# Patient Record
Sex: FEMALE | Race: WHITE | Employment: OTHER | URBAN - METROPOLITAN AREA
[De-identification: names, ages, dates, MRNs, and addresses within clinical notes are randomized per-mention and may not be internally consistent; named-entity substitution may affect disease eponyms.]

---

## 2020-12-18 ENCOUNTER — OFFICE VISIT (OUTPATIENT)
Dept: CARDIOLOGY CLINIC | Age: 75
End: 2020-12-18
Payer: MEDICARE

## 2020-12-18 VITALS
HEART RATE: 65 BPM | RESPIRATION RATE: 18 BRPM | HEIGHT: 63 IN | SYSTOLIC BLOOD PRESSURE: 138 MMHG | DIASTOLIC BLOOD PRESSURE: 80 MMHG | OXYGEN SATURATION: 100 % | WEIGHT: 92 LBS | BODY MASS INDEX: 16.3 KG/M2

## 2020-12-18 DIAGNOSIS — I49.9 IRREGULAR HEART BEAT: Primary | ICD-10-CM

## 2020-12-18 PROCEDURE — G8536 NO DOC ELDER MAL SCRN: HCPCS | Performed by: SPECIALIST

## 2020-12-18 PROCEDURE — G8510 SCR DEP NEG, NO PLAN REQD: HCPCS | Performed by: SPECIALIST

## 2020-12-18 PROCEDURE — 93005 ELECTROCARDIOGRAM TRACING: CPT | Performed by: SPECIALIST

## 2020-12-18 PROCEDURE — G8400 PT W/DXA NO RESULTS DOC: HCPCS | Performed by: SPECIALIST

## 2020-12-18 PROCEDURE — G8427 DOCREV CUR MEDS BY ELIG CLIN: HCPCS | Performed by: SPECIALIST

## 2020-12-18 PROCEDURE — 3288F FALL RISK ASSESSMENT DOCD: CPT | Performed by: SPECIALIST

## 2020-12-18 PROCEDURE — 99203 OFFICE O/P NEW LOW 30 MIN: CPT | Performed by: SPECIALIST

## 2020-12-18 PROCEDURE — 1100F PTFALLS ASSESS-DOCD GE2>/YR: CPT | Performed by: SPECIALIST

## 2020-12-18 PROCEDURE — G8419 CALC BMI OUT NRM PARAM NOF/U: HCPCS | Performed by: SPECIALIST

## 2020-12-18 PROCEDURE — 1090F PRES/ABSN URINE INCON ASSESS: CPT | Performed by: SPECIALIST

## 2020-12-18 PROCEDURE — 3017F COLORECTAL CA SCREEN DOC REV: CPT | Performed by: SPECIALIST

## 2020-12-18 PROCEDURE — 93010 ELECTROCARDIOGRAM REPORT: CPT | Performed by: SPECIALIST

## 2020-12-18 PROCEDURE — G0463 HOSPITAL OUTPT CLINIC VISIT: HCPCS | Performed by: SPECIALIST

## 2020-12-18 RX ORDER — BUTALBITAL, ACETAMINOPHEN AND CAFFEINE 50; 325; 40 MG/1; MG/1; MG/1
1-2 TABLET ORAL
COMMUNITY
Start: 2020-12-15 | End: 2022-09-16

## 2020-12-18 RX ORDER — METOPROLOL SUCCINATE 25 MG/1
25 TABLET, EXTENDED RELEASE ORAL EVERY MORNING
Status: ON HOLD | COMMUNITY
End: 2022-09-13

## 2020-12-18 RX ORDER — METOCLOPRAMIDE 10 MG/1
10 TABLET ORAL
Status: ON HOLD | COMMUNITY
Start: 2020-12-15 | End: 2021-12-16

## 2020-12-18 RX ORDER — DICLOFENAC SODIUM 75 MG/1
75 TABLET, DELAYED RELEASE ORAL 2 TIMES DAILY
Status: ON HOLD | COMMUNITY
Start: 2020-12-15 | End: 2021-12-16

## 2020-12-18 RX ORDER — DIGOXIN 250 MCG
0.25 TABLET ORAL DAILY
COMMUNITY
End: 2021-11-15

## 2020-12-18 RX ORDER — ASPIRIN 81 MG/1
325 TABLET ORAL DAILY
COMMUNITY
End: 2022-02-04

## 2020-12-18 RX ORDER — PANTOPRAZOLE SODIUM 40 MG/1
40 TABLET, DELAYED RELEASE ORAL DAILY
COMMUNITY
Start: 2020-12-15

## 2020-12-18 RX ORDER — LACTULOSE 10 G/15ML
10 SOLUTION ORAL; RECTAL DAILY
COMMUNITY
Start: 2020-12-15 | End: 2022-09-16

## 2020-12-18 NOTE — PROGRESS NOTES
[address] [facility]                                                            OFFICE NOTE        An Flores M.D.,MARCOS            Nereyda [place]   1945  [id number]    Date/Time:  12/18/20203:08 PM        ICD-10-CM ICD-9-CM    1. Irregular heart beat  I49.9 427.9 AMB POC EKG ROUTINE W/ 12 LEADS, INTER & REP         SUBJECTIVE:    Doing well no cp or sob or palpitations reported     Assessment/Plan  1. Paroxysmal atrial fibrillation: She seems to be completely asymptomatic at this time. Her electrocardio reveals a normal sinus rhythm with nonspecific ST-T wave changes. ST segment depression may be related to the use of digoxin. We have discussed digoxin and Toprol use. This will be continued for now. She tells me that she was previously on Eliquis but this had to be stopped secondary to bleeding. She now on aspirin a day only. For now no additional cardiac interventions. I will request records from [place]. Echo on hold. Her blood pressure seems to be controlled. She is closely followed by primary care physician. Otherwise see her back in 3 months. HPI   Very pleasant 69years old lady with a past medical history remarkable for paroxysmal atrial fibrillation which she tells me she has been having since the 1980s. She has recently relocated from [place] to [place] where her family is and she is currently living with her son. She has been followed by cardiology in [place]. She tells me that she has had an episode of congestive heart failure many years ago possibly related to atrial fibrillation rapid ventricular response which was then resolved by echo 2 or 3 years ago. Records not yet available at this time. She also has a history of gastroesophageal reflux disease. She quit smoking 2011. She does not drink.   She is not very active on account of unsteady gait. CARDIAC STUDIES                                       EKG Results     Procedure 720 Value Units Date/Time    AMB POC EKG ROUTINE W/ 12 LEADS, INTER & REP [[id number]] Resulted: 12/18/20 1457    Order Status: Completed Updated: 12/18/20 1500              IMAGING      MRI Results (most recent):  No results found for this or any previous visit. CT Results (most recent):  No results found for this or any previous visit. XR Results (most recent):  No results found for this or any previous visit. No past medical history on file. No past surgical history on file. Social History     Tobacco Use    Smoking status: Former Smoker     Quit date: 12/18/2020    Smokeless tobacco: Never Used   Substance Use Topics    Alcohol use: Never     Frequency: Never     Binge frequency: Never    Drug use: Never     No family history on file. Allergies   Allergen Reactions    Cefuroxime Hives    Hydrocodone Nausea and Vomiting    Oxycodone Nausea and Vomiting    Penicillins Hives         Visit Vitals  /80 (BP 1 Location: Left arm, BP Patient Position: Sitting)   Pulse 65   Resp 18   Ht 5' 3\" (1.6 m)   Wt 92 lb (41.7 kg)   SpO2 100%   BMI 16.30 kg/m²         Last 3 Recorded Weights in this Encounter    12/18/20 1444   Weight: 92 lb (41.7 kg)            Review of Systems:   Pertinent items are noted in the History of Present Illness. Neck: no JVD  Heart: regular rate and rhythm  Lungs: clear to auscultation bilaterally  Abdomen: soft, non-tender. Bowel sounds normal. No masses,  no organomegaly  Extremities: no edema      Current Outpatient Medications on File Prior to Visit   Medication Sig Dispense Refill    aspirin delayed-release 81 mg tablet Take 81 mg by mouth daily.  butalbital-acetaminophen-caffeine (FIORICET, ESGIC) -40 mg per tablet Take 1-2 Tabs by mouth every six (6) hours as needed.       diclofenac EC (VOLTAREN) 75 mg EC tablet Take 75 mg by mouth two (2) times a day.  digoxin (LANOXIN) 0.25 mg tablet Take 0.25 mg by mouth daily.  lactulose (CHRONULAC) 10 gram/15 mL solution Take 20 g by mouth two (2) times daily as needed.  metoclopramide HCl (REGLAN) 10 mg tablet Take 10 mg by mouth every eight (8) hours as needed.  metoprolol succinate (TOPROL-XL) 25 mg XL tablet Take 25 mg by mouth daily.  pantoprazole (PROTONIX) 40 mg tablet Take 40 mg by mouth daily. No current facility-administered medications on file prior to visit. Manda Schulz had no medications administered during this visit. Current Outpatient Medications   Medication Sig    aspirin delayed-release 81 mg tablet Take 81 mg by mouth daily.  butalbital-acetaminophen-caffeine (FIORICET, ESGIC) -40 mg per tablet Take 1-2 Tabs by mouth every six (6) hours as needed.  diclofenac EC (VOLTAREN) 75 mg EC tablet Take 75 mg by mouth two (2) times a day.  digoxin (LANOXIN) 0.25 mg tablet Take 0.25 mg by mouth daily.  lactulose (CHRONULAC) 10 gram/15 mL solution Take 20 g by mouth two (2) times daily as needed.  metoclopramide HCl (REGLAN) 10 mg tablet Take 10 mg by mouth every eight (8) hours as needed.  metoprolol succinate (TOPROL-XL) 25 mg XL tablet Take 25 mg by mouth daily.  pantoprazole (PROTONIX) 40 mg tablet Take 40 mg by mouth daily. No current facility-administered medications for this visit.           No results found for: CHOL, CHOLPOCT, CHOLX, CHLST, CHOLV, HDL, HDLPOC, HDLP, LDL, LDLCPOC, LDLC, DLDLP, VLDLC, VLDL, TGLX, TRIGL, TRIGP, TGLPOCT, CHHD, CHHDX    No results found for: NA, K, CL, CO2, AGAP, GLU, BUN, CREA, BUCR, GFRAA, GFRNA, CA, GFRAA    No results found for: ALT, GGT, GGTP, AP, APIT, APX, CBIL, TBIL, TBILI    No results found for: WBC, WBCLT, HGBPOC, HGB, HGBP, HCTPOC, HCT, PHCT, RBCH, PLT, MCV, HGBEXT, HCTEXT, PLTEXT    No results found for: TSH, TSH2, TSH3, TSHP, TSHEXT      No results found for: CHOL, CHOLPOCT, CHOLX, CHLST, CHOLV, HDL, HDLP, LDL, LDLC, DLDLP, TGLX, TRIGL, TRIGP, TGLPOCT, NTGLT, CHHD, CHHDX             Please note that this dictation was completed with [email or web address], the computer voice recognition software. Quite often unanticipated grammatical, syntax, homophones, and other interpretative errors are inadvertently transcribed by the computer software. Please disregard these errors. Please excuse any errors that have escaped final proofreading.

## 2020-12-18 NOTE — PROGRESS NOTES
.  Visit Vitals  /80 (BP 1 Location: Left arm, BP Patient Position: Sitting)   Pulse 65   Resp 18   Ht 5' 3\" (1.6 m)   Wt 92 lb (41.7 kg)   SpO2 100%   BMI 16.30 kg/m²

## 2021-03-12 ENCOUNTER — OFFICE VISIT (OUTPATIENT)
Dept: CARDIOLOGY CLINIC | Age: 76
End: 2021-03-12
Payer: MEDICARE

## 2021-03-12 VITALS
BODY MASS INDEX: 16.48 KG/M2 | DIASTOLIC BLOOD PRESSURE: 70 MMHG | RESPIRATION RATE: 18 BRPM | HEART RATE: 66 BPM | SYSTOLIC BLOOD PRESSURE: 110 MMHG | OXYGEN SATURATION: 97 % | HEIGHT: 63 IN | WEIGHT: 93 LBS

## 2021-03-12 DIAGNOSIS — I49.9 IRREGULAR HEART BEAT: ICD-10-CM

## 2021-03-12 DIAGNOSIS — I10 ESSENTIAL HYPERTENSION: Primary | ICD-10-CM

## 2021-03-12 PROCEDURE — G8752 SYS BP LESS 140: HCPCS | Performed by: SPECIALIST

## 2021-03-12 PROCEDURE — G8510 SCR DEP NEG, NO PLAN REQD: HCPCS | Performed by: SPECIALIST

## 2021-03-12 PROCEDURE — G8400 PT W/DXA NO RESULTS DOC: HCPCS | Performed by: SPECIALIST

## 2021-03-12 PROCEDURE — 1100F PTFALLS ASSESS-DOCD GE2>/YR: CPT | Performed by: SPECIALIST

## 2021-03-12 PROCEDURE — G8754 DIAS BP LESS 90: HCPCS | Performed by: SPECIALIST

## 2021-03-12 PROCEDURE — G8427 DOCREV CUR MEDS BY ELIG CLIN: HCPCS | Performed by: SPECIALIST

## 2021-03-12 PROCEDURE — 3288F FALL RISK ASSESSMENT DOCD: CPT | Performed by: SPECIALIST

## 2021-03-12 PROCEDURE — 99214 OFFICE O/P EST MOD 30 MIN: CPT | Performed by: SPECIALIST

## 2021-03-12 PROCEDURE — G8419 CALC BMI OUT NRM PARAM NOF/U: HCPCS | Performed by: SPECIALIST

## 2021-03-12 PROCEDURE — 1090F PRES/ABSN URINE INCON ASSESS: CPT | Performed by: SPECIALIST

## 2021-03-12 PROCEDURE — G8536 NO DOC ELDER MAL SCRN: HCPCS | Performed by: SPECIALIST

## 2021-03-12 PROCEDURE — G0463 HOSPITAL OUTPT CLINIC VISIT: HCPCS | Performed by: SPECIALIST

## 2021-03-12 PROCEDURE — 3017F COLORECTAL CA SCREEN DOC REV: CPT | Performed by: SPECIALIST

## 2021-03-12 NOTE — PROGRESS NOTES
.  Visit Vitals  /70 (BP 1 Location: Left upper arm, BP Patient Position: Sitting, BP Cuff Size: Adult)   Pulse 66   Resp 18   Ht 5' 3\" (1.6 m)   Wt 93 lb (42.2 kg)   SpO2 97%   BMI 16.47 kg/m²

## 2021-03-12 NOTE — PROGRESS NOTES
[address] [facility]                                                            OFFICE NOTE        Mini Montejo M.D.,MARCOS            [place]   1945  [id number]    Date/Time:  3/12/630411:47 PM      SUBJECTIVE:  Doing well no cp or sob or palpitations   Somewhat sedentary on account of hips and knees pain       Assessment/Plan    1. Paroxysmal atrial fibrillation: She remains completely asymptomatic. Her clinical examination today reveals a perfectly regular rhythm and rate. No recurrent atrial fibrillation thus far. Continue with Toprol and digoxin for now. The patient is aware of potential toxicity of digoxin nonetheless. She tells me that she was previously on Eliquis which was stopped on account of bleeding. She is on aspirin only at this time. She is aware of potential CVA occurring with paroxysmal atrial fibrillation. Proceed with echocardiogram at the next office visit to evaluate ejection fraction. Her blood pressure seems to be controlled. Her cholesterol is closely followed by primary care physician. Otherwise she will continue same activity although I encouraged her to try to stay a little more active and I will see her back in 6 months after echocardiogram.      HPI   Very pleasant 77years old lady with a past medical history remarkable for paroxysmal atrial fibrillation which she tells me she has been having since the 46s.     She has recently relocated from [place] to [place] where her family is and she is currently living with her son.     She has been followed by cardiology in [place].   She tells me that she has had an episode of congestive heart failure many years ago possibly related to atrial fibrillation rapid ventricular response which was then resolved by echo 2 or 3 years ago.     She also has a history of gastroesophageal reflux disease.     She quit smoking . She does not drink. She is not very active on account of unsteady gait. CARDIAC STUDIES                                       EKG Results     None              IMAGING      MRI Results (most recent):  No results found for this or any previous visit. CT Results (most recent):  No results found for this or any previous visit. XR Results (most recent):  No results found for this or any previous visit. No past medical history on file. No past surgical history on file. Social History     Tobacco Use    Smoking status: Former Smoker     Quit date: 2020     Years since quittin.2    Smokeless tobacco: Never Used   Substance Use Topics    Alcohol use: Never     Frequency: Never     Binge frequency: Never    Drug use: Never     No family history on file. Allergies   Allergen Reactions    Cefuroxime Hives    Hydrocodone Nausea and Vomiting    Oxycodone Nausea and Vomiting    Penicillins Hives         There were no vitals taken for this visit. There were no vitals filed for this visit. Review of Systems:   Pertinent items are noted in the History of Present Illness. Visit Vitals  /70 (BP 1 Location: Left upper arm, BP Patient Position: Sitting, BP Cuff Size: Adult)   Pulse 66   Resp 18   Ht 5' 3\" (1.6 m)   Wt 93 lb (42.2 kg)   SpO2 97%   BMI 16.47 kg/m²     General Appearance:  Well developed, well nourished,alert and oriented x 3, and individual in no acute distress. Ears/Nose/Mouth/Throat:   Hearing grossly normal.         Neck: Supple. Chest:   Lungs clear to auscultation bilaterally. Cardiovascular:  Regular rate and rhythm, S1, S2 normal, no murmur. Abdomen:   Soft, non-tender, bowel sounds are active. Extremities: No edema bilaterally. Skin: Warm and dry. Current Outpatient Medications on File Prior to Visit   Medication Sig Dispense Refill    aspirin delayed-release 81 mg tablet Take 81 mg by mouth daily.  butalbital-acetaminophen-caffeine (FIORICET, ESGIC) -40 mg per tablet Take 1-2 Tabs by mouth every six (6) hours as needed.  diclofenac EC (VOLTAREN) 75 mg EC tablet Take 75 mg by mouth two (2) times a day.  digoxin (LANOXIN) 0.25 mg tablet Take 0.25 mg by mouth daily.  lactulose (CHRONULAC) 10 gram/15 mL solution Take 20 g by mouth two (2) times daily as needed.  metoclopramide HCl (REGLAN) 10 mg tablet Take 10 mg by mouth every eight (8) hours as needed.  metoprolol succinate (TOPROL-XL) 25 mg XL tablet Take 25 mg by mouth daily.  pantoprazole (PROTONIX) 40 mg tablet Take 40 mg by mouth daily. No current facility-administered medications on file prior to visit. Marshall Lerner had no medications administered during this visit. Current Outpatient Medications   Medication Sig    aspirin delayed-release 81 mg tablet Take 81 mg by mouth daily.  butalbital-acetaminophen-caffeine (FIORICET, ESGIC) -40 mg per tablet Take 1-2 Tabs by mouth every six (6) hours as needed.  diclofenac EC (VOLTAREN) 75 mg EC tablet Take 75 mg by mouth two (2) times a day.  digoxin (LANOXIN) 0.25 mg tablet Take 0.25 mg by mouth daily.  lactulose (CHRONULAC) 10 gram/15 mL solution Take 20 g by mouth two (2) times daily as needed.  metoclopramide HCl (REGLAN) 10 mg tablet Take 10 mg by mouth every eight (8) hours as needed.  metoprolol succinate (TOPROL-XL) 25 mg XL tablet Take 25 mg by mouth daily.  pantoprazole (PROTONIX) 40 mg tablet Take 40 mg by mouth daily. No current facility-administered medications for this visit.           No results found for: CHOL, CHOLPOCT, CHOLX, CHLST, CHOLV, HDL, HDLPOC, HDLP, LDL, LDLCPOC, LDLC, DLDLP, VLDLC, VLDL, TGLX, TRIGL, TRIGP, TGLPOCT, CHHD, CHHDX    No results found for: NA, K, CL, CO2, AGAP, GLU, BUN, CREA, BUCR, GFRAA, GFRNA, CA, GFRAA    No results found for: ALT, GGT, GGTP, AP, APIT, APX, CBIL, TBIL, TBILI    No results found for: WBC, WBCLT, HGBPOC, HGB, HGBP, HCTPOC, HCT, PHCT, RBCH, PLT, MCV, HGBEXT, HCTEXT, PLTEXT    No results found for: TSH, TSH2, TSH3, TSHP, TSHEXT      No results found for: CHOL, CHOLPOCT, CHOLX, CHLST, CHOLV, HDL, HDLP, LDL, LDLC, DLDLP, TGLX, TRIGL, TRIGP, TGLPOCT, NTGLT, CHHD, CHHDX             Please note that this dictation was completed with [employer], the [employer] voice recognition software. Quite often unanticipated grammatical, syntax, homophones, and other interpretative errors are inadvertently transcribed by the computer software. Please disregard these errors. Please excuse any errors that have escaped final proofreading.

## 2021-08-30 ENCOUNTER — HOSPITAL ENCOUNTER (EMERGENCY)
Age: 76
Discharge: HOME | End: 2021-08-31
Attending: EMERGENCY MEDICINE | Admitting: EMERGENCY MEDICINE
Payer: MEDICARE

## 2021-08-30 DIAGNOSIS — E87.6 HYPOKALEMIA: Primary | ICD-10-CM

## 2021-08-30 DIAGNOSIS — K21.9 GASTROESOPHAGEAL REFLUX DISEASE, UNSPECIFIED WHETHER ESOPHAGITIS PRESENT: ICD-10-CM

## 2021-08-30 LAB
ALBUMIN SERPL-MCNC: 3.7 G/DL (ref 3.5–5)
ALBUMIN/GLOB SERPL: 0.9 {RATIO} (ref 1.1–2.2)
ALP SERPL-CCNC: 142 U/L (ref 45–117)
ALT SERPL-CCNC: 14 U/L (ref 12–78)
ANION GAP SERPL CALC-SCNC: 9 MMOL/L (ref 5–15)
AST SERPL-CCNC: 14 U/L (ref 15–37)
BASOPHILS # BLD: 0.1 K/UL (ref 0–0.1)
BASOPHILS NFR BLD: 1 % (ref 0–1)
BILIRUB SERPL-MCNC: 0.2 MG/DL (ref 0.2–1)
BUN SERPL-MCNC: 6 MG/DL (ref 6–20)
BUN/CREAT SERPL: 7 (ref 12–20)
CALCIUM SERPL-MCNC: 8.7 MG/DL (ref 8.5–10.1)
CHLORIDE SERPL-SCNC: 103 MMOL/L (ref 97–108)
CO2 SERPL-SCNC: 25 MMOL/L (ref 21–32)
COMMENT, HOLDF: NORMAL
CREAT SERPL-MCNC: 0.85 MG/DL (ref 0.55–1.02)
DIFFERENTIAL METHOD BLD: ABNORMAL
EOSINOPHIL # BLD: 0.1 K/UL (ref 0–0.4)
EOSINOPHIL NFR BLD: 2 % (ref 0–7)
ERYTHROCYTE [DISTWIDTH] IN BLOOD BY AUTOMATED COUNT: 18.2 % (ref 11.5–14.5)
GLOBULIN SER CALC-MCNC: 4.1 G/DL (ref 2–4)
GLUCOSE SERPL-MCNC: 90 MG/DL (ref 65–100)
HCT VFR BLD AUTO: 28.5 % (ref 35–47)
HGB BLD-MCNC: 8.6 G/DL (ref 11.5–16)
IMM GRANULOCYTES # BLD AUTO: 0 K/UL (ref 0–0.04)
IMM GRANULOCYTES NFR BLD AUTO: 0 % (ref 0–0.5)
LYMPHOCYTES # BLD: 2.4 K/UL (ref 0.8–3.5)
LYMPHOCYTES NFR BLD: 34 % (ref 12–49)
MCH RBC QN AUTO: 21.7 PG (ref 26–34)
MCHC RBC AUTO-ENTMCNC: 30.2 G/DL (ref 30–36.5)
MCV RBC AUTO: 72 FL (ref 80–99)
MONOCYTES # BLD: 0.4 K/UL (ref 0–1)
MONOCYTES NFR BLD: 6 % (ref 5–13)
NEUTS SEG # BLD: 4 K/UL (ref 1.8–8)
NEUTS SEG NFR BLD: 57 % (ref 32–75)
NRBC # BLD: 0 K/UL (ref 0–0.01)
NRBC BLD-RTO: 0 PER 100 WBC
PLATELET # BLD AUTO: 460 K/UL (ref 150–400)
PMV BLD AUTO: 9.2 FL (ref 8.9–12.9)
POTASSIUM SERPL-SCNC: 2.8 MMOL/L (ref 3.5–5.1)
PROT SERPL-MCNC: 7.8 G/DL (ref 6.4–8.2)
RBC # BLD AUTO: 3.96 M/UL (ref 3.8–5.2)
SAMPLES BEING HELD,HOLD: NORMAL
SODIUM SERPL-SCNC: 137 MMOL/L (ref 136–145)
TROPONIN I SERPL-MCNC: <0.05 NG/ML
WBC # BLD AUTO: 7.1 K/UL (ref 3.6–11)

## 2021-08-30 PROCEDURE — 74011250637 HC RX REV CODE- 250/637: Performed by: EMERGENCY MEDICINE

## 2021-08-30 PROCEDURE — 99285 EMERGENCY DEPT VISIT HI MDM: CPT

## 2021-08-30 PROCEDURE — 96375 TX/PRO/DX INJ NEW DRUG ADDON: CPT

## 2021-08-30 PROCEDURE — C9113 INJ PANTOPRAZOLE SODIUM, VIA: HCPCS | Performed by: EMERGENCY MEDICINE

## 2021-08-30 PROCEDURE — 74011000250 HC RX REV CODE- 250: Performed by: EMERGENCY MEDICINE

## 2021-08-30 PROCEDURE — 80053 COMPREHEN METABOLIC PANEL: CPT

## 2021-08-30 PROCEDURE — 84484 ASSAY OF TROPONIN QUANT: CPT

## 2021-08-30 PROCEDURE — 96374 THER/PROPH/DIAG INJ IV PUSH: CPT

## 2021-08-30 PROCEDURE — 85025 COMPLETE CBC W/AUTO DIFF WBC: CPT

## 2021-08-30 PROCEDURE — 96361 HYDRATE IV INFUSION ADD-ON: CPT

## 2021-08-30 PROCEDURE — 74011250636 HC RX REV CODE- 250/636: Performed by: EMERGENCY MEDICINE

## 2021-08-30 RX ORDER — POTASSIUM CHLORIDE 7.45 MG/ML
10 INJECTION INTRAVENOUS ONCE
Status: COMPLETED | OUTPATIENT
Start: 2021-08-30 | End: 2021-08-31

## 2021-08-30 RX ORDER — FAMOTIDINE 20 MG/1
20 TABLET, FILM COATED ORAL 2 TIMES DAILY
Qty: 20 TABLET | Refills: 0 | Status: SHIPPED | OUTPATIENT
Start: 2021-08-30 | End: 2021-09-09

## 2021-08-30 RX ORDER — ONDANSETRON 2 MG/ML
4 INJECTION INTRAMUSCULAR; INTRAVENOUS
Status: COMPLETED | OUTPATIENT
Start: 2021-08-30 | End: 2021-08-30

## 2021-08-30 RX ORDER — POTASSIUM CHLORIDE 1500 MG/1
40 TABLET, FILM COATED, EXTENDED RELEASE ORAL DAILY
Qty: 6 TABLET | Refills: 0 | Status: SHIPPED | OUTPATIENT
Start: 2021-08-30 | End: 2021-09-02

## 2021-08-30 RX ORDER — POTASSIUM CHLORIDE 750 MG/1
40 TABLET, FILM COATED, EXTENDED RELEASE ORAL
Status: COMPLETED | OUTPATIENT
Start: 2021-08-30 | End: 2021-08-30

## 2021-08-30 RX ADMIN — POTASSIUM CHLORIDE 10 MEQ: 7.46 INJECTION, SOLUTION INTRAVENOUS at 22:05

## 2021-08-30 RX ADMIN — ALUMINUM HYDROXIDE, MAGNESIUM HYDROXIDE, AND SIMETHICONE 40 ML: 200; 200; 20 SUSPENSION ORAL at 22:15

## 2021-08-30 RX ADMIN — SODIUM CHLORIDE 1000 ML: 9 INJECTION, SOLUTION INTRAVENOUS at 22:01

## 2021-08-30 RX ADMIN — ONDANSETRON 4 MG: 2 INJECTION INTRAMUSCULAR; INTRAVENOUS at 22:02

## 2021-08-30 RX ADMIN — PANTOPRAZOLE SODIUM 40 MG: 40 INJECTION, POWDER, FOR SOLUTION INTRAVENOUS at 22:02

## 2021-08-30 RX ADMIN — POTASSIUM CHLORIDE 40 MEQ: 750 TABLET, FILM COATED, EXTENDED RELEASE ORAL at 22:11

## 2021-08-31 VITALS
DIASTOLIC BLOOD PRESSURE: 71 MMHG | OXYGEN SATURATION: 98 % | SYSTOLIC BLOOD PRESSURE: 142 MMHG | TEMPERATURE: 97.8 F | HEART RATE: 86 BPM | RESPIRATION RATE: 16 BRPM

## 2021-08-31 NOTE — DISCHARGE INSTRUCTIONS
Your potassium was low today and we gave you IVF. Please take pepcid for your heart burn and follow up with GI. Also take potassium pills as prescribed. Thank you.

## 2021-08-31 NOTE — ED PROVIDER NOTES
Is a 76-year-old female with history of atrial fibrillation, eating disorder, hyponatremia resulting in seizures last year, chronic nausea who presents with acute exacerbation of her nausea, decreased p.o. intake, concern for dehydration. Patient reports that her electrolytes were abnormal last year resulting in admission to outside hospital.  She wants to make sure that her electrolytes are fine and that she gets some IV fluids because she is dehydrated. Patient reports that she moved from [place] last year right before the admission to the outside hospital for seizures and hyponatremia. Reports that she has not seen a GI specialist in [place] yet but would like a referral.  Denies any abdominal pain, urinary or vaginal symptoms, change in bowel habits. Past Medical History:   Diagnosis Date    Atrial fibrillation [facility])        Past Surgical History:   Procedure Laterality Date    HX  SECTION      HX CHOLECYSTECTOMY      HX HIP REPLACEMENT Left     HX HIP REPLACEMENT Right     HX HYSTERECTOMY      HX TONSILLECTOMY           No family history on file.     Social History     Socioeconomic History    Marital status: SINGLE     Spouse name: Not on file    Number of children: Not on file    Years of education: Not on file    Highest education level: Not on file   Occupational History    Not on file   Tobacco Use    Smoking status: Former Smoker     Quit date: 2020     Years since quittin.6    Smokeless tobacco: Never Used   Substance and Sexual Activity    Alcohol use: Never    Drug use: Never    Sexual activity: Not on file   Other Topics Concern    Not on file   Social History Narrative    Not on file     Social Determinants of Health     Financial Resource Strain:     Difficulty of Paying Living Expenses:    Food Insecurity:     Worried About Running Out of Food in the Last Year:     [address] in the Last Year:    Transportation Needs:     Lack of Transportation (Medical):  Lack of Transportation (Non-Medical):    Physical Activity:     Days of Exercise per Week:     Minutes of Exercise per Session:    Stress:     Feeling of Stress :    Social Connections:     Frequency of Communication with Friends and Family:     Frequency of Social Gatherings with Friends and Family:     Attends [employer] Services:     Active Member of Clubs or Organizations:     Attends Club or Organization Meetings:     Marital Status:    Intimate Partner Violence:     Fear of Current or Ex-Partner:     Emotionally Abused:     Physically Abused:     Sexually Abused: ALLERGIES: Cefuroxime, Hydrocodone, Oxycodone, and Penicillins    Review of Systems   Constitutional: Positive for fatigue. Negative for chills and fever. HENT: Negative for drooling and nosebleeds. Eyes: Negative for pain and itching. Respiratory: Negative for choking and stridor. Cardiovascular: Negative for leg swelling. Gastrointestinal: Positive for nausea. Negative for abdominal distention and rectal pain. Endocrine: Negative for heat intolerance and polyphagia. Genitourinary: Negative for enuresis and genital sores. Musculoskeletal: Negative for arthralgias and joint swelling. Skin: Negative for color change. Allergic/Immunologic: Negative for immunocompromised state. Neurological: Positive for light-headedness. Negative for tremors and speech difficulty. Hematological: Negative for adenopathy. Psychiatric/Behavioral: Negative for dysphoric mood and sleep disturbance. Vitals:    08/30/21 1818 08/30/21 2103   BP: 122/66 114/67   Pulse: 72 86   Resp: 16 16   Temp: 98.6 °F (37 °C) 97.8 °F (36.6 °C)   SpO2: 97% 98%            Physical Exam  Vitals and nursing note reviewed. Constitutional:       General: She is not in acute distress. Appearance: She is well-developed. She is not toxic-appearing or diaphoretic. HENT:      Head: Normocephalic.       Nose: Nose normal.      Mouth/Throat:      Mouth: Mucous membranes are dry. Eyes:      Conjunctiva/sclera: Conjunctivae normal.   Cardiovascular:      Rate and Rhythm: Normal rate and regular rhythm. Heart sounds: Normal heart sounds. Pulmonary:      Effort: Pulmonary effort is normal. No respiratory distress. Breath sounds: Normal breath sounds. Abdominal:      General: There is no distension. Palpations: Abdomen is soft. Tenderness: There is no abdominal tenderness. Musculoskeletal:         General: No deformity. Normal range of motion. Cervical back: Normal range of motion and neck supple. Skin:     General: Skin is warm and dry. Neurological:      Mental Status: She is alert and oriented to person, place, and time. Coordination: Coordination normal.   Psychiatric:         Behavior: Behavior normal.          MDM  Number of Diagnoses or Management Options  Gastroesophageal reflux disease, unspecified whether esophagitis present  Hypokalemia  Diagnosis management comments: No concern for hyponatremia today. Potassium repleted and IV fluids administered. Will provide prescription for potassium and Pepcid to add on to current GERD regimen. Referral for GI provided. Patient is stable for discharge at this time. Procedures    Patient's results have been reviewed with them. Patient and/or family have verbally conveyed their understanding and agreement of the patient's signs, symptoms, diagnosis, treatment and prognosis and additionally agree to follow up as recommended or return to the Emergency Room should their condition change prior to follow-up. Discharge instructions have also been provided to the patient with some educational information regarding their diagnosis as well a list of reasons why they would want to return to the ER prior to their follow-up appointment should their condition change.

## 2021-09-04 ENCOUNTER — HOSPITAL ENCOUNTER (EMERGENCY)
Age: 76
Discharge: HOME | End: 2021-09-04
Attending: EMERGENCY MEDICINE
Payer: MEDICARE

## 2021-09-04 ENCOUNTER — APPOINTMENT (OUTPATIENT)
Dept: GENERAL RADIOLOGY | Age: 76
End: 2021-09-04
Attending: EMERGENCY MEDICINE
Payer: MEDICARE

## 2021-09-04 VITALS
SYSTOLIC BLOOD PRESSURE: 120 MMHG | TEMPERATURE: 98.3 F | DIASTOLIC BLOOD PRESSURE: 64 MMHG | BODY MASS INDEX: 15.95 KG/M2 | HEIGHT: 63 IN | HEART RATE: 85 BPM | WEIGHT: 90 LBS | OXYGEN SATURATION: 100 % | RESPIRATION RATE: 16 BRPM

## 2021-09-04 DIAGNOSIS — R06.09 DYSPNEA ON EXERTION: ICD-10-CM

## 2021-09-04 DIAGNOSIS — D50.9 IRON DEFICIENCY ANEMIA, UNSPECIFIED IRON DEFICIENCY ANEMIA TYPE: Primary | ICD-10-CM

## 2021-09-04 DIAGNOSIS — T50.905A DRUG-INDUCED HYPERKALEMIA: ICD-10-CM

## 2021-09-04 DIAGNOSIS — E87.5 DRUG-INDUCED HYPERKALEMIA: ICD-10-CM

## 2021-09-04 LAB
ALBUMIN SERPL-MCNC: 3.5 G/DL (ref 3.5–5)
ALBUMIN/GLOB SERPL: 0.9 {RATIO} (ref 1.1–2.2)
ALP SERPL-CCNC: 121 U/L (ref 45–117)
ALT SERPL-CCNC: 19 U/L (ref 12–78)
ANION GAP SERPL CALC-SCNC: 9 MMOL/L (ref 5–15)
AST SERPL-CCNC: 14 U/L (ref 15–37)
BASOPHILS # BLD: 0.1 K/UL (ref 0–0.1)
BASOPHILS NFR BLD: 1 % (ref 0–1)
BILIRUB SERPL-MCNC: 0.2 MG/DL (ref 0.2–1)
BNP SERPL-MCNC: 515 PG/ML
BUN SERPL-MCNC: 11 MG/DL (ref 6–20)
BUN/CREAT SERPL: 11 (ref 12–20)
CALCIUM SERPL-MCNC: 9 MG/DL (ref 8.5–10.1)
CHLORIDE SERPL-SCNC: 105 MMOL/L (ref 97–108)
CO2 SERPL-SCNC: 20 MMOL/L (ref 21–32)
COMMENT, HOLDF: NORMAL
CREAT SERPL-MCNC: 0.98 MG/DL (ref 0.55–1.02)
DIFFERENTIAL METHOD BLD: ABNORMAL
EOSINOPHIL # BLD: 0.2 K/UL (ref 0–0.4)
EOSINOPHIL NFR BLD: 3 % (ref 0–7)
ERYTHROCYTE [DISTWIDTH] IN BLOOD BY AUTOMATED COUNT: 18.2 % (ref 11.5–14.5)
GLOBULIN SER CALC-MCNC: 3.9 G/DL (ref 2–4)
GLUCOSE SERPL-MCNC: 107 MG/DL (ref 65–100)
HCT VFR BLD AUTO: 26.5 % (ref 35–47)
HGB BLD-MCNC: 7.9 G/DL (ref 11.5–16)
IMM GRANULOCYTES # BLD AUTO: 0 K/UL (ref 0–0.04)
IMM GRANULOCYTES NFR BLD AUTO: 1 % (ref 0–0.5)
LYMPHOCYTES # BLD: 2.1 K/UL (ref 0.8–3.5)
LYMPHOCYTES NFR BLD: 25 % (ref 12–49)
MAGNESIUM SERPL-MCNC: 1.9 MG/DL (ref 1.6–2.4)
MCH RBC QN AUTO: 21.4 PG (ref 26–34)
MCHC RBC AUTO-ENTMCNC: 29.8 G/DL (ref 30–36.5)
MCV RBC AUTO: 71.8 FL (ref 80–99)
MONOCYTES # BLD: 0.9 K/UL (ref 0–1)
MONOCYTES NFR BLD: 11 % (ref 5–13)
NEUTS SEG # BLD: 5.2 K/UL (ref 1.8–8)
NEUTS SEG NFR BLD: 59 % (ref 32–75)
NRBC # BLD: 0 K/UL (ref 0–0.01)
NRBC BLD-RTO: 0 PER 100 WBC
PLATELET # BLD AUTO: 425 K/UL (ref 150–400)
PMV BLD AUTO: 8.9 FL (ref 8.9–12.9)
POTASSIUM SERPL-SCNC: 5.7 MMOL/L (ref 3.5–5.1)
PROT SERPL-MCNC: 7.4 G/DL (ref 6.4–8.2)
RBC # BLD AUTO: 3.69 M/UL (ref 3.8–5.2)
SAMPLES BEING HELD,HOLD: NORMAL
SODIUM SERPL-SCNC: 134 MMOL/L (ref 136–145)
TROPONIN I SERPL-MCNC: <0.05 NG/ML
WBC # BLD AUTO: 8.6 K/UL (ref 3.6–11)

## 2021-09-04 PROCEDURE — 83880 ASSAY OF NATRIURETIC PEPTIDE: CPT

## 2021-09-04 PROCEDURE — 71045 X-RAY EXAM CHEST 1 VIEW: CPT

## 2021-09-04 PROCEDURE — 36415 COLL VENOUS BLD VENIPUNCTURE: CPT

## 2021-09-04 PROCEDURE — 83735 ASSAY OF MAGNESIUM: CPT

## 2021-09-04 PROCEDURE — 80053 COMPREHEN METABOLIC PANEL: CPT

## 2021-09-04 PROCEDURE — 93005 ELECTROCARDIOGRAM TRACING: CPT

## 2021-09-04 PROCEDURE — 99284 EMERGENCY DEPT VISIT MOD MDM: CPT

## 2021-09-04 PROCEDURE — 85025 COMPLETE CBC W/AUTO DIFF WBC: CPT

## 2021-09-04 PROCEDURE — 84484 ASSAY OF TROPONIN QUANT: CPT

## 2021-09-04 RX ORDER — LANOLIN ALCOHOL/MO/W.PET/CERES
325 CREAM (GRAM) TOPICAL
Qty: 30 TABLET | Refills: 0 | Status: ON HOLD | OUTPATIENT
Start: 2021-09-04 | End: 2021-12-16

## 2021-09-04 NOTE — ED TRIAGE NOTES
Pt arrived with EMS from home c/o difficulty breathing for several months. Tested negative for COVID a few weeks ago. Pt is vaccinated against COVID. 99% on RA for EMS.

## 2021-09-04 NOTE — ED PROVIDER NOTES
50-year-old female with a history of atrial fibrillation, presents to the emergency department noting several months of dyspnea on exertion and fatigue. She reportedly has been tested multiple times for COVID-19 and has been negative. She is status post vaccination for COVID-19. She is found to have normal vital signs and satting 99% on room air in no respiratory distress while in route by EMS. On arrival she is speaking in full sentences in no distress. She was recently seen in the ED on  and was diagnosed with hypokalemia she was reportedly given Rx for a couple days of potassium supplement and then she began taking over-the-counter potassium supplements and drinking a lot of orange juice. States that she has an appointment with her primary care provider but does not for couple weeks. She denies any blood in stool or tarry black stools, nausea, vomiting, diarrhea, chest pain, fever, chills, or any other acute concerns. Past Medical History:   Diagnosis Date    Atrial fibrillation [facility])        Past Surgical History:   Procedure Laterality Date    HX  SECTION      HX CHOLECYSTECTOMY      HX HIP REPLACEMENT Left     HX HIP REPLACEMENT Right     HX HYSTERECTOMY      HX TONSILLECTOMY           History reviewed. No pertinent family history.     Social History     Socioeconomic History    Marital status: SINGLE     Spouse name: Not on file    Number of children: Not on file    Years of education: Not on file    Highest education level: Not on file   Occupational History    Not on file   Tobacco Use    Smoking status: Former Smoker     Quit date: 2020     Years since quittin.7    Smokeless tobacco: Never Used   Substance and Sexual Activity    Alcohol use: Never    Drug use: Never    Sexual activity: Not on file   Other Topics Concern    Not on file   Social History Narrative    Not on file     Social Determinants of Health     Financial Resource Strain:    [facility] Difficulty of Paying Living Expenses:    Food Insecurity:     Worried About Running Out of Food in the Last Year:     [address] in the Last Year:    Transportation Needs:     Lack of Transportation (Medical):  Lack of Transportation (Non-Medical):    Physical Activity:     Days of Exercise per Week:     Minutes of Exercise per Session:    Stress:     Feeling of Stress :    Social Connections:     Frequency of Communication with Friends and Family:     Frequency of Social Gatherings with Friends and Family:     Attends Denominational Services:     Active Member of Clubs or Organizations:     Attends Club or Organization Meetings:     Marital Status:    Intimate Partner Violence:     Fear of Current or Ex-Partner:     Emotionally Abused:     Physically Abused:     Sexually Abused: ALLERGIES: Cefuroxime, Hydrocodone, Oxycodone, and Penicillins    Review of Systems   Constitutional: Positive for activity change and fatigue. Negative for appetite change, chills and fever. HENT: Negative for congestion, rhinorrhea, sinus pressure, sneezing and sore throat. Eyes: Negative for photophobia and visual disturbance. Respiratory: Positive for shortness of breath (Dyspnea on exertion). Negative for cough. Cardiovascular: Negative for chest pain. Gastrointestinal: Negative for abdominal pain, blood in stool, constipation, diarrhea, nausea and vomiting. Genitourinary: Negative for difficulty urinating, dysuria, flank pain, frequency, hematuria, menstrual problem, urgency, vaginal bleeding and vaginal discharge. Musculoskeletal: Negative for arthralgias, back pain, myalgias and neck pain. Skin: Negative for rash and wound. Neurological: Negative for syncope, weakness, numbness and headaches. Psychiatric/Behavioral: Negative for self-injury and suicidal ideas. All other systems reviewed and are negative.       Vitals:    09/04/21 1724   BP: (!) 146/71   Pulse: 85   Resp: 16   Temp: 98.3 °F (36.8 °C)   SpO2: 99%   Weight: 40.8 kg (90 lb)   Height: 5' 3\" (1.6 m)            Physical Exam  Vitals and nursing note reviewed. Constitutional:       General: She is not in acute distress. Appearance: Normal appearance. She is well-developed. She is not diaphoretic. HENT:      Head: Normocephalic and atraumatic. Nose: Nose normal.   Eyes:      Extraocular Movements: Extraocular movements intact. Conjunctiva/sclera: Conjunctivae normal.      Pupils: Pupils are equal, round, and reactive to light. Cardiovascular:      Rate and Rhythm: Normal rate and regular rhythm. Heart sounds: Normal heart sounds. Pulmonary:      Effort: Pulmonary effort is normal.      Breath sounds: Normal breath sounds. Abdominal:      General: There is no distension. Palpations: Abdomen is soft. Tenderness: There is no abdominal tenderness. Musculoskeletal:         General: No tenderness. Cervical back: Neck supple. Skin:     General: Skin is warm and dry. Coloration: Skin is pale. Neurological:      General: No focal deficit present. Mental Status: She is alert and oriented to person, place, and time. Cranial Nerves: No cranial nerve deficit. Sensory: No sensory deficit. Motor: No weakness. Coordination: Coordination normal.          MDM   52-year-old female presents with chronic dyspnea on exertion for several months. She is afebrile with vital signs stable no acute distress. Patient does appear pale. Labs returned showing Hg 7.9, similar to prior measure of 8.6 on 8/30, but no leukocytosis, . K slightly elevated at 5.7, likely iatrogenic after taking p.o. potassium supplements. She was advised to stop taking supplemental potassium and just eat healthy diet including fruits and vegetables. Negative troponin unremarkable BNP. CXR viewed by myself and read by radiology showing no acute abnormalities.     Feel that her chronic fatigue symptoms are likely secondary to chronic anemia. Will Rx iron supplementation and recommended close follow-up with her PCP for further evaluation. Return precautions were given for worsening or concerns. This was discussed with the patient at the bedside she stated both understanding and agreement. Procedures  1736 EKG shows normal sinus rhythm with a rate of 79 bpm nonspecific ST changes inferolaterally but no ST elevation.

## 2021-09-05 LAB
ATRIAL RATE: 79 BPM
CALCULATED P AXIS, ECG09: 23 DEGREES
CALCULATED R AXIS, ECG10: 78 DEGREES
CALCULATED T AXIS, ECG11: 40 DEGREES
DIAGNOSIS, 93000: NORMAL
P-R INTERVAL, ECG05: 108 MS
Q-T INTERVAL, ECG07: 354 MS
QRS DURATION, ECG06: 78 MS
QTC CALCULATION (BEZET), ECG08: 405 MS
VENTRICULAR RATE, ECG03: 79 BPM

## 2021-09-07 ENCOUNTER — ANCILLARY PROCEDURE (OUTPATIENT)
Dept: CARDIOLOGY CLINIC | Age: 76
End: 2021-09-07
Payer: MEDICARE

## 2021-09-07 VITALS — HEIGHT: 63 IN | BODY MASS INDEX: 15.95 KG/M2 | WEIGHT: 90 LBS

## 2021-09-07 PROCEDURE — 93308 TTE F-UP OR LMTD: CPT | Performed by: SPECIALIST

## 2021-09-28 ENCOUNTER — OFFICE VISIT (OUTPATIENT)
Dept: CARDIOLOGY CLINIC | Age: 76
End: 2021-09-28
Payer: MEDICARE

## 2021-09-28 ENCOUNTER — TELEPHONE (OUTPATIENT)
Dept: CARDIOLOGY CLINIC | Age: 76
End: 2021-09-28

## 2021-09-28 VITALS
WEIGHT: 90.2 LBS | BODY MASS INDEX: 15.98 KG/M2 | SYSTOLIC BLOOD PRESSURE: 120 MMHG | DIASTOLIC BLOOD PRESSURE: 60 MMHG | RESPIRATION RATE: 16 BRPM | HEIGHT: 63 IN | HEART RATE: 64 BPM

## 2021-09-28 DIAGNOSIS — I49.9 IRREGULAR HEART BEAT: Primary | ICD-10-CM

## 2021-09-28 PROCEDURE — G8432 DEP SCR NOT DOC, RNG: HCPCS | Performed by: SPECIALIST

## 2021-09-28 PROCEDURE — G8752 SYS BP LESS 140: HCPCS | Performed by: SPECIALIST

## 2021-09-28 PROCEDURE — 1090F PRES/ABSN URINE INCON ASSESS: CPT | Performed by: SPECIALIST

## 2021-09-28 PROCEDURE — G0463 HOSPITAL OUTPT CLINIC VISIT: HCPCS | Performed by: SPECIALIST

## 2021-09-28 PROCEDURE — 3017F COLORECTAL CA SCREEN DOC REV: CPT | Performed by: SPECIALIST

## 2021-09-28 PROCEDURE — 99214 OFFICE O/P EST MOD 30 MIN: CPT | Performed by: SPECIALIST

## 2021-09-28 PROCEDURE — 1100F PTFALLS ASSESS-DOCD GE2>/YR: CPT | Performed by: SPECIALIST

## 2021-09-28 PROCEDURE — 3288F FALL RISK ASSESSMENT DOCD: CPT | Performed by: SPECIALIST

## 2021-09-28 PROCEDURE — G8419 CALC BMI OUT NRM PARAM NOF/U: HCPCS | Performed by: SPECIALIST

## 2021-09-28 PROCEDURE — 93005 ELECTROCARDIOGRAM TRACING: CPT | Performed by: SPECIALIST

## 2021-09-28 PROCEDURE — G8536 NO DOC ELDER MAL SCRN: HCPCS | Performed by: SPECIALIST

## 2021-09-28 PROCEDURE — G8400 PT W/DXA NO RESULTS DOC: HCPCS | Performed by: SPECIALIST

## 2021-09-28 PROCEDURE — G8754 DIAS BP LESS 90: HCPCS | Performed by: SPECIALIST

## 2021-09-28 PROCEDURE — G8428 CUR MEDS NOT DOCUMENT: HCPCS | Performed by: SPECIALIST

## 2021-09-28 PROCEDURE — 93010 ELECTROCARDIOGRAM REPORT: CPT | Performed by: SPECIALIST

## 2021-09-28 RX ORDER — AZITHROMYCIN 250 MG/1
TABLET, FILM COATED ORAL
Status: ON HOLD | COMMUNITY
Start: 2021-09-14 | End: 2021-12-16

## 2021-09-28 RX ORDER — ALBUTEROL SULFATE 90 UG/1
2 AEROSOL, METERED RESPIRATORY (INHALATION)
COMMUNITY
Start: 2021-09-14

## 2021-09-28 NOTE — PROGRESS NOTES
[facility]                                                            OFFICE NOTE        Luis Moyer M.D.,MARCOS            Jacinto Perry   1945  [id number]    Date/Time:  9/28/20211:35 PM            SUBJECTIVE:  She has occasional palpitations   no clear cp or sob reported   fatigued noticed       Assessment/Plan    1. Paroxysmal atrial fibrillation: She was diagnosed with paroxysmal atrial fibrillation in [place]. At that time started on Toprol and digoxin by her cardiologist there. She was started on Eliquis 2.5 mg twice daily in [place] by her cardiologist there stopped after significant ecchymoses appear. She is currently on aspirin alone 81 mg daily. We have a long conversation with the patient and her son regarding the potential occurrence of CVA with patient with paroxysmal atrial fibrillation. Need to see if paroxysmal versus still occurring because this is unclear at this point. Proceed with 30 days event monitor also to determine frequency and duration. We have discussed resuming Eliquis but one of the potential contraindication her severe anemia. Her hemoglobin was around 8 at the last hospital visit. She was started on iron supplementation few weeks ago. Discussed also the possible watchman procedure. Information have been given. We have discussed results of the transthoracic echocardiogram which is essentially okay with a preserved ejection fraction mitral cuspid regurgitation with no pulmonary hypertension. Electrocardiogram today reveals a normal sinus rhythm with nonspecific ST abnormalities likely related to digoxin use. Stress test on the back burner for now. Blood pressures well controlled. Cholesterol closely followed by primary care physician. Otherwise I will see her back in 6 weeks.       HPI     Very pleasant 77years old lady with a past medical history remarkable for paroxysmal atrial fibrillation which she tells me she has been having since the 1980s.     She has recently relocated from [place] to [place] where her family is and she is currently living with her son.     She has been followed by cardiology in [place].  She tells me that she has had an episode of congestive heart failure many years ago possibly related to atrial fibrillation rapid ventricular response which was then resolved by echo 2 or 3 years ago.     She also has a history of gastroesophageal reflux disease.     She quit smoking 2011.  She does not drink. Suzan Salazar is not very active on account of unsteady gait. CARDIAC STUDIES        09/07/21    ECHO ADULT FOLLOW-UP OR LIMITED 09/10/2021 9/10/2021    Interpretation Summary  · Limited study for the assessment of ejection fraction and pulmonary pressures. · Image quality for this study was technically difficult due to limited acoustic windows. · LV: Estimated LVEF is 55 - 60%. Visually measured ejection fraction. Normal cavity size, wall thickness and systolic function (ejection fraction normal). Wall motion: normal.  · PA: Pulmonary arterial systolic pressure is 30 mmHg. · TV: Mild tricuspid valve regurgitation is present. Signed by: Oksana Moore MD on 9/10/2021  7:58 AM                              EKG Results     None              IMAGING      MRI Results (most recent):  No results found for this or any previous visit. CT Results (most recent):  No results found for this or any previous visit. XR Results (most recent):  Results from Hospital Encounter encounter on 09/04/21    XR CHEST PORT    Narrative  PORTABLE CHEST RADIOGRAPH/S: 9/4/2021 6:03 PM    INDICATION: Dyspnea, cough. COMPARISON: None. TECHNIQUE: Portable frontal upright radiograph/s of the chest.    FINDINGS:  There is biapical pulmonary scarring and volume loss.  Numerous calcified  mediastinal lymph nodes are consistent with old granulomatous disease. There are  a few, tiny, scattered, calcified pulmonary granulomas as well. Probable pleural  calcification (right upper lung field, left hemidiaphragm) suggests old asbestos  exposure. The radiograph is rotated to the right, obscuring the central airways. No pneumothorax or pleural effusion. Impression  No acute process. Past Medical History:   Diagnosis Date    Atrial fibrillation (HCC)      Past Surgical History:   Procedure Laterality Date    HX  SECTION      HX CHOLECYSTECTOMY      HX HIP REPLACEMENT Left     HX HIP REPLACEMENT Right     HX HYSTERECTOMY      HX TONSILLECTOMY       Social History     Tobacco Use    Smoking status: Former Smoker     Quit date: 2020     Years since quittin.7    Smokeless tobacco: Never Used   Substance Use Topics    Alcohol use: Never    Drug use: Never     No family history on file. Allergies   Allergen Reactions    Cefuroxime Hives    Hydrocodone Nausea and Vomiting    Oxycodone Nausea and Vomiting    Penicillins Hives         There were no vitals taken for this visit. There were no vitals filed for this visit. Review of Systems:   Pertinent items are noted in the History of Present Illness. Visit Vitals  /60 (BP 1 Location: Left upper arm, BP Patient Position: Sitting, BP Cuff Size: Adult)   Pulse 64   Resp 16   Ht 5' 3\" (1.6 m)   Wt 90 lb 3.2 oz (40.9 kg)   BMI 15.98 kg/m²     General Appearance:  Well developed, well nourished,alert and oriented x 3, and individual in no acute distress. Ears/Nose/Mouth/Throat:   Hearing grossly normal.         Neck: Supple. Chest:   Lungs clear to auscultation bilaterally. Cardiovascular:  Regular rate and rhythm, S1, S2 normal, no murmur. Abdomen:   Soft, non-tender, bowel sounds are active. Extremities: No edema bilaterally. Skin: Warm and dry.                  Current Outpatient Medications on File Prior to Visit   Medication Sig Dispense Refill    ferrous sulfate 325 mg (65 mg iron) tablet Take 1 Tablet by mouth Daily (before breakfast). 30 Tablet 0    aspirin delayed-release 81 mg tablet Take 325 mg by mouth daily.  butalbital-acetaminophen-caffeine (FIORICET, ESGIC) -40 mg per tablet Take 1-2 Tabs by mouth every six (6) hours as needed.  diclofenac EC (VOLTAREN) 75 mg EC tablet Take 75 mg by mouth two (2) times a day.  digoxin (LANOXIN) 0.25 mg tablet Take 0.25 mg by mouth daily.  lactulose (CHRONULAC) 10 gram/15 mL solution Take 20 g by mouth two (2) times daily as needed.  metoclopramide HCl (REGLAN) 10 mg tablet Take 10 mg by mouth every eight (8) hours as needed.  metoprolol succinate (TOPROL-XL) 25 mg XL tablet Take 25 mg by mouth daily.  pantoprazole (PROTONIX) 40 mg tablet Take 40 mg by mouth daily. No current facility-administered medications on file prior to visit. Tamara Rush had no medications administered during this visit. Current Outpatient Medications   Medication Sig    ferrous sulfate 325 mg (65 mg iron) tablet Take 1 Tablet by mouth Daily (before breakfast).  aspirin delayed-release 81 mg tablet Take 325 mg by mouth daily.  butalbital-acetaminophen-caffeine (FIORICET, ESGIC) -40 mg per tablet Take 1-2 Tabs by mouth every six (6) hours as needed.  diclofenac EC (VOLTAREN) 75 mg EC tablet Take 75 mg by mouth two (2) times a day.  digoxin (LANOXIN) 0.25 mg tablet Take 0.25 mg by mouth daily.  lactulose (CHRONULAC) 10 gram/15 mL solution Take 20 g by mouth two (2) times daily as needed.  metoclopramide HCl (REGLAN) 10 mg tablet Take 10 mg by mouth every eight (8) hours as needed.  metoprolol succinate (TOPROL-XL) 25 mg XL tablet Take 25 mg by mouth daily.  pantoprazole (PROTONIX) 40 mg tablet Take 40 mg by mouth daily. No current facility-administered medications for this visit.          No results found for: CHOL, CHOLPOCT, CHOLX, CHLST, CHOLV, HDL, HDLPOC, HDLP, LDL, LDLCPOC, LDLC, DLDLP, VLDLC, VLDL, TGLX, TRIGL, TRIGP, TGLPOCT, CHHD, CHHDX    Lab Results   Component Value Date/Time    Sodium 134 (L) 09/04/2021 05:33 PM    Potassium 5.7 (H) 09/04/2021 05:33 PM    Chloride 105 09/04/2021 05:33 PM    CO2 20 (L) 09/04/2021 05:33 PM    Anion gap 9 09/04/2021 05:33 PM    Glucose 107 (H) 09/04/2021 05:33 PM    BUN 11 09/04/2021 05:33 PM    Creatinine 0.98 09/04/2021 05:33 PM    BUN/Creatinine ratio 11 (L) 09/04/2021 05:33 PM    GFR est AA >60 09/04/2021 05:33 PM    GFR est non-AA 55 (L) 09/04/2021 05:33 PM    Calcium 9.0 09/04/2021 05:33 PM       Lab Results   Component Value Date/Time    ALT (SGPT) 19 09/04/2021 05:33 PM    Alk. phosphatase 121 (H) 09/04/2021 05:33 PM    Bilirubin, total 0.2 09/04/2021 05:33 PM       Lab Results   Component Value Date/Time    WBC 8.6 09/04/2021 05:33 PM    HGB 7.9 (L) 09/04/2021 05:33 PM    HCT 26.5 (L) 09/04/2021 05:33 PM    PLATELET 824 (H) 92/13/7293 05:33 PM    MCV 71.8 (L) 09/04/2021 05:33 PM       No results found for: TSH, TSH2, TSH3, TSHP, TSHEXT      No results found for: CHOL, CHOLPOCT, CHOLX, CHLST, CHOLV, HDL, HDLP, LDL, LDLC, DLDLP, TGLX, TRIGL, TRIGP, TGLPOCT, NTGLT, CHHD, CHHDX             Please note that this dictation was completed with [employer], the [employer] voice recognition software. Quite often unanticipated grammatical, syntax, homophones, and other interpretative errors are inadvertently transcribed by the computer software. Please disregard these errors. Please excuse any errors that have escaped final proofreading.

## 2021-09-28 NOTE — TELEPHONE ENCOUNTER
LM for pt to [facility]. She left monitor box in office after her appt.

## 2021-09-28 NOTE — PROGRESS NOTES
Visit Vitals  /60 (BP 1 Location: Left upper arm, BP Patient Position: Sitting, BP Cuff Size: Adult)   Pulse 64   Resp 16   Ht 5' 3\" (1.6 m)   Wt 90 lb 3.2 oz (40.9 kg)   BMI 15.98 kg/m²

## 2021-11-02 ENCOUNTER — TELEPHONE (OUTPATIENT)
Dept: CARDIOLOGY CLINIC | Age: 76
End: 2021-11-02

## 2021-11-02 NOTE — TELEPHONE ENCOUNTER
----- Message from Bruce Lugo RN sent at 11/1/2021  2:36 PM EDT -----    ----- Message -----  From: Michelle Cano RN  Sent: 11/1/2021   2:35 PM EDT  To: Bruce Lugo RN    Monitor result.  Follow up 11/15/21

## 2021-11-15 ENCOUNTER — OFFICE VISIT (OUTPATIENT)
Dept: CARDIOLOGY CLINIC | Age: 76
End: 2021-11-15
Payer: MEDICARE

## 2021-11-15 VITALS
OXYGEN SATURATION: 100 % | WEIGHT: 93 LBS | RESPIRATION RATE: 16 BRPM | SYSTOLIC BLOOD PRESSURE: 110 MMHG | HEIGHT: 63 IN | HEART RATE: 44 BPM | BODY MASS INDEX: 16.48 KG/M2 | DIASTOLIC BLOOD PRESSURE: 70 MMHG

## 2021-11-15 DIAGNOSIS — I48.91 ATRIAL FIBRILLATION, UNSPECIFIED TYPE (HCC): Primary | ICD-10-CM

## 2021-11-15 PROCEDURE — G8427 DOCREV CUR MEDS BY ELIG CLIN: HCPCS | Performed by: SPECIALIST

## 2021-11-15 PROCEDURE — G0463 HOSPITAL OUTPT CLINIC VISIT: HCPCS | Performed by: SPECIALIST

## 2021-11-15 PROCEDURE — G8400 PT W/DXA NO RESULTS DOC: HCPCS | Performed by: SPECIALIST

## 2021-11-15 PROCEDURE — 1100F PTFALLS ASSESS-DOCD GE2>/YR: CPT | Performed by: SPECIALIST

## 2021-11-15 PROCEDURE — 3017F COLORECTAL CA SCREEN DOC REV: CPT | Performed by: SPECIALIST

## 2021-11-15 PROCEDURE — 99214 OFFICE O/P EST MOD 30 MIN: CPT | Performed by: SPECIALIST

## 2021-11-15 PROCEDURE — G8754 DIAS BP LESS 90: HCPCS | Performed by: SPECIALIST

## 2021-11-15 PROCEDURE — G8419 CALC BMI OUT NRM PARAM NOF/U: HCPCS | Performed by: SPECIALIST

## 2021-11-15 PROCEDURE — 1090F PRES/ABSN URINE INCON ASSESS: CPT | Performed by: SPECIALIST

## 2021-11-15 PROCEDURE — 3288F FALL RISK ASSESSMENT DOCD: CPT | Performed by: SPECIALIST

## 2021-11-15 PROCEDURE — G8536 NO DOC ELDER MAL SCRN: HCPCS | Performed by: SPECIALIST

## 2021-11-15 PROCEDURE — G8510 SCR DEP NEG, NO PLAN REQD: HCPCS | Performed by: SPECIALIST

## 2021-11-15 PROCEDURE — G8752 SYS BP LESS 140: HCPCS | Performed by: SPECIALIST

## 2021-11-15 RX ORDER — DIGOXIN 125 MCG
0.12 TABLET ORAL DAILY
Qty: 90 TABLET | Refills: 1 | Status: ON HOLD | OUTPATIENT
Start: 2021-11-15 | End: 2022-09-13

## 2021-11-15 NOTE — PROGRESS NOTES
[facility]                                                            OFFICE NOTE        Jono Hobson M.D.,MARCOS            Robert Francois   1945  [id number]    Date/Time:  11/15/07527:09 PM            SUBJECTIVE:  She is doing ok no cp or sob or palpitations       Assessment/Plan  1. Paroxysmal atrial fibrillation: She was diagnosed with paroxysmal atrial fibrillation in [place]. At that time started on Toprol and digoxin by her cardiologist there.     She was started on Eliquis 2.5 mg twice daily in [place] by her cardiologist there stopped after significant ecchymoses appear.     She is currently on aspirin alone 81 mg daily.     We have a long conversation with the patient and her son regarding the potential occurrence of CVA with patient with paroxysmal atrial fibrillation. Event monitor with no PAF on 10/21     We have discussed resuming Eliquis but one of the potential contraindication her severe anemia. Her hemoglobin was around 8 at the last hospital visit.       Discussed also the possible watchman procedure.   Information have been given.     We have discussed results of the transthoracic echocardiogram which is essentially okay with a preserved ejection fraction mitral cuspid regurgitation with no pulmonary hypertension.     HR a little slow decrease digoxin to 0.125 mg daily     Stress test on the back burner for now.     Blood pressures well controlled.     Cholesterol closely followed by primary care physician.     Otherwise I will see her back in 6 months or sooner if any isssues        HPI   Very pleasant 67 years old lady with a past medical history remarkable for paroxysmal atrial fibrillation which she tells me she has been having since the 46s.     She has recently relocated from [place] to [place] where her family is and she is currently living with her son.     She has been followed by cardiology in [place].  She tells me that she has had an episode of congestive heart failure many years ago possibly related to atrial fibrillation rapid ventricular response which was then resolved by echo 2 or 3 years ago.     She also has a history of gastroesophageal reflux disease.     She quit smoking 2011.  She does not drink. Chente Ray is not very active on account of unsteady gait. CARDIAC STUDIES        09/07/21    ECHO ADULT FOLLOW-UP OR LIMITED 09/10/2021 9/10/2021    Interpretation Summary  · Limited study for the assessment of ejection fraction and pulmonary pressures. · Image quality for this study was technically difficult due to limited acoustic windows. · LV: Estimated LVEF is 55 - 60%. Visually measured ejection fraction. Normal cavity size, wall thickness and systolic function (ejection fraction normal). Wall motion: normal.  · PA: Pulmonary arterial systolic pressure is 30 mmHg. · TV: Mild tricuspid valve regurgitation is present. Signed by: Dawn Florentino MD on 9/10/2021  7:58 AM                              EKG Results     None              IMAGING      MRI Results (most recent):  No results found for this or any previous visit. CT Results (most recent):  No results found for this or any previous visit. XR Results (most recent):  Results from Hospital Encounter encounter on 09/04/21    XR CHEST PORT    Narrative  PORTABLE CHEST RADIOGRAPH/S: 9/4/2021 6:03 PM    INDICATION: Dyspnea, cough. COMPARISON: None. TECHNIQUE: Portable frontal upright radiograph/s of the chest.    FINDINGS:  There is biapical pulmonary scarring and volume loss. Numerous calcified  mediastinal lymph nodes are consistent with old granulomatous disease. There are  a few, tiny, scattered, calcified pulmonary granulomas as well. Probable pleural  calcification (right upper lung field, left hemidiaphragm) suggests old asbestos  exposure.  The radiograph is rotated to the right, obscuring the central airways. No pneumothorax or pleural effusion. Impression  No acute process. Past Medical History:   Diagnosis Date    Atrial fibrillation (HCC)      Past Surgical History:   Procedure Laterality Date    HX  SECTION      HX CHOLECYSTECTOMY      HX HIP REPLACEMENT Left     HX HIP REPLACEMENT Right     HX HYSTERECTOMY      HX TONSILLECTOMY       Social History     Tobacco Use    Smoking status: Former Smoker     Quit date: 2020     Years since quittin.9    Smokeless tobacco: Never Used   Substance Use Topics    Alcohol use: Never    Drug use: Never     No family history on file. Allergies   Allergen Reactions    Cefuroxime Hives    Hydrocodone Nausea and Vomiting    Oxycodone Nausea and Vomiting    Penicillins Hives         Visit Vitals  /70   Pulse (!) 44   Resp 16   Ht 5' 3\" (1.6 m)   Wt 93 lb (42.2 kg)   SpO2 100%   BMI 16.47 kg/m²         Last 3 Recorded Weights in this Encounter    11/15/21 1347   Weight: 93 lb (42.2 kg)            Review of Systems:   Pertinent items are noted in the History of Present Illness. Visit Vitals  /70   Pulse (!) 44   Resp 16   Ht 5' 3\" (1.6 m)   Wt 93 lb (42.2 kg)   SpO2 100%   BMI 16.47 kg/m²     General Appearance:  Well developed, well nourished,alert and oriented x 3, and individual in no acute distress. Ears/Nose/Mouth/Throat:   Hearing grossly normal.         Neck: Supple. Chest:   Lungs clear to auscultation bilaterally. Cardiovascular:  Regular rate and rhythm, S1, S2 normal, no murmur. Abdomen:   Soft, non-tender, bowel sounds are active. Extremities: No edema bilaterally. Skin: Warm and dry. Current Outpatient Medications on File Prior to Visit   Medication Sig Dispense Refill    albuterol (PROVENTIL HFA, VENTOLIN HFA, PROAIR HFA) 90 mcg/actuation inhaler Take 2 Puffs by inhalation every six (6) hours as needed.       azithromycin (ZITHROMAX) 250 mg tablet Take two tablets on day 1 and then take 1 tablet daily for 4 days      ferrous sulfate 325 mg (65 mg iron) tablet Take 1 Tablet by mouth Daily (before breakfast). 30 Tablet 0    aspirin delayed-release 81 mg tablet Take 325 mg by mouth daily.  butalbital-acetaminophen-caffeine (FIORICET, ESGIC) -40 mg per tablet Take 1-2 Tabs by mouth every six (6) hours as needed.  diclofenac EC (VOLTAREN) 75 mg EC tablet Take 75 mg by mouth two (2) times a day.  digoxin (LANOXIN) 0.25 mg tablet Take 0.25 mg by mouth daily.  lactulose (CHRONULAC) 10 gram/15 mL solution Take 20 g by mouth two (2) times daily as needed.  metoclopramide HCl (REGLAN) 10 mg tablet Take 10 mg by mouth every eight (8) hours as needed.  metoprolol succinate (TOPROL-XL) 25 mg XL tablet Take 25 mg by mouth daily.  pantoprazole (PROTONIX) 40 mg tablet Take 40 mg by mouth daily. No current facility-administered medications on file prior to visit. Theresa Mcadams had no medications administered during this visit. Current Outpatient Medications   Medication Sig    albuterol (PROVENTIL HFA, VENTOLIN HFA, PROAIR HFA) 90 mcg/actuation inhaler Take 2 Puffs by inhalation every six (6) hours as needed.  azithromycin (ZITHROMAX) 250 mg tablet Take two tablets on day 1 and then take 1 tablet daily for 4 days    ferrous sulfate 325 mg (65 mg iron) tablet Take 1 Tablet by mouth Daily (before breakfast).  aspirin delayed-release 81 mg tablet Take 325 mg by mouth daily.  butalbital-acetaminophen-caffeine (FIORICET, ESGIC) -40 mg per tablet Take 1-2 Tabs by mouth every six (6) hours as needed.  diclofenac EC (VOLTAREN) 75 mg EC tablet Take 75 mg by mouth two (2) times a day.  digoxin (LANOXIN) 0.25 mg tablet Take 0.25 mg by mouth daily.  lactulose (CHRONULAC) 10 gram/15 mL solution Take 20 g by mouth two (2) times daily as needed.     metoclopramide HCl (REGLAN) 10 mg tablet Take 10 mg by mouth every eight (8) hours as needed.  metoprolol succinate (TOPROL-XL) 25 mg XL tablet Take 25 mg by mouth daily.  pantoprazole (PROTONIX) 40 mg tablet Take 40 mg by mouth daily. No current facility-administered medications for this visit. No results found for: CHOL, CHOLPOCT, CHOLX, CHLST, CHOLV, HDL, HDLPOC, HDLP, LDL, LDLCPOC, LDLC, DLDLP, VLDLC, VLDL, TGLX, TRIGL, TRIGP, TGLPOCT, CHHD, CHHDX    Lab Results   Component Value Date/Time    Sodium 134 (L) 09/04/2021 05:33 PM    Potassium 5.7 (H) 09/04/2021 05:33 PM    Chloride 105 09/04/2021 05:33 PM    CO2 20 (L) 09/04/2021 05:33 PM    Anion gap 9 09/04/2021 05:33 PM    Glucose 107 (H) 09/04/2021 05:33 PM    BUN 11 09/04/2021 05:33 PM    Creatinine 0.98 09/04/2021 05:33 PM    BUN/Creatinine ratio 11 (L) 09/04/2021 05:33 PM    GFR est AA >60 09/04/2021 05:33 PM    GFR est non-AA 55 (L) 09/04/2021 05:33 PM    Calcium 9.0 09/04/2021 05:33 PM       Lab Results   Component Value Date/Time    ALT (SGPT) 19 09/04/2021 05:33 PM    Alk. phosphatase 121 (H) 09/04/2021 05:33 PM    Bilirubin, total 0.2 09/04/2021 05:33 PM       Lab Results   Component Value Date/Time    WBC 8.6 09/04/2021 05:33 PM    HGB 7.9 (L) 09/04/2021 05:33 PM    HCT 26.5 (L) 09/04/2021 05:33 PM    PLATELET 717 (H) 64/08/4163 05:33 PM    MCV 71.8 (L) 09/04/2021 05:33 PM       No results found for: TSH, TSH2, TSH3, TSHP, TSHEXT      No results found for: CHOL, CHOLPOCT, CHOLX, CHLST, CHOLV, HDL, HDLP, LDL, LDLC, DLDLP, TGLX, TRIGL, TRIGP, TGLPOCT, NTGLT, CHHD, CHHDX             Please note that this dictation was completed with [employer], the computer voice recognition software. Quite often unanticipated grammatical, syntax, homophones, and other interpretative errors are inadvertently transcribed by the computer software. Please disregard these errors. Please excuse any errors that have escaped final proofreading.

## 2021-12-13 ENCOUNTER — TRANSCRIBE ORDER (OUTPATIENT)
Dept: REGISTRATION | Age: 76
End: 2021-12-13

## 2021-12-13 ENCOUNTER — HOSPITAL ENCOUNTER (OUTPATIENT)
Dept: PREADMISSION TESTING | Age: 76
Discharge: HOME | End: 2021-12-13
Attending: INTERNAL MEDICINE
Payer: MEDICARE

## 2021-12-13 DIAGNOSIS — Z01.812 PRE-PROCEDURE LAB EXAM: ICD-10-CM

## 2021-12-13 DIAGNOSIS — Z01.812 PRE-PROCEDURE LAB EXAM: Primary | ICD-10-CM

## 2021-12-13 PROCEDURE — U0005 INFEC AGEN DETEC AMPLI PROBE: HCPCS

## 2021-12-14 LAB
SARS-COV-2, XPLCVT: NOT DETECTED
SOURCE, COVRS: NORMAL

## 2021-12-16 ENCOUNTER — HOSPITAL ENCOUNTER (OUTPATIENT)
Age: 76
Setting detail: OUTPATIENT SURGERY
Discharge: HOME | End: 2021-12-16
Attending: INTERNAL MEDICINE | Admitting: INTERNAL MEDICINE
Payer: MEDICARE

## 2021-12-16 ENCOUNTER — ANESTHESIA EVENT (OUTPATIENT)
Dept: ENDOSCOPY | Age: 76
End: 2021-12-16
Payer: MEDICARE

## 2021-12-16 ENCOUNTER — ANESTHESIA (OUTPATIENT)
Dept: ENDOSCOPY | Age: 76
End: 2021-12-16
Payer: MEDICARE

## 2021-12-16 VITALS
HEART RATE: 90 BPM | WEIGHT: 94 LBS | SYSTOLIC BLOOD PRESSURE: 131 MMHG | OXYGEN SATURATION: 99 % | HEIGHT: 63 IN | BODY MASS INDEX: 16.66 KG/M2 | RESPIRATION RATE: 21 BRPM | DIASTOLIC BLOOD PRESSURE: 61 MMHG | TEMPERATURE: 97.1 F

## 2021-12-16 PROCEDURE — 74011250636 HC RX REV CODE- 250/636: Performed by: NURSE ANESTHETIST, CERTIFIED REGISTERED

## 2021-12-16 PROCEDURE — 74011000250 HC RX REV CODE- 250: Performed by: ANESTHESIOLOGY

## 2021-12-16 PROCEDURE — 74011250637 HC RX REV CODE- 250/637: Performed by: INTERNAL MEDICINE

## 2021-12-16 PROCEDURE — 77030040684 HC NDL ENDOSC NEEDLEMASTR OCOA -B: Performed by: INTERNAL MEDICINE

## 2021-12-16 PROCEDURE — 88305 TISSUE EXAM BY PATHOLOGIST: CPT

## 2021-12-16 PROCEDURE — 77030021593 HC FCPS BIOP ENDOSC BSC -A: Performed by: INTERNAL MEDICINE

## 2021-12-16 PROCEDURE — 76060000032 HC ANESTHESIA 0.5 TO 1 HR: Performed by: INTERNAL MEDICINE

## 2021-12-16 PROCEDURE — 2709999900 HC NON-CHARGEABLE SUPPLY: Performed by: INTERNAL MEDICINE

## 2021-12-16 PROCEDURE — 76040000007: Performed by: INTERNAL MEDICINE

## 2021-12-16 PROCEDURE — 74011250636 HC RX REV CODE- 250/636: Performed by: ANESTHESIOLOGY

## 2021-12-16 RX ORDER — SODIUM CHLORIDE 9 MG/ML
INJECTION, SOLUTION INTRAVENOUS
Status: DISCONTINUED | OUTPATIENT
Start: 2021-12-16 | End: 2021-12-16 | Stop reason: HOSPADM

## 2021-12-16 RX ORDER — SODIUM CHLORIDE 0.9 % (FLUSH) 0.9 %
5-40 SYRINGE (ML) INJECTION EVERY 8 HOURS
Status: DISCONTINUED | OUTPATIENT
Start: 2021-12-16 | End: 2021-12-16 | Stop reason: HOSPADM

## 2021-12-16 RX ORDER — POLYETHYLENE GLYCOL 3350 17 G/17G
17 POWDER, FOR SOLUTION ORAL DAILY
COMMUNITY
End: 2022-02-20

## 2021-12-16 RX ORDER — MIDAZOLAM HYDROCHLORIDE 1 MG/ML
.25-5 INJECTION, SOLUTION INTRAMUSCULAR; INTRAVENOUS
Status: DISCONTINUED | OUTPATIENT
Start: 2021-12-16 | End: 2021-12-16 | Stop reason: HOSPADM

## 2021-12-16 RX ORDER — SODIUM CHLORIDE 0.9 % (FLUSH) 0.9 %
5-40 SYRINGE (ML) INJECTION AS NEEDED
Status: DISCONTINUED | OUTPATIENT
Start: 2021-12-16 | End: 2021-12-16 | Stop reason: HOSPADM

## 2021-12-16 RX ORDER — PROPOFOL 10 MG/ML
INJECTION, EMULSION INTRAVENOUS AS NEEDED
Status: DISCONTINUED | OUTPATIENT
Start: 2021-12-16 | End: 2021-12-16 | Stop reason: HOSPADM

## 2021-12-16 RX ORDER — DEXTROMETHORPHAN/PSEUDOEPHED 2.5-7.5/.8
1.2 DROPS ORAL
Status: DISCONTINUED | OUTPATIENT
Start: 2021-12-16 | End: 2021-12-16 | Stop reason: HOSPADM

## 2021-12-16 RX ORDER — LIDOCAINE HYDROCHLORIDE 20 MG/ML
INJECTION, SOLUTION EPIDURAL; INFILTRATION; INTRACAUDAL; PERINEURAL AS NEEDED
Status: DISCONTINUED | OUTPATIENT
Start: 2021-12-16 | End: 2021-12-16 | Stop reason: HOSPADM

## 2021-12-16 RX ORDER — FENTANYL CITRATE 50 UG/ML
25-200 INJECTION, SOLUTION INTRAMUSCULAR; INTRAVENOUS
Status: DISCONTINUED | OUTPATIENT
Start: 2021-12-16 | End: 2021-12-16 | Stop reason: HOSPADM

## 2021-12-16 RX ORDER — NALOXONE HYDROCHLORIDE 0.4 MG/ML
0.4 INJECTION, SOLUTION INTRAMUSCULAR; INTRAVENOUS; SUBCUTANEOUS
Status: DISCONTINUED | OUTPATIENT
Start: 2021-12-16 | End: 2021-12-16 | Stop reason: HOSPADM

## 2021-12-16 RX ORDER — ATROPINE SULFATE 0.1 MG/ML
0.5 INJECTION INTRAVENOUS
Status: DISCONTINUED | OUTPATIENT
Start: 2021-12-16 | End: 2021-12-16 | Stop reason: HOSPADM

## 2021-12-16 RX ORDER — ACETAMINOPHEN 325 MG/1
650 TABLET ORAL
COMMUNITY

## 2021-12-16 RX ORDER — EPINEPHRINE 0.1 MG/ML
1 INJECTION INTRACARDIAC; INTRAVENOUS
Status: DISCONTINUED | OUTPATIENT
Start: 2021-12-16 | End: 2021-12-16 | Stop reason: HOSPADM

## 2021-12-16 RX ORDER — FLUMAZENIL 0.1 MG/ML
0.2 INJECTION INTRAVENOUS
Status: DISCONTINUED | OUTPATIENT
Start: 2021-12-16 | End: 2021-12-16 | Stop reason: HOSPADM

## 2021-12-16 RX ORDER — SODIUM CHLORIDE 9 MG/ML
50 INJECTION, SOLUTION INTRAVENOUS CONTINUOUS
Status: DISCONTINUED | OUTPATIENT
Start: 2021-12-16 | End: 2021-12-16 | Stop reason: HOSPADM

## 2021-12-16 RX ADMIN — PROPOFOL 40 MG: 10 INJECTION, EMULSION INTRAVENOUS at 11:37

## 2021-12-16 RX ADMIN — PROPOFOL 30 MG: 10 INJECTION, EMULSION INTRAVENOUS at 11:26

## 2021-12-16 RX ADMIN — PROPOFOL 50 MG: 10 INJECTION, EMULSION INTRAVENOUS at 11:12

## 2021-12-16 RX ADMIN — PROPOFOL 50 MG: 10 INJECTION, EMULSION INTRAVENOUS at 11:13

## 2021-12-16 RX ADMIN — PROPOFOL 50 MG: 10 INJECTION, EMULSION INTRAVENOUS at 11:11

## 2021-12-16 RX ADMIN — SODIUM CHLORIDE: 900 INJECTION, SOLUTION INTRAVENOUS at 10:06

## 2021-12-16 RX ADMIN — PROPOFOL 50 MG: 10 INJECTION, EMULSION INTRAVENOUS at 11:24

## 2021-12-16 RX ADMIN — SODIUM CHLORIDE: 900 INJECTION, SOLUTION INTRAVENOUS at 11:40

## 2021-12-16 RX ADMIN — PROPOFOL 50 MG: 10 INJECTION, EMULSION INTRAVENOUS at 11:20

## 2021-12-16 RX ADMIN — LIDOCAINE HYDROCHLORIDE 40 MG: 20 INJECTION, SOLUTION EPIDURAL; INFILTRATION; INTRACAUDAL; PERINEURAL at 11:11

## 2021-12-16 RX ADMIN — PROPOFOL 30 MG: 10 INJECTION, EMULSION INTRAVENOUS at 11:30

## 2021-12-16 RX ADMIN — PROPOFOL 40 MG: 10 INJECTION, EMULSION INTRAVENOUS at 11:33

## 2021-12-16 RX ADMIN — PROPOFOL 50 MG: 10 INJECTION, EMULSION INTRAVENOUS at 11:14

## 2021-12-16 NOTE — DISCHARGE INSTRUCTIONS
[address]  [place] [address], [address]    EGD/COLON DISCHARGE INSTRUCTIONS    Carolyne Joy  [id number]  1945    Discomfort:  Sore throat- throat lozenges or warm salt water gargle  redness at IV site- apply warm compress to area; if redness or soreness persist- contact your physician  Gaseous discomfort- walking, belching will help relieve any discomfort  You may not operate a vehicle for 12 hours  You may not engage in an occupation involving machinery or appliances for rest of today  You may not drink alcoholic beverages for at least 12 hours  Avoid making any critical decisions for at least 24 hour  DIET  You may resume your regular diet - however -  remember your colon is empty and a heavy meal will produce gas. Avoid these foods:  vegetables, fried / greasy foods, carbonated drinks    ACTIVITY  You may resume your normal daily activities   Spend the remainder of the day resting -  avoid any strenuous activity. CALL M.D. ANY SIGN OF   Increasing pain, nausea, vomiting  Abdominal distension (swelling)  New increased bleeding (oral or rectal)  Fever (chills)  Pain in chest area  Bloody discharge from nose or mouth  Shortness of breath    Follow-up Instructions:   Call Dr. Julianne Grove for any questions or problems. Telephone # [phone]    ENDOSCOPY FINDINGS:   Your endoscopy showed possible Scales's esophagus for which biopsies were taken. Your colonoscopy showed a large polyp/mass for which biopsies were taken. We will contact you about the pathology results. Signed By: Louis Jefferson. Coby Correa MD     12/16/2021  11:45 AM         Learning About Coronavirus (COVID-19)  Coronavirus (COVID-19): Overview  What is coronavirus ([id number])? The coronavirus disease (COVID-19) is caused by a virus. It is an illness that was first found in [place], [place], in December 2019. It has since spread worldwide. The virus can cause fever, cough, and trouble breathing.  In severe cases, it can cause pneumonia and make it hard to breathe without help. It can cause death. Coronaviruses are a large group of viruses. They cause the common cold. They also cause more serious illnesses like Middle East respiratory syndrome (MERS) and severe acute respiratory syndrome (SARS). COVID-19 is caused by a novel coronavirus. That means it's a new type that has not been seen in people before. This virus spreads person-to-person through droplets from coughing and sneezing. It can also spread when you are close to someone who is infected. And it can spread when you touch something that has the virus on it, such as a doorknob or a tabletop. What can you do to protect yourself from coronavirus (COVID-19)? The best way to protect yourself from getting sick is to:  · Avoid areas where there is an outbreak. · Avoid contact with people who may be infected. · Wash your hands often with soap or alcohol-based hand sanitizers. · Avoid crowds and try to stay at least 6 feet away from other people. · Wash your hands often, especially after you cough or sneeze. Use soap and water, and scrub for at least 20 seconds. If soap and water aren't available, use an alcohol-based hand . · Avoid touching your mouth, nose, and eyes. What can you do to avoid spreading the virus to others? To help avoid spreading the virus to others:  · Cover your mouth with a tissue when you cough or sneeze. Then throw the tissue in the trash. · Use a disinfectant to clean things that you touch often. · Stay home if you are sick or have been exposed to the virus. Don't go to school, work, or public areas. And don't use public transportation. · If you are sick:  ? Leave your home only if you need to get medical care. But call the doctor's office first so they know you're coming. And wear a face mask, if you have one.  ? If you have a face mask, wear it whenever you're around other people.  It can help stop the spread of the virus when you cough or sneeze. ? Clean and disinfect your home every day. Use household  and disinfectant wipes or sprays. Take special care to clean things that you grab with your hands. These include doorknobs, remote controls, phones, and handles on your refrigerator and microwave. And don't forget countertops, tabletops, bathrooms, and computer keyboards. When to call for help  Call 911 anytime you think you may need emergency care. For example, call if:  · You have severe trouble breathing. (You can't talk at all.)  · You have constant chest pain or pressure. · You are severely dizzy or lightheaded. · You are confused or can't think clearly. · Your face and lips have a blue color. · You pass out (lose consciousness) or are very hard to wake up. Call your doctor now if you develop symptoms such as:  · Shortness of breath. · Fever. · Cough. If you need to get care, call ahead to the doctor's office for instructions before you go. Make sure you wear a face mask, if you have one, to prevent exposing other people to the virus. Where can you get the latest information? The following health organizations are tracking and studying this virus. Their websites contain the most up-to-date information. Theador Gomez also learn what to do if you think you may have been exposed to the virus. · U.S. Centers for Disease Control and Prevention (CDC): The CDC provides updated news about the disease and travel advice. The website also tells you how to prevent the spread of infection. www.cdc.gov  · World Health Organization [facility]): WHO offers information about the virus outbreaks. WHO also has travel advice. www.who.int  Current as of: April 1, 2020               Content Version: 12.4  © 4836-3181 [employer].    Care instructions adapted under license by your healthcare professional. If you have questions about a medical condition or this instruction, always ask your healthcare professional. [employer] disclaims any warranty or liability for your use of this information.

## 2021-12-16 NOTE — PROCEDURES
[address]  [address] [facility], [facility] (EGD) Procedure Note    Heath Del Cid  1945  [id number]      Procedure: Endoscopic Gastroduodenoscopy --diagnostic, with biopsy    Indication: dysphagia    Pre-operative Diagnosis: see indication above    Post-operative Diagnosis: see findings below    : Julio Hernandez. [facility] MD DONNIE    Staff: Endoscopy Technician-1: Al Jones IV  Endoscopy RN-1: Kati Raza RN     Referring Provider:  Lillie Stokes NP      Anesthesia/Sedation:  MAC anesthesia Propofol        Procedure Details     After informed consent was obtained for the procedure, with all risks and benefits of procedure explained the patient was taken to the endoscopy suite and placed in the left lateral decubitus position. Following sequential administration of sedation as per above, the endoscope was inserted into the mouth and advanced under direct vision to second portion of the duodenum. A careful inspection was made as the gastroscope was withdrawn, including a retroflexed view of the proximal stomach; findings and interventions are described below. Findings:   Esophagus: short segment of Scales's appearing mucosa in distal esophagus. This was likely C1M1, but sliding hiatal hernia was present. No nodules seen with white light and NBI. Cold biopsies taken. Stomach: sliding hiatal hernia, no Earl erosions. Otherwise, normal appearing stomach - cold biopsies taken. Duodenum: normal to second portion - cold biopsies taken      Therapies:  As above    Specimens: 1. Duodenum, 2. Gastric, 3. Distal esophagus         EBL: None      Complications:   None; patient tolerated the procedure well. Impression:    As above    Recommendations: Follow up surgical pathology  Proceed to colonoscopy    Signed By: Julio Hernandez.  [facility] MD DONNIE     12/16/2021  11:46 AM

## 2021-12-16 NOTE — PROGRESS NOTES

## 2021-12-16 NOTE — H&P
[address]  [address] [facility], [address] [place] [address]      History and Physical       NAME:  Radha Wise   :   1945   MRN:   [id number]             History of Present Illness:  Patient is a 77 y. o. who is seen for anemia and dysphagia. PMH:  Past Medical History:   Diagnosis Date    Atrial fibrillation (HCC)     Chronic constipation     COPD (chronic obstructive pulmonary disease) (HCC)     GERD (gastroesophageal reflux disease)     [place] (hard of hearing)     Osteoporosis        PSH:  Past Surgical History:   Procedure Laterality Date    HX  SECTION      HX CHOLECYSTECTOMY      HX HIP REPLACEMENT Left     HX HIP REPLACEMENT Right     HX HYSTERECTOMY      HX TONSILLECTOMY         Allergies: Allergies   Allergen Reactions    Cefuroxime Hives    Hydrocodone Nausea and Vomiting    Oxycodone Nausea and Vomiting    Penicillins Hives       Home Medications:  Prior to Admission Medications   Prescriptions Last Dose Informant Patient Reported? Taking?   acetaminophen (TYLENOL) 325 mg tablet 12/15/2021 at Unknown time  Yes Yes   Sig: Take  by mouth every four (4) hours as needed for Pain. albuterol (PROVENTIL HFA, VENTOLIN HFA, PROAIR HFA) 90 mcg/actuation inhaler 2021 at Unknown time  Yes Yes   Sig: Take 2 Puffs by inhalation every six (6) hours as needed. aspirin delayed-release 81 mg tablet 2021  Yes No   Sig: Take 325 mg by mouth daily. butalbital-acetaminophen-caffeine (FIORICET, ESGIC) -40 mg per tablet 2021 at Unknown time  Yes Yes   Sig: Take 1-2 Tabs by mouth every six (6) hours as needed. digoxin (LANOXIN) 0.125 mg tablet 12/15/2021 at Unknown time  No Yes   Sig: Take 1 Tablet by mouth daily. lactulose (CHRONULAC) 10 gram/15 mL solution 2021 at Unknown time  Yes Yes   Sig: Take 20 g by mouth two (2) times daily as needed.    metoprolol succinate (TOPROL-XL) 25 mg XL tablet 12/15/2021 at Unknown time  Yes Yes   Sig: Take 25 mg by mouth daily. pantoprazole (PROTONIX) 40 mg tablet 12/15/2021 at Unknown time  Yes Yes   Sig: Take 40 mg by mouth daily. polyethylene glycol (Miralax) 17 gram/dose powder 12/9/2021 at Unknown time  Yes Yes   Sig: Take 17 g by mouth daily. Facility-Administered Medications: None       Hospital Medications:  Current Facility-Administered Medications   Medication Dose Route Frequency    0.9% sodium chloride infusion  50 mL/hr IntraVENous CONTINUOUS    sodium chloride (NS) flush 5-40 mL  5-40 mL IntraVENous Q8H    sodium chloride (NS) flush 5-40 mL  5-40 mL IntraVENous PRN    midazolam (VERSED) injection 0.25-5 mg  0.25-5 mg IntraVENous Multiple    fentaNYL citrate (PF) injection  mcg   mcg IntraVENous Multiple    naloxone (NARCAN) injection 0.4 mg  0.4 mg IntraVENous Multiple    flumazeniL (ROMAZICON) 0.1 mg/mL injection 0.2 mg  0.2 mg IntraVENous Multiple    simethicone (MYLICON) 45DB/5.0IF oral drops 80 mg  1.2 mL Oral Multiple    atropine injection 0.5 mg  0.5 mg IntraVENous ONCE PRN    EPINEPHrine (ADRENALIN) 0.1 mg/mL syringe 1 mg  1 mg Endoscopically ONCE PRN     Facility-Administered Medications Ordered in Other Encounters   Medication Dose Route Frequency    0.9% sodium chloride infusion   IntraVENous CONTINUOUS       Social History:  Social History     Tobacco Use    Smoking status: Former Smoker     Packs/day: 1.00     Years: 20.00     Pack years: 20.00     Quit date: 12/18/2011     Years since quitting: 10.0    Smokeless tobacco: Never Used   Substance Use Topics    Alcohol use: Never       Family History:  History reviewed. No pertinent family history.           Review of Systems:      Constitutional: negative fever, negative chills, negative weight loss  Eyes:   negative visual changes  ENT:   negative sore throat, tongue or lip swelling  Respiratory:  negative cough, negative dyspnea  Cards:  negative for chest pain, palpitations, lower extremity edema  GI: See HPI  :  negative for frequency, dysuria  Integument:  negative for rash and pruritus  Heme:  negative for easy bruising and gum/nose bleeding  Musculoskel: negative for myalgias,  back pain and muscle weakness  Neuro: negative for headaches, dizziness, vertigo  Psych:  negative for feelings of anxiety, depression       Objective:     Patient Vitals for the past 8 hrs:   BP Temp Pulse Resp SpO2 Height Weight   12/16/21 1057 132/74 98.6 °F (37 °C) 93 18 100 % -- --   12/16/21 1012 -- -- -- -- -- 5' 3\" (1.6 m) 42.6 kg (94 lb)     No intake/output data recorded. No intake/output data recorded. EXAM:     NEURO-a&o   HEENT-wnl   LUNGS-clear    COR-regular rate and rhythym     ABD-soft , no tenderness, no rebound, good bs     EXT-no edema     Data Review     No results for input(s): WBC, HGB, HCT, PLT, HGBEXT, HCTEXT, PLTEXT in the last 72 hours. No results for input(s): NA, K, CL, CO2, BUN, CREA, GLU, PHOS, CA in the last 72 hours. No results for input(s): AP, TBIL, TP, ALB, GLOB, GGT, AML, LPSE in the last 72 hours. No lab exists for component: SGOT, GPT, AMYP, HLPSE  No results for input(s): INR, PTP, APTT, INREXT in the last 72 hours. Assessment:     · Dysphagia  · anemia   There is no problem list on file for this patient. Plan:   · The patient was counseled at length about the risks of brittany Covid-19 in the olga-operative and post-operative states including the recovery window of their procedure. The patient was made aware that brittany Covid-19 after a surgical procedure may worsen their prognosis for recovering from the virus and lend to a higher morbidity and or mortality risk. The patient was given the options of postponing their procedure. All of the risks, benefits, and alternatives were discussed. The patient does wish to proceed with the procedure. · Endoscopic procedure with MAC     Signed By: Markie Duncan.  Latesha Huynh MD     12/16/2021  11:02 AM

## 2021-12-16 NOTE — PROCEDURES
[address]  [address] [facility], [address] [place] [address]      Colonoscopy Operative Report    Dex Martinez  [id number]  1945      Procedure Type:   Colonoscopy with biopsy     Indications:    Iron deficiency anemia         Pre-operative Diagnosis: see indication above    Post-operative Diagnosis:  See findings below    :  Roxy Livingston. Don Pacheco MD    Staff: Endoscopy Technician-1: Macey Haynes IV  Endoscopy RN-1: Mateo Cho RN     Referring Provider: [place] NP      Sedation:  MAC anesthesia      Procedure Details:  After informed consent was obtained with all risks and benefits of procedure explained and preoperative exam completed, the patient was taken to the endoscopy suite and placed in the left lateral decubitus position. Upon sequential sedation as per above, a digital rectal exam was performed demonstrating internal hemorrhoids. The Olympus pediatric videocolonoscope  was inserted in the rectum and carefully advanced to the transverse colon. The quality of preparation was adequate. The colonoscope was slowly withdrawn with careful evaluation between folds. Retroflexion in the rectum was completed . Findings: At 55 cm, (likely the splenic flexure), a polypoid mass was seen that was non-obstructing. This occupied at least 50% of the lumen circumference. Cold biopsies were taken. An [place] ink tattoo was placed distally. The scope could not safely be advanced upstream secondary to extensive looping. Specimen Removed: 1. Splenic flexure mass biopsy  Complications: None. EBL:  None. Impression:     1. Polypoid mass at 55 cm - biopsied and tattooed    Recommendations:  1. Follow up surgical pathology  2. Next consider surgery consultation based on results  3. Completion colonoscopy following resection. Signed By: Roxy Ochoa Sa, MD     12/16/2021  11:49 AM

## 2021-12-16 NOTE — ANESTHESIA POSTPROCEDURE EVALUATION
Procedure(s):  ESOPHAGOGASTRODUODENOSCOPY (EGD)  COLONOSCOPY   :-  ESOPHAGOGASTRODUODENAL (EGD) BIOPSY  COLON BIOPSY  INJECTION. MAC, total IV anesthesia    Anesthesia Post Evaluation      Multimodal analgesia: multimodal analgesia not used between 6 hours prior to anesthesia start to PACU discharge  Patient location during evaluation: PACU  Patient participation: complete - patient participated  Level of consciousness: awake  Pain score: 0  Pain management: adequate  Airway patency: patent  Anesthetic complications: no  Cardiovascular status: acceptable  Respiratory status: acceptable  Hydration status: acceptable  Comments: I have evaluated the patient and meets criteria for discharge from PACU. Isabel Pimentel MD    Final Post Anesthesia Temperature Assessment:  Normothermia (36.0-37.5 degrees C)      INITIAL Post-op Vital signs: No vitals data found for the desired time range.

## 2021-12-16 NOTE — ANESTHESIA PREPROCEDURE EVALUATION
Relevant Problems   No relevant active problems       Anesthetic History   No history of anesthetic complications            Review of Systems / Medical History  Patient summary reviewed, nursing notes reviewed and pertinent labs reviewed    Pulmonary  Within defined limits  COPD               Neuro/Psych   Within defined limits           Cardiovascular  Within defined limits          Dysrhythmias : atrial fibrillation           GI/Hepatic/Renal  Within defined limits   GERD           Endo/Other  Within defined limits           Other Findings              Physical Exam    Airway  Mallampati: II  TM Distance: > 6 cm  Neck ROM: normal range of motion   Mouth opening: Normal     Cardiovascular  Regular rate and rhythm,  S1 and S2 normal,  no murmur, click, rub, or gallop             Dental  No notable dental hx       Pulmonary  Breath sounds clear to auscultation               Abdominal  GI exam deferred       Other Findings            Anesthetic Plan    ASA: 3  Anesthesia type: MAC            Anesthetic plan and risks discussed with: Patient

## 2022-01-13 ENCOUNTER — OFFICE VISIT (OUTPATIENT)
Dept: SURGERY | Age: 77
End: 2022-01-13
Payer: MEDICARE

## 2022-01-13 ENCOUNTER — DOCUMENTATION ONLY (OUTPATIENT)
Dept: SURGERY | Age: 77
End: 2022-01-13

## 2022-01-13 VITALS
HEART RATE: 89 BPM | RESPIRATION RATE: 17 BRPM | SYSTOLIC BLOOD PRESSURE: 107 MMHG | TEMPERATURE: 98 F | DIASTOLIC BLOOD PRESSURE: 55 MMHG | HEIGHT: 63 IN | WEIGHT: 92.6 LBS | OXYGEN SATURATION: 96 % | BODY MASS INDEX: 16.41 KG/M2

## 2022-01-13 DIAGNOSIS — D12.4 ADENOMATOUS POLYP OF DESCENDING COLON: Primary | ICD-10-CM

## 2022-01-13 PROCEDURE — 99204 OFFICE O/P NEW MOD 45 MIN: CPT | Performed by: SURGERY

## 2022-01-13 PROCEDURE — G8400 PT W/DXA NO RESULTS DOC: HCPCS | Performed by: SURGERY

## 2022-01-13 PROCEDURE — G8432 DEP SCR NOT DOC, RNG: HCPCS | Performed by: SURGERY

## 2022-01-13 PROCEDURE — 1090F PRES/ABSN URINE INCON ASSESS: CPT | Performed by: SURGERY

## 2022-01-13 PROCEDURE — G8536 NO DOC ELDER MAL SCRN: HCPCS | Performed by: SURGERY

## 2022-01-13 PROCEDURE — 3017F COLORECTAL CA SCREEN DOC REV: CPT | Performed by: SURGERY

## 2022-01-13 PROCEDURE — G8427 DOCREV CUR MEDS BY ELIG CLIN: HCPCS | Performed by: SURGERY

## 2022-01-13 PROCEDURE — G8419 CALC BMI OUT NRM PARAM NOF/U: HCPCS | Performed by: SURGERY

## 2022-01-13 PROCEDURE — 1101F PT FALLS ASSESS-DOCD LE1/YR: CPT | Performed by: SURGERY

## 2022-01-13 NOTE — PROGRESS NOTES
ERAS teaching completed. Patient was given ERAS notebook. All questions answered. Patient to call the office with any further questions.

## 2022-01-13 NOTE — PROGRESS NOTES
1. Have you been to the ER, urgent care clinic since your last visit? Hospitalized since your last visit? No    2. Have you seen or consulted any other health care providers outside of the [address] [place] [address] since your last visit? Include any pap smears or colon screening. No    Patient is [place]. Has son with her.

## 2022-01-13 NOTE — LETTER
1/13/2022    Patient: Dio Agrawal   YOB: 1945   Date of Visit: 1/13/2022     Mary Jane Aleman NP  [facility]  Mail Stop [phone] [address]  Via Fax: [phone]    Dear Mary Jane Aleman NP,      Thank you for referring Ms. Dio Agrawal to [facility] [facility] for evaluation. My notes for this consultation are attached. If you have questions, please do not hesitate to call me. I look forward to following your patient along with you.       Sincerely,    Swati Au MD

## 2022-01-14 NOTE — PROGRESS NOTES
[facility] History and Physical    Chief Complaint: Tubulovillous adenoma of splenic flexure    History of Present Illness:      Sherri Jacobo is a 77 y.o. female who was kindly referred by Dr. 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 The patient states that she has a 40-year history of being laxative dependent secondary to a period of anorexia and bulimia as a youth. She underwent a colonoscopy due to her laxative dependency and was found to have a large polypoid lesion at the splenic flexure that was occupying 50% of the lumen. Biopsies of this were consistent with tubulovillous adenoma. This was not felt to be amenable to endoscopic removal and she was referred for surgical evaluation. The mass was tattooed distally. The patient states she had 1 another colonoscopy approximately 25 years ago. She is eating well and maintaining her weight at this point. She denies any blood in her stools. She has chronic constipation as mentioned above.     Past Medical History:   Diagnosis Date    Anemia     Atrial fibrillation (HCC)     Back pain     Bloating     Chronic constipation     Constipation     COPD (chronic obstructive pulmonary disease) (HCC)     GERD (gastroesophageal reflux disease)     [place] (hard of hearing)     Indigestion     Osteoporosis        Past Surgical History:   Procedure Laterality Date    COLONOSCOPY N/A 12/16/2021    COLONOSCOPY   :- performed by Halie Franz MD at [facility] ENDOSCOPY    [address]X [address]    HX CHOLECYSTECTOMY  1982    HX HIP REPLACEMENT Left     HX HIP REPLACEMENT Right     HX HYSTERECTOMY      HX ORTHOPAEDIC  1990    Broken Hip    HX TONSILLECTOMY         Social History     Socioeconomic History    Marital status: SINGLE     Spouse name: Not on file    Number of children: Not on file    Years of education: Not on file    Highest education level: Not on file Occupational History    Not on file   Tobacco Use    Smoking status: Former Smoker     Packs/day: 1.00     Years: 20.00     Pack years: 20.00     Quit date: 12/18/2011     Years since quitting: 10.0    Smokeless tobacco: Never Used   Substance and Sexual Activity    Alcohol use: Never    Drug use: Never    Sexual activity: Not Currently   Other Topics Concern    Not on file   Social History Narrative    Not on file     Social Determinants of Health     Financial Resource Strain:     Difficulty of Paying Living Expenses: Not on file   Food Insecurity:     Worried About Running Out of Food in the Last Year: Not on file    Myke of Food in the Last Year: Not on file   Transportation Needs:     Lack of Transportation (Medical): Not on file    Lack of Transportation (Non-Medical): Not on file   Physical Activity:     Days of Exercise per Week: Not on file    Minutes of Exercise per Session: Not on file   Stress:     Feeling of Stress : Not on file   Social Connections:     Frequency of Communication with Friends and Family: Not on file    Frequency of Social Gatherings with Friends and Family: Not on file    Attends Congregational Services: Not on file    Active Member of [address] [place] [address] [employer] or Organizations: Not on file    Attends Club or Organization Meetings: Not on file    Marital Status: Not on file   Intimate Partner Violence:     Fear of Current or Ex-Partner: Not on file    Emotionally Abused: Not on file    Physically Abused: Not on file    Sexually Abused: Not on file   Housing Stability:     Unable to Pay for Housing in the Last Year: Not on file    Number of [place] in the Last Year: Not on file    Unstable Housing in the Last Year: Not on file       Family History   Problem Relation Age of Onset    Heart Disease Mother     Heart Disease Father          Current Outpatient Medications:     acetaminophen (TYLENOL) 325 mg tablet, Take  by mouth every four (4) hours as needed for Pain. , Disp: , Rfl:     polyethylene glycol (Miralax) 17 gram/dose powder, Take 17 g by mouth daily. , Disp: , Rfl:     digoxin (LANOXIN) 0.125 mg tablet, Take 1 Tablet by mouth daily. , Disp: 90 Tablet, Rfl: 1    albuterol (PROVENTIL HFA, VENTOLIN HFA, PROAIR HFA) 90 mcg/actuation inhaler, Take 2 Puffs by inhalation every six (6) hours as needed. , Disp: , Rfl:     aspirin delayed-release 81 mg tablet, Take 325 mg by mouth daily. , Disp: , Rfl:     butalbital-acetaminophen-caffeine (FIORICET, ESGIC) -40 mg per tablet, Take 1-2 Tabs by mouth every six (6) hours as needed. , Disp: , Rfl:     lactulose (CHRONULAC) 10 gram/15 mL solution, Take 20 g by mouth two (2) times daily as needed. , Disp: , Rfl:     metoprolol succinate (TOPROL-XL) 25 mg XL tablet, Take 25 mg by mouth daily. , Disp: , Rfl:     pantoprazole (PROTONIX) 40 mg tablet, Take 40 mg by mouth daily. , Disp: , Rfl:     Allergies   Allergen Reactions    Cefuroxime Hives    Hydrocodone Nausea and Vomiting    Oxycodone Nausea and Vomiting    Penicillins Hives       ROS   Constitutional: Negative  Ears, Nose, Mouth, Throat, and Face: Negative  Respiratory: Negative  Cardiovascular: Negative  Gastrointestinal: Chronic constipation, history of eating disorder. Genitourinary: Negative  Integument/Breast: Negative  Hematologic/Lymphatic: Negative  Behavioral/Psychiatric: Negative  Allergic/Immunologic: Negative      Physical Exam:     Visit Vitals  BP (!) 107/55 (BP 1 Location: Left upper arm, BP Patient Position: Sitting, BP Cuff Size: Adult)   Pulse 89   Temp 98 °F (36.7 °C) (Oral)   Resp 17   Ht 5' 3\" (1.6 m)   Wt 92 lb 9.6 oz (42 kg)   SpO2 96%   BMI 16.40 kg/m²       General - alert and oriented, no apparent distress  HEENT - NC/AT. No scleral icterus  Pulm - CTAB, normal inspiratory effort  CV - RRR, no M/R/G  Abd - soft, NT, ND. Thin. No palpable masses or organomegaly.   Ext - warm, well perfused, no edema  Lymphatics - no cervical, supraclavicular or inguinal adenopathy noted. Skin - supple, no rashes  Psychiatric - normal affect, good mood    Labs  None    Imaging  None      Assessment:     Magali Yadav is a 77 y.o. female with a large tubulovillous adenoma at the splenic flexure. Recommendations:     1. I recommended a laparoscopic left colectomy for the large polyp in the colon not amenable to endoscopic removal.  Details of the surgery were discussed with the patient. ERAS education was provided. A complete discussion of the risks, benefits and alternatives to surgery were discussed with the patient who was keen to proceed. We will get this set up in the near future. 45 mins of time was spent with the patient of which > 50% of the time involved face-to-face counseling of the patient regarding the proposed treatment plan.       Haritha Gallegos MD  1/13/2022    CC: JOSEPHINE Mijares Dr.

## 2022-01-28 RX ORDER — METRONIDAZOLE 500 MG/1
TABLET ORAL
Qty: 3 TABLET | Refills: 0 | Status: ON HOLD | OUTPATIENT
Start: 2022-01-28 | End: 2022-02-14 | Stop reason: ALTCHOICE

## 2022-01-28 RX ORDER — NEOMYCIN SULFATE 500 MG/1
TABLET ORAL
Qty: 3 TABLET | Refills: 0 | Status: ON HOLD | OUTPATIENT
Start: 2022-01-28 | End: 2022-02-14 | Stop reason: ALTCHOICE

## 2022-01-28 RX ORDER — METOCLOPRAMIDE 10 MG/1
TABLET ORAL
Qty: 3 TABLET | Refills: 0 | Status: ON HOLD | OUTPATIENT
Start: 2022-01-28 | End: 2022-02-14 | Stop reason: ALTCHOICE

## 2022-01-31 ENCOUNTER — TRANSCRIBE ORDER (OUTPATIENT)
Dept: REGISTRATION | Age: 77
End: 2022-01-31

## 2022-01-31 ENCOUNTER — TELEPHONE (OUTPATIENT)
Dept: SURGERY | Age: 77
End: 2022-01-31

## 2022-01-31 DIAGNOSIS — Z01.812 PRE-PROCEDURE LAB EXAM: Primary | ICD-10-CM

## 2022-01-31 NOTE — TELEPHONE ENCOUNTER
Returned call to [facility] and confirmed patient is to receive golytely.

## 2022-01-31 NOTE — TELEPHONE ENCOUNTER
[address] [place] [address] East: [facility] [phone]    [address] called for more information in regards to script for PEG 3350. Requested a call back please.

## 2022-02-04 ENCOUNTER — HOSPITAL ENCOUNTER (OUTPATIENT)
Dept: PREADMISSION TESTING | Age: 77
Discharge: HOME | End: 2022-02-04
Payer: MEDICARE

## 2022-02-04 ENCOUNTER — TELEPHONE (OUTPATIENT)
Dept: SURGERY | Age: 77
End: 2022-02-04

## 2022-02-04 VITALS
DIASTOLIC BLOOD PRESSURE: 77 MMHG | HEIGHT: 62 IN | BODY MASS INDEX: 17.34 KG/M2 | WEIGHT: 94.2 LBS | RESPIRATION RATE: 16 BRPM | SYSTOLIC BLOOD PRESSURE: 131 MMHG | TEMPERATURE: 97.9 F | HEART RATE: 80 BPM

## 2022-02-04 LAB
ABO + RH BLD: NORMAL
ALBUMIN SERPL-MCNC: 3.5 G/DL (ref 3.5–5)
ALBUMIN/GLOB SERPL: 1.1 {RATIO} (ref 1.1–2.2)
ALP SERPL-CCNC: 106 U/L (ref 45–117)
ALT SERPL-CCNC: 16 U/L (ref 12–78)
ANION GAP SERPL CALC-SCNC: 6 MMOL/L (ref 5–15)
AST SERPL-CCNC: 13 U/L (ref 15–37)
BILIRUB SERPL-MCNC: 0.3 MG/DL (ref 0.2–1)
BLOOD GROUP ANTIBODIES SERPL: NORMAL
BUN SERPL-MCNC: 13 MG/DL (ref 6–20)
BUN/CREAT SERPL: 15 (ref 12–20)
CALCIUM SERPL-MCNC: 9.2 MG/DL (ref 8.5–10.1)
CHLORIDE SERPL-SCNC: 104 MMOL/L (ref 97–108)
CO2 SERPL-SCNC: 27 MMOL/L (ref 21–32)
CREAT SERPL-MCNC: 0.85 MG/DL (ref 0.55–1.02)
DIGOXIN SERPL-MCNC: 1.1 NG/ML (ref 0.9–2)
ERYTHROCYTE [DISTWIDTH] IN BLOOD BY AUTOMATED COUNT: 15.8 % (ref 11.5–14.5)
EST. AVERAGE GLUCOSE BLD GHB EST-MCNC: 103 MG/DL
GLOBULIN SER CALC-MCNC: 3.2 G/DL (ref 2–4)
GLUCOSE SERPL-MCNC: 87 MG/DL (ref 65–100)
HBA1C MFR BLD: 5.2 % (ref 4–5.6)
HCT VFR BLD AUTO: 32.3 % (ref 35–47)
HGB BLD-MCNC: 9.8 G/DL (ref 11.5–16)
MCH RBC QN AUTO: 28.8 PG (ref 26–34)
MCHC RBC AUTO-ENTMCNC: 30.3 G/DL (ref 30–36.5)
MCV RBC AUTO: 95 FL (ref 80–99)
NRBC # BLD: 0 K/UL (ref 0–0.01)
NRBC BLD-RTO: 0 PER 100 WBC
PLATELET # BLD AUTO: 433 K/UL (ref 150–400)
PMV BLD AUTO: 9.6 FL (ref 8.9–12.9)
POTASSIUM SERPL-SCNC: 4 MMOL/L (ref 3.5–5.1)
PROT SERPL-MCNC: 6.7 G/DL (ref 6.4–8.2)
RBC # BLD AUTO: 3.4 M/UL (ref 3.8–5.2)
SODIUM SERPL-SCNC: 137 MMOL/L (ref 136–145)
SPECIMEN EXP DATE BLD: NORMAL
WBC # BLD AUTO: 7.1 K/UL (ref 3.6–11)

## 2022-02-04 PROCEDURE — 85027 COMPLETE CBC AUTOMATED: CPT

## 2022-02-04 PROCEDURE — 80162 ASSAY OF DIGOXIN TOTAL: CPT

## 2022-02-04 PROCEDURE — 86900 BLOOD TYPING SEROLOGIC ABO: CPT

## 2022-02-04 PROCEDURE — 83036 HEMOGLOBIN GLYCOSYLATED A1C: CPT

## 2022-02-04 PROCEDURE — 80053 COMPREHEN METABOLIC PANEL: CPT

## 2022-02-04 PROCEDURE — 36415 COLL VENOUS BLD VENIPUNCTURE: CPT

## 2022-02-04 RX ORDER — BISMUTH SUBSALICYLATE 262 MG
1 TABLET,CHEWABLE ORAL DAILY
COMMUNITY

## 2022-02-04 RX ORDER — GUAIFENESIN 100 MG/5ML
81 LIQUID (ML) ORAL DAILY
COMMUNITY

## 2022-02-04 NOTE — PERIOP NOTES
[address] [facility] INSTRUCTIONS  ERAS    Surgery Date:   2/14/22    [facility] staff will call you between 4 PM- 8 PM the day before surgery with your arrival time. If your surgery is on a Monday, we will call you the preceding Friday. Please call [phone] after 8 PM if you did not receive your arrival time. 1. Please report to [facility] Patient Access/Admitting on the 1st floor. Bring your insurance card, photo identification, and any copayment ( if applicable). 2. If you are going home the same day of your surgery, you must have a responsible adult to drive you home. You need to have a responsible adult to stay with you the first 24 hours after surgery and you should not drive a car for 24 hours following your surgery. 3. If you are being admitted to the hospital, please leave personal belongings/luggage in your car until you have an assigned hospital room number. 4. Please refer to [facility] book for Pre-operative Nutrition Plan. 5. Do NOT drink alcohol or smoke 24 hours before surgery. STOP smoking for 14 days prior as it helps with breathing and healing after surgery. 6. Please wear comfortable clothes. Wear your glasses instead of contacts. We ask that all money, jewelry and valuables be left at home. Wear no make up, particularly mascara, the day of surgery. 7.  All body piercings, rings, and jewelry need to be removed and left at home. Please remove any nail polish or artificial nails from your fingernails. Please wear your hair loose or down. Please no pony-tails, buns, or any metal hair accessories. If you shower the morning of surgery, please do not apply any lotions or powders afterwards. You may wear deodorant. Do not shave any body area within 24 hours of your surgery. 8. Please follow all instructions to avoid any potential surgical cancellation. 9. Should your physical condition change, (i.e. fever, cold, flu, etc.) please notify your surgeon as soon as possible.   10. It is important to be on time. If a situation occurs where you may be delayed, please call:  [phone] / [phone] on the day of surgery. 11. The Preadmission Testing staff can be reached at [phone]. 12. Special instructions: BRING INHALER, BRING ADVANCED DIRECTIVE    Current Outpatient Medications   Medication Sig    aspirin (ASPIRIN) 325 mg tablet Take 325 mg by mouth daily.  multivitamin (ONE A DAY) tablet Take 1 Tablet by mouth daily.  metroNIDAZOLE (FLAGYL) 500 mg tablet Take 1 Tab by mouth three (3) times daily for 1 day. The day before surgery take first dose at 8am, 2nd dose at 9am and 3rd dose at 5pm.    neomycin (MYCIFRADIN) 500 mg tablet Take 1 Tab by mouth three (3) times daily for 1 day. One day prior to surgery take first dose at 8am, 2nd dose at 9am and 3rd dose at 5pm.    PEG 3350-Electrolytes (COLYTE;GOLYTELY) solr Take 4,000mL by mouth once for 1 dose. The day before surgery, begin drinking at 12 noon.  metoclopramide HCl (REGLAN) 10 mg tablet Take 1 Tab by mouth three (3) times daily for 1 day. The day before surgery take first dose at 6am, 2nd  dose at 12pm and 3rd dose at 5pm.    acetaminophen (TYLENOL) 325 mg tablet Take 650 mg by mouth every four (4) hours as needed for Pain.  polyethylene glycol (Miralax) 17 gram/dose powder Take 17 g by mouth daily.  digoxin (LANOXIN) 0.125 mg tablet Take 1 Tablet by mouth daily. (Patient taking differently: Take 0.125 mg by mouth Every morning.)    albuterol (PROVENTIL HFA, VENTOLIN HFA, PROAIR HFA) 90 mcg/actuation inhaler Take 2 Puffs by inhalation every six (6) hours as needed.  butalbital-acetaminophen-caffeine (FIORICET, ESGIC) -40 mg per tablet Take 1-2 Tabs by mouth every six (6) hours as needed.  lactulose (CHRONULAC) 10 gram/15 mL solution Take 20 g by mouth two (2) times daily as needed.  metoprolol succinate (TOPROL-XL) 25 mg XL tablet Take 25 mg by mouth Every morning.     pantoprazole (PROTONIX) 40 mg tablet Take 40 mg by mouth daily. No current facility-administered medications for this encounter. 1. YOU MUST ONLY TAKE THESE MEDICATIONS THE MORNING OF SURGERY WITH A SIP OF WATER: MEETOPROLOL, DIGOXIN, PANTOPRAZOLE  2. MEDICATIONS TO TAKE THE MORNING OF SURGERY ONLY IF NEEDED: TYLENOL, ALBUTEROL  3. HOLD these prescription medications BEFORE surgery: NONE  4. Ask your surgeon/prescribing physician about when/if to STOP taking these medications: [facility]  5. Stop all vitamins, herbal medicines and Aspirin containing products 7 days prior to surgery. Stop any non-steroidal anti-inflammatory drugs (i.e. Ibuprofen, Naproxen, Advil, Aleve) 3 days before surgery. You may take Tylenol. 6. If you are currently taking Aspirin, Plavix, Coumadin or any other blood-thinning/anticoagulant medication contact your prescribing physician for instructions. Incentive Spirometer        Using the incentive spirometer helps expand the small air sacs of your lungs, helps you breathe deeply, and helps improve your lung function. Use your incentive spirometer twice a day (10 breaths each time) prior to surgery. How to Use Your Incentive Spirometer:  1. Hold the incentive spirometer in an upright position. 2. Breathe out as usual.   3. Place the mouthpiece in your mouth and seal your lips tightly around it. 4. Take a deep breath. Breathe in slowly and as deeply as possible. Keep the blue flow rate guide between the arrows. 5. Hold your breath as long as possible. Then exhale slowly and allow the piston to fall to the bottom of the column. 6. Rest for a few seconds and repeat steps one through five at least 10 times. Opportunity given to ask and answer questions. Preventing Infections Before and After - Your Surgery    IMPORTANT INSTRUCTIONS    Please read and follow these instructions carefully.  If you are unable to comply with the below instructions your procedure will be cancelled. You play an important role in your health and preparation for surgery. To reduce the germs on your skin you will need to shower with CHG soap (Chorhexidine gluconate 4%) two times before surgery. CHG soap (Hibiclens, Hex-A-Clens or store brand)   CHG soap will be provided at your Preadmission Testing (PAT) appointment.  If you do not have a PAT appointment before surgery, you may arrange to  CHG soap from our office or purchase CHG soap at a pharmacy, grocery or department store.  You need to purchase TWO 4 ounce bottles to use for your 2 showers. Steps to follow:  1. Wash your hair with your normal shampoo and your body with regular soap and rinse well to remove shampoo and soap from your skin. 2. Wet a clean washcloth and turn off the shower. 3. Put CHG soap on washcloth and apply to your entire body from the neck down. Do not use on your head, face or private parts(genitals). Do not use CHG soap on open sores, wounds or areas of skin irritation. 4. Wash you body gently for 5 minutes. Do not wash your skin too hard. This soap does not create lather. Pay special attention to your underarms and from your belly button to your feet. 5. Turn the shower back on and rinse well to get CHG soap off your body. 6. Pat your skin dry with a clean, dry towel. Do not apply lotions or moisturizer. 7. Put on clean clothes and sleep on fresh bed sheets and do not allow pets to sleep with you. Shower with CHG soap 2 times before your surgery   The evening before your surgery   The morning of your surgery      Tips to help prevent infections after your surgery:  1. Protect your surgical wound from germs:  ? Hand washing is the most important thing you and your caregivers can do to prevent infections. ? Keep your bandage clean and dry! ? Do not touch your surgical wound.   2. Use clean, freshly washed towels and washcloths every time you shower; do not share bath linens with others. 3. Until your surgical wound is healed, wear clothing and sleep on bed linens each day that are clean and freshly washed. 4. Do not allow pets to sleep in your bed with you or touch your surgical wound. 5. Do not smoke - smoking delays wound healing. This may be a good time to stop smoking. 6. If you have diabetes, it is important for you to manage your blood sugar levels properly before your surgery as well as after your surgery. Poorly managed blood sugar levels slow down wound healing and prevent you from healing completely. [address]   Instructions for Pre-Surgery COVID-19 Testing 2/9/22 AT 10AM    Across our ministry we have established standard guidelines to ensure the health and safety of our patients, residents and associates as we resume elective services for patients. All patients presenting for surgery are required to have a COVID-19 test result within 96 hours of their scheduled surgery. [facility] is providing this test free of charge to the patient.    Instructions for COVID-19 Testing:     Patients will receive a call from Pre-Admission Testing 4-5 days prior to surgery to schedule a date and time to come to the [address] [place] [address] Drive for their COVID-19 test   Patients are advised to self-quarantine after testing until their scheduled surgery   Once on site, patients will be registered and receive COVID test in their vehicle   If a patient is scheduled for normal Pre Admission Testing 96 hours from date of surgery, the patient will still have their COVID test done at the [address] [place] [address] located at [address] [place] [address] Positive results will be shared with the surgeon and anesthesiologist and may result in cancellation of the elective procedure    Testing Hours and Location:   Address:  [facility] Up Pre Admission [facility] in the Discharge Lot on [address] (Map Attached)  [address] [place] [address] [address]   Hours: Monday- Friday 7a-3p   PAT Phone Number: [phone]            Patient Information Regarding COVID Restrictions    Patients are advised to self-quarantine after COVID testing up to the day of the scheduled procedure. Day of Procedure     Please park in the parking deck or any designated visitor parking lot.  Enter the facility through the Main Entrance of the hospital.   A temperature check and appropriate symptom/exposure screening will be done prior to entry to the facility.  On the day of surgery, please provide the cell phone number of the person who will be waiting for you to the Patient Access representative at the time of registration.  Please wear a mask on the day of your procedure.  We are now allowing one designated visitor per stay. Pediatric patients may have 2 designated visitors. This one person may come in with you on the day of your procedure.  No visitors under the age of 13.  The designated visitor must also wear a mask.  Once your procedure and the immediate recovery period is completed, a nurse in the recovery area will contact your designated visitor to inform them of your room number or to otherwise review other pertinent information regarding your care.  Social distancing practices are to be adhered to in waiting areas and the cafeteria. The patient was contacted in person. She verbalized understanding of all instructions does not  need reinforcement.

## 2022-02-04 NOTE — PERIOP NOTES
Copy of COVID Vaccine Card place on chart. NOTICE OF SERVICES FOR DEAF AND HARD OF HEARING  FORM COMPLETED AND PLACED ON CHART.

## 2022-02-04 NOTE — PERIOP NOTES
PAT Nutrition Screen    1. Has the patient had an unplanned weight loss of 10% of body weight or more in the last 6 months? No = 0   2. Has the patient been eating less than 50% of their normal diet in the preceding week? No = 0       Patient instructed on Non-Diabetic pre-operative nutrition plan to start 5 days prior to surgery. Patient given 10 shakes and 3 pre-surgery drinks. Opportunity given for questions and all questions answered.

## 2022-02-04 NOTE — TELEPHONE ENCOUNTER
Returned call to patient. Two patient identifiers used. Patient stated she has taken Flagyl in the past and it made her stomach very upset. She wants to know if there is another medication Dr. Arlene Roche wants her to take. Explained to patient I would speak to Dr. Arlene Roche and return the call on Monday.

## 2022-02-07 ENCOUNTER — DOCUMENTATION ONLY (OUTPATIENT)
Dept: SURGERY | Age: 77
End: 2022-02-07

## 2022-02-07 ENCOUNTER — TELEPHONE (OUTPATIENT)
Dept: SURGERY | Age: 77
End: 2022-02-07

## 2022-02-07 NOTE — TELEPHONE ENCOUNTER
Returned call to patient. Two patient identifiers used. Patient aware per Dr. Robyn Jade that she does not need to take Flagyl due to the side effects she previously stated. .  Patient expressed understanding.

## 2022-02-07 NOTE — TELEPHONE ENCOUNTER
----- Message from Leena Mendenhall MD sent at 2/4/2022  6:06 PM EST -----  Regarding: RE: Medication Problem  Yes, that's fine. Thanks,    Leena Mendenhall MD  ----- Message -----  From: Arvin Mccollum LPN  Sent: 3/7/5702   3:57 PM EST  To: Leena Mendenhall MD  Subject: FW: Medication Problem                           Patient says Flagyl makes her stomach upset. Can she just not worry about abt?  ----- Message -----  From: Camille Salter  Sent: 2/4/2022   3:20 PM EST  To: Leena Mendenhall MD, Tobi Tong LPN  Subject: Medication Problem                               Hi Dr. Savanna Rivera,     I received a voicemail from this patient and she stated that the metroNIDAZOLE (FLAGYL) you prescribed for surgery is upsetting her stomach and she believes she might be allergic. Please reach out to her when you can, thanks!

## 2022-02-07 NOTE — PROGRESS NOTES
Forwarded to Dr. Manjit Nicole for review. Agus Albarado, RN  Cooper ARMENTA, TUANN  SEE LAB RESULTS FROM 2/4/22:  RBC 3.40, HGB 9.8, HCT 32.3.  T&S NEEDED FOR DOS.      Suzanne Foster

## 2022-02-07 NOTE — PERIOP NOTES
PAT Nurse Practitioner     Pre-Operative Chart Review/Assessment:    [facility] GENERAL/COLORECTAL SURGERY                 Patient Name:  Kane Robertson                    MRN:   [id number]     Sex:   female                                       Age:   77 y.o.    :  1945       Today's Date:  2022     Date of PAT:   2022      Date of Surgery:    2022      Procedure(s):   Lap Left Colectomy     Surgeon:  Dr. Jarocho Chavis:       1)  PCP: Angelica Love NP        2)  Cardiac Clearance: Pt followed by Dr. Jihan Fernandez. LOV 11/15/21. Dig decreased at that time due to bradycardia. HR-80 and Dig level-1.1 in PAT. Condition stable otherwise. No new symptoms or other changes to current regimen. OV note and EKG in CC. 3)  Diabetic Treatment Consult: Not indicated. A1c-5.2       4)  Sleep Apnea evaluation:  Not indicated. ALEJANDRINA Score 1.       5)  Additional Concerns: Former smoker, GERD, PAF, COPD, anemia(hgb-9.8 on PAT labs), hx of seizures d/t hyponatremia(Na-137 on PAT labs), [place], BMI-17        6) Bowel Prep: Yes       7) Abx: Yes                   Vital Signs:         Vitals:    22 1141   BP: 131/77   Pulse: 80   Resp: 16   Temp: 97.9 °F (36.6 °C)   Weight: 42.7 kg (94 lb 3.2 oz)   Height: 5' 2\" (1.575 m)          Preoperative Nutrition Screen (MISTI)   Patient's Age: 77 y.o. Patient's BMI: Estimated body mass index is 17.23 kg/m² as calculated from the following:    Height as of this encounter: 5' 2\" (1.575 m). Weight as of this encounter: 42.7 kg (94 lb 3.2 oz). If the answer to any of the following is Yes, then recommend prescribe Oral Nutrition Supplements (ONS) for at least 5 days prior to surgery and/or order referral to dietitian for further assessment and nutrition therapy. 1. Does the patient have a documented serum albumin less than 3.0 within the last 90 days? No = 0          2. Is patient's BMI less than 18.5 (or less than 20 if age over 72)?  Yes = 1 3. Has the patient had an unplanned weight loss of 10% of body weight or more in the last 6 months? No = 0   4. Has the patient been eating less than 50% of their normal diet in the preceding week? No = 0   MISTI Score (number of Yes responses), 0-4 1     Plan:   2 immunonutrition shakes daily for 5 days prior to surgery and 5 days post-op. 3 pre-surgery drinks. Electronically signed by Kandy Avila NP on 2/7/22 at 8:39 AM        ____________________________________________  PAST MEDICAL HISTORY  Past Medical History:   Diagnosis Date    Adenoma of colon     Anemia     Arthritis     Atrial fibrillation ([address]     Bloating     Chronic constipation     Chronic pain     back     [facility]     GERD (gastroesophageal reflux disease)     [place] (hard of hearing)     Hyponatremia     caused seizures x 2 per pt    Indigestion     Osteoporosis     Seizures ([facility] [address] 7/2020, 9/2020    x 2       ____________________________________________  PAST SURGICAL HISTORY  Past Surgical History:   Procedure Laterality Date    COLONOSCOPY N/A 12/16/2021    COLONOSCOPY   :- performed by Kvng Hearn [facility] at [facility] ENDOSCOPY    [facility] [address] [facility] [address] [facility]    HX HIP REPLACEMENT Left     pt stated hip was pinned, not replaced    HX HIP REPLACEMENT Right     pt stated hip was pinned, not replaced    HX HYSTERECTOMY      [facility], 2011    bilateral hip fractures     HX ORTHOPAEDIC Left 2020    femur fracture    HX TONSILLECTOMY        ____________________________________________  HOME MEDICATIONS  Current Outpatient Medications   Medication Sig    aspirin (ASPIRIN) 325 mg tablet Take 325 mg by mouth daily.  multivitamin (ONE A DAY) tablet Take 1 Tablet by mouth daily.  metroNIDAZOLE (FLAGYL) 500 mg tablet Take 1 Tab by mouth three (3) times daily for 1 day.  The day before surgery take first dose at 8am, 2nd dose at 9am and 3rd dose at 5pm.    neomycin (MYCIFRADIN) 500 mg tablet Take 1 Tab by mouth three (3) times daily for 1 day. One day prior to surgery take first dose at 8am, 2nd dose at 9am and 3rd dose at 5pm.    PEG 3350-Electrolytes (COLYTE;GOLYTELY) solr Take 4,000mL by mouth once for 1 dose. The day before surgery, begin drinking at 12 noon.  metoclopramide HCl (REGLAN) 10 mg tablet Take 1 Tab by mouth three (3) times daily for 1 day. The day before surgery take first dose at 6am, 2nd  dose at 12pm and 3rd dose at 5pm.    acetaminophen (TYLENOL) 325 mg tablet Take 650 mg by mouth every four (4) hours as needed for Pain.  polyethylene glycol (Miralax) 17 gram/dose powder Take 17 g by mouth daily.  digoxin (LANOXIN) 0.125 mg tablet Take 1 Tablet by mouth daily. (Patient taking differently: Take 0.125 mg by mouth Every morning.)    albuterol (PROVENTIL HFA, VENTOLIN HFA, PROAIR HFA) 90 mcg/actuation inhaler Take 2 Puffs by inhalation every six (6) hours as needed.  butalbital-acetaminophen-caffeine (FIORICET, ESGIC) -40 mg per tablet Take 1-2 Tabs by mouth every six (6) hours as needed.  lactulose (CHRONULAC) 10 gram/15 mL solution Take 20 g by mouth two (2) times daily as needed.  metoprolol succinate (TOPROL-XL) 25 mg XL tablet Take 25 mg by mouth Every morning.  pantoprazole (PROTONIX) 40 mg tablet Take 40 mg by mouth daily. No current facility-administered medications for this encounter.      ____________________________________________  ALLERGIES  Allergies   Allergen Reactions    Cefuroxime Hives    Hydrocodone Nausea and Vomiting    Oxycodone Nausea and Vomiting    Penicillins Hives     Patient screened for any delayed non-IgE-mediated reaction to PCN.         Patient notes the following:    No delayed non-IgE-mediated reaction to PCN    Hives 1968            ____________________________________________  SOCIAL HISTORY  Social History     Tobacco Use    Smoking status: Former Smoker Packs/day: 1.00     Years: 20.00     Pack years: 20.00     Quit date: 12/18/2011     Years since quitting: 10.1    Smokeless tobacco: Never Used   Substance Use Topics    Alcohol use: Never      ____________________________________________   Internal Administration   First Dose COVID-19, Pfizer Purple top, DILUTE for use, 12+ yrs, 30mcg/0.3mL dose  05/07/2021   Second Dose COVID-19, Pfizer Purple top, DILUTE for use, 12+ yrs, 30mcg/0.3mL dose  05/28/2021      Last COVID Lab SARS-CoV-2 ( )   Date Value   02/09/2022 Not detected        Labs:    Hospital Outpatient Visit on 02/04/2022   Component Date Value Ref Range Status    WBC 02/04/2022 7.1  3.6 - 11.0 K/uL Final    RBC 02/04/2022 3.40* 3.80 - 5.20 M/uL Final    HGB 02/04/2022 9.8* 11.5 - 16.0 g/dL Final    HCT 02/04/2022 32.3* 35.0 - 47.0 % Final    MCV 02/04/2022 95.0  80.0 - 99.0 FL Final    MCH 02/04/2022 28.8  26.0 - 34.0 PG Final    MCHC 02/04/2022 30.3  30.0 - 36.5 g/dL Final    RDW 02/04/2022 15.8* 11.5 - 14.5 % Final    PLATELET 50/19/6206 057* 150 - 400 K/uL Final    MPV 02/04/2022 9.6  8.9 - 12.9 FL Final    NRBC 02/04/2022 0.0  0  WBC Final    ABSOLUTE NRBC 02/04/2022 0.00  0.00 - 0.01 K/uL Final    Hemoglobin A1c 02/04/2022 5.2  4.0 - 5.6 % Final    Comment: NEW METHOD  PLEASE NOTE NEW REFERENCE RANGE  (NOTE)  HbA1C Interpretive Ranges  <5.7              Normal  5.7 - 6.4         Consider Prediabetes  >6.5              Consider Diabetes      Est. average glucose 02/04/2022 103  mg/dL Final    Sodium 02/04/2022 137  136 - 145 mmol/L Final    Potassium 02/04/2022 4.0  3.5 - 5.1 mmol/L Final    Chloride 02/04/2022 104  97 - 108 mmol/L Final    CO2 02/04/2022 27  21 - 32 mmol/L Final    Anion gap 02/04/2022 6  5 - 15 mmol/L Final    Glucose 02/04/2022 87  65 - 100 mg/dL Final    BUN 02/04/2022 13  6 - 20 MG/DL Final    Creatinine 02/04/2022 0.85  0.55 - 1.02 MG/DL Final    BUN/Creatinine ratio 02/04/2022 15  12 - 20 Final    GFR est AA 02/04/2022 >60  >60 ml/min/1.73m2 Final    GFR est non-AA 02/04/2022 >60  >60 ml/min/1.73m2 Final    Estimated GFR is calculated using the IDMS-traceable Modification of Diet in Renal Disease (MDRD) Study equation, reported for both  Americans (GFRAA) and non- Americans (GFRNA), and normalized to 1.73m2 body surface area. The physician must decide which value applies to the patient.  Calcium 02/04/2022 9.2  8.5 - 10.1 MG/DL Final    Bilirubin, total 02/04/2022 0.3  0.2 - 1.0 MG/DL Final    ALT (SGPT) 02/04/2022 16  12 - 78 U/L Final    AST (SGOT) 02/04/2022 13* 15 - 37 U/L Final    Alk. phosphatase 02/04/2022 106  45 - 117 U/L Final    Protein, total 02/04/2022 6.7  6.4 - 8.2 g/dL Final    Albumin 02/04/2022 3.5  3.5 - 5.0 g/dL Final    Globulin 02/04/2022 3.2  2.0 - 4.0 g/dL Final    A-G Ratio 02/04/2022 1.1  1.1 - 2.2   Final    Crossmatch Expiration 02/04/2022 02/17/2022,2359   Final    ABO/Rh(D) 02/04/2022 A POSITIVE   Final    Antibody screen 02/04/2022 NEG   Final    Digoxin level 02/04/2022 1.1  0.90 - 2.00 NG/ML Final    Comment: The historic therapeutic range of 0.9-2.0 ng/mL shows substantial overlap between therapeutic and toxic concentrations, particularly if comorbidity is present such as hypokalemia, hypomagnesemia or hypothyroidism. Because the morbidity benefit seems greater and safety more likely at lower serum concentrations than previously used, consider administering doses to achieve serum concentrations from 0.5 to 0.9 ng/ml (that is, individualized dosing based on age, body size, renal function, and concomitant interacting medications). Recognizing women tend to have higher serum concentrations, sex of the patient is another consideration.  Therapeutic Drug Monitoring Data: A Concise Guide, [employer], 2007 [facility] 46: 278, [facility] Outpatient Visit on 12/13/2021   Component Date Value Ref Range Status    Specimen source 12/13/2021 Nasopharyngeal    Final    SARS-CoV-2 12/13/2021 Not detected  NOTD   Final    Comment:      The specimen is NEGATIVE for SARS-CoV-2, the novel coronavirus associated with COVID-19. A negative result does not rule out COVID-19. Sirisha SARS-CoV-2 for use on the Sirisha 6800/8800 Systems is a real-time RT-PCR test intended for the qualitative detection of nucleic acids from SARS-CoV-2  in clinician-collected nasal, nasopharyngeal,and oropharyngeal swab specimens from individuals who meet COVID-19 clinical and/or epidemiological criteria. Sirisha SARS-CoV-2 is for use only under Emergency Use Authorization (EUA) in laboratories certified under [facility] [address] (CLIA),  [facility], that meet requirements to perform high or moderate complexity tests. An individual without symptoms of COVID-19 and who is not shedding SARS-CoV-2 virus would expect to have a negative (not detected) result in this assay. Fact sheet for Healthcare Providers: [employer] - Addiction Treatment Reviews & Information.co.nz  Fact sheet for Patients: http://www.Reflex/                           54197/download       Methodology: RT-PCR            Results from Hospital Encounter encounter on 09/04/21    XR CHEST PORT    Narrative  PORTABLE CHEST RADIOGRAPH/S: 9/4/2021 6:03 PM    INDICATION: Dyspnea, cough. COMPARISON: None. TECHNIQUE: Portable frontal upright radiograph/s of the chest.    FINDINGS:  There is biapical pulmonary scarring and volume loss. Numerous calcified  mediastinal lymph nodes are consistent with old granulomatous disease. There are  a few, tiny, scattered, calcified pulmonary granulomas as well. Probable pleural  calcification (right upper lung field, left hemidiaphragm) suggests old asbestos  exposure. The radiograph is rotated to the right, obscuring the central airways. No pneumothorax or pleural effusion. Impression  No acute process.          Skin:     Denies open wounds, cuts, sores, rashes or other areas of concern in PAT assessment.          Jane Felder, NP

## 2022-02-08 ENCOUNTER — TELEPHONE (OUTPATIENT)
Dept: CARDIOLOGY CLINIC | Age: 77
End: 2022-02-08

## 2022-02-08 NOTE — TELEPHONE ENCOUNTER
Patient has a colon procedure Monday 2/14 and needs to know when to stop asprin and/or any other medicine. Please advise.     [phone]

## 2022-02-08 NOTE — TELEPHONE ENCOUNTER
Selena Trujillo MD  You 2 hours ago (12:50 PM)     MG    Ok to hold aspirin for 5-7 days prior to procedure    Message text      TC to pt, ID verified.  Advised as above per Dr. Sara Williamson

## 2022-02-09 ENCOUNTER — HOSPITAL ENCOUNTER (OUTPATIENT)
Dept: PREADMISSION TESTING | Age: 77
Discharge: HOME | End: 2022-02-09
Attending: SURGERY
Payer: MEDICARE

## 2022-02-09 DIAGNOSIS — Z01.812 PRE-PROCEDURE LAB EXAM: ICD-10-CM

## 2022-02-09 PROCEDURE — U0005 INFEC AGEN DETEC AMPLI PROBE: HCPCS

## 2022-02-10 LAB
SARS-COV-2, XPLCVT: NOT DETECTED
SOURCE, COVRS: NORMAL

## 2022-02-11 ENCOUNTER — ANESTHESIA EVENT (OUTPATIENT)
Dept: SURGERY | Age: 77
DRG: 329 | End: 2022-02-11
Payer: MEDICARE

## 2022-02-14 ENCOUNTER — ANESTHESIA (OUTPATIENT)
Dept: SURGERY | Age: 77
DRG: 329 | End: 2022-02-14
Payer: MEDICARE

## 2022-02-14 ENCOUNTER — HOSPITAL ENCOUNTER (INPATIENT)
Age: 77
LOS: 6 days | Discharge: HOME HEALTH | DRG: 329 | End: 2022-02-20
Attending: SURGERY | Admitting: SURGERY
Payer: MEDICARE

## 2022-02-14 DIAGNOSIS — D12.4 ADENOMATOUS POLYP OF DESCENDING COLON: ICD-10-CM

## 2022-02-14 PROBLEM — D12.6 ADENOMATOUS POLYP OF COLON: Status: ACTIVE | Noted: 2022-02-14

## 2022-02-14 PROBLEM — D12.6 ADENOMATOUS COLON POLYP: Status: ACTIVE | Noted: 2022-02-14

## 2022-02-14 LAB
GLUCOSE BLD STRIP.AUTO-MCNC: 145 MG/DL (ref 65–117)
SERVICE CMNT-IMP: ABNORMAL

## 2022-02-14 PROCEDURE — 74011000250 HC RX REV CODE- 250: Performed by: NURSE ANESTHETIST, CERTIFIED REGISTERED

## 2022-02-14 PROCEDURE — 77030042556 HC PNCL CAUT -B: Performed by: SURGERY

## 2022-02-14 PROCEDURE — 77030034628 HC LIGASURE LAP SEAL DIV MD COVD -F: Performed by: SURGERY

## 2022-02-14 PROCEDURE — 74011000250 HC RX REV CODE- 250: Performed by: ANESTHESIOLOGY

## 2022-02-14 PROCEDURE — 0DTG4ZZ RESECTION OF LEFT LARGE INTESTINE, PERCUTANEOUS ENDOSCOPIC APPROACH: ICD-10-PCS | Performed by: SURGERY

## 2022-02-14 PROCEDURE — 77030041238 HC TBNG INSUF MDII -A: Performed by: SURGERY

## 2022-02-14 PROCEDURE — 76010000132 HC OR TIME 2.5 TO 3 HR: Performed by: SURGERY

## 2022-02-14 PROCEDURE — 74011000250 HC RX REV CODE- 250: Performed by: SURGERY

## 2022-02-14 PROCEDURE — 74011250637 HC RX REV CODE- 250/637: Performed by: SURGERY

## 2022-02-14 PROCEDURE — 77030040830 HC CATH URETH FOL MDII -A: Performed by: SURGERY

## 2022-02-14 PROCEDURE — 74011250636 HC RX REV CODE- 250/636: Performed by: ANESTHESIOLOGY

## 2022-02-14 PROCEDURE — 77030012770 HC TRCR OPT FX AMR -B: Performed by: SURGERY

## 2022-02-14 PROCEDURE — 44204 LAPARO PARTIAL COLECTOMY: CPT | Performed by: PHYSICIAN ASSISTANT

## 2022-02-14 PROCEDURE — P9045 ALBUMIN (HUMAN), 5%, 250 ML: HCPCS | Performed by: ANESTHESIOLOGY

## 2022-02-14 PROCEDURE — G0378 HOSPITAL OBSERVATION PER HR: HCPCS

## 2022-02-14 PROCEDURE — 44204 LAPARO PARTIAL COLECTOMY: CPT | Performed by: SURGERY

## 2022-02-14 PROCEDURE — 74011250636 HC RX REV CODE- 250/636: Performed by: SURGERY

## 2022-02-14 PROCEDURE — 44213 LAP MOBIL SPLENIC FL ADD-ON: CPT | Performed by: SURGERY

## 2022-02-14 PROCEDURE — 82962 GLUCOSE BLOOD TEST: CPT

## 2022-02-14 PROCEDURE — 77030008608 HC TRCR ENDOSC SMTH AMR -B: Performed by: SURGERY

## 2022-02-14 PROCEDURE — 77030002966 HC SUT PDS J&J -A: Performed by: SURGERY

## 2022-02-14 PROCEDURE — 77030037366 HC STPLR ENDO TRI-STPLR COVD -C: Performed by: SURGERY

## 2022-02-14 PROCEDURE — 65270000029 HC RM PRIVATE

## 2022-02-14 PROCEDURE — 77030008684 HC TU ET CUF COVD -B: Performed by: NURSE ANESTHETIST, CERTIFIED REGISTERED

## 2022-02-14 PROCEDURE — 74011250636 HC RX REV CODE- 250/636: Performed by: NURSE ANESTHETIST, CERTIFIED REGISTERED

## 2022-02-14 PROCEDURE — 2709999900 HC NON-CHARGEABLE SUPPLY: Performed by: SURGERY

## 2022-02-14 PROCEDURE — 77030035029 HC NDL INSUF VERES DISP COVD -B: Performed by: SURGERY

## 2022-02-14 PROCEDURE — 96374 THER/PROPH/DIAG INJ IV PUSH: CPT

## 2022-02-14 PROCEDURE — 96376 TX/PRO/DX INJ SAME DRUG ADON: CPT

## 2022-02-14 PROCEDURE — 77030026438 HC STYL ET INTUB CARD -A: Performed by: NURSE ANESTHETIST, CERTIFIED REGISTERED

## 2022-02-14 PROCEDURE — 76210000000 HC OR PH I REC 2 TO 2.5 HR: Performed by: SURGERY

## 2022-02-14 PROCEDURE — 96372 THER/PROPH/DIAG INJ SC/IM: CPT

## 2022-02-14 PROCEDURE — 76060000037 HC ANESTHESIA 3 TO 3.5 HR: Performed by: SURGERY

## 2022-02-14 PROCEDURE — 77030002933 HC SUT MCRYL J&J -A: Performed by: SURGERY

## 2022-02-14 PROCEDURE — 77030031139 HC SUT VCRL2 J&J -A: Performed by: SURGERY

## 2022-02-14 PROCEDURE — 77030009965 HC RELD STPLR ENDOS COVD -D: Performed by: SURGERY

## 2022-02-14 PROCEDURE — 77030002996 HC SUT SLK J&J -A: Performed by: SURGERY

## 2022-02-14 PROCEDURE — C9290 INJ, BUPIVACAINE LIPOSOME: HCPCS | Performed by: SURGERY

## 2022-02-14 PROCEDURE — 77030038157 HC DEV PWR CNTR DISP SIGNIA COVD -C: Performed by: SURGERY

## 2022-02-14 PROCEDURE — 77030040361 HC SLV COMPR DVT MDII -B: Performed by: SURGERY

## 2022-02-14 PROCEDURE — 88309 TISSUE EXAM BY PATHOLOGIST: CPT

## 2022-02-14 PROCEDURE — 96375 TX/PRO/DX INJ NEW DRUG ADDON: CPT

## 2022-02-14 PROCEDURE — 77030009527 HC GEL PRT SYS AMR -E: Performed by: SURGERY

## 2022-02-14 PROCEDURE — 44213 LAP MOBIL SPLENIC FL ADD-ON: CPT | Performed by: PHYSICIAN ASSISTANT

## 2022-02-14 PROCEDURE — 77030016151 HC PROTCTR LNS DFOG COVD -B: Performed by: SURGERY

## 2022-02-14 PROCEDURE — 77030009426 HC FCPS BIOP ENDOSC BSC -B: Performed by: SURGERY

## 2022-02-14 PROCEDURE — P9045 ALBUMIN (HUMAN), 5%, 250 ML: HCPCS | Performed by: NURSE ANESTHETIST, CERTIFIED REGISTERED

## 2022-02-14 RX ORDER — ALBUMIN HUMAN 50 G/1000ML
250 SOLUTION INTRAVENOUS ONCE
Status: COMPLETED | OUTPATIENT
Start: 2022-02-14 | End: 2022-02-14

## 2022-02-14 RX ORDER — ACETAMINOPHEN 325 MG/1
650 TABLET ORAL EVERY 6 HOURS
Status: DISCONTINUED | OUTPATIENT
Start: 2022-02-14 | End: 2022-02-15

## 2022-02-14 RX ORDER — ENOXAPARIN SODIUM 100 MG/ML
40 INJECTION SUBCUTANEOUS DAILY
Status: DISCONTINUED | OUTPATIENT
Start: 2022-02-15 | End: 2022-02-20 | Stop reason: HOSPADM

## 2022-02-14 RX ORDER — THERA TABS 400 MCG
1 TAB ORAL DAILY
Status: DISCONTINUED | OUTPATIENT
Start: 2022-02-15 | End: 2022-02-20 | Stop reason: HOSPADM

## 2022-02-14 RX ORDER — KETAMINE HYDROCHLORIDE 10 MG/ML
INJECTION, SOLUTION INTRAMUSCULAR; INTRAVENOUS AS NEEDED
Status: DISCONTINUED | OUTPATIENT
Start: 2022-02-14 | End: 2022-02-14 | Stop reason: HOSPADM

## 2022-02-14 RX ORDER — MIDAZOLAM HYDROCHLORIDE 1 MG/ML
INJECTION, SOLUTION INTRAMUSCULAR; INTRAVENOUS AS NEEDED
Status: DISCONTINUED | OUTPATIENT
Start: 2022-02-14 | End: 2022-02-14 | Stop reason: HOSPADM

## 2022-02-14 RX ORDER — POLYETHYLENE GLYCOL 3350 17 G/17G
17 POWDER, FOR SOLUTION ORAL DAILY
Status: DISCONTINUED | OUTPATIENT
Start: 2022-02-15 | End: 2022-02-16

## 2022-02-14 RX ORDER — ROCURONIUM BROMIDE 10 MG/ML
INJECTION, SOLUTION INTRAVENOUS AS NEEDED
Status: DISCONTINUED | OUTPATIENT
Start: 2022-02-14 | End: 2022-02-14 | Stop reason: HOSPADM

## 2022-02-14 RX ORDER — HYDROMORPHONE HYDROCHLORIDE 1 MG/ML
.5-1 INJECTION, SOLUTION INTRAMUSCULAR; INTRAVENOUS; SUBCUTANEOUS
Status: DISCONTINUED | OUTPATIENT
Start: 2022-02-14 | End: 2022-02-18

## 2022-02-14 RX ORDER — METOPROLOL SUCCINATE 25 MG/1
25 TABLET, EXTENDED RELEASE ORAL EVERY MORNING
Status: DISCONTINUED | OUTPATIENT
Start: 2022-02-15 | End: 2022-02-20 | Stop reason: HOSPADM

## 2022-02-14 RX ORDER — PROPOFOL 10 MG/ML
INJECTION, EMULSION INTRAVENOUS AS NEEDED
Status: DISCONTINUED | OUTPATIENT
Start: 2022-02-14 | End: 2022-02-14 | Stop reason: HOSPADM

## 2022-02-14 RX ORDER — SODIUM CHLORIDE, SODIUM LACTATE, POTASSIUM CHLORIDE, CALCIUM CHLORIDE 600; 310; 30; 20 MG/100ML; MG/100ML; MG/100ML; MG/100ML
75 INJECTION, SOLUTION INTRAVENOUS CONTINUOUS
Status: DISCONTINUED | OUTPATIENT
Start: 2022-02-14 | End: 2022-02-14 | Stop reason: HOSPADM

## 2022-02-14 RX ORDER — KETOROLAC TROMETHAMINE 30 MG/ML
15 INJECTION, SOLUTION INTRAMUSCULAR; INTRAVENOUS EVERY 6 HOURS
Status: COMPLETED | OUTPATIENT
Start: 2022-02-14 | End: 2022-02-15

## 2022-02-14 RX ORDER — GLYCOPYRROLATE 0.2 MG/ML
0.2 INJECTION INTRAMUSCULAR; INTRAVENOUS ONCE
Status: COMPLETED | OUTPATIENT
Start: 2022-02-14 | End: 2022-02-14

## 2022-02-14 RX ORDER — CELECOXIB 100 MG/1
100 CAPSULE ORAL ONCE
Status: COMPLETED | OUTPATIENT
Start: 2022-02-14 | End: 2022-02-14

## 2022-02-14 RX ORDER — LEVOFLOXACIN 5 MG/ML
500 INJECTION, SOLUTION INTRAVENOUS ONCE
Status: COMPLETED | OUTPATIENT
Start: 2022-02-14 | End: 2022-02-14

## 2022-02-14 RX ORDER — SUCCINYLCHOLINE CHLORIDE 20 MG/ML
INJECTION INTRAMUSCULAR; INTRAVENOUS AS NEEDED
Status: DISCONTINUED | OUTPATIENT
Start: 2022-02-14 | End: 2022-02-14 | Stop reason: HOSPADM

## 2022-02-14 RX ORDER — DIGOXIN 125 MCG
0.12 TABLET ORAL EVERY MORNING
Status: DISCONTINUED | OUTPATIENT
Start: 2022-02-15 | End: 2022-02-20 | Stop reason: HOSPADM

## 2022-02-14 RX ORDER — FENTANYL CITRATE 50 UG/ML
INJECTION, SOLUTION INTRAMUSCULAR; INTRAVENOUS AS NEEDED
Status: DISCONTINUED | OUTPATIENT
Start: 2022-02-14 | End: 2022-02-14 | Stop reason: HOSPADM

## 2022-02-14 RX ORDER — BUPIVACAINE HYDROCHLORIDE 5 MG/ML
INJECTION, SOLUTION EPIDURAL; INTRACAUDAL AS NEEDED
Status: DISCONTINUED | OUTPATIENT
Start: 2022-02-14 | End: 2022-02-14 | Stop reason: HOSPADM

## 2022-02-14 RX ORDER — GLYCOPYRROLATE 0.2 MG/ML
INJECTION INTRAMUSCULAR; INTRAVENOUS
Status: DISPENSED
Start: 2022-02-14 | End: 2022-02-15

## 2022-02-14 RX ORDER — DEXAMETHASONE SODIUM PHOSPHATE 4 MG/ML
INJECTION, SOLUTION INTRA-ARTICULAR; INTRALESIONAL; INTRAMUSCULAR; INTRAVENOUS; SOFT TISSUE AS NEEDED
Status: DISCONTINUED | OUTPATIENT
Start: 2022-02-14 | End: 2022-02-14 | Stop reason: HOSPADM

## 2022-02-14 RX ORDER — IPRATROPIUM BROMIDE AND ALBUTEROL SULFATE 2.5; .5 MG/3ML; MG/3ML
3 SOLUTION RESPIRATORY (INHALATION)
Status: DISCONTINUED | OUTPATIENT
Start: 2022-02-14 | End: 2022-02-20 | Stop reason: HOSPADM

## 2022-02-14 RX ORDER — PHENYLEPHRINE HCL IN 0.9% NACL 0.4MG/10ML
SYRINGE (ML) INTRAVENOUS AS NEEDED
Status: DISCONTINUED | OUTPATIENT
Start: 2022-02-14 | End: 2022-02-14 | Stop reason: HOSPADM

## 2022-02-14 RX ORDER — LACTULOSE 10 G/15ML
20 SOLUTION ORAL; RECTAL
Status: DISCONTINUED | OUTPATIENT
Start: 2022-02-15 | End: 2022-02-20 | Stop reason: HOSPADM

## 2022-02-14 RX ORDER — DEXMEDETOMIDINE HYDROCHLORIDE 100 UG/ML
INJECTION, SOLUTION INTRAVENOUS AS NEEDED
Status: DISCONTINUED | OUTPATIENT
Start: 2022-02-14 | End: 2022-02-14 | Stop reason: HOSPADM

## 2022-02-14 RX ORDER — LIDOCAINE HYDROCHLORIDE ANHYDROUS AND DEXTROSE MONOHYDRATE .8; 5 G/100ML; G/100ML
INJECTION, SOLUTION INTRAVENOUS
Status: DISCONTINUED | OUTPATIENT
Start: 2022-02-14 | End: 2022-02-14 | Stop reason: HOSPADM

## 2022-02-14 RX ORDER — SODIUM CHLORIDE 0.9 % (FLUSH) 0.9 %
5-40 SYRINGE (ML) INJECTION EVERY 8 HOURS
Status: DISCONTINUED | OUTPATIENT
Start: 2022-02-14 | End: 2022-02-20 | Stop reason: HOSPADM

## 2022-02-14 RX ORDER — SODIUM CHLORIDE 0.9 % (FLUSH) 0.9 %
5-40 SYRINGE (ML) INJECTION EVERY 8 HOURS
Status: DISCONTINUED | OUTPATIENT
Start: 2022-02-14 | End: 2022-02-14 | Stop reason: HOSPADM

## 2022-02-14 RX ORDER — LIDOCAINE HYDROCHLORIDE 20 MG/ML
INJECTION, SOLUTION EPIDURAL; INFILTRATION; INTRACAUDAL; PERINEURAL AS NEEDED
Status: DISCONTINUED | OUTPATIENT
Start: 2022-02-14 | End: 2022-02-14 | Stop reason: HOSPADM

## 2022-02-14 RX ORDER — ALBUMIN HUMAN 50 G/1000ML
SOLUTION INTRAVENOUS AS NEEDED
Status: DISCONTINUED | OUTPATIENT
Start: 2022-02-14 | End: 2022-02-14 | Stop reason: HOSPADM

## 2022-02-14 RX ORDER — SODIUM CHLORIDE 0.9 % (FLUSH) 0.9 %
5-40 SYRINGE (ML) INJECTION AS NEEDED
Status: DISCONTINUED | OUTPATIENT
Start: 2022-02-14 | End: 2022-02-20 | Stop reason: HOSPADM

## 2022-02-14 RX ORDER — SODIUM CHLORIDE, SODIUM LACTATE, POTASSIUM CHLORIDE, CALCIUM CHLORIDE 600; 310; 30; 20 MG/100ML; MG/100ML; MG/100ML; MG/100ML
75 INJECTION, SOLUTION INTRAVENOUS CONTINUOUS
Status: DISCONTINUED | OUTPATIENT
Start: 2022-02-14 | End: 2022-02-15

## 2022-02-14 RX ORDER — EPHEDRINE SULFATE/0.9% NACL/PF 50 MG/5 ML
5 SYRINGE (ML) INTRAVENOUS ONCE
Status: COMPLETED | OUTPATIENT
Start: 2022-02-14 | End: 2022-02-14

## 2022-02-14 RX ORDER — ALBUMIN HUMAN 50 G/1000ML
25 SOLUTION INTRAVENOUS ONCE
Status: DISCONTINUED | OUTPATIENT
Start: 2022-02-14 | End: 2022-02-14

## 2022-02-14 RX ORDER — SODIUM CHLORIDE, SODIUM LACTATE, POTASSIUM CHLORIDE, CALCIUM CHLORIDE 600; 310; 30; 20 MG/100ML; MG/100ML; MG/100ML; MG/100ML
INJECTION, SOLUTION INTRAVENOUS
Status: DISCONTINUED | OUTPATIENT
Start: 2022-02-14 | End: 2022-02-14

## 2022-02-14 RX ORDER — METRONIDAZOLE 500 MG/100ML
500 INJECTION, SOLUTION INTRAVENOUS ONCE
Status: COMPLETED | OUTPATIENT
Start: 2022-02-14 | End: 2022-02-14

## 2022-02-14 RX ORDER — PANTOPRAZOLE SODIUM 40 MG/1
40 TABLET, DELAYED RELEASE ORAL
Status: DISCONTINUED | OUTPATIENT
Start: 2022-02-15 | End: 2022-02-20 | Stop reason: HOSPADM

## 2022-02-14 RX ORDER — ONDANSETRON 2 MG/ML
4 INJECTION INTRAMUSCULAR; INTRAVENOUS
Status: DISCONTINUED | OUTPATIENT
Start: 2022-02-14 | End: 2022-02-20 | Stop reason: HOSPADM

## 2022-02-14 RX ORDER — ASPIRIN 325 MG
325 TABLET ORAL DAILY
Status: DISCONTINUED | OUTPATIENT
Start: 2022-02-15 | End: 2022-02-20 | Stop reason: HOSPADM

## 2022-02-14 RX ORDER — LIDOCAINE HYDROCHLORIDE ANHYDROUS AND DEXTROSE MONOHYDRATE .8; 5 G/100ML; G/100ML
1 INJECTION, SOLUTION INTRAVENOUS CONTINUOUS
Status: ACTIVE | OUTPATIENT
Start: 2022-02-14 | End: 2022-02-16

## 2022-02-14 RX ORDER — FENTANYL CITRATE 50 UG/ML
25 INJECTION, SOLUTION INTRAMUSCULAR; INTRAVENOUS
Status: COMPLETED | OUTPATIENT
Start: 2022-02-14 | End: 2022-02-14

## 2022-02-14 RX ORDER — ACETAMINOPHEN 500 MG
1000 TABLET ORAL ONCE
Status: COMPLETED | OUTPATIENT
Start: 2022-02-14 | End: 2022-02-14

## 2022-02-14 RX ORDER — ONDANSETRON 2 MG/ML
4 INJECTION INTRAMUSCULAR; INTRAVENOUS AS NEEDED
Status: DISCONTINUED | OUTPATIENT
Start: 2022-02-14 | End: 2022-02-14 | Stop reason: HOSPADM

## 2022-02-14 RX ORDER — SODIUM CHLORIDE 0.9 % (FLUSH) 0.9 %
5-40 SYRINGE (ML) INJECTION AS NEEDED
Status: DISCONTINUED | OUTPATIENT
Start: 2022-02-14 | End: 2022-02-14 | Stop reason: HOSPADM

## 2022-02-14 RX ORDER — EPHEDRINE SULFATE/0.9% NACL/PF 50 MG/5 ML
10 SYRINGE (ML) INTRAVENOUS ONCE
Status: COMPLETED | OUTPATIENT
Start: 2022-02-14 | End: 2022-02-14

## 2022-02-14 RX ORDER — ONDANSETRON 2 MG/ML
INJECTION INTRAMUSCULAR; INTRAVENOUS AS NEEDED
Status: DISCONTINUED | OUTPATIENT
Start: 2022-02-14 | End: 2022-02-14 | Stop reason: HOSPADM

## 2022-02-14 RX ORDER — MAGNESIUM SULFATE HEPTAHYDRATE 40 MG/ML
INJECTION, SOLUTION INTRAVENOUS AS NEEDED
Status: DISCONTINUED | OUTPATIENT
Start: 2022-02-14 | End: 2022-02-14 | Stop reason: HOSPADM

## 2022-02-14 RX ORDER — MIDAZOLAM HYDROCHLORIDE 1 MG/ML
1 INJECTION, SOLUTION INTRAMUSCULAR; INTRAVENOUS AS NEEDED
Status: DISCONTINUED | OUTPATIENT
Start: 2022-02-14 | End: 2022-02-14 | Stop reason: HOSPADM

## 2022-02-14 RX ORDER — EPHEDRINE SULFATE/0.9% NACL/PF 50 MG/5 ML
SYRINGE (ML) INTRAVENOUS
Status: DISPENSED
Start: 2022-02-14 | End: 2022-02-15

## 2022-02-14 RX ADMIN — LIDOCAINE HYDROCHLORIDE 2 MG/KG/HR: 8 INJECTION, SOLUTION INTRAVENOUS at 12:32

## 2022-02-14 RX ADMIN — SODIUM CHLORIDE, POTASSIUM CHLORIDE, SODIUM LACTATE AND CALCIUM CHLORIDE 75 ML/HR: 600; 310; 30; 20 INJECTION, SOLUTION INTRAVENOUS at 10:07

## 2022-02-14 RX ADMIN — ACETAMINOPHEN 1000 MG: 500 TABLET ORAL at 10:35

## 2022-02-14 RX ADMIN — LEVOFLOXACIN 500 MG: 5 INJECTION, SOLUTION INTRAVENOUS at 12:40

## 2022-02-14 RX ADMIN — Medication 200 MCG: at 12:45

## 2022-02-14 RX ADMIN — MIDAZOLAM 2 MG: 1 INJECTION INTRAMUSCULAR; INTRAVENOUS at 12:19

## 2022-02-14 RX ADMIN — DEXMEDETOMIDINE HYDROCHLORIDE 5 MCG: 100 INJECTION, SOLUTION, CONCENTRATE INTRAVENOUS at 15:17

## 2022-02-14 RX ADMIN — HYDROMORPHONE HYDROCHLORIDE 0.5 MG: 1 INJECTION, SOLUTION INTRAMUSCULAR; INTRAVENOUS; SUBCUTANEOUS at 19:18

## 2022-02-14 RX ADMIN — ROCURONIUM BROMIDE 20 MG: 10 SOLUTION INTRAVENOUS at 12:36

## 2022-02-14 RX ADMIN — ROCURONIUM BROMIDE 10 MG: 10 SOLUTION INTRAVENOUS at 13:56

## 2022-02-14 RX ADMIN — FENTANYL CITRATE 50 MCG: 50 INJECTION, SOLUTION INTRAMUSCULAR; INTRAVENOUS at 12:32

## 2022-02-14 RX ADMIN — LIDOCAINE HYDROCHLORIDE 40 MG: 20 INJECTION, SOLUTION EPIDURAL; INFILTRATION; INTRACAUDAL; PERINEURAL at 12:32

## 2022-02-14 RX ADMIN — Medication 120 MCG: at 12:35

## 2022-02-14 RX ADMIN — PHENYLEPHRINE HYDROCHLORIDE 60 MCG/MIN: 10 INJECTION INTRAVENOUS at 13:24

## 2022-02-14 RX ADMIN — Medication 25 MG: at 12:32

## 2022-02-14 RX ADMIN — SODIUM CHLORIDE, POTASSIUM CHLORIDE, SODIUM LACTATE AND CALCIUM CHLORIDE 75 ML/HR: 600; 310; 30; 20 INJECTION, SOLUTION INTRAVENOUS at 21:03

## 2022-02-14 RX ADMIN — KETOROLAC TROMETHAMINE 15 MG: 30 INJECTION, SOLUTION INTRAMUSCULAR; INTRAVENOUS at 22:41

## 2022-02-14 RX ADMIN — SODIUM CHLORIDE, POTASSIUM CHLORIDE, SODIUM LACTATE AND CALCIUM CHLORIDE 75 ML/HR: 600; 310; 30; 20 INJECTION, SOLUTION INTRAVENOUS at 15:34

## 2022-02-14 RX ADMIN — ONDANSETRON HYDROCHLORIDE 4 MG: 2 SOLUTION INTRAMUSCULAR; INTRAVENOUS at 15:54

## 2022-02-14 RX ADMIN — LIDOCAINE HYDROCHLORIDE 1 MG/KG/HR: 8 INJECTION, SOLUTION INTRAVENOUS at 15:50

## 2022-02-14 RX ADMIN — SODIUM CHLORIDE, PRESERVATIVE FREE 10 ML: 5 INJECTION INTRAVENOUS at 21:12

## 2022-02-14 RX ADMIN — NALOXEGOL OXALATE 25 MG: 25 TABLET, FILM COATED ORAL at 10:35

## 2022-02-14 RX ADMIN — Medication 80 MCG: at 14:08

## 2022-02-14 RX ADMIN — Medication 10 MG: at 15:46

## 2022-02-14 RX ADMIN — CELECOXIB 100 MG: 100 CAPSULE ORAL at 10:35

## 2022-02-14 RX ADMIN — ROCURONIUM BROMIDE 20 MG: 10 SOLUTION INTRAVENOUS at 13:35

## 2022-02-14 RX ADMIN — MAGNESIUM SULFATE 2 G: 2 INJECTION INTRAVENOUS at 12:32

## 2022-02-14 RX ADMIN — DEXMEDETOMIDINE HYDROCHLORIDE 5 MCG: 100 INJECTION, SOLUTION, CONCENTRATE INTRAVENOUS at 15:04

## 2022-02-14 RX ADMIN — ROCURONIUM BROMIDE 10 MG: 10 SOLUTION INTRAVENOUS at 12:32

## 2022-02-14 RX ADMIN — FENTANYL CITRATE 25 MCG: 50 INJECTION, SOLUTION INTRAMUSCULAR; INTRAVENOUS at 16:14

## 2022-02-14 RX ADMIN — GLYCOPYRROLATE 0.2 MG: 0.2 INJECTION, SOLUTION INTRAMUSCULAR; INTRAVENOUS at 16:51

## 2022-02-14 RX ADMIN — DEXAMETHASONE SODIUM PHOSPHATE 4 MG: 4 INJECTION, SOLUTION INTRAMUSCULAR; INTRAVENOUS at 12:45

## 2022-02-14 RX ADMIN — METRONIDAZOLE 500 MG: 500 SOLUTION INTRAVENOUS at 12:32

## 2022-02-14 RX ADMIN — ALBUMIN (HUMAN) 250 ML: 12.5 INJECTION, SOLUTION INTRAVENOUS at 12:32

## 2022-02-14 RX ADMIN — ACETAMINOPHEN 650 MG: 325 TABLET ORAL at 18:39

## 2022-02-14 RX ADMIN — ONDANSETRON HYDROCHLORIDE 4 MG: 2 INJECTION, SOLUTION INTRAMUSCULAR; INTRAVENOUS at 14:46

## 2022-02-14 RX ADMIN — PROPOFOL 125 MG: 10 INJECTION, EMULSION INTRAVENOUS at 12:32

## 2022-02-14 RX ADMIN — ALBUMIN (HUMAN) 12.5 G: 12.5 INJECTION, SOLUTION INTRAVENOUS at 16:41

## 2022-02-14 RX ADMIN — Medication 5 MG: at 16:39

## 2022-02-14 RX ADMIN — SUCCINYLCHOLINE CHLORIDE 100 MG: 20 INJECTION, SOLUTION INTRAMUSCULAR; INTRAVENOUS at 12:32

## 2022-02-14 RX ADMIN — SUGAMMADEX 100 MG: 100 INJECTION, SOLUTION INTRAVENOUS at 15:04

## 2022-02-14 RX ADMIN — FENTANYL CITRATE 50 MCG: 50 INJECTION, SOLUTION INTRAMUSCULAR; INTRAVENOUS at 13:02

## 2022-02-14 NOTE — H&P
Date of Surgery Update:  Qing Vincent was seen and examined. History and physical has been reviewed. The patient has been examined. There have been no significant clinical changes since the completion of the originally dated History and Physical.  Patient with large adenomatous polyp near splenic flexure not amenable to endoscopic removal.  Plan for laparoscopic left colectomy, possible open, possible flexible sigmoidoscopy. A complete discussion of the risks, benefits and alternatives to surgery were discussed with the patient who was keen to proceed. The patient was counseled at length about the risks of brittany Covid-19 during their perioperative period and any recovery window from their procedure. The patient was made aware that brittany Covid-19  may worsen their prognosis for recovering from their procedure and lend to a higher morbidity and/or mortality risk. All material risks, benefits, and reasonable alternatives including postponing the procedure were discussed. The patient does wish to proceed with the procedure at this time.         Signed By: Karthikeyan Quiroz MD     February 14, 2022 11:00 AM         Please note from the office and include the additional information below:    Past Medical History  Past Medical History:   Diagnosis Date    Adenoma of colon     Anemia     Arthritis     Atrial fibrillation (HCC)     Bloating     Chronic constipation     Chronic pain     back     COPD (chronic obstructive pulmonary disease) ([address]     GERD (gastroesophageal reflux disease)     [place] (hard of hearing)     Hyponatremia     caused seizures x 2 per pt    Indigestion     Osteoporosis     Seizures ([address] 7/2020, 9/2020    x 2         Past Surgical History  Past Surgical History:   Procedure Laterality Date    COLONOSCOPY N/A 12/16/2021    COLONOSCOPY   :- [facility] at [facility] ENDOSCOPY    [facility]    HX HIP REPLACEMENT Left     pt stated hip was pinned, not replaced    HX HIP REPLACEMENT Right     pt stated hip was pinned, not replaced    HX HYSTERECTOMY      HX ORTHOPAEDIC  1990, 2011    bilateral hip fractures     HX ORTHOPAEDIC Left 2020    femur fracture    HX TONSILLECTOMY          Social History  The patient Palma Jackson  reports that she quit smoking about 10 years ago. She has a 20.00 pack-year smoking history. She has never used smokeless tobacco. She reports that she does not drink alcohol and does not use drugs.      Family History  Family History   Problem Relation Age of Onset    Heart Disease Mother     Heart Disease Father     Liver Disease Father     Alcohol abuse Father    Alba Adams Hx           Leena Mendenhall MD

## 2022-02-14 NOTE — BRIEF OP NOTE
Brief Postoperative Note    Patient: Qing Vincent  YOB: 1945  MRN: [id number]    Date of Procedure: 2/14/2022     Pre-Op Diagnosis: ADENOMATOUS POLYP OF SPLENIC FLEXURE OF COLON    Post-Op Diagnosis: ADENOMATOUS POLYP OF TRANSVERSE COLON      Procedure(s):  LAPAROSCOPIC LEFT COLECTOMY, LAPAROSCOPIC MOBILIZATION OF SPLENIC FLEXURE    Surgeon(s): Bismark Beard MD    Surgical Assistant: Physician Assistant: RUSLAN Rey    Anesthesia: General     Estimated Blood Loss (mL): 25 mL    Complications: None    Specimens:   ID Type Source Tests Collected by Time Destination   1 : left colon: stitch marks distal margin Fresh Colon  Bismark Beard MD 2/14/2022 1424 Pathology        Implants: * No implants in log *    Drains: * No LDAs found *    Findings: Tattoo marking adjacent to polyp in mid/distal transverse colon. No evidence of malignancy. Adhesions of omentum and distal stomach to anterior abdominal wall from prior surgeries in upper abdomen.       Electronically Signed by Birgit Gillette MD on 2/14/2022 at 3:19 PM

## 2022-02-14 NOTE — PROGRESS NOTES
TRANSFER - OUT REPORT:    Verbal report given to HERMAN Sinha(name) on Leandra Gonzalez  being transferred to (unit) for routine post - op       Report consisted of patients Situation, Background, Assessment and   Recommendations(SBAR). Information from the following report(s) SBAR, Kardex, OR Summary, Procedure Summary, Intake/Output and MAR was reviewed with the receiving nurse. Lines:   Peripheral IV 02/14/22 Left Hand (Active)   Site Assessment Clean, dry, & intact 02/14/22 1655   Phlebitis Assessment 0 02/14/22 1655   Infiltration Assessment 0 02/14/22 1655   Dressing Status Clean, dry, & intact 02/14/22 1655   Dressing Type Transparent;Tape 02/14/22 1655   Hub Color/Line Status Pink; Infusing 02/14/22 1655        Opportunity for questions and clarification was provided.       Patient transported with:   [employer]

## 2022-02-14 NOTE — ANESTHESIA POSTPROCEDURE EVALUATION
Post-Anesthesia Evaluation and Assessment    Patient: Pearl Mclaughlin MRN: [id number]  SSN: [id number]    YOB: 1945  Age: 77 y.o. Sex: female      I have evaluated the patient and they are stable and ready for discharge from the PACU. Cardiovascular Function/Vital Signs  Visit Vitals  BP (!) 94/53   Pulse 70   Temp 36.6 °C (97.8 °F)   Resp 17   Ht 5' 2\" (1.575 m)   Wt 42.4 kg (93 lb 7.6 oz)   SpO2 99%   BMI 17.10 kg/m²       Patient is status post General anesthesia for Procedure(s):  LAPAROSCOPIC LEFT COLECTOMY  . .    Nausea/Vomiting: None    Postoperative hydration reviewed and adequate. Pain:  Pain Scale 1: Numeric (0 - 10) (02/14/22 1525)  Pain Intensity 1: 9 (02/14/22 1525)   Managed    Neurological Status:   Neuro (WDL): Within Defined Limits (02/14/22 1525)  Neuro  LUE Motor Response: Purposeful (02/14/22 1525)  LLE Motor Response: Purposeful (02/14/22 1525)  RUE Motor Response: Purposeful (02/14/22 1525)  RLE Motor Response: Purposeful (02/14/22 1525)   At baseline    Mental Status, Level of Consciousness: Alert and  oriented to person, place, and time    Pulmonary Status:   O2 Device: Other (comment) (02/14/22 1525)   Adequate oxygenation and airway patent    Complications related to anesthesia: None    Post-anesthesia assessment completed. No concerns    Signed By: Alva Oden MD     February 14, 2022              Procedure(s):  LAPAROSCOPIC LEFT COLECTOMY  . .    general    <BSHSIANPOST>    INITIAL Post-op Vital signs:   Vitals Value Taken Time   BP 92/48 02/14/22 1635   Temp 36.6 °C (97.8 °F) 02/14/22 1525   Pulse 66 02/14/22 1636   Resp 18 02/14/22 1636   SpO2 100 % 02/14/22 1636   Vitals shown include unvalidated device data.

## 2022-02-14 NOTE — ANESTHESIA PREPROCEDURE EVALUATION
Relevant Problems   No relevant active problems       Anesthetic History   No history of anesthetic complications            Review of Systems / Medical History  Patient summary reviewed, nursing notes reviewed and pertinent labs reviewed    Pulmonary    COPD: mild               Neuro/Psych   Within defined limits           Cardiovascular            Dysrhythmias : atrial fibrillation      Exercise tolerance: <4 METS  Comments: LVEF 55-60%   GI/Hepatic/Renal     GERD: well controlled           Endo/Other        Arthritis and anemia     Other Findings            Physical Exam    Airway  Mallampati: II  TM Distance: > 6 cm  Neck ROM: normal range of motion   Mouth opening: Normal     Cardiovascular  Regular rate and rhythm,  S1 and S2 normal,  no murmur, click, rub, or gallop             Dental  No notable dental hx       Pulmonary  Breath sounds clear to auscultation               Abdominal  GI exam deferred       Other Findings            Anesthetic Plan    ASA: 3  Anesthesia type: general          Induction: Intravenous  Anesthetic plan and risks discussed with: Patient

## 2022-02-15 LAB
ANION GAP SERPL CALC-SCNC: 7 MMOL/L (ref 5–15)
BASOPHILS # BLD: 0 K/UL (ref 0–0.1)
BASOPHILS NFR BLD: 0 % (ref 0–1)
BUN SERPL-MCNC: 10 MG/DL (ref 6–20)
BUN/CREAT SERPL: 13 (ref 12–20)
CALCIUM SERPL-MCNC: 8.4 MG/DL (ref 8.5–10.1)
CHLORIDE SERPL-SCNC: 106 MMOL/L (ref 97–108)
CO2 SERPL-SCNC: 22 MMOL/L (ref 21–32)
CREAT SERPL-MCNC: 0.75 MG/DL (ref 0.55–1.02)
DIFFERENTIAL METHOD BLD: ABNORMAL
EOSINOPHIL # BLD: 0 K/UL (ref 0–0.4)
EOSINOPHIL NFR BLD: 0 % (ref 0–7)
ERYTHROCYTE [DISTWIDTH] IN BLOOD BY AUTOMATED COUNT: 15.3 % (ref 11.5–14.5)
GLUCOSE SERPL-MCNC: 105 MG/DL (ref 65–100)
HCT VFR BLD AUTO: 23.5 % (ref 35–47)
HGB BLD-MCNC: 7.5 G/DL (ref 11.5–16)
IMM GRANULOCYTES # BLD AUTO: 0 K/UL (ref 0–0.04)
IMM GRANULOCYTES NFR BLD AUTO: 0 % (ref 0–0.5)
LYMPHOCYTES # BLD: 1 K/UL (ref 0.8–3.5)
LYMPHOCYTES NFR BLD: 10 % (ref 12–49)
MAGNESIUM SERPL-MCNC: 2.1 MG/DL (ref 1.6–2.4)
MCH RBC QN AUTO: 29.2 PG (ref 26–34)
MCHC RBC AUTO-ENTMCNC: 31.9 G/DL (ref 30–36.5)
MCV RBC AUTO: 91.4 FL (ref 80–99)
MONOCYTES # BLD: 0.9 K/UL (ref 0–1)
MONOCYTES NFR BLD: 9 % (ref 5–13)
NEUTS SEG # BLD: 7.8 K/UL (ref 1.8–8)
NEUTS SEG NFR BLD: 81 % (ref 32–75)
NRBC # BLD: 0 K/UL (ref 0–0.01)
NRBC BLD-RTO: 0 PER 100 WBC
PHOSPHATE SERPL-MCNC: 3.3 MG/DL (ref 2.6–4.7)
PLATELET # BLD AUTO: 271 K/UL (ref 150–400)
PMV BLD AUTO: 9.5 FL (ref 8.9–12.9)
POTASSIUM SERPL-SCNC: 4 MMOL/L (ref 3.5–5.1)
RBC # BLD AUTO: 2.57 M/UL (ref 3.8–5.2)
SODIUM SERPL-SCNC: 135 MMOL/L (ref 136–145)
WBC # BLD AUTO: 9.8 K/UL (ref 3.6–11)

## 2022-02-15 PROCEDURE — 99024 POSTOP FOLLOW-UP VISIT: CPT

## 2022-02-15 PROCEDURE — 80048 BASIC METABOLIC PNL TOTAL CA: CPT

## 2022-02-15 PROCEDURE — 74011000250 HC RX REV CODE- 250: Performed by: SURGERY

## 2022-02-15 PROCEDURE — 84100 ASSAY OF PHOSPHORUS: CPT

## 2022-02-15 PROCEDURE — 65270000029 HC RM PRIVATE

## 2022-02-15 PROCEDURE — 83735 ASSAY OF MAGNESIUM: CPT

## 2022-02-15 PROCEDURE — 85025 COMPLETE CBC W/AUTO DIFF WBC: CPT

## 2022-02-15 PROCEDURE — 74011250636 HC RX REV CODE- 250/636: Performed by: SURGERY

## 2022-02-15 PROCEDURE — 74011250637 HC RX REV CODE- 250/637

## 2022-02-15 PROCEDURE — 74011250636 HC RX REV CODE- 250/636

## 2022-02-15 PROCEDURE — 36415 COLL VENOUS BLD VENIPUNCTURE: CPT

## 2022-02-15 PROCEDURE — 74011250637 HC RX REV CODE- 250/637: Performed by: SURGERY

## 2022-02-15 RX ORDER — BUTALBITAL, ACETAMINOPHEN AND CAFFEINE 50; 325; 40 MG/1; MG/1; MG/1
1-2 TABLET ORAL
Status: DISCONTINUED | OUTPATIENT
Start: 2022-02-15 | End: 2022-02-17

## 2022-02-15 RX ORDER — SODIUM CHLORIDE 9 MG/ML
100 INJECTION, SOLUTION INTRAVENOUS CONTINUOUS
Status: DISCONTINUED | OUTPATIENT
Start: 2022-02-15 | End: 2022-02-18

## 2022-02-15 RX ORDER — PROMETHAZINE HYDROCHLORIDE 25 MG/1
25 TABLET ORAL
Status: DISCONTINUED | OUTPATIENT
Start: 2022-02-15 | End: 2022-02-20 | Stop reason: HOSPADM

## 2022-02-15 RX ADMIN — THERA TABS 1 TABLET: TAB at 09:54

## 2022-02-15 RX ADMIN — ONDANSETRON HYDROCHLORIDE 4 MG: 2 SOLUTION INTRAMUSCULAR; INTRAVENOUS at 00:54

## 2022-02-15 RX ADMIN — ONDANSETRON HYDROCHLORIDE 4 MG: 2 SOLUTION INTRAMUSCULAR; INTRAVENOUS at 09:46

## 2022-02-15 RX ADMIN — POLYETHYLENE GLYCOL 3350 17 G: 17 POWDER, FOR SOLUTION ORAL at 09:54

## 2022-02-15 RX ADMIN — KETOROLAC TROMETHAMINE 15 MG: 30 INJECTION, SOLUTION INTRAMUSCULAR; INTRAVENOUS at 09:46

## 2022-02-15 RX ADMIN — SODIUM CHLORIDE, PRESERVATIVE FREE 10 ML: 5 INJECTION INTRAVENOUS at 06:51

## 2022-02-15 RX ADMIN — METOPROLOL SUCCINATE 25 MG: 25 TABLET, FILM COATED, EXTENDED RELEASE ORAL at 06:59

## 2022-02-15 RX ADMIN — PANTOPRAZOLE SODIUM 40 MG: 40 TABLET, DELAYED RELEASE ORAL at 06:49

## 2022-02-15 RX ADMIN — ASPIRIN 325 MG ORAL TABLET 325 MG: 325 PILL ORAL at 09:54

## 2022-02-15 RX ADMIN — ENOXAPARIN SODIUM 40 MG: 100 INJECTION SUBCUTANEOUS at 09:50

## 2022-02-15 RX ADMIN — Medication 12.5 MG: at 13:46

## 2022-02-15 RX ADMIN — DIGOXIN 0.12 MG: 125 TABLET ORAL at 09:54

## 2022-02-15 RX ADMIN — NALOXEGOL OXALATE 25 MG: 12.5 TABLET, FILM COATED ORAL at 09:54

## 2022-02-15 RX ADMIN — KETOROLAC TROMETHAMINE 15 MG: 30 INJECTION, SOLUTION INTRAMUSCULAR; INTRAVENOUS at 03:38

## 2022-02-15 RX ADMIN — Medication 12.5 MG: at 22:41

## 2022-02-15 RX ADMIN — KETOROLAC TROMETHAMINE 15 MG: 30 INJECTION, SOLUTION INTRAMUSCULAR; INTRAVENOUS at 18:15

## 2022-02-15 RX ADMIN — SODIUM CHLORIDE 100 ML/HR: 9 INJECTION, SOLUTION INTRAVENOUS at 11:40

## 2022-02-15 RX ADMIN — BUTALBITAL, ACETAMINOPHEN, AND CAFFEINE 2 TABLET: 50; 325; 40 TABLET ORAL at 11:39

## 2022-02-15 RX ADMIN — BUTALBITAL, ACETAMINOPHEN, AND CAFFEINE 2 TABLET: 50; 325; 40 TABLET ORAL at 21:26

## 2022-02-15 NOTE — ROUTINE PROCESS
Bedside and Verbal shift change report given to Sarah Beth RN (oncoming nurse) by Nestor Campos RN (offgoing nurse). Report included the following information SBAR, Kardex, Intake/Output, MAR and Recent Results.

## 2022-02-15 NOTE — PROGRESS NOTES
Physician Progress Note      PATIENT:               Bubba Lubin  CSN #:                  [id number]  :                       1945  ADMIT DATE:       2022 9:15 AM  DISCH DATE:  RESPONDING  PROVIDER #:        FLORI PEÑA MD          QUERY TEXT:    Patient admitted for Left Colectomy. RD has documented that pt has severe body fat and muscle mass loss noted on exam, with BMI 17.1. If possible, please document in progress notes and discharge summary if you are evaluating and /or treating any of the following: The medical record reflects the following:  Risk Factors: hx of Anorexia and Bulimia  Clinical Indicators: admitted for Colectomy, noted to have BMI 17.1 on admission. RD has documented that pt has severe body fat/ muscle mass loss noted on exam, meeting criteria for severe malnutrition in the setting of inadequate protein energy intake as evidenced by intake of 0-25% and low BMI. Treatment: Ensure Surgery nutritional supplement BID, anti-emetics prn, Theragran qd. RD to follow. ASPEN Criteria:  https://aspenjournals. onlinelibrary. menendez. com/doi/full/10.1177/0411740609400031    Thank you,  Torsten Crawford RN  Clinical Documentation  [phone]  Options provided:  -- Protein calorie malnutrition severe  -- Protein calorie malnutrition unspecified  -- Other - I will add my own diagnosis  -- Disagree - Not applicable / Not valid  -- Disagree - Clinically unable to determine / Unknown  -- Refer to Clinical Documentation Reviewer    PROVIDER RESPONSE TEXT:    This patient has severe protein calorie malnutrition. Query created by: Madelaine Hopper on 2/15/2022 1:39 PM      QUERY TEXT:    Pt has anemia documented under PMH. Hgb 7.5.  If possible, please document in progress notes and discharge summary further specificity regarding the acuity and type of anemia:    The medical record reflects the following:  Risk Factors: s/p colectomy, hx of GERD, anorexia and bulimia  Clinical Indicators: admitted for Colectomy for adenomatous polyp. EBL 25 cc from surgery. Pt noted to have Hgb ranging from 7.5 to 9.8 in the past 6 mos. Anemia is listed as a diagnosis under PMH in the H&P. Treatment: Artist Kayser, monitoring of labs    Thank you,  Jodee Nolan RN  Clinical Documentation  [phone]  Options provided:  -- Anemia due to acute blood loss  -- Anemia due to chronic blood loss  -- Anemia due to acute on chronic blood loss  -- Anemia due to postoperative blood loss  -- Anemia due to chronic disease, please specify.   -- Dilutional anemia  -- Other - I will add my own diagnosis  -- Disagree - Not applicable / Not valid  -- Disagree - Clinically unable to determine / Unknown  -- Refer to Clinical Documentation Reviewer    PROVIDER RESPONSE TEXT:    This patient has anemia due to chronic disease    Query created by: Ela Heller on 2/15/2022 1:59 PM      Electronically signed by:  Arnoldo Bryant MD 2/15/2022 2:04 PM

## 2022-02-15 NOTE — ROUTINE PROCESS
Bedside shift change report given to Tarun Mathur RN (oncoming nurse) by [facility], RN (offgoing nurse). Report included the following information SBAR, Kardex, ED Summary, OR Summary, Procedure Summary, Intake/Output, MAR and Recent Results.

## 2022-02-15 NOTE — ROUTINE PROCESS
Called the Dr. Luisito Landers, spoke with on call Dr. Fernando Michaud and  informed that patient refused the IV Lidocaine continuous drip.

## 2022-02-15 NOTE — PROGRESS NOTES
Comprehensive Nutrition Assessment    Type and Reason for Visit: Initial (Low BMI)    Nutrition Recommendations/Plan:      1. Continue with Low Fiber diet. 2. Continue with Ensure Surgery BID. Nutrition Assessment:    76 yo female admitted for adenomatous polyp of colon. PMhx: COPD, GERD, hyponatremia, seizures. S/P left colectomy 2/14. Underweight per BMI. Weight hx in EMR indicates no recent weight loss. Pt confirms this, stating her weight has been stable PTA. Reports having a hx of anorexia and bulimia for 25 years. Since then she has been unable to have BM without medication and cannot tolerate large portions. Eats small meals 5x/day at home and takes Miralax for BM support. Pt c/o nausea/dry heaving this morning. C/o burping, no flatus. Only took a few bites of grits this morning. Ensure surgery on her tray, states she cannot drink it now but might try it later. Offered Ensure clear as she was sipping on apple juice, she declined. No teeth, denies difficulty chewing at this time. Malnutrition Assessment:  Malnutrition Status:  Severe malnutrition    Context:  Chronic illness     Findings of the 6 clinical characteristics of malnutrition:   Energy Intake:  No significant decrease in energy intake  Weight Loss:  No significant weight loss     Body Fat Loss:  7 - Severe body fat loss, Buccal region,Orbital,Triceps   Muscle Mass Loss:  7 - Severe muscle mass loss, Clavicles (pectoralis &deltoids),Temples (temporalis)  Fluid Accumulation:  No significant fluid accumulation,     Strength:  Not performed       Nutritionally Significant Medications: Miralax, Zofran; LR at 75 ml/hr    Estimated Daily Nutrient Needs:  Energy (kcal): 5244-2797 (35-40 kcals/kg); Weight Used for Energy Requirements: Current  Protein (g): 67-76 (1.6-1.8 gm/kg);  Weight Used for Protein Requirements: Current  Fluid (ml/day): 9958-6675; Method Used for Fluid Requirements: 1 ml/kcal    Nutrition Related Findings:       BM: 2/14 watery  Edema: none  Wounds:  Surgical incision (abd)       Current Nutrition Therapies:   Diet: Low Fiber  Supplements: Ensure Surgery BID  Additional Caloric Sources: none    Meal intake: No data found. Supplement Intake: No data found.     Anthropometric Measures:  · Height:  5' 2\" (157.5 cm)  · Current Body Wt:  42.4 kg (93 lb 7.6 oz)   · Admission Body Wt:       · Usual Body Wt:        · Ideal Body Wt:  110:      · Adjusted Body Weight:   ; Weight Adjustment for: No adjustment   · Adjusted BMI:       · BMI Categories:  Underweight (BMI less than 22) age over 72     Wt Readings from Last 10 Encounters:   02/14/22 42.4 kg (93 lb 7.6 oz)   02/04/22 42.7 kg (94 lb 3.2 oz)   01/13/22 42 kg (92 lb 9.6 oz)   12/16/21 42.6 kg (94 lb)   11/15/21 42.2 kg (93 lb)   09/28/21 40.9 kg (90 lb 3.2 oz)   09/07/21 40.8 kg (90 lb)   09/04/21 40.8 kg (90 lb)   03/12/21 42.2 kg (93 lb)   12/18/20 41.7 kg (92 lb)       Nutrition Diagnosis:   · Inadequate oral intake related to inadequate protein-energy intake as evidenced by intake 0-25%    · Underweight related to inadequate protein-energy intake,altered GI function as evidenced by BMI (17.1)      Nutrition Interventions:   Food and/or Nutrient Delivery: Continue current diet,Continue oral nutrition supplement  Nutrition Education and Counseling: No recommendations at this time  Coordination of Nutrition Care: Continue to monitor while inpatient    Goals:  PO intakes > 50% meals + 1-2 ONS each day to promote weight gain of 0.5-1lb within next 5-7 days       Nutrition Monitoring and Evaluation:   Behavioral-Environmental Outcomes: None identified  Food/Nutrient Intake Outcomes: Food and nutrient intake,Supplement intake  Physical Signs/Symptoms Outcomes: Biochemical data,GI status,Weight,Nausea/vomiting    Discharge Planning:    Continue current diet,Continue oral nutrition supplement     Kwame Banegas RD  Contact via [employer]

## 2022-02-15 NOTE — OP NOTES
[address]   OPERATIVE REPORT    Name:  Pascual Dennis  MR#:  [id number]  :  1945  ACCOUNT #:  [de-identified]  DATE OF SERVICE:  2022    PREOPERATIVE DIAGNOSIS:  Adenomatous polyp of splenic flexure of colon. POSTOPERATIVE DIAGNOSIS:  Adenomatous polyp of transverse colon. PROCEDURES PERFORMED:  1. Laparoscopic left colectomy. 2.  Laparoscopic mobilization of splenic flexure. SURGEON:  Jesus Gama MD    ASSISTANT:  RUSLAN Allan.  Latisha Cantu assistance was required secondary to the complex nature of this laparoscopic operation. She assisted throughout with operation of the camera as well as retraction, exposure, and closure. ANESTHESIA:  General.    COMPLICATIONS:  None. SPECIMENS REMOVED:  Left colon. Disposition of specimen is Pathology. IMPLANTS:  None. ESTIMATED BLOOD LOSS:  25 mL. DISPOSITION:  PACU. FINDINGS:  The patient's tattoo marking was in the mid to distal transverse colon. The patient did have a very long and redundant transverse colon. No obvious signs of malignancy were noted. There were some adhesions in the upper abdomen of omentum in the distal aspect of the stomach to the anterior abdominal wall from previous open cholecystectomy. INDICATIONS:  The patient is a 60-year-old female who had undergone a colonoscopy due to significant difficulties with constipation. A large sessile polyp was noted in the area of the splenic flexure, on colonoscopy and biopsy, this was consistent with a tubulovillous adenoma. This was not felt to be amenable to endoscopic retrieval and thus she was referred for surgical resection. A complete discussion of risks, benefits, and alternatives of surgery were had with the patient. She was in agreement to proceed. Informed consent was obtained. PROCEDURE:  After informed consent was obtained, the patient was brought back to the operating room.   She was placed under general endotracheal anesthesia in the lithotomy position on the operating room table. Care was taken to position her legs in the lithotomy stirrups as well as to tuck her arms bilaterally. All pressure points were padded. She was then prepped and draped in the usual sterile fashion. A Kendrick catheter was placed prior to prepping and draping. A proper time-out was then performed. The left side had been marked preoperatively and this was confirmed at this time. The patient's abdomen was notable for a right off midline curvilinear incision from her previous open cholecystectomy as well as a low midline incision. We initially attempted to gain access by making a small 5-mm vertical incision just above the umbilicus and dissecting down to the base of the umbilicus using a Kocher clamp and retracting this anteriorly. We passed a Veress needle into the abdomen; however, a water drop technique test was not successful and thus we changed the location and placed a Veress needle in the left upper quadrant just beneath the costal margin in the midclavicular line. At this point, this was successful and the abdomen was insufflated to 15 mmHg. We then decided to tunnel into the abdomen in the right lower quadrant, 2 fingerbreadths anterior to the anterior superior iliac spine. This was done using a 5-mm Optiview trocar and the abdomen was entered safely. We then looked and there was no evidence of injury from the Veress needle in either location. There were no adhesions underneath the umbilicus, but it appeared that this was coming in at an oblique angle and not traversing the peritoneum. We then placed a 5-mm trocar through this initial supraumbilical site and through these 2 initial trocars used a laparoscopic LigaSure device to take down some of the adhesions to the upper abdominal wall. There were omental adhesions predominantly in the upper abdomen.   The distal stomach and duodenum were also somewhat adherent to the anterior abdominal wall, but these were left in place and not manipulated. Once this was completed, we placed additional trocars including a right midabdominal 5-mm trocar and an epigastric 5-mm trocar. These were all placed after injection of local anesthetic and under direct visualization. The patient's tattoo marking was clearly identified in the distal transverse colon, but the patient's colon was extremely long and redundant consistent with her chronic history of constipation. I felt that the best way to remove this area would be to do a left colectomy by taking the splenic flexure of the colon. We began by mobilizing the lateral attachments of the white line of Toldt going from the distal descending colon up towards the splenic flexure. The splenic flexure was completely mobilized, taking care not to injure the spleen. This was done by an approach laterally initially followed by entering the lesser sac and taking down the attachments of the gastrocolic ligament going towards the spleen. This completely mobilized the splenic flexure. Once this was completed, we elevated the colon anteriorly and evaluated the mesentery. The patient's inferior mesenteric vein was preserved, but there was a branch coming off of this including an arterial branch going up to the splenic flexure. This was divided using a 30-mm tan Endo PHONG staple load. The remainder of the mesentery was then taken for our planned area of resection using the LigaSure device and we preserved the primary left colic branch as well as the middle colic pedicle. Some of the branches of the middle colic coming over towards the splenic flexure were divided with the LigaSure device.     Once this was completed and our mesenteric dissection was done, the colon was very mobile due to the elongated nature of the colon and thus we made a small extraction incision by connecting the 2 trocar sites above the umbilicus and placed a GelPort through this site.  The specimen was then extracorporealized easily through this. We divided the colon at our planned area of transection both proximally and distally using 60-mm tan Endo PHONG staple loads. The specimen was marked for orientation and passed off the field. The polyp was clearly felt within the middle of the specimen, it did not have a malignant appearance. We then aligned the colon in an antiperistaltic side-to-side fashion by aligning the tenia using interrupted 3-0 silk sutures. A colotomy was made in both limbs and then a 60-mm tan Endo PHONG staple load was used to create the anastomosis. This appeared nicely hemostatic. The common colotomy was then closed using a 4-0 PDS suture in running fashion followed by 3-0 silk Lembert style sutures. This nicely closed the defect and silk sutures were placed on both the proximal and distal aspects of the staple line to take any tension off of this area. The anastomosis had absolutely no tension in dropping back into the abdomen without difficulty. The mesenteric defect was felt to be too large to close primarily and thus this was left open. The small bowel was pulled out to ensure that this was not going underneath the colon. The abdomen was inspected and no evidence of bleeding was noted and the wound protector was then removed. Additional local anesthetic was injected along the incision sites. Prior to removing the GelPort, we did close the right lower quadrant 12-mm trocar site using a 0 Vicryl suture on a suture passer. The abdomen was fully desufflated. All trocars were then removed. We then changed gown and gloves and opened the closing tray instruments. New drapery was laid. The extraction site was closed using a #1 PDS suture in a running fashion from above and below. Additional layers including interrupted 3-0 Vicryl suture were used to reapproximate the deep dermis and subcutaneous tissue followed by a 4-0 Monocryl subcuticular stitch.   The wounds have been copiously irrigated prior to closure. The remaining 2 laparoscopic trocar sites were closed using 4-0 Monocryl subcuticular stitch. All sites were then covered with Dermabond skin adhesive. The Veress needle site on the left upper quadrant was also covered with Dermabond skin adhesive. The patient was then awakened, extubated, and taken to the postanesthesia care unit in stable condition. All needle and instrument counts were correct at the completion of the case. I was present and scrubbed throughout the entirety of the case. There were no immediate complications.       MD TJ [id number]  D:  02/14/2022 15:40  T:  02/15/2022 2:18  JOB #:  [id number]

## 2022-02-15 NOTE — PROGRESS NOTES
0800: Bedside and Verbal shift change report given to Mattie Ozuna RN (oncoming nurse) by Denise Burrows RN (offgoing nurse). Report included the following information SBAR, Kardex, MAR and Recent Results. 1000: Pt c/o nausea, vomited 100 cc of bile-colored emesis. Zofran IV given as ordered, pt currently eating small amount of biscuit. 1100: Cecilia NP with GI made aware. 1825: Pt states Phenergan helped greatly, eating 25-50% of liquid diet and tolerating well. Pt denies nausea or discomfort. 2000: Bedside and Verbal shift change report given to Valeriano Da Silva RN (oncoming nurse) by Mattie Ozuna RN (offgoing nurse). Report included the following information SBAR, Kardex, MAR and Recent Results.

## 2022-02-15 NOTE — PROGRESS NOTES
RUR: 9% Low    JEREMIE: Anticipated discharge home. Patient's son to provide transport home once medically stable. Follow-up with PCP/specialist.     Primary Contact: Qasim Frost, [phone]    * Will need 2nd IM letter prior to discharge. Care Management Interventions  PCP Verified by CM: Yes (Tiffany Willard, last seen 12/9/21)  Mode of Transport at Discharge: Other (see comment) (Son )  Transition of Care Consult (CM Consult): Discharge Planning  Discharge Durable Medical Equipment: No  Physical Therapy Consult: Yes  Occupational Therapy Consult: Yes  Speech Therapy Consult: No  Support Systems: Child(saray)  Confirm Follow Up Transport: Family  The Plan for Transition of Care is Related to the Following Treatment Goals : Home  Discharge Location  Patient Expects to be Discharged to[de-identified] Home with family assistance    Reason for Admission:  Adenomatous colon polyp                   RUR Score:  9% Low           Plan for utilizing home health:  Unknown at this time        PCP: First and Last name:  Isac Thorpe NP     Name of Practice:    Are you a current patient: Yes/No: Yes   Approximate date of last visit: 12/9/21   Can you participate in a virtual visit with your PCP: Yes                    Current Advanced Directive/Advance Care Plan: Full Code    Healthcare Decision Maker:   Click here to complete [address] including selection of the Healthcare Decision Maker Relationship (ie \"Primary\")             Primary Decision Maker: Angy Harvey - Steven - [phone]                  Transition of Care Plan:    Home    CM met with patient at bedside to introduce self and explain role. Patient lives with her son and his partner in a ground level apartment with 2 steps to enter. Patient was independent with ADL's and son provides assistance with IADL's. Patient ambulates with the use of a rollator in the community and does not require an assistive device within her apartment.  Patient's son to provide transport home once medically stable. CM verified patient's demographics, insurance and PCP. Preferred pharmacy is [facility] on [address] [place] [address] in [place] with no barriers obtaining needed prescriptions. CM to continue to follow as needed.     Delroy Rowley, MSW   [phone]

## 2022-02-15 NOTE — PROGRESS NOTES
General Surgery Daily Progress Note    Admit Date: 2022  Post-Operative Day: 1 Day Post-Op from Procedure(s):  LAPAROSCOPIC LEFT COLECTOMY  . Subjective:     Last 24 hrs: patient c/o nausea/vomiting. RN reports pt vomited approximately 100 ml bilious output this am.  No flatus, no BM, some TTP. Patient has been refusing all pain medication she said it makes things worse for her. She did have zofran for nausea with little relief. Objective:     Blood pressure 120/68, pulse 84, temperature 98.5 °F (36.9 °C), resp. rate 18, height 5' 2\" (1.575 m), weight 93 lb 7.6 oz (42.4 kg), SpO2 96 %. Temp (24hrs), Av.2 °F (36.8 °C), Min:97.7 °F (36.5 °C), Max:98.9 °F (37.2 °C)      _____________________  Physical Exam:     Alert and Oriented x3 , in no acute distress. Cardiovascular: RRR  Lungs:CTAB   Abdomen: hypoactive BS, abd soft, surgical sites C/D/I      Assessment:   Principal Problem:    Adenomatous colon polyp (2022)    Active Problems:    Adenomatous polyp of colon (2022)            Plan:     Cut back diet to clears for now  PT/OT consult placed -Encourage OOB   Phenergan and zofran PRN for nausea  Continue IVF  Continue movantik   Further plan per Dr. Higinio Hutson         Data Review:    Recent Labs     02/15/22  0318   WBC 9.8   HGB 7.5*   HCT 23.5*        Recent Labs     02/15/22  0318   *   K 4.0      CO2 22   *   BUN 10   CREA 0.75   CA 8.4*   MG 2.1   PHOS 3.3     No results for input(s): AML, LPSE in the last 72 hours.         ______________________  Medications:    Current Facility-Administered Medications   Medication Dose Route Frequency    0.9% sodium chloride infusion  100 mL/hr IntraVENous CONTINUOUS    albuterol-ipratropium (DUO-NEB) 2.5 MG-0.5 MG/3 ML  3 mL Nebulization Q6H PRN    aspirin tablet 325 mg  325 mg Oral DAILY    digoxin (LANOXIN) tablet 0.125 mg  0.125 mg Oral QAM    lactulose (CHRONULAC) 10 gram/15 mL solution 30 mL  20 g Oral BID PRN    metoprolol succinate (TOPROL-XL) XL tablet 25 mg  25 mg Oral QAM    therapeutic multivitamin (THERAGRAN) tablet 1 Tablet  1 Tablet Oral DAILY    pantoprazole (PROTONIX) tablet 40 mg  40 mg Oral ACB    polyethylene glycol (MIRALAX) packet 17 g  17 g Oral DAILY    sodium chloride (NS) flush 5-40 mL  5-40 mL IntraVENous Q8H    sodium chloride (NS) flush 5-40 mL  5-40 mL IntraVENous PRN    acetaminophen (TYLENOL) tablet 650 mg  650 mg Oral Q6H    ketorolac (TORADOL) injection 15 mg  15 mg IntraVENous Q6H    ondansetron (ZOFRAN) injection 4 mg  4 mg IntraVENous Q6H PRN    naloxegoL (MOVANTIK) tablet 25 mg  25 mg Oral DAILY    lidocaine 8 mg/mL (Xylocaine) infusion  1 mg/kg/hr IntraVENous CONTINUOUS    enoxaparin (LOVENOX) injection 40 mg  40 mg SubCUTAneous DAILY    HYDROmorphone (DILAUDID) injection 0.5-1 mg  0.5-1 mg IntraVENous Q3H PRN       Tahir Baires NP  2/15/2022        Attending addendum:  I have independently examined the patient and have reviewed the chart. I agree with the above plan. Patient with some nausea overnight and vomiting. She is belching. No flatus or BM yet. Refusing pain meds but appears comfortable. Afeb/VSS. Abd soft, ND, appropriately tender. Incisions c/d/i. Labs reviewed. Will back down to liquid diet. Continue IV fluids due to vomiting. We will recheck labs tomorrow. Encouraged ambulation. Continue ERAS protocol.     Theo Burch MD  2/15/2022  6:37 PM

## 2022-02-15 NOTE — ROUTINE PROCESS
Rolly reused lidocaine IV medication at this time. Patient said, \"All pain medication make me dizzy, so I don't want any pain medication now\". Patient refused schedule Toradol as well.

## 2022-02-16 LAB
ANION GAP SERPL CALC-SCNC: 3 MMOL/L (ref 5–15)
BASOPHILS # BLD: 0 K/UL (ref 0–0.1)
BASOPHILS NFR BLD: 1 % (ref 0–1)
BUN SERPL-MCNC: 9 MG/DL (ref 6–20)
BUN/CREAT SERPL: 14 (ref 12–20)
CALCIUM SERPL-MCNC: 8 MG/DL (ref 8.5–10.1)
CHLORIDE SERPL-SCNC: 115 MMOL/L (ref 97–108)
CO2 SERPL-SCNC: 24 MMOL/L (ref 21–32)
CREAT SERPL-MCNC: 0.66 MG/DL (ref 0.55–1.02)
DIFFERENTIAL METHOD BLD: ABNORMAL
EOSINOPHIL # BLD: 0.1 K/UL (ref 0–0.4)
EOSINOPHIL NFR BLD: 2 % (ref 0–7)
ERYTHROCYTE [DISTWIDTH] IN BLOOD BY AUTOMATED COUNT: 15.8 % (ref 11.5–14.5)
GLUCOSE SERPL-MCNC: 91 MG/DL (ref 65–100)
HCT VFR BLD AUTO: 23.8 % (ref 35–47)
HGB BLD-MCNC: 7.3 G/DL (ref 11.5–16)
IMM GRANULOCYTES # BLD AUTO: 0 K/UL (ref 0–0.04)
IMM GRANULOCYTES NFR BLD AUTO: 0 % (ref 0–0.5)
LYMPHOCYTES # BLD: 1.2 K/UL (ref 0.8–3.5)
LYMPHOCYTES NFR BLD: 16 % (ref 12–49)
MCH RBC QN AUTO: 28.6 PG (ref 26–34)
MCHC RBC AUTO-ENTMCNC: 30.7 G/DL (ref 30–36.5)
MCV RBC AUTO: 93.3 FL (ref 80–99)
MONOCYTES # BLD: 0.8 K/UL (ref 0–1)
MONOCYTES NFR BLD: 10 % (ref 5–13)
NEUTS SEG # BLD: 5.2 K/UL (ref 1.8–8)
NEUTS SEG NFR BLD: 71 % (ref 32–75)
NRBC # BLD: 0 K/UL (ref 0–0.01)
NRBC BLD-RTO: 0 PER 100 WBC
PLATELET # BLD AUTO: 222 K/UL (ref 150–400)
PMV BLD AUTO: 9.2 FL (ref 8.9–12.9)
POTASSIUM SERPL-SCNC: 3.5 MMOL/L (ref 3.5–5.1)
RBC # BLD AUTO: 2.55 M/UL (ref 3.8–5.2)
SODIUM SERPL-SCNC: 142 MMOL/L (ref 136–145)
WBC # BLD AUTO: 7.3 K/UL (ref 3.6–11)

## 2022-02-16 PROCEDURE — 97162 PT EVAL MOD COMPLEX 30 MIN: CPT

## 2022-02-16 PROCEDURE — 74011000250 HC RX REV CODE- 250: Performed by: SURGERY

## 2022-02-16 PROCEDURE — 74011250636 HC RX REV CODE- 250/636

## 2022-02-16 PROCEDURE — 74011250636 HC RX REV CODE- 250/636: Performed by: SURGERY

## 2022-02-16 PROCEDURE — 80048 BASIC METABOLIC PNL TOTAL CA: CPT

## 2022-02-16 PROCEDURE — 85025 COMPLETE CBC W/AUTO DIFF WBC: CPT

## 2022-02-16 PROCEDURE — 74011250637 HC RX REV CODE- 250/637: Performed by: SURGERY

## 2022-02-16 PROCEDURE — 74011250637 HC RX REV CODE- 250/637

## 2022-02-16 PROCEDURE — 97165 OT EVAL LOW COMPLEX 30 MIN: CPT

## 2022-02-16 PROCEDURE — 97116 GAIT TRAINING THERAPY: CPT

## 2022-02-16 PROCEDURE — 36415 COLL VENOUS BLD VENIPUNCTURE: CPT

## 2022-02-16 PROCEDURE — 65270000029 HC RM PRIVATE

## 2022-02-16 PROCEDURE — 99024 POSTOP FOLLOW-UP VISIT: CPT | Performed by: PHYSICIAN ASSISTANT

## 2022-02-16 RX ORDER — ACETAMINOPHEN 325 MG/1
650 TABLET ORAL
Status: DISCONTINUED | OUTPATIENT
Start: 2022-02-16 | End: 2022-02-20 | Stop reason: HOSPADM

## 2022-02-16 RX ORDER — OXYCODONE HYDROCHLORIDE 5 MG/1
5 TABLET ORAL
Status: DISCONTINUED | OUTPATIENT
Start: 2022-02-16 | End: 2022-02-16

## 2022-02-16 RX ADMIN — NALOXEGOL OXALATE 25 MG: 12.5 TABLET, FILM COATED ORAL at 08:36

## 2022-02-16 RX ADMIN — ENOXAPARIN SODIUM 40 MG: 100 INJECTION SUBCUTANEOUS at 08:37

## 2022-02-16 RX ADMIN — Medication 12.5 MG: at 09:33

## 2022-02-16 RX ADMIN — METOPROLOL SUCCINATE 25 MG: 25 TABLET, FILM COATED, EXTENDED RELEASE ORAL at 07:26

## 2022-02-16 RX ADMIN — THERA TABS 1 TABLET: TAB at 08:37

## 2022-02-16 RX ADMIN — HYDROMORPHONE HYDROCHLORIDE 0.5 MG: 1 INJECTION, SOLUTION INTRAMUSCULAR; INTRAVENOUS; SUBCUTANEOUS at 14:20

## 2022-02-16 RX ADMIN — DIGOXIN 0.12 MG: 125 TABLET ORAL at 07:27

## 2022-02-16 RX ADMIN — ASPIRIN 325 MG ORAL TABLET 325 MG: 325 PILL ORAL at 08:37

## 2022-02-16 RX ADMIN — IPRATROPIUM BROMIDE AND ALBUTEROL SULFATE 3 ML: .5; 3 SOLUTION RESPIRATORY (INHALATION) at 11:07

## 2022-02-16 RX ADMIN — BUTALBITAL, ACETAMINOPHEN, AND CAFFEINE 2 TABLET: 50; 325; 40 TABLET ORAL at 08:36

## 2022-02-16 RX ADMIN — SODIUM CHLORIDE, PRESERVATIVE FREE 10 ML: 5 INJECTION INTRAVENOUS at 22:00

## 2022-02-16 RX ADMIN — POLYETHYLENE GLYCOL 3350 17 G: 17 POWDER, FOR SOLUTION ORAL at 08:37

## 2022-02-16 RX ADMIN — SODIUM CHLORIDE 100 ML/HR: 9 INJECTION, SOLUTION INTRAVENOUS at 07:29

## 2022-02-16 RX ADMIN — SODIUM CHLORIDE, PRESERVATIVE FREE 10 ML: 5 INJECTION INTRAVENOUS at 07:27

## 2022-02-16 RX ADMIN — BUTALBITAL, ACETAMINOPHEN, AND CAFFEINE 2 TABLET: 50; 325; 40 TABLET ORAL at 16:36

## 2022-02-16 RX ADMIN — SODIUM CHLORIDE 100 ML/HR: 9 INJECTION, SOLUTION INTRAVENOUS at 16:35

## 2022-02-16 RX ADMIN — PANTOPRAZOLE SODIUM 40 MG: 40 TABLET, DELAYED RELEASE ORAL at 07:27

## 2022-02-16 NOTE — PROGRESS NOTES
Problem: Mobility Impaired (Adult and Pediatric)  Goal: *Acute Goals and Plan of Care (Insert Text)  Description: FUNCTIONAL STATUS PRIOR TO ADMISSION: Patient was modified independent using a rollator for functional mobility in the community. Patient did not use AD inside her apartment. HOME SUPPORT PRIOR TO ADMISSION: The patient lived with her son and his  and typically did not require any assistance. Physical Therapy Goals  Initiated 2/16/2022  1. Patient will move from supine to sit and sit to supine , scoot up and down, and roll side to side in bed with modified independence within 7 day(s). 2.  Patient will transfer from bed to chair and chair to bed with modified independence using the least restrictive device within 7 day(s). 3.  Patient will perform sit to stand with modified independence within 7 day(s). 4.  Patient will ambulate with modified independence for 150 feet with the least restrictive device within 7 day(s). 5.  Patient will ascend/descend 2 stairs with 0 handrail(s) with minimal assistance/contact guard assist within 7 day(s). Outcome: Not Met   PHYSICAL THERAPY EVALUATION  Patient: Leandra Gonzalez (4 y.o. female)  Date: 2/16/2022  Primary Diagnosis: Adenomatous colon polyp [D12.6]  Adenomatous polyp of colon [D12.6]  Procedure(s) (LRB):  LAPAROSCOPIC LEFT COLECTOMY (N/A)  . (N/A) 2 Days Post-Op   Precautions:   Fall (2020, fx L femur)      ASSESSMENT  Based on the objective data described below, the patient presents with abdominal pain, decreased activity tolerance, generalized weakness, balance and gait dysfunction not consistent with baseline mobility level s/p admission for L colectomy, POD 2. Patient demonstrating overall fair to good tolerance to movement, slowed by abdominal pain but educated in strategies for bed mob to decrease incisional pain. Patient demonstrates good safety technique using her rollator for transfers and gait in room, CGA only needed. Set up in chair with all needs in reach and VSS throughout session. Patient lives with son on 1st floor of their apartment, was mod indep with rollator or no AD (in home). Anticipate good progress with improved pain control and medical progress. Recommend HHPT at d/c. Current Level of Function Impacting Discharge (mobility/balance): CGA, gait in room with rollator    Functional Outcome Measure: The patient scored Total: 50/100 on the Barthel Index outcome measure which is indicative of being partially dependent in basic self-care. Other factors to consider for discharge: supportive son     Patient will benefit from skilled therapy intervention to address the above noted impairments. PLAN :  Recommendations and Planned Interventions: bed mobility training, transfer training, gait training, therapeutic exercises, neuromuscular re-education, patient and family training/education, and therapeutic activities      Frequency/Duration: Patient will be followed by physical therapy:  5 times a week to address goals. 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     OBJECTIVE DATA SUMMARY:   HISTORY:    Past Medical History:   Diagnosis Date    Adenoma of colon     Anemia     Arthritis     Atrial fibrillation (HCC)     Bloating     Chronic constipation     Chronic pain     back     COPD (chronic obstructive pulmonary disease) (HCC)     GERD (gastroesophageal reflux disease)     [place] (hard of hearing)     Hyponatremia     caused seizures x 2 per pt    Indigestion     Osteoporosis     Seizures ([facility] [address] 7/2020, 9/2020    x 2      Past Surgical History:   Procedure Laterality Date    COLONOSCOPY N/A 12/16/2021    COLONOSCOPY   :- performed by Carmita Calixto MD at [place] [address] N/A 2/14/2022    . performed [facility] at [facility] MAIN OR    [facility]    HX [address]    HX HIP REPLACEMENT Left     pt stated hip was pinned, not replaced    HX HIP REPLACEMENT Right     pt stated hip was pinned, not replaced    HX HYSTERECTOMY      HX ORTHOPAEDIC  1990, 2011    bilateral hip fractures     HX ORTHOPAEDIC Left 2020    femur fracture    HX TONSILLECTOMY         Personal factors and/or comorbidities impacting plan of care: hx of anorexia    Home Situation  Home Environment: Apartment  # Steps to Enter: 2  One/Two Story Residence: Two story, live on 1st floor  Living Alone: No  Support Systems: Child(saray)  Patient Expects to be Discharged to[de-identified] Home with family assistance  Current DME Used/Available at Home: Felicia Bret, rollator,Walker, rolling  Tub or Shower Type: Tub/Shower combination    EXAMINATION/PRESENTATION/DECISION MAKING:   Critical Behavior:  Neurologic State: Alert  Orientation Level: Oriented X4  Cognition: Follows commands     Hearing: Auditory  Auditory Impairment: Hard of hearing, bilateral    Range Of Motion:  AROM: Generally decreased, functional                       Strength:    Strength: Generally decreased, functional                    Tone & Sensation:   Tone: Normal              Sensation: Intact               Coordination:  Coordination: Within functional limits  Vision:      Functional Mobility:  Bed Mobility:  Rolling: Stand-by assistance; Adaptive equipment; Additional time  Supine to Sit: Contact guard assistance; Adaptive equipment; Additional time     Scooting: Stand-by assistance; Additional time  Transfers:  Sit to Stand: Contact guard assistance  Stand to Sit: Contact guard assistance        Bed to Chair: Contact guard assistance              Balance:   Sitting: Intact  Standing: Impaired  Standing - Static: Good;Constant support  Standing - Dynamic : Fair;Constant support  Ambulation/Gait Training:  Distance (ft): 8 Feet (ft)  Assistive Device: Gait belt;Walker, rollator  Ambulation - Level of Assistance: Contact guard assistance        Gait Abnormalities: Decreased step clearance        Base of Support: Narrowed     Speed/Amanda: Pace decreased (<100 feet/min)  Step Length: Right shortened;Left shortened                     Functional Measure:  Barthel Index:    Bathin  Bladder: 10  Bowels: 10  Groomin  Dressin  Feeding: 10  Mobility: 0  Stairs: 0  Toilet Use: 5  Transfer (Bed to Chair and Back): 10  Total: 50/100       The Barthel ADL Index: Guidelines  1. The index should be used as a record of what a patient does, not as a record of what a patient could do. 2. The main aim is to establish degree of independence from any help, physical or verbal, however minor and for whatever reason. 3. The need for supervision renders the patient not independent. 4. A patient's performance should be established using the best available evidence. Asking the patient, friends/relatives and nurses are the usual sources, but direct observation and common sense are also important. However direct testing is not needed. 5. Usually the patient's performance over the preceding 24-48 hours is important, but occasionally longer periods will be relevant. 6. Middle categories imply that the patient supplies over 50 per cent of the effort. 7. Use of aids to be independent is allowed. Score Interpretation (from [address] [address])    Independent   60-79 Minimally independent   40-59 Partially dependent   20-39 Very dependent   <20 Totally dependent     -Alf Vargas, Barthel, D.W. (1965). Functional evaluation: the Barthel Index. [address] [facility] ([address] ().  The Barthel activities of daily living index: self-reporting versus actual performance in the old (> or = 75 years). Journal of [address] [place] [address] 45(0), 14 [facility], J.J.CHERI.F, Isacc Harper., Ramses Werner. (1999). Measuring the change in disability after inpatient rehabilitation; comparison of the responsiveness of the Barthel Index and Functional Marshall Measure. Journal of Neurology, Neurosurgery, and Psychiatry, 66(4), 061-081. AISHA Alicia, TRERENCE Foster, & Grey Douglass M.A. (2004) Assessment of post-stroke quality of life in cost-effectiveness studies: The usefulness of the Barthel Index and the EuroQoL-5D. Quality of Life Research, 15, 711-67        Physical Therapy Evaluation Charge Determination   History Examination Presentation Decision-Making   HIGH Complexity :3+ comorbidities / personal factors will impact the outcome/ POC  MEDIUM Complexity : 3 Standardized tests and measures addressing body structure, function, activity limitation and / or participation in recreation  MEDIUM Complexity : Evolving with changing characteristics  Other outcome measures Barthel 50  MEDIUM      Based on the above components, the patient evaluation is determined to be of the following complexity level: MEDIUM    Pain Ratin/10 in abdomen    Activity Tolerance:   Fair and limited by pain    After treatment patient left in no apparent distress:   Sitting in chair and Call bell within reach    COMMUNICATION/EDUCATION:   The patients plan of care was discussed with: Registered nurse. Fall prevention education was provided and the patient/caregiver indicated understanding. and Patient/family agree to work toward stated goals and plan of care.     Thank you for this referral.  Anahy Gonzales, PT   Time Calculation: 25 mins

## 2022-02-16 NOTE — PROGRESS NOTES
Problem: Falls - Risk of  Goal: *Absence of Falls  Description: Document Sofia Fothergill Fall Risk and appropriate interventions in the flowsheet.   Outcome: Progressing Towards Goal  Note: Fall Risk Interventions:  Mobility Interventions: Communicate number of staff needed for ambulation/transfer,Patient to call before getting OOB         Medication Interventions: Teach patient to arise slowly,Patient to call before getting OOB,Evaluate medications/consider consulting pharmacy    Elimination Interventions: Call light in reach,Patient to call for help with toileting needs,Toileting schedule/hourly rounds

## 2022-02-16 NOTE — PROGRESS NOTES
Spiritual Care Partner Volunteer visited patient in [place] on 2.16.22    Documented by Yas Penaloza, Staff , on 2.16.22  Please call GEOVANNY ([phone]) to page  if needed

## 2022-02-16 NOTE — PROGRESS NOTES
FUNCTIONAL STATUS PRIOR TO ADMISSION: Patient functional mobility in the home with independence, in the community with modified independent using a rollator. Patient basic ADLs was modified independent increased time, trunk flexion lower body, and ADL items raised height on counters. Instrumental ADLs with total assistance from family. Baseline [place]. HOME SUPPORT: The patient lived with son and partner. .DME: rollator    Occupational Therapy Goals  Initiated 2/16/2022  1. Patient will perform bathing with supervision/set-up within 7 day(s). 2.  Patient will perform lower body dressing with supervision/set-up within 7 day(s). 3.  Patient will perform standing ADLs 5 mins with RW supervision/set-up within 7 day(s). 4.  Patient will perform toilet transfers with modified independence within 7 day(s). 5.  Patient will perform all aspects of toileting with modified independence within 7 day(s). 6.  Patient will participate in upper extremity therapeutic exercise/activities standing with RW with supervision/set-up within 7 day(s). 7.  Patient will utilize energy conservation techniques during functional activities with verbal and visual cues within 7 day(s). OCCUPATIONAL THERAPY EVALUATION  Patient: Satnam Pineda (89 y.o. female)  Date: 2/16/2022  Primary Diagnosis: Adenomatous colon polyp [D12.6]  Adenomatous polyp of colon [D12.6]  Procedure(s) (LRB):  LAPAROSCOPIC LEFT COLECTOMY (N/A)  . (N/A) 2 Days Post-Op   Precautions:   fall    ASSESSMENT  Based on the objective data described below, the patient presents with ADLs limited by standing tolerance, functional reach, abdominal pain; baseline orthopedic injuries. Current Level of Function Impacting Discharge (ADLs/self-care): setup upper body ADLs, moderate assistance lower body ADLs, standing tolerance ~2 mins, functional mobility in the room supervision    Functional Outcome Measure:   The patient scored Total: 50/100 on the Barthel Index outcome measure which is indicative of being partially dependent in basic self-care. Other factors to consider for discharge: son can assist PRN     Patient will benefit from skilled therapy intervention to address the above noted impairments. PLAN :  Recommendations and Planned Interventions: self care training, functional mobility training, therapeutic exercise, balance training, therapeutic activities, endurance activities, patient education, home safety training and family training/education    Frequency/Duration: Patient will be followed by occupational therapy 3 times a week to address goals. Recommendation for discharge: (in order for the patient to meet his/her long term goals)  Occupational therapy at least 2 days/week in the home     This discharge recommendation:  Has been made in collaboration with the attending provider and/or case management    IF patient discharges home will need the following DME: has all DME needs met       SUBJECTIVE:   Patient stated I have been there done that, it will get better.     OBJECTIVE DATA SUMMARY:   HISTORY:   Past Medical History:   Diagnosis Date    Adenoma of colon     Anemia     Arthritis     Atrial fibrillation (HCC)     Bloating     Chronic constipation     Chronic pain     back     COPD (chronic obstructive pulmonary disease) (HCC)     GERD (gastroesophageal reflux disease)     [place] (hard of [place])     Hyponatremia     caused seizures x 2 per pt    Indigestion     Osteoporosis     Seizures ([address] 7/2020, 9/2020    x 2      Past Surgical History:   Procedure Laterality Date    COLONOSCOPY N/A 12/16/2021    COLONOSCOPY   :- performed by Halie Andersen., MD at [place] [address] N/A 2/14/2022    .  per[facility] at [facility] MAIN OR    [facility]    HX [facility]    HX HIP REPLACEMENT Left     pt stated hip was pinned, not replaced    HX HIP REPLACEMENT Right     pt stated hip was pinned, not replaced    HX HYSTERECTOMY      HX ORTHOPAEDIC  1990, 2011    bilateral hip fractures     HX ORTHOPAEDIC Left 2020    femur fracture    HX TONSILLECTOMY         Expanded or extensive additional review of patient history:     Home Situation  Home Environment: Apartment  # Steps to Enter: 2  One/Two Story Residence: Two story, live on 1st floor  Living Alone: No  Support Systems: Child(saray)  Patient Expects to be Discharged to[de-identified] Home with family assistance  Current DME Used/Available at Home: Arbutus Showman, rollator,Walker, rolling  Tub or Shower Type: Tub/Shower combination    Hand dominance: Right    EXAMINATION OF PERFORMANCE DEFICITS:  Cognitive/Behavioral Status:  Neurologic State: Alert  Orientation Level: Oriented X4  Cognition: Follows commands             Skin: intact    Edema: intact    Hearing: Auditory  Auditory Impairment: Hard of hearing, bilateral    Vision/Perceptual:                                     Range of Motion:  Shoulder flexion 90*  AROM: Generally decreased, functional                         Strength:    Strength: Generally decreased, functional                Coordination:  Coordination: Within functional limits            Tone & Sensation:    Tone: Normal  Sensation: Intact                      Balance:  Sitting: Intact  Standing: Impaired  Standing - Static: Good;Constant support  Standing - Dynamic : Fair;Constant support    Functional Mobility and Transfers for ADLs:  Bed Mobility:  Rolling: Stand-by assistance; Adaptive equipment; Additional time  Supine to Sit: Contact guard assistance; Adaptive equipment; Additional time  Scooting: Stand-by assistance; Additional time   Sit-supine supervision instruction benefits log roll, due to ortho injuries requesting to keep LEs together    Transfers:  Sit to Stand: Contact guard assistance  Stand to Sit: Contact guard assistance  Bed to Chair: Contact guard assistance   Functional mobility to and from bathroom with supervision, Rollator    ADL Assessment:   Feeding: independent     Grooming; nursing setup on beside tray, sitting in chair     Bathing: infer based on mock demo moderate assistance lower body, setup upper body      Lower body: moderate assistance 2* unable to tailor sit baseline and due to abdominal pain poor trunk flexion to reach feet; good standing balance     Upper body: setup     Toileting: supervision                 ADL Intervention and task modifications:     Patient instructed and indicated understanding the benefits of maintaining activity tolerance, functional mobility, and independence with self care tasks during acute stay  to ensure safe return home and to baseline. Encouraged patient to increase frequency and duration OOB, be out of bed for all meals, perform daily ADLs (as approved by RN/MD regarding bathing etc), and performing functional mobility to/from bathroom. Therapeutic Exercise:  Patient instructed on benefits in prep for ADLs 2* abdomen and orthopedic injuries and demonstrated shoulder flexion while sitting unsupported 5 reps 1 set with supervision. Functional Measure:    Barthel Index:  Bathin  Bladder: 10  Bowels: 10  Groomin  Dressin  Feeding: 10  Mobility: 0  Stairs: 0  Toilet Use: 5  Transfer (Bed to Chair and Back): 10  Total: 50/100      The Barthel ADL Index: Guidelines  1. The index should be used as a record of what a patient does, not as a record of what a patient could do. 2. The main aim is to establish degree of independence from any help, physical or verbal, however minor and for whatever reason. 3. The need for supervision renders the patient not independent. 4. A patient's performance should be established using the best available evidence. Asking the patient, friends/relatives and nurses are the usual sources, but direct observation and common sense are also important. However direct testing is not needed.   5. Usually the patient's performance over the preceding 24-48 hours is important, but occasionally longer periods will be relevant. 6. Middle categories imply that the patient supplies over 50 per cent of the effort. 7. Use of aids to be independent is allowed. Score Interpretation (from [address])    Independent   60-79 Minimally independent   40-59 Partially dependent   20-39 Very dependent   <20 Totally dependent     -Magdalena Vargas., BarthelMAAME. (1965). Functional evaluation: the Barthel Index. [address] ([address] (1997). The Barthel activities of daily living index: self-reporting versus actual performance in the old (> or = 75 years). Journal of [address] [place] [address] 45(7), 14 [facility], ADRIANNA, Yolanda Jimenez., Juan Francisco Zimmerman. (1999). Measuring the change in disability after inpatient rehabilitation; comparison of the responsiveness of the Barthel Index and Functional [place] Measure. Journal of Neurology, Neurosurgery, and Psychiatry, 66(4), 465-658. AISHA Saeed, TERRENCE Foster, & Laron Velasquez, MCharbelA. (2004) Assessment of post-stroke quality of life in cost-effectiveness studies: The usefulness of the Barthel Index and the EuroQoL-5D. Quality of Life Research, 13, 331-50     Pain Rating:  Pain meds received    Activity Tolerance:   Good    After treatment patient left in no apparent distress:    Supine in bed, Call bell within reach and Side rails x 3    COMMUNICATION/EDUCATION:   The patients plan of care was discussed with: Physical therapist and Registered nurse. Home safety education was provided and the patient/caregiver indicated understanding., Patient/family have participated as able in goal setting and plan of care. and Patient/family agree to work toward stated goals and plan of care. This patients plan of care is appropriate for delegation to \[facility]\"".     Thank you for this referral.  Soni Nino Gabriella  Time Calculation: 15 mins

## 2022-02-16 NOTE — PROGRESS NOTES
Orders received, chart reviewed and patient evaluated by occupational therapy. Pending progression with skilled acute occupational therapy, recommend:  Occupational therapy at least 2 days/week in the home      Recommend with nursing ADLs with supervision/setup, OOB to chair 3x/day and toileting via functional mobility to and from bathroom 1 assist and rollator. Thank you for completing as able in order to maintain patient strength, endurance and independence. Full evaluation to follow.

## 2022-02-16 NOTE — PROGRESS NOTES
General Surgery Daily Progress Note    Admit Date: 2022  Post-Operative Day: 2 Days Post-Op from Procedure(s):  LAPAROSCOPIC LEFT COLECTOMY  . Subjective:     Last 24 hrs: Watery BM earlier, denies NV  Tolerating full liquids but Ensures make induce NV in past  Voiding  Pain well-controlled      Objective:     Blood pressure 105/66, pulse 77, temperature 98 °F (36.7 °C), resp. rate 16, height 5' 2\" (1.575 m), weight 93 lb 7.6 oz (42.4 kg), SpO2 93 %. Temp (24hrs), Av.6 °F (37 °C), Min:98 °F (36.7 °C), Max:99.1 °F (37.3 °C)      _____________________  Physical Exam:     Alert and Oriented, very thin, in no acute distress. Cardiovascular: RRR, no peripheral edema  Lungs:CTAB on RA  Abdomen: soft, flat. Min TTP  Incisions c/d/i      Assessment:   Principal Problem:    Adenomatous colon polyp (2022)    Active Problems:    Adenomatous polyp of colon (2022)            Plan:     Advance diet  D/c Ensure supplements  D/c miralax  PO Analgesics PRN  OOB, ambulation w assistance  Daily PT/OT  Daily labs  Hopefully home 1-2 days    Data Review:    Recent Labs     22  0609 02/15/22  0318   WBC 7.3 9.8   HGB 7.3* 7.5*   HCT 23.8* 23.5*    271     Recent Labs     22  0609 02/15/22  0318    135*   K 3.5 4.0   * 106   CO2 24 22   GLU 91 105*   BUN 9 10   CREA 0.66 0.75   CA 8.0* 8.4*   MG  --  2.1   PHOS  --  3.3     No results for input(s): AML, LPSE in the last 72 hours.         ______________________  Medications:    Current Facility-Administered Medications   Medication Dose Route Frequency    0.9% sodium chloride infusion  100 mL/hr IntraVENous CONTINUOUS    promethazine (PHENERGAN) tablet 25 mg  25 mg Oral Q6H PRN    butalbital-acetaminophen-caffeine (FIORICET, ESGIC) -40 mg per tablet 1-2 Tablet  1-2 Tablet Oral Q8H PRN    promethazine (PHENERGAN) 12.5 mg in NS 50 mL IVPB  12.5 mg IntraVENous Q6H PRN    albuterol-ipratropium (DUO-NEB) 2.5 MG-0.5 MG/3 ML  3 mL Nebulization Q6H PRN    aspirin tablet 325 mg  325 mg Oral DAILY    digoxin (LANOXIN) tablet 0.125 mg  0.125 mg Oral QAM    lactulose (CHRONULAC) 10 gram/15 mL solution 30 mL  20 g Oral BID PRN    metoprolol succinate (TOPROL-XL) XL tablet 25 mg  25 mg Oral QAM    therapeutic multivitamin (THERAGRAN) tablet 1 Tablet  1 Tablet Oral DAILY    pantoprazole (PROTONIX) tablet 40 mg  40 mg Oral ACB    polyethylene glycol (MIRALAX) packet 17 g  17 g Oral DAILY    sodium chloride (NS) flush 5-40 mL  5-40 mL IntraVENous Q8H    sodium chloride (NS) flush 5-40 mL  5-40 mL IntraVENous PRN    ondansetron (ZOFRAN) injection 4 mg  4 mg IntraVENous Q6H PRN    naloxegoL (MOVANTIK) tablet 25 mg  25 mg Oral DAILY    lidocaine 8 mg/mL (Xylocaine) infusion  1 mg/kg/hr IntraVENous CONTINUOUS    enoxaparin (LOVENOX) injection 40 mg  40 mg SubCUTAneous DAILY    HYDROmorphone (DILAUDID) injection 0.5-1 mg  0.5-1 mg IntraVENous Q3H PRN       RUSLAN Mckeon  2/16/2022

## 2022-02-16 NOTE — PROGRESS NOTES
RUR: 12% Low     JEREMIE: Home health; [facility] (p: [phone]) accepted. Patient's son to provide transport home once medically stable. Follow-up with PCP/specialist.      Primary Contact: Morgan Arriola, [phone]     * Will need 2nd IM letter prior to discharge. 12:20PM - CM reviewed chart. Patient is POD#2 for laparoscopic left colectomy. Per review and ID rounds, hatch has been removed and voiding trials in process. Discharge pending medical progress and final recommendations. CM to continue to follow as needed. 2:10PM -  noted HH order. Referral sent to At [facility] via [employer] and accepted. AVS updated. Transition of Care Plan:     The Plan for Transition of Care is related to the following treatment goals: HH OT/PT    The Patient was provided with a choice of provider and agrees  with the discharge plan. Yes [x] No []    A Freedom of choice list was provided with basic dialogue that supports the patient's individualized plan of care/goals and shares the quality data associated with the providers.        Yes [x] No []      WM Ayala   [phone]

## 2022-02-17 PROCEDURE — 74011000250 HC RX REV CODE- 250: Performed by: SURGERY

## 2022-02-17 PROCEDURE — 74011250637 HC RX REV CODE- 250/637

## 2022-02-17 PROCEDURE — 97116 GAIT TRAINING THERAPY: CPT

## 2022-02-17 PROCEDURE — 74011250637 HC RX REV CODE- 250/637: Performed by: SURGERY

## 2022-02-17 PROCEDURE — 99024 POSTOP FOLLOW-UP VISIT: CPT | Performed by: PHYSICIAN ASSISTANT

## 2022-02-17 PROCEDURE — 74011250637 HC RX REV CODE- 250/637: Performed by: PHYSICIAN ASSISTANT

## 2022-02-17 PROCEDURE — 97535 SELF CARE MNGMENT TRAINING: CPT

## 2022-02-17 PROCEDURE — 74011250636 HC RX REV CODE- 250/636: Performed by: SURGERY

## 2022-02-17 PROCEDURE — 74011250636 HC RX REV CODE- 250/636

## 2022-02-17 PROCEDURE — 65270000029 HC RM PRIVATE

## 2022-02-17 RX ORDER — BUTALBITAL, ACETAMINOPHEN AND CAFFEINE 50; 325; 40 MG/1; MG/1; MG/1
1-2 TABLET ORAL
Qty: 40 TABLET | Refills: 0 | Status: SHIPPED | OUTPATIENT
Start: 2022-02-17 | End: 2022-03-01 | Stop reason: SDUPTHER

## 2022-02-17 RX ORDER — SIMETHICONE 80 MG
80 TABLET,CHEWABLE ORAL
Status: DISCONTINUED | OUTPATIENT
Start: 2022-02-17 | End: 2022-02-20 | Stop reason: HOSPADM

## 2022-02-17 RX ORDER — BUTALBITAL, ACETAMINOPHEN AND CAFFEINE 50; 325; 40 MG/1; MG/1; MG/1
1-2 TABLET ORAL
Status: DISCONTINUED | OUTPATIENT
Start: 2022-02-17 | End: 2022-02-20 | Stop reason: HOSPADM

## 2022-02-17 RX ADMIN — SODIUM CHLORIDE, PRESERVATIVE FREE 10 ML: 5 INJECTION INTRAVENOUS at 06:00

## 2022-02-17 RX ADMIN — BUTALBITAL, ACETAMINOPHEN, AND CAFFEINE 2 TABLET: 50; 325; 40 TABLET ORAL at 09:21

## 2022-02-17 RX ADMIN — SODIUM CHLORIDE 100 ML/HR: 9 INJECTION, SOLUTION INTRAVENOUS at 01:47

## 2022-02-17 RX ADMIN — SIMETHICONE 80 MG: 80 TABLET, CHEWABLE ORAL at 19:16

## 2022-02-17 RX ADMIN — SODIUM CHLORIDE, PRESERVATIVE FREE 10 ML: 5 INJECTION INTRAVENOUS at 21:16

## 2022-02-17 RX ADMIN — THERA TABS 1 TABLET: TAB at 08:29

## 2022-02-17 RX ADMIN — BUTALBITAL, ACETAMINOPHEN, AND CAFFEINE 2 TABLET: 50; 325; 40 TABLET ORAL at 22:34

## 2022-02-17 RX ADMIN — ENOXAPARIN SODIUM 40 MG: 100 INJECTION SUBCUTANEOUS at 08:29

## 2022-02-17 RX ADMIN — PANTOPRAZOLE SODIUM 40 MG: 40 TABLET, DELAYED RELEASE ORAL at 07:22

## 2022-02-17 RX ADMIN — PROMETHAZINE HYDROCHLORIDE 25 MG: 25 TABLET ORAL at 02:12

## 2022-02-17 RX ADMIN — BUTALBITAL, ACETAMINOPHEN, AND CAFFEINE 2 TABLET: 50; 325; 40 TABLET ORAL at 01:47

## 2022-02-17 RX ADMIN — DIGOXIN 0.12 MG: 125 TABLET ORAL at 07:21

## 2022-02-17 RX ADMIN — PROMETHAZINE HYDROCHLORIDE 25 MG: 25 TABLET ORAL at 21:16

## 2022-02-17 RX ADMIN — ONDANSETRON HYDROCHLORIDE 4 MG: 2 SOLUTION INTRAMUSCULAR; INTRAVENOUS at 12:59

## 2022-02-17 RX ADMIN — BUTALBITAL, ACETAMINOPHEN, AND CAFFEINE 2 TABLET: 50; 325; 40 TABLET ORAL at 16:22

## 2022-02-17 RX ADMIN — SODIUM CHLORIDE, PRESERVATIVE FREE 10 ML: 5 INJECTION INTRAVENOUS at 14:00

## 2022-02-17 RX ADMIN — SODIUM CHLORIDE 100 ML/HR: 9 INJECTION, SOLUTION INTRAVENOUS at 22:00

## 2022-02-17 RX ADMIN — ASPIRIN 325 MG ORAL TABLET 325 MG: 325 PILL ORAL at 08:29

## 2022-02-17 RX ADMIN — METOPROLOL SUCCINATE 25 MG: 25 TABLET, FILM COATED, EXTENDED RELEASE ORAL at 07:22

## 2022-02-17 NOTE — DISCHARGE INSTRUCTIONS
Discharge Instructions for [facility] Colorectal Surgery Patients       1. Do not lift any objects weighing more than 15-20 pounds for 4-6  weeks. 2. Do not do any housework including vacuuming, scrubbing or yardwork for 4 weeks. 3. Do not drive for two weeks or while taking sedating medications. 4. You may walk as desired and go up and down stairs as needed. 5. You may shower. Do not take tub baths, swim or use hot tubs for 2 weeks. 6. You have dermabond on your incisions which is surgical glue. This will dissolve over time. Do not scrub around incisions until the glue comes off.    7. Follow low-fiber diet for 2 weeks. (See handbook for additional details). 8. Drink nutritional supplements 2 times per day until your diet and appetite are back to normal. Diabetic patients should drink one-half bottle 4 times daily. 9. Multiple bowel movements are normal each day. Contact your surgeons office for any concerns. 10. Take Ibuprofen 200-400 mg every 8 hours as needed for pain     11. Follow up with providers as scheduled. 12. If your surgery involves an ostomy bag, please bring your supplies to the first office visit with your surgeon. 13. If you experience fever (greater than 100.5), chills, vomiting or redness or drainage at surgical site, please contact your surgeons office. 14. For questions regarding quality or quantity of ostomy output, refer to ERAS handbook or call surgeons office. Please see handbook for additional instructions. If you have further questions, please call your surgeons office. Patient Discharge Instructions    [facility] / [id number] : 1945    Admitted 2022 Discharged: 2022       If you experience any of the following symptoms Fevers, Chills, Nausea, Vomitting, Redness or Drainage at Surgical Site(s) or Any Other Questions or Concerns Please Call -  [phone]. Follow-up with Dr. Rhoda Aguilar in 10 days.   Future Appointments   Date Time Provider Savanna Kadie   3/1/2022  1:20 PM Nedra Ortiz MD [facility] BS AMB   5/16/2022  2:20 PM MD KAYLEN Loomis BS AMB           Information obtained by :  I understand that if any problems occur once I am at home I am to contact my physician. I understand and acknowledge receipt of the instructions indicated above.                                                                                                                                            Physician's or R.N.'s Signature                                                                  Date/Time                                                                                                                                              Patient or Representative Signature                                                          Date/Time

## 2022-02-17 NOTE — PROGRESS NOTES
RUR: 12% Low     JEREMIE: Home health; At [facility] (p: [phone]) accepted. Patient's son to provide transport home once medically stable. Follow-up with PCP/specialist.      Primary Contact: Bertrand Chauhan, [phone]     Medicare pt has received, reviewed, and signed 2nd IM letter informing them of their right to appeal the discharge. Signed copied has been placed on pt bedside chart. 12:30PM - CM reviewed chart. Patient is POD#3 for laparoscopic left colectomy. Per review of attending's note patient likely to discharge home tomorrow, 2/18. Home health has been arranged with [facility] (p: [phone]) for [facility] PT/OT. AVS updated. Discharge pending medical progress and final recommendations. CM to continue to follow.      WM Crump   [phone]

## 2022-02-17 NOTE — PROGRESS NOTES
Problem: Self Care Deficits Care Plan (Adult)  Goal: *Acute Goals and Plan of Care (Insert Text)  Description: FUNCTIONAL STATUS PRIOR TO ADMISSION: Patient functional mobility in the home with independence, in the community with modified independent using a rollator. Patient basic ADLs was modified independent increased time, trunk flexion lower body, and ADL items raised height on counters. Instrumental ADLs with total assistance from family. Baseline [place]. HOME SUPPORT: The patient lived with son and partner. .DME: rollator    Occupational Therapy Goals  Initiated 2/16/2022  1. Patient will perform bathing with supervision/set-up within 7 day(s). 2.  Patient will perform lower body dressing with supervision/set-up within 7 day(s). 3.  Patient will perform standing ADLs 5 mins with RW supervision/set-up within 7 day(s). 4.  Patient will perform toilet transfers with modified independence within 7 day(s). 5.  Patient will perform all aspects of toileting with modified independence within 7 day(s). 6.  Patient will participate in upper extremity therapeutic exercise/activities standing with RW with supervision/set-up within 7 day(s). 7.  Patient will utilize energy conservation techniques during functional activities with verbal and visual cues within 7 day(s). Outcome: Progressing Towards Goal     OCCUPATIONAL THERAPY TREATMENT  Patient: Tahir Shook (30 y.o. female)  Date: 2/17/2022  Diagnosis: Adenomatous colon polyp [D12.6]  Adenomatous polyp of colon [D12.6] Adenomatous colon polyp  Procedure(s) (LRB):  LAPAROSCOPIC LEFT COLECTOMY (N/A)  . (N/A) 3 Days Post-Op  Precautions: Fall (2020, fx L femur)  Chart, occupational therapy assessment, plan of care, and goals were reviewed. ASSESSMENT  Patient continues with skilled OT services and is progressing towards goals. Pt received on BSC indicating need to transfer back to bed.  Prior to transfer, pt requiring need to complete bowel/olga hygiene. She performed hygiene routine with CGA to min A required for thoroughness. Following, pt donned new brief simulated as underwear with up to min A and additional time. Pt noted to be SOB, however VSS. Educated on PLB techniques. Pt returned to semi-supine and positioned for comfort. Her functional independence and performance of ADL/IADL tasks continues to be limited at this time by decreased activity tolerance/endurance, impaired LB reach, abdominal pain, and generalized weakness. Continue to recommend [facility] at d/c. Current Level of Function Impacting Discharge (ADLs): min A LB dressing, CGA to min A toileting on BSC    Other factors to consider for discharge: PLOF         PLAN :  Patient continues to benefit from skilled intervention to address the above impairments. Continue treatment per established plan of care to address goals. Recommendation for discharge: (in order for the patient to meet his/her long term goals)  Occupational therapy at least 2 days/week in the home     This discharge recommendation:  Has been made in collaboration with the attending provider and/or case management    IF patient discharges home will need the following DME: patient owns DME required for discharge       SUBJECTIVE:   Patient stated People don't believe me but I always get out of breath like this.     OBJECTIVE DATA SUMMARY:   Cognitive/Behavioral Status:  Neurologic State: Alert  Orientation Level: Oriented X4  Cognition: Follows commands             Functional Mobility and Transfers for ADLs:  Bed Mobility:  Supine to Sit:  (pt received on BSC)  Sit to Supine: Contact guard assistance; Additional time  Scooting: Stand-by assistance    Transfers:  Sit to Stand: Contact guard assistance  Functional Transfers  Toilet Transfer : Contact guard assistance (BS)  Bed to Chair: Contact guard assistance    Balance:  Sitting: Intact  Standing: Impaired; With support  Standing - Static: Good;Constant support  Standing - Dynamic : Fair;Constant support    ADL Intervention:  Feeding  Feeding Assistance: Set-up                        Lower Body Dressing Assistance  Underpants: Minimum assistance (brief simulated as underwear)  Position Performed:  (seated on BSC)  Cues: Verbal cues provided;Physical assistance    Toileting  Toileting Assistance: Contact guard assistance;Minimum assistance  Bladder Hygiene: Contact guard assistance  Bowel Hygiene: Minimum assistance  Clothing Management: Contact guard assistance  Cues: Verbal cues provided  Adaptive Equipment: Walker (on BSC)         Pain:  Pt reporting moderate pain in abdomen, resolving with repositioning     Activity Tolerance:   Good, Fair, requires rest breaks and observed SOB with activity    After treatment patient left in no apparent distress:   Supine in bed, Call bell within reach and Side rails x 3, RN notified of session     COMMUNICATION/COLLABORATION:   The patients plan of care was discussed with: Registered nurse.      Yocasta Lopez OT  Time Calculation: 30 mins

## 2022-02-17 NOTE — PROGRESS NOTES
General Surgery Daily Progress Note    Admit Date: 2022  Post-Operative Day: 3 Days Post-Op from Procedure(s):  LAPAROSCOPIC LEFT COLECTOMY  . Subjective:     Last 24 hrs: Doing ok this AM--pain well-controlled    Requesting increase to 2 Fioricet q6hrs (PTA regimen)  Does not like food options--requesting tomato soup  Multiple dark brown, loose BM overnight  Denies NV      Objective:     Blood pressure 112/66, pulse 78, temperature 98.5 °F (36.9 °C), resp. rate 16, height 5' 2\" (1.575 m), weight 93 lb 7.6 oz (42.4 kg), SpO2 94 %. Temp (24hrs), Av.9 °F (37.2 °C), Min:98 °F (36.7 °C), Max:99.7 °F (37.6 °C)      _____________________  Physical Exam:     Alert and Oriented, talkative, in no acute distress. Cardiovascular: RRR, no peripheral edema  Lungs:CTAB on RA  Abdomen: soft, flat  NTND  Incisions c/d/i      Assessment:   Principal Problem:    Adenomatous colon polyp (2022)    Active Problems:    Adenomatous polyp of colon (2022)            Plan: Will increase PO Fioricet to q6 hrs  Tomato soup OK  PT to help w ambulation  Plan to d/c to home tomorrow    Data Review:    Recent Labs     22  0609 02/15/22  0318   WBC 7.3 9.8   HGB 7.3* 7.5*   HCT 23.8* 23.5*    271     Recent Labs     22  0609 02/15/22  0318    135*   K 3.5 4.0   * 106   CO2 24 22   GLU 91 105*   BUN 9 10   CREA 0.66 0.75   CA 8.0* 8.4*   MG  --  2.1   PHOS  --  3.3     No results for input(s): AML, LPSE in the last 72 hours.         ______________________  Medications:    Current Facility-Administered Medications   Medication Dose Route Frequency    butalbital-acetaminophen-caffeine (FIORICET, ESGIC) -40 mg per tablet 1-2 Tablet  1-2 Tablet Oral Q6H PRN    acetaminophen (TYLENOL) tablet 650 mg  650 mg Oral Q6H PRN    0.9% sodium chloride infusion  100 mL/hr IntraVENous CONTINUOUS    promethazine (PHENERGAN) tablet 25 mg  25 mg Oral Q6H PRN    promethazine (PHENERGAN) 12.5 mg in NS 50 mL IVPB  12.5 mg IntraVENous Q6H PRN    albuterol-ipratropium (DUO-NEB) 2.5 MG-0.5 MG/3 ML  3 mL Nebulization Q6H PRN    aspirin tablet 325 mg  325 mg Oral DAILY    digoxin (LANOXIN) tablet 0.125 mg  0.125 mg Oral QAM    lactulose (CHRONULAC) 10 gram/15 mL solution 30 mL  20 g Oral BID PRN    metoprolol succinate (TOPROL-XL) XL tablet 25 mg  25 mg Oral QAM    therapeutic multivitamin (THERAGRAN) tablet 1 Tablet  1 Tablet Oral DAILY    pantoprazole (PROTONIX) tablet 40 mg  40 mg Oral ACB    sodium chloride (NS) flush 5-40 mL  5-40 mL IntraVENous Q8H    sodium chloride (NS) flush 5-40 mL  5-40 mL IntraVENous PRN    ondansetron (ZOFRAN) injection 4 mg  4 mg IntraVENous Q6H PRN    enoxaparin (LOVENOX) injection 40 mg  40 mg SubCUTAneous DAILY    HYDROmorphone (DILAUDID) injection 0.5-1 mg  0.5-1 mg IntraVENous Q3H PRN       RUSLAN Singh  2/17/2022

## 2022-02-17 NOTE — PROGRESS NOTES
Bedside shift change report given to Ford (oncoming nurse) by Lucas Nicole RN (offgoing nurse). Report included the following information SBAR, Kardex, MAR and Recent Results.

## 2022-02-18 PROCEDURE — 74011250637 HC RX REV CODE- 250/637

## 2022-02-18 PROCEDURE — 74011250636 HC RX REV CODE- 250/636: Performed by: SURGERY

## 2022-02-18 PROCEDURE — 94760 N-INVAS EAR/PLS OXIMETRY 1: CPT

## 2022-02-18 PROCEDURE — 94640 AIRWAY INHALATION TREATMENT: CPT

## 2022-02-18 PROCEDURE — 94664 DEMO&/EVAL PT USE INHALER: CPT

## 2022-02-18 PROCEDURE — 74011250636 HC RX REV CODE- 250/636

## 2022-02-18 PROCEDURE — 74011250637 HC RX REV CODE- 250/637: Performed by: PHYSICIAN ASSISTANT

## 2022-02-18 PROCEDURE — 74011250637 HC RX REV CODE- 250/637: Performed by: SURGERY

## 2022-02-18 PROCEDURE — 99024 POSTOP FOLLOW-UP VISIT: CPT | Performed by: PHYSICIAN ASSISTANT

## 2022-02-18 PROCEDURE — 65270000029 HC RM PRIVATE

## 2022-02-18 PROCEDURE — 74011000250 HC RX REV CODE- 250: Performed by: SURGERY

## 2022-02-18 RX ADMIN — SODIUM CHLORIDE 100 ML/HR: 9 INJECTION, SOLUTION INTRAVENOUS at 07:24

## 2022-02-18 RX ADMIN — SODIUM CHLORIDE, PRESERVATIVE FREE 10 ML: 5 INJECTION INTRAVENOUS at 14:00

## 2022-02-18 RX ADMIN — IPRATROPIUM BROMIDE AND ALBUTEROL SULFATE 3 ML: .5; 3 SOLUTION RESPIRATORY (INHALATION) at 11:51

## 2022-02-18 RX ADMIN — BUTALBITAL, ACETAMINOPHEN, AND CAFFEINE 1 TABLET: 50; 325; 40 TABLET ORAL at 12:52

## 2022-02-18 RX ADMIN — BUTALBITAL, ACETAMINOPHEN, AND CAFFEINE 1 TABLET: 50; 325; 40 TABLET ORAL at 12:49

## 2022-02-18 RX ADMIN — SIMETHICONE 80 MG: 80 TABLET, CHEWABLE ORAL at 10:17

## 2022-02-18 RX ADMIN — BUTALBITAL, ACETAMINOPHEN, AND CAFFEINE 2 TABLET: 50; 325; 40 TABLET ORAL at 07:29

## 2022-02-18 RX ADMIN — BUTALBITAL, ACETAMINOPHEN, AND CAFFEINE 2 TABLET: 50; 325; 40 TABLET ORAL at 18:59

## 2022-02-18 RX ADMIN — ASPIRIN 325 MG ORAL TABLET 325 MG: 325 PILL ORAL at 10:08

## 2022-02-18 RX ADMIN — METOPROLOL SUCCINATE 25 MG: 25 TABLET, FILM COATED, EXTENDED RELEASE ORAL at 07:22

## 2022-02-18 RX ADMIN — IPRATROPIUM BROMIDE AND ALBUTEROL SULFATE 3 ML: .5; 3 SOLUTION RESPIRATORY (INHALATION) at 02:05

## 2022-02-18 RX ADMIN — THERA TABS 1 TABLET: TAB at 10:08

## 2022-02-18 RX ADMIN — SIMETHICONE 80 MG: 80 TABLET, CHEWABLE ORAL at 18:59

## 2022-02-18 RX ADMIN — PROMETHAZINE HYDROCHLORIDE 25 MG: 25 TABLET ORAL at 10:16

## 2022-02-18 RX ADMIN — ENOXAPARIN SODIUM 40 MG: 100 INJECTION SUBCUTANEOUS at 10:08

## 2022-02-18 RX ADMIN — PROMETHAZINE HYDROCHLORIDE 25 MG: 25 TABLET ORAL at 18:59

## 2022-02-18 RX ADMIN — SODIUM CHLORIDE, PRESERVATIVE FREE 10 ML: 5 INJECTION INTRAVENOUS at 07:22

## 2022-02-18 RX ADMIN — HYDROMORPHONE HYDROCHLORIDE 1 MG: 1 INJECTION, SOLUTION INTRAMUSCULAR; INTRAVENOUS; SUBCUTANEOUS at 12:20

## 2022-02-18 RX ADMIN — PANTOPRAZOLE SODIUM 40 MG: 40 TABLET, DELAYED RELEASE ORAL at 07:22

## 2022-02-18 RX ADMIN — DIGOXIN 0.12 MG: 125 TABLET ORAL at 07:22

## 2022-02-18 NOTE — ROUTINE PROCESS
Bedside shift change report given to HERMAN Hagen (oncoming nurse) by [employer], RN (offgoing nurse). Report included the following information SBAR, Kardex, ED Summary, OR Summary, Procedure Summary, Intake/Output, MAR, Recent Results and Med Rec Status.

## 2022-02-18 NOTE — ROUTINE PROCESS
[facility]  [address] [address]  Phone:  [phone]  Fax:  [phone]                                    Bia Bill   MRN: [id number]    Department:  [facility]   Date of Visit:  12/4/2019           After Visit Summary Signature Page    I have received my discharge instructions, and my questions have been answered. I have discussed any challenges I see with this plan with the nurse or doctor.    ..........................................................................................................................................  Patient/Patient Representative Signature      ..........................................................................................................................................  Patient Representative Print Name and Relationship to Patient    ..................................................               ................................................  Date                                   Time    ..........................................................................................................................................  Reviewed by Signature/Title    ...................................................              ..............................................  Date                                               Time          22EPIC Rev 08/18        patient got breathing treatment around at 2:00.

## 2022-02-18 NOTE — PROGRESS NOTES
Patient complaining of difficulty breathing related to bloating and gas build-up in the abdomen. Respirations slightly elevated around 19-25, oxygen saturation remains above 95% on room air. Pt has prn respiratory treatment, called respiratory therapy to administer nuo-neb.

## 2022-02-18 NOTE — PROGRESS NOTES
Surgery Progress Note    Admit Date: 2/14/2022      Subjective:      Pt flustered this morning, she  complains of having an accident when nursing staff was not able to assist her up to BR and she \"had a terrible accident when I had to go number 2 and couldnt get there\"   Pts pain present - adequately treated. She reports some SILVER due to \"Im crooked\"   No CP. Pt is up to [facility] with assistance. Patient 's current diet is Regular, she tells me that she has a long hx of an eating disorder and eats small meals through out the day due to early satiety. she had some cream of wheat this AM.  .. Pt reports  no nausea and no vomiting. Pt reports no fever or chills    Bowel Movements: Flatus and soft stool.       Pt strongly desires DC to home today, she lives with her son, feels it will be easier for her to get to the restroom quickly at home vs. Here     Objective:     Patient Vitals for the past 8 hrs:   BP Temp Pulse Resp SpO2   02/18/22 0814 -- (P) 97.7 °F (36.5 °C) (P) 74 (P) 18 (P) 99 %   02/18/22 0543 119/62 97.8 °F (36.6 °C) 90 18 96 %     02/18 0701 - 02/18 1900  In: -   Out: 700 [Urine:700]  02/16 1901 - 02/18 0700  In: -   Out: 5180 [Urine:1200]ip  Physical Exam:    General: alert,, no distress, anxious, kyphotic   Cardiac: normal S1 and S2  Lungs: clear posterior   Abdomen: soft, protuberant, nondistended, normal bowel sounds, tenderness mild - in the periumbilical area, without guarding, without rebound  Wounds:clean, dry, no drainage  Neuro: alert, oriented x 3, no defects noted in general exam.  Extremities: no edema      CBC:   Lab Results   Component Value Date/Time    WBC 7.3 02/16/2022 06:09 AM    RBC 2.55 (L) 02/16/2022 06:09 AM    HGB 7.3 (L) 02/16/2022 06:09 AM    HCT 23.8 (L) 02/16/2022 06:09 AM    PLATELET 575 19/63/2129 06:09 AM     BMP:   Lab Results   Component Value Date/Time    Glucose 91 02/16/2022 06:09 AM    Sodium 142 02/16/2022 06:09 AM    Potassium 3.5 02/16/2022 06:09 AM    Chloride 115 (H) 02/16/2022 06:09 AM    CO2 24 02/16/2022 06:09 AM    BUN 9 02/16/2022 06:09 AM    Creatinine 0.66 02/16/2022 06:09 AM    Calcium 8.0 (L) 02/16/2022 06:09 AM     CMP:  Lab Results   Component Value Date/Time    Glucose 91 02/16/2022 06:09 AM    Sodium 142 02/16/2022 06:09 AM    Potassium 3.5 02/16/2022 06:09 AM    Chloride 115 (H) 02/16/2022 06:09 AM    CO2 24 02/16/2022 06:09 AM    BUN 9 02/16/2022 06:09 AM    Creatinine 0.66 02/16/2022 06:09 AM    Calcium 8.0 (L) 02/16/2022 06:09 AM    Anion gap 3 (L) 02/16/2022 06:09 AM    BUN/Creatinine ratio 14 02/16/2022 06:09 AM    Alk. phosphatase 106 02/04/2022 11:52 AM    Protein, total 6.7 02/04/2022 11:52 AM    Albumin 3.5 02/04/2022 11:52 AM    Globulin 3.2 02/04/2022 11:52 AM    A-G Ratio 1.1 02/04/2022 11:52 AM       Radiology review: na        Assessment:   Pt is POD #4 s/p Procedure(s):  LAPAROSCOPIC LEFT COLECTOMY  . Adenomatous polyp of transverse colon  COPD     Plan:   Diet: pt is tolerating diet and feels she is back to her baseline with her PO intake, she is not a big eater.     Activity: up with assistance  Pain management  GI and DVT prophylaxis  Meds: continue ERAS, pre op meds   Labs: no lab studies available for review at time of visit  Pt feels that she is back to her baseline with her breathing and her PO intake  DC home if ok with   Dr. Yamil Babin PA-C

## 2022-02-19 PROCEDURE — 74011000250 HC RX REV CODE- 250: Performed by: SURGERY

## 2022-02-19 PROCEDURE — 94760 N-INVAS EAR/PLS OXIMETRY 1: CPT

## 2022-02-19 PROCEDURE — 74011250637 HC RX REV CODE- 250/637: Performed by: PHYSICIAN ASSISTANT

## 2022-02-19 PROCEDURE — 74011250637 HC RX REV CODE- 250/637: Performed by: SURGERY

## 2022-02-19 PROCEDURE — 65270000029 HC RM PRIVATE

## 2022-02-19 PROCEDURE — 74011250636 HC RX REV CODE- 250/636: Performed by: SURGERY

## 2022-02-19 RX ADMIN — METOPROLOL SUCCINATE 25 MG: 25 TABLET, FILM COATED, EXTENDED RELEASE ORAL at 08:13

## 2022-02-19 RX ADMIN — IPRATROPIUM BROMIDE AND ALBUTEROL SULFATE 3 ML: .5; 3 SOLUTION RESPIRATORY (INHALATION) at 20:41

## 2022-02-19 RX ADMIN — ASPIRIN 325 MG ORAL TABLET 325 MG: 325 PILL ORAL at 08:38

## 2022-02-19 RX ADMIN — IPRATROPIUM BROMIDE AND ALBUTEROL SULFATE 3 ML: .5; 3 SOLUTION RESPIRATORY (INHALATION) at 01:15

## 2022-02-19 RX ADMIN — THERA TABS 1 TABLET: TAB at 08:38

## 2022-02-19 RX ADMIN — BUTALBITAL, ACETAMINOPHEN, AND CAFFEINE 2 TABLET: 50; 325; 40 TABLET ORAL at 20:41

## 2022-02-19 RX ADMIN — BUTALBITAL, ACETAMINOPHEN, AND CAFFEINE 2 TABLET: 50; 325; 40 TABLET ORAL at 01:11

## 2022-02-19 RX ADMIN — Medication 12.5 MG: at 14:41

## 2022-02-19 RX ADMIN — SIMETHICONE 80 MG: 80 TABLET, CHEWABLE ORAL at 13:12

## 2022-02-19 RX ADMIN — BUTALBITAL, ACETAMINOPHEN, AND CAFFEINE 2 TABLET: 50; 325; 40 TABLET ORAL at 14:41

## 2022-02-19 RX ADMIN — SIMETHICONE 80 MG: 80 TABLET, CHEWABLE ORAL at 00:19

## 2022-02-19 RX ADMIN — BUTALBITAL, ACETAMINOPHEN, AND CAFFEINE 2 TABLET: 50; 325; 40 TABLET ORAL at 08:38

## 2022-02-19 RX ADMIN — DIGOXIN 0.12 MG: 125 TABLET ORAL at 08:13

## 2022-02-19 RX ADMIN — PANTOPRAZOLE SODIUM 40 MG: 40 TABLET, DELAYED RELEASE ORAL at 08:13

## 2022-02-19 NOTE — PROGRESS NOTES
Pt very aggressive verbally towards RN, RN educated pt on professional relationship and requested that the pt address the nurse with a little more respect. The pt stated several times throughout the night that \"I'm mad at myself not you, but sometimes it rubs off on other people. \" The RN responded calmly that the pt is going through a hard time, but needs to be respectful to staff. Pt expressed understanding. Throughout the night, pt yelled at nurse several times. Prior to assisting pt to bedside commode, RN was donning gloves; per pt \"I need to go RIGHT NOW. Oh God I'm gonna die\". RN explained that she will be able to help after putting on her gloves. Pt stated \"you just don't understand. \"    Bed alarm placed under pt following attempt to get up unassisted while RN was getting her gloves on.

## 2022-02-19 NOTE — PROGRESS NOTES
Progress Note    Patient: Chema Lamas MRN: [id number]  SSN: [id number]    YOB: 1945  Age: 77 y.o. Sex: female      Admit Date: 2022    5 Days Post-Op    Procedure:  Procedure(s):  LAPAROSCOPIC LEFT COLECTOMY  . Subjective:     No acute surgical issues. Pt doing okay. She is still a bit short of breath. No nausea or vomiting but not eating much due to quality of food. Pain is under control    Objective:     Visit Vitals  /69   Pulse 63   Temp 98.8 °F (37.1 °C)   Resp 18   Ht 5' 2\" (1.575 m)   Wt 93 lb 7.6 oz (42.4 kg)   SpO2 96%   BMI 17.10 kg/m²       Temp (24hrs), Av.3 °F (36.8 °C), Min:97.4 °F (36.3 °C), Max:99.1 °F (37.3 °C)        Physical Exam:    Gen:  NAD  Pulm:  Unlabored  Abd:  S/mildly distended/appropriate TTP  Wound:  C/D/I    No results found for this or any previous visit (from the past 24 hour(s)).         Assessment:     Hospital Problems  Date Reviewed: 2022          Codes Class Noted POA    * (Principal) Adenomatous colon polyp ICD-10-CM: D12.6  ICD-9-CM: 211.3  2022 Yes        Adenomatous polyp of colon ICD-10-CM: D12.6  ICD-9-CM: 211.3  2022 Unknown              Plan/Recommendations/Medical Decision Making:     - Diet as tolerated  - Pain control  - Possible DC home today or tomorrow pending patient discussion with son

## 2022-02-19 NOTE — PROGRESS NOTES
Pt and son agreed it is best to DC tomorrow morning based on son's schedule.  Son works at noon and wants to pick pt up no later than 1100 on Sunday

## 2022-02-19 NOTE — PROGRESS NOTES
Pt more cooperative during day than night shift, periods of being demanding but cooperative with staff. Periodically when educating patient on certain things she'd mention, \"I already know this, I've been through this before. \" Pt has gas/abd pain and nurse did mention to use spirometer and ambulate more to help and she responded, \"I already knew that\" and did not want to be educated any further. Pt has strong/ dry/ non-productive chronic cough; persuaded IS as well and she stated she knew how to use it and did not need to be showed or reminded about it.

## 2022-02-20 VITALS
OXYGEN SATURATION: 97 % | HEART RATE: 97 BPM | HEIGHT: 62 IN | WEIGHT: 93.47 LBS | SYSTOLIC BLOOD PRESSURE: 114 MMHG | RESPIRATION RATE: 18 BRPM | BODY MASS INDEX: 17.2 KG/M2 | TEMPERATURE: 98.3 F | DIASTOLIC BLOOD PRESSURE: 68 MMHG

## 2022-02-20 PROCEDURE — 94640 AIRWAY INHALATION TREATMENT: CPT

## 2022-02-20 PROCEDURE — 74011250636 HC RX REV CODE- 250/636: Performed by: SURGERY

## 2022-02-20 PROCEDURE — 74011250637 HC RX REV CODE- 250/637: Performed by: PHYSICIAN ASSISTANT

## 2022-02-20 PROCEDURE — 74011000250 HC RX REV CODE- 250: Performed by: SURGERY

## 2022-02-20 PROCEDURE — 99024 POSTOP FOLLOW-UP VISIT: CPT | Performed by: PHYSICIAN ASSISTANT

## 2022-02-20 PROCEDURE — 74011250637 HC RX REV CODE- 250/637: Performed by: SURGERY

## 2022-02-20 RX ORDER — DOCUSATE SODIUM 100 MG/1
100 CAPSULE, LIQUID FILLED ORAL 2 TIMES DAILY
Qty: 30 CAPSULE | Refills: 0 | Status: SHIPPED
Start: 2022-02-20 | End: 2022-09-16

## 2022-02-20 RX ORDER — TRAMADOL HYDROCHLORIDE 50 MG/1
50 TABLET ORAL
Qty: 20 TABLET | Refills: 0 | Status: SHIPPED | OUTPATIENT
Start: 2022-02-20 | End: 2022-02-27

## 2022-02-20 RX ADMIN — IPRATROPIUM BROMIDE AND ALBUTEROL SULFATE 3 ML: .5; 3 SOLUTION RESPIRATORY (INHALATION) at 07:00

## 2022-02-20 RX ADMIN — BUTALBITAL, ACETAMINOPHEN, AND CAFFEINE 2 TABLET: 50; 325; 40 TABLET ORAL at 09:12

## 2022-02-20 RX ADMIN — ASPIRIN 325 MG ORAL TABLET 325 MG: 325 PILL ORAL at 08:37

## 2022-02-20 RX ADMIN — PANTOPRAZOLE SODIUM 40 MG: 40 TABLET, DELAYED RELEASE ORAL at 08:37

## 2022-02-20 RX ADMIN — THERA TABS 1 TABLET: TAB at 08:37

## 2022-02-20 RX ADMIN — METOPROLOL SUCCINATE 25 MG: 25 TABLET, FILM COATED, EXTENDED RELEASE ORAL at 08:37

## 2022-02-20 RX ADMIN — BUTALBITAL, ACETAMINOPHEN, AND CAFFEINE 2 TABLET: 50; 325; 40 TABLET ORAL at 03:06

## 2022-02-20 RX ADMIN — ENOXAPARIN SODIUM 40 MG: 100 INJECTION SUBCUTANEOUS at 08:37

## 2022-02-20 RX ADMIN — DIGOXIN 0.12 MG: 125 TABLET ORAL at 08:37

## 2022-02-20 NOTE — PROGRESS NOTES
Progress Note    Patient: Rafi Floyd MRN: [id number]  SSN: [id number]    YOB: 1945  Age: 77 y.o. Sex: female      Admit Date: 2022    6 Days Post-Op    Procedure:  Procedure(s):  LAPAROSCOPIC LEFT COLECTOMY  . Subjective:     No acute surgical issues. Pt is doing well. No nausea or vomiting. Pain is under control. Dyspnea improving    Objective:     Visit Vitals  /68   Pulse 97   Temp 98.3 °F (36.8 °C)   Resp 18   Ht 5' 2\" (1.575 m)   Wt 93 lb 7.6 oz (42.4 kg)   SpO2 97%   BMI 17.10 kg/m²       Temp (24hrs), Av.6 °F (37 °C), Min:98.3 °F (36.8 °C), Max:98.8 °F (37.1 °C)        Physical Exam:    Gen:  NAD  Pulm:  Unlabored  Abd:  S/mildly distended/appropriate TTP  Wound:  C/D/I    No results found for this or any previous visit (from the past 24 hour(s)).         Assessment:     Hospital Problems  Date Reviewed: 2022          Codes Class Noted POA    * (Principal) Adenomatous colon polyp ICD-10-CM: D12.6  ICD-9-CM: 211.3  2022 Yes        Adenomatous polyp of colon ICD-10-CM: D12.6  ICD-9-CM: 211.3  2022 Unknown              Plan/Recommendations/Medical Decision Making:     - Diet as tolerated  - Pain control  - Plan DC home this am

## 2022-02-20 NOTE — PROGRESS NOTES
Transition of Care: home with home health - [facility] has accepted; info on AVS    Transport Plan: in car with family    RUR: 6%    Main contact - sonGuerline Tomlin Section- [phone]: this CM noted dc order; patient going home with At [facility]; son to transport home today; no other CM needs noted    Medicare pt has received, reviewed, and signed 2nd IM letter informing them of their right to appeal the discharge. Signed copy has been placed on pt bedside chart. 1050:  This CM sent message via [employer] to [address] [facility] of [facility] today    [address] following  Eric Gibson, RN, CRM

## 2022-02-23 NOTE — DISCHARGE SUMMARY
Physician Discharge Summary     Patient ID:  Heath Irwin  [id number]  77 y.o.  1945    Allergies: Cefuroxime, Hydrocodone, Oxycodone, and Penicillins    Admit Date: 2/14/2022    Discharge Date:  2/20/2022    * Admission Diagnoses: Adenomatous colon polyp [D12.6]  Adenomatous polyp of colon [D12.6]    * Discharge Diagnoses:    Hospital Problems as of 2/20/2022 Date Reviewed: 2/14/2022          Codes Class Noted - Resolved POA    * (Principal) Adenomatous colon polyp ICD-10-CM: D12.6  ICD-9-CM: 211.3  2/14/2022 - Present Yes        Adenomatous polyp of colon ICD-10-CM: D12.6  ICD-9-CM: 211.3  2/14/2022 - Present Unknown        Adenomatous polyp of descending colon ICD-10-CM: D12.4  ICD-9-CM: 211.3  1/13/2022 - Present Unknown               Admission Condition: Good    * Discharge Condition: good and improved    * Procedures: Procedure(s):  LAPAROSCOPIC LEFT COLECTOMY  . [facility] Course:   Normal hospital course for this procedure. Patient tolerated diet, passed gas & +BM  Transitioned to oral analgesics and pain well controlled. She worked with PT & ambulated. On POD #4, patient reported dyspnea, but stated she felt her breathing was at baseline. She was observed, increased IS use & felt well. Patient was DC to home  POD #6. She is doing well with follow up appointments in place.         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    Discharge Medications:   Discharge Medication List as of 2/20/2022  9:53 AM      START taking these medications    Details   traMADoL (ULTRAM) 50 mg tablet Take 1 Tablet by mouth every six (6) hours as needed for Pain for up to 7 days. Max Daily Amount: 200 mg., Normal, Disp-20 Tablet, R-0      docusate sodium (COLACE) 100 mg capsule Take 1 Capsule by mouth two (2) times a day., Normal, Disp-30 Capsule, R-0      !! butalbital-acetaminophen-caffeine (FIORICET, ESGIC) -40 mg per tablet Take 1-2 Tablets by mouth every six (6) hours as needed for Headache., Normal, Disp-40 Tablet, R-0       !! - Potential duplicate medications found. Please discuss with provider. CONTINUE these medications which have NOT CHANGED    Details   aspirin (ASPIRIN) 325 mg tablet Take 325 mg by mouth daily. , Historical Med      multivitamin (ONE A DAY) tablet Take 1 Tablet by mouth daily. , Historical Med      acetaminophen (TYLENOL) 325 mg tablet Take 650 mg by mouth every four (4) hours as needed for Pain., Historical Med      digoxin (LANOXIN) 0.125 mg tablet Take 1 Tablet by mouth daily. , Normal, Disp-90 Tablet, R-1Dose DECREASED to 0.125mg daily 11/15/21      albuterol (PROVENTIL HFA, VENTOLIN HFA, PROAIR HFA) 90 mcg/actuation inhaler Take 2 Puffs by inhalation every six (6) hours as needed., Historical Med      !! butalbital-acetaminophen-caffeine (FIORICET, ESGIC) -40 mg per tablet Take 1-2 Tabs by mouth every six (6) hours as needed., Historical Med      lactulose (CHRONULAC) 10 gram/15 mL solution Take 20 g by mouth two (2) times daily as needed., Historical Med      metoprolol succinate (TOPROL-XL) 25 mg XL tablet Take 25 mg by mouth Every morning., Historical Med      pantoprazole (PROTONIX) 40 mg tablet Take 40 mg by mouth daily. , Historical Med       !! - Potential duplicate medications found. Please discuss with provider.       STOP taking these medications       metroNIDAZOLE (FLAGYL) 500 mg tablet Comments:   Reason for Stopping:         neomycin (MYCIFRADIN) 500 mg tablet Comments:   Reason for Stopping:         PEG 3350-Electrolytes (COLYTE;GOLYTELY) solr Comments:   Reason for Stopping:         metoclopramide HCl (REGLAN) 10 mg tablet Comments:   Reason for Stopping:         polyethylene glycol (Miralax) 17 gram/dose powder Comments:   Reason for Stopping:               * Follow-up Care/Patient Instructions: Activity: No lifting, Driving, or Strenuous exercise for 2 weeks.  No driving while on analgesics, PT/OT per Home Health and See surgical instructions  Diet: Low fiber diet  Wound Care: Keep wound clean and dry    Follow-up Information     Follow up With Specialties Details Why Contact Info    AT [address] [address]  [phone]    Bismark Beard MD General Surgery, Surgical Oncology, Colon and Rectal Surgery In 2 weeks  [address] [place] [address]      Mary Gar NP Family Medicine, General Practice   [facility]  Mail Stop [phone] [facility] [phone]          Follow-up appts:  Future Appointments   Date Time Provider Savanna Engle   3/1/2022  1:20 PM Bismark Beard MD [facility] BS AMB   5/16/2022  2:20 PM MD KAYLEN Frank AMB       Signed:  RUSLAN Maki  2/23/2022  5:19 PM

## 2022-02-25 ENCOUNTER — TELEPHONE (OUTPATIENT)
Dept: SURGERY | Age: 77
End: 2022-02-25

## 2022-02-25 NOTE — TELEPHONE ENCOUNTER
----- Message from Jocelyne Loerae Day sent at 2/25/2022  8:35 AM EST -----  Regarding: Kenny Sher,     This patient left me a voicemail stating that she is having watery stool and constantly has to use the restroom multiple times a day. She wants to know if this is normal after surgery because she is concerned about getting dehydrated. Please reach out to her when you can, thanks!

## 2022-02-25 NOTE — TELEPHONE ENCOUNTER
Returned call to Ms Shirin Hubbard verified all PHI. Reviewed notes patient had  Adenomatous polyp of transverse colon on 2/14/22. Ms Shirin Hubbard states she has been having frequent diarrhea since surgery about 3 times a day small amount. She is drinking fluids good.  I advised to take some immodium if the diarrhea persist.     Post op follow up with Dr Dorcas Otto on 3/1/22 at 1:20 PM.

## 2022-03-01 ENCOUNTER — OFFICE VISIT (OUTPATIENT)
Dept: SURGERY | Age: 77
End: 2022-03-01
Payer: MEDICARE

## 2022-03-01 VITALS
WEIGHT: 95 LBS | OXYGEN SATURATION: 98 % | BODY MASS INDEX: 17.48 KG/M2 | SYSTOLIC BLOOD PRESSURE: 124 MMHG | HEART RATE: 90 BPM | DIASTOLIC BLOOD PRESSURE: 73 MMHG | HEIGHT: 62 IN | TEMPERATURE: 97.5 F

## 2022-03-01 DIAGNOSIS — D12.4 ADENOMATOUS POLYP OF DESCENDING COLON: ICD-10-CM

## 2022-03-01 PROCEDURE — 99024 POSTOP FOLLOW-UP VISIT: CPT | Performed by: SURGERY

## 2022-03-01 RX ORDER — PROMETHAZINE HYDROCHLORIDE 12.5 MG/1
12.5 TABLET ORAL
Qty: 20 TABLET | Refills: 0 | Status: SHIPPED | OUTPATIENT
Start: 2022-03-01 | End: 2022-06-16

## 2022-03-01 RX ORDER — BUTALBITAL, ACETAMINOPHEN AND CAFFEINE 50; 325; 40 MG/1; MG/1; MG/1
1-2 TABLET ORAL
Qty: 40 TABLET | Refills: 0 | Status: SHIPPED | OUTPATIENT
Start: 2022-03-01 | End: 2022-06-16

## 2022-03-01 NOTE — LETTER
3/1/2022    Patient: Sera Thorpe   YOB: 1945   Date of Visit: 3/1/2022     Xavier Porter NP  [facility]  Mail Stop [phone] [address]  Via Fax: [phone]    Dear Xavier Porter NP,      Thank you for referring Ms. Shelly Barnard to [facility] for evaluation. My notes for this consultation are attached. If you have questions, please do not hesitate to call me. I look forward to following your patient along with you.       Sincerely,    Tula Cowden, MD

## 2022-03-01 NOTE — PROGRESS NOTES
1. Have you been to the ER, urgent care clinic since your last visit? Hospitalized since your last visit? No    2. Have you seen or consulted any other health care providers outside of the [address] [place] [address] since your last visit? Include any pap smears or colon screening.  No

## 2022-03-02 NOTE — PROGRESS NOTES
[facility]      Clinic Note - Follow up    Subjective     Janine Nick returns for scheduled follow up today. She is s/p laparoscopic left colectomy on 2/14/2022. Her pathology showed a large tubulovillous adenoma with high-grade dysplasia but no evidence of malignancy. The patient she had diarrhea for several days after going home but this resolved over the weekend. She did vomit yesterday but is having normal bowel function and passing gas today. She has eaten fine today as well. She does complain of lots of abdominal cramping which is improving. No other complaints. No fevers. Objective     Visit Vitals  /73 (BP 1 Location: Left upper arm, BP Patient Position: Sitting, BP Cuff Size: Adult)   Pulse 90   Temp 97.5 °F (36.4 °C) (Oral)   Ht 5' 2\" (1.575 m)   Wt 95 lb (43.1 kg)   SpO2 98%   BMI 17.38 kg/m²         PE  GEN - Awake, alert, communicating appropriately. NAD  Pulm - CTAB  CV - RRR  Abd - soft, NT, ND. Incisions healing nicely. No signs of infection. Ext - warm, well perfused, no edema. All other systems negative unless indicated above. Labs  None      Imaging  None      Assessment     Janine Nick is a 77 y. o.yr old female s/p laparoscopic left colectomy on 2/14/2022. Her pathology showed a large tubulovillous adenoma with high-grade dysplasia but no evidence of malignancy. Plan     The patient requested refills for Fioricet which is the only pain medication she is willing to take and asked for promethazine in case she has recurrent nausea. I gave her a prescription for a small supply of both medications today. I will plan to see her back in 4 weeks. We reviewed her pathology today. She will follow-up colonoscopy as per recommendations from GI due to inability to traverse the polyp prior to surgery. This can likely be done in 6-8 more weeks.       Rl Henao MD  3/1/2022    CC: JOSEPHINE [address] Dr.

## 2022-03-09 ENCOUNTER — TELEPHONE (OUTPATIENT)
Dept: SURGERY | Age: 77
End: 2022-03-09

## 2022-03-09 NOTE — TELEPHONE ENCOUNTER
Home Health nurse is now calling saying patient is in a lot of pain and is requesting a call back from the nurse.

## 2022-03-09 NOTE — TELEPHONE ENCOUNTER
Returned call to patient. Two patient identifiers used. Patient stated she is having some stomach pain 8/10. She took 2 Fioricet this morning and it helped, however she is out and needs more. Explained to patient if she is having abdominal pain that intense she should go to the ED. Patient declined. Patient stated she needed a refill for the pain. Offered patient an appointment to see an NP tomorrow. Patient to come in tomorrow at 11am.  Patient stated she is eating well. Denies nausea w/vomiting. LBM 03/08/2022.

## 2022-03-09 NOTE — TELEPHONE ENCOUNTER
I received a voicemail from this patient and she stated that she is having a lot of pain in her stomach and that she is running out of pain medication. She is requesting a call back as soon as possible. Also, if you can please let Ms. Devon Gaye know to call the main line and not the surgery department going forward for anything post-op. Thanks.

## 2022-03-10 ENCOUNTER — OFFICE VISIT (OUTPATIENT)
Dept: SURGERY | Age: 77
End: 2022-03-10
Payer: MEDICARE

## 2022-03-10 VITALS
HEIGHT: 62 IN | DIASTOLIC BLOOD PRESSURE: 65 MMHG | HEART RATE: 102 BPM | WEIGHT: 92 LBS | BODY MASS INDEX: 16.93 KG/M2 | SYSTOLIC BLOOD PRESSURE: 100 MMHG | OXYGEN SATURATION: 96 %

## 2022-03-10 DIAGNOSIS — R10.12 LUQ PAIN: ICD-10-CM

## 2022-03-10 DIAGNOSIS — K59.00 CONSTIPATION, UNSPECIFIED CONSTIPATION TYPE: ICD-10-CM

## 2022-03-10 DIAGNOSIS — Z09 POSTOPERATIVE EXAMINATION: Primary | ICD-10-CM

## 2022-03-10 PROCEDURE — 99024 POSTOP FOLLOW-UP VISIT: CPT | Performed by: NURSE PRACTITIONER

## 2022-03-10 RX ORDER — POLYETHYLENE GLYCOL 3350 17 G/17G
17 POWDER, FOR SOLUTION ORAL DAILY
Qty: 510 G | Refills: 0 | Status: SHIPPED | OUTPATIENT
Start: 2022-03-10 | End: 2022-04-09

## 2022-03-10 NOTE — PROGRESS NOTES
1. Have you been to the ER, urgent care clinic since your last visit? Hospitalized since your last visit? No    2. Have you seen or consulted any other health care providers outside of the [address] [place] [address] since your last visit? Include any pap smears or colon screening.  No

## 2022-03-10 NOTE — PROGRESS NOTES
Subjective:      Alvarez Bentley is a 77 y.o. female presents for postop care following left colectomy on 2/14/2022. She complains of left upper quadrant pain and is requesting a refill on Fioricet. She is constipated. She has used a little Miralax at home. Ms. Shannan Pollack has a reminder for a \"due or due soon\" health maintenance. I have asked that she contact her primary care provider for follow-up on this health maintenance. Objective:     Visit Vitals  /65 (BP 1 Location: Left upper arm, BP Patient Position: Sitting, BP Cuff Size: Large adult)   Pulse (!) 102   Ht 5' 2\" (1.575 m)   Wt 92 lb (41.7 kg)   SpO2 96%   BMI 16.83 kg/m²       General:  alert, cooperative, no distress   Abdomen: soft, left upper quadrant tenderness   Incision:   healing well, no drainage, no erythema, no dehiscence, incision well approximated     Assessment:     Doing well postoperatively. Constipation      Plan: Will order a KUB. Advised to take tylenol or ibuprofen for pain. Miralax prescribed. Advised to take once daily to help her move her bowels.

## 2022-03-11 NOTE — PATIENT INSTRUCTIONS
Constipation: Care Instructions  Overview     Constipation means that you have a hard time passing stools (bowel movements). People pass stools from 3 times a day to once every 3 days. What is normal for you may be different. Constipation may occur with pain in the rectum and cramping. The pain may get worse when you try to pass stools. Sometimes there are small amounts of bright red blood on toilet paper or the surface of stools. This is because of enlarged veins near the rectum (hemorrhoids). A few changes in your diet and lifestyle may help you avoid ongoing constipation. Your doctor may also prescribe medicine to help loosen your stool. Some medicines can cause constipation. These include pain medicines and antidepressants. Tell your doctor about all the medicines you take. Your doctor may want to make a medicine change to ease your symptoms. Follow-up care is a key part of your treatment and safety. Be sure to make and go to all appointments, and call your doctor if you are having problems. It's also a good idea to know your test results and keep a list of the medicines you take. How can you care for yourself at home? · Drink plenty of fluids. If you have kidney, heart, or liver disease and have to limit fluids, talk with your doctor before you increase the amount of fluids you drink. · Include high-fiber foods in your diet each day. These include fruits, vegetables, beans, and whole grains. · Get at least 30 minutes of exercise on most days of the week. Walking is a good choice. You also may want to do other activities, such as running, swimming, cycling, or playing tennis or team sports. · Take a fiber supplement, such as Citrucel or Metamucil, every day. Read and follow all instructions on the label. · Schedule time each day for a bowel movement. A daily routine may help. Take your time having a bowel movement, but don't sit for more than 10 minutes at a time.  And don't strain too much.  · Support your feet with a small step stool when you sit on the toilet. This helps flex your hips and places your pelvis in a squatting position. · Your doctor may recommend an over-the-counter laxative to relieve your constipation. Examples are Milk of Magnesia and MiraLax. Read and follow all instructions on the label. Do not use laxatives on a long-term basis. When should you call for help? Call your doctor now or seek immediate medical care if:    · You have new or worse belly pain.     · You have new or worse nausea or vomiting.     · You have blood in your stools. Watch closely for changes in your health, and be sure to contact your doctor if:    · Your constipation is getting worse.     · You do not get better as expected. Where can you learn more? Go to http://www.downey.com/  Enter P343 in the search box to learn more about \"Constipation: Care Instructions. \"  Current as of: July 1, 2021               Content Version: 13.2  © 2006-2022 Healthwise, Incorporated. Care instructions adapted under license by [employer] (which disclaims liability or warranty for this information). If you have questions about a medical condition or this instruction, always ask your healthcare professional. [address] [address] any warranty or liability for your use of this information.

## 2022-03-18 PROBLEM — D12.6 ADENOMATOUS COLON POLYP: Status: ACTIVE | Noted: 2022-02-14

## 2022-03-18 PROBLEM — D12.6 ADENOMATOUS POLYP OF COLON: Status: ACTIVE | Noted: 2022-02-14

## 2022-03-19 PROBLEM — D12.4 ADENOMATOUS POLYP OF DESCENDING COLON: Status: ACTIVE | Noted: 2022-01-13

## 2022-03-31 ENCOUNTER — OFFICE VISIT (OUTPATIENT)
Dept: SURGERY | Age: 77
End: 2022-03-31
Payer: MEDICARE

## 2022-03-31 VITALS
DIASTOLIC BLOOD PRESSURE: 62 MMHG | SYSTOLIC BLOOD PRESSURE: 118 MMHG | HEIGHT: 63 IN | TEMPERATURE: 98 F | WEIGHT: 93.4 LBS | RESPIRATION RATE: 18 BRPM | HEART RATE: 93 BPM | BODY MASS INDEX: 16.55 KG/M2

## 2022-03-31 DIAGNOSIS — D12.3 ADENOMATOUS POLYP OF TRANSVERSE COLON: Primary | ICD-10-CM

## 2022-03-31 DIAGNOSIS — Z09 POSTOPERATIVE EXAMINATION: ICD-10-CM

## 2022-03-31 PROCEDURE — 99024 POSTOP FOLLOW-UP VISIT: CPT | Performed by: PHYSICIAN ASSISTANT

## 2022-03-31 NOTE — PATIENT INSTRUCTIONS
Learning About the Diet for Swallowing Problems  What are swallowing problems? Difficulty swallowing is also called dysphagia (say \"beb-OMV-[id number]-[id number]\"). It is most often a sign of a problem with your throat or esophagus. This is the tube that moves food and liquids from the back of your mouth to your stomach. Trouble swallowing can occur when the muscles and nerves that move food through the throat and esophagus are not working right. To help you swallow food, your doctor or speech therapist may advise a special dysphagia diet for you. Why is a special diet important? A dysphagia diet can help you handle some problems that can occur when it's hard to swallow food and liquids easily. These problems can include:  · Malnutrition. This means you aren't getting enough healthy foods to keep your body working well. · Dehydration. This means you aren't getting enough liquids to keep your body healthy. · Aspiration. This means that food, liquid, or saliva goes down your windpipe (trachea) into your lungs, instead of down your esophagus to your stomach. This can lead to aspiration pneumonia, which is an inflammation of the lungs. What is the dysphagia diet? In the dysphagia diet, you change the foods you eat and the liquids you drink to make it easier to swallow them. You may:  · Change the texture of the foods you eat. Your doctor or speech therapist may advise you to eat one of these types of foods:  ? Easy-to-chew foods. These are foods that are soft or tender. ? Soft and bite-sized foods. These are soft foods that have been cut into small pieces. ? Minced and moist foods. These are very soft, small, and moist lumps of food that need very little chewing. ? Pureed foods. These are foods that have been blended smooth. The puree must be thick enough to hold its shape on a spoon. These foods don't need to be chewed. ? Liquidized foods.  These are foods that have been blended smooth but are not as thick as pureed foods. You can drink them from a cup. · Thicken the liquids you drink. Your doctor or speech therapist will tell you what kind of thickener to use and how thick to make the liquids. ? Slightly thick liquids. These are thicker than water but can flow through a straw. ? Mildly thick liquids. These can be sipped from a cup but take some effort to drink with a straw. ? Moderately thick liquids. These liquids are thick enough to drink from a cup or from a spoon. But they are hard to drink through a wide straw. ? Extremely thick liquids. These are thick enough to hold their shape on a spoon. They are too thick to drink from a cup or suck through a straw. Your speech therapist will help you learn exercises to train your muscles to work together so you can swallow. You may also need to learn how to position your body or how to put food in your mouth to be able to swallow better. Follow-up care is a key part of your treatment and safety. Be sure to make and go to all appointments, and call your doctor if you are having problems. It's also a good idea to know your test results and keep a list of the medicines you take. Where can you learn more? Go to http://www.gray.com/  Enter W187 in the search box to learn more about \"Learning About the Diet for Swallowing Problems. \"  Current as of: July 1, 2021               Content Version: 13.2  © 5539-4875 Healthwise, Incorporated. Care instructions adapted under license by [employer] (which disclaims liability or warranty for this information). If you have questions about a medical condition or this instruction, always ask your healthcare professional. [address] [address] any warranty or liability for your use of this information.

## 2022-03-31 NOTE — PROGRESS NOTES
Subjective:      Pearl Mclaughlin is a 77 y.o. female presents for postop care 6 weeks following Laparoscopic left colectomy & Laparoscopic mobilization of splenic flexure for Adenomatous polyp of transverse colon by Dr. Rob Wooten on 2022. Ms. Arcelia Melissa states that overall she is doing pretty well. Occassionally, food will get stuck but then passes. She denies vomiting. Tries to eat frequent small meals. Appetite is normal. Eating a regular diet with difficulty as noted above. Bowel movements are regular. The patient is voiding without difficulty. The patient is not having any pain. .    Patient has an advanced directive: NO  Ms. Arcelia Melissa has a reminder for a \"due or due soon\" health maintenance. I have asked that she contact her primary care provider for follow-up on this health maintenance. Objective:     Visit Vitals  /62 (BP 1 Location: Left arm, BP Patient Position: Sitting, BP Cuff Size: Small adult)   Pulse 93   Temp 98 °F (36.7 °C) (Oral)   Resp 18   Ht 5' 3\" (1.6 m)   Wt 93 lb 6.4 oz (42.4 kg)   BMI 16.55 kg/m²       General:  alert, cooperative, no distress, appears stated age. Thin   Pulm: No resp distress, CTA bilat and normal respiratory effort   Abdomen:   soft, bowel sounds active, non-tender   Incisions: healing well, no drainage, no erythema, no hernia, no seroma, no swelling, no dehiscence, incisions well approximated     Assessment:     Doing well postoperatively. ICD-10-CM ICD-9-CM    1. Adenomatous polyp of transverse colon  D12.3 211.3    2. Postoperative examination  Z09 V67.00        Plan/Recommendations/Medical Decision Makin. Continue soft diet as tolerated  2. Follow-up with Dr. Courtney Badillo for repeat colonoscopy & endoscopy--given phone number  3. RTC PRN    Ms. Arcelia Melissa & son verbalized understanding and questions were answered to the best of my knowledge and ability.     We discussed the importance of proper diet and 30 minutes/day of proper exercise. Dysphagia diet educational materials were provided. Thank you for allowing us to participate in the care of your patient.     > 25 minutes were spent with patient with greater than 50% of that time spent face to face counseling    Zahraa Gan [facility]  3/31/2022

## 2022-03-31 NOTE — PROGRESS NOTES
1. Have you been to the ER, urgent care clinic since your last visit? Hospitalized since your last visit? No    2. Have you seen or consulted any other health care providers outside of the [address] [place] [address] since your last visit? Include any pap smears or colon screening.  No

## 2022-05-17 ENCOUNTER — TELEPHONE (OUTPATIENT)
Dept: FAMILY MEDICINE CLINIC | Age: 77
End: 2022-05-17

## 2022-05-17 NOTE — TELEPHONE ENCOUNTER
Tiffanie Jones called to find out more information about the Home Based program.  She said that she lives about a mile from the [facility].   # [phone]

## 2022-05-19 NOTE — TELEPHONE ENCOUNTER
This nurse spoke with patient. She says she is looking for a new PCP, she prefers someone in the [employer]. She is not homebound, so can go out to a PCP office. This nurse explained that our [address] practice sees patients that are homebound. Patient lives near [facility]. This nurse gave patient the names and numbers for [facility].

## 2022-06-16 ENCOUNTER — OFFICE VISIT (OUTPATIENT)
Dept: CARDIOLOGY CLINIC | Age: 77
End: 2022-06-16
Payer: MEDICARE

## 2022-06-16 VITALS
WEIGHT: 93 LBS | RESPIRATION RATE: 18 BRPM | HEIGHT: 62 IN | BODY MASS INDEX: 17.11 KG/M2 | SYSTOLIC BLOOD PRESSURE: 88 MMHG | DIASTOLIC BLOOD PRESSURE: 48 MMHG

## 2022-06-16 DIAGNOSIS — R06.02 SOB (SHORTNESS OF BREATH): ICD-10-CM

## 2022-06-16 DIAGNOSIS — D12.4 ADENOMATOUS POLYP OF DESCENDING COLON: Primary | ICD-10-CM

## 2022-06-16 PROCEDURE — G0463 HOSPITAL OUTPT CLINIC VISIT: HCPCS | Performed by: SPECIALIST

## 2022-06-16 PROCEDURE — 1101F PT FALLS ASSESS-DOCD LE1/YR: CPT | Performed by: SPECIALIST

## 2022-06-16 PROCEDURE — G8419 CALC BMI OUT NRM PARAM NOF/U: HCPCS | Performed by: SPECIALIST

## 2022-06-16 PROCEDURE — G8536 NO DOC ELDER MAL SCRN: HCPCS | Performed by: SPECIALIST

## 2022-06-16 PROCEDURE — 1090F PRES/ABSN URINE INCON ASSESS: CPT | Performed by: SPECIALIST

## 2022-06-16 PROCEDURE — 99214 OFFICE O/P EST MOD 30 MIN: CPT | Performed by: SPECIALIST

## 2022-06-16 PROCEDURE — G8400 PT W/DXA NO RESULTS DOC: HCPCS | Performed by: SPECIALIST

## 2022-06-16 PROCEDURE — G8427 DOCREV CUR MEDS BY ELIG CLIN: HCPCS | Performed by: SPECIALIST

## 2022-06-16 PROCEDURE — 3017F COLORECTAL CA SCREEN DOC REV: CPT | Performed by: SPECIALIST

## 2022-06-16 PROCEDURE — 1123F ACP DISCUSS/DSCN MKR DOCD: CPT | Performed by: SPECIALIST

## 2022-06-16 PROCEDURE — 93010 ELECTROCARDIOGRAM REPORT: CPT | Performed by: SPECIALIST

## 2022-06-16 PROCEDURE — G8510 SCR DEP NEG, NO PLAN REQD: HCPCS | Performed by: SPECIALIST

## 2022-06-16 PROCEDURE — 93005 ELECTROCARDIOGRAM TRACING: CPT | Performed by: SPECIALIST

## 2022-06-16 RX ORDER — CYCLOBENZAPRINE HCL 10 MG
10 TABLET ORAL
COMMUNITY
Start: 2022-03-15

## 2022-06-16 RX ORDER — DICYCLOMINE HYDROCHLORIDE 20 MG/1
20 TABLET ORAL 2 TIMES DAILY
COMMUNITY
Start: 2022-03-15 | End: 2022-09-16

## 2022-06-16 NOTE — PROGRESS NOTES
[address] [facility] CARDIOLOGY          [facility]                                                            OFFICE NOTE        Brianda Castellanos M.D.,MARCOS            Bill Karimi   1945  [id number]    Date/Time:  6/16/202210:49 AM            SUBJECTIVE:  Elevated historian. Apparently some shortness of breath but unable to pinpoint exactly all that. She has undergone surgery in February for colonic polyp    She is me no history of the recurrent bleeding at this time. No chest pain reported no palpitations. She has undergone laparoscopic left colectomy in February thousand 22 for a adenomatous colon polyp     Assessment/Plan    1. Paroxysmal atrial fibrillation: She gives no history of palpitations. Her EKG today reveals a normal sinus rhythm nonspecific ST segment abnormalities (possibly digoxin effect) and PACs. Continue combination of Toprol and digoxin at this time. Digoxin was decreased at last office visit and bradycardia. This will not be increased at this point. To be noted she was started on Eliquis 2.5 mg twice a day in [place] by her cardiologist this was stopped secondary to significant ecchymosis. She is currently on aspirin alone 81 mg daily but both her and her son are fully aware of the potential for CVA on aspirin alone in case atrial fibrillation should recur. To be noted she has undergone event monitoring ocular thousand 21 which failed to reveal any paroxysmal atrial fibrillation. Watchman procedure was also discussed with her. 2.  Left hemicolectomy:in February 2022. This was done for adenomatous colonic polyp. Severely anemic at that time. 3.  Shortness of breath: Very difficult to pinpoint her shortness of breath. Anemia may be in part responsible. Proceed with CBC and CMP. Proceed with limited echocardiogram as well.   To be noted that she did have a echocardiogram in September thousand 21 which showed a normal ejection fraction. Her blood pressure is at the lower limits of normal.  She has had no syncopal presyncopal episode at this time. I am going to ask her to start taking the Toprol at bedtime. Cholesterol closely followed by primary care physician. See her back in 4 to 6 weeks above-mentioned test completed. HPI   Very pleasant 73 years old lady with a past medical history remarkable for paroxysmal atrial fibrillation which she tells me she has been having since the 46s.     She has recently relocated from [place] to [place] where her family is and she is currently living with her son.     She has been followed by cardiology in [place].  She tells me that she has had an episode of congestive heart failure many years ago possibly related to atrial fibrillation rapid ventricular response which was then resolved by echo 2 or 3 years ago.     She also has a history of gastroesophageal reflux disease.     She quit smoking 2011.  She does not drink. Wolfgang Wharton is not very active on account of unsteady gait.     She has undergone laparoscopic left colectomy in February thousand 22 for a adenomatous colon polyp                CARDIAC STUDIES        09/07/21    ECHO ADULT FOLLOW-UP OR LIMITED 09/10/2021 9/10/2021    Interpretation Summary  · Limited study for the assessment of ejection fraction and pulmonary pressures. · Image quality for this study was technically difficult due to limited acoustic windows. · LV: Estimated LVEF is 55 - 60%. Visually measured ejection fraction. Normal cavity size, wall thickness and systolic function (ejection fraction normal). Wall motion: normal.  · PA: Pulmonary arterial systolic pressure is 30 mmHg. · TV: Mild tricuspid valve regurgitation is present.     Signed by: Brandt Aldridge MD on 9/10/2021  7:58 AM                              EKG Results     Procedure 720 Value Units Date/Time    AMB POC EKG ROUTINE W/ 12 LEADS, INTER & REP [[id number]] Resulted: 06/16/22 1037    Order Status: Completed Updated: 06/16/22 1041              IMAGING      MRI Results (most recent):  No results found for this or any previous visit. CT Results (most recent):  No results found for this or any previous visit. XR Results (most recent):  Results from Hospital Encounter encounter on 09/04/21    XR CHEST PORT    Narrative  PORTABLE CHEST RADIOGRAPH/S: 9/4/2021 6:03 PM    INDICATION: Dyspnea, cough. COMPARISON: None. TECHNIQUE: Portable frontal upright radiograph/s of the chest.    FINDINGS:  There is biapical pulmonary scarring and volume loss. Numerous calcified  mediastinal lymph nodes are consistent with old granulomatous disease. There are  a few, tiny, scattered, calcified pulmonary granulomas as well. Probable pleural  calcification (right upper lung field, left hemidiaphragm) suggests old asbestos  exposure. The radiograph is rotated to the right, obscuring the central airways. No pneumothorax or pleural effusion. Impression  No acute process. Past Medical History:   Diagnosis Date    Adenoma of colon     Anemia     Arthritis     Atrial fibrillation (HCC)     Bloating     Chronic constipation     Chronic pain     back     COPD (chronic obstructive pulmonary disease) (HCC)     GERD (gastroesophageal reflux disease)     [place] (hard of hearing)     Hyponatremia     caused seizures x 2 per pt    Indigestion     Osteoporosis     Seizures ([address] 7/2020, 9/2020    x 2      Past Surgical History:   Procedure Laterality Date    COLONOSCOPY N/A 12/16/2021    COLONOSCOPY   :- performed by Yomi Dash MD at [place] [address] N/A 2/14/2022    .  performed by Luciano Santillan [facility] at [facility] MAIN OR    [facility]    HX [facility]    HX HIP REPLACEMENT Left     pt stated hip was pinned, not replaced    HX HIP REPLACEMENT Right     pt stated hip was pinned, not replaced    HX HYSTERECTOMY      HX ORTHOPAEDIC  1990, 2011    bilateral hip fractures     HX ORTHOPAEDIC Left 2020    femur fracture    HX TONSILLECTOMY       Social History     Tobacco Use    Smoking status: Former Smoker     Packs/day: 1.00     Years: 20.00     Pack years: 20.00     Quit date: 12/18/2011     Years since quitting: 10.5    Smokeless tobacco: Never Used   Vaping Use    Vaping Use: Never used   Substance Use Topics    Alcohol use: Never    Drug use: Never     Family History   Problem Relation Age of Onset    Heart Disease Mother     Heart Disease Father     Liver Disease Father     Alcohol abuse Father     Anesth Problems Neg Hx      Allergies   Allergen Reactions    Cefuroxime Hives    Hydrocodone Nausea and Vomiting    Oxycodone Nausea and Vomiting    Penicillins Hives     Patient screened for any delayed non-IgE-mediated reaction to PCN.         Patient notes the following:    No delayed non-IgE-mediated reaction to PCN    Hives 1969               Visit Vitals  BP (!) 88/48 (BP 1 Location: Left upper arm, BP Patient Position: Sitting, BP Cuff Size: Adult)   Resp 18   Ht 5' 2\" (1.575 m)   Wt 93 lb (42.2 kg)   BMI 17.01 kg/m²         Last 3 Recorded Weights in this Encounter    06/16/22 1028   Weight: 93 lb (42.2 kg)            Review of Systems:   Pertinent items are noted in the History of Present Illness. Neck: no JVD  Heart: regular rate and rhythm  Lungs: clear to auscultation bilaterally  Abdomen: soft, non-tender. Bowel sounds normal. No masses,  no organomegaly      Current Outpatient Medications on File Prior to Visit   Medication Sig Dispense Refill    cyclobenzaprine (FLEXERIL) 10 mg tablet Take 10 mg by mouth three (3) times daily as needed.  dicyclomine (BENTYL) 20 mg tablet Take 20 mg by mouth.  docusate sodium (COLACE) 100 mg capsule Take 1 Capsule by mouth two (2) times a day.  30 Capsule 0    aspirin (ASPIRIN) 325 mg tablet Take 325 mg by mouth daily.      multivitamin (ONE A DAY) tablet Take 1 Tablet by mouth daily.  acetaminophen (TYLENOL) 325 mg tablet Take 650 mg by mouth every four (4) hours as needed for Pain.  digoxin (LANOXIN) 0.125 mg tablet Take 1 Tablet by mouth daily. (Patient taking differently: Take 0.125 mg by mouth Every morning.) 90 Tablet 1    albuterol (PROVENTIL HFA, VENTOLIN HFA, PROAIR HFA) 90 mcg/actuation inhaler Take 2 Puffs by inhalation every six (6) hours as needed.  butalbital-acetaminophen-caffeine (FIORICET, ESGIC) -40 mg per tablet Take 1-2 Tablets by mouth every six (6) hours as needed.  lactulose (CHRONULAC) 10 gram/15 mL solution Take 20 g by mouth two (2) times daily as needed.  metoprolol succinate (TOPROL-XL) 25 mg XL tablet Take 25 mg by mouth Every morning.  pantoprazole (PROTONIX) 40 mg tablet Take 40 mg by mouth daily.  butalbital-acetaminophen-caffeine (FIORICET, ESGIC) -40 mg per tablet Take 1-2 Tablets by mouth every six (6) hours as needed for Headache. (Patient not taking: Reported on 3/31/2022) 40 Tablet 0    promethazine (PHENERGAN) 12.5 mg tablet Take 1 Tablet by mouth every six (6) hours as needed for Nausea. (Patient not taking: Reported on 3/31/2022) 20 Tablet 0     No current facility-administered medications on file prior to visit. Kristina Pelayo. Arvin Martin had no medications administered during this visit. Current Outpatient Medications   Medication Sig    cyclobenzaprine (FLEXERIL) 10 mg tablet Take 10 mg by mouth three (3) times daily as needed.  dicyclomine (BENTYL) 20 mg tablet Take 20 mg by mouth.  docusate sodium (COLACE) 100 mg capsule Take 1 Capsule by mouth two (2) times a day.  aspirin (ASPIRIN) 325 mg tablet Take 325 mg by mouth daily.  multivitamin (ONE A DAY) tablet Take 1 Tablet by mouth daily.  acetaminophen (TYLENOL) 325 mg tablet Take 650 mg by mouth every four (4) hours as needed for Pain.     digoxin (LANOXIN) 0.125 mg tablet Take 1 Tablet by mouth daily. (Patient taking differently: Take 0.125 mg by mouth Every morning.)    albuterol (PROVENTIL HFA, VENTOLIN HFA, PROAIR HFA) 90 mcg/actuation inhaler Take 2 Puffs by inhalation every six (6) hours as needed.  butalbital-acetaminophen-caffeine (FIORICET, ESGIC) -40 mg per tablet Take 1-2 Tablets by mouth every six (6) hours as needed.  lactulose (CHRONULAC) 10 gram/15 mL solution Take 20 g by mouth two (2) times daily as needed.  metoprolol succinate (TOPROL-XL) 25 mg XL tablet Take 25 mg by mouth Every morning.  pantoprazole (PROTONIX) 40 mg tablet Take 40 mg by mouth daily.  butalbital-acetaminophen-caffeine (FIORICET, ESGIC) -40 mg per tablet Take 1-2 Tablets by mouth every six (6) hours as needed for Headache. (Patient not taking: Reported on 3/31/2022)    promethazine (PHENERGAN) 12.5 mg tablet Take 1 Tablet by mouth every six (6) hours as needed for Nausea. (Patient not taking: Reported on 3/31/2022)     No current facility-administered medications for this visit. No results found for: CHOL, CHOLPOCT, CHOLX, CHLST, CHOLV, HDL, HDLPOC, HDLP, LDL, LDLCPOC, LDLC, DLDLP, VLDLC, VLDL, TGLX, TRIGL, TRIGP, TGLPOCT, CHHD, CHHDX    Lab Results   Component Value Date/Time    Sodium 142 02/16/2022 06:09 AM    Potassium 3.5 02/16/2022 06:09 AM    Chloride 115 (H) 02/16/2022 06:09 AM    CO2 24 02/16/2022 06:09 AM    Anion gap 3 (L) 02/16/2022 06:09 AM    Glucose 91 02/16/2022 06:09 AM    BUN 9 02/16/2022 06:09 AM    Creatinine 0.66 02/16/2022 06:09 AM    BUN/Creatinine ratio 14 02/16/2022 06:09 AM    GFR est AA >60 02/16/2022 06:09 AM    GFR est non-AA >60 02/16/2022 06:09 AM    Calcium 8.0 (L) 02/16/2022 06:09 AM       Lab Results   Component Value Date/Time    ALT (SGPT) 16 02/04/2022 11:52 AM    Alk.  phosphatase 106 02/04/2022 11:52 AM    Bilirubin, total 0.3 02/04/2022 11:52 AM       Lab Results   Component Value Date/Time    WBC 7.3 02/16/2022 06:09 AM    HGB 7.3 (L) 02/16/2022 06:09 AM    HCT 23.8 (L) 02/16/2022 06:09 AM    PLATELET 036 27/57/3083 06:09 AM    MCV 93.3 02/16/2022 06:09 AM       No results found for: TSH, TSH2, TSH3, TSHP, TSHEXT      No results found for: CHOL, CHOLPOCT, CHOLX, CHLST, CHOLV, HDL, HDLP, LDL, LDLC, DLDLP, TGLX, TRIGL, TRIGP, TGLPOCT, NTGLT, CHHD, CHHDX             Please note that this dictation was completed with [employer], the [employer] voice recognition software. Quite often unanticipated grammatical, syntax, homophones, and other interpretative errors are inadvertently transcribed by the computer software. Please disregard these errors. Please excuse any errors that have escaped final proofreading.

## 2022-06-16 NOTE — PROGRESS NOTES
Visit Vitals  BP (!) 88/48 (BP 1 Location: Left upper arm, BP Patient Position: Sitting, BP Cuff Size: Adult)   Resp 18   Ht 5' 2\" (1.575 m)   Wt 93 lb (42.2 kg)   BMI 17.01 kg/m²

## 2022-06-16 NOTE — PATIENT INSTRUCTIONS
Please be sure you are taking your Metoprolol (Toprol XL) at night    Please have lab work and echocardiogram. Follow up in about 4-6 weeks

## 2022-06-22 ENCOUNTER — TELEPHONE (OUTPATIENT)
Dept: CARDIOLOGY CLINIC | Age: 77
End: 2022-06-22

## 2022-06-22 NOTE — TELEPHONE ENCOUNTER
Pt said at her last appointment it was brought up pt could have anemia and so she got blood work done. She is wondering if she could have the results of her labs. Please advise.      [phone]

## 2022-06-23 ENCOUNTER — APPOINTMENT (OUTPATIENT)
Dept: GENERAL RADIOLOGY | Age: 77
End: 2022-06-23
Attending: EMERGENCY MEDICINE
Payer: MEDICARE

## 2022-06-23 ENCOUNTER — TELEPHONE (OUTPATIENT)
Dept: CARDIOLOGY CLINIC | Age: 77
End: 2022-06-23

## 2022-06-23 ENCOUNTER — HOSPITAL ENCOUNTER (EMERGENCY)
Age: 77
Discharge: HOME | End: 2022-06-23
Attending: EMERGENCY MEDICINE
Payer: MEDICARE

## 2022-06-23 VITALS
HEIGHT: 63 IN | WEIGHT: 93 LBS | OXYGEN SATURATION: 99 % | DIASTOLIC BLOOD PRESSURE: 59 MMHG | SYSTOLIC BLOOD PRESSURE: 107 MMHG | BODY MASS INDEX: 16.48 KG/M2 | HEART RATE: 67 BPM | RESPIRATION RATE: 20 BRPM | TEMPERATURE: 98.3 F

## 2022-06-23 DIAGNOSIS — D64.9 ANEMIA, UNSPECIFIED TYPE: Primary | ICD-10-CM

## 2022-06-23 LAB
ALBUMIN SERPL-MCNC: 3.6 G/DL (ref 3.5–5)
ALBUMIN/GLOB SERPL: 1 {RATIO} (ref 1.1–2.2)
ALP SERPL-CCNC: 96 U/L (ref 45–117)
ALT SERPL-CCNC: 12 U/L (ref 12–78)
ANION GAP SERPL CALC-SCNC: 9 MMOL/L (ref 5–15)
AST SERPL-CCNC: 15 U/L (ref 15–37)
ATRIAL RATE: 75 BPM
BASOPHILS # BLD: 0.1 K/UL (ref 0–0.1)
BASOPHILS NFR BLD: 1 % (ref 0–1)
BILIRUB SERPL-MCNC: 0.2 MG/DL (ref 0.2–1)
BUN SERPL-MCNC: 16 MG/DL (ref 6–20)
BUN/CREAT SERPL: 14 (ref 12–20)
CALCIUM SERPL-MCNC: 9.2 MG/DL (ref 8.5–10.1)
CALCULATED P AXIS, ECG09: 76 DEGREES
CALCULATED R AXIS, ECG10: 79 DEGREES
CALCULATED T AXIS, ECG11: 49 DEGREES
CHLORIDE SERPL-SCNC: 103 MMOL/L (ref 97–108)
CO2 SERPL-SCNC: 22 MMOL/L (ref 21–32)
COMMENT, HOLDF: NORMAL
CREAT SERPL-MCNC: 1.18 MG/DL (ref 0.55–1.02)
DIAGNOSIS, 93000: NORMAL
DIFFERENTIAL METHOD BLD: ABNORMAL
EOSINOPHIL # BLD: 0.2 K/UL (ref 0–0.4)
EOSINOPHIL NFR BLD: 2 % (ref 0–7)
ERYTHROCYTE [DISTWIDTH] IN BLOOD BY AUTOMATED COUNT: 21.9 % (ref 11.5–14.5)
GLOBULIN SER CALC-MCNC: 3.6 G/DL (ref 2–4)
GLUCOSE SERPL-MCNC: 97 MG/DL (ref 65–100)
HCT VFR BLD AUTO: 23.7 % (ref 35–47)
HGB BLD-MCNC: 6.9 G/DL (ref 11.5–16)
HISTORY CHECKED?,CKHIST: NORMAL
IMM GRANULOCYTES # BLD AUTO: 0 K/UL (ref 0–0.04)
IMM GRANULOCYTES NFR BLD AUTO: 0 % (ref 0–0.5)
LYMPHOCYTES # BLD: 1.7 K/UL (ref 0.8–3.5)
LYMPHOCYTES NFR BLD: 22 % (ref 12–49)
MCH RBC QN AUTO: 22.8 PG (ref 26–34)
MCHC RBC AUTO-ENTMCNC: 29.1 G/DL (ref 30–36.5)
MCV RBC AUTO: 78.2 FL (ref 80–99)
MONOCYTES # BLD: 0.9 K/UL (ref 0–1)
MONOCYTES NFR BLD: 12 % (ref 5–13)
NEUTS SEG # BLD: 4.7 K/UL (ref 1.8–8)
NEUTS SEG NFR BLD: 63 % (ref 32–75)
NRBC # BLD: 0 K/UL (ref 0–0.01)
NRBC BLD-RTO: 0 PER 100 WBC
P-R INTERVAL, ECG05: 126 MS
PLATELET # BLD AUTO: 501 K/UL (ref 150–400)
PMV BLD AUTO: 9.2 FL (ref 8.9–12.9)
POTASSIUM SERPL-SCNC: 4.5 MMOL/L (ref 3.5–5.1)
PROT SERPL-MCNC: 7.2 G/DL (ref 6.4–8.2)
Q-T INTERVAL, ECG07: 390 MS
QRS DURATION, ECG06: 82 MS
QTC CALCULATION (BEZET), ECG08: 435 MS
RBC # BLD AUTO: 3.03 M/UL (ref 3.8–5.2)
RBC MORPH BLD: ABNORMAL
SAMPLES BEING HELD,HOLD: NORMAL
SODIUM SERPL-SCNC: 134 MMOL/L (ref 136–145)
VENTRICULAR RATE, ECG03: 75 BPM
WBC # BLD AUTO: 7.6 K/UL (ref 3.6–11)

## 2022-06-23 PROCEDURE — 74011250637 HC RX REV CODE- 250/637: Performed by: EMERGENCY MEDICINE

## 2022-06-23 PROCEDURE — 71045 X-RAY EXAM CHEST 1 VIEW: CPT

## 2022-06-23 PROCEDURE — 36430 TRANSFUSION BLD/BLD COMPNT: CPT

## 2022-06-23 PROCEDURE — 85025 COMPLETE CBC W/AUTO DIFF WBC: CPT

## 2022-06-23 PROCEDURE — 99285 EMERGENCY DEPT VISIT HI MDM: CPT

## 2022-06-23 PROCEDURE — 93005 ELECTROCARDIOGRAM TRACING: CPT

## 2022-06-23 PROCEDURE — P9016 RBC LEUKOCYTES REDUCED: HCPCS

## 2022-06-23 PROCEDURE — 86920 COMPATIBILITY TEST SPIN: CPT

## 2022-06-23 PROCEDURE — 36415 COLL VENOUS BLD VENIPUNCTURE: CPT

## 2022-06-23 PROCEDURE — 80053 COMPREHEN METABOLIC PANEL: CPT

## 2022-06-23 PROCEDURE — 86900 BLOOD TYPING SEROLOGIC ABO: CPT

## 2022-06-23 RX ORDER — BUTALBITAL, ACETAMINOPHEN AND CAFFEINE 50; 325; 40 MG/1; MG/1; MG/1
1 TABLET ORAL ONCE
Status: COMPLETED | OUTPATIENT
Start: 2022-06-23 | End: 2022-06-23

## 2022-06-23 RX ORDER — SODIUM CHLORIDE 9 MG/ML
250 INJECTION, SOLUTION INTRAVENOUS AS NEEDED
Status: DISCONTINUED | OUTPATIENT
Start: 2022-06-23 | End: 2022-06-23 | Stop reason: HOSPADM

## 2022-06-23 RX ADMIN — BUTALBITAL, ACETAMINOPHEN, AND CAFFEINE 1 TABLET: 50; 325; 40 TABLET ORAL at 12:41

## 2022-06-23 NOTE — ED PROVIDER NOTES
HPI   61-year-old female with a past medical history of anemia, atrial fibrillation no longer on blood thinners, COPD, GERD, and a prior partial colectomy who presents to the emergency department due to anemia. Patient had her scheduled follow-up appointment with her cardiologist about a week ago. She says she has been lightheaded and short of breath for the last 4 months. Her symptoms have progressively gotten worse. Her cardiologist noticed that she had not had repeat labs after having a hemoglobin of 7.5 and so she had labs drawn that showed a hemoglobin of 6.3. When this result came back she was told to go to the emergency department due to a blood transfusion. Her symptoms have not gotten worse acutely. She says she feels very fatigued and weak when she walks around. She denies any blood in her stool or dark stools. She has iron supplementation at home but she does not take this regularly. She denies fevers or chills. No cough or chest pain. Past Medical History:   Diagnosis Date    Adenoma of colon     Anemia     Arthritis     Atrial fibrillation (HCC)     Bloating     Chronic constipation     Chronic pain     back     COPD (chronic obstructive pulmonary disease) (HCC)     GERD (gastroesophageal reflux disease)     [place] (hard of hearing)     Hyponatremia     caused seizures x 2 per pt    Indigestion     Osteoporosis     Seizures ([address] 7/2020, 9/2020    x 2        Past Surgical History:   Procedure Laterality Date    COLONOSCOPY N/A 12/16/2021    COLONOSCOPY   :- performed by Michaela Gee MD at [facility] N/A 2/14/2022    .  performed by Parveen Landon MD at [address] [place] [address] [address] [facility]    HX [facility]    HX HIP REPLACEMENT Left     pt stated hip was pinned, not replaced    HX HIP REPLACEMENT Right     pt stated hip was pinned, not replaced    HX HYSTERECTOMY      [address] [facility]    bilateral hip fractures     HX ORTHOPAEDIC Left 2020    femur fracture    HX TONSILLECTOMY           Family History:   Problem Relation Age of Onset    Heart Disease Mother     Heart Disease Father     Liver Disease Father     Alcohol abuse Father     Anesth Problems Neg Hx        Social History     Socioeconomic History    Marital status: SINGLE     Spouse name: Not on file    Number of children: Not on file    Years of education: Not on file    Highest education level: Not on file   Occupational History    Not on file   Tobacco Use    Smoking status: Former Smoker     Packs/day: 1.00     Years: 20.00     Pack years: 20.00     Quit date: 12/18/2011     Years since quitting: 10.5    Smokeless tobacco: Never Used   Vaping Use    Vaping Use: Never used   Substance and Sexual Activity    Alcohol use: Never    Drug use: Never    Sexual activity: Not Currently   Other Topics Concern    Not on file   Social History Narrative    Not on file     Social Determinants of Health     Financial Resource Strain:     Difficulty of Paying Living Expenses: Not on file   Food Insecurity:     Worried About [address] in the Last Year: Not on file    [address] in the Last Year: Not on file   Transportation Needs:     Lack of Transportation (Medical): Not on file    Lack of Transportation (Non-Medical):  Not on file   Physical Activity:     Days of Exercise per Week: Not on file    Minutes of Exercise per Session: Not on file   Stress:     Feeling of Stress : Not on file   Social Connections:     Frequency of Communication with Friends and Family: Not on file    Frequency of Social Gatherings with Friends and Family: Not on file    Attends [employer] Services: Not on file    Active Member of Clubs or Organizations: Not on file    Attends Club or Organization Meetings: Not on file    Marital Status: Not on file   Intimate Partner Violence:     Fear of Current or Ex-Partner: Not on file   [employer] Abused: Not on file    Physically Abused: Not on file    Sexually Abused: Not on file   Housing Stability:     Unable to Pay for Housing in the Last Year: Not on file    Number of Places Lived in the Last Year: Not on file    Unstable Housing in the Last Year: Not on file         ALLERGIES: Cefuroxime, Hydrocodone, Oxycodone, and Penicillins    Review of Systems   A complete review of systems was performed and all systems reviewed are negative unless otherwise documented in the HPI. Vitals:    06/23/22 1020   BP: (!) 122/58   Pulse: 88   Resp: 16   Temp: 98.3 °F (36.8 °C)   SpO2: 100%   Weight: 42.2 kg (93 lb)   Height: 5' 3\" (1.6 m)            Physical Exam  Constitutional:       Comments: Chronically ill-appearing elderly woman. Appears older than stated age. Not acutely distressed. HENT:      Mouth/Throat:      Comments: Moist mucous membranes  Eyes:      Extraocular Movements: Extraocular movements intact. Comments: Pale conjunctiva   Neck:      Comments: No JVD  Cardiovascular:      Comments: Regular rate and rhythm. Delayed capillary refill but strong distal pulses in the upper extremities. Pulmonary:      Effort: Pulmonary effort is normal. No respiratory distress. Breath sounds: Normal breath sounds. No wheezing or rales. Abdominal:      General: There is no distension. Palpations: Abdomen is soft. Tenderness: There is no abdominal tenderness. Musculoskeletal:         General: No deformity. Normal range of motion. Cervical back: Normal range of motion. Right lower leg: No edema. Left lower leg: No edema. Skin:     General: Skin is warm and dry. Neurological:      Comments: Awake and alert. GCS 15          MDM  ED Course as of 06/23/22 1307   Thu Jun 23, 2022   1119 EKG shows normal sinus rhythm with a rate of 75 and QTC of 435. She has some findings of digoxin use but no concerning ST elevations or depressions.  [MM]      ED Course User Index  [MM] Sophie Wilson MD   81-year-old female presents with the above chief complaint. Vital signs stable. She appears pale. Rectal exam deferred given her symptoms have been slow and progressive. She had a low MCV on her blood work recently which I think indicates iron deficiency anemia. Labs will be repeated and she will be transfused. Low suspicion for acute GI blood loss or active bleeding. I have instructed the patient she should continue her iron supplementation on a daily basis. Labs show a hemoglobin of 6.9 today. No elevation in her BUN to suggest an upper GI bleed. Patient did have a few episodes of hypotension which essentially resolved without intervention. Blood pressures are doing fine now. She is getting her blood transfusion. She will be discharged after her blood transfusion is complete. Patient in stable condition.     Procedures

## 2022-06-23 NOTE — ED TRIAGE NOTES
Pt to ED from home via EMS for severe anemia needing blood transfusion per PCP. Pt complains of SOB, fatigue, lightheadedness.  Denies CP, abd pain, N/V.

## 2022-06-23 NOTE — TELEPHONE ENCOUNTER
Discussed with patient results of blood testing particularly significant reduction in hemoglobin at 6.3. To be noted the patient did have low global along but disease is lower than usual.    She denies any overt bleeding. Nonetheless I suspect this severe anemia is the cause of her shortness of breath. Will require some transfusions. She is asymptomatic besides shortness of breath. She has had no presyncopal syncopal episode or chest pain. Nonetheless I did ask her to consider proceeding with the emergency room evaluation today for at the latest go to her PCP either today or tomorrow. She decided to proceed to the emergency room today.

## 2022-06-23 NOTE — DISCHARGE INSTRUCTIONS
Please follow-up with your primary provider as soon as possible and start taking the iron supplementation you have at home every day. Return to the emergency department with any other concerns.

## 2022-06-23 NOTE — TELEPHONE ENCOUNTER
TC to pt, ID verified. Dr. Melendez Knows spoke with pt and advised she needs to ER for her low Hgb. Advised her it is chronically low, but is now at 6.3, the lowest it has been in a while and she may need a transfusion. Pt is agreeable and states she will go to [facility]. No further questions.

## 2022-06-23 NOTE — ED NOTES
I have reviewed discharge instructions with the patient. The patient verbalized understanding. Patient provided with education for high iron foods. Awaiting son to come pick patient up. Patient is clear for dc at this time.

## 2022-06-24 LAB
ABO + RH BLD: NORMAL
BLD PROD TYP BPU: NORMAL
BLOOD GROUP ANTIBODIES SERPL: NORMAL
BPU ID: NORMAL
CROSSMATCH RESULT,%XM: NORMAL
SPECIMEN EXP DATE BLD: NORMAL
STATUS OF UNIT,%ST: NORMAL
UNIT DIVISION, %UDIV: 0

## 2022-06-29 ENCOUNTER — APPOINTMENT (OUTPATIENT)
Dept: CT IMAGING | Age: 77
End: 2022-06-29
Attending: EMERGENCY MEDICINE
Payer: MEDICARE

## 2022-06-29 ENCOUNTER — HOSPITAL ENCOUNTER (OUTPATIENT)
Age: 77
Setting detail: OBSERVATION
Discharge: HOME | End: 2022-06-30
Attending: EMERGENCY MEDICINE | Admitting: HOSPITALIST
Payer: MEDICARE

## 2022-06-29 DIAGNOSIS — R09.02 HYPOXEMIA: Primary | ICD-10-CM

## 2022-06-29 DIAGNOSIS — M48.54XS COMPRESSION FRACTURE OF THORACIC VERTEBRA, NON-TRAUMATIC, SEQUELA: ICD-10-CM

## 2022-06-29 DIAGNOSIS — R06.02 SOB (SHORTNESS OF BREATH): ICD-10-CM

## 2022-06-29 DIAGNOSIS — J43.2 CENTRILOBULAR EMPHYSEMA (HCC): ICD-10-CM

## 2022-06-29 PROBLEM — R06.03 RESPIRATORY DISTRESS: Status: ACTIVE | Noted: 2022-06-29

## 2022-06-29 PROBLEM — J96.00 ACUTE RESPIRATORY FAILURE (HCC): Status: ACTIVE | Noted: 2022-06-29

## 2022-06-29 LAB
ALBUMIN SERPL-MCNC: 3.3 G/DL (ref 3.5–5)
ALBUMIN/GLOB SERPL: 1.1 {RATIO} (ref 1.1–2.2)
ALP SERPL-CCNC: 97 U/L (ref 45–117)
ALT SERPL-CCNC: 17 U/L (ref 12–78)
ANION GAP SERPL CALC-SCNC: 11 MMOL/L (ref 5–15)
AST SERPL-CCNC: 18 U/L (ref 15–37)
ATRIAL RATE: 129 BPM
BASOPHILS # BLD: 0.1 K/UL (ref 0–0.1)
BASOPHILS NFR BLD: 1 % (ref 0–1)
BILIRUB SERPL-MCNC: 0.2 MG/DL (ref 0.2–1)
BNP SERPL-MCNC: 1498 PG/ML
BUN SERPL-MCNC: 12 MG/DL (ref 6–20)
BUN/CREAT SERPL: 15 (ref 12–20)
CALCIUM SERPL-MCNC: 8.8 MG/DL (ref 8.5–10.1)
CALCULATED P AXIS, ECG09: 80 DEGREES
CALCULATED R AXIS, ECG10: 70 DEGREES
CALCULATED T AXIS, ECG11: 58 DEGREES
CHLORIDE SERPL-SCNC: 111 MMOL/L (ref 97–108)
CO2 SERPL-SCNC: 19 MMOL/L (ref 21–32)
COMMENT, HOLDF: NORMAL
COVID-19 RAPID TEST, COVR: NOT DETECTED
CREAT SERPL-MCNC: 0.82 MG/DL (ref 0.55–1.02)
DIAGNOSIS, 93000: NORMAL
DIFFERENTIAL METHOD BLD: ABNORMAL
DIGOXIN SERPL-MCNC: 0.5 NG/ML (ref 0.9–2)
EOSINOPHIL # BLD: 0.1 K/UL (ref 0–0.4)
EOSINOPHIL NFR BLD: 2 % (ref 0–7)
ERYTHROCYTE [DISTWIDTH] IN BLOOD BY AUTOMATED COUNT: 24.9 % (ref 11.5–14.5)
FLUAV AG NPH QL IA: NEGATIVE
FLUBV AG NOSE QL IA: NEGATIVE
GLOBULIN SER CALC-MCNC: 3.1 G/DL (ref 2–4)
GLUCOSE SERPL-MCNC: 109 MG/DL (ref 65–100)
HCT VFR BLD AUTO: 28.2 % (ref 35–47)
HGB BLD-MCNC: 8.6 G/DL (ref 11.5–16)
IMM GRANULOCYTES # BLD AUTO: 0.1 K/UL (ref 0–0.04)
IMM GRANULOCYTES NFR BLD AUTO: 1 % (ref 0–0.5)
LYMPHOCYTES # BLD: 1.2 K/UL (ref 0.8–3.5)
LYMPHOCYTES NFR BLD: 19 % (ref 12–49)
MAGNESIUM SERPL-MCNC: 1.9 MG/DL (ref 1.6–2.4)
MCH RBC QN AUTO: 24.5 PG (ref 26–34)
MCHC RBC AUTO-ENTMCNC: 30.5 G/DL (ref 30–36.5)
MCV RBC AUTO: 80.3 FL (ref 80–99)
MONOCYTES # BLD: 0.5 K/UL (ref 0–1)
MONOCYTES NFR BLD: 8 % (ref 5–13)
NEUTS SEG # BLD: 4.3 K/UL (ref 1.8–8)
NEUTS SEG NFR BLD: 69 % (ref 32–75)
NRBC # BLD: 0 K/UL (ref 0–0.01)
NRBC BLD-RTO: 0 PER 100 WBC
P-R INTERVAL, ECG05: 128 MS
PLATELET # BLD AUTO: 436 K/UL (ref 150–400)
PMV BLD AUTO: 8.7 FL (ref 8.9–12.9)
POTASSIUM SERPL-SCNC: 4 MMOL/L (ref 3.5–5.1)
PROT SERPL-MCNC: 6.4 G/DL (ref 6.4–8.2)
Q-T INTERVAL, ECG07: 374 MS
QRS DURATION, ECG06: 80 MS
QTC CALCULATION (BEZET), ECG08: 519 MS
RBC # BLD AUTO: 3.51 M/UL (ref 3.8–5.2)
RBC MORPH BLD: ABNORMAL
SAMPLES BEING HELD,HOLD: NORMAL
SODIUM SERPL-SCNC: 141 MMOL/L (ref 136–145)
SOURCE, COVRS: NORMAL
T4 FREE SERPL-MCNC: 1.2 NG/DL (ref 0.8–1.5)
TROPONIN-HIGH SENSITIVITY: 19 NG/L (ref 0–51)
TROPONIN-HIGH SENSITIVITY: 19 NG/L (ref 0–51)
TROPONIN-HIGH SENSITIVITY: 24 NG/L (ref 0–51)
TROPONIN-HIGH SENSITIVITY: 24 NG/L (ref 0–51)
TROPONIN-HIGH SENSITIVITY: NORMAL NG/L
TSH SERPL DL<=0.05 MIU/L-ACNC: 5.51 UIU/ML (ref 0.36–3.74)
VENTRICULAR RATE, ECG03: 116 BPM
WBC # BLD AUTO: 6.3 K/UL (ref 3.6–11)

## 2022-06-29 PROCEDURE — 84439 ASSAY OF FREE THYROXINE: CPT

## 2022-06-29 PROCEDURE — 80053 COMPREHEN METABOLIC PANEL: CPT

## 2022-06-29 PROCEDURE — 74011000250 HC RX REV CODE- 250: Performed by: NURSE PRACTITIONER

## 2022-06-29 PROCEDURE — 97530 THERAPEUTIC ACTIVITIES: CPT

## 2022-06-29 PROCEDURE — 74011250637 HC RX REV CODE- 250/637: Performed by: HOSPITALIST

## 2022-06-29 PROCEDURE — 96375 TX/PRO/DX INJ NEW DRUG ADDON: CPT

## 2022-06-29 PROCEDURE — 84443 ASSAY THYROID STIM HORMONE: CPT

## 2022-06-29 PROCEDURE — G0378 HOSPITAL OBSERVATION PER HR: HCPCS

## 2022-06-29 PROCEDURE — 74011000636 HC RX REV CODE- 636: Performed by: RADIOLOGY

## 2022-06-29 PROCEDURE — 87804 INFLUENZA ASSAY W/OPTIC: CPT

## 2022-06-29 PROCEDURE — 74011250636 HC RX REV CODE- 250/636: Performed by: HOSPITALIST

## 2022-06-29 PROCEDURE — 97161 PT EVAL LOW COMPLEX 20 MIN: CPT

## 2022-06-29 PROCEDURE — 71275 CT ANGIOGRAPHY CHEST: CPT

## 2022-06-29 PROCEDURE — 36415 COLL VENOUS BLD VENIPUNCTURE: CPT

## 2022-06-29 PROCEDURE — 80162 ASSAY OF DIGOXIN TOTAL: CPT

## 2022-06-29 PROCEDURE — 74011250636 HC RX REV CODE- 250/636: Performed by: EMERGENCY MEDICINE

## 2022-06-29 PROCEDURE — 85025 COMPLETE CBC W/AUTO DIFF WBC: CPT

## 2022-06-29 PROCEDURE — 83880 ASSAY OF NATRIURETIC PEPTIDE: CPT

## 2022-06-29 PROCEDURE — 87635 SARS-COV-2 COVID-19 AMP PRB: CPT

## 2022-06-29 PROCEDURE — 96376 TX/PRO/DX INJ SAME DRUG ADON: CPT

## 2022-06-29 PROCEDURE — 84484 ASSAY OF TROPONIN QUANT: CPT

## 2022-06-29 PROCEDURE — 83735 ASSAY OF MAGNESIUM: CPT

## 2022-06-29 PROCEDURE — 96374 THER/PROPH/DIAG INJ IV PUSH: CPT

## 2022-06-29 PROCEDURE — 93005 ELECTROCARDIOGRAM TRACING: CPT

## 2022-06-29 PROCEDURE — 97165 OT EVAL LOW COMPLEX 30 MIN: CPT

## 2022-06-29 PROCEDURE — 99285 EMERGENCY DEPT VISIT HI MDM: CPT

## 2022-06-29 PROCEDURE — 74011000250 HC RX REV CODE- 250: Performed by: HOSPITALIST

## 2022-06-29 RX ORDER — METOPROLOL SUCCINATE 25 MG/1
25 TABLET, EXTENDED RELEASE ORAL EVERY MORNING
Status: DISCONTINUED | OUTPATIENT
Start: 2022-06-29 | End: 2022-06-30 | Stop reason: HOSPADM

## 2022-06-29 RX ORDER — DOXYCYCLINE HYCLATE 100 MG
100 TABLET ORAL EVERY 12 HOURS
Status: DISCONTINUED | OUTPATIENT
Start: 2022-06-29 | End: 2022-06-30 | Stop reason: HOSPADM

## 2022-06-29 RX ORDER — ENOXAPARIN SODIUM 100 MG/ML
30 INJECTION SUBCUTANEOUS DAILY
Status: DISCONTINUED | OUTPATIENT
Start: 2022-06-30 | End: 2022-06-30 | Stop reason: HOSPADM

## 2022-06-29 RX ORDER — PANTOPRAZOLE SODIUM 40 MG/1
40 TABLET, DELAYED RELEASE ORAL DAILY
Status: DISCONTINUED | OUTPATIENT
Start: 2022-06-29 | End: 2022-06-30 | Stop reason: HOSPADM

## 2022-06-29 RX ORDER — POLYETHYLENE GLYCOL 3350 17 G/17G
17 POWDER, FOR SOLUTION ORAL DAILY PRN
Status: DISCONTINUED | OUTPATIENT
Start: 2022-06-29 | End: 2022-06-30 | Stop reason: HOSPADM

## 2022-06-29 RX ORDER — LIDOCAINE 4 G/100G
1 PATCH TOPICAL EVERY 24 HOURS
Status: DISCONTINUED | OUTPATIENT
Start: 2022-06-29 | End: 2022-06-30 | Stop reason: HOSPADM

## 2022-06-29 RX ORDER — BUTALBITAL, ACETAMINOPHEN AND CAFFEINE 50; 325; 40 MG/1; MG/1; MG/1
1-2 TABLET ORAL
Status: DISCONTINUED | OUTPATIENT
Start: 2022-06-29 | End: 2022-06-30 | Stop reason: HOSPADM

## 2022-06-29 RX ORDER — ACETAMINOPHEN 500 MG
1000 TABLET ORAL
Status: DISPENSED | OUTPATIENT
Start: 2022-06-29 | End: 2022-06-29

## 2022-06-29 RX ORDER — DICYCLOMINE HYDROCHLORIDE 20 MG/1
20 TABLET ORAL
Status: DISCONTINUED | OUTPATIENT
Start: 2022-06-29 | End: 2022-06-30 | Stop reason: HOSPADM

## 2022-06-29 RX ORDER — DOCUSATE SODIUM 100 MG/1
100 CAPSULE, LIQUID FILLED ORAL 2 TIMES DAILY
Status: DISCONTINUED | OUTPATIENT
Start: 2022-06-29 | End: 2022-06-30 | Stop reason: HOSPADM

## 2022-06-29 RX ORDER — ONDANSETRON 2 MG/ML
4 INJECTION INTRAMUSCULAR; INTRAVENOUS
Status: DISCONTINUED | OUTPATIENT
Start: 2022-06-29 | End: 2022-06-30 | Stop reason: HOSPADM

## 2022-06-29 RX ORDER — MAG HYDROX/ALUMINUM HYD/SIMETH 200-200-20
30 SUSPENSION, ORAL (FINAL DOSE FORM) ORAL
Status: DISCONTINUED | OUTPATIENT
Start: 2022-06-29 | End: 2022-06-30 | Stop reason: HOSPADM

## 2022-06-29 RX ORDER — ENOXAPARIN SODIUM 100 MG/ML
40 INJECTION SUBCUTANEOUS DAILY
Status: DISCONTINUED | OUTPATIENT
Start: 2022-06-29 | End: 2022-06-29

## 2022-06-29 RX ORDER — ACETAMINOPHEN 325 MG/1
650 TABLET ORAL
Status: DISCONTINUED | OUTPATIENT
Start: 2022-06-29 | End: 2022-06-30 | Stop reason: HOSPADM

## 2022-06-29 RX ORDER — ACETAMINOPHEN 650 MG/1
650 SUPPOSITORY RECTAL
Status: DISCONTINUED | OUTPATIENT
Start: 2022-06-29 | End: 2022-06-30 | Stop reason: HOSPADM

## 2022-06-29 RX ORDER — SODIUM CHLORIDE 0.9 % (FLUSH) 0.9 %
5-40 SYRINGE (ML) INJECTION EVERY 8 HOURS
Status: DISCONTINUED | OUTPATIENT
Start: 2022-06-29 | End: 2022-06-30 | Stop reason: HOSPADM

## 2022-06-29 RX ORDER — CYCLOBENZAPRINE HCL 10 MG
10 TABLET ORAL
Status: DISCONTINUED | OUTPATIENT
Start: 2022-06-29 | End: 2022-06-30 | Stop reason: HOSPADM

## 2022-06-29 RX ORDER — FENTANYL CITRATE 50 UG/ML
25 INJECTION, SOLUTION INTRAMUSCULAR; INTRAVENOUS ONCE
Status: COMPLETED | OUTPATIENT
Start: 2022-06-29 | End: 2022-06-29

## 2022-06-29 RX ORDER — DIGOXIN 125 MCG
0.12 TABLET ORAL EVERY MORNING
Status: DISCONTINUED | OUTPATIENT
Start: 2022-06-29 | End: 2022-06-30 | Stop reason: HOSPADM

## 2022-06-29 RX ORDER — SODIUM CHLORIDE 0.9 % (FLUSH) 0.9 %
5-40 SYRINGE (ML) INJECTION AS NEEDED
Status: DISCONTINUED | OUTPATIENT
Start: 2022-06-29 | End: 2022-06-30 | Stop reason: HOSPADM

## 2022-06-29 RX ORDER — ONDANSETRON 2 MG/ML
INJECTION INTRAMUSCULAR; INTRAVENOUS
Status: DISPENSED
Start: 2022-06-29 | End: 2022-06-29

## 2022-06-29 RX ORDER — IPRATROPIUM BROMIDE AND ALBUTEROL SULFATE 2.5; .5 MG/3ML; MG/3ML
3 SOLUTION RESPIRATORY (INHALATION)
Status: DISCONTINUED | OUTPATIENT
Start: 2022-06-29 | End: 2022-06-30 | Stop reason: HOSPADM

## 2022-06-29 RX ORDER — ONDANSETRON 2 MG/ML
4 INJECTION INTRAMUSCULAR; INTRAVENOUS
Status: COMPLETED | OUTPATIENT
Start: 2022-06-29 | End: 2022-06-29

## 2022-06-29 RX ORDER — ACETAMINOPHEN 325 MG/1
650 TABLET ORAL
Status: DISCONTINUED | OUTPATIENT
Start: 2022-06-29 | End: 2022-06-30 | Stop reason: SDUPTHER

## 2022-06-29 RX ORDER — PROMETHAZINE HYDROCHLORIDE 25 MG/1
12.5 TABLET ORAL
Status: DISCONTINUED | OUTPATIENT
Start: 2022-06-29 | End: 2022-06-30 | Stop reason: HOSPADM

## 2022-06-29 RX ORDER — LACTULOSE 10 G/15ML
20 SOLUTION ORAL; RECTAL
Status: DISCONTINUED | OUTPATIENT
Start: 2022-06-29 | End: 2022-06-30 | Stop reason: HOSPADM

## 2022-06-29 RX ORDER — ALBUTEROL SULFATE 0.83 MG/ML
2.5 SOLUTION RESPIRATORY (INHALATION)
Status: DISCONTINUED | OUTPATIENT
Start: 2022-06-29 | End: 2022-06-30 | Stop reason: HOSPADM

## 2022-06-29 RX ADMIN — DICYCLOMINE HYDROCHLORIDE 20 MG: 20 TABLET ORAL at 22:47

## 2022-06-29 RX ADMIN — PANTOPRAZOLE SODIUM 40 MG: 40 TABLET, DELAYED RELEASE ORAL at 08:56

## 2022-06-29 RX ADMIN — ONDANSETRON HYDROCHLORIDE 4 MG: 2 SOLUTION INTRAMUSCULAR; INTRAVENOUS at 06:39

## 2022-06-29 RX ADMIN — METHYLPREDNISOLONE SODIUM SUCCINATE 40 MG: 40 INJECTION, POWDER, FOR SOLUTION INTRAMUSCULAR; INTRAVENOUS at 17:05

## 2022-06-29 RX ADMIN — FENTANYL CITRATE 25 MCG: 50 INJECTION, SOLUTION INTRAMUSCULAR; INTRAVENOUS at 07:32

## 2022-06-29 RX ADMIN — DIGOXIN 0.12 MG: 125 TABLET ORAL at 08:56

## 2022-06-29 RX ADMIN — DOCUSATE SODIUM 100 MG: 100 CAPSULE, LIQUID FILLED ORAL at 08:56

## 2022-06-29 RX ADMIN — BUTALBITAL, ACETAMINOPHEN, AND CAFFEINE 2 TABLET: 50; 325; 40 TABLET ORAL at 22:47

## 2022-06-29 RX ADMIN — Medication 10 ML: at 22:00

## 2022-06-29 RX ADMIN — DOXYCYCLINE HYCLATE 100 MG: 100 TABLET, COATED ORAL at 22:48

## 2022-06-29 RX ADMIN — DICYCLOMINE HYDROCHLORIDE 20 MG: 20 TABLET ORAL at 17:05

## 2022-06-29 RX ADMIN — DOXYCYCLINE HYCLATE 100 MG: 100 TABLET, COATED ORAL at 12:05

## 2022-06-29 RX ADMIN — ONDANSETRON HYDROCHLORIDE 4 MG: 2 INJECTION, SOLUTION INTRAMUSCULAR; INTRAVENOUS at 15:10

## 2022-06-29 RX ADMIN — MULTIPLE VITAMINS W/ MINERALS TAB 1 TABLET: TAB at 08:57

## 2022-06-29 RX ADMIN — ONDANSETRON HYDROCHLORIDE 4 MG: 2 INJECTION, SOLUTION INTRAMUSCULAR; INTRAVENOUS at 20:42

## 2022-06-29 RX ADMIN — METOPROLOL SUCCINATE 25 MG: 50 TABLET, EXTENDED RELEASE ORAL at 08:56

## 2022-06-29 RX ADMIN — BUTALBITAL, ACETAMINOPHEN, AND CAFFEINE 2 TABLET: 50; 325; 40 TABLET ORAL at 15:09

## 2022-06-29 RX ADMIN — ALUMINUM HYDROXIDE, MAGNESIUM HYDROXIDE, AND SIMETHICONE 30 ML: 200; 200; 20 SUSPENSION ORAL at 12:04

## 2022-06-29 RX ADMIN — ALUMINUM HYDROXIDE, MAGNESIUM HYDROXIDE, AND SIMETHICONE 30 ML: 200; 200; 20 SUSPENSION ORAL at 19:27

## 2022-06-29 RX ADMIN — METHYLPREDNISOLONE SODIUM SUCCINATE 40 MG: 40 INJECTION, POWDER, FOR SOLUTION INTRAMUSCULAR; INTRAVENOUS at 12:05

## 2022-06-29 RX ADMIN — IOPAMIDOL 60 ML: 755 INJECTION, SOLUTION INTRAVENOUS at 06:00

## 2022-06-29 RX ADMIN — DICYCLOMINE HYDROCHLORIDE 20 MG: 20 TABLET ORAL at 12:05

## 2022-06-29 NOTE — PROGRESS NOTES
Problem: Mobility Impaired (Adult and Pediatric)  Goal: *Acute Goals and Plan of Care (Insert Text)  Description: FUNCTIONAL STATUS PRIOR TO ADMISSION: Patient was modified independent using a rollator for functional mobility within community. Pt does not use DME within home. HOME SUPPORT PRIOR TO ADMISSION: The patient lives with son who is able to assist as needed. Physical Therapy Goals  Initiated 6/29/2022  1. Patient will move from supine to sit and sit to supine  in bed with modified independence within 7 day(s). 2.  Patient will transfer from bed to chair and chair to bed with modified independence using the least restrictive device within 7 day(s). 3.  Patient will perform sit to stand with modified independence within 7 day(s). 4.  Patient will ambulate with modified independence for 300 feet with the least restrictive device within 7 day(s). 5.  Patient will ascend/descend 2 stairs with one handrail(s) with modified independence within 7 day(s). Outcome: Progressing Towards Goal   PHYSICAL THERAPY EVALUATION  Patient: Fransisca Cassidy (37 y.o. female)  Date: 6/29/2022  Primary Diagnosis: Acute respiratory failure ([address] [J96.00]        Precautions:   Fall      ASSESSMENT  Based on the objective data described below, the patient presents with decreased activity tolerance, balance deficits, and gait deviations. Pt required increased time with mobility and presented with increased trunk flexion upon standing with forward head posture (pt with h/o scolosis). Pt ambulated within room with fair jody. Pt on room air with ambulation, SpO2 >95%. Educated pt on OOB mobility to prevent deconditioning and anticipate HHPT upon discharge to continue addressing impairments. Current Level of Function Impacting Discharge (mobility/balance):supervision ambulation    Functional Outcome Measure:   The patient scored Total: 65/100 on the Barthel Index outcome measure which is indicative of being minimally independent in basic self-care. Other factors to consider for discharge: fall risk, 2 FLAVIA     Patient will benefit from skilled therapy intervention to address the above noted impairments. PLAN :  Recommendations and Planned Interventions: bed mobility training, transfer training, gait training, therapeutic exercises, neuromuscular re-education, patient and family training/education, and therapeutic activities      Frequency/Duration: Patient will be followed by physical therapy:  5 times a week to address goals. 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     OBJECTIVE DATA SUMMARY:   HISTORY:    Past Medical History:   Diagnosis Date    Adenoma of colon     Anemia     Arthritis     Atrial fibrillation (HCC)     Bloating     Chronic constipation     Chronic pain     back     COPD (chronic obstructive pulmonary disease) (HCC)     GERD (gastroesophageal reflux disease)     [place] (hard of hearing)     Hyponatremia     caused seizures x 2 per pt    Indigestion     Osteoporosis     Seizures ([facility] [address] 7/2020, 9/2020    x 2      Past Surgical History:   Procedure Laterality Date    COLONOSCOPY N/A 12/16/2021    COLONOSCOPY   :- performed by Renetta Henao MD at [place] [address] N/A 2/14/2022    .  performed by Mine Franco MD at [facility] MAIN OR    [facility]    HX CHOLECYSTECTOMY  1982    HX HIP REPLACEMENT Left     pt stated hip was pinned, not replaced    HX HIP REPLACEMENT Right     pt stated hip was pinned, not replaced    HX HYSTERECTOMY      HX ORTHOPAEDIC  1990, 2011    bilateral hip fractures     HX ORTHOPAEDIC Left 2020    femur fracture    HX TONSILLECTOMY         Personal factors and/or comorbidities impacting plan of care: PMH    Home Situation  Home Environment: Apartment  # Steps to Enter: 2  Rails to Enter: No  One/Two Story Residence: One story  Living Alone: No  Support Systems: Child(saray) (son)  Patient Expects to be Discharged to[de-identified] Home  Current DME Used/Available at Home: Other (comment),Walker, rollator,Shower chair,Grab bars (scooter; grab bar clamped on side of tub)  Tub or Shower Type: Tub/Shower combination    EXAMINATION/PRESENTATION/DECISION MAKING:   Critical Behavior:  Neurologic State: Alert     Cognition: Appropriate decision making,Appropriate safety awareness,Appropriate for age attention/concentration,Follows commands  Safety/Judgement: Awareness of environment  Hearing: Auditory  Auditory Impairment: Hard of hearing, bilateral (better hearing on R side)  Skin:  intact  Edema: none  Range Of Motion:  AROM: Within functional limits (infer from functional activity)                       Strength:    Strength: Within functional limits (infer from functional activity )                    Tone & Sensation:   Tone: Normal              Sensation: Intact               Coordination:  Coordination: Within functional limits (infer from functional activity)  Vision:      Functional Mobility:  Bed Mobility:  Rolling: Modified independent  Supine to Sit: Supervision  Sit to Supine: Supervision  Scooting: Modified independent  Transfers:  Sit to Stand: Supervision  Stand to Sit: Supervision                       Balance:   Sitting: Intact; Without support  Standing: Impaired  Standing - Static: Good  Standing - Dynamic : Fair;Unsupported  Ambulation/Gait Training:  Distance (ft): 15 Feet (ft)     Ambulation - Level of Assistance: Supervision        Gait Abnormalities: Decreased step clearance Base of Support: Narrowed                   Functional Measure:  Barthel Index:    Bathin  Bladder: 10  Bowels: 10  Groomin  Dressin  Feeding: 10  Mobility: 0  Stairs: 0  Toilet Use: 10  Transfer (Bed to Chair and Back): 10  Total: 65/100       The Barthel ADL Index: Guidelines  1. The index should be used as a record of what a patient does, not as a record of what a patient could do. 2. The main aim is to establish degree of independence from any help, physical or verbal, however minor and for whatever reason. 3. The need for supervision renders the patient not independent. 4. A patient's performance should be established using the best available evidence. Asking the patient, friends/relatives and nurses are the usual sources, but direct observation and common sense are also important. However direct testing is not needed. 5. Usually the patient's performance over the preceding 24-48 hours is important, but occasionally longer periods will be relevant. 6. Middle categories imply that the patient supplies over 50 per cent of the effort. 7. Use of aids to be independent is allowed. Score Interpretation (from [address] [place] [address])    Independent   60-79 Minimally independent   40-59 Partially dependent   20-39 Very dependent   <20 Totally dependent     -Magdalena Vargas., Barthel, D.W. (1965). Functional evaluation: the Barthel Index. [facility] ([address] (1997). The Barthel activities of daily living index: self-reporting versus actual performance in the old (> or = 75 years). Journal of [address] [place] [address] 45(7), [facility], J.J.M.F, Saima Hernandez., [place] Sheila. (1999). Measuring the change in disability after inpatient rehabilitation; comparison of the responsiveness of the Barthel Index and Functional [place] Measure. Journal of Neurology, Neurosurgery, and Psychiatry, 66(4), 146-843.   AISHA Contreras, TERRENCE Foster, Forrestine Seip, M.A. (2004) Assessment of post-stroke quality of life in cost-effectiveness studies: The usefulness of the Barthel Index and the EuroQoL-5D. Quality of Life Research, 15, 158-59        Physical Therapy Evaluation Charge Determination   History Examination Presentation Decision-Making   HIGH Complexity :3+ comorbidities / personal factors will impact the outcome/ POC  LOW Complexity : 1-2 Standardized tests and measures addressing body structure, function, activity limitation and / or participation in recreation  LOW Complexity : Stable, uncomplicated  Other outcome measures barthel  LOW       Based on the above components, the patient evaluation is determined to be of the following complexity level: LOW       Activity Tolerance:   Fair and requires rest breaks    After treatment patient left in no apparent distress:   Supine in bed, Call bell within reach, and Side rails x 3    COMMUNICATION/EDUCATION:   The patients plan of care was discussed with: Registered nurse. Fall prevention education was provided and the patient/caregiver indicated understanding., Patient/family have participated as able in goal setting and plan of care. , and Patient/family agree to work toward stated goals and plan of care.     Thank you for this referral.  Albert Parks PT   Time Calculation: 18 mins      b

## 2022-06-29 NOTE — ED NOTES
Pt continues to be anxious about her O2 sats of 95%. Pt reassured. Pt also c/o back pain. Offered pt tylenol, flexeril, and repositioning which she refused.

## 2022-06-29 NOTE — ED NOTES
Pt began vomiting immediately after receiving bentyl and doxycycline. Pt has stopped vomiting at this time. Not due for Zofran yet. Will CTM.

## 2022-06-29 NOTE — ED NOTES
Checked on pt and introduced myself. Pt resting comfortably at this time but appears anxious and tells me, \"I don't want to die. \" I explained to pt that her vital signs are stable and we are doing everything we can to help her feel better. Pt told me her oxygen level was low. Saturations at 97% with good waveform. Pt reports that this is low for her as her oxygen is usually at 99%. I explained to pt that 94% and up is good and 90%-93% is okay. Pt verbalized understanding. Assisted pt with calling her son. Will continue to monitor.

## 2022-06-29 NOTE — H&P
[facility]  HISTORY AND PHYSICAL    Name:  Vera Casillas  MR#:  [id number]  :  1945  ACCOUNT #:  [de-identified]  ADMIT DATE:  2022      ADMITTING ATTENDING:  Payal Hodge MD    CHIEF COMPLAINT:  Diarrhea, generalized weakness, pain, and shortness of breath since yesterday. HISTORY OF PRESENT ILLNESS:  This is a 45-year-old female. She is not one of the best historians, but it seems like she has a history of chronic atrial fibrillation; anemia; adenoma of colon, status post surgery; history of seizures; and COPD. She comes over here because of shortness of breath, generalized weakness, and pain. She says that yesterday she started having diarrhea. She continues to have diarrhea this morning and also she felt shortness of breath and this morning she checked her oxygenation and it was found to be 88. At that time, the patient was brought into the ER for further evaluation and management. She also says that she has been having generalized body ache and headache since yesterday. She says that she never had any fever, unusual cough, nausea, or vomiting. No nasal congestion. She does feel that her appetite has severely diminished in the last couple of days. She denies any wheezing. Also she says that at home, she was given albuterol inhaler, but without any significant relief. REVIEW OF SYSTEMS:  Pertinent positive as above. Rest of review of systems were done. There were all negative except for above. PAST MEDICAL HISTORY:  1. Adenoma of colon. 2.  Anemia. 3.  Atrial fibrillation. 4.  Chronic constipation. 5.  COPD. 6.  Hyponatremia. 7.  Seizures. 8.  Osteoporosis. PAST SURGICAL HISTORY:  1. Colonoscopy. 2.  Sigmoidoscopy. 3.   infection. 4.  Cholecystectomy. 5.  Hip replacement. FAMILY HISTORY:  Significant for coronary artery disease and alcohol abuse. SOCIAL HISTORY:  Former smoker, quit in . Nonalcoholic.   Non-IV drug abuser. ALLERGIES:  THE PATIENT IS ALLERGIC TO FOLLOWING MEDICATIONS:  CEFUROXIME, HYDROCODONE, OXYCODONE, AND PENICILLIN. PHYSICAL EXAMINATION:  VITAL SIGNS:  At the time the patient was seen, the patient's vitals were as follows; blood pressure 125/68, pulse 105, afebrile, respiratory rate 26, saturating 99% on 2 L of nasal cannula. GENERAL:  Not in acute distress, comfortably lying in bed. HEENT:  Head:  Normocephalic, atraumatic. Eyes:  PERRLA. EOMI. Ears:  Bilateral hearing normal.  No growth. Nose:  No polyp, no bleeding. Mouth:  Moist mucous membranes. Decent oral hygiene. NECK:  Supple. No JVD. No thyromegaly. RESPIRATORY:  Bilateral good air sounds and no adventitious breath sounds that I could appreciate. CARDIOVASCULAR:  S1 and S2 normal.  No murmurs, rubs, or gallops. GI:  Bowel sounds present. Soft, nontender, and nondistended. NEUROLOGICAL:  Cranial nerves II through XII intact. Motor 5/5 bilaterally upper limb and lower limb. Sensory normal.  PSYCHIATRIC:  Mood and affect appropriate. Alert, awake, and oriented x3. LABORATORY AND RADIOLOGICAL RESULTS:  WBC 6.3, hemoglobin 8.6, hematocrit 28, and a platelet count of 549. Sodium 141, potassium 4.0, chloride 111, bicarbonate 19, BUN 12, and creatinine 0.82. High sensitivity troponin of 24. ProBNP of 1498. TSH was done, which shows 5.51; free T4 of 1.2. Rapid COVID is negative. CTA of the chest was done which shows no evidence of pulmonary embolism, prior granulomatous disease, and T12 compression deformity. EKG was done which shows sinus tachycardia. No ST-T wave changes suggestive of ischemia. ASSESSMENT AND PLAN:  This is a 76-year-old female with a past medical history of adenoma, status post surgery; paroxysmal atrial fibrillation, not on anticoagulation; and hypertension. She comes over here because of 2 days history of generalized weakness, pain, diarrhea, and shortness of breath.   1.  Generalized aches, headache, diarrhea, and shortness of breath:  Probably secondary to viral prodrome. The patient's rapid COVID is negative. We will do a flu screen as well on the patient. For now, I will put the patient on steroids and doxycycline for mild chronic obstructive pulmonary disease exacerbation. Hopefully, the patient would be feeling better by tomorrow. If she does not, then please consider Pulmonary consult on the patient. We will also provide incentive spirometry as well as DuoNebs p.r.n.  2.  Paroxysmal atrial fibrillation:  Currently seems to be sinus rhythm with tachycardia. We will restart her home medications. The tachycardia could probably be from the breathing treatment. 3.  Generalized body ache:  Pain management with Tylenol for now. 4.  Anemia:  Chronic. Recently, the patient received blood transfusion as an outpatient. Outpatient followup with primary care physician. 5.  T12 compression fracture. Not significant pain. Monitor. 6. The patient is full code. I spent about 50 minutes in taking care of the patient.       Paige Keys MD PG/BERNARDO_I/CHRISTA_BETTY_P  D:  06/29/2022 11:49  T:  06/29/2022 13:23  JOB #:  [id number]

## 2022-06-29 NOTE — ROUTINE PROCESS
TRANSFER - OUT REPORT:    Verbal report given to RN(name) on Consuella Diss  being transferred to 2N(unit) for routine progression of care       Report consisted of patients Situation, Background, Assessment and   Recommendations(SBAR). Information from the following report(s) SBAR, ED Summary, [facility] and Recent Results was reviewed with the receiving nurse. Lines:   Peripheral IV 06/29/22 Right Wrist (Active)        Opportunity for questions and clarification was provided.       Patient transported with:   O2 @ 2 liters  Rose Mary Fuentes, RN

## 2022-06-29 NOTE — PROGRESS NOTES
Lovenox Monitoring  Indication: DVT Prophylaxis  Recent Labs     06/29/22  0439   HGB 8.6*   *   CREA 0.82     Current Weight: 42.2 kg  Est. CrCl = 40 ml/min  Current Dose: 40 mg subcutaneously every 24 hours. Plan: Change to 30mg SQ q24h per [facility] P&T protocl for wt <50.9 kg      [employer], Pharm. D

## 2022-06-29 NOTE — ED TRIAGE NOTES
Brought by EMS from home for SOB since yesterday, EMS stated was 82% on RA  put on 3LNC and brought up to 99% does not use oxygen at home

## 2022-06-29 NOTE — ED PROVIDER NOTES
Shortness of Breath  This is a new problem. The average episode lasts 1 day. The problem occurs continuously. The current episode started 12 to 24 hours ago. The problem has not changed since onset. Pertinent negatives include no fever, no cough, no sputum production, no hemoptysis, no wheezing, no chest pain and no leg swelling. It is unknown what precipitated the problem. Treatments tried: albuterol. The treatment provided no relief. She has had prior hospitalizations. She has had prior ED visits. Associated medical issues include chronic lung disease (emphysema). Past Medical History:   Diagnosis Date    Adenoma of colon     Anemia     Arthritis     Atrial fibrillation (HCC)     Bloating     Chronic constipation     Chronic pain     back     COPD (chronic obstructive pulmonary disease) (HCC)     GERD (gastroesophageal reflux disease)     [place] (hard of hearing)     Hyponatremia     caused seizures x 2 per pt    Indigestion     Osteoporosis     Seizures ([address] 7/2020, 9/2020    x 2        Past Surgical History:   Procedure Laterality Date    COLONOSCOPY N/A 12/16/2021    COLONOSCOPY   :- performed by Helena Bowman., MD at [place] [address] N/A 2/14/2022    .  performed by Juan Francisco Sampson MD at [facility] MAIN OR    [facility]    HX [facility]    HX HIP REPLACEMENT Left     pt stated hip was pinned, not replaced    HX HIP REPLACEMENT Right     pt stated hip was pinned, not replaced    HX HYSTERECTOMY      HX ORTHOPAEDIC  1990, 2011    bilateral hip fractures     HX ORTHOPAEDIC Left 2020    femur fracture    HX TONSILLECTOMY           Family History:   Problem Relation Age of Onset    Heart Disease Mother     Heart Disease Father     Liver Disease Father     Alcohol abuse Father     Anesth Problems Neg Hx        Social History     Socioeconomic History    Marital status: SINGLE     Spouse name: Not on file    Number of children: Not on file    Years of education: Not on file    Highest education level: Not on file   Occupational History    Not on file   Tobacco Use    Smoking status: Former Smoker     Packs/day: 1.00     Years: 20.00     Pack years: 20.00     Quit date: 12/18/2011     Years since quitting: 10.5    Smokeless tobacco: Never Used   Vaping Use    Vaping Use: Never used   Substance and Sexual Activity    Alcohol use: Never    Drug use: Never    Sexual activity: Not Currently   Other Topics Concern    Not on file   Social History Narrative    Not on file     Social Determinants of Health     Financial Resource Strain:     Difficulty of Paying Living Expenses: Not on file   Food Insecurity:     Worried About [address] [employer] in the Last Year: Not on file    Myke of Food in the Last Year: Not on file   Transportation Needs:     Lack of Transportation (Medical): Not on file    Lack of Transportation (Non-Medical):  Not on file   Physical Activity:     Days of Exercise per Week: Not on file    Minutes of Exercise per Session: Not on file   Stress:     Feeling of Stress : Not on file   Social Connections:     Frequency of Communication with Friends and Family: Not on file    Frequency of Social Gatherings with Friends and Family: Not on file    Attends Cheondoism Services: Not on file    Active Member of [address] [place] [address] or Organizations: Not on file    Attends Club or Organization Meetings: Not on file    Marital Status: Not on file   Intimate Partner Violence:     Fear of Current or Ex-Partner: Not on file    Emotionally Abused: Not on file    Physically Abused: Not on file    Sexually Abused: Not on file   Housing Stability:     Unable to Pay for Housing in the Last Year: Not on file    Number of [place] in the Last Year: Not on file    Unstable Housing in the Last Year: Not on file         ALLERGIES: Cefuroxime, Hydrocodone, Oxycodone, and Penicillins    Review of Systems   Constitutional: Negative for fever. Respiratory: Positive for shortness of breath. Negative for cough, hemoptysis, sputum production and wheezing. Cardiovascular: Negative for chest pain and leg swelling. All other systems reviewed and are negative. Vitals:    06/29/22 0410 06/29/22 0415 06/29/22 0500   BP: (!) 122/57 (!) 122/57 (!) 104/52   Pulse: (!) 121 (!) 110 (!) 106   Resp: 25 18 24   Temp: 97.8 °F (36.6 °C)     SpO2: (!) 87%  99%   Weight: 42.2 kg (93 lb)     Height: 5' 3\" (1.6 m)              Physical Exam  Vitals and nursing note reviewed. Constitutional:       General: She is not in acute distress. Appearance: She is well-developed. HENT:      Head: Normocephalic and atraumatic. Eyes:      Conjunctiva/sclera: Conjunctivae normal.   Cardiovascular:      Rate and Rhythm: Normal rate and regular rhythm. Heart sounds: Normal heart sounds. Pulmonary:      Effort: Pulmonary effort is normal. No respiratory distress. Breath sounds: Normal breath sounds. Abdominal:      General: There is no distension. Musculoskeletal:         General: No deformity. Normal range of motion. Cervical back: Neck supple. Skin:     General: Skin is warm and dry. Neurological:      Mental Status: She is alert. Cranial Nerves: No cranial nerve deficit. Psychiatric:         Behavior: Behavior normal.        [facility]  ED Course as of 06/29/22 0658   Wed Jun 29, 2022   0528 EKG 0432: Rate 116, sinus tachycardia with premature supreventricular complexes and fusion complexes. Nonspecific ST/T abnormality. Since last tracing rate faster and PACs are new. Otherwise no significant change. [DK]      ED Course User Index  [DK] Valentino Mckee MD     61-year-old female presents with shortness of breath over the last day. She does have a history of COPD but she is not actively wheezing.   She was noted to be hypoxemic into the mid 80s earlier today while having symptoms and EMS found her saturating 82% on room air placing her on 2 L of oxygen which improved her symptoms and she is saturating well now. No pulmonary embolism or airspace disease. Possible progression of underlying emphysema. Hospitalist was consulted for admission and will see the patient in the emergency department. Procedures    [facility] Consult for Admission  7:00 AM    ED Room Number: ER12/12  Patient Name and age:  Kolby Pascual 77 y.o.  female  Working Diagnosis:   1. Hypoxemia    2. Compression fracture of thoracic vertebra, non-traumatic, sequela    3. SOB (shortness of breath)    4.  Centrilobular emphysema ([address]        COVID-19 Suspicion:  no  Sepsis present:  no  Reassessment needed: no  Code Status:  Full Code  Readmission: no  Isolation Requirements:  no  Recommended Level of Care:  telemetry  Department:[facility] Adult ED - 21

## 2022-06-29 NOTE — PROGRESS NOTES
Problem: Self Care Deficits Care Plan (Adult)  Goal: *Acute Goals and Plan of Care (Insert Text)  Description: FUNCTIONAL STATUS PRIOR TO ADMISSION: Patient was modified independent using a rollator to ambulate from apartment to car and powered scooter for community mobility. HOME SUPPORT: The patient lived alone with son who provides assistance with RW management on stairs, and transfer in/out of shower. Occupational Therapy Goals  Initiated 6/29/2022  1. Patient will perform grooming with independence within 7 day(s). 2.  Patient will perform upper body dressing with independence within 7 day(s). 3.  Patient will perform lower body dressing with independence within 7 day(s). 4.  Patient will perform toilet transfers with independence within 7 day(s). 5.  Patient will perform all aspects of toileting with independence within 7 day(s). 6.  Patient will utilize energy conservation techniques during functional activities with verbal and visual cues within 7 day(s). Outcome: Progressing Towards Goal     OCCUPATIONAL THERAPY EVALUATION  Patient: Mark Landon (89 y.o. female)  Date: 6/29/2022  Primary Diagnosis: Acute respiratory failure ([facility] [address] [J96.00]       Precautions:  Fall    ASSESSMENT  Based on the objective data described below, the patient presents with limited independent participation in ADLs d/t stomach pains, lightheadedness, and poor activity tolerance. Patient demonstrated functional UE ROM/strength, sitting balance, vision and was alert and aware to the situation. Patient is \"half deaf\" with greater hearing ability in R ear. Able to complete bed mobility with mod I and functional transfers with supervision. Patient indicated need to sit down when standing at EOB d/t lightheadedness and stomach pain. BP taken and stable, but HR elevated from 99 BPM at sitting to 121 BPM when standing. Session concluded with patient supine in bed, call bell in reach, all needs met and RN notified. Will continue to benefit from acute OT services in order to address listed impairments. Recommended discharge home with family support at this time. Current Level of Function Impacting Discharge (ADLs/self-care): Poor activity tolerance, SBA for functional mobility d/t weakness    Functional Outcome Measure: The patient scored Total: 70/100 on the Barthel Index outcome measure which is indicative of being 30% dependence in basic self-care. Other factors to consider for discharge: Lives with son who appears supported     Patient will benefit from skilled therapy intervention to address the above noted impairments. PLAN :  Recommendations and Planned Interventions: self care training, functional mobility training, balance training, patient education, home safety training and family training/education    Frequency/Duration: Patient will be followed by occupational therapy 3 times a week to address goals. Recommendation for discharge: (in order for the patient to meet his/her long term goals)  No skilled occupational therapy/ follow up rehabilitation needs identified at this time. This discharge recommendation:  Has been made in collaboration with the attending provider and/or case management    IF patient discharges home will need the following DME: patient owns DME required for discharge       SUBJECTIVE:   Patient stated Well we make sandwiches together, and I teach him how to cook zucchini and broccoli.  re: son cooking     OBJECTIVE DATA SUMMARY:   HISTORY:   Past Medical History:   Diagnosis Date    Adenoma of colon     Anemia     Arthritis     Atrial fibrillation (HCC)     Bloating     Chronic constipation     Chronic pain     back     COPD (chronic obstructive pulmonary disease) (HCC)     GERD (gastroesophageal reflux disease)     [place] (hard of hearing)     Hyponatremia     caused seizures x 2 per pt    Indigestion     Osteoporosis     Seizures ([address] 7/2020, 9/2020    x 2 Past Surgical History:   Procedure Laterality Date    COLONOSCOPY N/A 12/16/2021    COLONOSCOPY   :- performed by Siri Shah MD at [place] [address] N/A 2/14/2022    . performed [facility] Ciera Reid [facility] MA[facility] OR    [address]    HX [address] [facility]    HX HIP REPLACEMENT Left     pt stated hip was pinned, not replaced    HX HIP REPLACEMENT Right     pt stated hip was pinned, not replaced    HX HYSTERECTOMY      HX ORTHOPAEDIC  1990, 2011    bilateral hip fractures     HX ORTHOPAEDIC Left 2020    femur fracture    HX TONSILLECTOMY         Expanded or extensive additional review of patient history:     Home Situation  Home Environment: Apartment  # Steps to Enter: 2  Rails to Enter: No  One/Two Story Residence: One story  Living Alone: No  Support Systems: Child(saray) (son)  Patient Expects to be Discharged to[de-identified] Home  Current DME Used/Available at Home: Other (comment),Walker, rollator,Shower chair,Grab bars (scooter; grab bar clamped on side of tub)  Tub or Shower Type: Tub/Shower combination    Hand dominance: Right    EXAMINATION OF PERFORMANCE DEFICITS:  Cognitive/Behavioral Status:  Neurologic State: Alert     Cognition: Appropriate decision making; Appropriate safety awareness; Appropriate for age attention/concentration; Follows commands  Perception: Appears intact  Perseveration: No perseveration noted  Safety/Judgement: Awareness of environment    Skin: Appears intact    Edema: None noted    Hearing: Auditory  Auditory Impairment: Hard of hearing, bilateral (better hearing on R side)    Vision/Perceptual:                                     Range of Motion:  BUE  AROM: Within functional limits (infer from functional activity)                         Strength:  BUE  Strength:  Within functional limits (infer from functional activity )                Coordination:  Coordination: Within functional limits (infer from functional activity)  Fine Motor Skills-Upper: Left Intact; Right Intact    Gross Motor Skills-Upper: Left Intact; Right Intact    Tone & Sensation:  BUE  Tone: Normal  Sensation: Intact                      Balance:  Sitting: Intact; Without support  Standing: Impaired  Standing - Static: Good  Standing - Dynamic : Fair;Unsupported    Functional Mobility and Transfers for ADLs:  Bed Mobility:  Rolling: Modified independent  Supine to Sit: Supervision  Sit to Supine: Supervision  Scooting: Modified independent    Transfers:  Sit to Stand: Supervision  Stand to Sit: Supervision    ADL Assessment:  Feeding: Independent    Oral Facial Hygiene/Grooming: Independent    Bathing: Minimum assistance         Upper Body Dressing: Setup    Lower Body Dressing: Stand-by assistance    Toileting: Stand by assistance                ADL Intervention and task modifications:                                          Cognitive Retraining  Safety/Judgement: Awareness of environment    Functional Measure:    Barthel Index:  Bathin  Bladder: 10  Bowels: 10  Groomin  Dressin  Feeding: 10  Mobility: 0  Stairs: 0  Toilet Use: 10  Transfer (Bed to Chair and Back): 15  Total: 70/100      The Barthel ADL Index: Guidelines  1. The index should be used as a record of what a patient does, not as a record of what a patient could do. 2. The main aim is to establish degree of independence from any help, physical or verbal, however minor and for whatever reason. 3. The need for supervision renders the patient not independent. 4. A patient's performance should be established using the best available evidence. Asking the patient, friends/relatives and nurses are the usual sources, but direct observation and common sense are also important. However direct testing is not needed. 5. Usually the patient's performance over the preceding 24-48 hours is important, but occasionally longer periods will be relevant.   6. Middle categories imply that the patient supplies over 50 per cent of the effort. 7. Use of aids to be independent is allowed. Score Interpretation (from [address] [facility])    Independent   60-79 Minimally independent   40-59 Partially dependent   20-39 Very dependent   <20 Totally dependent     -Magdalena Vargas., Barthel, D.W. (1965). Functional evaluation: the Barthel Index. [address] ([address] (1997). The Barthel activities of daily living index: self-reporting versus actual performance in the old (> or = 75 years). Journal of [address] [place] [address] 45(7), [address] [facility], J...F, Qasim Duckworth., [facility]. (1999). Measuring the change in disability after inpatient rehabilitation; comparison of the responsiveness of the Barthel Index and Functional [place] Measure. Journal of Neurology, Neurosurgery, and Psychiatry, 66(4), 705-834. Christie Phoenix, [place].YON[place]AN, DARYN Foster.CHERI, & Lord Amanda MCharbelA. (2004) Assessment of post-stroke quality of life in cost-effectiveness studies: The usefulness of the Barthel Index and the EuroQoL-5D. Quality of Life Research, 15, 814-01     Occupational Therapy Evaluation Charge Determination   History Examination Decision-Making   LOW Complexity : Brief history review  LOW Complexity : 1-3 performance deficits relating to physical, cognitive , or psychosocial skils that result in activity limitations and / or participation restrictions  LOW Complexity : No comorbidities that affect functional and no verbal or physical assistance needed to complete eval tasks       Based on the above components, the patient evaluation is determined to be of the following complexity level: LOW   Pain Rating:  C/o pain in back from previous fall, and limited mobility in R hip - did not rate.      Activity Tolerance:   Fair, desaturates with exertion and requires oxygen and requires rest breaks    After treatment patient left in no apparent distress:    Supine in bed, Call bell within reach and both side rails up, and RN notified. COMMUNICATION/EDUCATION:   The patients plan of care was discussed with: Registered nurse and Physician. Patient/family have participated as able in goal setting and plan of care. and Patient/family agree to work toward stated goals and plan of care. This patients plan of care is appropriate for delegation to CAMERON. Thank you for this referral.  RAVINDRA Mack  Time Calculation: 18 mins  Regarding student involvement in patient care:  A student participated in this treatment session. Per CMS Medicare statements and AOTA guidelines I certify that the following was true:  1. I was present and directly observed the entire session. 2. I made all skilled judgments and clinical decisions regarding care. 3. I am the practitioner responsible for assessment, treatment, and documentation.

## 2022-06-29 NOTE — ED NOTES
Bedside and Verbal shift change report given to Dipak Hall RN (oncoming nurse) by Johnna Mullins RN (offgoing nurse). Report included the following information SBAR and ED Summary. Pt sitting comfortably in hospital bed. Son at bedside.

## 2022-06-30 VITALS
SYSTOLIC BLOOD PRESSURE: 104 MMHG | RESPIRATION RATE: 18 BRPM | TEMPERATURE: 98.5 F | HEART RATE: 92 BPM | BODY MASS INDEX: 16.48 KG/M2 | OXYGEN SATURATION: 100 % | DIASTOLIC BLOOD PRESSURE: 66 MMHG | HEIGHT: 63 IN | WEIGHT: 93 LBS

## 2022-06-30 LAB
ALBUMIN SERPL-MCNC: 3.5 G/DL (ref 3.5–5)
ALBUMIN/GLOB SERPL: 1.2 {RATIO} (ref 1.1–2.2)
ALP SERPL-CCNC: 98 U/L (ref 45–117)
ALT SERPL-CCNC: 18 U/L (ref 12–78)
ANION GAP SERPL CALC-SCNC: 9 MMOL/L (ref 5–15)
AST SERPL-CCNC: 16 U/L (ref 15–37)
BASOPHILS # BLD: 0 K/UL (ref 0–0.1)
BASOPHILS NFR BLD: 1 % (ref 0–1)
BILIRUB SERPL-MCNC: 0.2 MG/DL (ref 0.2–1)
BUN SERPL-MCNC: 13 MG/DL (ref 6–20)
BUN/CREAT SERPL: 15 (ref 12–20)
CALCIUM SERPL-MCNC: 9 MG/DL (ref 8.5–10.1)
CHLORIDE SERPL-SCNC: 110 MMOL/L (ref 97–108)
CO2 SERPL-SCNC: 22 MMOL/L (ref 21–32)
CREAT SERPL-MCNC: 0.87 MG/DL (ref 0.55–1.02)
DIFFERENTIAL METHOD BLD: ABNORMAL
EOSINOPHIL # BLD: 0 K/UL (ref 0–0.4)
EOSINOPHIL NFR BLD: 0 % (ref 0–7)
ERYTHROCYTE [DISTWIDTH] IN BLOOD BY AUTOMATED COUNT: 25.4 % (ref 11.5–14.5)
GLOBULIN SER CALC-MCNC: 3 G/DL (ref 2–4)
GLUCOSE SERPL-MCNC: 117 MG/DL (ref 65–100)
HCT VFR BLD AUTO: 28 % (ref 35–47)
HGB BLD-MCNC: 8.7 G/DL (ref 11.5–16)
IMM GRANULOCYTES # BLD AUTO: 0 K/UL (ref 0–0.04)
IMM GRANULOCYTES NFR BLD AUTO: 1 % (ref 0–0.5)
LYMPHOCYTES # BLD: 0.5 K/UL (ref 0.8–3.5)
LYMPHOCYTES NFR BLD: 11 % (ref 12–49)
MCH RBC QN AUTO: 24.6 PG (ref 26–34)
MCHC RBC AUTO-ENTMCNC: 31.1 G/DL (ref 30–36.5)
MCV RBC AUTO: 79.3 FL (ref 80–99)
MONOCYTES # BLD: 0.1 K/UL (ref 0–1)
MONOCYTES NFR BLD: 3 % (ref 5–13)
NEUTS SEG # BLD: 3.6 K/UL (ref 1.8–8)
NEUTS SEG NFR BLD: 84 % (ref 32–75)
NRBC # BLD: 0 K/UL (ref 0–0.01)
NRBC BLD-RTO: 0 PER 100 WBC
PLATELET # BLD AUTO: 466 K/UL (ref 150–400)
PMV BLD AUTO: 8.9 FL (ref 8.9–12.9)
POTASSIUM SERPL-SCNC: 4.2 MMOL/L (ref 3.5–5.1)
PROT SERPL-MCNC: 6.5 G/DL (ref 6.4–8.2)
RBC # BLD AUTO: 3.53 M/UL (ref 3.8–5.2)
RBC MORPH BLD: ABNORMAL
SODIUM SERPL-SCNC: 141 MMOL/L (ref 136–145)
WBC # BLD AUTO: 4.2 K/UL (ref 3.6–11)

## 2022-06-30 PROCEDURE — 80053 COMPREHEN METABOLIC PANEL: CPT

## 2022-06-30 PROCEDURE — 96376 TX/PRO/DX INJ SAME DRUG ADON: CPT

## 2022-06-30 PROCEDURE — 36415 COLL VENOUS BLD VENIPUNCTURE: CPT

## 2022-06-30 PROCEDURE — 74011250637 HC RX REV CODE- 250/637: Performed by: HOSPITALIST

## 2022-06-30 PROCEDURE — G0378 HOSPITAL OBSERVATION PER HR: HCPCS

## 2022-06-30 PROCEDURE — 74011000250 HC RX REV CODE- 250: Performed by: HOSPITALIST

## 2022-06-30 PROCEDURE — 74011250636 HC RX REV CODE- 250/636: Performed by: HOSPITALIST

## 2022-06-30 PROCEDURE — 85025 COMPLETE CBC W/AUTO DIFF WBC: CPT

## 2022-06-30 RX ORDER — DOXYCYCLINE HYCLATE 100 MG
100 TABLET ORAL EVERY 12 HOURS
Qty: 14 TABLET | Refills: 0 | Status: SHIPPED
Start: 2022-06-30 | End: 2022-08-25

## 2022-06-30 RX ORDER — FLUTICASONE FUROATE, UMECLIDINIUM BROMIDE AND VILANTEROL TRIFENATATE 100; 62.5; 25 UG/1; UG/1; UG/1
1 POWDER RESPIRATORY (INHALATION) DAILY
Qty: 60 EACH | Refills: 0 | Status: ON HOLD | OUTPATIENT
Start: 2022-06-30 | End: 2022-09-13

## 2022-06-30 RX ORDER — PREDNISONE 10 MG/1
40 TABLET ORAL
Qty: 30 TABLET | Refills: 0 | Status: ON HOLD | OUTPATIENT
Start: 2022-06-30 | End: 2022-09-13

## 2022-06-30 RX ADMIN — BUTALBITAL, ACETAMINOPHEN, AND CAFFEINE 2 TABLET: 50; 325; 40 TABLET ORAL at 11:42

## 2022-06-30 RX ADMIN — PANTOPRAZOLE SODIUM 40 MG: 40 TABLET, DELAYED RELEASE ORAL at 09:17

## 2022-06-30 RX ADMIN — DICYCLOMINE HYDROCHLORIDE 20 MG: 20 TABLET ORAL at 07:11

## 2022-06-30 RX ADMIN — DOXYCYCLINE HYCLATE 100 MG: 100 TABLET, COATED ORAL at 09:16

## 2022-06-30 RX ADMIN — METHYLPREDNISOLONE SODIUM SUCCINATE 40 MG: 40 INJECTION, POWDER, FOR SOLUTION INTRAMUSCULAR; INTRAVENOUS at 11:42

## 2022-06-30 RX ADMIN — ONDANSETRON HYDROCHLORIDE 4 MG: 2 INJECTION, SOLUTION INTRAMUSCULAR; INTRAVENOUS at 03:54

## 2022-06-30 RX ADMIN — DIGOXIN 0.12 MG: 125 TABLET ORAL at 07:17

## 2022-06-30 RX ADMIN — ALUMINUM HYDROXIDE, MAGNESIUM HYDROXIDE, AND SIMETHICONE 30 ML: 200; 200; 20 SUSPENSION ORAL at 04:04

## 2022-06-30 RX ADMIN — METHYLPREDNISOLONE SODIUM SUCCINATE 40 MG: 40 INJECTION, POWDER, FOR SOLUTION INTRAMUSCULAR; INTRAVENOUS at 05:37

## 2022-06-30 RX ADMIN — DICYCLOMINE HYDROCHLORIDE 20 MG: 20 TABLET ORAL at 13:18

## 2022-06-30 RX ADMIN — MULTIPLE VITAMINS W/ MINERALS TAB 1 TABLET: TAB at 09:17

## 2022-06-30 RX ADMIN — Medication 10 ML: at 06:00

## 2022-06-30 RX ADMIN — METHYLPREDNISOLONE SODIUM SUCCINATE 40 MG: 40 INJECTION, POWDER, FOR SOLUTION INTRAMUSCULAR; INTRAVENOUS at 00:00

## 2022-06-30 RX ADMIN — Medication 10 ML: at 14:00

## 2022-06-30 RX ADMIN — METOPROLOL SUCCINATE 25 MG: 50 TABLET, EXTENDED RELEASE ORAL at 07:17

## 2022-06-30 RX ADMIN — BUTALBITAL, ACETAMINOPHEN, AND CAFFEINE 2 TABLET: 50; 325; 40 TABLET ORAL at 05:37

## 2022-06-30 RX ADMIN — ALUMINUM HYDROXIDE, MAGNESIUM HYDROXIDE, AND SIMETHICONE 30 ML: 200; 200; 20 SUSPENSION ORAL at 09:17

## 2022-06-30 RX ADMIN — DOCUSATE SODIUM 100 MG: 100 CAPSULE, LIQUID FILLED ORAL at 09:16

## 2022-06-30 NOTE — DISCHARGE INSTRUCTIONS
Discharge Instructions       PATIENT ID: Josh Gunn  MRN: [id number]   YOB: 1945    DATE OF ADMISSION: 6/29/2022  4:02 AM    DATE OF DISCHARGE: 6/30/2022    PRIMARY CARE PROVIDER: Alejandra Aldana NP     ATTENDING PHYSICIAN: Abebe Gomez MD  DISCHARGING PROVIDER: Victorino Snow MD    To contact this individual call [phone] and ask the  to page. If unavailable ask to be transferred the Adult Hospitalist Department. DISCHARGE DIAGNOSES   COPD exacerbation    CONSULTATIONS: IP CONSULT TO HOSPITALIST  IP CONSULT TO PULMONOLOGY    PROCEDURES/SURGERIES: * No surgery found *    PENDING TEST RESULTS:   At the time of discharge the following test results are still pending: none    FOLLOW UP APPOINTMENTS:   Follow-up Information     Follow up With Specialties Details Why Contact Info    Alejandra Aldana NP Family Medicine, General Practice In 1 week  [facility]  Mail Stop [phone]  [address] [facility] [phone]             ADDITIONAL CARE RECOMMENDATIONS:   Follow up with PMD    DIET: Cardiac Diet    ACTIVITY: Activity as tolerated    DISCHARGE MEDICATIONS:   See Medication Reconciliation Form    · It is important that you take the medication exactly as they are prescribed. · Keep your medication in the bottles provided by the pharmacist and keep a list of the medication names, dosages, and times to be taken in your wallet. · Do not take other medications without consulting your doctor. NOTIFY YOUR PHYSICIAN FOR ANY OF THE FOLLOWING:   Fever over 101 degrees for 24 hours. Chest pain, shortness of breath, fever, chills, nausea, vomiting, diarrhea, change in mentation, falling, weakness, bleeding. Severe pain or pain not relieved by medications. Or, any other signs or symptoms that you may have questions about.       DISPOSITION:  x  Home With:   OT  PT  HH  RN       SNF/Inpatient Rehab/LTAC    Independent/assisted living    Hospice Other:     CDMP Checked:   Yes x     PROBLEM LIST Updated:  Yes x       Signed:   Andrea Lucas MD  6/30/2022  1:12 PM

## 2022-06-30 NOTE — CONSULTS
Pulmonary, Critical Care, and Sleep Medicine~Consult Note    Name: Carolyne Joy MRN: [id number]   : 1945 [facility]: [facility]    Date: 2022 1:13 PM Admission: 2022     Impression Plan   1. Acute hypoxic resp failure  2. AE of COPD  3. Former smoker  4. Was told she had sarcoidosis at 13years old; no bx to confirm   5. Recent hemicolectomy   6. p afib 1. Agree with steroid taper over next 7-10 days  2.  empiric abx  3. O2 titration above 90%  4. PFTs on outpatient basis and follow up with Dr Shreyas Miller to [facility] care   5. Would suggest trelegy at discharge      Daily Progression:    Consult Note requested by hospitalist    Patient presented with 2-3 days of increasing dyspnea. Son noted hypoxemia on outpatient pulse ox. She has not had problems like this before. On outpatient ANN, but nothing else. Does not see a pulmonologist. Reports smoking hx, but stopped in ; smoked for 20 years. Lives with son.      22 CTA  IMPRESSION  1. No evidence of pulmonary embolism. 2. Prior granulomatous disease. 3. T12 compression deformity. ECHO 21  Result status: Final result  · Limited study for the assessment of ejection fraction and pulmonary pressures. · Image quality for this study was technically difficult due to limited acoustic windows. · LV: Estimated LVEF is 55 - 60%. Visually measured ejection fraction. Normal cavity size, wall thickness and systolic function (ejection fraction normal). Wall motion: normal.  · PA: Pulmonary arterial systolic pressure is 30 mmHg. · TV: Mild tricuspid valve regurgitation is present. I have reviewed the labs and previous days notes. A comprehensive review of systems was negative.   Past Medical History:   Diagnosis Date    Adenoma of colon     Anemia     Arthritis     Atrial fibrillation (HCC)     Bloating     Chronic constipation     Chronic pain     back     COPD (chronic obstructive pulmonary disease) ([facility] [address]     GERD (gastroesophageal reflux disease)     [place] (hard of hearing)     Hyponatremia     caused seizures x 2 per pt    Indigestion     Osteoporosis     Seizures ([facility] [address] 7/2020, 9/2020    x 2       Past Surgical History:   Procedure Laterality Date    COLONOSCOPY N/A 12/16/2021    COLONOSCOPY   :- performed by Clif Saini MD at [place] [address] N/A 2/14/2022    . performed by Victoria Nava MD at [address] [place] [address] [address] [facility]    HX [address] [facility]    HX HIP REPLACEMENT Left     pt stated hip was pinned, not replaced    HX HIP REPLACEMENT Right     pt stated hip was pinned, not replaced    HX HYSTERECTOMY      HX ORTHOPAEDIC  1990, 2011    bilateral hip fractures     HX ORTHOPAEDIC Left 2020    femur fracture    HX TONSILLECTOMY        Prior to Admission medications    Medication Sig Start Date End Date Taking? Authorizing Provider   doxycycline (VIBRA-TABS) 100 mg tablet Take 1 Tablet by mouth every twelve (12) hours. 6/30/22  Yes Severo Davidson MD   predniSONE (DELTASONE) 10 mg tablet Take 40 mg by mouth daily (with breakfast). To be decreased by 10 mg every 3 days and then stop 6/30/22  Yes Severo Davidson MD   cyclobenzaprine (FLEXERIL) 10 mg tablet Take 10 mg by mouth three (3) times daily as needed. 3/15/22   Provider, Historical   dicyclomine (BENTYL) 20 mg tablet Take 20 mg by mouth. 3/15/22   Provider, Historical   docusate sodium (COLACE) 100 mg capsule Take 1 Capsule by mouth two (2) times a day. 2/20/22   Ishan Smith MD   aspirin (ASPIRIN) 325 mg tablet Take 81 mg by mouth daily. Provider, Historical   multivitamin (ONE A DAY) tablet Take 1 Tablet by mouth daily. Provider, Historical   acetaminophen (TYLENOL) 325 mg tablet Take 650 mg by mouth every four (4) hours as needed for Pain. Provider, Historical   digoxin (LANOXIN) 0.125 mg tablet Take 1 Tablet by mouth daily.   Patient taking differently: Take 0.125 mg by mouth Every morning. 11/15/21   Padmaja Stanley MD   albuterol (PROVENTIL HFA, VENTOLIN HFA, PROAIR HFA) 90 mcg/actuation inhaler Take 2 Puffs by inhalation every six (6) hours as needed. 21   Provider, Historical   butalbital-acetaminophen-caffeine (FIORICET, ESGIC) -40 mg per tablet Take 1-2 Tablets by mouth every six (6) hours as needed. 12/15/20   Provider, Historical   lactulose (CHRONULAC) 10 gram/15 mL solution Take 20 g by mouth two (2) times daily as needed. 12/15/20   Provider, Historical   metoprolol succinate (TOPROL-XL) 25 mg XL tablet Take 25 mg by mouth Every morning. Provider, Historical   pantoprazole (PROTONIX) 40 mg tablet Take 40 mg by mouth daily. 12/15/20   Provider, Historical     Allergies   Allergen Reactions    Cefuroxime Hives    Hydrocodone Nausea and Vomiting    Oxycodone Nausea and Vomiting    Penicillins Hives     Patient screened for any delayed non-IgE-mediated reaction to PCN.         Patient notes the following:    No delayed non-IgE-mediated reaction to PCN    Hives 1969            Social History     Tobacco Use    Smoking status: Former Smoker     Packs/day: 1.00     Years: 20.00     Pack years: 20.00     Quit date: 2011     Years since quitting: 10.5    Smokeless tobacco: Never Used   Substance Use Topics    Alcohol use: Never      Family History   Problem Relation Age of Onset    Heart Disease Mother     Heart Disease Father     Liver Disease Father     Alcohol abuse Father     Anesth Problems Neg Hx      OBJECTIVE:     Vital Signs:       Visit Vitals  /73 (BP 1 Location: Left upper arm, BP Patient Position: At rest)   Pulse 98   Temp 99.2 °F (37.3 °C)   Resp 18   Ht 5' 3\" (1.6 m)   Wt 42.2 kg (93 lb)   SpO2 96%   BMI 16.47 kg/m²      Temp (24hrs), Av.5 °F (36.9 °C), Min:97.9 °F (36.6 °C), Max:99.2 °F (37.3 °C)     Intake/Output:     Last shift: No intake/output data recorded.     Last 3 shifts:  1901 -  0700  In: -   Out: 151 [Urine:150]          Intake/Output Summary (Last 24 hours) at 6/30/2022 1313  Last data filed at 6/30/2022 0538  Gross per 24 hour   Intake --   Output 151 ml   Net -151 ml       Physical Exam:                                        Exam Findings Other   General: No resp distress noted, appears stated age    [de-identified]:  No ulcers, JVD not elevated, no cervical LAD    Chest: No pectus deformity, normal chest rise b/l    HEART:  RRR, no murmurs/rubs/gallops    Lungs:  diminished BS at bases    ABD: Soft/NT, non rigid mildly distended    EXT: No cyanosis/clubbing/edema, normal peripheral pulses    Skin: No rashes or ulcers, no mottling    Neuro: A/O x 3        Medications:  Current Facility-Administered Medications   Medication Dose Route Frequency    butalbital-acetaminophen-caffeine (FIORICET, ESGIC) -40 mg per tablet 1-2 Tablet  1-2 Tablet Oral Q6H PRN    lactulose (CHRONULAC) 10 gram/15 mL solution 30 mL  20 g Oral BID PRN    metoprolol succinate (TOPROL-XL) XL tablet 25 mg  25 mg Oral QAM    pantoprazole (PROTONIX) tablet 40 mg  40 mg Oral DAILY    albuterol (PROVENTIL VENTOLIN) nebulizer solution 2.5 mg  2.5 mg Nebulization Q4H PRN    digoxin (LANOXIN) tablet 0.125 mg  0.125 mg Oral QAM    docusate sodium (COLACE) capsule 100 mg  100 mg Oral BID    cyclobenzaprine (FLEXERIL) tablet 10 mg  10 mg Oral TID PRN    dicyclomine (BENTYL) tablet 20 mg  20 mg Oral AC&HS    sodium chloride (NS) flush 5-40 mL  5-40 mL IntraVENous Q8H    sodium chloride (NS) flush 5-40 mL  5-40 mL IntraVENous PRN    acetaminophen (TYLENOL) tablet 650 mg  650 mg Oral Q6H PRN    Or    acetaminophen (TYLENOL) suppository 650 mg  650 mg Rectal Q6H PRN    polyethylene glycol (MIRALAX) packet 17 g  17 g Oral DAILY PRN    promethazine (PHENERGAN) tablet 12.5 mg  12.5 mg Oral Q6H PRN    Or    ondansetron (ZOFRAN) injection 4 mg  4 mg IntraVENous Q6H PRN    multivitamin, tx-iron-ca-min (THERA-M w/ IRON) tablet 1 Tablet  1 Tablet Oral DAILY    alum-mag hydroxide-simeth (MYLANTA) oral suspension 30 mL  30 mL Oral Q4H PRN    methylPREDNISolone (PF) (SOLU-MEDROL) injection 40 mg  40 mg IntraVENous Q6H    albuterol-ipratropium (DUO-NEB) 2.5 MG-0.5 MG/3 ML  3 mL Nebulization Q4H PRN    doxycycline (VIBRA-TABS) tablet 100 mg  100 mg Oral Q12H    enoxaparin (LOVENOX) injection 30 mg  30 mg SubCUTAneous DAILY    lidocaine 4 % patch 1 Patch  1 Patch TransDERmal Q24H       Labs:  ABG No results for input(s): PHI, PCO2I, PO2I, HCO3I, SO2I, FIO2I in the last 72 hours.      CBC Recent Labs     06/30/22  0353 06/29/22  0439   WBC 4.2 6.3   HGB 8.7* 8.6*   HCT 28.0* 28.2*   * 436*   MCV 79.3* 80.3   MCH 24.6* 88.1*        Metabolic  Panel Recent Labs     06/30/22  0353 06/29/22  0439    141   K 4.2 4.0   * 111*   CO2 22 19*   * 109*   BUN 13 12   CREA 0.87 0.82   CA 9.0 8.8   MG  --  1.9   ALB 3.5 3.3*   ALT 18 17        Pertinent Labs                Yordy Ahumada PA-C  6/30/2022

## 2022-06-30 NOTE — PROGRESS NOTES
Comprehensive Nutrition Assessment    Type and Reason for Visit: Initial    Nutrition Recommendations/Plan:   1. Continue 2g sodium regular diet  2. Ensure enlive van TID       Malnutrition Assessment:  Malnutrition Status:  Severe malnutrition (06/30/22 1434)    Context:  Chronic illness     Findings of the 6 clinical characteristics of malnutrition:   Energy Intake:  No significant decrease in energy intake  Weight Loss:  No significant weight loss     Body Fat Loss:  Severe body fat loss, Orbital,Triceps   Muscle Mass Loss:  Severe muscle mass loss, Clavicles (pectoralis &deltoids),Hand (interosseous),Temples (temporalis)  Fluid Accumulation:  No significant fluid accumulation,     Strength:  Not performed            Nutrition Assessment:    PMHx: Afib, anemia, adenoma of colon, COPD, osteoporosis    77 y.o. female admitted with generalized weakness, pain, diarrhea, SOB 2 days pta. Visited pt at bedside for low BMI of 16. Pt stated she was anorexic/bulemic for 25 years when she was younger. Now she typically eats 5-6 small meals/day, maybe full fat yogurt or cottag cheese, oatmeal, eggs, or tuna. Stated she was having diarrhea 1 week pta and she \"probably\" lost some weight during that time. She was still able to eat 5-6 small meals/day during this time. UBW 93#, current wt 93#- ? accuracy as wt is stated. She said she weighed 76# in 2018 but has kept weight on since then. She likes vanilla ice cream, cookies, chips and vegetables. She would like to receive Ensure enlive van TID. Labs reviewed.       Nutritionally Significant Medications:  Digoxin, colace, doxycycline, methylprednisolone, toprol xl, thera-m w/ iron, protonix, mylanta      Estimated Daily Nutrient Needs:  Energy Requirements Based On: Kcal/kg  Weight Used for Energy Requirements: Current  Energy (kcal/day): 1470  Weight Used for Protein Requirements: Current  Protein (g/day): 46  Method Used for Fluid Requirements: 1 ml/kcal  Fluid (ml/day): 1500    Nutrition Related Findings:   Edema: No data recorded    Last BM: 06/29/22, Loose    Wounds: None      Current Nutrition Therapies:  Diet: regular 2g Na  Supplements: ensure enlive TID  Meal intake:   Patient Vitals for the past 168 hrs:   % Diet Eaten   06/30/22 0908 76 - 100%     Supplement intake: No data found. Nutrition Support: none      Anthropometric Measures:  Height: 5' 3\" (160 cm)  Ideal Body Weight (IBW): 115 lbs (52 kg)     Current Body Wt:  42.2 kg (93 lb 0.6 oz), 80.9 % IBW.  Stated  Current BMI (kg/m2): 16.5  Usual Body Weight: 42.2 kg (93 lb)  % Weight Change (Calculated): 0    BMI Category: Underweight (BMI less than 18.5)    Wt Readings from Last 10 Encounters:   06/29/22 42.2 kg (93 lb)   06/23/22 42.2 kg (93 lb)   06/16/22 42.2 kg (93 lb)   03/31/22 42.4 kg (93 lb 6.4 oz)   03/10/22 41.7 kg (92 lb)   03/01/22 43.1 kg (95 lb)   02/14/22 42.4 kg (93 lb 7.6 oz)   02/04/22 42.7 kg (94 lb 3.2 oz)   01/13/22 42 kg (92 lb 9.6 oz)   12/16/21 42.6 kg (94 lb)           Nutrition Diagnosis:   · Severe malnutrition,In context of chronic illness related to inadequate protein-energy intake as evidenced by BMI,Criteria as identified in malnutrition assessment (BMI 16.4)      Nutrition Interventions:   Food and/or Nutrient Delivery: Continue current diet,Continue oral nutrition supplement  Nutrition Education/Counseling: No recommendations at this time  Coordination of Nutrition Care: Continue to monitor while inpatient       Goals:     Goals: PO intake 75% or greater,by next RD assessment       Nutrition Monitoring and Evaluation:   Behavioral-Environmental Outcomes: None identified  Food/Nutrient Intake Outcomes: Food and nutrient intake,Supplement intake  Physical Signs/Symptoms Outcomes: Biochemical data,Meal time behavior,Weight,Nutrition focused physical findings    Discharge Planning:    Continue oral nutrition supplement,Continue current diet    Charlie Kerns  Available via [address] [place] [address]

## 2022-06-30 NOTE — DISCHARGE SUMMARY
Discharge Summary       PATIENT ID: Arely Marie  MRN: [id number]   YOB: 1945    DATE OF ADMISSION: 6/29/2022  4:02 AM    DATE OF DISCHARGE: 6/30/2022   PRIMARY CARE PROVIDER: Lilibeth Bernal NP     ATTENDING PHYSICIAN: Dr Montana Robison  DISCHARGING PROVIDER: Montana Robison MD    To contact this individual call [phone] and ask the  to page. If unavailable ask to be transferred the Adult Hospitalist Department. CONSULTATIONS: IP CONSULT TO HOSPITALIST  IP CONSULT TO PULMONOLOGY    PROCEDURES/SURGERIES: * No surgery found *    DISCHARGE DIAGNOSES:   Generalized aches, headache, diarrhea, and shortness of breath  COPD exacerbation  -Solumedrol/Doxy  -Duonebs  -PT/OT home health    Paroxysmal atrial fibrillation: Continue home meds  Anemia:  Chronic. Recently, the patient received blood transfusion as an outpatient. Outpatient followup with primary care physician. T12 compression fracture. Not significant pain. Monitor. ADDITIONAL CARE RECOMMENDATIONS:   Follow up with PMD   Follow up with Pulmonary     NOTIFY YOUR PHYSICIAN FOR ANY OF THE FOLLOWING:   Fever over 101 degrees for 24 hours. Chest pain, shortness of breath, fever, chills, nausea, vomiting, diarrhea, change in mentation, falling, weakness, bleeding. Severe pain or pain not relieved by medications, as well as any other signs or symptoms that you may have questions about. FOLLOW UP APPOINTMENTS:    Follow-up Information     Follow up With Specialties Details Why Contact Info    Lilibeth Bernal NP Family Medicine, General Practice In 1 week  [facility]  Mail Stop [id number] [address] [facility] [phone]               DIET: Cardiac Diet    ACTIVITY: Activity as tolerated    DISCHARGE MEDICATIONS:  Current Discharge Medication List      START taking these medications    Details   doxycycline (VIBRA-TABS) 100 mg tablet Take 1 Tablet by mouth every twelve (12) hours.   Qty: 14 Tablet, Refills: 0  Start date: 6/30/2022      predniSONE (DELTASONE) 10 mg tablet Take 40 mg by mouth daily (with breakfast). To be decreased by 10 mg every 3 days and then stop  Qty: 30 Tablet, Refills: 0  Start date: 6/30/2022         CONTINUE these medications which have NOT CHANGED    Details   cyclobenzaprine (FLEXERIL) 10 mg tablet Take 10 mg by mouth three (3) times daily as needed. dicyclomine (BENTYL) 20 mg tablet Take 20 mg by mouth. docusate sodium (COLACE) 100 mg capsule Take 1 Capsule by mouth two (2) times a day. Qty: 30 Capsule, Refills: 0      aspirin (ASPIRIN) 325 mg tablet Take 81 mg by mouth daily. multivitamin (ONE A DAY) tablet Take 1 Tablet by mouth daily. acetaminophen (TYLENOL) 325 mg tablet Take 650 mg by mouth every four (4) hours as needed for Pain.      digoxin (LANOXIN) 0.125 mg tablet Take 1 Tablet by mouth daily. Qty: 90 Tablet, Refills: 1    Comments: Dose DECREASED to 0.125mg daily 11/15/21      albuterol (PROVENTIL HFA, VENTOLIN HFA, PROAIR HFA) 90 mcg/actuation inhaler Take 2 Puffs by inhalation every six (6) hours as needed. butalbital-acetaminophen-caffeine (FIORICET, ESGIC) -40 mg per tablet Take 1-2 Tablets by mouth every six (6) hours as needed. lactulose (CHRONULAC) 10 gram/15 mL solution Take 20 g by mouth two (2) times daily as needed. metoprolol succinate (TOPROL-XL) 25 mg XL tablet Take 25 mg by mouth Every morning. pantoprazole (PROTONIX) 40 mg tablet Take 40 mg by mouth daily.              DISPOSITION:  x  Home With:   OT  PT  HH  RN       Long term SNF/Inpatient Rehab    Independent/assisted living    Hospice    Other:       PATIENT CONDITION AT DISCHARGE:     Functional status    Poor     Deconditioned    x Independent      Cognition    x Lucid     Forgetful     Dementia      Catheters/lines (plus indication)    Kendrick     PICC     PEG    x None      Code status    x Full code     DNR      PHYSICAL EXAMINATION AT DISCHARGE:  Please see progress note      CHRONIC MEDICAL DIAGNOSES:  Problem List as of 6/30/2022 Date Reviewed: 6/29/2022          Codes Class Noted - Resolved    Acute respiratory failure ([facility] [address] ICD-10-CM: J96.00  ICD-9-CM: 518.81  6/29/2022 - Present        * (Principal) Respiratory distress ICD-10-CM: R06.03  ICD-9-CM: 786.09  6/29/2022 - Present        Adenomatous colon polyp ICD-10-CM: D12.6  ICD-9-CM: 211.3  2/14/2022 - Present        Adenomatous polyp of colon ICD-10-CM: D12.6  ICD-9-CM: 211.3  2/14/2022 - Present        Adenomatous polyp of descending colon ICD-10-CM: D12.4  ICD-9-CM: 211.3  1/13/2022 - Present              Greater than 37 minutes were spent with the patient on counseling and coordination of care    Signed:   Demarcus Montes MD  6/30/2022  1:13 PM   .

## 2022-06-30 NOTE — PROGRESS NOTES
JEREMIE PLAN:  RUR-Obs  Disposition-Home with son  Transportation by son  F/U with PCP/Specialist    Medicare Outpatient Observation Notice (MOON)/ [place] Outpatient Observation Notice (Midgalia Donnelly) provided to patient/representative with verbal explanation of the notice. Time allotted for questions regarding the notice. Patient /representative provided a completed copy of the MOON/VOON notice. Copy placed on bedside chart. Reason for Admission:  SOB, diarrhea, poor appetitive                     RUR Score: Obs                  Plan for utilizing home health:    N/A      PCP: First and Last name:  Galo Lee NP     Name of Practice:    Are you a current patient: Yes/No: Yes   Approximate date of last visit:    Can you participate in a virtual visit with your PCP: Yes                    Current Advanced Directive/Advance Care Plan: Full Code      Healthcare Decision Maker:   Click here to complete [address] including selection of the Healthcare Decision Maker Relationship (ie \"Primary\")             Primary Decision Maker: Kylah Bliss - Son - [phone]                  Transition of Care Plan:   CM met with patient to discuss discharge planning. Patient alert and oriented x 4. Patient answered all questions appropriately. She said she lives with her son in an apartment with other roommates. .She lives in the downstairs apartment. Patient said she has a scooter, a rollator,a W/C and a shower chair. Patient said her son Sun Bonilla assists with her meals and shopping as well transports her to appointments. Patient said she has a daughter Shabbir Marks who is a [employer] but does not associate with her and her son due to her Restorationism.      CM informed patient that therapy recommended home PT, patient declined home PT stating that she had home therapy following her last hospitalization and was taught everything she needed to know, therefore did not see a need for another home therapy visits  Patient awaiting a Pulmonary consult. She did not voice any discharge needs and she verified her demographic information. Natalie Irwin RN, CRM  Care Management Interventions  PCP Verified by CM: Yes  Palliative Care Criteria Met (RRAT>21 & CHF Dx)?: No  Mode of Transport at Discharge:  Other (see comment) (Private car)  Transition of Care Consult (CM Consult): Discharge Planning  MyChart Signup: No  Discharge Durable Medical Equipment: No  Health Maintenance Reviewed: Yes  Physical Therapy Consult: Yes  Occupational Therapy Consult: Yes  Speech Therapy Consult: No  Support Systems: Other Family Member(s)  Confirm Follow Up Transport: Family  Discharge Location  Patient Expects to be Discharged to[de-identified] Home with family assistance

## 2022-06-30 NOTE — PROGRESS NOTES
[address] [facility] Adult  Hospitalist Group                                                                                          Hospitalist Progress Note  Montana Robison MD  Answering service: [phone] OR 36 from in house phone        Date of Service:  2022  NAME:  Arely Marie  :  1945  MRN:  [id number]      Admission Summary: This is a 27-year-old female. She is not one of the best historians, but it seems like she has a history of chronic atrial fibrillation; anemia; adenoma of colon, status post surgery; history of seizures; and COPD. She comes over here because of shortness of breath, generalized weakness, and pain. She says that yesterday she started having diarrhea. She continues to have diarrhea this morning and also she felt shortness of breath and this morning she checked her oxygenation and it was found to be 88. At that time, the patient was brought into the ER for further evaluation and management. She also says that she has been having generalized body ache and headache since yesterday. She says that she never had any fever, unusual cough, nausea, or vomiting. No nasal congestion. She does feel that her appetite has severely diminished in the last couple of days. She denies any wheezing. Also she says that at home, she was given albuterol inhaler, but without any significant relief.          Interval history / Subjective:   F/u COPD exacerbation  Feels better     Assessment & Plan:     Generalized aches, headache, diarrhea, and shortness of breath  COPD exacerbation  -Solumedrol/Doxy  -Duonebs  -PT/OT home health    Paroxysmal atrial fibrillation: Continue home meds  Anemia:  Chronic. Recently, the patient received blood transfusion as an outpatient. Outpatient followup with primary care physician. T12 compression fracture. Not significant pain. Monitor.     Cardiac diet      PT/OT Home health       Code status: FULL CODE  Prophylaxis: Lovenox    Plan: Discharge home   Care Plan discussed with: Patient  Anticipated Disposition: home     Hospital Problems  Date Reviewed: 6/29/2022          Codes Class Noted POA    Acute respiratory failure [facility]) ICD-10-CM: J96.00  ICD-9-CM: 518.81  6/29/2022 Unknown        * (Principal) Respiratory distress ICD-10-CM: R06.03  ICD-9-CM: 786.09  6/29/2022 Yes                Review of Systems:   A comprehensive review of systems was negative except for that written in the HPI. Vital Signs:    Last 24hrs VS reviewed since prior progress note. Most recent are:  Visit Vitals  /73 (BP 1 Location: Left upper arm, BP Patient Position: At rest)   Pulse 98   Temp 99.2 °F (37.3 °C)   Resp 18   Ht 5' 3\" (1.6 m)   Wt 42.2 kg (93 lb)   SpO2 96%   BMI 16.47 kg/m²         Intake/Output Summary (Last 24 hours) at 6/30/2022 1306  Last data filed at 6/30/2022 0538  Gross per 24 hour   Intake --   Output 151 ml   Net -151 ml        Physical Examination:     I had a face to face encounter with this patient and independently examined them on 6/30/2022 as outlined below:          Constitutional:  No acute distress   ENT:  Oral mucosa moist, oropharynx benign. Resp:  CTA bilaterally. No wheezing/rhonchi/rales. No accessory muscle use. CV:  Regular rhythm, normal rate, no murmurs, gallops, rubs    GI:  Soft, non distended, non tender. normoactive bowel sounds, no hepatosplenomegaly     Musculoskeletal:  No edema, warm, 2+ pulses throughout    Neurologic:  Moves all extremities.   AAOx3, CN II-XII reviewed            Data Review:    Review and/or order of clinical lab test      Labs:     Recent Labs     06/30/22  0353 06/29/22  0439   WBC 4.2 6.3   HGB 8.7* 8.6*   HCT 28.0* 28.2*   * 436*     Recent Labs     06/30/22  0353 06/29/22  0439    141   K 4.2 4.0   * 111*   CO2 22 19*   BUN 13 12   CREA 0.87 0.82   * 109*   CA 9.0 8.8   MG  --  1.9     Recent Labs     06/30/22  0353 06/29/22  0439   ALT 18 17   AP 98 97 TBILI 0.2 0.2   TP 6.5 6.4   ALB 3.5 3.3*   GLOB 3.0 3.1     No results for input(s): INR, PTP, APTT, INREXT in the last 72 hours. No results for input(s): FE, TIBC, PSAT, FERR in the last 72 hours. No results found for: FOL, RBCF   No results for input(s): PH, PCO2, PO2 in the last 72 hours. No results for input(s): CPK, CKNDX, TROIQ in the last 72 hours.     No lab exists for component: CPKMB  No results found for: CHOL, CHOLX, CHLST, CHOLV, HDL, HDLP, LDL, LDLC, DLDLP, TGLX, TRIGL, TRIGP, CHHD, CHHDX  Lab Results   Component Value Date/Time    Glucose (POC) 145 (H) 02/14/2022 09:01 PM     No results found for: COLOR, APPRN, SPGRU, REFSG, FRANK, PROTU, GLUCU, KETU, BILU, UROU, BRITTANY, LEUKU, GLUKE, EPSU, BACTU, WBCU, RBCU, CASTS, UCRY      Medications Reviewed:     Current Facility-Administered Medications   Medication Dose Route Frequency    butalbital-acetaminophen-caffeine (FIORICET, ESGIC) -40 mg per tablet 1-2 Tablet  1-2 Tablet Oral Q6H PRN    lactulose (CHRONULAC) 10 gram/15 mL solution 30 mL  20 g Oral BID PRN    metoprolol succinate (TOPROL-XL) XL tablet 25 mg  25 mg Oral QAM    pantoprazole (PROTONIX) tablet 40 mg  40 mg Oral DAILY    albuterol (PROVENTIL VENTOLIN) nebulizer solution 2.5 mg  2.5 mg Nebulization Q4H PRN    digoxin (LANOXIN) tablet 0.125 mg  0.125 mg Oral QAM    docusate sodium (COLACE) capsule 100 mg  100 mg Oral BID    cyclobenzaprine (FLEXERIL) tablet 10 mg  10 mg Oral TID PRN    dicyclomine (BENTYL) tablet 20 mg  20 mg Oral AC&HS    sodium chloride (NS) flush 5-40 mL  5-40 mL IntraVENous Q8H    sodium chloride (NS) flush 5-40 mL  5-40 mL IntraVENous PRN    acetaminophen (TYLENOL) tablet 650 mg  650 mg Oral Q6H PRN    Or    acetaminophen (TYLENOL) suppository 650 mg  650 mg Rectal Q6H PRN    polyethylene glycol (MIRALAX) packet 17 g  17 g Oral DAILY PRN    promethazine (PHENERGAN) tablet 12.5 mg  12.5 mg Oral Q6H PRN    Or    ondansetron (ZOFRAN) injection 4 mg 4 mg IntraVENous Q6H PRN    multivitamin, tx-iron-ca-min (THERA-M w/ IRON) tablet 1 Tablet  1 Tablet Oral DAILY    alum-mag hydroxide-simeth (MYLANTA) oral suspension 30 mL  30 mL Oral Q4H PRN    methylPREDNISolone (PF) (SOLU-MEDROL) injection 40 mg  40 mg IntraVENous Q6H    albuterol-ipratropium (DUO-NEB) 2.5 MG-0.5 MG/3 ML  3 mL Nebulization Q4H PRN    doxycycline (VIBRA-TABS) tablet 100 mg  100 mg Oral Q12H    enoxaparin (LOVENOX) injection 30 mg  30 mg SubCUTAneous DAILY    lidocaine 4 % patch 1 Patch  1 Patch TransDERmal Q24H     ______________________________________________________________________  EXPECTED LENGTH OF STAY: - - -  ACTUAL LENGTH OF STAY:          Arturo Vizcaino MD

## 2022-07-07 ENCOUNTER — HOSPITAL ENCOUNTER (OUTPATIENT)
Dept: GENERAL RADIOLOGY | Age: 77
Discharge: HOME | End: 2022-07-07
Attending: PHYSICIAN ASSISTANT
Payer: MEDICARE

## 2022-07-07 ENCOUNTER — TRANSCRIBE ORDER (OUTPATIENT)
Dept: GENERAL RADIOLOGY | Age: 77
End: 2022-07-07

## 2022-07-07 DIAGNOSIS — M54.50 LOW BACK PAIN: Primary | ICD-10-CM

## 2022-07-07 DIAGNOSIS — M54.50 LOW BACK PAIN: ICD-10-CM

## 2022-07-07 DIAGNOSIS — M54.6 PAIN IN THORACIC SPINE: ICD-10-CM

## 2022-07-07 DIAGNOSIS — M54.6 PAIN IN THORACIC SPINE: Primary | ICD-10-CM

## 2022-07-07 PROCEDURE — 72110 X-RAY EXAM L-2 SPINE 4/>VWS: CPT

## 2022-07-07 PROCEDURE — 72070 X-RAY EXAM THORAC SPINE 2VWS: CPT

## 2022-07-21 ENCOUNTER — ANCILLARY PROCEDURE (OUTPATIENT)
Dept: CARDIOLOGY CLINIC | Age: 77
End: 2022-07-21
Payer: MEDICARE

## 2022-07-21 VITALS — BODY MASS INDEX: 16.48 KG/M2 | WEIGHT: 93 LBS | HEIGHT: 63 IN

## 2022-07-21 DIAGNOSIS — R06.02 SOB (SHORTNESS OF BREATH): ICD-10-CM

## 2022-07-21 LAB
ECHO AO ROOT DIAM: 3.2 CM
ECHO AO ROOT INDEX: 2.29 CM/M2
ECHO EST RA PRESSURE: 3 MMHG
ECHO LA DIAMETER INDEX: 2.14 CM/M2
ECHO LA DIAMETER: 3 CM
ECHO LA TO AORTIC ROOT RATIO: 0.94
ECHO LV EDV A2C: 83 ML
ECHO LV EDV A4C: 85 ML
ECHO LV EDV BP: 84 ML (ref 56–104)
ECHO LV EDV INDEX A4C: 61 ML/M2
ECHO LV EDV INDEX BP: 60 ML/M2
ECHO LV EDV NDEX A2C: 59 ML/M2
ECHO LV EJECTION FRACTION A2C: 54 %
ECHO LV EJECTION FRACTION A4C: 59 %
ECHO LV EJECTION FRACTION BIPLANE: 56 % (ref 55–100)
ECHO LV ESV A2C: 38 ML
ECHO LV ESV A4C: 35 ML
ECHO LV ESV BP: 36 ML (ref 19–49)
ECHO LV ESV INDEX A2C: 27 ML/M2
ECHO LV ESV INDEX A4C: 25 ML/M2
ECHO LV ESV INDEX BP: 26 ML/M2
ECHO LV FRACTIONAL SHORTENING: 28 % (ref 28–44)
ECHO LV INTERNAL DIMENSION DIASTOLE INDEX: 2.86 CM/M2
ECHO LV INTERNAL DIMENSION DIASTOLIC: 4 CM (ref 3.9–5.3)
ECHO LV INTERNAL DIMENSION SYSTOLIC INDEX: 2.07 CM/M2
ECHO LV INTERNAL DIMENSION SYSTOLIC: 2.9 CM
ECHO LV IVSD: 0.7 CM (ref 0.6–0.9)
ECHO LV MASS 2D: 78.4 G (ref 67–162)
ECHO LV MASS INDEX 2D: 56 G/M2 (ref 43–95)
ECHO LV POSTERIOR WALL DIASTOLIC: 0.7 CM (ref 0.6–0.9)
ECHO LV RELATIVE WALL THICKNESS RATIO: 0.35
ECHO PULMONARY ARTERY SYSTOLIC PRESSURE (PASP): 27 MMHG
ECHO TV REGURGITANT PEAK GRADIENT: 24 MMHG

## 2022-07-21 PROCEDURE — 93325 DOPPLER ECHO COLOR FLOW MAPG: CPT | Performed by: SPECIALIST

## 2022-07-21 PROCEDURE — 93308 TTE F-UP OR LMTD: CPT | Performed by: SPECIALIST

## 2022-07-21 PROCEDURE — 93321 DOPPLER ECHO F-UP/LMTD STD: CPT | Performed by: SPECIALIST

## 2022-08-21 ENCOUNTER — APPOINTMENT (OUTPATIENT)
Dept: GENERAL RADIOLOGY | Age: 77
DRG: 811 | End: 2022-08-21
Attending: EMERGENCY MEDICINE
Payer: MEDICARE

## 2022-08-21 ENCOUNTER — HOSPITAL ENCOUNTER (INPATIENT)
Age: 77
LOS: 4 days | Discharge: HOME HEALTH | DRG: 811 | End: 2022-08-25
Attending: EMERGENCY MEDICINE | Admitting: FAMILY MEDICINE
Payer: MEDICARE

## 2022-08-21 DIAGNOSIS — D64.9 SYMPTOMATIC ANEMIA: Primary | ICD-10-CM

## 2022-08-21 PROBLEM — I95.9 HYPOTENSION: Status: ACTIVE | Noted: 2022-08-21

## 2022-08-21 PROBLEM — E87.20 METABOLIC ACIDOSIS: Status: ACTIVE | Noted: 2022-08-21

## 2022-08-21 PROBLEM — E86.0 DEHYDRATION: Status: ACTIVE | Noted: 2022-08-21

## 2022-08-21 LAB
ALBUMIN SERPL-MCNC: 3.5 G/DL (ref 3.5–5)
ALBUMIN/GLOB SERPL: 1 {RATIO} (ref 1.1–2.2)
ALP SERPL-CCNC: 87 U/L (ref 45–117)
ALT SERPL-CCNC: 21 U/L (ref 12–78)
ANION GAP SERPL CALC-SCNC: 10 MMOL/L (ref 5–15)
AST SERPL-CCNC: 25 U/L (ref 15–37)
BASOPHILS # BLD: 0.1 K/UL (ref 0–0.1)
BASOPHILS NFR BLD: 1 % (ref 0–1)
BILIRUB SERPL-MCNC: 0.1 MG/DL (ref 0.2–1)
BUN SERPL-MCNC: 29 MG/DL (ref 6–20)
BUN/CREAT SERPL: 24 (ref 12–20)
CALCIUM SERPL-MCNC: 9.1 MG/DL (ref 8.5–10.1)
CHLORIDE SERPL-SCNC: 105 MMOL/L (ref 97–108)
CO2 SERPL-SCNC: 19 MMOL/L (ref 21–32)
CREAT SERPL-MCNC: 1.2 MG/DL (ref 0.55–1.02)
DIFFERENTIAL METHOD BLD: ABNORMAL
EOSINOPHIL # BLD: 0.2 K/UL (ref 0–0.4)
EOSINOPHIL NFR BLD: 2 % (ref 0–7)
ERYTHROCYTE [DISTWIDTH] IN BLOOD BY AUTOMATED COUNT: 21.2 % (ref 11.5–14.5)
GLOBULIN SER CALC-MCNC: 3.6 G/DL (ref 2–4)
GLUCOSE SERPL-MCNC: 107 MG/DL (ref 65–100)
HCT VFR BLD AUTO: 23 % (ref 35–47)
HGB BLD-MCNC: 7 G/DL (ref 11.5–16)
HISTORY CHECKED?,CKHIST: NORMAL
IMM GRANULOCYTES # BLD AUTO: 0.1 K/UL (ref 0–0.04)
IMM GRANULOCYTES NFR BLD AUTO: 1 % (ref 0–0.5)
LYMPHOCYTES # BLD: 1.6 K/UL (ref 0.8–3.5)
LYMPHOCYTES NFR BLD: 15 % (ref 12–49)
MCH RBC QN AUTO: 22.7 PG (ref 26–34)
MCHC RBC AUTO-ENTMCNC: 30.4 G/DL (ref 30–36.5)
MCV RBC AUTO: 74.7 FL (ref 80–99)
MONOCYTES # BLD: 0.9 K/UL (ref 0–1)
MONOCYTES NFR BLD: 8 % (ref 5–13)
NEUTS SEG # BLD: 8 K/UL (ref 1.8–8)
NEUTS SEG NFR BLD: 73 % (ref 32–75)
NRBC # BLD: 0 K/UL (ref 0–0.01)
NRBC BLD-RTO: 0 PER 100 WBC
PLATELET # BLD AUTO: 483 K/UL (ref 150–400)
PMV BLD AUTO: 9.2 FL (ref 8.9–12.9)
POTASSIUM SERPL-SCNC: 4.3 MMOL/L (ref 3.5–5.1)
PROT SERPL-MCNC: 7.1 G/DL (ref 6.4–8.2)
RBC # BLD AUTO: 3.08 M/UL (ref 3.8–5.2)
RBC MORPH BLD: ABNORMAL
SODIUM SERPL-SCNC: 134 MMOL/L (ref 136–145)
TROPONIN-HIGH SENSITIVITY: 8 NG/L (ref 0–51)
WBC # BLD AUTO: 10.9 K/UL (ref 3.6–11)

## 2022-08-21 PROCEDURE — 36430 TRANSFUSION BLD/BLD COMPNT: CPT

## 2022-08-21 PROCEDURE — 74011250636 HC RX REV CODE- 250/636: Performed by: EMERGENCY MEDICINE

## 2022-08-21 PROCEDURE — 93005 ELECTROCARDIOGRAM TRACING: CPT

## 2022-08-21 PROCEDURE — 71045 X-RAY EXAM CHEST 1 VIEW: CPT

## 2022-08-21 PROCEDURE — 86923 COMPATIBILITY TEST ELECTRIC: CPT

## 2022-08-21 PROCEDURE — 86900 BLOOD TYPING SEROLOGIC ABO: CPT

## 2022-08-21 PROCEDURE — 36415 COLL VENOUS BLD VENIPUNCTURE: CPT

## 2022-08-21 PROCEDURE — 90791 PSYCH DIAGNOSTIC EVALUATION: CPT

## 2022-08-21 PROCEDURE — P9016 RBC LEUKOCYTES REDUCED: HCPCS

## 2022-08-21 PROCEDURE — 74011250637 HC RX REV CODE- 250/637: Performed by: EMERGENCY MEDICINE

## 2022-08-21 PROCEDURE — 65270000046 HC RM TELEMETRY

## 2022-08-21 PROCEDURE — 85025 COMPLETE CBC W/AUTO DIFF WBC: CPT

## 2022-08-21 PROCEDURE — 80053 COMPREHEN METABOLIC PANEL: CPT

## 2022-08-21 PROCEDURE — 74011000250 HC RX REV CODE- 250: Performed by: EMERGENCY MEDICINE

## 2022-08-21 PROCEDURE — 65270000029 HC RM PRIVATE

## 2022-08-21 PROCEDURE — 99285 EMERGENCY DEPT VISIT HI MDM: CPT

## 2022-08-21 PROCEDURE — 84484 ASSAY OF TROPONIN QUANT: CPT

## 2022-08-21 RX ORDER — SODIUM CHLORIDE 9 MG/ML
250 INJECTION, SOLUTION INTRAVENOUS AS NEEDED
Status: DISCONTINUED | OUTPATIENT
Start: 2022-08-21 | End: 2022-08-25 | Stop reason: HOSPADM

## 2022-08-21 RX ORDER — BUTALBITAL, ACETAMINOPHEN AND CAFFEINE 50; 325; 40 MG/1; MG/1; MG/1
2 TABLET ORAL
Status: COMPLETED | OUTPATIENT
Start: 2022-08-21 | End: 2022-08-21

## 2022-08-21 RX ADMIN — LIDOCAINE HYDROCHLORIDE 40 ML: 20 SOLUTION ORAL; TOPICAL at 21:50

## 2022-08-21 RX ADMIN — BUTALBITAL, ACETAMINOPHEN, AND CAFFEINE 2 TABLET: 50; 325; 40 TABLET ORAL at 21:48

## 2022-08-21 RX ADMIN — SODIUM CHLORIDE 500 ML: 9 INJECTION, SOLUTION INTRAVENOUS at 20:13

## 2022-08-21 NOTE — ED TRIAGE NOTES
Pt comes to ED from home via EMS with reports of SOB. EMS reports pt was unable to get her nebulizer to work. EMS reports being able to get the neb working. Pt requested to come to the ED to get \"checked out. \"    Pt reports her son is her primary care giver but is currently incarcerated. Pt reports this is causing her stress because she is unable to get her medications and groceries. Pt reports, \"I wish I was dead. \" Pt denies SI attempts or having a plan. She states, \"No, I am too much of a coward. \"     Dr Stephanie Hernández at bedside during triage. Charge RN, Neri Jones, notified. BMSART consult to be ordered. Updated primary RN, Gian Haynes.

## 2022-08-21 NOTE — ED PROVIDER NOTES
Patient presents to the emergency department with a chief complaint of \"I am scared to death\". She reports that her son is incarcerated for drunk driving and he is her main caregiver so she does not have anyone to take care of her. She also reports that she has had shortness of breath and weakness that did not improve with her nebulizer machine. She endorses feeling depressed and wishes that she was dead but she denies any active SI or previous attempts to hurt herself. The history is provided by the patient and the EMS personnel. Shortness of Breath  Pertinent negatives include no fever, no chest pain, no vomiting and no abdominal pain. Past Medical History:   Diagnosis Date    Adenoma of colon     Anemia     Arthritis     Atrial fibrillation (HCC)     Bloating     Chronic constipation     Chronic pain     back     COPD (chronic obstructive pulmonary disease) (HCC)     GERD (gastroesophageal reflux disease)     [place] (hard of hearing)     Hyponatremia     caused seizures x 2 per pt    Indigestion     Osteoporosis     Seizures ([address] 7/2020, 9/2020    x 2        Past Surgical History:   Procedure Laterality Date    COLONOSCOPY N/A 12/16/2021    COLONOSCOPY   :- performed by Raymundo Jaime MD at [place] [address] N/A 2/14/2022    .  performed by Laurel Wallace MD at [facility] MAIN OR    [address]    HX [address]    HX HIP REPLACEMENT Left     pt stated hip was pinned, not replaced    HX HIP REPLACEMENT Right     pt stated hip was pinned, not replaced    HX HYSTERECTOMY      HX ORTHOPAEDIC  1990, 2011    bilateral hip fractures     HX ORTHOPAEDIC Left 2020    femur fracture    HX TONSILLECTOMY           Family History:   Problem Relation Age of Onset    Heart Disease Mother     Heart Disease Father     Liver Disease Father     Alcohol abuse Father     Anesth Problems Neg Hx        Social History     Socioeconomic History  Marital status: SINGLE     Spouse name: Not on file    Number of children: Not on file    Years of education: Not on file    Highest education level: Not on file   Occupational History    Not on file   Tobacco Use    Smoking status: Former     Packs/day: 1.00     Years: 20.00     Pack years: 20.00     Types: Cigarettes     Quit date: 12/18/2011     Years since quitting: 10.6    Smokeless tobacco: Never   Vaping Use    Vaping Use: Never used   Substance and Sexual Activity    Alcohol use: Never    Drug use: Never    Sexual activity: Not Currently   Other Topics Concern    Not on file   Social History Narrative    Not on file     Social Determinants of Health     Financial Resource Strain: Not on file   Food Insecurity: Not on file   Transportation Needs: Not on file   Physical Activity: Not on file   Stress: Not on file   Social Connections: Not on file   Intimate Partner Violence: Not on file   Housing Stability: Not on file         ALLERGIES: Cefuroxime, Hydrocodone, Oxycodone, and Penicillins    Review of Systems   Constitutional:  Negative for chills and fever. Respiratory:  Positive for shortness of breath. Cardiovascular:  Negative for chest pain. Gastrointestinal:  Negative for abdominal pain, constipation, diarrhea and vomiting. Neurological:  Positive for weakness. Negative for dizziness and light-headedness. Psychiatric/Behavioral:  Positive for dysphoric mood. All other systems reviewed and are negative. Vitals:    08/21/22 1908 08/21/22 1910 08/21/22 1915   BP: (!) 82/59 106/63 (!) 87/55   Pulse: 100 100 97   Resp: 20 15 22   Temp: 98.3 °F (36.8 °C)     SpO2: 100%  97%            Physical Exam  Vitals and nursing note reviewed. Constitutional:       Appearance: She is well-developed. She is not ill-appearing. HENT:      Head: Normocephalic and atraumatic. Eyes:      General: No scleral icterus. Cardiovascular:      Rate and Rhythm: Normal rate and regular rhythm. Pulmonary:      Effort: Pulmonary effort is normal.      Breath sounds: Normal breath sounds. Abdominal:      General: There is no distension. Musculoskeletal:      Cervical back: Normal range of motion. Skin:     General: Skin is warm and dry. Findings: No erythema or rash. Neurological:      General: No focal deficit present. Mental Status: She is alert and oriented to person, place, and time. Psychiatric:         Mood and Affect: Mood is anxious and depressed. Affect is tearful. Behavior: Behavior normal.         Thought Content: Thought content does not include suicidal ideation. Thought content does not include suicidal plan. MDM  Number of Diagnoses or Management Options         Procedures        EKG: normal sinus rhythm, inferior ST depressions, non specific T wave changes,  95 BPM, no ectopy. EKG interpreted by Sagar Kim MD     MEDICAL DECISION MAKIN y.o. female presents with Shortness of Breath    Differential diagnosis includes but not limited to:  UTI, anemia, PNA, PTX, NSTEMI, STEMI, ACS, CVA    LABORATORY TESTS:  Labs Reviewed   CBC WITH AUTOMATED DIFF - Abnormal; Notable for the following components:       Result Value    RBC 3.08 (*)     HGB 7.0 (*)     HCT 23.0 (*)     MCV 74.7 (*)     MCH 22.7 (*)     RDW 21.2 (*)     PLATELET 502 (*)     IMMATURE GRANULOCYTES 1 (*)     ABS. IMM.  GRANS. 0.1 (*)     All other components within normal limits   METABOLIC PANEL, COMPREHENSIVE - Abnormal; Notable for the following components:    Sodium 134 (*)     CO2 19 (*)     Glucose 107 (*)     BUN 29 (*)     Creatinine 1.20 (*)     BUN/Creatinine ratio 24 (*)     GFR est AA 53 (*)     GFR est non-AA 44 (*)     Bilirubin, total 0.1 (*)     A-G Ratio 1.0 (*)     All other components within normal limits   URINE CULTURE HOLD SAMPLE   TROPONIN-HIGH SENSITIVITY   URINALYSIS W/MICROSCOPIC   RBC, ALLOCATE   TYPE & SCREEN       IMAGING RESULTS:  XR CHEST PORT   Final Result   No acute process. Of technical note, there was a delay in verification of this examination. MEDICATIONS GIVEN:  Medications   0.9% sodium chloride infusion 250 mL (has no administration in time range)   butalbital-acetaminophen-caffeine (FIORICET, ESGIC) -40 mg per tablet 2 Tablet (has no administration in time range)   mylanta/viscous lidocaine (GI COCKTAIL) (has no administration in time range)   sodium chloride 0.9 % bolus infusion 500 mL (0 mL IntraVENous IV Completed 8/21/22 2104)       PROGRESS NOTE:   The patient's ED course has been uncomplicated    Patient is being admitted to the hospital.  The results of their tests and reasons for their admission have been discussed with them and/or available family. They convey agreement and understanding for the need to be admitted and for their admission diagnosis. Consultation will be made now with the inpatient physician for hospitalization. CONSULTS:  Hospitalist Consult: [facility] for Admission  9:46 PM    ED Room Number: AS24/74  Patient Name and age:  Radha Wise 77 y.o.  female  Working Diagnosis:   1. Symptomatic anemia        COVID-19 Suspicion:  no  Sepsis present:  no  Reassessment needed: no  Code Status:  Full Code  Readmission: no  Isolation Requirements:  no  Recommended Level of Care:  telemetry  Department:[facility] Adult ED - 21   Other:  Pt has chronic anemia and has required transfusions in the past.  She is weak and unable to manage her ADLs with a decrease in her Hgb    Portillo Anthony MD      Please note that this dictation was completed with [employer], the computer voice recognition software. Quite often unanticipated grammatical, syntax, homophones, and other interpretive errors are inadvertently transcribed by the computer software. Please disregard these errors. Please excuse any errors that have escaped final proofreading.

## 2022-08-22 LAB
ALBUMIN SERPL-MCNC: 3.2 G/DL (ref 3.5–5)
ALBUMIN/GLOB SERPL: 1 {RATIO} (ref 1.1–2.2)
ALP SERPL-CCNC: 83 U/L (ref 45–117)
ALT SERPL-CCNC: 19 U/L (ref 12–78)
ANION GAP SERPL CALC-SCNC: 10 MMOL/L (ref 5–15)
APTT PPP: 23.9 SEC (ref 22.1–31)
AST SERPL-CCNC: 25 U/L (ref 15–37)
BASOPHILS # BLD: 0.1 K/UL (ref 0–0.1)
BASOPHILS NFR BLD: 1 % (ref 0–1)
BILIRUB SERPL-MCNC: 0.5 MG/DL (ref 0.2–1)
BNP SERPL-MCNC: 302 PG/ML
BUN SERPL-MCNC: 22 MG/DL (ref 6–20)
BUN/CREAT SERPL: 23 (ref 12–20)
CALCIUM SERPL-MCNC: 8.1 MG/DL (ref 8.5–10.1)
CHLORIDE SERPL-SCNC: 111 MMOL/L (ref 97–108)
CHOLEST SERPL-MCNC: 141 MG/DL
CO2 SERPL-SCNC: 19 MMOL/L (ref 21–32)
CREAT SERPL-MCNC: 0.96 MG/DL (ref 0.55–1.02)
DIFFERENTIAL METHOD BLD: ABNORMAL
EOSINOPHIL # BLD: 0.1 K/UL (ref 0–0.4)
EOSINOPHIL NFR BLD: 1 % (ref 0–7)
ERYTHROCYTE [DISTWIDTH] IN BLOOD BY AUTOMATED COUNT: 21 % (ref 11.5–14.5)
FOLATE SERPL-MCNC: 27.1 NG/ML (ref 5–21)
GLOBULIN SER CALC-MCNC: 3.1 G/DL (ref 2–4)
GLUCOSE SERPL-MCNC: 99 MG/DL (ref 65–100)
HCT VFR BLD AUTO: 23.4 % (ref 35–47)
HCT VFR BLD AUTO: 25 % (ref 35–47)
HDLC SERPL-MCNC: 57 MG/DL
HDLC SERPL: 2.5 {RATIO} (ref 0–5)
HGB BLD-MCNC: 7.5 G/DL (ref 11.5–16)
HGB BLD-MCNC: 7.8 G/DL (ref 11.5–16)
IMM GRANULOCYTES # BLD AUTO: 0.1 K/UL (ref 0–0.04)
IMM GRANULOCYTES NFR BLD AUTO: 2 % (ref 0–0.5)
INR PPP: 1 (ref 0.9–1.1)
IRON SATN MFR SERPL: 24 % (ref 20–50)
IRON SERPL-MCNC: 88 UG/DL (ref 35–150)
LACTATE SERPL-SCNC: 1.7 MMOL/L (ref 0.4–2)
LDLC SERPL CALC-MCNC: 71.6 MG/DL (ref 0–100)
LYMPHOCYTES # BLD: 1.2 K/UL (ref 0.8–3.5)
LYMPHOCYTES NFR BLD: 17 % (ref 12–49)
MAGNESIUM SERPL-MCNC: 1.8 MG/DL (ref 1.6–2.4)
MCH RBC QN AUTO: 24.5 PG (ref 26–34)
MCHC RBC AUTO-ENTMCNC: 31.2 G/DL (ref 30–36.5)
MCV RBC AUTO: 78.6 FL (ref 80–99)
MONOCYTES # BLD: 0.6 K/UL (ref 0–1)
MONOCYTES NFR BLD: 9 % (ref 5–13)
NEUTS SEG # BLD: 4.8 K/UL (ref 1.8–8)
NEUTS SEG NFR BLD: 70 % (ref 32–75)
NRBC # BLD: 0 K/UL (ref 0–0.01)
NRBC BLD-RTO: 0 PER 100 WBC
PLATELET # BLD AUTO: 339 K/UL (ref 150–400)
PMV BLD AUTO: 9.2 FL (ref 8.9–12.9)
POTASSIUM SERPL-SCNC: 3.8 MMOL/L (ref 3.5–5.1)
PREALB SERPL-MCNC: 21.9 MG/DL (ref 20–40)
PROT SERPL-MCNC: 6.3 G/DL (ref 6.4–8.2)
PROTHROMBIN TIME: 10 SEC (ref 9–11.1)
RBC # BLD AUTO: 3.18 M/UL (ref 3.8–5.2)
RBC MORPH BLD: ABNORMAL
SODIUM SERPL-SCNC: 140 MMOL/L (ref 136–145)
THERAPEUTIC RANGE,PTTT: NORMAL SECS (ref 58–77)
TIBC SERPL-MCNC: 364 UG/DL (ref 250–450)
TRIGL SERPL-MCNC: 62 MG/DL (ref ?–150)
TROPONIN-HIGH SENSITIVITY: 9 NG/L (ref 0–51)
TSH SERPL DL<=0.05 MIU/L-ACNC: 3.63 UIU/ML (ref 0.36–3.74)
VIT B12 SERPL-MCNC: 564 PG/ML (ref 193–986)
VLDLC SERPL CALC-MCNC: 12.4 MG/DL
WBC # BLD AUTO: 6.9 K/UL (ref 3.6–11)

## 2022-08-22 PROCEDURE — 74011250636 HC RX REV CODE- 250/636: Performed by: STUDENT IN AN ORGANIZED HEALTH CARE EDUCATION/TRAINING PROGRAM

## 2022-08-22 PROCEDURE — 83735 ASSAY OF MAGNESIUM: CPT

## 2022-08-22 PROCEDURE — 36415 COLL VENOUS BLD VENIPUNCTURE: CPT

## 2022-08-22 PROCEDURE — 85018 HEMOGLOBIN: CPT

## 2022-08-22 PROCEDURE — 85730 THROMBOPLASTIN TIME PARTIAL: CPT

## 2022-08-22 PROCEDURE — 84443 ASSAY THYROID STIM HORMONE: CPT

## 2022-08-22 PROCEDURE — 84134 ASSAY OF PREALBUMIN: CPT

## 2022-08-22 PROCEDURE — 85025 COMPLETE CBC W/AUTO DIFF WBC: CPT

## 2022-08-22 PROCEDURE — 83880 ASSAY OF NATRIURETIC PEPTIDE: CPT

## 2022-08-22 PROCEDURE — 82746 ASSAY OF FOLIC ACID SERUM: CPT

## 2022-08-22 PROCEDURE — 80053 COMPREHEN METABOLIC PANEL: CPT

## 2022-08-22 PROCEDURE — 30233N1 TRANSFUSION OF NONAUTOLOGOUS RED BLOOD CELLS INTO PERIPHERAL VEIN, PERCUTANEOUS APPROACH: ICD-10-PCS | Performed by: FAMILY MEDICINE

## 2022-08-22 PROCEDURE — 74011250636 HC RX REV CODE- 250/636: Performed by: FAMILY MEDICINE

## 2022-08-22 PROCEDURE — 83540 ASSAY OF IRON: CPT

## 2022-08-22 PROCEDURE — 74011000250 HC RX REV CODE- 250: Performed by: STUDENT IN AN ORGANIZED HEALTH CARE EDUCATION/TRAINING PROGRAM

## 2022-08-22 PROCEDURE — 94664 DEMO&/EVAL PT USE INHALER: CPT

## 2022-08-22 PROCEDURE — 97161 PT EVAL LOW COMPLEX 20 MIN: CPT

## 2022-08-22 PROCEDURE — 94640 AIRWAY INHALATION TREATMENT: CPT

## 2022-08-22 PROCEDURE — 85610 PROTHROMBIN TIME: CPT

## 2022-08-22 PROCEDURE — 84484 ASSAY OF TROPONIN QUANT: CPT

## 2022-08-22 PROCEDURE — 80061 LIPID PANEL: CPT

## 2022-08-22 PROCEDURE — 97530 THERAPEUTIC ACTIVITIES: CPT

## 2022-08-22 PROCEDURE — 82607 VITAMIN B-12: CPT

## 2022-08-22 PROCEDURE — 74011250637 HC RX REV CODE- 250/637: Performed by: STUDENT IN AN ORGANIZED HEALTH CARE EDUCATION/TRAINING PROGRAM

## 2022-08-22 PROCEDURE — 83605 ASSAY OF LACTIC ACID: CPT

## 2022-08-22 PROCEDURE — 65270000046 HC RM TELEMETRY

## 2022-08-22 RX ORDER — SODIUM CHLORIDE 9 MG/ML
100 INJECTION, SOLUTION INTRAVENOUS CONTINUOUS
Status: DISCONTINUED | OUTPATIENT
Start: 2022-08-22 | End: 2022-08-23

## 2022-08-22 RX ORDER — CYCLOBENZAPRINE HCL 10 MG
10 TABLET ORAL
Status: DISCONTINUED | OUTPATIENT
Start: 2022-08-22 | End: 2022-08-25 | Stop reason: HOSPADM

## 2022-08-22 RX ORDER — ARFORMOTEROL TARTRATE 15 UG/2ML
15 SOLUTION RESPIRATORY (INHALATION)
Status: DISCONTINUED | OUTPATIENT
Start: 2022-08-22 | End: 2022-08-25 | Stop reason: HOSPADM

## 2022-08-22 RX ORDER — PANTOPRAZOLE SODIUM 40 MG/1
40 TABLET, DELAYED RELEASE ORAL DAILY
Status: DISCONTINUED | OUTPATIENT
Start: 2022-08-22 | End: 2022-08-22

## 2022-08-22 RX ORDER — DICYCLOMINE HYDROCHLORIDE 20 MG/1
20 TABLET ORAL
Status: DISCONTINUED | OUTPATIENT
Start: 2022-08-22 | End: 2022-08-25 | Stop reason: HOSPADM

## 2022-08-22 RX ORDER — IPRATROPIUM BROMIDE AND ALBUTEROL SULFATE 2.5; .5 MG/3ML; MG/3ML
3 SOLUTION RESPIRATORY (INHALATION)
Status: DISCONTINUED | OUTPATIENT
Start: 2022-08-22 | End: 2022-08-25 | Stop reason: HOSPADM

## 2022-08-22 RX ORDER — ONDANSETRON 2 MG/ML
4 INJECTION INTRAMUSCULAR; INTRAVENOUS
Status: DISCONTINUED | OUTPATIENT
Start: 2022-08-22 | End: 2022-08-25 | Stop reason: HOSPADM

## 2022-08-22 RX ORDER — DIGOXIN 125 MCG
0.12 TABLET ORAL DAILY
Status: DISCONTINUED | OUTPATIENT
Start: 2022-08-22 | End: 2022-08-25 | Stop reason: HOSPADM

## 2022-08-22 RX ORDER — MAG HYDROX/ALUMINUM HYD/SIMETH 200-200-20
30 SUSPENSION, ORAL (FINAL DOSE FORM) ORAL
Status: DISCONTINUED | OUTPATIENT
Start: 2022-08-22 | End: 2022-08-25 | Stop reason: HOSPADM

## 2022-08-22 RX ORDER — BUTALBITAL, ACETAMINOPHEN AND CAFFEINE 50; 325; 40 MG/1; MG/1; MG/1
2 TABLET ORAL
Status: DISCONTINUED | OUTPATIENT
Start: 2022-08-22 | End: 2022-08-25 | Stop reason: HOSPADM

## 2022-08-22 RX ORDER — IPRATROPIUM BROMIDE 0.5 MG/2.5ML
0.5 SOLUTION RESPIRATORY (INHALATION)
Status: DISCONTINUED | OUTPATIENT
Start: 2022-08-22 | End: 2022-08-25 | Stop reason: HOSPADM

## 2022-08-22 RX ORDER — DIGOXIN 125 MCG
0.12 TABLET ORAL DAILY
Status: DISCONTINUED | OUTPATIENT
Start: 2022-08-22 | End: 2022-08-22

## 2022-08-22 RX ORDER — BUDESONIDE 0.5 MG/2ML
500 INHALANT ORAL
Status: DISCONTINUED | OUTPATIENT
Start: 2022-08-22 | End: 2022-08-25 | Stop reason: HOSPADM

## 2022-08-22 RX ORDER — BUTALBITAL, ACETAMINOPHEN AND CAFFEINE 50; 325; 40 MG/1; MG/1; MG/1
1 TABLET ORAL
Status: DISCONTINUED | OUTPATIENT
Start: 2022-08-22 | End: 2022-08-22

## 2022-08-22 RX ORDER — GUAIFENESIN 100 MG/5ML
81 LIQUID (ML) ORAL DAILY
Status: DISCONTINUED | OUTPATIENT
Start: 2022-08-22 | End: 2022-08-25 | Stop reason: HOSPADM

## 2022-08-22 RX ORDER — METOPROLOL TARTRATE 25 MG/1
12.5 TABLET, FILM COATED ORAL 2 TIMES DAILY
Status: DISCONTINUED | OUTPATIENT
Start: 2022-08-22 | End: 2022-08-25 | Stop reason: HOSPADM

## 2022-08-22 RX ADMIN — DIGOXIN 0.12 MG: 125 TABLET ORAL at 11:31

## 2022-08-22 RX ADMIN — ONDANSETRON HYDROCHLORIDE 4 MG: 2 INJECTION, SOLUTION INTRAMUSCULAR; INTRAVENOUS at 04:43

## 2022-08-22 RX ADMIN — IPRATROPIUM BROMIDE 0.5 MG: 0.5 SOLUTION RESPIRATORY (INHALATION) at 14:37

## 2022-08-22 RX ADMIN — ONDANSETRON HYDROCHLORIDE 4 MG: 2 INJECTION, SOLUTION INTRAMUSCULAR; INTRAVENOUS at 16:43

## 2022-08-22 RX ADMIN — CYCLOBENZAPRINE 10 MG: 10 TABLET, FILM COATED ORAL at 16:43

## 2022-08-22 RX ADMIN — BUTALBITAL, ACETAMINOPHEN, AND CAFFEINE 2 TABLET: 50; 325; 40 TABLET ORAL at 20:19

## 2022-08-22 RX ADMIN — ONDANSETRON HYDROCHLORIDE 4 MG: 2 INJECTION, SOLUTION INTRAMUSCULAR; INTRAVENOUS at 11:31

## 2022-08-22 RX ADMIN — BUTALBITAL, ACETAMINOPHEN, AND CAFFEINE 2 TABLET: 50; 325; 40 TABLET ORAL at 12:50

## 2022-08-22 RX ADMIN — ASPIRIN 81 MG CHEWABLE TABLET 81 MG: 81 TABLET CHEWABLE at 11:31

## 2022-08-22 RX ADMIN — DICYCLOMINE HYDROCHLORIDE 20 MG: 20 TABLET ORAL at 15:41

## 2022-08-22 RX ADMIN — SODIUM CHLORIDE 75 ML/HR: 9 INJECTION, SOLUTION INTRAVENOUS at 11:31

## 2022-08-22 RX ADMIN — SODIUM CHLORIDE 100 ML/HR: 9 INJECTION, SOLUTION INTRAVENOUS at 23:29

## 2022-08-22 NOTE — PROGRESS NOTES
TRANSFER - OUT REPORT:    Verbal report given to Leif Armstrong RN (name) on [place]  being transferred to  (unit) for routine progression of care       Report consisted of patients Situation, Background, Assessment and   Recommendations(SBAR). Information from the following report(s) SBAR, Kardex, Intake/Output, MAR, and Recent Results was reviewed with the receiving nurse. Lines:   Peripheral IV  Anterior; Left Forearm (Active)   Site Assessment Clean, dry, & intact 08/22/22 1350   Phlebitis Assessment 0 08/22/22 1350   Infiltration Assessment 0 08/22/22 1350   Dressing Status Clean, dry, & intact 08/22/22 1350   Dressing Type Transparent;Tape 08/22/22 1350   Hub Color/Line Status Patent; Infusing;Blue 08/22/22 1350   Alcohol Cap Used Yes 08/22/22 1350        Opportunity for questions and clarification was provided.       Patient transported with:   [employer]

## 2022-08-22 NOTE — BSMART NOTE
Comprehensive Assessment Form Part 1      Section I - Disposition    Past Medical History:   Diagnosis Date    Adenoma of colon     Anemia     Arthritis     Atrial fibrillation (HCC)     Bloating     Chronic constipation     Chronic pain     back     COPD (chronic obstructive pulmonary disease) (HCC)     GERD (gastroesophageal reflux disease)     [place] (hard of hearing)     Hyponatremia     caused seizures x 2 per pt    Indigestion     Osteoporosis     Seizures [address] [address] [address] 7/2020, 9/2020    x 2         The Medical Doctor to Psychiatrist conference was not completed. The Medical Doctor is in agreement with [facility]  The plan is medically clear and discharge. The on-call Psychiatrist consulted was Dr. Norma Hope. The admitting Psychiatrist will be Dr. Norma Hope. The admitting Diagnosis is n/a. The Payor source is [place] [id number] MEDICARE PART A & B. This writer reviewed the [address] in nursing flowsheet and the risk level assigned is low risk. Based on this assessment, the risk of suicide is low risk and the plan is medically clear and discharge. Section II - Integrated Summary  Summary from triage:  Pt comes to ED from home via EMS with reports of SOB. EMS reports pt was unable to get her nebulizer to work. EMS reports being able to get the neb working. Pt requested to come to the ED to get \"checked out. \"     Pt reports her son is her primary care giver but is currently incarcerated. Pt reports this is causing her stress because she is unable to get her medications and groceries. Pt reports, \"I wish I was dead. \" Pt denies SI attempts or having a plan. She states, \"No, I am too much of a coward. \"      Dr Rolando Panda at bedside during triage. Charge RN, Peter Moon, notified. BMSART consult to be ordered. Updated primary RN, Mercedes Barahona. Patient assessed in room 19 of the ER. Patient denied si/hi and stated, \"I am too coward to do anything like that to myself. \" Patient further noted the many health conditions that she has and many surgeries that she has been through. She explained that her son who is her primary care giver is incarcerated and has been since June 30 and she is unsure when he will be released and how she does not have anyone at home to care for her. Patient stated that she cannot afford to pay nursing care to come in the home to provide care. Patient talked about her monthly benefits and insurance benefits and coverage but stated the challenges she faces with getting care in the home. Patient does not have a prior MH dx and is experiencing negative thoughts and feelings based on her current situation. Patient will likely benefit from case management supports to assist her in linking with appropriate resources to manage in the community. She is not meeting criteria for vol psych admission at this time. Dr Melba Adame was updated on the disposition and is agrees that patient will benefit from case management supports. The patienthas demonstrated mental capacity to provide informed consent. The information is given by the patient. The Chief Complaint is shortness of breath. The Precipitant Factors are lack of resources. Previous Hospitalizations: none  The patient has not previously been in restraints. Current Psychiatrist and/or  is none. Lethality Assessment:    The potential for suicide noted by the following: ideation . The potential for homicide is not noted. The patient has not been a perpetrator of sexual or physical abuse. There are not pending charges. The patient is felt to be at risk for self harm or harm to others. The attending nurse was advised the patient needs supervision. Section III - Psychosocial  The patient's overall mood and attitude is calm and cooperative. Feelings of helplessness and hopelessness are not observed. Generalized anxiety is not observed. Panic is not observed. Phobias are not observed. Obsessive compulsive tendencies are not observed. Section IV - Mental Status Exam  The patient's appearance shows no evidence of impairment. The patient's behavior shows no evidence of impairment. The patient is oriented to time, place, person and situation. The patient's speech shows no evidence of impairment. The patient's mood is sad and worried. The range of affect shows no evidence of impairment. The patient's thought content demonstrates no evidence of impairment. The thought process shows no evidence of impairment. The patient's perception shows no evidence of impairment. The patient's memory shows no evidence of impairment. The patient's appetite shows no evidence of impairment. The patient's sleep shows no evidence of impairment. The patient's insight shows no evidence of impairment. The patient's judgement shows no evidence of impairment. Section V - Substance Abuse  The patient is not using substances. Section VI - Living Arrangements  The patient is . The patient lives in her own residence and her son lives with her. The patient has 2 children. The patient does plan to return home upon discharge. The patient does not have legal issues pending. The patient's source of income comes from social security. Congregation and cultural practices have not been voiced at this time. The patient's greatest support comes from family and this person will not be involved with the treatment. The patient has not been in an event described as horrible or outside the realm of ordinary life experience either currently or in the past.  The patient has not been a victim of sexual/physical abuse. Section VII - Other Areas of Clinical Concern  The highest grade achieved is unknown with the overall quality of school experience being described as n/a. The patient is currently retired and speaks [place] as a primary language. The patient has no communication impairments affecting communication.  The patient's preference for learning can be described as: can read and write adequately. The patient's hearing is normal.  The patient's vision is impaired and  wears glasses or contacts.       Arminda Duncan, [address]

## 2022-08-22 NOTE — PROGRESS NOTES
Occupational Therapy:    Chart reviewed and attempted to see for OT session, however patient being admitted to the floor at this time. Will continue to follow up and attempt as able.      Brandon Barr, OT

## 2022-08-22 NOTE — ED NOTES
RN to bedside and found patient had large liquid stool occurrence in her brief. Patient was cleaned, placed in new brief, and repositioned in bed for comfort. RN will continue to monitor.

## 2022-08-22 NOTE — PROGRESS NOTES
8/22/2022 -   TRANSITIONS OF CARE PLAN:   RUR: 17%; Moderate  DESTINATION: Likely Own Home due to the patient not wanting SNF placement  TRANSPORT: BLS  NEEDS FOR DISCHARGE: Likely HH - [facility] for [facility] to [address]:  ONGOING INPATIENT NEEDS: Stool Studies, Monitoring of Labs, Transfuse as Needed, IVF,     Anticipated Discharge is: Greater Than 48 Hours    Reason for Admission:   Symptomatic Anemia, Hypotension                  RUR Score:     17%; Moderate             PCP: First and Last name:   Jaswant Huffman NP     Name of Practice:    Are you a current patient: Yes/No: Yes   Approximate date of last visit:    Can you participate in a virtual visit if needed: Yes    Do you (patient/family) have any concerns for transition/discharge? Limited home support due to patient's primary caretaker (son) being incarcerated at this time; patient's son's spouse Yaritza Kamara) lives with the patient, but he is to return to [place] for 10 days starting 9/13              Plan for utilizing home health:   TBD    Current Advanced Directive/Advance Care Plan:  Full Code    Healthcare Decision Maker:   Click here to complete [address] including selection of the Healthcare Decision Maker Relationship (ie \"Primary\")            Primary Decision Maker: Lynn Seymour - Steven - [phone]    Transition of Care Plan:        CM notes CM Consult for [employer]. CM met with patient, with patient alert and oriented x4    At baseline, patient lives with son in a first floor apartment, 1 exterior step - patient's son has been incarcerated since 06/30. Patient's son's spouse has been helping the patient, but the spouse has to return to [place] for a family matter for 10 days. At baseline, patient is independent in ADLs, but requires standby assist for bathing.   Patient is not able to cook due to SOB     Home DME includes: Scooter, Rollator, Wheelchair, [address] Stool, Nebulizer, BP Cuff, 2x Hearing Aids, Top Dentures    Patient has hx of HH with [facility] at [address] [place] [address] in [place], and locally at [facility]    Patient identified issues with Scoliosis, 2x broken hips, and left knee not bending fully    Funding includes 2 Pensions and Disability for approximately $2,500    Pharmacy preference is: [facility] [facility]    Additional support includes: son in law Pool Ceja) and daughter distantly; son is currently incarcerated    Patient has an AMD that indicates her daughter as mPOA. Patient wants to change AMD due to the daughter and patient not having a relationship. Patient identified that her daughter is a Anglican. Patient's daughter is Elham Nickerson. Referral submitted for a new AMD.    Patient has been fully vaccinated via the [address] vaccine, with the second booster having been received. CM discussed [facility] vs SNF. Patient identified that she does not want to discharge to a SNF for multiple concerns, including pain management. Patient would be in agreement to [facility] but expressed concerns about being able to afford additional care aid support while her son in law is out of the country. Choice would be for [facility], to include the Bridge to [facility].  Patient began to vomit during [facility] vs SNF discussion. CM notified nursing of the above. Attending is aware of above and concerns regarding patient's discharge. Likely disposition is TBD, pending medical progression and clearance by patient's medical team due to patient's limited home support and limited funding. CM Team to continue following for any additional JEREMIE needs and for recommendations. Medicare pt has received, reviewed, and signed IM letter informing them of their right to appeal the discharge. Signed copy has been placed on pt bedside chart. Care Management Interventions  PCP Verified by CM:  Yes  Palliative Care Criteria Met (RRAT>21 & CHF Dx)?: No  Mode of Transport at Discharge: [facility]  Transition of Care Consult (CM Consult): Discharge Planning  MyChart Signup: No  Discharge Durable Medical Equipment: No (has Scooter, Rollator, Wheelchair, Shower Stool, Nebulizer, BP Cuff, 2x Hearing Aids, Top Dentures)  Health Maintenance Reviewed: Yes (CM met with patient, with patient alert and oriented x4)  Physical Therapy Consult: Yes  Occupational Therapy Consult: Yes  Speech Therapy Consult: No  Support Systems: Other Family Member(s)  Confirm Follow Up Transport: Family  The Incentientter & Child Information Provided?: No  Discharge Location  Patient Expects to be Discharged to[de-identified] Unable to determine at this time (lives with son in a first floor apartment, 1 exterior step - patient's son has been incarcerated since 06/30.   Patient's son's spouse has been helping the patient, but the spouse has to return to [place] for a family matter for 10 days.  )  CRM: Sae Carpenter, MPH, [address].: [phone] or [phone]

## 2022-08-22 NOTE — ED NOTES
TRANSFER - OUT REPORT:    Verbal report given to HERMAN Thomas(name) on Holly Teran  being transferred to (unit) for routine progression of care       Report consisted of patients Situation, Background, Assessment and   Recommendations(SBAR). Information from the following report(s) SBAR, ED Summary, Intake/Output and Recent Results was reviewed with the receiving nurse. Lines:   Peripheral IV 08/21/22 Left Arm (Active)   Site Assessment Clean, dry, & intact 08/21/22 2021   Phlebitis Assessment 0 08/21/22 2021   Infiltration Assessment 0 08/21/22 2021   Dressing Status Clean, dry, & intact 08/21/22 2021   Dressing Type 4 X 4 08/21/22 2021   Hub Color/Line Status Blue 08/21/22 2021   Action Taken Blood drawn 08/21/22 2021        Opportunity for questions and clarification was provided.       Patient transported with:   O2 @ 4 liters

## 2022-08-22 NOTE — PROGRESS NOTES
[facility] Adult  Hospitalist Group                                                                                          Hospitalist Progress Note  Zoe Zimmer MD  Answering service: [phone] from in house phone        Date of Service:  2022  NAME:  Carolyne Joy  :  1945  MRN:  [id number]      Admission Summary:   Carolyne Joy is a 77 y.o. female with past medical history of anemia, arthritis, Afib, chronic back pain, constipation, hyponatremia, GERD, osteoporosis, seizures presented to the ED from home via EMS with chief complaints of feeling scared, short of breath (SOB). Per patient had onset of SOB yesterday, constant, moderate severity, without specific alleviating factors. EMS was called to her residence as patient was unable to use nebulizer as it was not working. Patient requested to go to the ED for evaluation. Per ED provider note, patient commented that she was \"scared\", depressed, and wishing she was dead. However, she denied SI. Until recently her son (who is now reportedly incarcerated) was patient's caregiver. She is currently living alone and  notedly has been unable to obtain her prescribed medications and groceries per ED triage note. Patient was last hospitalized from 2022 - 2022 with diagnoses of COPD exacerbation, paroxysmal Afib, anemia, T12 compression fracture, SOB, headache, and diarrhea. On arrival in the ED tonight, initial recorded VS were T 98.3 F, BP 82/59, , RR 20, O2sat 100% on room air. Chest xray portable showed no acute process. Abnormal labs showed Hgb 7.0, MCV 74.7, CO2 19, BUN 29, creatinine 1.2, Na 134. ED ordered 0.9%  ml IV fluid bolus, Fioricet, GI cocktail, and 0.9%  ml IV fluid bolus x 1. ED request admission to the hospitalist service.          Interval history / Subjective:   Patient seen and examined earlier this morning by me for follow-up of symptomatic anemia, dehydration, weakness. Patient has multiple chronic complaints and complaints about her social situation. No complaints of shortness of breath, pain, or other acute issues. Assessment & Plan:      Symptomatic anemia  -admit to telemetry floor  -transfuse 1 unit PRBCs   -repeat H&H post transfusion  -check iron profile, B12, folate levels  -order stool occult blood test  -goal Hgb > 7.0  -consider for EGD/ colonoscopy  -last reported colonoscopy Feb 2022 with colon polyp present per patient report  Pending stool occult blood  Check H&H     2. Dehydration  -currently receiving PRBC transfusion  -repeat renal panel  -strict Is and Os and monitor UOP  Gentle fluids     3. Metabolic acidosis   -plan as above  -repeat CMP  Continue gentle fluids and recheck labs     4. Hypotension   -BP improved. Continue monitoring     5. COPD  -may have Duoneb nebulizer tx prn  Resume home inhalers     6. GERD  -PPI daily      7. History of Afib  -ordered 12 lead EKG  -continue telemetry monitoring  Continue home metoprolol and digoxin     8. Underweight  -consider for failure to thrive  -BMI 16.47  -consult nutrition service  -add on test for pre-albumin     9. Open wound  -superficial abrasions left leg  -order dressing changes and Bacitracin ointment     10. Generalized weakness  -fall precautions  -consult PT/OT  -consult case management for social living conditions as patient is not able to care for herself at home    Patient reports that her son is in [facility] and her son's partner takes care of her but he has to go back to [place] and she will be able to take care of herself. Code status: Full  Prophylaxis: SCDs due to anemia  Care Plan discussed with: Patient, nurse  Anticipated Disposition: TBD. Pending PT/OT eval's. Will likely need SNF versus long-term care.      Hospital Problems  Date Reviewed: 6/29/2022            Codes Class Noted POA    Dehydration ICD-10-CM: E86.0  ICD-9-CM: 276.51  8/21/2022 Unknown Metabolic acidosis DMT-35-SK: E87.2  ICD-9-CM: 276.2  8/21/2022 Unknown        Hypotension ICD-10-CM: I95.9  ICD-9-CM: 458.9  8/21/2022 Unknown        Symptomatic anemia ICD-10-CM: D64.9  ICD-9-CM: 285.9  8/21/2022 Unknown             Review of Systems:   A comprehensive review of systems was negative except for that written in the HPI. Vital Signs:    Last 24hrs VS reviewed since prior progress note. Most recent are:  Visit Vitals  /63 (BP 1 Location: Right upper arm, BP Patient Position: At rest)   Pulse 98   Temp 98.3 °F (36.8 °C)   Resp 20   SpO2 96%         Intake/Output Summary (Last 24 hours) at 8/22/2022 1244  Last data filed at 8/22/2022 0130  Gross per 24 hour   Intake 820.83 ml   Output --   Net 820.83 ml        Physical Examination:     I had a face to face encounter with this patient and independently examined them on 8/22/2022 as outlined below:          Constitutional:  No acute distress, cooperative, pleasant    ENT:  Oral mucosa moist, oropharynx benign. Resp:  CTA bilaterally. No wheezing/rhonchi/rales. No accessory muscle use. CV:  Regular rhythm, normal rate, no murmurs, gallops, rubs    GI:  Soft, non distended, non tender. normoactive bowel sounds, no hepatosplenomegaly     Musculoskeletal:  No edema, warm, 2+ pulses throughout    Neurologic:  Moves all extremities.   AAOx3, CN II-XII reviewed            Data Review:    Review and/or order of clinical lab test  Review and/or order of tests in the radiology section of CPT  Review and/or order of tests in the medicine section of CPT      Labs:     Recent Labs     08/22/22  0355 08/21/22 1930   WBC 6.9 10.9   HGB 7.8* 7.0*   HCT 25.0* 23.0*    483*     Recent Labs     08/22/22  0355 08/21/22 1930    134*   K 3.8 4.3   * 105   CO2 19* 19*   BUN 22* 29*   CREA 0.96 1.20*   GLU 99 107*   CA 8.1* 9.1   MG 1.8  --      Recent Labs     08/22/22  0355 08/21/22  1930   ALT 19 21   AP 83 87   TBILI 0.5 0.1*   TP 6.3* 7. 1   ALB 3.2* 3.5   GLOB 3.1 3.6     Recent Labs     08/22/22  0355   INR 1.0   PTP 10.0   APTT 23.9      Recent Labs     08/22/22  0355   TIBC 364   PSAT 24      Lab Results   Component Value Date/Time    Folate 27.1 (H) 08/22/2022 03:55 AM      No results for input(s): PH, PCO2, PO2 in the last 72 hours. No results for input(s): CPK, CKNDX, TROIQ in the last 72 hours.     No lab exists for component: CPKMB  Lab Results   Component Value Date/Time    Cholesterol, total 141 08/22/2022 03:55 AM    HDL Cholesterol 57 08/22/2022 03:55 AM    LDL, calculated 71.6 08/22/2022 03:55 AM    Triglyceride 62 08/22/2022 03:55 AM    CHOL/HDL Ratio 2.5 08/22/2022 03:55 AM     Lab Results   Component Value Date/Time    Glucose (POC) 145 (H) 02/14/2022 09:01 PM     No results found for: COLOR, APPRN, SPGRU, REFSG, FRANK, PROTU, GLUCU, KETU, BILU, UROU, BRITTANY, LEUKU, GLUKE, EPSU, BACTU, WBCU, RBCU, CASTS, UCRY      Medications Reviewed:     Current Facility-Administered Medications   Medication Dose Route Frequency    ondansetron (ZOFRAN) injection 4 mg  4 mg IntraVENous Q6H PRN    0.9% sodium chloride infusion  75 mL/hr IntraVENous CONTINUOUS    metoprolol succinate (TOPROL-XL) XL tablet 25 mg  25 mg Oral QAM    albuterol-ipratropium (DUO-NEB) 2.5 MG-0.5 MG/3 ML  3 mL Nebulization Q4H PRN    digoxin (LANOXIN) tablet 0.125 mg  0.125 mg Oral DAILY    aspirin chewable tablet 81 mg  81 mg Oral DAILY    butalbital-acetaminophen-caffeine (FIORICET, ESGIC) -40 mg per tablet 2 Tablet  2 Tablet Oral Q8H PRN    arformoteroL (BROVANA) neb solution 15 mcg  15 mcg Nebulization BID RT    And    budesonide (PULMICORT) 500 mcg/2 ml nebulizer suspension  500 mcg Nebulization BID RT    And    ipratropium (ATROVENT) 0.02 % nebulizer solution 0.5 mg  0.5 mg Nebulization Q6H RT    0.9% sodium chloride infusion 250 mL  250 mL IntraVENous PRN     ______________________________________________________________________  EXPECTED LENGTH OF STAY: - - -  ACTUAL LENGTH OF STAY:          [facility] [address] Pelayo MD

## 2022-08-22 NOTE — PROGRESS NOTES
Problem: Mobility Impaired (Adult and Pediatric)  Goal: *Acute Goals and Plan of Care (Insert Text)  Description: FUNCTIONAL STATUS PRIOR TO ADMISSION: Patient was modified independent using a rollator vs no device for functional mobility. Also uses an electric scooter for longer distances/grocery shopping. Does not drive. Does not use home O2 at baseline. HOME SUPPORT PRIOR TO ADMISSION: The patient lived with her son who was her main support system for groceries/medications and household management (cooking, cleaning, transportation), however son is currently incarcerated. Physical Therapy Goals  Initiated 8/22/2022  1. Patient will move from supine to sit and sit to supine , scoot up and down, and roll side to side in bed with modified independence within 7 day(s). 2.  Patient will transfer from bed to chair and chair to bed with modified independence using the least restrictive device within 7 day(s). 3.  Patient will perform sit to stand with modified independence within 7 day(s). 4.  Patient will ambulate with supervision/set-up for 100 feet with the least restrictive device within 7 day(s). 5.  Patient will ascend/descend 1 stairs without handrail(s) with minimal assistance/contact guard assist within 7 day(s). Outcome: Progressing Towards Goal   PHYSICAL THERAPY EVALUATION  Patient: Marychuy Warner (57 y.o. female)  Date: 8/22/2022  Primary Diagnosis: Symptomatic anemia [D64.9]  Hypotension [X65.5]  Metabolic acidosis [O82.7]  Dehydration [E86.0]       Precautions:   Fall, Bed Alarm, very [place]      ASSESSMENT  Based on the objective data described below, the patient presents with nausea, generalized weakness, cachectic appearance, impaired balance, decreased endurance, and tachycardia with minimal activity following admission for symptomatic anemia. Received in bed agreeable to therapy and very [place].  Despite significant nausea with period of coughing/heaving, she was agreeable to sitting EOB and stood with CGA x1. Tachycardic up to 135 with minimal activity and returned to supine. Very SOB with this activity and very talkative throughout. Reports she ambulates without device within her home and walks \"very bent over\" due to history of \"compression fractures in thoracic spine and slipped discs in lumbar spine\" and this causes her to be very SOB at home. Not managing her self care and household management well without her son around (currently incarcerated). Anticipate need for SNF at d/c with transition to a long term care setting of some sort if social support within the home unable to resume. Current Level of Function Impacting Discharge (mobility/balance): supervision-CGA    Functional Outcome Measure: The patient scored Total Score: 4/28 on the Tinetti outcome measure which is indicative of high fall risk. Other factors to consider for discharge: on supplemental O2     Patient will benefit from skilled therapy intervention to address the above noted impairments. PLAN :  Recommendations and Planned Interventions: bed mobility training, transfer training, gait training, therapeutic exercises, neuromuscular re-education, patient and family training/education, and therapeutic activities      Frequency/Duration: Patient will be followed by physical therapy:  5 times a week to address goals. Recommendation for discharge: (in order for the patient to meet his/her long term goals)  Therapy up to 5 days/week in SNF setting    This discharge recommendation:  Has not yet been discussed the attending provider and/or case management    IF patient discharges home will need the following DME: to be determined (TBD)         SUBJECTIVE:   Patient stated I know all ten cranial nerves. \" Very forthcoming with all she is knowledgeable about anatomy and physiology     OBJECTIVE DATA SUMMARY:   HISTORY:    Past Medical History:   Diagnosis Date    Adenoma of colon     Anemia     Arthritis     Atrial fibrillation (HCC)     Bloating     Chronic constipation     Chronic pain     back     COPD (chronic obstructive pulmonary disease) (HCC)     GERD (gastroesophageal reflux disease)     [place] (hard of hearing)     Hyponatremia     caused seizures x 2 per pt    Indigestion     Osteoporosis     Seizures ([address] 7/2020, 9/2020    x 2      Past Surgical History:   Procedure Laterality Date    COLONOSCOPY N/A 12/16/2021    COLONOSCOPY   :- performed by Jas Carnes MD at [place] [address] N/A 2/14/2022    . performed by Y[facility] Dobson MD at [facility] MAIN OR    [address]    HX CHOLECYSTECTOMY  1982    HX HIP REPLACEMENT Left     pt stated hip was pinned, not replaced    HX HIP REPLACEMENT Right     pt stated hip was pinned, not replaced    HX HYSTERECTOMY      HX ORTHOPAEDIC  1990, 2011    bilateral hip fractures     HX ORTHOPAEDIC Left 2020    femur fracture    HX TONSILLECTOMY         Personal factors and/or comorbidities impacting plan of care: PMH    Home Situation  Home Environment: Apartment  # Steps to Enter: 1  One/Two Story Residence: One story  Living Alone: No  Support Systems: Child(saray) (son currently incarcerated)  Patient Expects to be Discharged to[de-identified] Assisted Living (per MD pt will need placement facility)  Current DME Used/Available at Home: bess White, [address] [facility] chair (scooter)    EXAMINATION/PRESENTATION/DECISION MAKING:   Critical Behavior:              Hearing: Auditory  Auditory Impairment: Hard of hearing, bilateral  Range Of Motion:  AROM: Generally decreased, functional  Strength:    Strength: Generally decreased, functional  Tone & Sensation:   Tone: Normal  Sensation: Intact  Coordination:  Coordination: Generally decreased, functional  Functional Mobility:  Bed Mobility:  Supine to Sit: Supervision; Additional time  Sit to Supine: Supervision; Additional time  Scooting: Supervision; Additional time  Transfers:  Sit to Stand: Contact guard assistance  Stand to Sit: Contact guard assistance  Balance:   Sitting: Intact; Without support  Standing: Impaired; With support  Standing - Static: Fair      Functional Measure:  Tinetti test:    Sitting Balance: 1  Arises: 1  Attempts to Rise: 2  Immediate Standing Balance: 0  Standing Balance: 0  Nudged: 0  Eyes Closed: 0  Turn 360 Degrees - Continuous/Discontinuous: 0  Turn 360 Degrees - Steady/Unsteady: 0  Sitting Down: 0  Balance Score: 4 Balance total score  Indication of Gait: 0  R Step Length/Height: 0  L Step Length/Height: 0  R Foot Clearance: 0  L Foot Clearance: 0  Step Symmetry: 0  Step Continuity: 0  Path: 0  Trunk: 0  Walking Time: 0  Gait Score: 0 Gait total score  Total Score: 4/28 Overall total score         Tinetti Tool Score Risk of Falls  <19 = High Fall Risk  19-24 = Moderate Fall Risk  25-28 = Low Fall Risk  Tinetti ME. Performance-Oriented Assessment of Mobility Problems in Elderly Patients. [address] [address]; [id number].  (Scoring Description: PT Bulletin Feb. 10, 1993)    Older adults: Darlys Litten et al, 2009; n = [address] [employer] elderly evaluated with ABC, NELSON, ADL, and IADL)  · Mean NELSON score for males aged 69-68 years = 26.21(3.40)  · Mean NELSON score for females age 69-68 years = 25.16(4.30)  · Mean NELSON score for males over 80 years = 23.29(6.02)  · Mean NELSON score for females over 80 years = 17.20(8.32)        Physical Therapy Evaluation Charge Determination   History Examination Presentation Decision-Making   HIGH Complexity :3+ comorbidities / personal factors will impact the outcome/ POC  HIGH Complexity : 4+ Standardized tests and measures addressing body structure, function, activity limitation and / or participation in recreation  LOW Complexity : Stable, uncomplicated  Other outcome measures TInetti 4/28  HIGH       Based on the above components, the patient evaluation is determined to be of the following complexity level: LOW     Pain Rating:  nausea    Activity Tolerance:   Fair, requires frequent rest breaks, and observed SOB with activity      After treatment patient left in no apparent distress:   Supine in bed, Call bell within reach, and Side rails x 3    COMMUNICATION/EDUCATION:   The patients plan of care was discussed with: Occupational therapist and Registered nurse. Fall prevention education was provided and the patient/caregiver indicated understanding., Patient/family have participated as able in goal setting and plan of care. , and Patient/family agree to work toward stated goals and plan of care.     Thank you for this referral.  Connor Fang, PT, DPT   Time Calculation: 14 mins

## 2022-08-22 NOTE — BSMART NOTE
Consulted with Dr Shaan Aguilar, Patient will be a medical admit. It will be beneficial for case management to provide supports to patient prior to discharge to address her needs in the community.

## 2022-08-22 NOTE — BSMART NOTE
BSMART assessment completed, and suicide risk level noted to be low. Primary Nurse [place] Physician Dr. Ritu Roberts notified. Concerns not observed. Security/Off- has not been notified.

## 2022-08-22 NOTE — PROGRESS NOTES
Problem: Patient Education: Go to Patient Education Activity  Goal: Patient/Family Education  Outcome: Progressing Towards Goal     Problem: Patient Education: Go to Patient Education Activity  Goal: Patient/Family Education  Outcome: Progressing Towards Goal     Problem: Patient Education: Go to Patient Education Activity  Goal: Patient/Family Education  Outcome: Progressing Towards Goal

## 2022-08-22 NOTE — PROGRESS NOTES
Bedside shift change report given to [address]  (oncoming nurse) by Kingsley Ying RN  (offgoing nurse). Report included the following information SBAR, Kardex, MAR, and Recent Results.

## 2022-08-22 NOTE — H&P
[facility]    History & Physical      Date of admission: 8/21/2022    Patient name: Alicia Page  MRN: [id number]  YOB: 1945  Age: 77 y.o. Primary care provider:  Becky Mireles NP     Source of Information: patient, ED and electronic medical records                              Chief complaint:  scared and     History of present illness  Alicia Page is a 77 y.o. female with past medical history of anemia, arthritis, Afib, chronic back pain, constipation, hyponatremia, GERD, osteoporosis, seizures presented to the ED from home via EMS with chief complaints of feeling scared, short of breath (SOB). Per patient had onset of SOB yesterday, constant, moderate severity, without specific alleviating factors. EMS was called to her residence as patient was unable to use nebulizer as it was not working. Patient requested to go to the ED for evaluation. Per ED provider note, patient commented that she was \"scared\", depressed, and wishing she was dead. However, she denied SI. Until recently her son (who is now reportedly incarcerated) was patient's caregiver. She is currently living alone and  notedly has been unable to obtain her prescribed medications and groceries per ED triage note. Patient was last hospitalized from 6/29/2022 - 6/30/2022 with diagnoses of COPD exacerbation, paroxysmal Afib, anemia, T12 compression fracture, SOB, headache, and diarrhea. On arrival in the ED tonight, initial recorded VS were T 98.3 F, BP 82/59, , RR 20, O2sat 100% on room air. Chest xray portable showed no acute process. Abnormal labs showed Hgb 7.0, MCV 74.7, CO2 19, BUN 29, creatinine 1.2, Na 134. ED ordered 0.9%  ml IV fluid bolus, Fioricet, GI cocktail, and 0.9%  ml IV fluid bolus x 1. ED request admission to the hospitalist service.       Past Medical History:   Diagnosis Date    Adenoma of colon Anemia     Arthritis     Atrial fibrillation (HCC)     Bloating     Chronic constipation     Chronic pain     back     COPD (chronic obstructive pulmonary disease) (HCC)     GERD (gastroesophageal reflux disease)     [place] (hard of hearing)     Hyponatremia     caused seizures x 2 per pt    Indigestion     Osteoporosis     Seizures ([address] 7/2020, 9/2020    x 2       Past Surgical History:   Procedure Laterality Date    COLONOSCOPY N/A 12/16/2021    COLONOSCOPY   :- performed by Jack Guerra MD at [place] [address] N/A 2/14/2022    . performed by Luke Aviles MD at [facility] MAIN OR    [address]    HX CHOLECYSTECTOMY  1982    HX HIP REPLACEMENT Left     pt stated hip was pinned, not replaced    HX HIP REPLACEMENT Right     pt stated hip was pinned, not replaced    HX HYSTERECTOMY      HX ORTHOPAEDIC  1990, 2011    bilateral hip fractures     HX ORTHOPAEDIC Left 2020    femur fracture    HX TONSILLECTOMY       MEDICATIONS:  Prior to Admission medications    Medication Sig Start Date End Date Taking? Authorizing Provider   doxycycline (VIBRA-TABS) 100 mg tablet Take 1 Tablet by mouth every twelve (12) hours. 6/30/22   Edward Mclean MD   predniSONE (DELTASONE) 10 mg tablet Take 40 mg by mouth daily (with breakfast). To be decreased by 10 mg every 3 days and then stop 6/30/22   Edwadr Mclean MD   fluticasone-umeclidinium-vilanterol (Trelegy Ellipta) 100-62.5-25 mcg inhaler Take 1 Puff by inhalation daily. 6/30/22   Edward Mclean MD   cyclobenzaprine (FLEXERIL) 10 mg tablet Take 10 mg by mouth three (3) times daily as needed. 3/15/22   Provider, Historical   dicyclomine (BENTYL) 20 mg tablet Take 20 mg by mouth. 3/15/22   Provider, Historical   docusate sodium (COLACE) 100 mg capsule Take 1 Capsule by mouth two (2) times a day. 2/20/22   Sondra Jefferson MD   aspirin (ASPIRIN) 325 mg tablet Take 81 mg by mouth daily.     Provider, Historical   multivitamin (ONE A DAY) tablet Take 1 Tablet by mouth daily. Provider, Historical   acetaminophen (TYLENOL) 325 mg tablet Take 650 mg by mouth every four (4) hours as needed for Pain. Provider, Historical   digoxin (LANOXIN) 0.125 mg tablet Take 1 Tablet by mouth daily. Patient taking differently: Take 0.125 mg by mouth Every morning. 11/15/21   Mynor Guerra MD   albuterol (PROVENTIL HFA, VENTOLIN HFA, PROAIR HFA) 90 mcg/actuation inhaler Take 2 Puffs by inhalation every six (6) hours as needed. 9/14/21   Provider, Historical   butalbital-acetaminophen-caffeine (FIORICET, ESGIC) -40 mg per tablet Take 1-2 Tablets by mouth every six (6) hours as needed. 12/15/20   Provider, Historical   lactulose (CHRONULAC) 10 gram/15 mL solution Take 20 g by mouth two (2) times daily as needed. 12/15/20   Provider, Historical   metoprolol succinate (TOPROL-XL) 25 mg XL tablet Take 25 mg by mouth Every morning. Provider, Historical   pantoprazole (PROTONIX) 40 mg tablet Take 40 mg by mouth daily. 12/15/20   Provider, Historical     Allergies   Allergen Reactions    Cefuroxime Hives    Hydrocodone Nausea and Vomiting    Oxycodone Nausea and Vomiting    Penicillins Hives     Patient screened for any delayed non-IgE-mediated reaction to PCN. Patient notes the following:    No delayed non-IgE-mediated reaction to PCN    [address] history  Patient resides  X  Independently    X  Was previously receiving caregiving assistance by her son until he was incarcerated.   Now she reportedly has a non-relative house mate who performs some errands           Ambulates      x  Notedly walks without assistance at home   x  Uses walker in out in public        Alcohol history   x  denies           Smoking history      x  Former smoker; reportedly quit smoking \"11 years ago\"          Family History   Problem Relation Age of Onset    Heart Disease Mother     Heart Disease Father     Liver Disease Father     Alcohol abuse Father     Anesth Problems Neg Hx       Review of systems  Pertinent positives as noted in HPI. All other systems were reviewed and were negative     Physical Examination   Visit Vitals  BP (!) 104/53   Pulse 91   Temp 98.3 °F (36.8 °C)   Resp 16   SpO2 97%          O2 Device: None (Room air)    General:  Elderly underweight female in no acute respiratory distress. Head:  Normocephalic, without obvious abnormality, atraumatic   Eyes:  Conjunctivae/corneas clear. Pupils 2 mm reactive bilateral.     E/N/M/T: Nares normal. Septum midline. No nasal drainage or sinus tenderness  Tongue midline/ non-edematous  Clear oropharynx  Dry mucous membranes   Neck: Normal appearance and movements, symmetrical, trachea midline  No palpable adenopathy  No thyroid enlargement, tenderness or nodules  No carotid bruit   No JVD  Trachea midline   Lungs:   Symmetrical chest expansion and respiratory effort  Clear to auscultation bilaterally   Chest wall:  No tenderness or deformity   Heart:  Regular rate and rhythm   Normal S1 and S2; no murmur, click, rub or gallop   Abdomen:   Soft, no tenderness  No rebound, guarding, or rigidity  Non-distended   Bowel sounds normal  No masses or hepatosplenomegaly  No hernias present   Back: No costovertebral angle tenderness  No step-off deformity   Extremities: Extremities normal, atraumatic  No cyanosis or edema     Vascular/  Pulses: 2+ radial/ 1+ DP bilateral pulses   Integument/  Skin: 2 superficial open wound/ abrasions on anterior left leg  Warm and dry   Musculo-      skeletal: Gait not tested  Normal symmetry, with limited ROM, strength and tone  No calf tenderness   Neuro: GCS 15. Moves all extremities x 4 with generalized weakness. No slurred speech. No facial droop. Sensation grossly intact. Psych: Alert, oriented x 3. Very anxious.            I reviewed the following data:      24 Hour Results:  Recent Results (from the past 24 hour(s))   CBC WITH AUTOMATED DIFF    Collection Time: 08/21/22 7:30 PM   Result Value Ref Range    WBC 10.9 3.6 - 11.0 K/uL    RBC 3.08 (L) 3.80 - 5.20 M/uL    HGB 7.0 (L) 11.5 - 16.0 g/dL    HCT 23.0 (L) 35.0 - 47.0 %    MCV 74.7 (L) 80.0 - 99.0 FL    MCH 22.7 (L) 26.0 - 34.0 PG    MCHC 30.4 30.0 - 36.5 g/dL    RDW 21.2 (H) 11.5 - 14.5 %    PLATELET 110 (H) 005 - 400 K/uL    MPV 9.2 8.9 - 12.9 FL    NRBC 0.0 0  WBC    ABSOLUTE NRBC 0.00 0.00 - 0.01 K/uL    NEUTROPHILS 73 32 - 75 %    LYMPHOCYTES 15 12 - 49 %    MONOCYTES 8 5 - 13 %    EOSINOPHILS 2 0 - 7 %    BASOPHILS 1 0 - 1 %    IMMATURE GRANULOCYTES 1 (H) 0.0 - 0.5 %    ABS. NEUTROPHILS 8.0 1.8 - 8.0 K/UL    ABS. LYMPHOCYTES 1.6 0.8 - 3.5 K/UL    ABS. MONOCYTES 0.9 0.0 - 1.0 K/UL    ABS. EOSINOPHILS 0.2 0.0 - 0.4 K/UL    ABS. BASOPHILS 0.1 0.0 - 0.1 K/UL    ABS. IMM. GRANS. 0.1 (H) 0.00 - 0.04 K/UL    DF SMEAR SCANNED      RBC COMMENTS ANA CELLS  PRESENT        RBC COMMENTS ANISOCYTOSIS  2+        RBC COMMENTS ACANTHOCYTES  HYPOCHROMIA  2+        RBC COMMENTS MICROCYTOSIS  1+        RBC COMMENTS SCHISTOCYTES  PRESENT       METABOLIC PANEL, COMPREHENSIVE    Collection Time: 08/21/22  7:30 PM   Result Value Ref Range    Sodium 134 (L) 136 - 145 mmol/L    Potassium 4.3 3.5 - 5.1 mmol/L    Chloride 105 97 - 108 mmol/L    CO2 19 (L) 21 - 32 mmol/L    Anion gap 10 5 - 15 mmol/L    Glucose 107 (H) 65 - 100 mg/dL    BUN 29 (H) 6 - 20 MG/DL    Creatinine 1.20 (H) 0.55 - 1.02 MG/DL    BUN/Creatinine ratio 24 (H) 12 - 20      GFR est AA 53 (L) >60 ml/min/1.73m2    GFR est non-AA 44 (L) >60 ml/min/1.73m2    Calcium 9.1 8.5 - 10.1 MG/DL    Bilirubin, total 0.1 (L) 0.2 - 1.0 MG/DL    ALT (SGPT) 21 12 - 78 U/L    AST (SGOT) 25 15 - 37 U/L    Alk.  phosphatase 87 45 - 117 U/L    Protein, total 7.1 6.4 - 8.2 g/dL    Albumin 3.5 3.5 - 5.0 g/dL    Globulin 3.6 2.0 - 4.0 g/dL    A-G Ratio 1.0 (L) 1.1 - 2.2     TROPONIN-HIGH SENSITIVITY    Collection Time: 08/21/22  7:30 PM   Result Value Ref Range    Troponin-High Sensitivity 8 0 - 51 ng/L   EKG, 12 LEAD, INITIAL    Collection Time: 08/21/22  8:06 PM   Result Value Ref Range    Ventricular Rate 95 BPM    Atrial Rate 95 BPM    P-R Interval 128 ms    QRS Duration 86 ms    Q-T Interval 352 ms    QTC Calculation (Bezet) 442 ms    Calculated P Axis 85 degrees    Calculated R Axis 80 degrees    Calculated T Axis -92 degrees    Diagnosis       Normal sinus rhythm  ST & T wave abnormality, consider inferior ischemia  Abnormal ECG  When compared with ECG of 29-JUN-2022 04:32,  fusion complexes are no longer present  premature supraventricular complexes are no longer present  T wave inversion now evident in Inferior leads  Inverted T waves have replaced nonspecific T wave abnormality in Lateral   leads     RBC, ALLOCATE    Collection Time: 08/21/22  9:15 PM   Result Value Ref Range    HISTORY CHECKED? Historical check performed    TYPE & SCREEN    Collection Time: 08/21/22  9:21 PM   Result Value Ref Range    Crossmatch Expiration 08/24/2022,2359     ABO/Rh(D) A POSITIVE     Antibody screen NEG     Unit number [id number]     Blood component type RC LR     Unit division 00     Status of unit ISSUED     Crossmatch result Compatible      Recent Labs     08/21/22 1930   WBC 10.9   HGB 7.0*   HCT 23.0*   *     Recent Labs     08/21/22 1930   *   K 4.3      CO2 19*   *   BUN 29*   CREA 1.20*   CA 9.1   ALB 3.5   TBILI 0.1*   ALT 21       Imaging    XR CHEST PORT    Portable exam of the chest obtained at 1939 demonstrates normal heart size. There is no acute process in the lung fields. Calcified mediastinal lymph nodes  are noted. Calcified granulomata are seen in the upper lobes bilaterally. The  osseous structures are unremarkable. IMPRESSION  No acute process.          Assessment and Plan      Symptomatic anemia  -admit to telemetry floor  -transfuse 1 unit PRBCs   -repeat H&H post transfusion  -check iron profile, B12, folate levels  -order stool occult blood test  -goal Hgb > 7.0  -consider for EGD/ colonoscopy  -last reported colonoscopy Feb 2022 with colon polyp present per patient report    2. Dehydration  -currently receiving PRBC transfusion  -repeat renal panel  -strict Is and Os and monitor UOP    3. Metabolic acidosis   -plan as above  -repeat CMP    4. Hypotension   -BP improved. Continue monitoring    5. COPD  -may have Duoneb nebulizer tx prn    6. GERD  -PPI daily     7. History of Afib  -ordered 12 lead EKG  -continue telemetry monitoring    8. Underweight  -consider for failure to thrive  -BMI 16.47  -consult nutrition service  -add on test for pre-albumin    9. Open wound  -superficial abrasions left leg  -order dressing changes and Bacitracin ointment    10. Generalized weakness  -fall precautions  -consult PT/OT  -consult case management for social living conditions as patient is not able to care for herself at home    Diet: regular  Activity: ambulate with assistance only  DVT prophylaxis: SCDs to BLEs  Isolation precautions: none  Consultations: PT/OT  Anticipated disposition: ~ 2-3 days    ADVANCED DIRECTIVE/ CODE STATUS:  FULL CODE as per discussion with patient.        Signed by: David Ward MD    August 22, 2022 at 12:02 AM

## 2022-08-22 NOTE — CONSULTS
Comprehensive Nutrition Assessment    Type and Reason for Visit: Consult    Nutrition Recommendations/Plan:   Pt requested to be on pureed diet- changed, and educated pt that diet texture can be changed pending swallow eval if she finds texture unappealing  Ensure enlive TID, magic cup once daily  Please obtain updated weight  Recommend SLP vs GI consult- pt stated she has a hiatal hernia and food gets stuck in her throat  Noted previous MD recommended checking prealbumin- do not feel this is necessary as albumin & prealbumin are not true indicators of malnutrition- they are both indicators of inflammation. There is an association between inflammation and malnutrition, however these proteins correlate with patients risk for adverse outcomes rather than with protein-energy malnutrition. Malnutrition Assessment:  Malnutrition Status:  Severe malnutrition (08/22/22 1500)    Context:  Social/environmental circumstances     Findings of the 6 clinical characteristics of malnutrition:   Energy Intake:  No significant decrease in energy intake  Weight Loss:  No significant weight loass     Body Fat Loss:  Severe body fat loss, Orbital, Triceps   Muscle Mass Loss:  Severe muscle mass loss, Clavicles (pectoralis &deltoids), Temples (temporalis), Hand (interosseous)  Fluid Accumulation:  No significant fluid accumulation,     Strength:  Not performed        Nutrition Assessment:    PMHx: COPD, anemia, Afib, constipation, GERD, osteoporosis    77 y.o. female admitted with metabolic acidosis, generalized weakness. Pt is known to RD from previous visits. Current wt is stated at pts UBW of 93#. Pt has history of anorexia but in recent years has been steady at 93#. She denied any recent changes in weight, however she has had difficulty getting groceries d/t her son/caretaker being incarcerated.  Pt stated \"I can't eat solid food\" and stated she has a hiatal hernia, and that food gets stuck in her throat and she feels nauseous after eating. She requested to be on a pureed diet. RD educated pt on different diet textures. Today she had some jello and pepsi. She likes chocolate ensure and orange magic cup. Food preferences are mashed potatoes with gravy, cream of wheat with butter, tomato soup- updated. RD encouraged intake of small frequent meals dense in kcals/protein. Recent Labs     08/22/22  0355 08/21/22  1930   GLU 99 107*   BUN 22* 29*   CREA 0.96 1.20*    134*   K 3.8 4.3   * 105   CO2 19* 19*   CA 8.1* 9.1   MG 1.8  --        Recent Labs     08/22/22  0355   PREALB 21.9     Recent Labs     08/22/22  0355   IRON 88   FOL 27.1*   B12LT 564         Nutritionally Significant Medications:  Digoxin, IV NaCl, zofran      Estimated Daily Nutrient Needs:  Energy Requirements Based On: Kcal/kg  Weight Used for Energy Requirements: Ideal  Energy (kcal/day): 6419-6478 (30-35 kcals/kg)  Weight Used for Protein Requirements: Ideal  Protein (g/day): 83  Method Used for Fluid Requirements: 1 ml/kcal  Fluid (ml/day): 1700    Nutrition Related Findings:   Edema: No data recorded    Last BM:  ,      Wounds: None      Current Nutrition Therapies:  Diet: regular  Supplements: ensure enlive BID  Meal intake: No data found. Supplement intake: No data found. Nutrition Support: none      Anthropometric Measures:  Height: 5' 3\" (160 cm)  Ideal Body Weight (IBW): 115 lbs (52 kg)     Current Body Wt:  42.2 kg (93 lb 0.6 oz), 80.9 % IBW.  Stated  Current BMI (kg/m2): 16.5  Usual Body Weight: 42.2 kg (93 lb)  % Weight Change (Calculated): 0     BMI Category: Underweight (BMI less than 18.5)    Wt Readings from Last 10 Encounters:   07/21/22 42.2 kg (93 lb)   06/29/22 42.2 kg (93 lb)   06/23/22 42.2 kg (93 lb)   06/16/22 42.2 kg (93 lb)   03/31/22 42.4 kg (93 lb 6.4 oz)   03/10/22 41.7 kg (92 lb)   03/01/22 43.1 kg (95 lb)   02/14/22 42.4 kg (93 lb 7.6 oz)   02/04/22 42.7 kg (94 lb 3.2 oz)   01/13/22 42 kg (92 lb 9.6 oz) Nutrition Diagnosis:   Severe malnutrition, In context of chronic illness related to inadequate protein-energy intake as evidenced by BMI, intake 0-25%    Nutrition Interventions:   Food and/or Nutrient Delivery: Continue current diet, Start oral nutrition supplement  Nutrition Education/Counseling: No recommendations at this time  Coordination of Nutrition Care: Continue to monitor while inpatient, Swallow evaluation       Goals:     Goals: other (specify)  Specify Other Goals: po intake >/=50%, and wt stable over next 5-7 days    Nutrition Monitoring and Evaluation:   Behavioral-Environmental Outcomes: None identified  Food/Nutrient Intake Outcomes: Diet advancement/tolerance, Food and nutrient intake, Supplement intake  Physical Signs/Symptoms Outcomes: Biochemical data, Chewing or swallowing, Meal time behavior, Nutrition focused physical findings, Weight    Discharge Planning:    Continue oral nutrition supplement    Soren Mack  Available via [employer]

## 2022-08-22 NOTE — ED NOTES
Bedside shift change report given to Cheyenne Rhodes (oncoming nurse) by Maritza Kim (offgoing nurse). Report included the following information SBAR, ED Summary, Intake/Output, MAR and Recent Results.

## 2022-08-22 NOTE — ED NOTES
Bedside and Verbal shift change report given to Mago Teresa RN (oncoming nurse) by Michael Hays RN (offgoing nurse). Report included the following information SBAR, ED Summary, MAR and Recent Results.

## 2022-08-23 LAB
ANION GAP SERPL CALC-SCNC: 6 MMOL/L (ref 5–15)
BASOPHILS # BLD: 0.1 K/UL (ref 0–0.1)
BASOPHILS NFR BLD: 1 % (ref 0–1)
BUN SERPL-MCNC: 11 MG/DL (ref 6–20)
BUN/CREAT SERPL: 14 (ref 12–20)
CALCIUM SERPL-MCNC: 7.8 MG/DL (ref 8.5–10.1)
CHLORIDE SERPL-SCNC: 113 MMOL/L (ref 97–108)
CO2 SERPL-SCNC: 19 MMOL/L (ref 21–32)
CREAT SERPL-MCNC: 0.77 MG/DL (ref 0.55–1.02)
DIFFERENTIAL METHOD BLD: ABNORMAL
EOSINOPHIL # BLD: 0.2 K/UL (ref 0–0.4)
EOSINOPHIL NFR BLD: 4 % (ref 0–7)
ERYTHROCYTE [DISTWIDTH] IN BLOOD BY AUTOMATED COUNT: 20.6 % (ref 11.5–14.5)
GLUCOSE SERPL-MCNC: 110 MG/DL (ref 65–100)
HCT VFR BLD AUTO: 20.2 % (ref 35–47)
HCT VFR BLD AUTO: 26.9 % (ref 35–47)
HGB BLD-MCNC: 6.4 G/DL (ref 11.5–16)
HGB BLD-MCNC: 8.7 G/DL (ref 11.5–16)
HISTORY CHECKED?,CKHIST: NORMAL
IMM GRANULOCYTES # BLD AUTO: 0.1 K/UL (ref 0–0.04)
IMM GRANULOCYTES NFR BLD AUTO: 1 % (ref 0–0.5)
LYMPHOCYTES # BLD: 1.2 K/UL (ref 0.8–3.5)
LYMPHOCYTES NFR BLD: 21 % (ref 12–49)
MCH RBC QN AUTO: 23.4 PG (ref 26–34)
MCHC RBC AUTO-ENTMCNC: 31.7 G/DL (ref 30–36.5)
MCV RBC AUTO: 74 FL (ref 80–99)
MONOCYTES # BLD: 0.6 K/UL (ref 0–1)
MONOCYTES NFR BLD: 11 % (ref 5–13)
NEUTS SEG # BLD: 3.6 K/UL (ref 1.8–8)
NEUTS SEG NFR BLD: 62 % (ref 32–75)
NRBC # BLD: 0 K/UL (ref 0–0.01)
NRBC BLD-RTO: 0 PER 100 WBC
PLATELET # BLD AUTO: 275 K/UL (ref 150–400)
PMV BLD AUTO: 8.9 FL (ref 8.9–12.9)
POTASSIUM SERPL-SCNC: 3.4 MMOL/L (ref 3.5–5.1)
RBC # BLD AUTO: 2.73 M/UL (ref 3.8–5.2)
RBC MORPH BLD: ABNORMAL
RBC MORPH BLD: ABNORMAL
SODIUM SERPL-SCNC: 138 MMOL/L (ref 136–145)
WBC # BLD AUTO: 5.8 K/UL (ref 3.6–11)

## 2022-08-23 PROCEDURE — P9016 RBC LEUKOCYTES REDUCED: HCPCS

## 2022-08-23 PROCEDURE — 97116 GAIT TRAINING THERAPY: CPT

## 2022-08-23 PROCEDURE — 74011000250 HC RX REV CODE- 250: Performed by: STUDENT IN AN ORGANIZED HEALTH CARE EDUCATION/TRAINING PROGRAM

## 2022-08-23 PROCEDURE — 74011250636 HC RX REV CODE- 250/636: Performed by: STUDENT IN AN ORGANIZED HEALTH CARE EDUCATION/TRAINING PROGRAM

## 2022-08-23 PROCEDURE — 85018 HEMOGLOBIN: CPT

## 2022-08-23 PROCEDURE — 74011250637 HC RX REV CODE- 250/637: Performed by: STUDENT IN AN ORGANIZED HEALTH CARE EDUCATION/TRAINING PROGRAM

## 2022-08-23 PROCEDURE — 94664 DEMO&/EVAL PT USE INHALER: CPT

## 2022-08-23 PROCEDURE — 74011250636 HC RX REV CODE- 250/636: Performed by: FAMILY MEDICINE

## 2022-08-23 PROCEDURE — 97165 OT EVAL LOW COMPLEX 30 MIN: CPT

## 2022-08-23 PROCEDURE — 36430 TRANSFUSION BLD/BLD COMPNT: CPT

## 2022-08-23 PROCEDURE — 80048 BASIC METABOLIC PNL TOTAL CA: CPT

## 2022-08-23 PROCEDURE — 94640 AIRWAY INHALATION TREATMENT: CPT

## 2022-08-23 PROCEDURE — 74011250637 HC RX REV CODE- 250/637: Performed by: NURSE PRACTITIONER

## 2022-08-23 PROCEDURE — 36415 COLL VENOUS BLD VENIPUNCTURE: CPT

## 2022-08-23 PROCEDURE — 65270000046 HC RM TELEMETRY

## 2022-08-23 PROCEDURE — 85025 COMPLETE CBC W/AUTO DIFF WBC: CPT

## 2022-08-23 PROCEDURE — 97535 SELF CARE MNGMENT TRAINING: CPT

## 2022-08-23 RX ORDER — SODIUM CHLORIDE 9 MG/ML
100 INJECTION, SOLUTION INTRAVENOUS CONTINUOUS
Status: DISCONTINUED | OUTPATIENT
Start: 2022-08-23 | End: 2022-08-25 | Stop reason: HOSPADM

## 2022-08-23 RX ORDER — SODIUM CHLORIDE 9 MG/ML
250 INJECTION, SOLUTION INTRAVENOUS AS NEEDED
Status: DISCONTINUED | OUTPATIENT
Start: 2022-08-23 | End: 2022-08-25 | Stop reason: HOSPADM

## 2022-08-23 RX ORDER — DOCUSATE SODIUM 100 MG/1
100 CAPSULE, LIQUID FILLED ORAL DAILY
Status: DISCONTINUED | OUTPATIENT
Start: 2022-08-23 | End: 2022-08-25 | Stop reason: HOSPADM

## 2022-08-23 RX ADMIN — DICYCLOMINE HYDROCHLORIDE 20 MG: 20 TABLET ORAL at 11:43

## 2022-08-23 RX ADMIN — ARFORMOTEROL TARTRATE 15 MCG: 15 SOLUTION RESPIRATORY (INHALATION) at 21:11

## 2022-08-23 RX ADMIN — BUTALBITAL, ACETAMINOPHEN, AND CAFFEINE 2 TABLET: 50; 325; 40 TABLET ORAL at 12:17

## 2022-08-23 RX ADMIN — DOCUSATE SODIUM 100 MG: 100 CAPSULE, LIQUID FILLED ORAL at 21:07

## 2022-08-23 RX ADMIN — BUDESONIDE 500 MCG: 0.5 INHALANT RESPIRATORY (INHALATION) at 21:11

## 2022-08-23 RX ADMIN — ONDANSETRON HYDROCHLORIDE 4 MG: 2 INJECTION, SOLUTION INTRAMUSCULAR; INTRAVENOUS at 10:01

## 2022-08-23 RX ADMIN — DIGOXIN 0.12 MG: 125 TABLET ORAL at 09:49

## 2022-08-23 RX ADMIN — DICYCLOMINE HYDROCHLORIDE 20 MG: 20 TABLET ORAL at 16:30

## 2022-08-23 RX ADMIN — METOPROLOL TARTRATE 12.5 MG: 25 TABLET, FILM COATED ORAL at 18:44

## 2022-08-23 RX ADMIN — DICYCLOMINE HYDROCHLORIDE 20 MG: 20 TABLET ORAL at 06:48

## 2022-08-23 RX ADMIN — ALUMINUM HYDROXIDE, MAGNESIUM HYDROXIDE, AND SIMETHICONE 30 ML: 200; 200; 20 SUSPENSION ORAL at 14:18

## 2022-08-23 RX ADMIN — ONDANSETRON HYDROCHLORIDE 4 MG: 2 INJECTION, SOLUTION INTRAMUSCULAR; INTRAVENOUS at 03:56

## 2022-08-23 RX ADMIN — IPRATROPIUM BROMIDE 0.5 MG: 0.5 SOLUTION RESPIRATORY (INHALATION) at 21:11

## 2022-08-23 RX ADMIN — SODIUM CHLORIDE 100 ML/HR: 9 INJECTION, SOLUTION INTRAVENOUS at 09:49

## 2022-08-23 RX ADMIN — BUTALBITAL, ACETAMINOPHEN, AND CAFFEINE 2 TABLET: 50; 325; 40 TABLET ORAL at 03:58

## 2022-08-23 RX ADMIN — ASPIRIN 81 MG CHEWABLE TABLET 81 MG: 81 TABLET CHEWABLE at 09:48

## 2022-08-23 RX ADMIN — SODIUM CHLORIDE 100 ML/HR: 9 INJECTION, SOLUTION INTRAVENOUS at 23:52

## 2022-08-23 RX ADMIN — ONDANSETRON HYDROCHLORIDE 4 MG: 2 INJECTION, SOLUTION INTRAMUSCULAR; INTRAVENOUS at 20:07

## 2022-08-23 RX ADMIN — BUTALBITAL, ACETAMINOPHEN, AND CAFFEINE 2 TABLET: 50; 325; 40 TABLET ORAL at 21:07

## 2022-08-23 NOTE — PROGRESS NOTES
Transition of Care Plan  RUR- 18%   DISPOSITION: The disposition plan is SNF vs. HH; pending medical progression  SNF Pending:  [facility]   F/U with PCP/Specialist    Transport: [facility] vs Family      Per conversation with the pt regarding Dispo; this cm inquired about the pt's preference on a SNF. She reported that she has hx w/ [facility]. She reported that she is still uncertain if she wants to transition to a SNF at this time. She reported that this cm could send a referral to [address] [place] [address] "[employer] but she would like to consult with her son-in-law prior to committing to the plan. She reported that she would follow up with this cm in the morning.      CM: [address] [place],   [phone]

## 2022-08-23 NOTE — PROGRESS NOTES
Occupational Therapy: Chart reviewed in prep for OT services.  Noted HgB 6.4- will defer OT services and follow up with evaluation as appropriate   Pushpa Beal OTR/L

## 2022-08-23 NOTE — PROGRESS NOTES
Problem: Mobility Impaired (Adult and Pediatric)  Goal: *Acute Goals and Plan of Care (Insert Text)  Description: FUNCTIONAL STATUS PRIOR TO ADMISSION: Patient was modified independent using a rollator vs no device for functional mobility. Also uses an electric scooter for longer distances/grocery shopping. Does not drive. HOME SUPPORT PRIOR TO ADMISSION: The patient lived with her son who was her main support system for groceries/medications and household management (cooking, cleaning, transportation), however son is currently incarcerated. Physical Therapy Goals  Initiated 8/22/2022  1. Patient will move from supine to sit and sit to supine , scoot up and down, and roll side to side in bed with modified independence within 7 day(s). 2.  Patient will transfer from bed to chair and chair to bed with modified independence using the least restrictive device within 7 day(s). 3.  Patient will perform sit to stand with modified independence within 7 day(s). 4.  Patient will ambulate with supervision/set-up for 100 feet with the least restrictive device within 7 day(s). 5.  Patient will ascend/descend 1 stairs without handrail(s) with minimal assistance/contact guard assist within 7 day(s). Outcome: Progressing Towards Goal   PHYSICAL THERAPY TREATMENT  Patient: Alicia Page (79 y.o. female)  Date: 8/23/2022  Diagnosis: Symptomatic anemia [D64.9]  Hypotension [B78.9]  Metabolic acidosis [P74.3]  Dehydration [E86.0] <principal problem not specified>      Precautions: Fall, Bed Alarm  Chart, physical therapy assessment, plan of care and goals were reviewed. ASSESSMENT  Pt seen following RN clearance and 1 unit PRBCs earlier today. Patient continues with skilled PT services and is progressing towards goals. Pt pleasant and agreeable to bed mobility, transfers, gait, and return to bed (discussed chair for dinner time). Pt is progressing with her activity tolerance, balance, and distance ambulated. She presents with SILVER though attributes this to her baseline postural deformities and being mechanically unable to take a full breath from kyphosis and trunk flexion. Pt expresses concerns regarding her return home: her son is normally her caregiver, but is in assisted. His spouse, has been caring for her, but he must travel internationally soon and she is anxious about herself and her son's dog. Pt left with all needs met and NAD following session events. Recommend: OOB to chair for all meals and walking to/from bathroom for toileting with staff supervision    Next session: gait training and stair training as appropriate     Current Level of Function Impacting Discharge (mobility/balance): upwards of CGA    Other factors to consider for discharge: pt may be returning to home without assistance (?) unlike her baseline status         PLAN :  Patient continues to benefit from skilled intervention to address the above impairments. Continue treatment per established plan of care. to address goals. Recommendation for discharge: (in order for the patient to meet his/her long term goals)  Therapy up to 5 days/week in SNF setting    This discharge recommendation:  Has been made in collaboration with the attending provider and/or case management    IF patient discharges home will need the following DME: to be determined (TBD)       SUBJECTIVE:   Patient stated My son's  has been caring for me. . he is very nice, but he has to leave the country soon.     OBJECTIVE DATA SUMMARY:   Critical Behavior:  Neurologic State: Alert  Orientation Level: Oriented X4  Cognition: Follows commands, Appropriate decision making     Functional Mobility Training:  Bed Mobility:     Supine to Sit: Supervision; Additional time;Bed Modified  Sit to Supine: Modified independent;Bed Modified  Scooting: Supervision; Additional time        Transfers:  Sit to Stand: Supervision  Stand to Sit: Supervision        Bed to Chair:  (NT; pt deferred at this time)                    Balance:  Sitting: Intact  Standing: Impaired; Without support  Standing - Static: Good  Standing - Dynamic : Fair;Occasional  Ambulation/Gait Training:  Distance (ft): 40 Feet (ft) (x2 with multiple standing rest breaks for pt to hold wall for support, extend her trunk and take a deep breath (SOB observed))  Assistive Device: Gait belt  Ambulation - Level of Assistance: Contact guard assistance;Stand-by assistance        Gait Abnormalities: Decreased step clearance (pt with significantly flexed posture she reports is baseline and w/ associated spinal abnormalities)              Speed/Amanda: Slow;Pace decreased (<100 feet/min)  Step Length: Right shortened;Left shortened    Stairs:     Stairs - Level of Assistance:  (NT)          Pain Rating:  NT    Activity Tolerance:   Fair, SpO2 stable on RA, and pt with  post walk, SpO2 stable 99% on RA    After treatment patient left in no apparent distress:   Supine in bed, Call bell within reach, and Side rails x 3    COMMUNICATION/COLLABORATION:   The patients plan of care was discussed with: Registered nurseCharbel aCli   Time Calculation: 18 mins no indicators present

## 2022-08-23 NOTE — PROGRESS NOTES
[facility] Adult  Hospitalist Group                                                                                          Hospitalist Progress Note  Brenda Nichole MD  Answering service: [phone] from in house phone        Date of Service:  2022  NAME:  Chantel Hutchinson  :  1945  MRN:  [id number]      Admission Summary:   Chantel Hutchinson is a 77 y.o. female with past medical history of anemia, arthritis, Afib, chronic back pain, constipation, hyponatremia, GERD, osteoporosis, seizures presented to the ED from home via EMS with chief complaints of feeling scared, short of breath (SOB). Per patient had onset of SOB yesterday, constant, moderate severity, without specific alleviating factors. EMS was called to her residence as patient was unable to use nebulizer as it was not working. Patient requested to go to the ED for evaluation. Per ED provider note, patient commented that she was \"scared\", depressed, and wishing she was dead. However, she denied SI. Until recently her son (who is now reportedly incarcerated) was patient's caregiver. She is currently living alone and  notedly has been unable to obtain her prescribed medications and groceries per ED triage note. Patient was last hospitalized from 2022 - 2022 with diagnoses of COPD exacerbation, paroxysmal Afib, anemia, T12 compression fracture, SOB, headache, and diarrhea. On arrival in the ED tonight, initial recorded VS were T 98.3 F, BP 82/59, , RR 20, O2sat 100% on room air. Chest xray portable showed no acute process. Abnormal labs showed Hgb 7.0, MCV 74.7, CO2 19, BUN 29, creatinine 1.2, Na 134. ED ordered 0.9%  ml IV fluid bolus, Fioricet, GI cocktail, and 0.9%  ml IV fluid bolus x 1. ED request admission to the hospitalist service.          Interval history / Subjective:   Patient seen and examined earlier this morning by me for follow-up of symptomatic anemia, dehydration, weakness. Patient has multiple chronic complaints and complaints about her social situation. No complaints of shortness of breath, pain, or other acute issues. Assessment & Plan:      Symptomatic anemia  -admit to telemetry floor  -transfuse 1 unit PRBCs   -repeat H&H post transfusion  -check iron profile, B12, folate levels  -order stool occult blood test  -goal Hgb > 7.0  -consider for EGD/ colonoscopy  -last reported colonoscopy Feb 2022 with colon polyp present per patient report  Pending stool occult blood  Check H&H  Patient's hemoglobin is 8.7 status post 2 units of packed red blood cells     2. Dehydration  -currently receiving PRBC transfusion  -repeat renal panel  -strict Is and Os and monitor UOP  Gentle fluids  Resolved     3. Metabolic acidosis   -plan as above  -repeat CMP  Continue gentle fluids and recheck labs  Continue to monitor     4. Hypotension   -BP improved. Continue monitoring  Resolved     5. COPD  -may have Duoneb nebulizer tx prn  Resume home inhalers  Stable     6. GERD  -PPI daily      7. History of Afib  -ordered 12 lead EKG  -continue telemetry monitoring  Continue home metoprolol and digoxin     8. Underweight  -consider for failure to thrive  -BMI 16.47  -consult nutrition service  -add on test for pre-albumin     9. Open wound  -superficial abrasions left leg  -order dressing changes and Bacitracin ointment     10. Generalized weakness  -fall precautions  -consult PT/OT  -consult case management for social living conditions as patient is not able to care for herself at home    Plan is SNF.      Code status: Full  Prophylaxis: SCDs due to anemia  Care Plan discussed with: Patient, nurse  Anticipated Disposition: SNF 24 to 48 hours     Hospital Problems  Date Reviewed: 6/29/2022            Codes Class Noted POA    Dehydration ICD-10-CM: E86.0  ICD-9-CM: 276.51  8/21/2022 Unknown        Metabolic acidosis C-62-WV: E87.2  ICD-9-CM: 276.2  8/21/2022 Unknown Hypotension ICD-10-CM: I95.9  ICD-9-CM: 458.9  8/21/2022 Unknown        Symptomatic anemia ICD-10-CM: D64.9  ICD-9-CM: 285.9  8/21/2022 Unknown           Review of Systems:   A comprehensive review of systems was negative except for that written in the HPI. Vital Signs:    Last 24hrs VS reviewed since prior progress note. Most recent are:  Visit Vitals  BP (!) 150/69   Pulse 83   Temp 97.9 °F (36.6 °C)   Resp 16   Ht 5' 3\" (1.6 m)   SpO2 99%   BMI 16.47 kg/m²         Intake/Output Summary (Last 24 hours) at 8/23/2022 1856  Last data filed at 8/23/2022 1145  Gross per 24 hour   Intake 310 ml   Output --   Net 310 ml          Physical Examination:     I had a face to face encounter with this patient and independently examined them on 8/23/2022 as outlined below:          Constitutional:  No acute distress, cooperative, pleasant    ENT:  Oral mucosa moist, oropharynx benign. Resp:  CTA bilaterally. No wheezing/rhonchi/rales. No accessory muscle use. CV:  Regular rhythm, normal rate, no murmurs, gallops, rubs    GI:  Soft, non distended, non tender. normoactive bowel sounds, no hepatosplenomegaly     Musculoskeletal:  No edema, warm, 2+ pulses throughout    Neurologic:  Moves all extremities. AAOx3, CN II-XII reviewed            Data Review:    Review and/or order of clinical lab test  Review and/or order of tests in the radiology section of CPT  Review and/or order of tests in the medicine section of CPT      Labs:     Recent Labs     08/23/22  1424 08/23/22  0539 08/22/22  1406 08/22/22  0355   WBC  --  5.8  --  6.9   HGB 8.7* 6.4*   < > 7.8*   HCT 26.9* 20.2*   < > 25.0*   PLT  --  275  --  339    < > = values in this interval not displayed.        Recent Labs     08/23/22  0539 08/22/22  0355 08/21/22  1930    140 134*   K 3.4* 3.8 4.3   * 111* 105   CO2 19* 19* 19*   BUN 11 22* 29*   CREA 0.77 0.96 1.20*   * 99 107*   CA 7.8* 8.1* 9.1   MG  --  1.8  --        Recent Labs 08/22/22 0355 08/21/22  1930   ALT 19 21   AP 83 87   TBILI 0.5 0.1*   TP 6.3* 7.1   ALB 3.2* 3.5   GLOB 3.1 3.6       Recent Labs     08/22/22  0355   INR 1.0   PTP 10.0   APTT 23.9        Recent Labs     08/22/22  0355   TIBC 364   PSAT 24        Lab Results   Component Value Date/Time    Folate 27.1 (H) 08/22/2022 03:55 AM        No results for input(s): PH, PCO2, PO2 in the last 72 hours. No results for input(s): CPK, CKNDX, TROIQ in the last 72 hours.     No lab exists for component: CPKMB  Lab Results   Component Value Date/Time    Cholesterol, total 141 08/22/2022 03:55 AM    HDL Cholesterol 57 08/22/2022 03:55 AM    LDL, calculated 71.6 08/22/2022 03:55 AM    Triglyceride 62 08/22/2022 03:55 AM    CHOL/HDL Ratio 2.5 08/22/2022 03:55 AM     Lab Results   Component Value Date/Time    Glucose (POC) 145 (H) 02/14/2022 09:01 PM     No results found for: COLOR, APPRN, SPGRU, REFSG, FRANK, PROTU, GLUCU, KETU, BILU, UROU, BRITTANY, LEUKU, GLUKE, EPSU, BACTU, WBCU, RBCU, CASTS, UCRY      Medications Reviewed:     Current Facility-Administered Medications   Medication Dose Route Frequency    0.9% sodium chloride infusion 250 mL  250 mL IntraVENous PRN    0.9% sodium chloride infusion  100 mL/hr IntraVENous CONTINUOUS    ondansetron (ZOFRAN) injection 4 mg  4 mg IntraVENous Q6H PRN    metoprolol tartrate (LOPRESSOR) tablet 12.5 mg  12.5 mg Oral BID    albuterol-ipratropium (DUO-NEB) 2.5 MG-0.5 MG/3 ML  3 mL Nebulization Q4H PRN    digoxin (LANOXIN) tablet 0.125 mg  0.125 mg Oral DAILY    aspirin chewable tablet 81 mg  81 mg Oral DAILY    butalbital-acetaminophen-caffeine (FIORICET, ESGIC) -40 mg per tablet 2 Tablet  2 Tablet Oral Q8H PRN    arformoteroL (BROVANA) neb solution 15 mcg  15 mcg Nebulization BID RT    And    budesonide (PULMICORT) 500 mcg/2 ml nebulizer suspension  500 mcg Nebulization BID RT    And    ipratropium (ATROVENT) 0.02 % nebulizer solution 0.5 mg  0.5 mg Nebulization Q6H RT    dicyclomine (BENTYL) tablet 20 mg  20 mg Oral TIDAC    cyclobenzaprine (FLEXERIL) tablet 10 mg  10 mg Oral TID PRN    alum-mag hydroxide-simeth (MYLANTA) oral suspension 30 mL  30 mL Oral Q4H PRN    0.9% sodium chloride infusion 250 mL  250 mL IntraVENous PRN     ______________________________________________________________________  EXPECTED LENGTH OF STAY: 2d 16h  ACTUAL LENGTH OF STAY:          [facility] Gian Oakley MD

## 2022-08-23 NOTE — PROGRESS NOTES
Problem: Self Care Deficits Care Plan (Adult)  Goal: *Therapy Goal (Edit Goal, Insert Text)  Description: FUNCTIONAL STATUS PRIOR TO ADMISSION: Patient was independent with ADL/ light home mgmt tasks with no assistive device for mobility within the home. Patient used a rollator in the community   HOME SUPPORT: Inconsistent home support- son is currently incarcerated, and his partner will be leaving the [address] [place] [address]  Initiated 8/23/22  1. Patient will perform grooming in stand with modified independence within 7 day(s). 2.  Patient will perform bathing with modified independence within 7 day(s). 3.  Patient will perform lower body dressing with modified independence within 7 day(s). 4.  Patient will perform toilet transfers with modified independence within 7 day(s). 5.  Patient will perform all aspects of toileting with modified independence within 7 day(s). 6.  Patient will simulate simple meal prep/clean up with modified independence within 7 day(s). Outcome: Not Met   OCCUPATIONAL THERAPY EVALUATION  Patient: Darlene Negro (3 y.o. female)  Date: 8/23/2022  Primary Diagnosis: Symptomatic anemia [D64.9]  Hypotension [P36.8]  Metabolic acidosis [Y89.9]  Dehydration [E86.0]       Precautions:   Fall, Bed Alarm    ASSESSMENT  Based on the objective data described below, the patient presents with decreased ADL/mobility performance due to general weakness/fatigue, decreased standing tolerance, impaired dynamic standing balance. Patient's activity tolerance improving- evaluated after receiving 1 unit PRBC today. SPO2 95%,  during in room ambulation/ADL activity. Anticipate continued gains as patient feels better. She does express concern regarding ability to manage house hold tasks at this time.  SNF level rehab services recommended to maximize her recovery and facilitate ADL/IADL independence    Current Level of Function Impacting Discharge (ADLs/self-care): min assist to close supervision     Functional Outcome Measure: The patient scored Total: 55/100 on the Barthel Index outcome measure which is indicative of being partially dependent in basic self-care. Other factors to consider for discharge:      Patient will benefit from skilled therapy intervention to address the above noted impairments. PLAN :  Recommendations and Planned Interventions: self care training, functional mobility training, therapeutic exercise, balance training, therapeutic activities, endurance activities, patient education, home safety training, and family training/education    Frequency/Duration: Patient will be followed by occupational therapy 5 times a week to address goals. Recommendation for discharge: (in order for the patient to meet his/her long term goals)  Therapy up to 5 days/week in SNF setting    This discharge recommendation:  Has been made in collaboration with the attending provider and/or case management    IF patient discharges home will need the following DME: patient owns DME required for discharge       SUBJECTIVE:   Patient pleasant and cooperative     OBJECTIVE DATA SUMMARY:   HISTORY:   Past Medical History:   Diagnosis Date    Adenoma of colon     Anemia     Arthritis     Atrial fibrillation (HCC)     Bloating     Chronic constipation     Chronic pain     back     COPD (chronic obstructive pulmonary disease) ([address]     GERD (gastroesophageal reflux disease)     [place] (hard of hearing)     Hyponatremia     caused seizures x 2 per pt    Indigestion     Osteoporosis     Seizures ([address] 7/2020, 9/2020    x 2      Past Surgical History:   Procedure Laterality Date    COLONOSCOPY N/A 12/16/2021    COLONOSCOPY   :- performed by Talmage Hashimoto., MD at [address] [place] [address] N/A 2/14/2022    .  performed by Cony Mejia MD at [facility] MAIN OR    [facility]    HX CHOLECYSTECTOMY  1982    HX HIP REPLACEMENT Left     pt stated hip was pinned, not replaced    HX HIP REPLACEMENT Right     pt stated hip was pinned, not replaced    HX HYSTERECTOMY      HX ORTHOPAEDIC  1990, 2011    bilateral hip fractures     HX ORTHOPAEDIC Left 2020    femur fracture    HX TONSILLECTOMY         Expanded or extensive additional review of patient history:     Home Situation  Home Environment: Apartment  # Steps to Enter: 1  One/Two Story Residence: One story  Living Alone: No  Support Systems: Other Family Member(s)  Patient Expects to be Discharged to[de-identified] Skilled nursing facility  Current DME Used/Available at Home: Yin Gonzalez, bess, [facility] chair (scooter)  Tub or Shower Type: Tub/Shower combination      EXAMINATION OF PERFORMANCE DEFICITS:  Cognitive/Behavioral Status:  Neurologic State: Alert  Orientation Level: Oriented X4  Cognition: Follows commands; Appropriate decision making           Hearing: Auditory  Auditory Impairment: Hard of hearing, bilateral    Vision/Perceptual:                                Corrective Lenses: Glasses    Range of Motion:    AROM: Generally decreased, functional                         Strength:    Strength: Generally decreased, functional                Coordination:  Coordination: Within functional limits  Fine Motor Skills-Upper: Left Intact; Right Intact    Gross Motor Skills-Upper: Left Intact; Right Intact    Tone & Sensation:    Tone: Normal                         Balance:  Sitting: Intact; Without support  Standing: Impaired; Without support  Standing - Static: Good; Unsupported  Standing - Dynamic : Fair    Functional Mobility and Transfers for ADLs:  Bed Mobility:  Supine to Sit: Supervision  Scooting: Supervision    Transfers:  Sit to Stand: Supervision  Stand to Sit: Supervision  Bed to Chair: Contact guard assistance  Toilet Transfer : Contact guard assistance  Assistive Device :  (none)    ADL Assessment:  Feeding: Independent    Oral Facial Hygiene/Grooming: Supervision    Bathing: Contact guard assistance         Upper Body Dressing: Setup    Lower Body Dressing: Contact guard assistance    Toileting: Contact guard assistance                ADL Intervention and task modifications:   Patient was educated on the benefits of maintaining activity tolerance, functional mobility, and independence with self care tasks during acute stay. Encouraged patient to be out of bed for all meals, perform daily ADLs (as approved by RN/MD regarding bathing etc), performing functional mobility to/from bathroom with staff assistance, and increasing time OOB daily. Patient educated about the importance of maintaining activity tolerance to ensure safe return home and to baseline. Patient verbalized understanding of education. Functional Measure:    Barthel Index:  Bathin  Bladder: 10  Bowels: 10  Groomin  Dressin  Feeding: 10  Mobility: 0  Stairs: 0  Toilet Use: 5  Transfer (Bed to Chair and Back): 10  Total: 55/100      The Barthel ADL Index: Guidelines  1. The index should be used as a record of what a patient does, not as a record of what a patient could do. 2. The main aim is to establish degree of independence from any help, physical or verbal, however minor and for whatever reason. 3. The need for supervision renders the patient not independent. 4. A patient's performance should be established using the best available evidence. Asking the patient, friends/relatives and nurses are the usual sources, but direct observation and common sense are also important. However direct testing is not needed. 5. Usually the patient's performance over the preceding 24-48 hours is important, but occasionally longer periods will be relevant. 6. Middle categories imply that the patient supplies over 50 per cent of the effort. 7. Use of aids to be independent is allowed.     Score Interpretation (from [address] [place] [address])    Independent   60-79 Minimally independent   40-59 Partially dependent   20-39 Very dependent   <20 Totally dependent -Frederica Del., Barthel, D.W. (5823). Functional evaluation: the Barthel Index. [address] ([address] (1997). The Barthel activities of daily living index: self-reporting versus actual performance in the old (> or = 75 years). Journal of [facility]4 [facility] 45(7), 14 [facility], ADRIANNA, Myrla Holter., Meghan Brownlee. (1999). Measuring the change in disability after inpatient rehabilitation; comparison of the responsiveness of the Barthel Index and Functional Stone Measure. Journal of Neurology, Neurosurgery, and Psychiatry, 66(4), 572-468. Hermelinda Alfredo, NCharbelJ.A, TERRENCE Foster, & Shiv Stovall M.A. (2004) Assessment of post-stroke quality of life in cost-effectiveness studies: The usefulness of the Barthel Index and the EuroQoL-5D. Quality of Life Research, 15, 566-87     Occupational Therapy Evaluation Charge Determination   History Examination Decision-Making   LOW Complexity : Brief history review  LOW Complexity : 1-3 performance deficits relating to physical, cognitive , or psychosocial skils that result in activity limitations and / or participation restrictions  LOW Complexity : No comorbidities that affect functional and no verbal or physical assistance needed to complete eval tasks       Based on the above components, the patient evaluation is determined to be of the following complexity level: LOW   Pain Rating:  None reported during activity     Activity Tolerance:   Fair and , SPO2 95% during in room ambulation     After treatment patient left in no apparent distress:    Sitting in chair and Call bell within reach    COMMUNICATION/EDUCATION:   The patients plan of care was discussed with: Physical therapist and Registered nurse. Home safety education was provided and the patient/caregiver indicated understanding. and Patient/family have participated as able in goal setting and plan of care.     This patients plan of care is appropriate for delegation to [facility].     Thank you for this referral.  Leann Durand OT  Time Calculation: 28 mins

## 2022-08-23 NOTE — PROGRESS NOTES
Hgb 6.4 this morning. Wasn't informed by Laboratory of a critical value. Juliana Marks NP @ 3499. She will inform Physician.

## 2022-08-24 LAB
ABO + RH BLD: NORMAL
ANION GAP SERPL CALC-SCNC: 6 MMOL/L (ref 5–15)
ATRIAL RATE: 95 BPM
BASOPHILS # BLD: 0.1 K/UL (ref 0–0.1)
BASOPHILS NFR BLD: 1 % (ref 0–1)
BLD PROD TYP BPU: NORMAL
BLD PROD TYP BPU: NORMAL
BLOOD GROUP ANTIBODIES SERPL: NORMAL
BPU ID: NORMAL
BPU ID: NORMAL
BUN SERPL-MCNC: 12 MG/DL (ref 6–20)
BUN/CREAT SERPL: 22 (ref 12–20)
CALCIUM SERPL-MCNC: 7.9 MG/DL (ref 8.5–10.1)
CALCULATED P AXIS, ECG09: 85 DEGREES
CALCULATED R AXIS, ECG10: 80 DEGREES
CALCULATED T AXIS, ECG11: -92 DEGREES
CHLORIDE SERPL-SCNC: 113 MMOL/L (ref 97–108)
CO2 SERPL-SCNC: 19 MMOL/L (ref 21–32)
CREAT SERPL-MCNC: 0.54 MG/DL (ref 0.55–1.02)
CROSSMATCH RESULT,%XM: NORMAL
CROSSMATCH RESULT,%XM: NORMAL
DIAGNOSIS, 93000: NORMAL
DIFFERENTIAL METHOD BLD: ABNORMAL
EOSINOPHIL # BLD: 0.3 K/UL (ref 0–0.4)
EOSINOPHIL NFR BLD: 4 % (ref 0–7)
ERYTHROCYTE [DISTWIDTH] IN BLOOD BY AUTOMATED COUNT: 20.5 % (ref 11.5–14.5)
FERRITIN SERPL-MCNC: 23 NG/ML (ref 8–252)
GLUCOSE SERPL-MCNC: 97 MG/DL (ref 65–100)
HCT VFR BLD AUTO: 24.9 % (ref 35–47)
HGB BLD-MCNC: 8.3 G/DL (ref 11.5–16)
IMM GRANULOCYTES # BLD AUTO: 0 K/UL (ref 0–0.04)
IMM GRANULOCYTES NFR BLD AUTO: 0 % (ref 0–0.5)
IRON SATN MFR SERPL: 12 % (ref 20–50)
IRON SERPL-MCNC: 39 UG/DL (ref 35–150)
LYMPHOCYTES # BLD: 1.4 K/UL (ref 0.8–3.5)
LYMPHOCYTES NFR BLD: 19 % (ref 12–49)
MCH RBC QN AUTO: 25.2 PG (ref 26–34)
MCHC RBC AUTO-ENTMCNC: 33.3 G/DL (ref 30–36.5)
MCV RBC AUTO: 75.7 FL (ref 80–99)
MONOCYTES # BLD: 0.6 K/UL (ref 0–1)
MONOCYTES NFR BLD: 8 % (ref 5–13)
NEUTS SEG # BLD: 5 K/UL (ref 1.8–8)
NEUTS SEG NFR BLD: 68 % (ref 32–75)
NRBC # BLD: 0 K/UL (ref 0–0.01)
NRBC BLD-RTO: 0 PER 100 WBC
P-R INTERVAL, ECG05: 128 MS
PLATELET # BLD AUTO: 231 K/UL (ref 150–400)
PMV BLD AUTO: 8.8 FL (ref 8.9–12.9)
POTASSIUM SERPL-SCNC: 3.6 MMOL/L (ref 3.5–5.1)
Q-T INTERVAL, ECG07: 352 MS
QRS DURATION, ECG06: 86 MS
QTC CALCULATION (BEZET), ECG08: 442 MS
RBC # BLD AUTO: 3.29 M/UL (ref 3.8–5.2)
RBC MORPH BLD: ABNORMAL
SODIUM SERPL-SCNC: 138 MMOL/L (ref 136–145)
SPECIMEN EXP DATE BLD: NORMAL
STATUS OF UNIT,%ST: NORMAL
STATUS OF UNIT,%ST: NORMAL
TIBC SERPL-MCNC: 316 UG/DL (ref 250–450)
UNIT DIVISION, %UDIV: 0
UNIT DIVISION, %UDIV: 0
VENTRICULAR RATE, ECG03: 95 BPM
WBC # BLD AUTO: 7.4 K/UL (ref 3.6–11)

## 2022-08-24 PROCEDURE — 83540 ASSAY OF IRON: CPT

## 2022-08-24 PROCEDURE — 74011000258 HC RX REV CODE- 258: Performed by: STUDENT IN AN ORGANIZED HEALTH CARE EDUCATION/TRAINING PROGRAM

## 2022-08-24 PROCEDURE — 36415 COLL VENOUS BLD VENIPUNCTURE: CPT

## 2022-08-24 PROCEDURE — 82728 ASSAY OF FERRITIN: CPT

## 2022-08-24 PROCEDURE — 97535 SELF CARE MNGMENT TRAINING: CPT

## 2022-08-24 PROCEDURE — 97110 THERAPEUTIC EXERCISES: CPT

## 2022-08-24 PROCEDURE — 74011250636 HC RX REV CODE- 250/636: Performed by: FAMILY MEDICINE

## 2022-08-24 PROCEDURE — 85025 COMPLETE CBC W/AUTO DIFF WBC: CPT

## 2022-08-24 PROCEDURE — 97116 GAIT TRAINING THERAPY: CPT

## 2022-08-24 PROCEDURE — 74011250637 HC RX REV CODE- 250/637: Performed by: STUDENT IN AN ORGANIZED HEALTH CARE EDUCATION/TRAINING PROGRAM

## 2022-08-24 PROCEDURE — 74011000250 HC RX REV CODE- 250: Performed by: STUDENT IN AN ORGANIZED HEALTH CARE EDUCATION/TRAINING PROGRAM

## 2022-08-24 PROCEDURE — 74011250637 HC RX REV CODE- 250/637: Performed by: NURSE PRACTITIONER

## 2022-08-24 PROCEDURE — 74011250636 HC RX REV CODE- 250/636: Performed by: STUDENT IN AN ORGANIZED HEALTH CARE EDUCATION/TRAINING PROGRAM

## 2022-08-24 PROCEDURE — 80048 BASIC METABOLIC PNL TOTAL CA: CPT

## 2022-08-24 PROCEDURE — 65270000029 HC RM PRIVATE

## 2022-08-24 RX ORDER — SODIUM CHLORIDE 450 MG/100ML
75 INJECTION, SOLUTION INTRAVENOUS CONTINUOUS
Status: DISCONTINUED | OUTPATIENT
Start: 2022-08-24 | End: 2022-08-25 | Stop reason: HOSPADM

## 2022-08-24 RX ORDER — SODIUM CHLORIDE 450 MG/100ML
75 INJECTION, SOLUTION INTRAVENOUS ONCE
Status: COMPLETED | OUTPATIENT
Start: 2022-08-24 | End: 2022-08-24

## 2022-08-24 RX ADMIN — ONDANSETRON HYDROCHLORIDE 4 MG: 2 INJECTION, SOLUTION INTRAMUSCULAR; INTRAVENOUS at 12:59

## 2022-08-24 RX ADMIN — SODIUM CHLORIDE 75 ML/HR: 4.5 INJECTION, SOLUTION INTRAVENOUS at 14:13

## 2022-08-24 RX ADMIN — ALUMINUM HYDROXIDE, MAGNESIUM HYDROXIDE, AND SIMETHICONE 30 ML: 200; 200; 20 SUSPENSION ORAL at 02:19

## 2022-08-24 RX ADMIN — BUTALBITAL, ACETAMINOPHEN, AND CAFFEINE 2 TABLET: 50; 325; 40 TABLET ORAL at 22:48

## 2022-08-24 RX ADMIN — METOPROLOL TARTRATE 12.5 MG: 25 TABLET, FILM COATED ORAL at 08:35

## 2022-08-24 RX ADMIN — METOPROLOL TARTRATE 12.5 MG: 25 TABLET, FILM COATED ORAL at 17:17

## 2022-08-24 RX ADMIN — IRON SUCROSE 200 MG: 20 INJECTION, SOLUTION INTRAVENOUS at 08:28

## 2022-08-24 RX ADMIN — DICYCLOMINE HYDROCHLORIDE 20 MG: 20 TABLET ORAL at 17:17

## 2022-08-24 RX ADMIN — SODIUM CHLORIDE 75 ML/HR: 4.5 INJECTION, SOLUTION INTRAVENOUS at 08:29

## 2022-08-24 RX ADMIN — ONDANSETRON HYDROCHLORIDE 4 MG: 2 INJECTION, SOLUTION INTRAMUSCULAR; INTRAVENOUS at 18:57

## 2022-08-24 RX ADMIN — SODIUM CHLORIDE 75 ML/HR: 4.5 INJECTION, SOLUTION INTRAVENOUS at 22:48

## 2022-08-24 RX ADMIN — BUTALBITAL, ACETAMINOPHEN, AND CAFFEINE 2 TABLET: 50; 325; 40 TABLET ORAL at 05:52

## 2022-08-24 RX ADMIN — DICYCLOMINE HYDROCHLORIDE 20 MG: 20 TABLET ORAL at 08:34

## 2022-08-24 RX ADMIN — DICYCLOMINE HYDROCHLORIDE 20 MG: 20 TABLET ORAL at 12:25

## 2022-08-24 RX ADMIN — DIGOXIN 0.12 MG: 125 TABLET ORAL at 12:17

## 2022-08-24 RX ADMIN — ALUMINUM HYDROXIDE, MAGNESIUM HYDROXIDE, AND SIMETHICONE 30 ML: 200; 200; 20 SUSPENSION ORAL at 21:36

## 2022-08-24 RX ADMIN — BUTALBITAL, ACETAMINOPHEN, AND CAFFEINE 2 TABLET: 50; 325; 40 TABLET ORAL at 14:17

## 2022-08-24 RX ADMIN — ONDANSETRON HYDROCHLORIDE 4 MG: 2 INJECTION, SOLUTION INTRAMUSCULAR; INTRAVENOUS at 04:17

## 2022-08-24 RX ADMIN — ASPIRIN 81 MG CHEWABLE TABLET 81 MG: 81 TABLET CHEWABLE at 08:28

## 2022-08-24 NOTE — PROGRESS NOTES
Problem: Falls - Risk of  Goal: *Absence of Falls  Description: Document Usman Rojas Fall Risk and appropriate interventions in the flowsheet.   Outcome: Progressing Towards Goal  Note: Fall Risk Interventions:  Mobility Interventions: Patient to call before getting OOB, Utilize walker, cane, or other assistive device, PT Consult for mobility concerns    Medication Interventions: Patient to call before getting OOB, Teach patient to arise slowly    Elimination Interventions: Call light in reach, Patient to call for help with toileting needs

## 2022-08-24 NOTE — PROGRESS NOTES
Physician Progress Note      PATIENT:               Luis Mclean  [facility] #:                  [id number]  :                       1945  ADMIT DATE:       2022 7:06 PM  [address] [place] DATE:  RESPONDING  PROVIDER #:        Donald Simms MD        QUERY TEXT:    Type of Anemia: Please provide further specificity, if known. Clinical indicators include: hgb, anemia, transfuse 1 unit prbcs, h&h, transfusion, iron, b12, occult blood, prbc transfusion, due to anemia, hct, tibc, hemoglobin, 2 units of packed red blood cells  Options provided:  -- Anemia due to acute blood loss  -- Anemia due to chronic blood loss  -- Anemia due to iron deficiency  -- Anemia due to postoperative blood loss  -- Anemia due to chronic disease  -- Other - I will add my own diagnosis  -- Disagree - Not applicable / Not valid  -- Disagree - Clinically Unable to determine / Unknown        PROVIDER RESPONSE TEXT:    The patient has anemia due to iron deficiency. QUERY TEXT:    Patient admitted with anemia . If possible, please document in progress notes and discharge summary if you are evaluating and /or treating any of the following:     The medical record reflects the following:  Risk Factors: failure to thrive  Clinical Indicators:  nutritional consult  Malnutrition Assessment:  Malnutrition Status:  Severe malnutrition (22 1500)  Context:  Social/environmental circumstances  Findings of the 6 clinical characteristics of malnutrition:  Energy Intake:  No significant decrease in energy intake  Weight Loss:  No significant weight loass  Body Fat Loss:  Severe body fat loss, Orbital, Triceps  Muscle Mass Loss:  Severe muscle mass loss, Clavicles (pectoralis &deltoids), Temples (temporalis), Hand (interosseous)  Fluid Accumulation:  No significant fluid accumulation,   Strength:  Not performed      Treatment: nutritional consult, Diet: regular,  Supplements: ensure enlive BID    Thank you,  Quita Ramires RN, CDI, CRCR, [address]  Certified Clinical Documentation Integrity  Specialist  [phone]  You can also contact me via [address] [place] [address]. ASPEN Criteria:  https://aspenjournals. onlinelibrary. menendez. com/doi/full/10.1177/1234710689054801  Options provided:  -- Protein calorie malnutrition severe  with a BMI of 16.47  -- malnutrition with BMI 16.47  -- Underweight with BMI 16.47  -- Other - I will add my own diagnosis  -- Disagree - Not applicable / Not valid  -- Disagree - Clinically unable to determine / Unknown  -- Refer to Clinical Documentation Reviewer    PROVIDER RESPONSE TEXT:    This patient has severe protein calorie malnutrition with a BMI of 16.47. Query created by: Lydia Mattson on 8/23/2022 7:49 AM      QUERY TEXT:    Pt admitted with anemia and dehydration . Pt noted to have abnormal renal function labs. If possible, please document in the progress notes and discharge summary if you are evaluating and/or treating any of the following: The medical record reflects the following:  Risk Factors: dehydration  Clinical Indicators:    8/21/22 19:30************  BUN: 29 (H)  Creatinine: 1.20 (H)  GFR est non-AA: 44 (L)    8/23/22 05:39  BUN: 11  Creatinine: 0.77  GFR est non-AA: >60    Treatment: renal lab monitoring, 500 cc fluid bolus, IV fluids NS @ 100 cc/hr      Thank you,  Zaria Carvajal RN, CDI, CRCR, CCDS  Certified Clinical Documentation Integrity  Specialist  [phone]  You can also contact me via [address] [place] [address].           Defined by Kidney Disease Improving Global Outcomes (KDIGO) clinical practice guideline for acute kidney injury:  -Increase in SCr by greater than or equal to 0.3 mg/dl within 48?hours; or  -Increase or decrease in SCr to greater than or equal to 1.5 times baseline, which is known or presumed to have occurred within the prior 7 days; or  -Urine volume < 0.5ml/kg/h for 6 hours  Options provided:  -- Acute kidney failure  -- Acute kidney injury  -- Other - I will add my own diagnosis  -- Disagree - Not applicable / Not valid  -- Disagree - Clinically unable to determine / Unknown  -- Refer to Clinical Documentation Reviewer    PROVIDER RESPONSE TEXT:    This patient has an Acute kidney injury.     Query created by: Karime Olson on 8/23/2022 7:54 AM      Electronically signed by:  Charles Blackburn MD 8/24/2022 6:50 AM

## 2022-08-24 NOTE — PROGRESS NOTES
Problem: Mobility Impaired (Adult and Pediatric)  Goal: *Acute Goals and Plan of Care (Insert Text)  Description: FUNCTIONAL STATUS PRIOR TO ADMISSION: Patient was modified independent using a rollator vs no device for functional mobility. Also uses an electric scooter for longer distances/grocery shopping. Does not drive. HOME SUPPORT PRIOR TO ADMISSION: The patient lived with her son who was her main support system for groceries/medications and household management (cooking, cleaning, transportation), however son is currently incarcerated. Physical Therapy Goals  Initiated 8/22/2022  1. Patient will move from supine to sit and sit to supine , scoot up and down, and roll side to side in bed with modified independence within 7 day(s). 2.  Patient will transfer from bed to chair and chair to bed with modified independence using the least restrictive device within 7 day(s). 3.  Patient will perform sit to stand with modified independence within 7 day(s). 4.  Patient will ambulate with supervision/set-up for 100 feet with the least restrictive device within 7 day(s). 5.  Patient will ascend/descend 1 stairs without handrail(s) with minimal assistance/contact guard assist within 7 day(s). Outcome: Progressing Towards Goal     PHYSICAL THERAPY TREATMENT  Patient: Fransisca Cassidy (89 y.o. female)  Date: 8/24/2022  Diagnosis: Symptomatic anemia [D64.9]  Hypotension [J55.9]  Metabolic acidosis [A75.2]  Dehydration [E86.0] <principal problem not specified>      Precautions: Fall, Bed Alarm  Chart, physical therapy assessment, plan of care and goals were reviewed. ASSESSMENT  Patient continues with skilled PT services and is progressing towards goals. Able to progress gait distance with overall crouched posture (baseline). Patient is generally deconditioned and with impaired balance making it challenging for her especially to perform higher level IADLs and mobility.  Patient had support of son until recently (now incarcerated) and son-in-law assisting more recently but will need to leave the country and unclear if patient will have any support during that period of time. At this time recommend SNF to maximize functional potential but really needs long term plan for support (additional support at home or some type of LTC?). Acute PT will continue to follow and progress as able. Current Level of Function Impacting Discharge (mobility/balance): contact guard assist sit to stand without UEs    Other factors to consider for discharge: son incarcerated (prior caregiver) and son-in-law is supportive but will be out of country for over a week soon         PLAN :  Patient continues to benefit from skilled intervention to address the above impairments. Continue treatment per established plan of care. to address goals. Recommendation for discharge: (in order for the patient to meet his/her long term goals)  Therapy up to 5 days/week in SNF setting    This discharge recommendation:  Has been made in collaboration with the attending provider and/or case management    IF patient discharges home will need the following DME: patient owns DME required for discharge       SUBJECTIVE:   Patient stated I need to sign something that gives my body to science when I die. Evie Rios I really don't want to die     OBJECTIVE DATA SUMMARY:   Critical Behavior:  Neurologic State: Alert  Orientation Level: Oriented X4  Cognition: Follows commands, Appropriate for age attention/concentration, Appropriate decision making     Functional Mobility Training:  Bed Mobility:   Supine to Sit: Supervision; Additional time  Sit to Supine: Modified independent;Bed Modified  Scooting: Supervision; Additional time  Transfers:  Sit to Stand: Supervision;Contact guard assistance (CGA without use of UEs for sit to stands with fatigue)  Stand to Sit: Supervision  Balance:  Sitting: Intact  Standing: Impaired; Without support  Standing - Static: Good  Standing - Dynamic : Fair  Ambulation/Gait Training:  Distance (ft): 60 Feet (ft) (a couple standing rest breaks to extend trunk and take deep breaths)  Assistive Device: Gait belt  Ambulation - Level of Assistance: Contact guard assistance;Stand-by assistance  Gait Abnormalities: Decreased step clearance (pt with significantly flexed posture she reports is baseline and w/ associated spinal abnormalities)  Speed/Amanda: Pace decreased (<100 feet/min); Shuffled  Step Length: Left shortened;Right shortened    Therapeutic Exercises:   Sitting exercises - ankle dorsiflexion/plantarflexion, long arc quads, B hip flexion  Standing exercise- hip abduction  Sit to stands to fatigue (x4 without UEs and with CGA)  Patient attempted to perform sit ups for exercise in bed - discussed given prior spine compression fractures to avoid this exercise at this time and patient reports understanding    Pain Rating:  Did not interfere    Activity Tolerance:   Fair    After treatment patient left in no apparent distress:   Supine in bed, Call bell within reach, and Side rails x 3, aware of need to call for assist for mobility and reporting intention to comply    COMMUNICATION/COLLABORATION:   The patients plan of care was discussed with: Registered nurse.      Jimenez Erazo, PT   Time Calculation: 26 mins

## 2022-08-24 NOTE — PROGRESS NOTES
Problem: Self Care Deficits Care Plan (Adult)  Goal: *Therapy Goal (Edit Goal, Insert Text)  Description: FUNCTIONAL STATUS PRIOR TO ADMISSION: Patient was independent with ADL/ light home mgmt tasks with no assistive device for mobility within the home. Patient used a rollator in the community   HOME SUPPORT: Inconsistent home support- son is currently incarcerated, and his partner will be leaving the [address] [place] [address]  Initiated 8/23/22  1. Patient will perform grooming in stand with modified independence within 7 day(s). 2.  Patient will perform bathing with modified independence within 7 day(s). 3.  Patient will perform lower body dressing with modified independence within 7 day(s). 4.  Patient will perform toilet transfers with modified independence within 7 day(s). 5.  Patient will perform all aspects of toileting with modified independence within 7 day(s). 6.  Patient will simulate simple meal prep/clean up with modified independence within 7 day(s). Outcome: Progressing Towards Goal   OCCUPATIONAL THERAPY TREATMENT  Patient: Lex Quezada (21 y.o. female)  Date: 8/24/2022  Diagnosis: Symptomatic anemia [D64.9]  Hypotension [X49.8]  Metabolic acidosis [R30.8]  Dehydration [E86.0] <principal problem not specified>      Precautions: Fall, Bed Alarm  Chart, occupational therapy assessment, plan of care, and goals were reviewed. ASSESSMENT  Patient continues with skilled OT services and is progressing towards goals. Patient demonstrates improved balance during ADL in stand and related mobility, no assistive devices used today. She has SOB and requires rest breaks during activity though this is baseline for her. General weakness persists. Patient is now stating she wants to return home. She states that her daughter is able to check in on her every other day, and she may be able to stay with her on the weekends when her son in law is out of town.  Her son's dog will be cared for by a neighbor. If patient returns home, [place] OT is strongly recommended to ensure safety with meal prep/clean up    Current Level of Function Impacting Discharge (ADLs): supervision    Other factors to consider for discharge:          PLAN :  Patient continues to benefit from skilled intervention to address the above impairments. Continue treatment per established plan of care to address goals. Recommend with staff: ADL in bathroom    Recommend next OT session: progress POC    Recommendation for discharge: (in order for the patient to meet his/her long term goals)  To be determined: SNF vs  with increased family support     This discharge recommendation:  Has been made in collaboration with the attending provider and/or case management    IF patient discharges home will need the following DME: patient owns DME required for discharge       SUBJECTIVE:   Patient stated My daughter is going to check on me every other day.     OBJECTIVE DATA SUMMARY:   Cognitive/Behavioral Status:  Neurologic State: Alert  Orientation Level: Oriented X4  Cognition: Appropriate decision making; Follows commands             Functional Mobility and Transfers for ADLs:  Bed Mobility:  Supine to Sit: Supervision  Sit to Supine: Modified independent;Bed Modified  Scooting: Supervision; Additional time    Transfers:  Sit to Stand: Supervision;Contact guard assistance (CGA without use of UEs for sit to stands with fatigue)  Functional Transfers  Bathroom Mobility: Supervision/set up  Toilet Transfer : Supervision  Adaptive Equipment:  (none)  Bed to Chair: Supervision    Balance:  Sitting: Impaired; Without support  Standing: Impaired; Without support  Standing - Static: Good; Unsupported  Standing - Dynamic : Fair    ADL Intervention:   Dicussed use of rollator for item transport during IADL tasks- patient typically does not use it in the home.    Pet care- son's dog will be cared for by neighbor while [employer] is out of town     Grooming  Grooming Assistance: Supervision  Position Performed: Standing       Lower Body Dressing Assistance  Socks: Supervision  Leg Crossed Method Used: Yes  Position Performed: Seated edge of bed    Toileting  Toileting Assistance: Supervision    Pain:  None reported     Activity Tolerance:   Fair and requires rest breaks    After treatment patient left in no apparent distress:   Supine in bed and Call bell within reach    COMMUNICATION/COLLABORATION:   The patients plan of care was discussed with: Registered nurse.      Mariluz Loredo OT  Time Calculation: 28 mins

## 2022-08-24 NOTE — PROGRESS NOTES
[facility] Adult  Hospitalist Group                                                                                          Hospitalist Progress Note  Sheila Howell MD  Answering service: [phone] from in house phone        Date of Service:  2022  NAME:  Ny Rahman  :  1945  MRN:  [id number]      Admission Summary:   Ny Rahman is a 77 y.o. female with past medical history of anemia, arthritis, Afib, chronic back pain, constipation, hyponatremia, GERD, osteoporosis, seizures presented to the ED from home via EMS with chief complaints of feeling scared, short of breath (SOB). Per patient had onset of SOB yesterday, constant, moderate severity, without specific alleviating factors. EMS was called to her residence as patient was unable to use nebulizer as it was not working. Patient requested to go to the ED for evaluation. Per ED provider note, patient commented that she was \"scared\", depressed, and wishing she was dead. However, she denied SI. Until recently her son (who is now reportedly incarcerated) was patient's caregiver. She is currently living alone and  notedly has been unable to obtain her prescribed medications and groceries per ED triage note. Patient was last hospitalized from 2022 - 2022 with diagnoses of COPD exacerbation, paroxysmal Afib, anemia, T12 compression fracture, SOB, headache, and diarrhea. On arrival in the ED tonight, initial recorded VS were T 98.3 F, BP 82/59, , RR 20, O2sat 100% on room air. Chest xray portable showed no acute process. Abnormal labs showed Hgb 7.0, MCV 74.7, CO2 19, BUN 29, creatinine 1.2, Na 134. ED ordered 0.9%  ml IV fluid bolus, Fioricet, GI cocktail, and 0.9%  ml IV fluid bolus x 1. ED request admission to the hospitalist service.          Interval history / Subjective:   Patient seen and examined earlier this morning by me for follow-up of symptomatic anemia, dehydration, weakness. Patient is feeling better today. She complains of some diarrhea. Assessment & Plan:      Symptomatic anemia  -admit to telemetry floor  -transfuse 1 unit PRBCs   -repeat H&H post transfusion  -check iron profile, B12, folate levels  -order stool occult blood test  -goal Hgb > 7.0  -consider for EGD/ colonoscopy  -last reported colonoscopy Feb 2022 with colon polyp present per patient report  Pending stool occult blood  Check H&H  Patient's hemoglobin is 8.7 status post 2 units of packed red blood cells  Hemoglobin stable  Giving IV iron     2. Dehydration  -currently receiving PRBC transfusion  -repeat renal panel  -strict Is and Os and monitor UOP  Gentle fluids  Resolved     3. Metabolic acidosis   -plan as above  -repeat CMP  Continue gentle fluids and recheck labs  Continue to monitor  Started on 1/2 NS     4. Hypotension   -BP improved. Continue monitoring  Resolved     5. COPD  -may have Duoneb nebulizer tx prn  Resume home inhalers  Stable     6. GERD  -PPI daily      7. History of Afib  -ordered 12 lead EKG  -continue telemetry monitoring  Continue home metoprolol and digoxin     8. Underweight  -consider for failure to thrive  -BMI 16.47  -consult nutrition service  -add on test for pre-albumin     9. Open wound  -superficial abrasions left leg  -order dressing changes and Bacitracin ointment     10. Generalized weakness  -fall precautions  -consult PT/OT  -consult case management for social living conditions as patient is not able to care for herself at home    Plan is SNF.      Code status: Full  Prophylaxis: SCDs due to anemia  Care Plan discussed with: Patient, nurse  Anticipated Disposition: SNF 24 to 48 hours     Hospital Problems  Date Reviewed: 6/29/2022            Codes Class Noted POA    Dehydration ICD-10-CM: E86.0  ICD-9-CM: 276.51  8/21/2022 Unknown        Metabolic acidosis LNA-91-CM: E87.2  ICD-9-CM: 276.2  8/21/2022 Unknown        Hypotension ICD-10-CM: I95.9  ICD-9-CM: 458.9  8/21/2022 Unknown        Symptomatic anemia ICD-10-CM: D64.9  ICD-9-CM: 285.9  8/21/2022 Unknown         Review of Systems:   A comprehensive review of systems was negative except for that written in the HPI. Vital Signs:    Last 24hrs VS reviewed since prior progress note. Most recent are:  Visit Vitals  /61 (BP 1 Location: Left upper arm, BP Patient Position: At rest;Lying)   Pulse 90   Temp 97.8 °F (36.6 °C)   Resp 17   Ht 5' 3\" (1.6 m)   SpO2 99%   BMI 16.47 kg/m²       No intake or output data in the 24 hours ending 08/24/22 1345       Physical Examination:     I had a face to face encounter with this patient and independently examined them on 8/24/2022 as outlined below:          Constitutional:  No acute distress, cooperative, pleasant    ENT:  Oral mucosa moist, oropharynx benign. Resp:  CTA bilaterally. No wheezing/rhonchi/rales. No accessory muscle use. CV:  Regular rhythm, normal rate, no murmurs, gallops, rubs    GI:  Soft, non distended, non tender. normoactive bowel sounds, no hepatosplenomegaly     Musculoskeletal:  No edema, warm, 2+ pulses throughout    Neurologic:  Moves all extremities.   AAOx3, CN II-XII reviewed            Data Review:    Review and/or order of clinical lab test  Review and/or order of tests in the radiology section of CPT  Review and/or order of tests in the medicine section of CPT      Labs:     Recent Labs     08/24/22  0002 08/23/22  1424 08/23/22  0539   WBC 7.4  --  5.8   HGB 8.3* 8.7* 6.4*   HCT 24.9* 26.9* 20.2*     --  275       Recent Labs     08/24/22  0002 08/23/22  0539 08/22/22  0355    138 140   K 3.6 3.4* 3.8   * 113* 111*   CO2 19* 19* 19*   BUN 12 11 22*   CREA 0.54* 0.77 0.96   GLU 97 110* 99   CA 7.9* 7.8* 8.1*   MG  --   --  1.8       Recent Labs     08/22/22  0355 08/21/22  1930   ALT 19 21   AP 83 87   TBILI 0.5 0.1*   TP 6.3* 7.1   ALB 3.2* 3.5   GLOB 3.1 3.6       Recent Labs     08/22/22  0355 INR 1.0   PTP 10.0   APTT 23.9        Recent Labs     08/24/22  0820 08/22/22  0355   TIBC 316 364   PSAT 12* 24   FERR 23  --         Lab Results   Component Value Date/Time    Folate 27.1 (H) 08/22/2022 03:55 AM        No results for input(s): PH, PCO2, PO2 in the last 72 hours. No results for input(s): CPK, CKNDX, TROIQ in the last 72 hours.     No lab exists for component: CPKMB  Lab Results   Component Value Date/Time    Cholesterol, total 141 08/22/2022 03:55 AM    HDL Cholesterol 57 08/22/2022 03:55 AM    LDL, calculated 71.6 08/22/2022 03:55 AM    Triglyceride 62 08/22/2022 03:55 AM    CHOL/HDL Ratio 2.5 08/22/2022 03:55 AM     Lab Results   Component Value Date/Time    Glucose (POC) 145 (H) 02/14/2022 09:01 PM     No results found for: COLOR, APPRN, SPGRU, REFSG, FRANK, PROTU, GLUCU, KETU, BILU, UROU, BRITTANY, LEUKU, GLUKE, EPSU, BACTU, WBCU, RBCU, CASTS, UCRY      Medications Reviewed:     Current Facility-Administered Medications   Medication Dose Route Frequency    0.9% sodium chloride infusion 250 mL  250 mL IntraVENous PRN    [Held by provider] 0.9% sodium chloride infusion  100 mL/hr IntraVENous CONTINUOUS    docusate sodium (COLACE) capsule 100 mg  100 mg Oral DAILY    ondansetron (ZOFRAN) injection 4 mg  4 mg IntraVENous Q6H PRN    metoprolol tartrate (LOPRESSOR) tablet 12.5 mg  12.5 mg Oral BID    albuterol-ipratropium (DUO-NEB) 2.5 MG-0.5 MG/3 ML  3 mL Nebulization Q4H PRN    digoxin (LANOXIN) tablet 0.125 mg  0.125 mg Oral DAILY    aspirin chewable tablet 81 mg  81 mg Oral DAILY    butalbital-acetaminophen-caffeine (FIORICET, ESGIC) -40 mg per tablet 2 Tablet  2 Tablet Oral Q8H PRN    arformoteroL (BROVANA) neb solution 15 mcg  15 mcg Nebulization BID RT    And    budesonide (PULMICORT) 500 mcg/2 ml nebulizer suspension  500 mcg Nebulization BID RT    And    ipratropium (ATROVENT) 0.02 % nebulizer solution 0.5 mg  0.5 mg Nebulization Q6H RT    dicyclomine (BENTYL) tablet 20 mg  20 mg Oral TIDAC    cyclobenzaprine (FLEXERIL) tablet 10 mg  10 mg Oral TID PRN    alum-mag hydroxide-simeth (MYLANTA) oral suspension 30 mL  30 mL Oral Q4H PRN    0.9% sodium chloride infusion 250 mL  250 mL IntraVENous PRN     ______________________________________________________________________  EXPECTED LENGTH OF STAY: 2d 16h  ACTUAL LENGTH OF STAY:          [address]  Tatiana Estrella MD

## 2022-08-25 VITALS
HEART RATE: 75 BPM | SYSTOLIC BLOOD PRESSURE: 114 MMHG | DIASTOLIC BLOOD PRESSURE: 70 MMHG | OXYGEN SATURATION: 98 % | BODY MASS INDEX: 16.47 KG/M2 | HEIGHT: 63 IN | TEMPERATURE: 98.3 F | RESPIRATION RATE: 16 BRPM

## 2022-08-25 LAB
ANION GAP SERPL CALC-SCNC: 8 MMOL/L (ref 5–15)
BASOPHILS # BLD: 0.1 K/UL (ref 0–0.1)
BASOPHILS NFR BLD: 2 % (ref 0–1)
BUN SERPL-MCNC: 7 MG/DL (ref 6–20)
BUN/CREAT SERPL: 11 (ref 12–20)
CALCIUM SERPL-MCNC: 8.1 MG/DL (ref 8.5–10.1)
CHLORIDE SERPL-SCNC: 109 MMOL/L (ref 97–108)
CO2 SERPL-SCNC: 22 MMOL/L (ref 21–32)
CREAT SERPL-MCNC: 0.61 MG/DL (ref 0.55–1.02)
DIFFERENTIAL METHOD BLD: ABNORMAL
EOSINOPHIL # BLD: 0.4 K/UL (ref 0–0.4)
EOSINOPHIL NFR BLD: 7 % (ref 0–7)
ERYTHROCYTE [DISTWIDTH] IN BLOOD BY AUTOMATED COUNT: 21.2 % (ref 11.5–14.5)
GLUCOSE SERPL-MCNC: 95 MG/DL (ref 65–100)
HCT VFR BLD AUTO: 25.7 % (ref 35–47)
HGB BLD-MCNC: 8.4 G/DL (ref 11.5–16)
IMM GRANULOCYTES # BLD AUTO: 0 K/UL (ref 0–0.04)
IMM GRANULOCYTES NFR BLD AUTO: 0 % (ref 0–0.5)
LYMPHOCYTES # BLD: 1.2 K/UL (ref 0.8–3.5)
LYMPHOCYTES NFR BLD: 21 % (ref 12–49)
MCH RBC QN AUTO: 24.9 PG (ref 26–34)
MCHC RBC AUTO-ENTMCNC: 32.7 G/DL (ref 30–36.5)
MCV RBC AUTO: 76 FL (ref 80–99)
MONOCYTES # BLD: 0.6 K/UL (ref 0–1)
MONOCYTES NFR BLD: 10 % (ref 5–13)
NEUTS SEG # BLD: 3.6 K/UL (ref 1.8–8)
NEUTS SEG NFR BLD: 60 % (ref 32–75)
NRBC # BLD: 0 K/UL (ref 0–0.01)
NRBC BLD-RTO: 0 PER 100 WBC
PLATELET # BLD AUTO: 229 K/UL (ref 150–400)
PMV BLD AUTO: 8.7 FL (ref 8.9–12.9)
POTASSIUM SERPL-SCNC: 3.2 MMOL/L (ref 3.5–5.1)
RBC # BLD AUTO: 3.38 M/UL (ref 3.8–5.2)
RBC MORPH BLD: ABNORMAL
RBC MORPH BLD: ABNORMAL
SODIUM SERPL-SCNC: 139 MMOL/L (ref 136–145)
WBC # BLD AUTO: 5.9 K/UL (ref 3.6–11)

## 2022-08-25 PROCEDURE — 36415 COLL VENOUS BLD VENIPUNCTURE: CPT

## 2022-08-25 PROCEDURE — 85025 COMPLETE CBC W/AUTO DIFF WBC: CPT

## 2022-08-25 PROCEDURE — 80048 BASIC METABOLIC PNL TOTAL CA: CPT

## 2022-08-25 PROCEDURE — 74011250637 HC RX REV CODE- 250/637: Performed by: STUDENT IN AN ORGANIZED HEALTH CARE EDUCATION/TRAINING PROGRAM

## 2022-08-25 PROCEDURE — 74011250636 HC RX REV CODE- 250/636: Performed by: FAMILY MEDICINE

## 2022-08-25 PROCEDURE — 74011250637 HC RX REV CODE- 250/637: Performed by: NURSE PRACTITIONER

## 2022-08-25 RX ORDER — LOPERAMIDE HYDROCHLORIDE 2 MG/1
4 CAPSULE ORAL
Status: DISCONTINUED | OUTPATIENT
Start: 2022-08-25 | End: 2022-08-25 | Stop reason: HOSPADM

## 2022-08-25 RX ORDER — LOPERAMIDE HYDROCHLORIDE 2 MG/1
4 CAPSULE ORAL
Qty: 16 CAPSULE | Refills: 0 | Status: SHIPPED | OUTPATIENT
Start: 2022-08-25 | End: 2022-08-29

## 2022-08-25 RX ADMIN — DIGOXIN 0.12 MG: 125 TABLET ORAL at 10:34

## 2022-08-25 RX ADMIN — DICYCLOMINE HYDROCHLORIDE 20 MG: 20 TABLET ORAL at 17:37

## 2022-08-25 RX ADMIN — BUTALBITAL, ACETAMINOPHEN, AND CAFFEINE 2 TABLET: 50; 325; 40 TABLET ORAL at 06:53

## 2022-08-25 RX ADMIN — METOPROLOL TARTRATE 12.5 MG: 25 TABLET, FILM COATED ORAL at 09:19

## 2022-08-25 RX ADMIN — DICYCLOMINE HYDROCHLORIDE 20 MG: 20 TABLET ORAL at 13:02

## 2022-08-25 RX ADMIN — DICYCLOMINE HYDROCHLORIDE 20 MG: 20 TABLET ORAL at 06:53

## 2022-08-25 RX ADMIN — ONDANSETRON HYDROCHLORIDE 4 MG: 2 INJECTION, SOLUTION INTRAMUSCULAR; INTRAVENOUS at 09:19

## 2022-08-25 RX ADMIN — BUTALBITAL, ACETAMINOPHEN, AND CAFFEINE 2 TABLET: 50; 325; 40 TABLET ORAL at 14:45

## 2022-08-25 RX ADMIN — ASPIRIN 81 MG CHEWABLE TABLET 81 MG: 81 TABLET CHEWABLE at 09:19

## 2022-08-25 RX ADMIN — METOPROLOL TARTRATE 12.5 MG: 25 TABLET, FILM COATED ORAL at 17:37

## 2022-08-25 RX ADMIN — LOPERAMIDE HYDROCHLORIDE 4 MG: 2 CAPSULE ORAL at 14:45

## 2022-08-25 RX ADMIN — ONDANSETRON HYDROCHLORIDE 4 MG: 2 INJECTION, SOLUTION INTRAMUSCULAR; INTRAVENOUS at 02:53

## 2022-08-25 RX ADMIN — ALUMINUM HYDROXIDE, MAGNESIUM HYDROXIDE, AND SIMETHICONE 30 ML: 200; 200; 20 SUSPENSION ORAL at 14:45

## 2022-08-25 NOTE — PROGRESS NOTES
Problem: Falls - Risk of  Goal: *Absence of Falls  Description: Document Carolyn Bower Fall Risk and appropriate interventions in the flowsheet.   Outcome: Progressing Towards Goal  Note: Fall Risk Interventions:  Mobility Interventions: Patient to call before getting OOB         Medication Interventions: Patient to call before getting OOB, Teach patient to arise slowly    Elimination Interventions: Call light in reach, Patient to call for help with toileting needs

## 2022-08-25 NOTE — PROGRESS NOTES
Transition of Care Plan  RUR- 17%   DISPOSITION: The disposition plan is home with family assistance and HH  HH: [facility]; AVS updated   F/U with PCP/Specialist    Transport: Daughter    Medicare pt has received, reviewed, and signed 2nd IM letter informing them of their right to appeal the discharge. Signed copy has been placed on pt bedside chart.       CM: [address]. [place],   [phone]

## 2022-08-25 NOTE — PROGRESS NOTES
Attempted to schedule hospital follow up PCP appointment. Office /Nurse will contact the patient with appointment information. Pending patient discharge.  Funmi Alegre, Care Management Assistant

## 2022-08-25 NOTE — DISCHARGE SUMMARY
Discharge Summary       PATIENT ID: Ellen Soto  MRN: [id number]   YOB: 1945    DATE OF ADMISSION: 8/21/2022  7:06 PM    DATE OF DISCHARGE: 08/25/22    PRIMARY CARE PROVIDER: Beba Doyle NP     ATTENDING PHYSICIAN: Carmen Robledo MD   DISCHARGING PROVIDER: Carmen Robledo MD    To contact this individual call [phone] and ask the  to page. If unavailable ask to be transferred the Adult Hospitalist Department. CONSULTATIONS: None    PROCEDURES/SURGERIES: * No surgery found *    ADMITTING DIAGNOSES & HOSPITAL COURSE:   Patient was evaluated for symptomatic anemia transfuse 1 unit PRBC hemoglobin stayed stable thereafter. Given IV iron. Iron studies within normal limit. No further bm for stool occult. Also continue on IV fluids. Patient improved. Discussed with patient at length at bedside about living situation she lives with son in law, states daughter is willing to take her under her care. Does not wish to go to SNF agreeable to home health. Fu with pcp 3-5 days. DISCHARGE DIAGNOSES / PLAN:      Symptomatic anemia  -Iron, B12 folate within normal limit  -goal Hgb > 7.0  -given IV iron   Patient's hemoglobin is stable mid 8's  post 2 units of packed red blood cells    JOE  -s/p IVF  Resolved     Metabolic acidosis   -resolved   -plan as above    Hypotension   -BP improved.  Continue monitoring  Resolved     COPD  -may have Duoneb nebulizer tx prn  Resume home inhalers  Stable      GERD  -PPI daily      History of Afib  -ordered 12 lead EKG  -continue telemetry monitoring  Continue home metoprolol and digoxin     Underweight  -consider for failure to thrive  -BMI 16.47  -consult nutrition service  -add on test for pre-albumin     Open wound  -superficial abrasions left leg  -order dressing changes and Bacitracin ointment     Generalized weakness  -fall precautions  -consult PT/OT  -consult case management for social living conditions she wants , has support from brother in law and daughter     Loose stool  -immodium prn   -op gi if persists      Code status: Full  Prophylaxis: SCDs due to anemia  Care Plan discussed with: Patient, nurse  Anticipated Disposition:          FOLLOW UP APPOINTMENTS:    Follow-up Information       Follow up With Specialties Details Why Contact Info    Eldon Teran NP Family Medicine, General Practice  PCP will follow up with patient  with appointment [facility]  Mail Stop [phone] [address] [address]  [phone]             ADDITIONAL CARE RECOMMENDATIONS: Repeat CBC, BMP 3 to 5 days    DIET: Resume previous diet    ACTIVITY: Activity as tolerated      DISCHARGE MEDICATIONS:  Current Discharge Medication List        START taking these medications    Details   loperamide (IMODIUM) 2 mg capsule Take 2 Capsules by mouth every four (4) hours as needed for Diarrhea for up to 4 days. Qty: 16 Capsule, Refills: 0  Start date: 8/25/2022, End date: 8/29/2022           CONTINUE these medications which have NOT CHANGED    Details   predniSONE (DELTASONE) 10 mg tablet Take 40 mg by mouth daily (with breakfast). To be decreased by 10 mg every 3 days and then stop  Qty: 30 Tablet, Refills: 0      fluticasone-umeclidinium-vilanterol (Trelegy Ellipta) 100-62.5-25 mcg inhaler Take 1 Puff by inhalation daily. Qty: 60 Each, Refills: 0      cyclobenzaprine (FLEXERIL) 10 mg tablet Take 10 mg by mouth three (3) times daily as needed. dicyclomine (BENTYL) 20 mg tablet Take 20 mg by mouth. docusate sodium (COLACE) 100 mg capsule Take 1 Capsule by mouth two (2) times a day. Qty: 30 Capsule, Refills: 0      aspirin (ASPIRIN) 325 mg tablet Take 81 mg by mouth daily. multivitamin (ONE A DAY) tablet Take 1 Tablet by mouth daily.       acetaminophen (TYLENOL) 325 mg tablet Take 650 mg by mouth every four (4) hours as needed for Pain.      digoxin (LANOXIN) 0.125 mg tablet Take 1 Tablet by mouth daily. Qty: 90 Tablet, Refills: 1    Comments: Dose DECREASED to 0.125mg daily 11/15/21      albuterol (PROVENTIL HFA, VENTOLIN HFA, PROAIR HFA) 90 mcg/actuation inhaler Take 2 Puffs by inhalation every six (6) hours as needed. butalbital-acetaminophen-caffeine (FIORICET, ESGIC) -40 mg per tablet Take 1-2 Tablets by mouth every six (6) hours as needed. lactulose (CHRONULAC) 10 gram/15 mL solution Take 20 g by mouth two (2) times daily as needed. metoprolol succinate (TOPROL-XL) 25 mg XL tablet Take 25 mg by mouth Every morning. pantoprazole (PROTONIX) 40 mg tablet Take 40 mg by mouth daily. STOP taking these medications       doxycycline (VIBRA-TABS) 100 mg tablet Comments:   Reason for Stopping:                 NOTIFY YOUR PHYSICIAN FOR ANY OF THE FOLLOWING:   Fever over 101 degrees for 24 hours. Chest pain, shortness of breath, fever, chills, nausea, vomiting, diarrhea, change in mentation, falling, weakness, bleeding. Severe pain or pain not relieved by medications. Or, any other signs or symptoms that you may have questions about. DISPOSITION:    Home With:   OT  PT x HH  RN       Long term SNF/Inpatient Rehab    Independent/assisted living    Hospice    Other:       PATIENT CONDITION AT DISCHARGE:     Functional status    Poor    x Deconditioned     Independent      Cognition   x  Lucid     Forgetful     Dementia      Catheters/lines (plus indication)    Kendrick     PICC     PEG    x None      Code status   x  Full code     DNR      PHYSICAL EXAMINATION AT DISCHARGE:      I had a face to face encounter with this patient and independently examined them on 8/25/2022 as outlined below:                                                       Constitutional:  No acute distress, cooperative, pleasant, thin appearing    ENT:  Oral mucosa moist, oropharynx benign.     Resp:  CTA bilaterally. No wheezing/rhonchi/rales. No accessory muscle use. CV:  Regular rhythm, normal rate, no murmurs, gallops, rubs    GI:  Soft, non distended, non tender. normoactive bowel sounds, no hepatosplenomegaly     Musculoskeletal:  No edema, warm, 2+ pulses throughout    Neurologic:  Moves all extremities.   AAOx3, CN II-XII reviewed         CHRONIC MEDICAL DIAGNOSES:  Problem List as of 8/25/2022 Date Reviewed: 6/29/2022            Codes Class Noted - Resolved    Dehydration ICD-10-CM: E86.0  ICD-9-CM: 276.51  8/21/2022 - Present        Metabolic acidosis QEX-46-FT: E87.2  ICD-9-CM: 276.2  8/21/2022 - Present        Hypotension ICD-10-CM: I95.9  ICD-9-CM: 458.9  8/21/2022 - Present        Symptomatic anemia ICD-10-CM: D64.9  ICD-9-CM: 285.9  8/21/2022 - Present        Acute respiratory failure ([facility] ICD-10-CM: J96.00  ICD-9-CM: 518.81  6/29/2022 - Present        Respiratory distress ICD-10-CM: R06.03  ICD-9-CM: 786.09  6/29/2022 - Present        Adenomatous colon polyp ICD-10-CM: D12.6  ICD-9-CM: 211.3  2/14/2022 - Present        Adenomatous polyp of colon ICD-10-CM: D12.6  ICD-9-CM: 211.3  2/14/2022 - Present        Adenomatous polyp of descending colon ICD-10-CM: D12.4  ICD-9-CM: 211.3  1/13/2022 - Present           Greater than 30 minutes were spent with the patient on counseling and coordination of care    Signed:   Edwin Machado MD  8/25/2022  3:25 PM

## 2022-09-12 ENCOUNTER — HOSPITAL ENCOUNTER (INPATIENT)
Age: 77
LOS: 4 days | Discharge: NURSING, REHAB OR LONG-TERM CARE | DRG: 436 | End: 2022-09-16
Attending: STUDENT IN AN ORGANIZED HEALTH CARE EDUCATION/TRAINING PROGRAM | Admitting: HOSPITALIST
Payer: MEDICARE

## 2022-09-12 ENCOUNTER — APPOINTMENT (OUTPATIENT)
Dept: MRI IMAGING | Age: 77
DRG: 436 | End: 2022-09-12
Attending: HOSPITALIST
Payer: MEDICARE

## 2022-09-12 ENCOUNTER — APPOINTMENT (OUTPATIENT)
Dept: CT IMAGING | Age: 77
DRG: 436 | End: 2022-09-12
Attending: STUDENT IN AN ORGANIZED HEALTH CARE EDUCATION/TRAINING PROGRAM
Payer: MEDICARE

## 2022-09-12 DIAGNOSIS — R53.81 PHYSICAL DEBILITY: ICD-10-CM

## 2022-09-12 DIAGNOSIS — R19.7 DIARRHEA, UNSPECIFIED TYPE: ICD-10-CM

## 2022-09-12 DIAGNOSIS — R16.0 LIVER MASS: ICD-10-CM

## 2022-09-12 DIAGNOSIS — R10.9 ACUTE ABDOMINAL PAIN: ICD-10-CM

## 2022-09-12 DIAGNOSIS — C78.7 METASTASIS TO LIVER OF UNKNOWN ORIGIN (HCC): Primary | ICD-10-CM

## 2022-09-12 DIAGNOSIS — Z71.89 ACP (ADVANCE CARE PLANNING): ICD-10-CM

## 2022-09-12 DIAGNOSIS — K56.41 FECAL IMPACTION OF COLON (HCC): ICD-10-CM

## 2022-09-12 DIAGNOSIS — C80.1 METASTASIS TO LIVER OF UNKNOWN ORIGIN (HCC): Primary | ICD-10-CM

## 2022-09-12 PROBLEM — E87.5 HYPERKALEMIA: Status: ACTIVE | Noted: 2022-09-12

## 2022-09-12 LAB
ALBUMIN SERPL-MCNC: 3.5 G/DL (ref 3.5–5)
ALBUMIN/GLOB SERPL: 0.9 {RATIO} (ref 1.1–2.2)
ALP SERPL-CCNC: 113 U/L (ref 45–117)
ALT SERPL-CCNC: 26 U/L (ref 12–78)
ANION GAP SERPL CALC-SCNC: 4 MMOL/L (ref 5–15)
ANION GAP SERPL CALC-SCNC: 6 MMOL/L (ref 5–15)
AST SERPL-CCNC: 88 U/L (ref 15–37)
BASOPHILS # BLD: 0.1 K/UL (ref 0–0.1)
BASOPHILS NFR BLD: 1 % (ref 0–1)
BILIRUB DIRECT SERPL-MCNC: <0.1 MG/DL (ref 0–0.2)
BILIRUB SERPL-MCNC: 0.3 MG/DL (ref 0.2–1)
BUN SERPL-MCNC: 21 MG/DL (ref 6–20)
BUN SERPL-MCNC: 23 MG/DL (ref 6–20)
BUN/CREAT SERPL: 23 (ref 12–20)
BUN/CREAT SERPL: 24 (ref 12–20)
CALCIUM SERPL-MCNC: 8.2 MG/DL (ref 8.5–10.1)
CALCIUM SERPL-MCNC: 8.9 MG/DL (ref 8.5–10.1)
CHLORIDE SERPL-SCNC: 109 MMOL/L (ref 97–108)
CHLORIDE SERPL-SCNC: 110 MMOL/L (ref 97–108)
CO2 SERPL-SCNC: 23 MMOL/L (ref 21–32)
CO2 SERPL-SCNC: 23 MMOL/L (ref 21–32)
COMMENT, HOLDF: NORMAL
CREAT SERPL-MCNC: 0.88 MG/DL (ref 0.55–1.02)
CREAT SERPL-MCNC: 1.01 MG/DL (ref 0.55–1.02)
DIFFERENTIAL METHOD BLD: ABNORMAL
EOSINOPHIL # BLD: 0.2 K/UL (ref 0–0.4)
EOSINOPHIL NFR BLD: 3 % (ref 0–7)
ERYTHROCYTE [DISTWIDTH] IN BLOOD BY AUTOMATED COUNT: 25.3 % (ref 11.5–14.5)
GLOBULIN SER CALC-MCNC: 3.7 G/DL (ref 2–4)
GLUCOSE SERPL-MCNC: 104 MG/DL (ref 65–100)
GLUCOSE SERPL-MCNC: 91 MG/DL (ref 65–100)
HCT VFR BLD AUTO: 33.4 % (ref 35–47)
HGB BLD-MCNC: 10.4 G/DL (ref 11.5–16)
IMM GRANULOCYTES # BLD AUTO: 0.1 K/UL (ref 0–0.04)
IMM GRANULOCYTES NFR BLD AUTO: 1 % (ref 0–0.5)
INR PPP: 1 (ref 0.9–1.1)
LYMPHOCYTES # BLD: 1.2 K/UL (ref 0.8–3.5)
LYMPHOCYTES NFR BLD: 19 % (ref 12–49)
MAGNESIUM SERPL-MCNC: 2.4 MG/DL (ref 1.6–2.4)
MCH RBC QN AUTO: 26.5 PG (ref 26–34)
MCHC RBC AUTO-ENTMCNC: 31.1 G/DL (ref 30–36.5)
MCV RBC AUTO: 85 FL (ref 80–99)
MONOCYTES # BLD: 0.5 K/UL (ref 0–1)
MONOCYTES NFR BLD: 8 % (ref 5–13)
NEUTS SEG # BLD: 4.1 K/UL (ref 1.8–8)
NEUTS SEG NFR BLD: 68 % (ref 32–75)
NRBC # BLD: 0 K/UL (ref 0–0.01)
NRBC BLD-RTO: 0 PER 100 WBC
PLATELET # BLD AUTO: 507 K/UL (ref 150–400)
PMV BLD AUTO: 9.6 FL (ref 8.9–12.9)
POTASSIUM SERPL-SCNC: 4.2 MMOL/L (ref 3.5–5.1)
POTASSIUM SERPL-SCNC: 6.2 MMOL/L (ref 3.5–5.1)
PROT SERPL-MCNC: 7.2 G/DL (ref 6.4–8.2)
PROTHROMBIN TIME: 10 SEC (ref 9–11.1)
RBC # BLD AUTO: 3.93 M/UL (ref 3.8–5.2)
RBC MORPH BLD: ABNORMAL
SAMPLES BEING HELD,HOLD: NORMAL
SODIUM SERPL-SCNC: 136 MMOL/L (ref 136–145)
SODIUM SERPL-SCNC: 139 MMOL/L (ref 136–145)
WBC # BLD AUTO: 6.2 K/UL (ref 3.6–11)

## 2022-09-12 PROCEDURE — 96374 THER/PROPH/DIAG INJ IV PUSH: CPT

## 2022-09-12 PROCEDURE — 83735 ASSAY OF MAGNESIUM: CPT

## 2022-09-12 PROCEDURE — 0FB13ZX EXCISION OF RIGHT LOBE LIVER, PERCUTANEOUS APPROACH, DIAGNOSTIC: ICD-10-PCS | Performed by: RADIOLOGY

## 2022-09-12 PROCEDURE — 36415 COLL VENOUS BLD VENIPUNCTURE: CPT

## 2022-09-12 PROCEDURE — 96361 HYDRATE IV INFUSION ADD-ON: CPT

## 2022-09-12 PROCEDURE — 74011250637 HC RX REV CODE- 250/637: Performed by: HOSPITALIST

## 2022-09-12 PROCEDURE — 85025 COMPLETE CBC W/AUTO DIFF WBC: CPT

## 2022-09-12 PROCEDURE — 96375 TX/PRO/DX INJ NEW DRUG ADDON: CPT

## 2022-09-12 PROCEDURE — 80048 BASIC METABOLIC PNL TOTAL CA: CPT

## 2022-09-12 PROCEDURE — 74011250636 HC RX REV CODE- 250/636

## 2022-09-12 PROCEDURE — 96372 THER/PROPH/DIAG INJ SC/IM: CPT

## 2022-09-12 PROCEDURE — 85610 PROTHROMBIN TIME: CPT

## 2022-09-12 PROCEDURE — 74011250636 HC RX REV CODE- 250/636: Performed by: STUDENT IN AN ORGANIZED HEALTH CARE EDUCATION/TRAINING PROGRAM

## 2022-09-12 PROCEDURE — 80076 HEPATIC FUNCTION PANEL: CPT

## 2022-09-12 PROCEDURE — 65270000046 HC RM TELEMETRY

## 2022-09-12 PROCEDURE — 74177 CT ABD & PELVIS W/CONTRAST: CPT

## 2022-09-12 PROCEDURE — 74011250636 HC RX REV CODE- 250/636: Performed by: HOSPITALIST

## 2022-09-12 PROCEDURE — 99285 EMERGENCY DEPT VISIT HI MDM: CPT

## 2022-09-12 PROCEDURE — 74011000636 HC RX REV CODE- 636: Performed by: RADIOLOGY

## 2022-09-12 PROCEDURE — 74011000250 HC RX REV CODE- 250: Performed by: HOSPITALIST

## 2022-09-12 RX ORDER — KETOROLAC TROMETHAMINE 30 MG/ML
15 INJECTION, SOLUTION INTRAMUSCULAR; INTRAVENOUS
Status: COMPLETED | OUTPATIENT
Start: 2022-09-12 | End: 2022-09-12

## 2022-09-12 RX ORDER — POLYETHYLENE GLYCOL 3350 17 G/17G
17 POWDER, FOR SOLUTION ORAL DAILY PRN
Status: DISCONTINUED | OUTPATIENT
Start: 2022-09-12 | End: 2022-09-13

## 2022-09-12 RX ORDER — SODIUM CHLORIDE 0.9 % (FLUSH) 0.9 %
5-40 SYRINGE (ML) INJECTION EVERY 8 HOURS
Status: DISCONTINUED | OUTPATIENT
Start: 2022-09-12 | End: 2022-09-16 | Stop reason: HOSPADM

## 2022-09-12 RX ORDER — ONDANSETRON 2 MG/ML
8 INJECTION INTRAMUSCULAR; INTRAVENOUS ONCE
Status: COMPLETED | OUTPATIENT
Start: 2022-09-12 | End: 2022-09-12

## 2022-09-12 RX ORDER — ONDANSETRON 4 MG/1
4 TABLET, ORALLY DISINTEGRATING ORAL
Status: DISCONTINUED | OUTPATIENT
Start: 2022-09-12 | End: 2022-09-16 | Stop reason: HOSPADM

## 2022-09-12 RX ORDER — MORPHINE SULFATE 2 MG/ML
2 INJECTION, SOLUTION INTRAMUSCULAR; INTRAVENOUS
Status: COMPLETED | OUTPATIENT
Start: 2022-09-12 | End: 2022-09-12

## 2022-09-12 RX ORDER — ONDANSETRON 2 MG/ML
INJECTION INTRAMUSCULAR; INTRAVENOUS
Status: COMPLETED
Start: 2022-09-12 | End: 2022-09-12

## 2022-09-12 RX ORDER — BUTALBITAL, ACETAMINOPHEN AND CAFFEINE 50; 325; 40 MG/1; MG/1; MG/1
1 TABLET ORAL
Status: DISCONTINUED | OUTPATIENT
Start: 2022-09-12 | End: 2022-09-16

## 2022-09-12 RX ORDER — ONDANSETRON 2 MG/ML
4 INJECTION INTRAMUSCULAR; INTRAVENOUS
Status: DISCONTINUED | OUTPATIENT
Start: 2022-09-12 | End: 2022-09-16 | Stop reason: HOSPADM

## 2022-09-12 RX ORDER — ENOXAPARIN SODIUM 100 MG/ML
40 INJECTION SUBCUTANEOUS DAILY
Status: DISCONTINUED | OUTPATIENT
Start: 2022-09-12 | End: 2022-09-12 | Stop reason: DRUGHIGH

## 2022-09-12 RX ORDER — ACETAMINOPHEN 650 MG/1
650 SUPPOSITORY RECTAL
Status: DISCONTINUED | OUTPATIENT
Start: 2022-09-12 | End: 2022-09-16 | Stop reason: HOSPADM

## 2022-09-12 RX ORDER — SODIUM CHLORIDE 0.9 % (FLUSH) 0.9 %
5-40 SYRINGE (ML) INJECTION AS NEEDED
Status: DISCONTINUED | OUTPATIENT
Start: 2022-09-12 | End: 2022-09-16 | Stop reason: HOSPADM

## 2022-09-12 RX ORDER — CALCIUM GLUCONATE 20 MG/ML
2 INJECTION, SOLUTION INTRAVENOUS ONCE
Status: DISCONTINUED | OUTPATIENT
Start: 2022-09-12 | End: 2022-09-12

## 2022-09-12 RX ORDER — ACETAMINOPHEN 325 MG/1
650 TABLET ORAL
Status: DISCONTINUED | OUTPATIENT
Start: 2022-09-12 | End: 2022-09-16 | Stop reason: HOSPADM

## 2022-09-12 RX ORDER — DICYCLOMINE HYDROCHLORIDE 10 MG/1
10 CAPSULE ORAL 4 TIMES DAILY
Status: DISCONTINUED | OUTPATIENT
Start: 2022-09-12 | End: 2022-09-16 | Stop reason: HOSPADM

## 2022-09-12 RX ORDER — DICYCLOMINE HYDROCHLORIDE 10 MG/ML
10 INJECTION INTRAMUSCULAR
Status: COMPLETED | OUTPATIENT
Start: 2022-09-12 | End: 2022-09-12

## 2022-09-12 RX ORDER — ENOXAPARIN SODIUM 100 MG/ML
30 INJECTION SUBCUTANEOUS DAILY
Status: DISCONTINUED | OUTPATIENT
Start: 2022-09-12 | End: 2022-09-16 | Stop reason: HOSPADM

## 2022-09-12 RX ADMIN — KETOROLAC TROMETHAMINE 15 MG: 30 INJECTION, SOLUTION INTRAMUSCULAR; INTRAVENOUS at 21:57

## 2022-09-12 RX ADMIN — MORPHINE SULFATE 2 MG: 2 INJECTION, SOLUTION INTRAMUSCULAR; INTRAVENOUS at 06:38

## 2022-09-12 RX ADMIN — IOPAMIDOL 100 ML: 755 INJECTION, SOLUTION INTRAVENOUS at 07:01

## 2022-09-12 RX ADMIN — Medication 10 ML: at 15:15

## 2022-09-12 RX ADMIN — DICYCLOMINE HYDROCHLORIDE 10 MG: 10 CAPSULE ORAL at 18:14

## 2022-09-12 RX ADMIN — Medication 10 ML: at 21:59

## 2022-09-12 RX ADMIN — ONDANSETRON 8 MG: 2 INJECTION INTRAMUSCULAR; INTRAVENOUS at 07:51

## 2022-09-12 RX ADMIN — SODIUM CHLORIDE 1000 ML: 9 INJECTION, SOLUTION INTRAVENOUS at 06:45

## 2022-09-12 RX ADMIN — DICYCLOMINE HYDROCHLORIDE 10 MG: 10 CAPSULE ORAL at 21:57

## 2022-09-12 RX ADMIN — BUTALBITAL, ACETAMINOPHEN, AND CAFFEINE 1 TABLET: 50; 325; 40 TABLET ORAL at 18:13

## 2022-09-12 RX ADMIN — DICYCLOMINE HYDROCHLORIDE 10 MG: 20 INJECTION, SOLUTION INTRAMUSCULAR at 06:38

## 2022-09-12 NOTE — PROGRESS NOTES
Lovenox Monitoring  Indication: DVT Prophylaxis  Recent Labs     09/12/22  0449   HGB 10.4*   *   CREA 1.01     Current Weight: 42.2 kg  Est. CrCl = ~ 32 ml/min  Current Dose: 40 mg subcutaneously every 24 hours. Plan: Change to Lovenox 30 mg SQ DAILY for Wt < 50.9 kg and CrCl > 30 ml/min.

## 2022-09-12 NOTE — ED NOTES
TRANSFER - OUT REPORT:    Verbal report given to HERMAN Esteves(name) on Lex Quezada  being transferred to 422(unit) for routine progression of care       Report consisted of patients Situation, Background, Assessment and   Recommendations(SBAR). Information from the following report(s) SBAR, ED Summary, MAR, and Recent Results was reviewed with the receiving nurse. Lines:   Peripheral IV 09/12/22 Anterior; Left Forearm (Active)   Site Assessment Clean, dry, & intact 09/12/22 0620   Phlebitis Assessment 0 09/12/22 0620   Infiltration Assessment 0 09/12/22 0620   Dressing Status Clean, dry, & intact 09/12/22 0620   Dressing Type 4 X 4 09/12/22 1260        Opportunity for questions and clarification was provided.       Patient transported with:   [employer]

## 2022-09-12 NOTE — PROGRESS NOTES
1400: Bedside and Verbal shift change report given to HERMAN Torres (oncoming nurse) by Santos Garcia (charge nurse took report). Report included the following information SBAR, Kardex, Procedure Summary, Intake/Output, MAR, Recent Results, and Cardiac Rhythm SR.               1630: Bedside and Verbal shift change report given to [address] (oncoming nurse) by HERMAN Torres (offgoing nurse).  Report included the following information SBAR, Kardex, Procedure Summary, Intake/Output, MAR, Recent Results, and Cardiac Rhythm SR.

## 2022-09-12 NOTE — ED PROVIDER NOTES
HISTORY OF PRESENT ILLNESS    Diarrhea   Associated symptoms include diarrhea. 44-year-old female with history of chronic constipation and chronic back pain, actually has been taking multiple medications as prescribed by her primary care physician, frequent constipation, but arrival is from home for chief complaint of abdominal pain and diarrhea over the last 4 hours. Patient states that she will have waxing and waning lower abdominal pain, intermittent, just before she has a significant large bowel movement. Patient states that she takes laxatives and other medications for the same. She follows with gastroenterology and had an adenoma of her colon previously. Pain otherwise is described as pressure, rated as severe when it comes on. MEDICAL HISTORY  Past Medical History:   Diagnosis Date    Adenoma of colon     Anemia     Arthritis     Atrial fibrillation (HCC)     Bloating     Chronic constipation     Chronic pain     back     COPD (chronic obstructive pulmonary disease) (HCC)     GERD (gastroesophageal reflux disease)     [place] (hard of hearing)     Hyponatremia     caused seizures x 2 per pt    Indigestion     Osteoporosis     Seizures ([facility] 7/2020, 9/2020    x 2      Past Surgical History:   Procedure Laterality Date    COLONOSCOPY N/A 12/16/2021    COLONOSCOPY   :- performed by Bhavana Mantilla MD at [place] [address] N/A 2/14/2022    .  performed by Stephanie Packer MD at [facility] MAIN OR    HX [address]    HX CHOLECYSTECTOMY  1982    HX HIP REPLACEMENT Left     pt stated hip was pinned, not replaced    HX HIP REPLACEMENT Right     pt stated hip was pinned, not replaced    HX HYSTERECTOMY      HX ORTHOPAEDIC  1990, 2011    bilateral hip fractures     HX ORTHOPAEDIC Left 2020    femur fracture    HX TONSILLECTOMY       Family History:   Problem Relation Age of Onset    Heart Disease Mother     Heart Disease Father     Liver Disease Father     Alcohol abuse Father     Anesth Problems Neg Hx      Social History     Socioeconomic History    Marital status: SINGLE     Spouse name: Not on file    Number of children: Not on file    Years of education: Not on file    Highest education level: Not on file   Occupational History    Not on file   Tobacco Use    Smoking status: Former     Packs/day: 1.00     Years: 20.00     Pack years: 20.00     Types: Cigarettes     Quit date: 12/18/2011     Years since quitting: 10.7    Smokeless tobacco: Never   Vaping Use    Vaping Use: Never used   Substance and Sexual Activity    Alcohol use: Never    Drug use: Never    Sexual activity: Not Currently   Other Topics Concern    Not on file   Social History Narrative    Not on file     Social Determinants of Health     Financial Resource Strain: Not on file   Food Insecurity: Not on file   Transportation Needs: Not on file   Physical Activity: Not on file   Stress: Not on file   Social Connections: Not on file   Intimate Partner Violence: Not on file   Housing Stability: Not on file     ALLERGIES: Cefuroxime, Hydrocodone, Oxycodone, and Penicillins    REVIEW OF SYSTEMS  Review of Systems   Gastrointestinal:  Positive for diarrhea. A 10-POINT SYSTEMS HAS BEEN REVIEWED, AND ALL SYSTEMS ARE NEGATIVE UNLESS OTHERWISE STATED IN THE HPI ARE OTHERWISE NEGATIVE    PHYSICAL EXAM  Vitals:    09/12/22 0423   BP: 121/87   Pulse: (!) 109   Resp: 17   Temp: 98.4 °F (36.9 °C)   Weight: 42.2 kg (93 lb 0.6 oz)   Height: 5' 3\" (1.6 m)     Physical Exam  Vitals and nursing note reviewed. Constitutional:       General: She is not in acute distress. Appearance: She is well-developed. She is not ill-appearing. Comments: Thin, frail-appearing   HENT:      Head: Normocephalic and atraumatic. Right Ear: External ear normal.      Left Ear: External ear normal.   Eyes:      Extraocular Movements: Extraocular movements intact.       Conjunctiva/sclera: Conjunctivae normal.   Cardiovascular:      Rate and Rhythm: Normal rate and regular rhythm. Pulses: Normal pulses. Heart sounds: No murmur heard. No friction rub. No gallop. Pulmonary:      Effort: Pulmonary effort is normal.      Breath sounds: No wheezing, rhonchi or rales. Abdominal:      General: There is no distension. Palpations: Abdomen is soft. Tenderness: There is abdominal tenderness (Suprapubic/left lower quadrant tenderness, palpable stool). There is no guarding or rebound. Musculoskeletal:         General: No swelling, tenderness or deformity. Normal range of motion. Cervical back: No rigidity. Skin:     General: Skin is warm. Capillary Refill: Capillary refill takes less than 2 seconds. Coloration: Skin is not jaundiced. Findings: No rash. Neurological:      General: No focal deficit present. Mental Status: She is alert and oriented to person, place, and time. Motor: No weakness. INITIAL ASSESSMENT AND PLAN  [facility]    Arely Marie is a 77 y.o. female who presents with abdominal pain, constipation/diarrhea  Differential diagnosis includes but is not limited to electrolyte abnormality, slow transit, fecal impaction, colitis, diverticulitis    Given pain and tenderness, patient admitted from CT scan. Did review prior records extensively patient has not actually had a CT scan for quite some time and has been just getting prescriptions from her physicians. She actually has been admitted previously for GI bleed and did require transfusion    CBC with mild nonspecific the most likely due to mild blood loss  Chemistry showed hyperkalemia though with hemolysis, normal renal function, was given IV fluids for such, mildly elevated BUN most likely related to mild dehydration  CT scan of the abdomen pelvis showed large amount of colonic stool without any overt impaction.   I did attempt to disimpact the patient but there was no significant hard stool and patient ultimately declined such    Unfortunately, it did also show liver metastasis with unknown primary. I did relay this information to the patient. Given this, patient would benefit from mission. Patient case discussed with the hospitalist on-call who will admit. Will likely need biopsy/possible GI evaluation         DIAGNOSTIC STUDIES  Recent Results (from the past 24 hour(s))   CBC WITH AUTOMATED DIFF    Collection Time: 09/12/22  4:49 AM   Result Value Ref Range    WBC 6.2 3.6 - 11.0 K/uL    RBC 3.93 3.80 - 5.20 M/uL    HGB 10.4 (L) 11.5 - 16.0 g/dL    HCT 33.4 (L) 35.0 - 47.0 %    MCV 85.0 80.0 - 99.0 FL    MCH 26.5 26.0 - 34.0 PG    MCHC 31.1 30.0 - 36.5 g/dL    RDW 25.3 (H) 11.5 - 14.5 %    PLATELET 455 (H) 089 - 400 K/uL    MPV 9.6 8.9 - 12.9 FL    NRBC 0.0 0  WBC    ABSOLUTE NRBC 0.00 0.00 - 0.01 K/uL    NEUTROPHILS 68 32 - 75 %    LYMPHOCYTES 19 12 - 49 %    MONOCYTES 8 5 - 13 %    EOSINOPHILS 3 0 - 7 %    BASOPHILS 1 0 - 1 %    IMMATURE GRANULOCYTES 1 (H) 0.0 - 0.5 %    ABS. NEUTROPHILS 4.1 1.8 - 8.0 K/UL    ABS. LYMPHOCYTES 1.2 0.8 - 3.5 K/UL    ABS. MONOCYTES 0.5 0.0 - 1.0 K/UL    ABS. EOSINOPHILS 0.2 0.0 - 0.4 K/UL    ABS. BASOPHILS 0.1 0.0 - 0.1 K/UL    ABS. IMM.  GRANS. 0.1 (H) 0.00 - 0.04 K/UL    DF SMEAR SCANNED      RBC COMMENTS ANISOCYTOSIS  3+        RBC COMMENTS MICROCYTOSIS  1+        RBC COMMENTS HYPOCHROMIA  1+        RBC COMMENTS SCHISTOCYTES  1+        RBC COMMENTS TARGET CELLS  PRESENT       METABOLIC PANEL, BASIC    Collection Time: 09/12/22  4:49 AM   Result Value Ref Range    Sodium 136 136 - 145 mmol/L    Potassium 6.2 (H) 3.5 - 5.1 mmol/L    Chloride 109 (H) 97 - 108 mmol/L    CO2 23 21 - 32 mmol/L    Anion gap 4 (L) 5 - 15 mmol/L    Glucose 104 (H) 65 - 100 mg/dL    BUN 23 (H) 6 - 20 MG/DL    Creatinine 1.01 0.55 - 1.02 MG/DL    BUN/Creatinine ratio 23 (H) 12 - 20      GFR est AA >60 >60 ml/min/1.73m2    GFR est non-AA 53 (L) >60 ml/min/1.73m2    Calcium 8.9 8.5 - 10.1 MG/DL   MAGNESIUM Collection Time: 09/12/22  4:49 AM   Result Value Ref Range    Magnesium 2.4 1.6 - 2.4 mg/dL   SAMPLES BEING HELD    Collection Time: 09/12/22  4:49 AM   Result Value Ref Range    SAMPLES BEING HELD 1RED,1BLUE     COMMENT        Add-on orders for these samples will be processed based on acceptable specimen integrity and analyte stability, which may vary by analyte. US GUIDE BX CHRISTIAN PERC   Final Result   1. Ultrasound-guided biopsy of right hepatic lobe lesion. No immediate   complications. Pathology results pending. CT ABD PELV W CONT   Final Result   1. Large amount of colonic stool. No bowel obstruction. 2. Liver metastases and metastatic lymph node in the upper abdomen. No primary   malignancy is identified. 3. Biliary duct dilatation may be at least in part postsurgical following   cholecystectomy. Consider further evaluation with MRCP without and with contrast   if there are clinical signs or symptoms referrable to the biliary system. FINAL ASSESSMENT    ED DIAGNOSIS  1. Metastasis to liver of unknown origin ([address]    2. Diarrhea, unspecified type    3. Fecal impaction of colon ([address]    4. Acute abdominal pain    5. Liver mass    6. ACP (advance care planning)    7.  Physical debility        DISPOSITION        MEDICATIONS ADMINISTERED IN THE EMERGENCY DEPARTMENT  Medications   diazePAM (VALIUM) injection 2 mg (2 mg IntraVENous Refused 9/13/22 1208)   dextrose 5 % - 0.45% NaCl 1,000 mL with sodium bicarbonate (8.4%) 75 mEq infusion ( IntraVENous Stopped 9/15/22 0943)   sodium bicarbonate (4.2%) 4.2 % injection (has no administration in time range)   morphine injection 2 mg (2 mg IntraVENous Given 9/12/22 0638)   dicyclomine (BENTYL) 10 mg/mL injection 10 mg (10 mg IntraMUSCular Given 9/12/22 0638)   iopamidoL (ISOVUE-370) 76 % injection 100 mL (100 mL IntraVENous Given 9/12/22 0701)   sodium chloride 0.9 % bolus infusion 1,000 mL (0 mL IntraVENous IV Completed 9/12/22 0900) ondansetron [facility]) injection 8 mg (8 mg IntraVENous Given 9/12/22 0751)   ketorolac (TORADOL) injection 15 mg (15 mg IntraVENous Given 9/12/22 7957)   lidocaine (XYLOCAINE) 20 mg/mL (2 %) injection (10 mL  Given 9/15/22 0900)   lidocaine (XYLOCAINE) 10 mg/mL (1 %) injection 2 mL (10 mL SubCUTAneous Given by Provider 9/15/22 1100)   sodium bicarbonate (4.2%) injection 42 mg (1 mg SubCUTAneous Given by Provider 9/15/22 1100)       PROCEDURES  Procedures

## 2022-09-12 NOTE — H&P
History & Physical    Primary Care Provider: Lisseth Patel NP  Source of Information: Patient     Please note that this dictation was completed with [employer], the computer voice recognition software. Quite often unanticipated grammatical, syntax, homophones, and other interpretive errors are inadvertently transcribed by the computer software. Please disregard these errors. Please excuse any errors that have escaped final proofreading. Cc: Abdominal pain    History of Presenting Illness:   Serena Pham is a 77 y.o. female who presents with abdominal pain and diarrhea. Patient said that last few days she is constantly wiping her bottom because she cannot stop passing stool but at the same time she says his stool is pastelike and continuously coming out with cramping in the lower abdomen radiating towards thigh. Patient denies any fever chills or any other issues denies any seeing blood with the stools. CT abdomen of the patient was done in the ED and suggestive of hepatic mets of unknown primary patient was admitted for further management. Patient was recently discharged from [facility] last month after was being admitted for anemia and weakness. The patient denies any fever, chills, chest pain, cough, congestion, recent illness, palpitations, or dysuria. Review of Systems:  Pertinent items are noted in the History of Present Illness.      Past Medical History:   Diagnosis Date    Adenoma of colon     Anemia     Arthritis     Atrial fibrillation (HCC)     Bloating     Chronic constipation     Chronic pain     back     COPD (chronic obstructive pulmonary disease) (HCC)     GERD (gastroesophageal reflux disease)     [place] (hard of hearing)     Hyponatremia     caused seizures x 2 per pt    Indigestion     Osteoporosis     Seizures ([facility] [address] 7/2020, 9/2020    x 2       Past Surgical History:   Procedure Laterality Date    COLONOSCOPY N/A 12/16/2021 COLONOSCOPY   :- performed by Dany Apley., MD at [place] [id number] N/A 2/14/2022    . performed by Kiki Crowley MD at [facility] MAIN OR    [address]    HX CHOLECYSTECTOMY  1982    HX HIP REPLACEMENT Left     pt stated hip was pinned, not replaced    HX HIP REPLACEMENT Right     pt stated hip was pinned, not replaced    HX HYSTERECTOMY      HX ORTHOPAEDIC  1990, 2011    bilateral hip fractures     HX ORTHOPAEDIC Left 2020    femur fracture    HX TONSILLECTOMY       Prior to Admission medications    Medication Sig Start Date End Date Taking? Authorizing Provider   predniSONE (DELTASONE) 10 mg tablet Take 40 mg by mouth daily (with breakfast). To be decreased by 10 mg every 3 days and then stop 6/30/22   Caleb Salazar MD   fluticasone-umeclidinium-vilanterol (Trelegy Ellipta) 100-62.5-25 mcg inhaler Take 1 Puff by inhalation daily. 6/30/22   Caleb Salazar MD   cyclobenzaprine (FLEXERIL) 10 mg tablet Take 10 mg by mouth three (3) times daily as needed. 3/15/22   Provider, Historical   dicyclomine (BENTYL) 20 mg tablet Take 20 mg by mouth. 3/15/22   Provider, Historical   docusate sodium (COLACE) 100 mg capsule Take 1 Capsule by mouth two (2) times a day. 2/20/22   Michelle Gonzalez MD   aspirin (ASPIRIN) 325 mg tablet Take 81 mg by mouth daily. Provider, Historical   multivitamin (ONE A DAY) tablet Take 1 Tablet by mouth daily. Provider, Historical   acetaminophen (TYLENOL) 325 mg tablet Take 650 mg by mouth every four (4) hours as needed for Pain. Provider, Historical   digoxin (LANOXIN) 0.125 mg tablet Take 1 Tablet by mouth daily. Patient taking differently: Take 0.125 mg by mouth Every morning. 11/15/21   Lonny Pizarro MD   albuterol (PROVENTIL HFA, VENTOLIN HFA, PROAIR HFA) 90 mcg/actuation inhaler Take 2 Puffs by inhalation every six (6) hours as needed.  9/14/21   Provider, Historical   butalbital-acetaminophen-caffeine (FIORICET, ESGIC) -40 mg per tablet Take 1-2 Tablets by mouth every six (6) hours as needed. 12/15/20   Provider, Historical   lactulose (CHRONULAC) 10 gram/15 mL solution Take 20 g by mouth two (2) times daily as needed. 12/15/20   Provider, Historical   metoprolol succinate (TOPROL-XL) 25 mg XL tablet Take 25 mg by mouth Every morning. Provider, Historical   pantoprazole (PROTONIX) 40 mg tablet Take 40 mg by mouth daily. 12/15/20   Provider, Historical     Allergies   Allergen Reactions    Cefuroxime Hives    Hydrocodone Nausea and Vomiting    Oxycodone Nausea and Vomiting    Penicillins Hives     Patient screened for any delayed non-IgE-mediated reaction to PCN. Patient notes the following:    No delayed non-IgE-mediated reaction to PCN    Hives 1969            Family History   Problem Relation Age of Onset    Heart Disease Mother     Heart Disease Father     Liver Disease Father     Alcohol abuse Father     Anesth Problems Neg Hx         SOCIAL HISTORY:  Patient resides:  Independently x   Assisted Living    SNF    With family care       Smoking history:   None x   Former    Chronic      Alcohol history:   None x   Social    Chronic      Ambulates:   Independently x   w/cane    w/walker    w/wc    CODE STATUS:  DNR    Full x   Other      Objective:     Physical Exam:     Visit Vitals  /77   Pulse (!) 120   Temp 98.4 °F (36.9 °C)   Resp 24   Ht 5' 3\" (1.6 m)   Wt 42.2 kg (93 lb 0.6 oz)   SpO2 99%   BMI 16.48 kg/m²      O2 Device: None (Room air)    General:  Alert, cooperative, no distress, appears stated age. Head:  Normocephalic, without obvious abnormality, atraumatic. Eyes:  Conjunctivae/corneas clear. PERRL, EOMs intact. Nose: Nares normal. Septum midline. Mucosa normal. No drainage or sinus tenderness. Throat: Lips, mucosa, and tongue normal. Teeth and gums normal.   Neck: Supple, symmetrical, trachea midline, no adenopathy, thyroid: no enlargement/tenderness/nodules, no carotid bruit and no JVD.        Lungs: Clear to auscultation bilaterally. Heart:  Regular rate and rhythm, S1, S2 normal, no murmur, click, rub or gallop. Abdomen:   Soft, non-tender. Bowel sounds normal. No masses,  No organomegaly. Extremities: Extremities normal, atraumatic, no cyanosis or edema. Pulses: 2+ and symmetric all extremities. Skin: Skin color, texture, turgor normal. No rashes or lesions   Neurologic: CNII-XII intact. EKG:  normal EKG, normal sinus rhythm. Data Review:     Recent Days:  Recent Labs     09/12/22  0449   WBC 6.2   HGB 10.4*   HCT 33.4*   *     Recent Labs     09/12/22  0843 09/12/22  0812 09/12/22 0449     --  136   K 4.2  --  6.2*   *  --  109*   CO2 23  --  23   GLU 91  --  104*   BUN 21*  --  23*   CREA 0.88  --  1.01   CA 8.2*  --  8.9   MG  --   --  2.4   ALB  --   --  3.5   TBILI  --   --  0.3   ALT  --   --  26   INR  --  1.0  --      No results for input(s): PH, PCO2, PO2, HCO3, FIO2 in the last 72 hours. 24 Hour Results:  Recent Results (from the past 24 hour(s))   CBC WITH AUTOMATED DIFF    Collection Time: 09/12/22  4:49 AM   Result Value Ref Range    WBC 6.2 3.6 - 11.0 K/uL    RBC 3.93 3.80 - 5.20 M/uL    HGB 10.4 (L) 11.5 - 16.0 g/dL    HCT 33.4 (L) 35.0 - 47.0 %    MCV 85.0 80.0 - 99.0 FL    MCH 26.5 26.0 - 34.0 PG    MCHC 31.1 30.0 - 36.5 g/dL    RDW 25.3 (H) 11.5 - 14.5 %    PLATELET 231 (H) 165 - 400 K/uL    MPV 9.6 8.9 - 12.9 FL    NRBC 0.0 0  WBC    ABSOLUTE NRBC 0.00 0.00 - 0.01 K/uL    NEUTROPHILS 68 32 - 75 %    LYMPHOCYTES 19 12 - 49 %    MONOCYTES 8 5 - 13 %    EOSINOPHILS 3 0 - 7 %    BASOPHILS 1 0 - 1 %    IMMATURE GRANULOCYTES 1 (H) 0.0 - 0.5 %    ABS. NEUTROPHILS 4.1 1.8 - 8.0 K/UL    ABS. LYMPHOCYTES 1.2 0.8 - 3.5 K/UL    ABS. MONOCYTES 0.5 0.0 - 1.0 K/UL    ABS. EOSINOPHILS 0.2 0.0 - 0.4 K/UL    ABS. BASOPHILS 0.1 0.0 - 0.1 K/UL    ABS. IMM.  GRANS. 0.1 (H) 0.00 - 0.04 K/UL    DF SMEAR SCANNED      RBC COMMENTS ANISOCYTOSIS  3+        RBC COMMENTS MICROCYTOSIS  1+        RBC COMMENTS HYPOCHROMIA  1+        RBC COMMENTS SCHISTOCYTES  1+        RBC COMMENTS TARGET CELLS  PRESENT       METABOLIC PANEL, BASIC    Collection Time: 09/12/22  4:49 AM   Result Value Ref Range    Sodium 136 136 - 145 mmol/L    Potassium 6.2 (H) 3.5 - 5.1 mmol/L    Chloride 109 (H) 97 - 108 mmol/L    CO2 23 21 - 32 mmol/L    Anion gap 4 (L) 5 - 15 mmol/L    Glucose 104 (H) 65 - 100 mg/dL    BUN 23 (H) 6 - 20 MG/DL    Creatinine 1.01 0.55 - 1.02 MG/DL    BUN/Creatinine ratio 23 (H) 12 - 20      GFR est AA >60 >60 ml/min/1.73m2    GFR est non-AA 53 (L) >60 ml/min/1.73m2    Calcium 8.9 8.5 - 10.1 MG/DL   MAGNESIUM    Collection Time: 09/12/22  4:49 AM   Result Value Ref Range    Magnesium 2.4 1.6 - 2.4 mg/dL   SAMPLES BEING HELD    Collection Time: 09/12/22  4:49 AM   Result Value Ref Range    SAMPLES BEING HELD 1RED,1BLUE     COMMENT        Add-on orders for these samples will be processed based on acceptable specimen integrity and analyte stability, which may vary by analyte. HEPATIC FUNCTION PANEL    Collection Time: 09/12/22  4:49 AM   Result Value Ref Range    Protein, total 7.2 6.4 - 8.2 g/dL    Albumin 3.5 3.5 - 5.0 g/dL    Globulin 3.7 2.0 - 4.0 g/dL    A-G Ratio 0.9 (L) 1.1 - 2.2      Bilirubin, total 0.3 0.2 - 1.0 MG/DL    Bilirubin, direct <0.1 0.0 - 0.2 MG/DL    Alk.  phosphatase 113 45 - 117 U/L    AST (SGOT) 88 (H) 15 - 37 U/L    ALT (SGPT) 26 12 - 78 U/L   PROTHROMBIN TIME + INR    Collection Time: 09/12/22  8:12 AM   Result Value Ref Range    INR 1.0 0.9 - 1.1      Prothrombin time 10.0 9.0 - 66.2 sec   METABOLIC PANEL, BASIC    Collection Time: 09/12/22  8:43 AM   Result Value Ref Range    Sodium 139 136 - 145 mmol/L    Potassium 4.2 3.5 - 5.1 mmol/L    Chloride 110 (H) 97 - 108 mmol/L    CO2 23 21 - 32 mmol/L    Anion gap 6 5 - 15 mmol/L    Glucose 91 65 - 100 mg/dL    BUN 21 (H) 6 - 20 MG/DL    Creatinine 0.88 0.55 - 1.02 MG/DL    BUN/Creatinine ratio 24 (H) 12 - 20      GFR est AA >60 >60 ml/min/1.73m2    GFR est non-AA >60 >60 ml/min/1.73m2    Calcium 8.2 (L) 8.5 - 10.1 MG/DL         Imaging:     CT ABD PELV W CONT    Result Date: 9/12/2022  1. Large amount of colonic stool. No bowel obstruction. 2. Liver metastases and metastatic lymph node in the upper abdomen. No primary malignancy is identified. 3. Biliary duct dilatation may be at least in part postsurgical following cholecystectomy. Consider further evaluation with MRCP without and with contrast if there are clinical signs or symptoms referrable to the biliary system. Admit to inpatient status      Patient was explained about the risk of admission including and not a complete list including risk of falls,fractures,blood clots,allergic reactions,infections. Patient/family also understands and agrees to the treatment plan including medications and side effect profiles and also understand the risk with radiation while undergoing imaging studies. The patient and the family/friends (after permission given by the patient to discuss) understand this and agree with the admission plan.         Assessment:     Principal Problem:    Hyperkalemia (9/12/2022)           Plan:     Abdominal pain with large amount of colonic stool  No bowel obstruction  Liver mets with metastatic lymph node in upper abdomen  Dilated CBD  --Admit patient to telemetry initially patient was hyperkalemic repeat potassium was normal  --Continue IV fluid bowel regimen to clean for a prep  --Consult GI  --MR CP followed by liver biopsy requested  --Possible malignancy probably colonic origin will need a colonoscopy eventually    DVT prophylaxis SCD  Full code  Patient ambulates at baseline       Signed By: Kt Elaine MD     September 12, 2022

## 2022-09-12 NOTE — ED TRIAGE NOTES
Patient arrives with EMS from home CC diarrhea for 4 hours. Patient takes lactulose and another laxative once daily in the morning every day.  Endorses abdominal pain, no other complaints at this time

## 2022-09-12 NOTE — PROGRESS NOTES
Problem: Falls - Risk of  Goal: *Absence of Falls  Description: Document Clydene Bone Fall Risk and appropriate interventions in the flowsheet.   Outcome: Progressing Towards Goal  Note: Fall Risk Interventions:  Mobility Interventions: Patient to call before getting OOB, Strengthening exercises (ROM-active/passive), Utilize gait belt for transfers/ambulation              Elimination Interventions: Call light in reach, Patient to call for help with toileting needs, Toileting schedule/hourly rounds              Problem: Patient Education: Go to Patient Education Activity  Goal: Patient/Family Education  Outcome: Progressing Towards Goal

## 2022-09-12 NOTE — CONSULTS
[address] [facility] NOTE  [facility]  [phone] NP in-hospital cell phone M-F until 4:30  After 5pm or on weekends, please call  for physician on call        NAME:  Vincent Moore   :   1945   MRN:   [id number]       Referring Physician: Kevin Kaplan Date: 2022 2:50 PM    Chief Complaint: liver mets CBD dilatation     History of Present Illness:  Patient is a 77 y.o. who is seen in consultation at the request of Dr. Ruther Skiff for liver mets CBD dilatation. Medical history as listed below includes large colon polyp status post left colon resection 2022. She presented for abdominal pain with diarrhea that started last night. She denies constipation or nausea. She is [place] and does not answer many questions. She is not agreeable for any bowel meds or MRI. EGD for [facility] Dr. Blanca Hernandez 2021: Scales's mucosa, hiatal hernia; biopsy with Scales's, gastric and duodenal biopsy benign  Colonoscopy for [facility] Dr. Blanca Hernandez 2021: near splenic flexure polypoid mass (tubulovillous adenoma) occupying 50% of lumen circumference, tattooed, scope could not be advanced secondary to extensive looping - status post left colon resection 6.5 cm tubulovillous adenoma with high-grade dysplasia, 39 lymph notes negative for carcinoma    I have reviewed the emergency room note, hospital admission note, notes by all other clinicians who have seen the patient during this hospitalization to date. I have reviewed the problem list and the reason for this hospitalization. I have reviewed the allergies and the medications the patient was taking at home prior to this hospitalization.     PMH:  Past Medical History:   Diagnosis Date    Adenoma of colon     Anemia     Arthritis     Atrial fibrillation (HCC)     Bloating     Chronic constipation     Chronic pain     back     COPD (chronic obstructive pulmonary disease) ([facility] [address]     GERD (gastroesophageal reflux disease)     [place] (hard of hearing)     Hyponatremia     caused seizures x 2 per pt    Indigestion     Osteoporosis     Seizures ([address] 7/2020, 9/2020    x 2        PSH:  Past Surgical History:   Procedure Laterality Date    COLONOSCOPY N/A 12/16/2021    COLONOSCOPY   :- performed by Jaret Christensen MD at [place] [address] N/A 2/14/2022    . [facility]    [address] [facility]    HX CHOLECYSTECTOMY  1982    HX HIP REPLACEMENT Left     pt stated hip was pinned, not replaced    HX HIP REPLACEMENT Right     pt stated hip was pinned, not replaced    HX HYSTERECTOMY      HX ORTHOPAEDIC  1990, 2011    bilateral hip fractures     HX ORTHOPAEDIC Left 2020    femur fracture    HX TONSILLECTOMY         Allergies: Allergies   Allergen Reactions    Cefuroxime Hives    Hydrocodone Nausea and Vomiting    Oxycodone Nausea and Vomiting    Penicillins Hives     Patient screened for any delayed non-IgE-mediated reaction to PCN. Patient notes the following:    No delayed non-IgE-mediated reaction to PCN    Hives 1969             Home Medications:  Prior to Admission Medications   Prescriptions Last Dose Informant Patient Reported? Taking?   acetaminophen (TYLENOL) 325 mg tablet   Yes No   Sig: Take 650 mg by mouth every four (4) hours as needed for Pain. albuterol (PROVENTIL HFA, VENTOLIN HFA, PROAIR HFA) 90 mcg/actuation inhaler   Yes No   Sig: Take 2 Puffs by inhalation every six (6) hours as needed. aspirin (ASPIRIN) 325 mg tablet   Yes No   Sig: Take 81 mg by mouth daily. butalbital-acetaminophen-caffeine (FIORICET, ESGIC) -40 mg per tablet   Yes No   Sig: Take 1-2 Tablets by mouth every six (6) hours as needed. cyclobenzaprine (FLEXERIL) 10 mg tablet   Yes No   Sig: Take 10 mg by mouth three (3) times daily as needed.    dicyclomine (BENTYL) 20 mg tablet   Yes No   Sig: Take 20 mg by mouth. digoxin (LANOXIN) 0.125 mg tablet   No No   Sig: Take 1 Tablet by mouth daily. Patient taking differently: Take 0.125 mg by mouth Every morning. docusate sodium (COLACE) 100 mg capsule   No No   Sig: Take 1 Capsule by mouth two (2) times a day. fluticasone-umeclidinium-vilanterol (Trelegy Ellipta) 100-62.5-25 mcg inhaler   No No   Sig: Take 1 Puff by inhalation daily. lactulose (CHRONULAC) 10 gram/15 mL solution   Yes No   Sig: Take 20 g by mouth two (2) times daily as needed. metoprolol succinate (TOPROL-XL) 25 mg XL tablet   Yes No   Sig: Take 25 mg by mouth Every morning.   multivitamin (ONE A DAY) tablet   Yes No   Sig: Take 1 Tablet by mouth daily. pantoprazole (PROTONIX) 40 mg tablet   Yes No   Sig: Take 40 mg by mouth daily. predniSONE (DELTASONE) 10 mg tablet   No No   Sig: Take 40 mg by mouth daily (with breakfast).  To be decreased by 10 mg every 3 days and then stop      Facility-Administered Medications: None       Hospital Medications:  Current Facility-Administered Medications   Medication Dose Route Frequency    sodium chloride (NS) flush 5-40 mL  5-40 mL IntraVENous Q8H    sodium chloride (NS) flush 5-40 mL  5-40 mL IntraVENous PRN    acetaminophen (TYLENOL) tablet 650 mg  650 mg Oral Q6H PRN    Or    acetaminophen (TYLENOL) suppository 650 mg  650 mg Rectal Q6H PRN    polyethylene glycol (MIRALAX) packet 17 g  17 g Oral DAILY PRN    ondansetron (ZOFRAN ODT) tablet 4 mg  4 mg Oral Q8H PRN    Or    ondansetron (ZOFRAN) injection 4 mg  4 mg IntraVENous Q6H PRN    enoxaparin (LOVENOX) injection 30 mg  30 mg SubCUTAneous DAILY       Social History:  Social History     Tobacco Use    Smoking status: Former     Packs/day: 1.00     Years: 20.00     Pack years: 20.00     Types: Cigarettes     Quit date: 12/18/2011     Years since quitting: 10.7    Smokeless tobacco: Never   Substance Use Topics    Alcohol use: Never       Family History:  Family History   Problem Relation Age of Onset    Heart Disease Mother     Heart Disease Father     Liver Disease Father     Alcohol abuse Father     Anesth Problems Neg Hx        Review of Systems:  Constitutional: negative fever, negative chills, negative weight loss  Eyes:   negative visual changes  ENT:   negative sore throat, tongue or lip swelling  Respiratory:  negative cough, + dyspnea  Cards:  + for chest pain, palpitations, lower extremity edema  GI:   See HPI  :  negative for frequency, dysuria  Integument:  negative for rash and pruritus  Heme:  + for easy bruising and gum/nose bleeding  Musculoskeletal:negative for myalgias, back pain and muscle weakness  Neuro:    negative for headaches, dizziness, vertigo  Psych: + for feelings of anxiety, depression     Objective:   Patient Vitals for the past 8 hrs:   BP Temp Pulse Resp SpO2   09/12/22 1423 (!) 104/53 99.3 °F (37.4 °C) (!) 113 23 100 %   09/12/22 1400 -- -- (!) 118 -- --   09/12/22 1218 (!) 101/56 98.1 °F (36.7 °C) (!) 115 21 100 %   09/12/22 1001 110/77 -- (!) 120 24 --   09/12/22 0901 113/70 -- (!) 120 20 --     09/12 0701 - 09/12 1900  In: 1000 [I.V.:1000]  Out: -   No intake/output data recorded. EXAM:     CONST:  Lady lying in bed, no acute distress   NEURO:  alert and oriented x 3   HEENT: EOMI, no scleral icterus   LUNGS: No increased WOB   CARD:   Tachycardia    ABD:  soft, significant generalized tenderness, no rebound, no masses, + distended   EXT:  no edema, warm   PSYCH: full, not anxious     Data Review     Recent Labs     09/12/22 0449   WBC 6.2   HGB 10.4*   HCT 33.4*   *     Recent Labs     09/12/22  0843 09/12/22 0449    136   K 4.2 6.2*   * 109*   CO2 23 23   BUN 21* 23*   CREA 0.88 1.01   GLU 91 104*   CA 8.2* 8.9     Recent Labs     09/12/22 0449      TP 7.2   ALB 3.5   GLOB 3.7     Recent Labs     09/12/22 0812   INR 1.0   PTP 10.0     IMPRESSION  1. Large amount of colonic stool. No bowel obstruction.   2. Liver metastases and metastatic lymph node in the upper abdomen. No primary  malignancy is identified. 3. Biliary duct dilatation may be at least in part postsurgical following  cholecystectomy. Consider further evaluation with MRCP without and with contrast  if there are clinical signs or symptoms referrable to the biliary system.      Assessment:     Abdominal pain  Liver mets with upper abdominal lymph nodes: AST 88, t bili normal   Large colon polyp status post left colon resection 2/2022  Chronic anemia     Patient Active Problem List   Diagnosis Code    Adenomatous polyp of descending colon D12.4    Adenomatous colon polyp D12.6    Adenomatous polyp of colon D12.6    Acute respiratory failure (HCC) J96.00    Respiratory distress R06.03    Dehydration D07.7    Metabolic acidosis Z80.5    Hypotension I95.9    Symptomatic anemia D64.9    Hyperkalemia E87.5     Plan:       Patient refusing MRCP   Refusing suppository/enemas or bowel meds  Liver biopsy ordered  Will need repeat colonoscopy if within goals of care pending liver biopsy results  Patient discussed with Dr. Neelam Shelby  Thank you for allowing me to participate in care of Marshall Lerner     Signed By: Mathew Morales NP     9/12/2022  2:50 PM

## 2022-09-13 ENCOUNTER — APPOINTMENT (OUTPATIENT)
Dept: MRI IMAGING | Age: 77
DRG: 436 | End: 2022-09-13
Attending: HOSPITALIST
Payer: MEDICARE

## 2022-09-13 LAB
ANION GAP SERPL CALC-SCNC: 10 MMOL/L (ref 5–15)
BASOPHILS # BLD: 0 K/UL (ref 0–0.1)
BASOPHILS NFR BLD: 0 % (ref 0–1)
BUN SERPL-MCNC: 20 MG/DL (ref 6–20)
BUN/CREAT SERPL: 22 (ref 12–20)
CALCIUM SERPL-MCNC: 8.5 MG/DL (ref 8.5–10.1)
CHLORIDE SERPL-SCNC: 112 MMOL/L (ref 97–108)
CO2 SERPL-SCNC: 19 MMOL/L (ref 21–32)
CREAT SERPL-MCNC: 0.89 MG/DL (ref 0.55–1.02)
DIFFERENTIAL METHOD BLD: ABNORMAL
EOSINOPHIL # BLD: 0.2 K/UL (ref 0–0.4)
EOSINOPHIL NFR BLD: 1 % (ref 0–7)
ERYTHROCYTE [DISTWIDTH] IN BLOOD BY AUTOMATED COUNT: 26.3 % (ref 11.5–14.5)
GLUCOSE SERPL-MCNC: 97 MG/DL (ref 65–100)
HCT VFR BLD AUTO: 29.4 % (ref 35–47)
HGB BLD-MCNC: 9.4 G/DL (ref 11.5–16)
IMM GRANULOCYTES # BLD AUTO: 0.2 K/UL (ref 0–0.04)
IMM GRANULOCYTES NFR BLD AUTO: 1 % (ref 0–0.5)
LYMPHOCYTES # BLD: 1.1 K/UL (ref 0.8–3.5)
LYMPHOCYTES NFR BLD: 7 % (ref 12–49)
MCH RBC QN AUTO: 27.2 PG (ref 26–34)
MCHC RBC AUTO-ENTMCNC: 32 G/DL (ref 30–36.5)
MCV RBC AUTO: 85 FL (ref 80–99)
MONOCYTES # BLD: 0.9 K/UL (ref 0–1)
MONOCYTES NFR BLD: 6 % (ref 5–13)
NEUTS SEG # BLD: 12.8 K/UL (ref 1.8–8)
NEUTS SEG NFR BLD: 85 % (ref 32–75)
NRBC # BLD: 0 K/UL (ref 0–0.01)
NRBC BLD-RTO: 0 PER 100 WBC
PLATELET # BLD AUTO: 423 K/UL (ref 150–400)
PMV BLD AUTO: 9.1 FL (ref 8.9–12.9)
POTASSIUM SERPL-SCNC: 4.3 MMOL/L (ref 3.5–5.1)
RBC # BLD AUTO: 3.46 M/UL (ref 3.8–5.2)
RBC MORPH BLD: ABNORMAL
SODIUM SERPL-SCNC: 141 MMOL/L (ref 136–145)
WBC # BLD AUTO: 15.2 K/UL (ref 3.6–11)

## 2022-09-13 PROCEDURE — 85025 COMPLETE CBC W/AUTO DIFF WBC: CPT

## 2022-09-13 PROCEDURE — 65270000046 HC RM TELEMETRY

## 2022-09-13 PROCEDURE — 99223 1ST HOSP IP/OBS HIGH 75: CPT | Performed by: NURSE PRACTITIONER

## 2022-09-13 PROCEDURE — 74011250637 HC RX REV CODE- 250/637: Performed by: HOSPITALIST

## 2022-09-13 PROCEDURE — 36415 COLL VENOUS BLD VENIPUNCTURE: CPT

## 2022-09-13 PROCEDURE — 94760 N-INVAS EAR/PLS OXIMETRY 1: CPT

## 2022-09-13 PROCEDURE — 74011000250 HC RX REV CODE- 250: Performed by: HOSPITALIST

## 2022-09-13 PROCEDURE — 74011250637 HC RX REV CODE- 250/637: Performed by: PHYSICIAN ASSISTANT

## 2022-09-13 PROCEDURE — 80048 BASIC METABOLIC PNL TOTAL CA: CPT

## 2022-09-13 RX ORDER — POLYETHYLENE GLYCOL 3350 17 G/17G
17 POWDER, FOR SOLUTION ORAL EVERY 12 HOURS
Status: DISCONTINUED | OUTPATIENT
Start: 2022-09-13 | End: 2022-09-16 | Stop reason: HOSPADM

## 2022-09-13 RX ORDER — DIGOXIN 125 MCG
0.12 TABLET ORAL DAILY
COMMUNITY

## 2022-09-13 RX ORDER — PROMETHAZINE HYDROCHLORIDE 25 MG/1
25 TABLET ORAL
COMMUNITY
End: 2022-09-16

## 2022-09-13 RX ORDER — DIGOXIN 125 MCG
0.12 TABLET ORAL DAILY
Status: DISCONTINUED | OUTPATIENT
Start: 2022-09-14 | End: 2022-09-16 | Stop reason: HOSPADM

## 2022-09-13 RX ORDER — METOPROLOL TARTRATE 25 MG/1
12.5 TABLET, FILM COATED ORAL 2 TIMES DAILY
COMMUNITY

## 2022-09-13 RX ORDER — DIAZEPAM 2 MG/1
1 TABLET ORAL ONCE
Status: DISCONTINUED | OUTPATIENT
Start: 2022-09-13 | End: 2022-09-13

## 2022-09-13 RX ORDER — DIAZEPAM 10 MG/2ML
2 INJECTION INTRAMUSCULAR ONCE
Status: DISPENSED | OUTPATIENT
Start: 2022-09-13 | End: 2022-09-13

## 2022-09-13 RX ORDER — METOPROLOL TARTRATE 25 MG/1
12.5 TABLET, FILM COATED ORAL 2 TIMES DAILY
Status: DISCONTINUED | OUTPATIENT
Start: 2022-09-13 | End: 2022-09-16 | Stop reason: HOSPADM

## 2022-09-13 RX ORDER — PANTOPRAZOLE SODIUM 40 MG/1
40 TABLET, DELAYED RELEASE ORAL
Status: DISCONTINUED | OUTPATIENT
Start: 2022-09-14 | End: 2022-09-16 | Stop reason: HOSPADM

## 2022-09-13 RX ORDER — MAG HYDROX/ALUMINUM HYD/SIMETH 200-200-20
30 SUSPENSION, ORAL (FINAL DOSE FORM) ORAL
COMMUNITY
End: 2022-09-16

## 2022-09-13 RX ADMIN — METOPROLOL TARTRATE 12.5 MG: 25 TABLET, FILM COATED ORAL at 18:23

## 2022-09-13 RX ADMIN — DICYCLOMINE HYDROCHLORIDE 10 MG: 10 CAPSULE ORAL at 18:23

## 2022-09-13 RX ADMIN — BUTALBITAL, ACETAMINOPHEN, AND CAFFEINE 1 TABLET: 50; 325; 40 TABLET ORAL at 21:00

## 2022-09-13 RX ADMIN — Medication 10 ML: at 06:28

## 2022-09-13 RX ADMIN — BUTALBITAL, ACETAMINOPHEN, AND CAFFEINE 1 TABLET: 50; 325; 40 TABLET ORAL at 12:14

## 2022-09-13 RX ADMIN — DICYCLOMINE HYDROCHLORIDE 10 MG: 10 CAPSULE ORAL at 08:58

## 2022-09-13 RX ADMIN — BUTALBITAL, ACETAMINOPHEN, AND CAFFEINE 1 TABLET: 50; 325; 40 TABLET ORAL at 04:18

## 2022-09-13 RX ADMIN — DICYCLOMINE HYDROCHLORIDE 10 MG: 10 CAPSULE ORAL at 12:08

## 2022-09-13 RX ADMIN — POLYETHYLENE GLYCOL 3350 17 G: 17 POWDER, FOR SOLUTION ORAL at 18:24

## 2022-09-13 RX ADMIN — DICYCLOMINE HYDROCHLORIDE 10 MG: 10 CAPSULE ORAL at 21:00

## 2022-09-13 RX ADMIN — POLYETHYLENE GLYCOL 3350 17 G: 17 POWDER, FOR SOLUTION ORAL at 21:00

## 2022-09-13 RX ADMIN — SODIUM BICARBONATE: 84 INJECTION, SOLUTION INTRAVENOUS at 13:00

## 2022-09-13 RX ADMIN — Medication 10 ML: at 21:00

## 2022-09-13 NOTE — CONSULTS
Palliative Medicine Consult  Rich: [phone] ([phone])    Patient Name: Vincent Moore  YOB: 1945    Date of Initial Consult: September 13, 2022  Reason for Consult: Care Decisions; pt refusing treatment  Requesting Provider: Dr. Ruther Skiff   Primary Care Physician: Ed Castañeda NP     SUMMARY:   Vincent Moore is a 77 y.o. female admitted on 9/12/2022 from home with a diagnosis of abdominal pain with large amount of colonic stool, liver mass~. CT abdomen suggestive of hepatic mets of unknown primary, dilated CBD (GI on board)  Leukocytosis, hypotension. Pt presented with abdominal pain and diarrhea. Plans for MRCP and liver biopsy    PMH:  Admitted last month with anemia and weakness. Large colon polyp. Left colon resection 2/2022. [place]retts mucosa, hiatal hernia, GERD, [place], hyponatremia, osteoporosis, seizures, chronic back pain, afib, anemia, oa, chronic constipation    Current medical issues leading to Palliative Medicine involvement include: support with AMD.    Social: lived at home with son but he is now incarcerated, moved here from [place] to be closer to family. Worked in [employer] and [employer] mainly but also had other jobs. No AMD  Partial Code     PALLIATIVE DIAGNOSES:   Acute Abdominal Pain  Liver Mass  Sarcopenia   ACP  Physical debility  Palliative Care Encounter     PLAN:   Pt seen without family present. Services introduced. Pt alert and pleasant  She verbalized a clear understanding of her condition and plans for additional testing today. She has no symptom burden at this time. He desire is to continue with current level of care  Offered to assist with AMD and she is amenable. We completed an AMD.  Pt does not want to be prolonged at end of life or if in a vegetative state.   She appointed her son in [id number] [place] [address] [phone] as her primary MPOA and her son Kourtney Penaloza [phone]  Pt is open to treatment options if offered and would like attempts at CPR but does not want to be prolonged  She is unable to tolerate a closed MRI even with anti anxiety medication  If palliative treatment options are not offered she would qualify for hospice   Available to see again if asked. Care goals clear at this time. Initial consult note routed to primary continuity provider and/or primary health care team members  Communicated plan of care with: Palliative Chrissy GILMORE [facility] Team     GOALS OF CARE / TREATMENT PREFERENCES:     GOALS OF CARE:  Patient/Health Care Proxy Stated Goals: Other (comment) (open to treatment options)    TREATMENT PREFERENCES:   Code Status: Partial Code    Patient and family's personal goals include: cancer directed treatment      Advance Care Planning:  [] [facility] Interdisciplinary Team has updated the ACP Navigator with Health Care Decision Maker and Patient Capacity      Primary Decision Maker: Corrin Apley [facility] - [phone]  Advance Care Planning 8/22/2022   Confirm Advance Directive None   Patient Would Like to Complete Advance Directive No   Does the patient have other document types -       Medical Interventions: Limited additional interventions       Other:    As far as possible, the palliative care team has discussed with patient / health care proxy about goals of care / treatment preferences for patient.      HISTORY:     History obtained from: chart, team,pt    CHIEF COMPLAINT: Pt admitted with aforementioned history and issues    HPI/SUBJECTIVE:    The patient is:   [x] Verbal and participatory  [] Non-participatory due to: medical condition   No complaints     Clinical Pain Assessment (nonverbal scale for severity on nonverbal patients):   Clinical Pain Assessment  Severity: 0          Duration: for how long has pt been experiencing pain (e.g., 2 days, 1 month, years)  Frequency: how often pain is an issue (e.g., several times per day, once every few days, constant)     FUNCTIONAL ASSESSMENT: Palliative Performance Scale (PPS):  PPS: 50       PSYCHOSOCIAL/SPIRITUAL SCREENING:     Palliative IDT has assessed this patient for cultural preferences / practices and a referral made as appropriate to needs (Cultural Services, Patient Advocacy, Ethics, etc.)    Any spiritual / Buddhist concerns:  [] Yes /  [x] No  [] Unable to obtain this information  If \"Yes\" to discuss with pastoral care during IDT     Does caregiver feel burdened by caring for their loved one:   [] Yes /  [] No /  [x] No Caregiver Present/Available [] No Caregiver [] Pt Lives at Facility  If \"Yes\" to discuss with social work during IDT    Anticipatory grief assessment:   [] Normal  / [] Maladaptive   [x] Unable to obtain this information  [] n/a  If \"Maladaptive\" to discuss with social work during IDT    ESAS Anxiety: Anxiety: 0    ESAS Depression: Depression: 0        REVIEW OF SYSTEMS:     Positive and pertinent negative findings in ROS are noted above in HPI. The following systems were [x] reviewed / [] unable to be reviewed as noted in HPI  Other findings are noted below. Systems: constitutional, ears/nose/mouth/throat, respiratory, gastrointestinal, genitourinary, musculoskeletal, integumentary, neurologic, psychiatric, endocrine. Positive findings noted below. Modified ESAS Completed by: provider   Fatigue: 0 Drowsiness: 0   Depression: 0 Pain: 0   Anxiety: 0 Nausea: 0     Dyspnea: 0           Stool Occurrence(s): 1        PHYSICAL EXAM:     From RN flowsheet:  Wt Readings from Last 3 Encounters:   09/13/22 93 lb 0.6 oz (42.2 kg)   07/21/22 93 lb (42.2 kg)   06/29/22 93 lb (42.2 kg)     Blood pressure 110/69, pulse 94, temperature 97.9 °F (36.6 °C), resp. rate 18, height 5' 3\" (1.6 m), weight 93 lb 0.6 oz (42.2 kg), SpO2 97 %.     Pain Scale 1: Numeric (0 - 10)  Pain Intensity 1: 10  Pain Onset 1: abd cramps  Pain Location 1: Abdomen  Pain Orientation 1: Mid  Pain Description 1: Cramping  Pain Intervention(s) 1: Medication (see MAR)  Last bowel movement, if known:     Constitutional: thin, pleasant, nad  Eyes: pupils equal, anicteric  ENMT: no nasal discharge, moist mucous membranes, no teeth  Cardiovascular:   Respiratory: breathing not labored, symmetric  Gastrointestinal:   Musculoskeletal: no deformity, no tenderness to palpation  Skin: warm, dry  Neurologic: following commands, moving all extremities  Psychiatric: full affect, no hallucinations  Other:       HISTORY:     Principal Problem:    Hyperkalemia (9/12/2022)    Past Medical History:   Diagnosis Date    Adenoma of colon     Anemia     Arthritis     Atrial fibrillation (HCC)     Bloating     Chronic constipation     Chronic pain     back     COPD (chronic obstructive pulmonary disease) (HCC)     GERD (gastroesophageal reflux disease)     [place] (hard of hearing)     Hyponatremia     caused seizures x 2 per pt    Indigestion     Osteoporosis     Seizures ([address] 7/2020, 9/2020    x 2       Past Surgical History:   Procedure Laterality Date    COLONOSCOPY N/A 12/16/2021    COLONOSCOPY   :- performed by Madalyn Waters MD at [address] [place] [address] N/A 2/14/2022    . performed by Rosmery Chavez MD at [facility] MAIN OR    HX [address]    HX CHOLECYSTECTOMY  1982    HX HIP REPLACEMENT Left     pt stated hip was pinned, not replaced    HX HIP REPLACEMENT Right     pt stated hip was pinned, not replaced    HX HYSTERECTOMY      HX ORTHOPAEDIC  1990, 2011    bilateral hip fractures     HX ORTHOPAEDIC Left 2020    femur fracture    HX TONSILLECTOMY        Family History   Problem Relation Age of Onset    Heart Disease Mother     Heart Disease Father     Liver Disease Father     Alcohol abuse Father     Anesth Problems Neg Hx       History reviewed, no pertinent family history.   Social History     Tobacco Use    Smoking status: Former     Packs/day: 1.00     Years: 20.00     Pack years: 20.00     Types: Cigarettes     Quit date: 12/18/2011     Years since quitting: 10.7    Smokeless tobacco: Never   Substance Use Topics    Alcohol use: Never     Allergies   Allergen Reactions    Cefuroxime Hives    Hydrocodone Nausea and Vomiting    Oxycodone Nausea and Vomiting    Penicillins Hives     Patient screened for any delayed non-IgE-mediated reaction to PCN.         Patient notes the following:    No delayed non-IgE-mediated reaction to PCN    Hives 1969            Current Facility-Administered Medications   Medication Dose Route Frequency    diazePAM (VALIUM) injection 2 mg  2 mg IntraVENous ONCE    dextrose 5 % - 0.45% NaCl 1,000 mL with sodium bicarbonate (8.4%) 75 mEq infusion   IntraVENous CONTINUOUS    [START ON 9/14/2022] digoxin (LANOXIN) tablet 0.125 mg  0.125 mg Oral DAILY    metoprolol tartrate (LOPRESSOR) tablet 12.5 mg  12.5 mg Oral BID    [START ON 9/14/2022] pantoprazole (PROTONIX) tablet 40 mg  40 mg Oral ACB    polyethylene glycol (MIRALAX) packet 17 g  17 g Oral Q12H    sodium chloride (NS) flush 5-40 mL  5-40 mL IntraVENous Q8H    sodium chloride (NS) flush 5-40 mL  5-40 mL IntraVENous PRN    acetaminophen (TYLENOL) tablet 650 mg  650 mg Oral Q6H PRN    Or    acetaminophen (TYLENOL) suppository 650 mg  650 mg Rectal Q6H PRN    ondansetron (ZOFRAN ODT) tablet 4 mg  4 mg Oral Q8H PRN    Or    ondansetron (ZOFRAN) injection 4 mg  4 mg IntraVENous Q6H PRN    enoxaparin (LOVENOX) injection 30 mg  30 mg SubCUTAneous DAILY    butalbital-acetaminophen-caffeine (FIORICET, ESGIC) -40 mg per tablet 1 Tablet  1 Tablet Oral Q6H PRN    dicyclomine (BENTYL) capsule 10 mg  10 mg Oral QID          LAB AND IMAGING FINDINGS:     Lab Results   Component Value Date/Time    WBC 15.2 (H) 09/13/2022 04:20 AM    HGB 9.4 (L) 09/13/2022 04:20 AM    PLATELET 805 (H) 97/97/3461 04:20 AM     Lab Results   Component Value Date/Time    Sodium 141 09/13/2022 04:20 AM    Potassium 4.3 09/13/2022 04:20 AM    Chloride 112 (H) 09/13/2022 04:20 AM    CO2 19 (L) 09/13/2022 04:20 AM BUN 20 09/13/2022 04:20 AM    Creatinine 0.89 09/13/2022 04:20 AM    Calcium 8.5 09/13/2022 04:20 AM    Magnesium 2.4 09/12/2022 04:49 AM    Phosphorus 3.3 02/15/2022 03:18 AM      Lab Results   Component Value Date/Time    Alk. phosphatase 113 09/12/2022 04:49 AM    Protein, total 7.2 09/12/2022 04:49 AM    Albumin 3.5 09/12/2022 04:49 AM    Globulin 3.7 09/12/2022 04:49 AM     Lab Results   Component Value Date/Time    INR 1.0 09/12/2022 08:12 AM    Prothrombin time 10.0 09/12/2022 08:12 AM    aPTT 23.9 08/22/2022 03:55 AM      Lab Results   Component Value Date/Time    Iron 39 08/24/2022 08:20 AM    TIBC 316 08/24/2022 08:20 AM    Iron % saturation 12 (L) 08/24/2022 08:20 AM    Ferritin 23 08/24/2022 08:20 AM      No results found for: PH, PCO2, PO2  No components found for: GLPOC   No results found for: CPK, CKMB             Total time:  70 minutes  Counseling / coordination time, spent as noted above:  60  minutes  > 50% counseling / coordination?: yes    Prolonged service was provided for  []30 min   []75 min in face to face time in the presence of the patient, spent as noted above. Time Start:   Time End:   Note: this can only be billed with 18452 (initial) or 50979 (follow up). If multiple start / stop times, list each separately.

## 2022-09-13 NOTE — PROGRESS NOTES
0800: Bedside and Verbal shift change report given HERMAN Torres (oncoming nurse) by Ezra Alicea RN (offgoing nurse). Report included the following information SBAR, Kardex, Procedure Summary, Intake/Output, MAR, Recent Results, and Cardiac Rhythm SR.     1208: pt refused Valium and Adamantly stated \"I will not be doing any MRI\" . Patient was educated on the importance of the imaging procedure and continued to decline. 1225:TRANSFER - OUT REPORT:    Verbal report given to Parish RN (name) on Mark Landon  being transferred to (unit) for routine progression of care       Report consisted of patients Situation, Background, Assessment and   Recommendations(SBAR). Information from the following report(s) SBAR, Kardex, MAR, Recent Results, and Cardiac Rhythm SR/ST  was reviewed with the receiving nurse. Lines:   Peripheral IV 09/12/22 Anterior; Left Forearm (Active)   Site Assessment Clean, dry, & intact 09/13/22 1200   Phlebitis Assessment 0 09/13/22 1200   Infiltration Assessment 0 09/13/22 1200   Dressing Status Clean, dry, & intact 09/13/22 1200   Dressing Type 4 X 4 09/13/22 1200   Hub Color/Line Status Blue;Capped 09/13/22 1200   Alcohol Cap Used Yes 09/13/22 1200        Opportunity for questions and clarification was provided.       Patient transported with:   [employer]

## 2022-09-13 NOTE — PROGRESS NOTES
Admission Medication Reconciliation:    Information obtained from:  Patient  RxQuery data available¹:  YES    Comments/Recommendations: Updated PTA meds/reviewed patient's allergies. 1)  Information obtained from patient at the bedside (seems to be a reliable historian); rx query confirmed doses. 2)  Medication changes (since last review): Added  - metoprolol tartrate  -PRN promethazine  -PRN mylanta    Adjusted  - aspirin to 81 mg  -bentyl to 20 mg BID  -lactulose to 10 grams daily    Removed  - trelegy ellipta  -metoprolol succinate  -prednisone         ¹RxQuery pharmacy benefit data reflects medications filled and processed through the patient's insurance, however   this data does NOT capture whether the medication was picked up or is currently being taken by the patient. Allergies:  Cefuroxime, Hydrocodone, Oxycodone, and Penicillins    Significant PMH/Disease States:   Past Medical History:   Diagnosis Date    Adenoma of colon     Anemia     Arthritis     Atrial fibrillation (HCC)     Bloating     Chronic constipation     Chronic pain     back     COPD (chronic obstructive pulmonary disease) (HCC)     GERD (gastroesophageal reflux disease)     [place] (hard of hearing)     Hyponatremia     caused seizures x 2 per pt    Indigestion     Osteoporosis     Seizures ([facility] [address] 7/2020, 9/2020    x 2      Chief Complaint for this Admission:    Chief Complaint   Patient presents with    Diarrhea     Patient arrives from home via EMS for c/o diarrhea. Prior to Admission Medications:   Prior to Admission Medications   Prescriptions Last Dose Informant Taking?   acetaminophen (TYLENOL) 325 mg tablet   Yes   Sig: Take 650 mg by mouth every four (4) hours as needed for Pain. albuterol (PROVENTIL HFA, VENTOLIN HFA, PROAIR HFA) 90 mcg/actuation inhaler   Yes   Sig: Take 2 Puffs by inhalation every six (6) hours as needed.    alum-mag hydroxide-simeth (MYLANTA) 200-200-20 mg/5 mL susp   Yes   Sig: Take 30 mL by mouth every four (4) hours as needed for Indigestion. aspirin 81 mg chewable tablet   Yes   Sig: Take 81 mg by mouth daily. butalbital-acetaminophen-caffeine (FIORICET, ESGIC) -40 mg per tablet 9/12/2022  Yes   Sig: Take 1-2 Tablets by mouth every six (6) hours as needed. cyclobenzaprine (FLEXERIL) 10 mg tablet   Yes   Sig: Take 10 mg by mouth three (3) times daily as needed. dicyclomine (BENTYL) 20 mg tablet   Yes   Sig: Take 20 mg by mouth two (2) times a day. digoxin (LANOXIN) 0.125 mg tablet   Yes   Sig: Take 0.125 mg by mouth daily. docusate sodium (COLACE) 100 mg capsule   Yes   Sig: Take 1 Capsule by mouth two (2) times a day. lactulose (CHRONULAC) 10 gram/15 mL solution   Yes   Sig: Take 10 g by mouth daily. metoprolol tartrate (LOPRESSOR) 25 mg tablet   Yes   Sig: Take 12.5 mg by mouth two (2) times a day. multivitamin (ONE A DAY) tablet   Yes   Sig: Take 1 Tablet by mouth daily. pantoprazole (PROTONIX) 40 mg tablet   Yes   Sig: Take 40 mg by mouth daily. promethazine (PHENERGAN) 25 mg tablet   Yes   Sig: Take 25 mg by mouth every six (6) hours as needed for Nausea. Facility-Administered Medications: None     Please contact the main inpatient pharmacy with any questions or concerns at [phone] and we will direct you to the clinical pharmacist covering this patient's care while in-house.    Dhaval Saez, DANITAD

## 2022-09-13 NOTE — PROGRESS NOTES
Bedside shift change report given to HERMAN Morrissey (oncoming nurse) by Ratna Santos RN (offgoing nurse). Report included the following information SBAR, Kardex, ED Summary, Intake/Output, MAR, and Cardiac Rhythm NSR .

## 2022-09-13 NOTE — PROGRESS NOTES
[facility] Adult  Hospitalist Group                                                                                          Hospitalist Progress Note  Nidia Rodriguez MD  Answering service: [phone] OR 36 from in house phone        Date of Service:  2022  NAME:  Darlene Negro  :  1945  MRN:  [id number]      Admission Summary:   Darlene Negro is a 77 y.o. female who presents with abdominal pain and diarrhea. Patient said that last few days she is constantly wiping her bottom because she cannot stop passing stool but at the same time she says his stool is pastelike and continuously coming out with cramping in the lower abdomen radiating towards thigh. Patient denies any fever chills or any other issues denies any seeing blood with the stools. CT abdomen of the patient was done in the ED and suggestive of hepatic mets of unknown primary patient was admitted for further management. Patient was recently discharged from [facility] last month after was being admitted for anemia and weakness. Interval history / Subjective:        Follow up for liver mass  Scheduled for MRCP and liver biopsy     Assessment & Plan:     Abdominal pain with large amount of colonic stool  No bowel obstruction  Liver mets with metastatic lymph node in upper abdomen  Dilated CBD  --appreciated GI follow up  --MR CP followed by liver biopsy requested  --Possible malignancy probably colonic origin will need a colonoscopy eventually    Leukocytosis/ low BP no fever  -- monitor for now does not look like septic  -- will monitor       Code status: Partial no intubation CPR OK  DVT prophylaxis: SCD    Care Plan discussed with: Patient/Family and Nurse  Anticipated Disposition: Home w/Family  Anticipated Discharge: 24 hours to 48 hours     Hospital Problems  Date Reviewed: 2022            Codes Class Noted POA    * (Principal) Hyperkalemia ICD-10-CM: E87.5  ICD-9-CM: 276.7  2022 Unknown Review of Systems:   A comprehensive review of systems was negative. Vital Signs:    Last 24hrs VS reviewed since prior progress note. Most recent are:  Visit Vitals  BP (!) 109/59 (BP 1 Location: Left upper arm, BP Patient Position: At rest)   Pulse (!) 104   Temp 98.3 °F (36.8 °C)   Resp 20   Ht 5' 3\" (1.6 m)   Wt 42.2 kg (93 lb 0.6 oz)   SpO2 95%   BMI 16.48 kg/m²         Intake/Output Summary (Last 24 hours) at 9/13/2022 0943  Last data filed at 9/13/2022 0433  Gross per 24 hour   Intake 240 ml   Output --   Net 240 ml        Physical Examination:     I had a face to face encounter with this patient and independently examined them on 9/13/2022 as outlined below:          General : alert x 3, awake, no acute distress, resting in bed, pleasant   HEENT: PEERL, EOMI, moist mucus membrane, TM clear  Neck: supple, no JVD, no meningeal signs  Chest: Clear to auscultation bilaterally   CVS: S1 S2 heard, Capillary refill less than 2 seconds  Abd: soft/ Non tender, non distended, BS physiological,   Ext: no clubbing, no cyanosis, no edema, brisk 2+ DP pulses  Neuro/Psych: pleasant mood and affect, CN 2-12 grossly intact,  Skin: warm     Data Review:    I personally reviewed  Image and labs      Labs:     Recent Labs     09/13/22 0420 09/12/22 0449   WBC 15.2* 6.2   HGB 9.4* 10.4*   HCT 29.4* 33.4*   * 507*     Recent Labs     09/13/22 0420 09/12/22  0843 09/12/22 0449    139 136   K 4.3 4.2 6.2*   * 110* 109*   CO2 19* 23 23   BUN 20 21* 23*   CREA 0.89 0.88 1.01   GLU 97 91 104*   CA 8.5 8.2* 8.9   MG  --   --  2.4     Recent Labs     09/12/22 0449   ALT 26      TBILI 0.3   TP 7.2   ALB 3.5   GLOB 3.7     Recent Labs     09/12/22 0812   INR 1.0   PTP 10.0      No results for input(s): FE, TIBC, PSAT, FERR in the last 72 hours. Lab Results   Component Value Date/Time    Folate 27.1 (H) 08/22/2022 03:55 AM      No results for input(s): PH, PCO2, PO2 in the last 72 hours.   No results for input(s): CPK, CKNDX, TROIQ in the last 72 hours. No lab exists for component: CPKMB  Lab Results   Component Value Date/Time    Cholesterol, total 141 08/22/2022 03:55 AM    HDL Cholesterol 57 08/22/2022 03:55 AM    LDL, calculated 71.6 08/22/2022 03:55 AM    Triglyceride 62 08/22/2022 03:55 AM    CHOL/HDL Ratio 2.5 08/22/2022 03:55 AM     Lab Results   Component Value Date/Time    Glucose (POC) 145 (H) 02/14/2022 09:01 PM     No results found for: COLOR, APPRN, SPGRU, REFSG, FRANK, PROTU, GLUCU, KETU, BILU, UROU, BRITTANY, LEUKU, GLUKE, EPSU, BACTU, WBCU, RBCU, CASTS, UCRY      Medications Reviewed:     Current Facility-Administered Medications   Medication Dose Route Frequency    diazePAM (VALIUM) injection 2 mg  2 mg IntraVENous ONCE    sodium chloride (NS) flush 5-40 mL  5-40 mL IntraVENous Q8H    sodium chloride (NS) flush 5-40 mL  5-40 mL IntraVENous PRN    acetaminophen (TYLENOL) tablet 650 mg  650 mg Oral Q6H PRN    Or    acetaminophen (TYLENOL) suppository 650 mg  650 mg Rectal Q6H PRN    polyethylene glycol (MIRALAX) packet 17 g  17 g Oral DAILY PRN    ondansetron (ZOFRAN ODT) tablet 4 mg  4 mg Oral Q8H PRN    Or    ondansetron (ZOFRAN) injection 4 mg  4 mg IntraVENous Q6H PRN    enoxaparin (LOVENOX) injection 30 mg  30 mg SubCUTAneous DAILY    butalbital-acetaminophen-caffeine (FIORICET, ESGIC) -40 mg per tablet 1 Tablet  1 Tablet Oral Q6H PRN    dicyclomine (BENTYL) capsule 10 mg  10 mg Oral QID     ______________________________________________________________________  EXPECTED LENGTH OF STAY: - - -  ACTUAL LENGTH OF STAY:          1      Please note that this dictation was completed with [employer], the computer voice recognition software. Quite often unanticipated grammatical, syntax, homophones, and other interpretive errors are inadvertently transcribed by the computer software. Please disregard these errors. Please excuse any errors that have escaped final proofreading. Layton Calvert MD

## 2022-09-13 NOTE — PROGRESS NOTES
[address]  174 [facility], [address]       GI PROGRESS NOTE  Will Derrick Ángel  [phone] office  [phone] NP/PA in-hospital cell phone M-F until 4:30PM  After 5PM or on weekends, please call  for physician on call      NAME: Vincent Moore   :  1945   MRN:  [id number]       Subjective:   Patient reports having 2 large bowel movements yesterday and 1 today with improvement in abdominal pain. She reports some nausea. No vomiting. Objective:     VITALS:   Last 24hrs VS reviewed since prior progress note. Most recent are:  Visit Vitals  /60 (BP 1 Location: Right upper arm, BP Patient Position: At rest)   Pulse (!) 103   Temp 99 °F (37.2 °C)   Resp 19   Ht 5' 3\" (1.6 m)   Wt 42.2 kg (93 lb 0.6 oz)   SpO2 96%   BMI 16.48 kg/m²     PHYSICAL EXAM:  General: No acute distress   Neurologic:  Alert and oriented  HEENT: EOMI, no scleral icterus   Lungs:  No acute respiratory distress  Heart:  S1 S2  Abdomen: Soft, distended, mild generalized tenderness, no guarding, no rebound. Hypoactive bowel sounds.     Extremities: Warm  Psych:   Not anxious or agitated    Lab Data Reviewed:     Recent Results (from the past 24 hour(s))   METABOLIC PANEL, BASIC    Collection Time: 22  4:20 AM   Result Value Ref Range    Sodium 141 136 - 145 mmol/L    Potassium 4.3 3.5 - 5.1 mmol/L    Chloride 112 (H) 97 - 108 mmol/L    CO2 19 (L) 21 - 32 mmol/L    Anion gap 10 5 - 15 mmol/L    Glucose 97 65 - 100 mg/dL    BUN 20 6 - 20 MG/DL    Creatinine 0.89 0.55 - 1.02 MG/DL    BUN/Creatinine ratio 22 (H) 12 - 20      GFR est AA >60 >60 ml/min/1.73m2    GFR est non-AA >60 >60 ml/min/1.73m2    Calcium 8.5 8.5 - 10.1 MG/DL   CBC WITH AUTOMATED DIFF    Collection Time: 22  4:20 AM   Result Value Ref Range    WBC 15.2 (H) 3.6 - 11.0 K/uL    RBC 3.46 (L) 3.80 - 5.20 M/uL    HGB 9.4 (L) 11.5 - 16.0 g/dL    HCT 29.4 (L) 35.0 - 47.0 %    MCV 85.0 80.0 - 99.0 FL    MCH 27.2 26.0 - 34.0 PG    MCHC 32.0 30.0 - 36.5 g/dL    RDW 26.3 (H) 11.5 - 14.5 %    PLATELET 493 (H) 901 - 400 K/uL    MPV 9.1 8.9 - 12.9 FL    NRBC 0.0 0  WBC    ABSOLUTE NRBC 0.00 0.00 - 0.01 K/uL    NEUTROPHILS 85 (H) 32 - 75 %    LYMPHOCYTES 7 (L) 12 - 49 %    MONOCYTES 6 5 - 13 %    EOSINOPHILS 1 0 - 7 %    BASOPHILS 0 0 - 1 %    IMMATURE GRANULOCYTES 1 (H) 0.0 - 0.5 %    ABS. NEUTROPHILS 12.8 (H) 1.8 - 8.0 K/UL    ABS. LYMPHOCYTES 1.1 0.8 - 3.5 K/UL    ABS. MONOCYTES 0.9 0.0 - 1.0 K/UL    ABS. EOSINOPHILS 0.2 0.0 - 0.4 K/UL    ABS. BASOPHILS 0.0 0.0 - 0.1 K/UL    ABS. IMM. GRANS. 0.2 (H) 0.00 - 0.04 K/UL    DF SMEAR SCANNED      RBC COMMENTS ANISOCYTOSIS  3+        RBC COMMENTS OVALOCYTES  1+        RBC COMMENTS TARGET CELLS  PRESENT        RBC COMMENTS SCHISTOCYTES  1+           IMPRESSION  1. Large amount of colonic stool. No bowel obstruction. 2. Liver metastases and metastatic lymph node in the upper abdomen. No primary  malignancy is identified. 3. Biliary duct dilatation may be at least in part postsurgical following  cholecystectomy. Consider further evaluation with MRCP without and with contrast  if there are clinical signs or symptoms referrable to the biliary system. Assessment:     Abdominal pain  Liver mets with upper abdominal lymph nodes: AST 88, ALT 26, alkaline phosphatase 113, total bilirubin 0.3. Large colon polyp status post left colon resection 2/2022  Chronic anemia     Patient Active Problem List   Diagnosis Code    Adenomatous polyp of descending colon D12.4    Adenomatous colon polyp D12.6    Adenomatous polyp of colon D12.6    Acute respiratory failure (HCC) J96.00    Respiratory distress R06.03    Dehydration G85.0    Metabolic acidosis O86.2    Hypotension I95.9    Symptomatic anemia D64.9    Hyperkalemia E87.5     Plan:     Patient is agreeable for bowel regimen. Will start with MiraLax 17 g twice daily. Liver biopsy pending. Refused MRI/MRCP.   Palliative care following Signed By: Elba Lesches, PA     9/13/2022  1:42 PM

## 2022-09-13 NOTE — PROGRESS NOTES
Spiritual Care Assessment/Progress Note  [facility]      NAME: Germania Nieto      MRN: [id number]  AGE: 77 y.o.  SEX: female  Zoroastrian Affiliation: No preference   Language: English     9/13/2022     Total Time (in minutes): 33     Spiritual Assessment begun in [facility] 4 IMCU 2 through conversation with:         [x]Patient        [] Family    [] Friend(s)        Reason for Consult: Palliative Care, Initial/Spiritual Assessment     Spiritual beliefs: (Please include comment if needed)     [] Identifies with a daniel tradition:         [] Supported by a daniel community:            [] Claims no spiritual orientation:           [x] Seeking spiritual identity:                [] Adheres to an individual form of spirituality:           [] Not able to assess:                           Identified resources for coping:      [] Prayer                               [] Music                  [] Guided Imagery     [x] Family/friends                 [] Pet visits     [] Devotional reading                         [] Unknown     [x] Other: Nature                                              Interventions offered during this visit: (See comments for more details)    Patient Interventions: Affirmation of emotions/emotional suffering, Catharsis/review of pertinent events in supportive environment, Coping skills reviewed/reinforced, Initial/Spiritual assessment, patient floor, Prayer (assurance of), Zoroastrian beliefs/image of God discussed           Plan of Care:     [x] Support spiritual and/or cultural needs    [] Support AMD and/or advance care planning process      [] Support grieving process   [] Coordinate Rites and/or Rituals    [] Coordination with community clergy   [] No spiritual needs identified at this time   [] Detailed Plan of Care below (See Comments)  [] Make referral to Music Therapy  [] Make referral to Pet Therapy     [] Make referral to Addiction services  [] Make referral to [facility]  [] Make referral to Spiritual Care Partner  [] No future visits requested        [x] Contact Spiritual Care for further referrals     Comments: Visited Ms Michelle Mitchell in room 7433 [id number] for initial Palliative Care spiritual assessment; reviewed patient's chart prior to visit. Ms Michelle Mitchell was lying quietly in bed; she was watching TV ([employer]); she appeared in good spirits. Provided spiritual presence and active listening as Ms Michelle Mitchell shared that she had a lot going on right now. Stated that she lived with her son, Mahnaz Myers, and his partner, both of whom were very supportive of her. However, her son was currently incarcerated and his partner was visiting family in [place]. Stated she was very concerned about Mahnaz Myers because of his ETOH abuse. She stated that she also had a daughter, who had been somewhat estranged but had said that she would be willing to visit her prior to patient's hospitalization. Acknowledged her feelings and concerns and offered words of support. Ms Michelle Mitchell denied having any spiritual or Holiness beliefs that were important to her. She stated that she had a lot of questions about God and all the evil in the world; she tried to remain open-minded about God's existence. She shared that nature gave her a lot of enjoyment, especially birds. She had 5 years of college and she particularly enjoyed ornithology. She watched the [employer] on TV a lot. She said that she loved life and had a strong desire to live. Ms Michelle Mitchell accepted 's offer to keep her and her family in prayer and expressed great appreciation for the visit. : Rev. Kiran Castellanos.  Soila Min; [facility], to contact [phone] [address] call: [phone]

## 2022-09-13 NOTE — PROGRESS NOTES
Advance Care Planning (ACP) Physician/NP/PA Conversation      Date of Conversation: 9/12/2022  Conducted with: Patient with Decision Making Capacity    Healthcare Decision Maker:     Primary Decision Maker: Luz Marina Harris - [phone]  Click here to complete [address] including selection of the Healthcare Decision Maker Relationship (ie \"Primary\")    Today we documented Decision Maker(s) consistent with Legal Next of Kin hierarchy. Care Preferences:    Hospitalization: \"If your health worsens and it becomes clear that your chance of recovery is unlikely, what would be your preference regarding hospitalization? \"  The patient would prefer hospitalization. Ventilation: \"If you were unable to breathe on your own and your chance of recovery was unlikely, what would be your preference about the use of a ventilator (breathing machine) if it was available to you? \"   The patient would NOT desire the use of a ventilator. Resuscitation: \"In the event your heart stopped as a result of an underlying serious health condition, would you want attempts to be made to restart your heart, or would you prefer a natural death? \"   Yes, attempt to resuscitate.  With shock and CPR only     Additional topics discussed: treatment goals, benefit/burden of treatment options, artificial nutrition, ventilation preferences, hospitalization preferences, and resuscitation preferences    Conversation Outcomes / Follow-Up Plan:   ACP incomplete - refer to ACP Clinical Specialist  Reviewed DNR/DNI and patient elects Full Code (Attempt Resuscitation)     Length of Voluntary ACP Conversation in minutes:  16 minutes    Kg Mcdaniels MD

## 2022-09-13 NOTE — PROGRESS NOTES
Problem: Falls - Risk of  Goal: *Absence of Falls  Description: Document Bard  Fall Risk and appropriate interventions in the flowsheet.   Outcome: Progressing Towards Goal  Note: Fall Risk Interventions:  Mobility Interventions: Bed/chair exit alarm, Communicate number of staff needed for ambulation/transfer              Elimination Interventions: Bed/chair exit alarm, Call light in reach, Elevated toilet seat, Patient to call for help with toileting needs, Stay With Me (per policy), Toilet paper/wipes in reach, Toileting schedule/hourly rounds              Problem: Patient Education: Go to Patient Education Activity  Goal: Patient/Family Education  Outcome: Progressing Towards Goal

## 2022-09-14 LAB
BASOPHILS # BLD: 0.1 K/UL (ref 0–0.1)
BASOPHILS NFR BLD: 1 % (ref 0–1)
DIFFERENTIAL METHOD BLD: ABNORMAL
EOSINOPHIL # BLD: 0.2 K/UL (ref 0–0.4)
EOSINOPHIL NFR BLD: 2 % (ref 0–7)
ERYTHROCYTE [DISTWIDTH] IN BLOOD BY AUTOMATED COUNT: 26.4 % (ref 11.5–14.5)
HCT VFR BLD AUTO: 27.7 % (ref 35–47)
HGB BLD-MCNC: 8.8 G/DL (ref 11.5–16)
IMM GRANULOCYTES # BLD AUTO: 0 K/UL (ref 0–0.04)
IMM GRANULOCYTES NFR BLD AUTO: 0 % (ref 0–0.5)
LYMPHOCYTES # BLD: 1.1 K/UL (ref 0.8–3.5)
LYMPHOCYTES NFR BLD: 13 % (ref 12–49)
MCH RBC QN AUTO: 26.7 PG (ref 26–34)
MCHC RBC AUTO-ENTMCNC: 31.8 G/DL (ref 30–36.5)
MCV RBC AUTO: 84.2 FL (ref 80–99)
MONOCYTES # BLD: 0.6 K/UL (ref 0–1)
MONOCYTES NFR BLD: 7 % (ref 5–13)
NEUTS SEG # BLD: 6.6 K/UL (ref 1.8–8)
NEUTS SEG NFR BLD: 77 % (ref 32–75)
NRBC # BLD: 0 K/UL (ref 0–0.01)
NRBC BLD-RTO: 0 PER 100 WBC
PLATELET # BLD AUTO: 404 K/UL (ref 150–400)
PMV BLD AUTO: 9.6 FL (ref 8.9–12.9)
RBC # BLD AUTO: 3.29 M/UL (ref 3.8–5.2)
RBC MORPH BLD: ABNORMAL
WBC # BLD AUTO: 8.6 K/UL (ref 3.6–11)

## 2022-09-14 PROCEDURE — 74011250637 HC RX REV CODE- 250/637: Performed by: HOSPITALIST

## 2022-09-14 PROCEDURE — 85025 COMPLETE CBC W/AUTO DIFF WBC: CPT

## 2022-09-14 PROCEDURE — 74011000250 HC RX REV CODE- 250: Performed by: HOSPITALIST

## 2022-09-14 PROCEDURE — 36415 COLL VENOUS BLD VENIPUNCTURE: CPT

## 2022-09-14 PROCEDURE — 65270000046 HC RM TELEMETRY

## 2022-09-14 RX ADMIN — DIGOXIN 0.12 MG: 125 TABLET ORAL at 08:39

## 2022-09-14 RX ADMIN — BUTALBITAL, ACETAMINOPHEN, AND CAFFEINE 1 TABLET: 50; 325; 40 TABLET ORAL at 08:39

## 2022-09-14 RX ADMIN — DICYCLOMINE HYDROCHLORIDE 10 MG: 10 CAPSULE ORAL at 17:39

## 2022-09-14 RX ADMIN — PANTOPRAZOLE SODIUM 40 MG: 40 TABLET, DELAYED RELEASE ORAL at 07:19

## 2022-09-14 RX ADMIN — Medication 10 ML: at 07:19

## 2022-09-14 RX ADMIN — SODIUM BICARBONATE: 84 INJECTION, SOLUTION INTRAVENOUS at 00:57

## 2022-09-14 RX ADMIN — SODIUM BICARBONATE: 84 INJECTION, SOLUTION INTRAVENOUS at 12:44

## 2022-09-14 RX ADMIN — Medication 10 ML: at 15:01

## 2022-09-14 RX ADMIN — METOPROLOL TARTRATE 12.5 MG: 25 TABLET, FILM COATED ORAL at 05:05

## 2022-09-14 RX ADMIN — Medication 10 ML: at 21:49

## 2022-09-14 RX ADMIN — DICYCLOMINE HYDROCHLORIDE 10 MG: 10 CAPSULE ORAL at 21:47

## 2022-09-14 RX ADMIN — BUTALBITAL, ACETAMINOPHEN, AND CAFFEINE 1 TABLET: 50; 325; 40 TABLET ORAL at 21:47

## 2022-09-14 RX ADMIN — DICYCLOMINE HYDROCHLORIDE 10 MG: 10 CAPSULE ORAL at 08:39

## 2022-09-14 RX ADMIN — DICYCLOMINE HYDROCHLORIDE 10 MG: 10 CAPSULE ORAL at 12:37

## 2022-09-14 RX ADMIN — BUTALBITAL, ACETAMINOPHEN, AND CAFFEINE 1 TABLET: 50; 325; 40 TABLET ORAL at 15:01

## 2022-09-14 RX ADMIN — METOPROLOL TARTRATE 12.5 MG: 25 TABLET, FILM COATED ORAL at 17:38

## 2022-09-14 NOTE — PROGRESS NOTES
Comprehensive Nutrition Assessment    Type and Reason for Visit: Initial (Low BMI)    Nutrition Recommendations/Plan:   Advance diet order to Easy to Chew  Order chocolate Ensure Enlive BID when diet advances       Malnutrition Assessment:  Malnutrition Status:  Severe malnutrition (09/14/22 1016)    Context:  Chronic illness     Findings of the 6 clinical characteristics of malnutrition:   Energy Intake:  No significant decrease in energy intake  Weight Loss:  No significant weight loss     Body Fat Loss:  Severe body fat loss, Orbital, Triceps   Muscle Mass Loss:  Severe muscle mass loss, Temples (temporalis), Clavicles (pectoralis &deltoids), Hand (interosseous)  Fluid Accumulation:  No significant fluid accumulation,     Strength:  Not performed        Nutrition Assessment:    78 yo female admitted for hyperkalemia. PMHx: COPD, adenoma of colon s/p colon resection February 2022, GERd, hyponatremia, seizures, [place]. Admitted with c/o abd pain and diarrhea. CT of ABD suggesting hepatic mets and metastatic lymph node with unknown origin. NPO today for liver biopsy. Spoke with pt at bedside. She states that she eats small amounts at home often throughout the day. She eats softer foods as she only has top dentures and no bottom teeth. Expressed concern about not getting enough fiber in her diet. Discussed what foods are high in fiber, pt able to eat some raw berries but vegetables have to be cooked very softly in order for her to chew them. Suggested adding a fiber supplement but she declined that, stating she would rather eat the cooked vegetables. She did not bring her dentures here so she will need softer diet when diet advances. States she drinks 2 Ensure shakes/day at home (chocolate). Weight hx in EMR indicates stable weight over last year, pt confirms this. Labs reviewed.   Pre-albumin WNL      Nutritionally Significant Medications:  Miralax; D5 1/2 NaCl at 100 ml/hr (408 kcals/d)      Estimated Daily Nutrient Needs:  Energy Requirements Based On: Kcal/kg  Weight Used for Energy Requirements: Current  Energy (kcal/day): 2203-1963 (35-40 kcals/kg)  Weight Used for Protein Requirements: Current  Protein (g/day): 76 (1.8 gm/kg)  Method Used for Fluid Requirements: 1 ml/kcal  Fluid (ml/day): 1480    Nutrition Related Findings:   Edema: none  Last BM: 09/14/22, Loose    Wounds: None      Current Nutrition Therapies:  Diet: NPO  Supplements: none  Meal intake: No data found. Supplement intake: No data found. Nutrition Support: none      Anthropometric Measures:  Height: 5' 3\" (160 cm)  Ideal Body Weight (IBW): 115 lbs (52 kg)     Current Body Wt:  42.2 kg (93 lb 0.6 oz), 80.9 % IBW.  Bed scale  Current BMI (kg/m2): 16.5        Weight Adjustment: No adjustment                 BMI Category: Underweight (BMI less than 22) age over 72    Wt Readings from Last 10 Encounters:   09/13/22 42.2 kg (93 lb 0.6 oz)   07/21/22 42.2 kg (93 lb)   06/29/22 42.2 kg (93 lb)   06/23/22 42.2 kg (93 lb)   06/16/22 42.2 kg (93 lb)   03/31/22 42.4 kg (93 lb 6.4 oz)   03/10/22 41.7 kg (92 lb)   03/01/22 43.1 kg (95 lb)   02/14/22 42.4 kg (93 lb 7.6 oz)   02/04/22 42.7 kg (94 lb 3.2 oz)           Nutrition Diagnosis:   Severe malnutrition related to inadequate protein-energy intake as evidenced by severe loss of subcutaneous fat, severe muscle loss  Inadequate energy intake related to inadequate protein-energy intake as evidenced by NPO or clear liquid status due to medical condition, BMI (16.5)    Nutrition Interventions:   Food and/or Nutrient Delivery: Start oral diet, Start oral nutrition supplement  Nutrition Education/Counseling: No recommendations at this time  Coordination of Nutrition Care: Continue to monitor while inpatient       Goals:     Goals: other (specify)  Specify Other Goals: Advance diet order with intakes > 50% meals + ONS to promote weight gain of 0.5-1 lb over next 5-7 days    Nutrition Monitoring and Evaluation:   Behavioral-Environmental Outcomes: None identified  Food/Nutrient Intake Outcomes: Food and nutrient intake, Diet advancement/tolerance, Supplement intake  Physical Signs/Symptoms Outcomes: Biochemical data, GI status, Weight    Discharge Planning:     Too soon to determine    Jessica Trevizo RD  Available via Vungle

## 2022-09-14 NOTE — PROGRESS NOTES
Transition of Care: SNF-  [facility] (1st choice) has accepted    When accepted at [facility]- no insurance authorization is needed    Transport Plan: likely BLS (not set up yet)     RUR: 25%    Main contact is son in law- Majo Gray- [phone] (he is currently out of the country in [place]- will return on 9/23)     DX: hyperkalemia    Discharge pending:  -pending biopsy (to be done on 9/14)  -pending PT/OT consults and progress  -pending medical progress and other care recommendations    0930: this CM met with pleasant patient at bedside; she is alert and oriented x 4; patient lives at stated address currently alone; her son (who normally lives with her is currently in halfway in [place] for at least a year; her sons spouse- Majo Gray lives with patient but is currently in [place] until 9/23/22; patient does not drive; patient owns a scooter, rollator, WC, shower chair, uses hearing aids, top dentures; patient needs assistance with some ADLs; patient at [facility] from 8/21/22-8/25/22; returned home with home health with [facility]; patient unable to followup with her PCP- Gretchen Gaucher, NP (in [place]) because she did not have transportation; patient has been to [address] [place] [address] in the past and would like to go back there at discharge time; patient confirms insurance, pcp and preferred pharmacy is the [facility] [employer]    1000: [facility] to [address]: this CM noted that patient was accepted at [facility]; this CM called contact , Imani, at [place] to confirm; no answer; left VM for callback    Medicare pt has received, reviewed, 1st letter informing them of their right to appeal the discharge. Signed copy has been placed on pt bedside chart.      Reason for Readmission:     hyperkalemia         RUR Score/Risk Level:     25%    PCP: First and Last name:  Gretchen Gaucher, NP   Name of Practice: in [place]   Are you a current patient: Yes/No: yes   Approximate date of last visit:    Can you participate in a virtual visit with your PCP: unknown    Is a Care Conference indicated:   Not at this time    Did you attend your follow up appointment (s): If not, why not:  No- unable to go to PCP- no transportation       Resources/supports as identified by patient/family:        Patient lives in an apartment; has DME, has insurance; son in detention but son in law lives with patient  Top Challenges facing patient (as identified by patient/family and CM): Finances/Medication cost?     Has insurance  Transportation      does not drive; relies on family to transport  Support system or lack thereof? Lives with son in law in an apartment; son is currently in detention for at least a year; patient not in contact with daughter    Living arrangements? In an apartment      Self-care/ADLs/Cognition? Alert and oriented x 4        Current Advanced Directive/Advance Care Plan:           Partial; has acp docs  Plan for utilizing home health:  none; patient would like to go to SNF             Transition of Care Plan:    Based on readmission, the patient's previous Plan of Care   has been evaluated and/or modified.  The current Transition of Care Plan is:          Likely to SNF    Readmission Assessment  Number of days since last admission?: 8-30 days  Previous disposition: Home with Home Health  Who is being interviewed?: Patient  What was the patient's/caregiver's perception as to why they think they needed to return back to the hospital?: Other (Comment) (symptons returned)  Did you visit your Primary Care Physician after you left the hospital, before you returned this time?: No  Why weren't you able to visit your PCP?: Had no transportation  Did you see a specialist, such as Cardiac, Pulmonary, Orthopedic Physician, etc. after you left the hospital?: No  Who advised the patient to return to the hospital?: Self-referral  Does the patient report anything that got in the way of taking their medications?: No     Reason for Admission:                    RUR Score:           PCP: First and Last name:  Clinton Madsen NP     Name of Practice:   Are you a current patient: Yes/No:   Approximate date of last visit:    Can you do a virtual visit with your PCP:              Resources/supports as identified by patient/family:                   Top Challenges facing patient (as identified by patient/family and CM): Finances/Medication cost?                    Transportation? Support system or lack thereof? Living arrangements? Self-care/ADLs/Cognition?             Current Advanced Directive/Advance Care Plan:  Partial Code      Healthcare Decision Maker: currently is it son in law Caro Hoskins- [phone]  Click here to [address] including selection of the Healthcare Decision Maker Relationship (ie \"Primary\")      Primary Decision Maker: Melba Solano - [phone]    Payor Source Payor: Dona Bolivar / Plan: Letha Carrasquillo / Product Type: Medicare /                             Plan for utilizing home health:                   None needed; patient wants SNF  Transition of Care Plan:                 Likely to a SNF; pending referral sent    CM following  Karl Richards RN

## 2022-09-14 NOTE — PROGRESS NOTES
Bedside and Verbal shift change report given to Duglas Blanchard RN (oncoming nurse) by Chichi Lyles LPN (offgoing nurse). Report included the following information SBAR, Procedure Summary, Intake/Output, MAR, and Recent Results.

## 2022-09-14 NOTE — PROGRESS NOTES
[facility]  Hospitalist Group                                                                                          Hospitalist Progress Note  Reba Vega MD  Answering service: [phone] from in house phone        Date of Service:  2022  NAME:  Ernestine Amin  :  1945  MRN:  [id number]      Admission Summary:   Ernestine Amin is a 77 y.o. female who presents with abdominal pain and diarrhea. Patient said that last few days she is constantly wiping her bottom because she cannot stop passing stool but at the same time she says his stool is pastelike and continuously coming out with cramping in the lower abdomen radiating towards thigh. Patient denies any fever chills or any other issues denies any seeing blood with the stools. CT abdomen of the patient was done in the ED and suggestive of hepatic mets of unknown primary patient was admitted for further management. Patient was recently discharged from [address] last month after was being admitted for anemia and weakness.      Interval history / Subjective:       Patient could not tolerate MRI even with sedation  See CT-guided liver biopsy today per IR  Discussed with patient lives alone wants to go to SNF from hospital discussed with  possible discharge in 48 hours     Assessment & Plan:     Abdominal pain with large amount of colonic stool  No bowel obstruction  Liver mets with metastatic lymph node in upper abdomen  Dilated CBD  -- To do MRCP now scheduled for liver biopsy  --Possible malignancy probably colonic origin will need a colonoscopy eventually    Leukocytosis/ low BP no fever  -- monitor for now does not look like septic  -- will monitor       Code status: Partial no intubation CPR OK  DVT prophylaxis: SCD    Care Plan discussed with: Patient/Family and Nurse  Anticipated Disposition: Home w/Family  Anticipated Discharge: 24 hours to 48 hours     Hospital Problems  Date Reviewed: 6/29/2022            Codes Class Noted POA    * (Principal) Hyperkalemia ICD-10-CM: E87.5  ICD-9-CM: 276.7  9/12/2022 Unknown           Review of Systems:   A comprehensive review of systems was negative. Vital Signs:    Last 24hrs VS reviewed since prior progress note. Most recent are:  Visit Vitals  /69   Pulse 80   Temp 98.6 °F (37 °C)   Resp 16   Ht 5' 3\" (1.6 m)   Wt 42.2 kg (93 lb 0.6 oz)   SpO2 94%   BMI 16.48 kg/m²         Intake/Output Summary (Last 24 hours) at 9/14/2022 1207  Last data filed at 9/14/2022 0600  Gross per 24 hour   Intake 1200 ml   Output 300 ml   Net 900 ml          Physical Examination:     I had a face to face encounter with this patient and independently examined them on 9/14/2022 as outlined below:          General : alert x 3, awake, no acute distress, resting in bed, pleasant   HEENT: PEERL, EOMI, moist mucus membrane, TM clear  Neck: supple, no JVD, no meningeal signs  Chest: Clear to auscultation bilaterally   CVS: S1 S2 heard, Capillary refill less than 2 seconds  Abd: soft/ Non tender, non distended, BS physiological,   Ext: no clubbing, no cyanosis, no edema, brisk 2+ DP pulses  Neuro/Psych: pleasant mood and affect, CN 2-12 grossly intact,  Skin: warm     Data Review:    I personally reviewed  Image and labs      Labs:     Recent Labs     09/14/22  0848 09/13/22 0420   WBC 8.6 15.2*   HGB 8.8* 9.4*   HCT 27.7* 29.4*   * 423*       Recent Labs     09/13/22  0420 09/12/22  0843 09/12/22  0449    139 136   K 4.3 4.2 6.2*   * 110* 109*   CO2 19* 23 23   BUN 20 21* 23*   CREA 0.89 0.88 1.01   GLU 97 91 104*   CA 8.5 8.2* 8.9   MG  --   --  2.4       Recent Labs     09/12/22  0449   ALT 26      TBILI 0.3   TP 7.2   ALB 3.5   GLOB 3.7       Recent Labs     09/12/22  0812   INR 1.0   PTP 10.0        No results for input(s): FE, TIBC, PSAT, FERR in the last 72 hours.    Lab Results   Component Value Date/Time    Folate 27.1 (H) 08/22/2022 03:55 AM        No results for input(s): PH, PCO2, PO2 in the last 72 hours. No results for input(s): CPK, CKNDX, TROIQ in the last 72 hours.     No lab exists for component: CPKMB  Lab Results   Component Value Date/Time    Cholesterol, total 141 08/22/2022 03:55 AM    HDL Cholesterol 57 08/22/2022 03:55 AM    LDL, calculated 71.6 08/22/2022 03:55 AM    Triglyceride 62 08/22/2022 03:55 AM    CHOL/HDL Ratio 2.5 08/22/2022 03:55 AM     Lab Results   Component Value Date/Time    Glucose (POC) 145 (H) 02/14/2022 09:01 PM     No results found for: COLOR, APPRN, SPGRU, REFSG, FRANK, PROTU, GLUCU, KETU, BILU, UROU, BRITTANY, LEUKU, GLUKE, EPSU, BACTU, WBCU, RBCU, CASTS, UCRY      Medications Reviewed:     Current Facility-Administered Medications   Medication Dose Route Frequency    dextrose 5 % - 0.45% NaCl 1,000 mL with sodium bicarbonate (8.4%) 75 mEq infusion   IntraVENous CONTINUOUS    digoxin (LANOXIN) tablet 0.125 mg  0.125 mg Oral DAILY    metoprolol tartrate (LOPRESSOR) tablet 12.5 mg  12.5 mg Oral BID    pantoprazole (PROTONIX) tablet 40 mg  40 mg Oral ACB    polyethylene glycol (MIRALAX) packet 17 g  17 g Oral Q12H    sodium chloride (NS) flush 5-40 mL  5-40 mL IntraVENous Q8H    sodium chloride (NS) flush 5-40 mL  5-40 mL IntraVENous PRN    acetaminophen (TYLENOL) tablet 650 mg  650 mg Oral Q6H PRN    Or    acetaminophen (TYLENOL) suppository 650 mg  650 mg Rectal Q6H PRN    ondansetron (ZOFRAN ODT) tablet 4 mg  4 mg Oral Q8H PRN    Or    ondansetron (ZOFRAN) injection 4 mg  4 mg IntraVENous Q6H PRN    enoxaparin (LOVENOX) injection 30 mg  30 mg SubCUTAneous DAILY    butalbital-acetaminophen-caffeine (FIORICET, ESGIC) -40 mg per tablet 1 Tablet  1 Tablet Oral Q6H PRN    dicyclomine (BENTYL) capsule 10 mg  10 mg Oral QID     ______________________________________________________________________  EXPECTED LENGTH OF STAY: 3d 9h  ACTUAL LENGTH OF STAY:          2      Please note that this dictation was completed with Dragon, the computer voice recognition software. Quite often unanticipated grammatical, syntax, homophones, and other interpretive errors are inadvertently transcribed by the computer software. Please disregard these errors. Please excuse any errors that have escaped final proofreading.                 Yovani Pressley MD

## 2022-09-15 ENCOUNTER — APPOINTMENT (OUTPATIENT)
Dept: CT IMAGING | Age: 77
DRG: 436 | End: 2022-09-15
Attending: HOSPITALIST
Payer: MEDICARE

## 2022-09-15 ENCOUNTER — APPOINTMENT (OUTPATIENT)
Dept: ULTRASOUND IMAGING | Age: 77
DRG: 436 | End: 2022-09-15
Attending: HOSPITALIST
Payer: MEDICARE

## 2022-09-15 PROCEDURE — 2709999900 HC NON-CHARGEABLE SUPPLY

## 2022-09-15 PROCEDURE — 77030040395 HC NDL BIOP COAX INTRO MRTM -B

## 2022-09-15 PROCEDURE — 88307 TISSUE EXAM BY PATHOLOGIST: CPT

## 2022-09-15 PROCEDURE — 74011250637 HC RX REV CODE- 250/637: Performed by: PHYSICIAN ASSISTANT

## 2022-09-15 PROCEDURE — 65270000046 HC RM TELEMETRY

## 2022-09-15 PROCEDURE — 88342 IMHCHEM/IMCYTCHM 1ST ANTB: CPT

## 2022-09-15 PROCEDURE — 77030014007 HC SPNG HEMSTAT J&J -B

## 2022-09-15 PROCEDURE — 74011000250 HC RX REV CODE- 250

## 2022-09-15 PROCEDURE — 74011250636 HC RX REV CODE- 250/636: Performed by: STUDENT IN AN ORGANIZED HEALTH CARE EDUCATION/TRAINING PROGRAM

## 2022-09-15 PROCEDURE — 88341 IMHCHEM/IMCYTCHM EA ADD ANTB: CPT

## 2022-09-15 PROCEDURE — 77030014115

## 2022-09-15 PROCEDURE — 74011250637 HC RX REV CODE- 250/637: Performed by: HOSPITALIST

## 2022-09-15 PROCEDURE — 74011250637 HC RX REV CODE- 250/637: Performed by: INTERNAL MEDICINE

## 2022-09-15 PROCEDURE — 88333 PATH CONSLTJ SURG CYTO XM 1: CPT

## 2022-09-15 PROCEDURE — 99152 MOD SED SAME PHYS/QHP 5/>YRS: CPT

## 2022-09-15 PROCEDURE — 74011000250 HC RX REV CODE- 250: Performed by: HOSPITALIST

## 2022-09-15 PROCEDURE — 74011000250 HC RX REV CODE- 250: Performed by: PHYSICIAN ASSISTANT

## 2022-09-15 PROCEDURE — 74011250636 HC RX REV CODE- 250/636: Performed by: HOSPITALIST

## 2022-09-15 RX ORDER — FAMOTIDINE 20 MG/1
20 TABLET, FILM COATED ORAL
Status: DISCONTINUED | OUTPATIENT
Start: 2022-09-15 | End: 2022-09-16

## 2022-09-15 RX ORDER — ATROPINE SULFATE 0.1 MG/ML
INJECTION INTRAVENOUS
Status: DISCONTINUED
Start: 2022-09-15 | End: 2022-09-16 | Stop reason: WASHOUT

## 2022-09-15 RX ORDER — SODIUM CHLORIDE 9 MG/ML
25 INJECTION, SOLUTION INTRAVENOUS CONTINUOUS
Status: DISCONTINUED | OUTPATIENT
Start: 2022-09-15 | End: 2022-09-15 | Stop reason: ALTCHOICE

## 2022-09-15 RX ORDER — LIDOCAINE HYDROCHLORIDE 10 MG/ML
INJECTION INFILTRATION; PERINEURAL
Status: COMPLETED
Start: 2022-09-15 | End: 2022-09-15

## 2022-09-15 RX ORDER — SIMETHICONE 80 MG
80 TABLET,CHEWABLE ORAL
Status: DISCONTINUED | OUTPATIENT
Start: 2022-09-15 | End: 2022-09-16 | Stop reason: HOSPADM

## 2022-09-15 RX ORDER — MIDAZOLAM HYDROCHLORIDE 1 MG/ML
2 INJECTION, SOLUTION INTRAMUSCULAR; INTRAVENOUS
Status: DISCONTINUED | OUTPATIENT
Start: 2022-09-15 | End: 2022-09-15 | Stop reason: ALTCHOICE

## 2022-09-15 RX ORDER — SODIUM BICARBONATE 42 MG/ML
INJECTION, SOLUTION INTRAVENOUS
Status: DISPENSED
Start: 2022-09-15 | End: 2022-09-15

## 2022-09-15 RX ORDER — FENTANYL CITRATE 50 UG/ML
100 INJECTION, SOLUTION INTRAMUSCULAR; INTRAVENOUS
Status: DISCONTINUED | OUTPATIENT
Start: 2022-09-15 | End: 2022-09-15 | Stop reason: ALTCHOICE

## 2022-09-15 RX ORDER — ASPIRIN 81 MG/1
81 TABLET ORAL DAILY
Status: DISCONTINUED | OUTPATIENT
Start: 2022-09-16 | End: 2022-09-16 | Stop reason: HOSPADM

## 2022-09-15 RX ORDER — LIDOCAINE HYDROCHLORIDE 20 MG/ML
INJECTION, SOLUTION INFILTRATION; PERINEURAL
Status: COMPLETED
Start: 2022-09-15 | End: 2022-09-15

## 2022-09-15 RX ORDER — SODIUM BICARBONATE 42 MG/ML
1 INJECTION, SOLUTION INTRAVENOUS
Status: COMPLETED | OUTPATIENT
Start: 2022-09-15 | End: 2022-09-15

## 2022-09-15 RX ORDER — LIDOCAINE HYDROCHLORIDE 10 MG/ML
20 INJECTION INFILTRATION; PERINEURAL
Status: COMPLETED | OUTPATIENT
Start: 2022-09-15 | End: 2022-09-15

## 2022-09-15 RX ADMIN — METOPROLOL TARTRATE 12.5 MG: 25 TABLET, FILM COATED ORAL at 17:45

## 2022-09-15 RX ADMIN — Medication 10 ML: at 17:46

## 2022-09-15 RX ADMIN — ONDANSETRON 4 MG: 2 INJECTION INTRAMUSCULAR; INTRAVENOUS at 12:01

## 2022-09-15 RX ADMIN — FENTANYL CITRATE 25 MCG: 50 INJECTION, SOLUTION INTRAMUSCULAR; INTRAVENOUS at 11:20

## 2022-09-15 RX ADMIN — BUTALBITAL, ACETAMINOPHEN, AND CAFFEINE 1 TABLET: 50; 325; 40 TABLET ORAL at 22:02

## 2022-09-15 RX ADMIN — DICYCLOMINE HYDROCHLORIDE 10 MG: 10 CAPSULE ORAL at 17:45

## 2022-09-15 RX ADMIN — METOPROLOL TARTRATE 12.5 MG: 25 TABLET, FILM COATED ORAL at 06:14

## 2022-09-15 RX ADMIN — LIDOCAINE HYDROCHLORIDE 10 ML: 10 INJECTION INFILTRATION; PERINEURAL at 11:00

## 2022-09-15 RX ADMIN — DIGOXIN 0.12 MG: 125 TABLET ORAL at 12:59

## 2022-09-15 RX ADMIN — Medication 10 ML: at 06:14

## 2022-09-15 RX ADMIN — POLYETHYLENE GLYCOL 3350 17 G: 17 POWDER, FOR SOLUTION ORAL at 15:04

## 2022-09-15 RX ADMIN — POLYETHYLENE GLYCOL 3350 17 G: 17 POWDER, FOR SOLUTION ORAL at 22:02

## 2022-09-15 RX ADMIN — Medication 10 ML: at 22:03

## 2022-09-15 RX ADMIN — LIDOCAINE HYDROCHLORIDE 10 ML: 10 INJECTION, SOLUTION INFILTRATION; PERINEURAL at 11:00

## 2022-09-15 RX ADMIN — FENTANYL CITRATE 50 MCG: 50 INJECTION, SOLUTION INTRAMUSCULAR; INTRAVENOUS at 10:57

## 2022-09-15 RX ADMIN — Medication 80 MG: at 22:02

## 2022-09-15 RX ADMIN — DICYCLOMINE HYDROCHLORIDE 10 MG: 10 CAPSULE ORAL at 22:02

## 2022-09-15 RX ADMIN — SODIUM BICARBONATE 1 MG: 42 INJECTION, SOLUTION INTRAVENOUS at 11:00

## 2022-09-15 RX ADMIN — LIDOCAINE HYDROCHLORIDE 10 ML: 20 INJECTION, SOLUTION INFILTRATION; PERINEURAL at 09:00

## 2022-09-15 RX ADMIN — MIDAZOLAM HYDROCHLORIDE 1 MG: 1 INJECTION, SOLUTION INTRAMUSCULAR; INTRAVENOUS at 11:04

## 2022-09-15 RX ADMIN — Medication 80 MG: at 15:10

## 2022-09-15 RX ADMIN — FENTANYL CITRATE 50 MCG: 50 INJECTION, SOLUTION INTRAMUSCULAR; INTRAVENOUS at 11:04

## 2022-09-15 RX ADMIN — MIDAZOLAM HYDROCHLORIDE 1 MG: 1 INJECTION, SOLUTION INTRAMUSCULAR; INTRAVENOUS at 10:57

## 2022-09-15 RX ADMIN — SODIUM BICARBONATE: 84 INJECTION, SOLUTION INTRAVENOUS at 03:56

## 2022-09-15 RX ADMIN — BUTALBITAL, ACETAMINOPHEN, AND CAFFEINE 1 TABLET: 50; 325; 40 TABLET ORAL at 15:04

## 2022-09-15 RX ADMIN — DICYCLOMINE HYDROCHLORIDE 10 MG: 10 CAPSULE ORAL at 12:59

## 2022-09-15 RX ADMIN — PANTOPRAZOLE SODIUM 40 MG: 40 TABLET, DELAYED RELEASE ORAL at 06:14

## 2022-09-15 RX ADMIN — BUTALBITAL, ACETAMINOPHEN, AND CAFFEINE 1 TABLET: 50; 325; 40 TABLET ORAL at 06:14

## 2022-09-15 RX ADMIN — MIDAZOLAM HYDROCHLORIDE 0.5 MG: 1 INJECTION, SOLUTION INTRAMUSCULAR; INTRAVENOUS at 11:20

## 2022-09-15 NOTE — PROGRESS NOTES
Physician Progress Note      PATIENT:               Renee Garcia  CSN #:                  [id number]  :                       1945  ADMIT DATE:       2022 4:18 AM  DISCH DATE:  RESPONDING  PROVIDER #:        Gavino Meeks MD          QUERY TEXT:    Patient admitted with hyperkalemia and liver mets. The pt was noted to have a BMI of 16.5, and the Registered Dietician has noted severe malnutrition with a recommendation to order chocolate Ensure Enlive BID when diet advances. If possible, please document if you are evaluating and/or treating any of the following: The medical record reflects the following:    Risk Factors: 76 yo female with only top dentures and no bottom teeth admitted for hyperkalemia with PMH of COPD, adenoma of colon s/p colon resection 2022, GERD, hyponatremia    Clinical Indicators:    -- BMI = 16.5 from bed scale weight    -- Registered Dietician Malnutrition Assessment:  Malnutrition Status:  Severe malnutrition (22 1016)  Context:  Chronic illness  Findings of the 6 clinical characteristics of malnutrition:  Energy Intake:  No significant decrease in energy intake  Weight Loss:  No significant weight loss  Body Fat Loss:  Severe body fat loss, Orbital, Triceps  Muscle Mass Loss:  Severe muscle mass loss, Temples (temporalis), Clavicles (pectoralis &deltoids), Hand (interosseous)  Fluid Accumulation:  No significant fluid accumulation,   Strength:  Not performed    Treatment: Registered Dietician consult, weight measurement, recommendation to order nutritional supplement BID when diet advances    Thank-you,  Berna Perez RN, [facility]  Clinical   Bard [email or web address]. [email or web address]  [phone]  You can also contact me via Reba Fwoler. ASPEN Criteria:  https://[email or web address] [email or web address] [email or web address] com/doi/full/10.1177/3784732574392343  Options provided:  -- Severe protein calorie malnutrition  -- Other malnutrition, please specify, Please specify type of malnutrition. -- Underweight with BMI 16.5  -- Other - I will add my own diagnosis  -- Disagree - Not applicable / Not valid  -- Disagree - Clinically unable to determine / Unknown  -- Refer to Clinical Documentation Reviewer    PROVIDER RESPONSE TEXT:    Provider is clinically unable to determine a response to this query.   pending labs, ordered prealbumin    Query created by: Radha Daigle on 9/14/2022 4:42 PM      Electronically signed by:  Ellen Fernandes MD 9/15/2022 6:45 PM

## 2022-09-15 NOTE — PROGRESS NOTES
Pt arrives on stretcher to angio department for liver biopsy procedure. All assessments completed and consent was reviewed. Education given was regarding procedure, moderate sedation, post-procedure care and  management/follow-up. Opportunity for questions was provided and all questions and concerns were addressed.

## 2022-09-15 NOTE — PROGRESS NOTES
Approximately 1200. Pt hr dropped to 40's, and pt feeling nauseous. Provider called to bedside and pt given zofran.  See Mar.

## 2022-09-15 NOTE — PROGRESS NOTES
Bedside shift change report given to [facility] RN (oncoming nurse) by Melba Louis RN (offgoing nurse). Report included the following information SBAR, Kardex, Intake/Output, MAR, and Recent Results.

## 2022-09-15 NOTE — PROGRESS NOTES
Bedside and Verbal shift change report given to Long Morfin (oncoming nurse) by Olga Trevino (offgoing nurse). Report included the following information SBAR, Kardex, Procedure Summary, Intake/Output, MAR, and Recent Results.

## 2022-09-15 NOTE — H&P
INTERVENTIONAL RADIOLOGY  Preoperative History and Physical      Patient:  Dex Martinez  :  1945  Age:  77 y.o. MRN:  [id number]  Today's Date:  9/15/2022      CC / HPI   Dex Martinez is a 77 y.o. female with a history of liver metastasis without known primarymalignancy who presents for US guided liver mass biopsy. PAST MEDICAL HISTORY  Past Medical History:   Diagnosis Date    Adenoma of colon     Anemia     Arthritis     Atrial fibrillation (HCC)     Bloating     Chronic constipation     Chronic pain     back     COPD (chronic obstructive pulmonary disease) (HCC)     GERD (gastroesophageal reflux disease)     [place] (hard of hearing)     Hyponatremia     caused seizures x 2 per pt    Indigestion     Osteoporosis     Seizures ([address] 2020, 9/2020    x 2        PAST SURGICAL HISTORY  Past Surgical History:   Procedure Laterality Date    COLONOSCOPY N/A 2021    COLONOSCOPY   :- performed by Jas Carnes MD at [place] [address] N/A 2022    .  performed by Yanni Dobson MD at [facility] MAIN OR    HX [address]    HX CHOLECYSTECTOMY      HX HIP REPLACEMENT Left     pt stated hip was pinned, not replaced    HX HIP REPLACEMENT Right     pt stated hip was pinned, not replaced    HX HYSTERECTOMY      HX ORTHOPAEDIC  ,     bilateral hip fractures     HX ORTHOPAEDIC Left 2020    femur fracture    HX TONSILLECTOMY         SOCIAL HISTORY  Social History     Socioeconomic History    Marital status: SINGLE     Spouse name: Not on file    Number of children: Not on file    Years of education: Not on file    Highest education level: Not on file   Occupational History    Not on file   Tobacco Use    Smoking status: Former     Packs/day: 1.00     Years: 20.00     Pack years: 20.00     Types: Cigarettes     Quit date: 2011     Years since quitting: 10.7    Smokeless tobacco: Never   Vaping Use    Vaping Use: Never used   Substance and Sexual Activity    Alcohol use: Never    Drug use: Never    Sexual activity: Not Currently   Other Topics Concern    Not on file   Social History Narrative    Not on file     Social Determinants of Health     Financial Resource Strain: Not on file   Food Insecurity: Not on file   Transportation Needs: Not on file   Physical Activity: Not on file   Stress: Not on file   Social Connections: Not on file   Intimate Partner Violence: Not on file   Housing Stability: Not on file       FAMILY HISTORY  Family History   Problem Relation Age of Onset    Heart Disease Mother     Heart Disease Father     Liver Disease Father     Alcohol abuse Father     Anesth Problems Neg Hx        CURRENT MEDICATIONS  Current Facility-Administered Medications   Medication Dose Route Frequency Provider Last Rate Last Admin    sodium bicarbonate (4.2%) 4.2 % injection             lidocaine (XYLOCAINE) 20 mg/mL (2 %) injection             midazolam (VERSED) injection 2 mg  2 mg IntraVENous Rad Multiple Blake Irene MD        fentaNYL citrate (PF) injection 100 mcg  100 mcg IntraVENous Multiple Ernesto Gallegos MD        0.9% sodium chloride infusion  25 mL/hr IntraVENous CONTINUOUS Ernesto Gallegos MD        lidocaine (XYLOCAINE) 10 mg/mL (1 %) injection 2 mL  20 mg SubCUTAneous RAD ONCE FosterHodan  PA        sodium bicarbonate (4.2%) injection 42 mg  1 mL SubCUTAneous RAD ONCE Hodan Rodriguez  PA        digoxin (LANOXIN) tablet 0.125 mg  0.125 mg Oral DAILY Jah Diallo MD   0.125 mg at 09/14/22 0839    metoprolol tartrate (LOPRESSOR) tablet 12.5 mg  12.5 mg Oral BID Murali RIVERA MD   12.5 mg at 09/15/22 0614    pantoprazole (PROTONIX) tablet 40 mg  40 mg Oral ACB Jah Diallo MD   40 mg at 09/15/22 0614    polyethylene glycol (MIRALAX) packet 17 g  17 g Oral Q12H RUSLAN Mayorga   17 g at 09/13/22 2100    sodium chloride (NS) flush 5-40 mL  5-40 mL IntraVENous Q8H Jah Diallo MD   10 mL at 09/15/22 0614    sodium chloride (NS) flush 5-40 mL  5-40 mL IntraVENous PRN Azul Raymundo MD        acetaminophen (TYLENOL) tablet 650 mg  650 mg Oral Q6H PRN Azul Raymundo MD        Or    acetaminophen (TYLENOL) suppository 650 mg  650 mg Rectal Q6H PRN Azul Raymundo MD        ondansetron (ZOFRAN ODT) tablet 4 mg  4 mg Oral Q8H PRN Azul Raymundo MD        Or    ondansetron (ZOFRAN) injection 4 mg  4 mg IntraVENous Q6H PRN Azul Raymundo MD        enoxaparin (LOVENOX) injection 30 mg  30 mg SubCUTAneous DAILY Azul Raymundo MD        butalbital-acetaminophen-caffeine (FIORICET, ESGIC) -40 mg per tablet 1 Tablet  1 Tablet Oral Q6H PRN Azul Raymundo MD   1 Tablet at 09/15/22 1487    dicyclomine (BENTYL) capsule 10 mg  10 mg Oral QID Azul Raymundo MD   10 mg at 09/14/22 2147       ALLERGIES  Allergies   Allergen Reactions    Cefuroxime Hives    Hydrocodone Nausea and Vomiting    Oxycodone Nausea and Vomiting    Penicillins Hives     Patient screened for any delayed non-IgE-mediated reaction to PCN. Patient notes the following:    No delayed non-IgE-mediated reaction to PCN    Hives 1969             DIAGNOSTIC STUDIES   IMAGING STUDIES  Relevant Imaging studies reviewed  CT Results (most recent):  Results from Hospital Encounter encounter on 09/12/22    CT ABD PELV W CONT    Narrative  EXAM:  CT ABD PELV W CONT    INDICATION: Abdominal pain and diarrhea. COMPARISON: Lumbar spine radiographs 7/7/2022. TECHNIQUE: Helical CT of the abdomen  and pelvis  following the uneventful  intravenous administration of nonionic contrast.  Coronal and sagittal reformats  are performed. CT dose reduction was achieved through use of a standardized  protocol tailored for this examination and automatic exposure control for dose  modulation. Adaptive statistical iterative reconstruction (ASIR) was utilized. FINDINGS:  The visualized lung bases demonstrate no mass or consolidation.  Scattered  calcified pleural plaques are noted. The heart size is normal. There is no  pericardial or pleural effusion. There are multiple low-density liver lesions measuring up to 1.9 cm. The spleen,  pancreas, and adrenal glands are normal.    The gall bladder is surgically absent without biliary duct dilatation including  the common bile duct measuring up to 9 mm in diameter. The kidneys are symmetric without hydronephrosis. There are bilateral kidney  cysts measuring up to 1.3 cm on the right upper for which no imaging follow-up  is recommended. There is a large amount of rectal stool. There are no dilated bowel loops. The  appendix is normal.    A heterogeneous lymph node in the anterior upper abdomen measures 1.9 x 1.0 cm  (series 3, image 36). There is no free fluid or free air. The aorta tapers  without aneurysm. There is aortoiliac atherosclerosis. The urinary bladder is normal.  There is no pelvic mass. The uterus is  surgically absent. Bilateral femoral intramedullary hardware is present. There are degenerative  changes in the spine with shaped curvature. There is chronic compression  fracture at T11. Impression  1. Large amount of colonic stool. No bowel obstruction. 2. Liver metastases and metastatic lymph node in the upper abdomen. No primary  malignancy is identified. 3. Biliary duct dilatation may be at least in part postsurgical following  cholecystectomy. Consider further evaluation with MRCP without and with contrast  if there are clinical signs or symptoms referrable to the biliary system.       LABS  Lab Results   Component Value Date/Time    WBC 8.6 09/14/2022 08:48 AM    HGB 8.8 (L) 09/14/2022 08:48 AM    HCT 27.7 (L) 09/14/2022 08:48 AM    PLATELET 023 (H) 93/19/4390 08:48 AM    MCV 84.2 09/14/2022 08:48 AM     Lab Results   Component Value Date/Time    Sodium 141 09/13/2022 04:20 AM    Potassium 4.3 09/13/2022 04:20 AM    Chloride 112 (H) 09/13/2022 04:20 AM    CO2 19 (L) 09/13/2022 04:20 AM    Anion gap 10 09/13/2022 04:20 AM    Glucose 97 09/13/2022 04:20 AM    BUN 20 09/13/2022 04:20 AM    Creatinine 0.89 09/13/2022 04:20 AM    BUN/Creatinine ratio 22 (H) 09/13/2022 04:20 AM    GFR est AA >60 09/13/2022 04:20 AM    GFR est non-AA >60 09/13/2022 04:20 AM    Calcium 8.5 09/13/2022 04:20 AM     Lab Results   Component Value Date/Time    INR 1.0 09/12/2022 08:12 AM    Prothrombin time 10.0 09/12/2022 08:12 AM       PHYSICAL EXAM   /63 (BP 1 Location: Right upper arm, BP Patient Position: At rest;Supine)   Pulse 68   Temp 98.8 °F (37.1 °C)   Resp 21   Ht 5' 3\" (1.6 m)   Wt 41.7 kg (92 lb)   SpO2 99%   BMI 16.30 kg/m²   General:  NAD  Heart:  RRR  Lungs:  NWOB  Neurological:  AAOX3    PLAN   Procedure to be performed:  US guided liver mass biopsy  Plan for sedation:  moderate  Post procedure plan:  observation per protocol  Informed consent:  risks, benefits, and alternatives reviewed with the patient / family who agree to proceed  Code status:  Partial Code      Raquel, [facility] [facility] Radiology, [place].

## 2022-09-15 NOTE — PROGRESS NOTES
TRANSFER - OUT REPORT:    Verbal report given to Reed Khoury RN on [employer]  being transferred to [phone] for routine post - op       Report consisted of patients Situation, Background, Assessment and   Recommendations(SBAR). Information from the following report(s) SBAR was reviewed with the receiving nurse. Lines:   Peripheral IV 09/15/22 Anterior;Distal;Left Forearm (Active)   Site Assessment Clean, dry, & intact 09/15/22 1002   Phlebitis Assessment 0 09/15/22 1002   Infiltration Assessment 0 09/15/22 1002   Dressing Status Clean, dry, & intact 09/15/22 1002   Dressing Type Transparent 09/15/22 1002   Hub Color/Line Status Flushed;Yellow 09/15/22 1002        Opportunity for questions and clarification was provided.       Patient transported with hospital transportation

## 2022-09-15 NOTE — PROGRESS NOTES
Transition of Care: SNF-  [facility] (1st choice) has accepted     When accepted at SNF- no insurance authorization is needed     Transport Plan: likely BLS (not set up yet)      RUR: 25%     Main contact is son in law- Jeremy Varela- [phone] (he is currently out of the country in [place]- will return on 9/23)      DX: hyperkalemia     Discharge pending:  -pending biopsy (to be done on 9/14)  -pending PT/OT consults and progress  -pending medical progress and other care recommendations    1030: this CM called Imani at [facility] to check on bed availability for Friday 9/16; no answer; left VM for callback     NOTE: pleasant patient at bedside; she is alert and oriented x 4; patient lives at stated address currently alone; her son (who normally lives with her is currently in [facility] in [place] for at least a year; her sons spouse- Jeremy Varela lives with patient but is currently in [place] until 9/23/22; patient does not drive; patient owns a scooter, rollator, WC, shower chair, uses hearing aids, top dentures; patient needs assistance with some ADLs; patient at [facility] from 8/21/22-8/25/22; returned home with home health with [facility]; patient unable to followup with her PCP- Pedro Richards NP (in [place]) because she did not have transportation; patient has been to [address] [place] [address] in the past and would like to go back there at discharge time; patient confirms insurance, pcp and preferred pharmacy is the [facility] [address] following  Becki Acosta RN        Revision History

## 2022-09-15 NOTE — PROGRESS NOTES
[facility] Adult  Hospitalist Group                                                                                          Hospitalist Progress Note  Linsey Workman MD  Answering service: [phone] from in house phone        Date of Service:  9/15/2022  NAME:  Fransisca Cassidy  :  1945  MRN:  [id number]      Admission Summary: Amanda Castellanos is a 77 y.o. female who presents with abdominal pain and diarrhea. Patient said that last few days she is constantly wiping her bottom because she cannot stop passing stool but at the same time she says his stool is pastelike and continuously coming out with cramping in the lower abdomen radiating towards thigh. Patient denies any fever chills or any other issues denies any seeing blood with the stools. CT abdomen of the patient was done in the ED and suggestive of hepatic mets of unknown primary patient was admitted for further management. Patient was recently discharged from [facility] last month after was being admitted for anemia and weakness. \"    Interval history / Subjective:       S/p biopsy, back on floor, she has no complaints other than looking for her phone  that she thinks was left at the other room before she was moved,---tech is assisting patient with this.      Assessment & Plan:     Abdominal pain with large amount of colonic stool  No bowel obstruction  Liver mets with metastatic lymph node in upper abdomen  Dilated CBD  -s/p liver biopsy  --Possible malignancy probably colonic origin will need a colonoscopy eventually    Leukocytosis/ low BP no fever, resolved on its own  -no fever  -monitor    Hx of afib  -rate controlled  -resume digoxin and metoprolol  -was on home baby aspirin     Code status: Partial no intubation CPR OK  DVT prophylaxis: lovenox    Care Plan discussed with: Patient/Family and Nurse  Anticipated Disposition: Home w/Family  Anticipated Discharge: 24 hours to 48 hours     Hospital Problems  Date Reviewed: 6/29/2022            Codes Class Noted POA    * (Principal) Hyperkalemia ICD-10-CM: E87.5  ICD-9-CM: 276.7  9/12/2022 Unknown         Review of Systems:   A comprehensive review of systems was negative. Vital Signs:    Last 24hrs VS reviewed since prior progress note.  Most recent are:  Visit Vitals  /74   Pulse 78   Temp 98.9 °F (37.2 °C)   Resp 18   Ht 5' 3\" (1.6 m)   Wt 41.7 kg (92 lb)   SpO2 93%   BMI 16.30 kg/m²         Intake/Output Summary (Last 24 hours) at 9/15/2022 1710  Last data filed at 9/15/2022 0810  Gross per 24 hour   Intake 1200 ml   Output --   Net 1200 ml          Physical Examination:     I had a face to face encounter with this patient and independently examined them on 9/15/2022 as outlined below:          General : alert x 3, awake, no acute distress   HEENT: PEERL, EOMI, moist mucus membrane,   Neck: supple, no JVD, neck supple  Chest: Clear to auscultation bilaterally, no wheezing  Back: kyphosis, nontender  CVS: S1 S2 heard, no rubs  Abd: soft/ Non tender, non distended, BS present  Ext: no clubbing, no cyanosis, no edema  Neuro: awake, moves all extremties, no focal deficits  Skin: warm, no cyanosis  Psych: not agitated, not anxious, does not voice si/hi/hallucinations    Data Review:    I personally reviewed  Image and labs      Labs:     Recent Labs     09/14/22  0848 09/13/22 0420   WBC 8.6 15.2*   HGB 8.8* 9.4*   HCT 27.7* 29.4*   * 423*       Recent Labs     09/13/22  0420      K 4.3   *   CO2 19*   BUN 20   CREA 0.89   GLU 97   CA 8.5         Lab Results   Component Value Date/Time    Cholesterol, total 141 08/22/2022 03:55 AM    HDL Cholesterol 57 08/22/2022 03:55 AM    LDL, calculated 71.6 08/22/2022 03:55 AM    Triglyceride 62 08/22/2022 03:55 AM    CHOL/HDL Ratio 2.5 08/22/2022 03:55 AM     Lab Results   Component Value Date/Time    Glucose (POC) 145 (H) 02/14/2022 09:01 PM         Medications Reviewed:     Current Facility-Administered Medications   Medication Dose Route Frequency    sodium bicarbonate (4.2%) 4.2 % injection        atropine 0.1 mg/mL injection        simethicone (MYLICON) tablet 80 mg  80 mg Oral QID PRN    famotidine (PEPCID) tablet 20 mg  20 mg Oral BID PRN    digoxin (LANOXIN) tablet 0.125 mg  0.125 mg Oral DAILY    metoprolol tartrate (LOPRESSOR) tablet 12.5 mg  12.5 mg Oral BID    pantoprazole (PROTONIX) tablet 40 mg  40 mg Oral ACB    polyethylene glycol (MIRALAX) packet 17 g  17 g Oral Q12H    sodium chloride (NS) flush 5-40 mL  5-40 mL IntraVENous Q8H    sodium chloride (NS) flush 5-40 mL  5-40 mL IntraVENous PRN    acetaminophen (TYLENOL) tablet 650 mg  650 mg Oral Q6H PRN    Or    acetaminophen (TYLENOL) suppository 650 mg  650 mg Rectal Q6H PRN    ondansetron (ZOFRAN ODT) tablet 4 mg  4 mg Oral Q8H PRN    Or    ondansetron (ZOFRAN) injection 4 mg  4 mg IntraVENous Q6H PRN    enoxaparin (LOVENOX) injection 30 mg  30 mg SubCUTAneous DAILY    butalbital-acetaminophen-caffeine (FIORICET, ESGIC) -40 mg per tablet 1 Tablet  1 Tablet Oral Q6H PRN    dicyclomine (BENTYL) capsule 10 mg  10 mg Oral QID     ______________________________________________________________________  EXPECTED LENGTH OF STAY: 3d 9h  ACTUAL LENGTH OF STAY:          3       Nichelle Aguilera MD

## 2022-09-16 VITALS
RESPIRATION RATE: 16 BRPM | HEART RATE: 78 BPM | TEMPERATURE: 98.4 F | OXYGEN SATURATION: 97 % | WEIGHT: 92 LBS | BODY MASS INDEX: 16.3 KG/M2 | SYSTOLIC BLOOD PRESSURE: 105 MMHG | HEIGHT: 63 IN | DIASTOLIC BLOOD PRESSURE: 64 MMHG

## 2022-09-16 PROBLEM — E87.5 HYPERKALEMIA: Status: RESOLVED | Noted: 2022-09-12 | Resolved: 2022-09-16

## 2022-09-16 PROCEDURE — 74011250637 HC RX REV CODE- 250/637: Performed by: INTERNAL MEDICINE

## 2022-09-16 PROCEDURE — 94760 N-INVAS EAR/PLS OXIMETRY 1: CPT

## 2022-09-16 PROCEDURE — 74011250637 HC RX REV CODE- 250/637: Performed by: PHYSICIAN ASSISTANT

## 2022-09-16 PROCEDURE — 74011000250 HC RX REV CODE- 250: Performed by: HOSPITALIST

## 2022-09-16 PROCEDURE — 74011250636 HC RX REV CODE- 250/636: Performed by: HOSPITALIST

## 2022-09-16 PROCEDURE — 74011250637 HC RX REV CODE- 250/637: Performed by: HOSPITALIST

## 2022-09-16 PROCEDURE — 74011250637 HC RX REV CODE- 250/637: Performed by: NURSE PRACTITIONER

## 2022-09-16 RX ORDER — BALSAM PERU/CASTOR OIL
OINTMENT (GRAM) TOPICAL EVERY 12 HOURS
Status: DISCONTINUED | OUTPATIENT
Start: 2022-09-16 | End: 2022-09-16 | Stop reason: HOSPADM

## 2022-09-16 RX ORDER — SIMETHICONE 80 MG
80 TABLET,CHEWABLE ORAL
Qty: 2 TABLET | Refills: 0 | Status: SHIPPED
Start: 2022-09-16

## 2022-09-16 RX ORDER — BUTALBITAL, ACETAMINOPHEN AND CAFFEINE 50; 325; 40 MG/1; MG/1; MG/1
2 TABLET ORAL
Qty: 6 TABLET | Refills: 0 | Status: SHIPPED | OUTPATIENT
Start: 2022-09-16

## 2022-09-16 RX ORDER — LUBIPROSTONE 24 UG/1
24 CAPSULE, GELATIN COATED ORAL 2 TIMES DAILY WITH MEALS
Qty: 10 CAPSULE | Refills: 0 | Status: SHIPPED | OUTPATIENT
Start: 2022-09-16 | End: 2022-10-20

## 2022-09-16 RX ORDER — ONDANSETRON 4 MG/1
4 TABLET, ORALLY DISINTEGRATING ORAL
Qty: 1 TABLET | Refills: 0 | Status: SHIPPED
Start: 2022-09-16

## 2022-09-16 RX ORDER — FAMOTIDINE 20 MG/1
20 TABLET, FILM COATED ORAL DAILY PRN
Status: DISCONTINUED | OUTPATIENT
Start: 2022-09-16 | End: 2022-09-16 | Stop reason: HOSPADM

## 2022-09-16 RX ORDER — LUBIPROSTONE 24 UG/1
24 CAPSULE, GELATIN COATED ORAL 2 TIMES DAILY WITH MEALS
Status: DISCONTINUED | OUTPATIENT
Start: 2022-09-16 | End: 2022-09-16 | Stop reason: HOSPADM

## 2022-09-16 RX ORDER — POLYETHYLENE GLYCOL 3350 17 G/17G
17 POWDER, FOR SOLUTION ORAL EVERY 12 HOURS
Qty: 1 EACH | Refills: 0 | Status: SHIPPED
Start: 2022-09-16

## 2022-09-16 RX ORDER — DICYCLOMINE HYDROCHLORIDE 10 MG/1
10 CAPSULE ORAL 4 TIMES DAILY
Qty: 1 CAPSULE | Refills: 0 | Status: SHIPPED
Start: 2022-09-16

## 2022-09-16 RX ORDER — BUTALBITAL, ACETAMINOPHEN AND CAFFEINE 50; 325; 40 MG/1; MG/1; MG/1
2 TABLET ORAL
Status: DISCONTINUED | OUTPATIENT
Start: 2022-09-16 | End: 2022-09-16 | Stop reason: HOSPADM

## 2022-09-16 RX ADMIN — POLYETHYLENE GLYCOL 3350 17 G: 17 POWDER, FOR SOLUTION ORAL at 08:37

## 2022-09-16 RX ADMIN — METOPROLOL TARTRATE 12.5 MG: 25 TABLET, FILM COATED ORAL at 05:51

## 2022-09-16 RX ADMIN — DIGOXIN 0.12 MG: 125 TABLET ORAL at 08:37

## 2022-09-16 RX ADMIN — BUTALBITAL, ACETAMINOPHEN, AND CAFFEINE 1 TABLET: 50; 325; 40 TABLET ORAL at 06:18

## 2022-09-16 RX ADMIN — Medication 80 MG: at 12:14

## 2022-09-16 RX ADMIN — Medication 10 ML: at 17:00

## 2022-09-16 RX ADMIN — DICYCLOMINE HYDROCHLORIDE 10 MG: 10 CAPSULE ORAL at 17:00

## 2022-09-16 RX ADMIN — DICYCLOMINE HYDROCHLORIDE 10 MG: 10 CAPSULE ORAL at 08:37

## 2022-09-16 RX ADMIN — ASPIRIN 81 MG: 81 TABLET, COATED ORAL at 08:37

## 2022-09-16 RX ADMIN — Medication: at 17:00

## 2022-09-16 RX ADMIN — BUTALBITAL, ACETAMINOPHEN, AND CAFFEINE 2 TABLET: 50; 325; 40 TABLET ORAL at 12:14

## 2022-09-16 RX ADMIN — Medication 80 MG: at 05:50

## 2022-09-16 RX ADMIN — LUBIPROSTONE 24 MCG: 24 CAPSULE, GELATIN COATED ORAL at 12:14

## 2022-09-16 RX ADMIN — ACETAMINOPHEN 650 MG: 325 TABLET ORAL at 05:50

## 2022-09-16 RX ADMIN — PANTOPRAZOLE SODIUM 40 MG: 40 TABLET, DELAYED RELEASE ORAL at 06:14

## 2022-09-16 RX ADMIN — Medication 10 ML: at 06:14

## 2022-09-16 RX ADMIN — DICYCLOMINE HYDROCHLORIDE 10 MG: 10 CAPSULE ORAL at 12:15

## 2022-09-16 NOTE — PROGRESS NOTES
Transition of Care: SNF-  [facility] (1st choice) has accepted and bed is available on 9/16; bed#39A report is [phone]    Imani at [address] [place] [address]- [phone]     no insurance authorization is needed     Transport Plan: [facility] is scheduled for 5pm     RUR: 23%     Main contact is son in law- Caro Hoskins- [phone] ([place] [facility] [facility]- will return on 9/23)      DX: hyperkalemia          0913: this CM called Imani at [address] [place] [address] to check on bed availability for Friday 9/16; there is a bed available on Friday 9/16 if patient is medically ready and there will likely be a bed ready for her over the weekend    1500: this CM noted discharge order; this CM called Imani at [address] [place] [address]; they have a bed ready today; no insurance auth is required; this CM updated patient at bedside on discharge to [address] [place] [address] today; she verbalized understanding    Medicare pt has received, reviewed, and signed 2nd IM letter informing them of their right to appeal the discharge. Signed copy has been placed on pt bedside chart. Transition of Care Plan to SNF/Rehab    SNF/Rehab Transition:  Patient has been accepted to [address] [place] [address] and meets criteria for admission. Patient will transported by [facility] and expected to leave at 5pm.    Communication to Patient/Family:  Met with patient and she is agreeable to the transition plan. Communication to SNF/Rehab:  Bedside RN, Saúl Shearer, has been notified to update the transition plan to the facility and call report (phone number [phone]--). Discharge information has been updated on the AVS.         ursing Please include all hard scripts for controlled substances, med rec and dc summary, and AVS in packet.      Reviewed and confirmed with facility, [address] [place] [address] can manage the patient care needs for the following:     SNF/Rehab Transition:  Patient to follow-up with Home Health:   PCP/Specialist: liver, GI, PCP    Reviewed and confirmed with facility, [facility] they can manage the patient care needs for the following:     Conor Mean with (X) only those applicable:    Medication:  [x]  Medications will be available at the facility  []  IV Antibiotics   []  Controlled Substance - hard copy to be sent with patient   []  Weekly Labs   Documents:  [] Hard RX  [x] MAR  [x] Kardex  [x] AVS  []Transfer Summary  [x]Discharge   Equipment:  []  CPAP/BiPAP  []  Wound Vacuum  []  Kendrick or Urinary Device  []  PICC/Central Line  []  Nebulizer  []  Ventilator   Treatment:  []Isolation (for MRSA, VRE, etc.)  []Surgical Drain Management  []Tracheostomy Care  []Dressing Changes  []Dialysis with transportation and chair time   []PEG Care  []Oxygen  []Daily Weights for Heart Failure   Dietary:  []Any diet limitations  []Tube Feedings   []Total Parenteral Management (TPN)   Eligible for Medicaid Long Term Services and Supports  Yes:  [] Eligible for medical assistance or will become eligible within 180 days and UAI completed. [] Provider/Patient and/or support system has requested screening. [] UAI copy provided to patient or responsible party,   [] UAI unavailable at discharge will send once processed to SNF provider. [] UAI unavailable at discharged mailed to patient  No:   [x] Private pay and is not financially eligible for Medicaid within the next 180 days. [] Reside out-of-state.   [] A residents of a state owned/operated facility that is licensed  by [address] and [employer] or [facility]  [] Enrollment in [facility] hospice services  [] [address] [place] [address] [facility]  [] Patient /Family declines to have screening completed or provide financial information for screening     Financial Resources:  Medicaid    [] Initiated and application pending   [] Full coverage     Advanced Care Plan:  []Surrogate Decision Maker of Care  []POA  []Communicated Code Status  (DDNR\", \"Full\")    Other         NOTE: pleasant patient at bedside; she is alert and oriented x 4; patient lives at stated address currently alone; her son (who normally lives with her is currently in long term in [place] for at least a year; her sons spouse- Ernesto Roberts lives with patient but is currently in [facility] until 9/23/22; patient does not drive; patient owns a scooter, rollator, WC, shower chair, uses hearing aids, top dentures; patient needs assistance with some ADLs; patient at [facility] from 8/21/22-8/25/22; returned home with home health with [facility]; patient unable to followup with her PCP- Daniella Oliveira NP (in [place]) because she did not have transportation; patient has been to [address] [facility] in the past and would like to go back there at discharge time; patient confirms insurance, pcp and preferred pharmacy is the [facility] following  Larissa Richter RN         Revision History

## 2022-09-16 NOTE — DISCHARGE SUMMARY
Discharge Summary       PATIENT ID: Holly Teran  MRN: [id number]   YOB: 1945    DATE OF ADMISSION: 9/12/2022  4:18 AM    DATE OF DISCHARGE: 9/16/2022   PRIMARY CARE PROVIDER: Jr Conti NP     ATTENDING PHYSICIAN:    DISCHARGING PROVIDER: Louise Ramirez MD    To contact this individual call [phone] and ask the  to page. If unavailable ask to be transferred the Adult Hospitalist Department. CONSULTATIONS: IP CONSULT TO GASTROENTEROLOGY  IP CONSULT TO PALLIATIVE CARE - PROVIDER  IP CONSULT TO GASTROENTEROLOGY    PROCEDURES/SURGERIES: * No surgery found *    DISCHARGE DIAGNOSES:   ADMISSION SUMMARY AND HOSPITAL COURSE:     DISCHARGE DIAGNOSES / PLAN:      Admission Summary: Angel Nolan is a 77 y.o. female who presents with abdominal pain and diarrhea. Patient said that last few days she is constantly wiping her bottom because she cannot stop passing stool but at the same time she says his stool is pastelike and continuously coming out with cramping in the lower abdomen radiating towards thigh. Patient denies any fever chills or any other issues denies any seeing blood with the stools. CT abdomen of the patient was done in the ED and suggestive of hepatic mets of unknown primary patient was admitted for further management. Patient was recently discharged from [facility] last month after was being admitted for anemia and weakness. \"     Interval history / Subjective:       Pt states she had a large polyp removed last Dec 2021 and supposedly suppose to get another one in a year from then per verbal report---pending path of liver biopsy      Assessment & Plan:      Abdominal pain with large amount of colonic stool  No bowel obstruction  Liver mets with metastatic lymph node in upper abdomen  Dilated CBD  -9/14 palliative care consult ordered by prior hospitalist   -s/p liver biopsy  --Possible malignancy probably colonic origin will need a colonoscopy eventually  -9/16 GI consulted who cleared for discharge with recommended meds and need outpatient followup for future colonscopy and liver biopsy results     Leukocytosis/ low BP no fever, resolved on its own  -no fever  -monitor     Hx of afib  -rate controlled  -resume digoxin and metoprolol  -was on home baby aspirin    Hx of headaches/chronic pain  -pt has been using fioricet prior to admission     Code status: Partial no intubation CPR OK         NOTIFY YOUR PHYSICIAN FOR ANY OF THE FOLLOWING:   Fever over 101 degrees for 24 hours. Chest pain, shortness of breath, fever, chills, nausea, vomiting, diarrhea, change in mentation, falling, weakness, bleeding. Severe pain or pain not relieved by medications, as well as any other signs or symptoms that you may have questions about. FOLLOW UP APPOINTMENTS:    Follow-up Information       Follow up With Specialties Details Why Contact Info    [address]) [address]    Jas Carnes MD Gastroenterology Call Call to schedule followup visit (including results of liver biopsy and future colonoscopy) [address] [place] [address] Road  [phone]                 DIET: Regular Diet, easy chew    ACTIVITY: Activity as tolerated      DISCHARGE MEDICATIONS:  Current Discharge Medication List        START taking these medications    Details   dicyclomine (BENTYL) 10 mg capsule Take 1 Capsule by mouth four (4) times daily. Qty: 1 Capsule, Refills: 0  Start date: 9/16/2022      ondansetron (ZOFRAN ODT) 4 mg disintegrating tablet Take 1 Tablet by mouth every eight (8) hours as needed for Nausea or Vomiting. Qty: 1 Tablet, Refills: 0  Start date: 9/16/2022      polyethylene glycol (MIRALAX) 17 gram packet Take 1 Packet by mouth every twelve (12) hours.   Qty: 1 Each, Refills: 0  Start date: 9/16/2022      simethicone (MYLICON) 80 mg chewable tablet Take 1 Tablet by mouth four (4) times daily as needed for GI Pain. Qty: 2 Tablet, Refills: 0  Start date: 9/16/2022      lubiPROStone (AMITIZA) 24 mcg capsule Take 1 Capsule by mouth two (2) times daily (with meals) for 30 days. Recommended by GI service  Qty: 10 Capsule, Refills: 0  Start date: 9/16/2022, End date: 10/16/2022           CONTINUE these medications which have CHANGED    Details   butalbital-acetaminophen-caffeine (FIORICET, ESGIC) -40 mg per tablet Take 2 Tablets by mouth two (2) times daily as needed for Headache or Migraine. Qty: 6 Tablet, Refills: 0  Start date: 9/16/2022           CONTINUE these medications which have NOT CHANGED    Details   digoxin (LANOXIN) 0.125 mg tablet Take 0.125 mg by mouth daily. metoprolol tartrate (LOPRESSOR) 25 mg tablet Take 12.5 mg by mouth two (2) times a day. cyclobenzaprine (FLEXERIL) 10 mg tablet Take 10 mg by mouth three (3) times daily as needed. aspirin 81 mg chewable tablet Take 81 mg by mouth daily. multivitamin (ONE A DAY) tablet Take 1 Tablet by mouth daily. acetaminophen (TYLENOL) 325 mg tablet Take 650 mg by mouth every four (4) hours as needed for Pain. albuterol (PROVENTIL HFA, VENTOLIN HFA, PROAIR HFA) 90 mcg/actuation inhaler Take 2 Puffs by inhalation every six (6) hours as needed. pantoprazole (PROTONIX) 40 mg tablet Take 40 mg by mouth daily.            STOP taking these medications       alum-mag hydroxide-simeth (MYLANTA) 200-200-20 mg/5 mL susp Comments:   Reason for Stopping:         promethazine (PHENERGAN) 25 mg tablet Comments:   Reason for Stopping:         dicyclomine (BENTYL) 20 mg tablet Comments:   Reason for Stopping:         docusate sodium (COLACE) 100 mg capsule Comments:   Reason for Stopping:         lactulose (CHRONULAC) 10 gram/15 mL solution Comments:   Reason for Stopping:               DISPOSITION:    Home With:   OT  PT  HH  RN      x Long term SNF/Inpatient Rehab    Independent/assisted living    Hospice Other:        PATIENT CONDITION AT DISCHARGE:     Functional status    Poor    x Deconditioned     Independent      Cognition    x Lucid     Forgetful     Dementia      Catheters/lines (plus indication)    Kendrick     PICC     PEG    x None      Code status :   Partial Code-->No intubation but CPR okay    Full code     DNR      PHYSICAL EXAMINATION AT DISCHARGE:                                                   General : alert x 3, awake, no acute distress   HEENT: PEERL, EOMI, moist mucus membrane,   Neck: supple, no JVD, neck supple  Chest: Clear to auscultation bilaterally, no wheezing  Back: kyphosis, nontender  CVS: S1 S2 heard, no rubs  Abd: soft/ Non tender, non distended, BS present  Ext: no clubbing, no cyanosis, no edema  Neuro: awake, moves all extremties, no focal deficits  Skin: warm, no cyanosis  Psych: not agitated, not anxious, does not voice si/hi/hallucinations        Greater than 40 minutes were spent with the patient on counseling and coordination of care    Signed:   Frankie Bowen MD  9/16/2022  2:50 PM

## 2022-09-16 NOTE — WOUND CARE
Wound Care Note:     New consult placed by nurse request for spinal wound    Chart shows:  Admitted for hyperkalemia  Past Medical History:   Diagnosis Date    Adenoma of colon     Anemia     Arthritis     Atrial fibrillation (HCC)     Bloating     Chronic constipation     Chronic pain     back     COPD (chronic obstructive pulmonary disease) (HCC)     GERD (gastroesophageal reflux disease)     [place] (hard of hearing)     Hyponatremia     caused seizures x 2 per pt    Indigestion     Osteoporosis     Seizures ([address] 7/2020, 9/2020    x 2      WBC = 8.6 on 9/14/22  Admitted from home    Assessment:   Patient is A&O x 4, communicative, continent with no assistance needed in repositioning. Bed: [place]  Diet: Diet easy to chew  Patient reports no pain. Left heel, bilateral buttocks, and sacral skin intact and without erythema. Right heel skin intact with light erythema that is blanchable. 1. Lower spine is prominent and there is an area of erythema measuring 1.5 cm x 2.5 cm, blanchable, no open area, olga-wound intact. Patient stated, \"it stays red\". Venelex ointment to be ordered. Waffle cushion placed under fitted sheet. Spoke with Dr. Jackee Romberg, wound care orders obtained    Patient repositioned supine on waffle cushion. Recommendations:    Lower spine, sacrum and bilateral heels- Every 12 hours liberally apply Venelex ointment (BPCO) and reapply Optifoam Gentle to spine. Skin Care & Pressure Prevention:  Minimize layers of linen/pads under patient to optimize support surface. Turn/reposition approximately every 2 hours and offload heels.   Manage incontinence / promote continence   Nourishing Skin Cream to dry skin, minimize use of briefs when able    Discussed above plan with patient & Luigi Goddard RN    Transition of Care: Plan to follow as needed while admitted to hospital.    Joy Notice" Eden Reza, MARKYN, RN, [facility]  Certified Wound and Ostomy Nurse  office [phone]  Best way to contact me is through [place]

## 2022-09-16 NOTE — PROGRESS NOTES
TRANSFER - OUT REPORT:    Verbal report given to Yary Dorado RN(name) on Ny Rahman  being transferred to [facility] (unit) for routine progression of care       Report consisted of patients Situation, Background, Assessment and   Recommendations(SBAR). Information from the following report(s) SBAR, Kardex, Intake/Output, MAR, and Recent Results was reviewed with the receiving nurse. Opportunity for questions and clarification was provided. Patient transported with:  belongings.

## 2022-09-16 NOTE — PROGRESS NOTES
[facility] Adult  Hospitalist Group                                                                                          Hospitalist Progress Note  Allyn Cho MD  Answering service: [phone] from in house phone        Date of Service:  2022  NAME:  Braeden Joseph  :  1945  MRN:  [id number]      Admission Summary: Sulema Eng is a 77 y.o. female who presents with abdominal pain and diarrhea. Patient said that last few days she is constantly wiping her bottom because she cannot stop passing stool but at the same time she says his stool is pastelike and continuously coming out with cramping in the lower abdomen radiating towards thigh. Patient denies any fever chills or any other issues denies any seeing blood with the stools. CT abdomen of the patient was done in the ED and suggestive of hepatic mets of unknown primary patient was admitted for further management. Patient was recently discharged from [facility] last month after was being admitted for anemia and weakness. \"    Interval history / Subjective:       Pt states she had a large polyp removed last Dec 2021 and supposedly suppose to get another one in a year from then per verbal report---pending path of liver biopsy     Assessment & Plan:     Abdominal pain with large amount of colonic stool  No bowel obstruction  Liver mets with metastatic lymph node in upper abdomen  Dilated CBD  - palliative care consult ordered by prior hospitalist   -s/p liver biopsy  --Possible malignancy probably colonic origin will need a colonoscopy eventually  - ordered GI consult regarding above, liver bx path report pending    Leukocytosis/ low BP no fever, resolved on its own  -no fever  -monitor    Hx of afib  -rate controlled  -resume digoxin and metoprolol  -was on home baby aspirin     Code status: Partial no intubation CPR OK  DVT prophylaxis: lovenox    Care Plan discussed with: Patient/Family and Nurse  Anticipated Disposition: Home w/Family  Anticipated Discharge: pending GI consult to see      Hospital Problems  Date Reviewed: 6/29/2022            Codes Class Noted POA    * (Principal) Hyperkalemia ICD-10-CM: E87.5  ICD-9-CM: 276.7  9/12/2022 Unknown         Review of Systems:   A comprehensive review of systems was negative. Vital Signs:    Last 24hrs VS reviewed since prior progress note.  Most recent are:  Visit Vitals  BP 96/60 (BP 1 Location: Right upper arm, BP Patient Position: At rest)   Pulse 73   Temp 98.3 °F (36.8 °C)   Resp 10   Ht 5' 3\" (1.6 m)   Wt 41.7 kg (92 lb)   SpO2 95%   BMI 16.30 kg/m²       No intake or output data in the 24 hours ending 09/16/22 0901       Physical Examination:     I had a face to face encounter with this patient and independently examined them on 9/16/2022 as outlined below:          General : alert x 3, awake, no acute distress   HEENT: PEERL, EOMI, moist mucus membrane,   Neck: supple, no JVD, neck supple  Chest: Clear to auscultation bilaterally, no wheezing  Back: kyphosis, nontender  CVS: S1 S2 heard, no rubs  Abd: soft/ Non tender, non distended, BS present  Ext: no clubbing, no cyanosis, no edema  Neuro: awake, moves all extremties, no focal deficits  Skin: warm, no cyanosis  Psych: not agitated, not anxious, does not voice si/hi/hallucinations    Data Review:    I personally reviewed  Image and labs      Labs:     Recent Labs     09/14/22  0848   WBC 8.6   HGB 8.8*   HCT 27.7*   *         Medications Reviewed:     Current Facility-Administered Medications   Medication Dose Route Frequency    butalbital-acetaminophen-caffeine (FIORICET, ESGIC) -40 mg per tablet 2 Tablet  2 Tablet Oral Q6H PRN    simethicone (MYLICON) tablet 80 mg  80 mg Oral QID PRN    famotidine (PEPCID) tablet 20 mg  20 mg Oral BID PRN    aspirin delayed-release tablet 81 mg  81 mg Oral DAILY    digoxin (LANOXIN) tablet 0.125 mg  0.125 mg Oral DAILY    metoprolol tartrate (LOPRESSOR) tablet 12.5 mg  12.5 mg Oral BID    pantoprazole (PROTONIX) tablet 40 mg  40 mg Oral ACB    polyethylene glycol (MIRALAX) packet 17 g  17 g Oral Q12H    sodium chloride (NS) flush 5-40 mL  5-40 mL IntraVENous Q8H    sodium chloride (NS) flush 5-40 mL  5-40 mL IntraVENous PRN    acetaminophen (TYLENOL) tablet 650 mg  650 mg Oral Q6H PRN    Or    acetaminophen (TYLENOL) suppository 650 mg  650 mg Rectal Q6H PRN    ondansetron (ZOFRAN ODT) tablet 4 mg  4 mg Oral Q8H PRN    Or    ondansetron (ZOFRAN) injection 4 mg  4 mg IntraVENous Q6H PRN    enoxaparin (LOVENOX) injection 30 mg  30 mg SubCUTAneous DAILY    dicyclomine (BENTYL) capsule 10 mg  10 mg Oral QID     ______________________________________________________________________  EXPECTED LENGTH OF STAY: 3d 9h  ACTUAL LENGTH OF STAY:          4       Linda Mccullough MD

## 2022-09-16 NOTE — DISCHARGE INSTRUCTIONS
SNF to take over care upon hospital discharge  Followup with GI service (liver biopsy results and future colonsocopy)

## 2022-09-16 NOTE — PROGRESS NOTES
Michell Oakes [address]  [address] [facility], [address]       GI PROGRESS NOTE  [facility]  [phone] NP/PA in-hospital cell phone M-F until 4:30PM  After 5PM or on weekends, please call  for physician on call      NAME: Vincent Moore   :  1945   MRN:  [id number]       Subjective:   She only had small BM yesterday, abdominal pain is better but still present. She is about at her baseline. Objective:     VITALS:   Last 24hrs VS reviewed since prior progress note. Most recent are:  Visit Vitals  BP 96/60 (BP 1 Location: Right upper arm, BP Patient Position: At rest)   Pulse 71   Temp 98.3 °F (36.8 °C)   Resp 10   Ht 5' 3\" (1.6 m)   Wt 41.7 kg (92 lb)   SpO2 95%   BMI 16.30 kg/m²     PHYSICAL EXAM:  General: No acute distress   Neurologic:  Alert and oriented  HEENT: EOMI, no scleral icterus   Lungs:  No acute respiratory distress  Heart:  Regular rate  Abdomen: Soft, distended, mild generalized tenderness, no guarding, no rebound. Extremities: Warm  Psych:   Not anxious or agitated    Lab Data Reviewed:     No results found for this or any previous visit (from the past 24 hour(s)). IMPRESSION  1. Large amount of colonic stool. No bowel obstruction. 2. Liver metastases and metastatic lymph node in the upper abdomen. No primary  malignancy is identified. 3. Biliary duct dilatation may be at least in part postsurgical following  cholecystectomy. Consider further evaluation with MRCP without and with contrast  if there are clinical signs or symptoms referrable to the biliary system.      Assessment:     Abdominal pain/constipation   Liver mets with upper abdominal lymph nodes   Large colon polyp status post left colon resection 2022  Chronic anemia     Patient Active Problem List   Diagnosis Code    Adenomatous polyp of descending colon D12.4    Adenomatous colon polyp D12.6    Adenomatous polyp of colon D12.6    Acute respiratory failure ([facility] J96.00    Respiratory distress R06.03    Dehydration K00.7    Metabolic acidosis T36.3    Hypotension I95.9    Symptomatic anemia D64.9    Hyperkalemia E87.5     Plan:     MiraLax 17 g twice daily  Linzess was too effective, she's refusing enemas/suppositories/bowel prep, agreeable for Amitiza  Liver biopsy pending. Refused MRI/MRCP.   Palliative care following  Will sign off, please call back with biopsy results/if colonoscopy is needed      Signed By: Lonny Luna NP     9/16/2022  1:42 PM

## 2022-09-20 PROBLEM — E86.0 DEHYDRATION: Status: RESOLVED | Noted: 2022-08-21 | Resolved: 2022-09-20

## 2022-09-28 ENCOUNTER — APPOINTMENT (OUTPATIENT)
Dept: CT IMAGING | Age: 77
DRG: 435 | End: 2022-09-28
Attending: STUDENT IN AN ORGANIZED HEALTH CARE EDUCATION/TRAINING PROGRAM
Payer: MEDICARE

## 2022-09-28 ENCOUNTER — APPOINTMENT (OUTPATIENT)
Dept: CT IMAGING | Age: 77
DRG: 435 | End: 2022-09-28
Attending: HOSPITALIST
Payer: MEDICARE

## 2022-09-28 ENCOUNTER — HOSPITAL ENCOUNTER (INPATIENT)
Age: 77
LOS: 22 days | Discharge: NURSING, REHAB OR LONG-TERM CARE | DRG: 435 | End: 2022-10-20
Attending: STUDENT IN AN ORGANIZED HEALTH CARE EDUCATION/TRAINING PROGRAM | Admitting: HOSPITALIST
Payer: MEDICARE

## 2022-09-28 DIAGNOSIS — I48.0 PAROXYSMAL ATRIAL FIBRILLATION (HCC): ICD-10-CM

## 2022-09-28 DIAGNOSIS — I95.9 HYPOTENSION, UNSPECIFIED HYPOTENSION TYPE: ICD-10-CM

## 2022-09-28 DIAGNOSIS — I48.0 PAF (PAROXYSMAL ATRIAL FIBRILLATION) (HCC): ICD-10-CM

## 2022-09-28 DIAGNOSIS — I07.1 TRICUSPID VALVE INSUFFICIENCY, UNSPECIFIED ETIOLOGY: ICD-10-CM

## 2022-09-28 DIAGNOSIS — Z71.89 DNR (DO NOT RESUSCITATE) DISCUSSION: ICD-10-CM

## 2022-09-28 DIAGNOSIS — K72.00 SHOCK LIVER: ICD-10-CM

## 2022-09-28 DIAGNOSIS — Z51.5 END OF LIFE CARE: ICD-10-CM

## 2022-09-28 DIAGNOSIS — K75.0 LIVER ABSCESS: ICD-10-CM

## 2022-09-28 DIAGNOSIS — R74.8 ELEVATED LIVER ENZYMES: ICD-10-CM

## 2022-09-28 DIAGNOSIS — C78.7 LIVER METASTASES (HCC): Primary | ICD-10-CM

## 2022-09-28 DIAGNOSIS — Z51.5 PALLIATIVE CARE ENCOUNTER: ICD-10-CM

## 2022-09-28 DIAGNOSIS — K76.1 CHRONIC PASSIVE HEPATIC CONGESTION: ICD-10-CM

## 2022-09-28 DIAGNOSIS — C80.1 ADENOCARCINOMA (HCC): ICD-10-CM

## 2022-09-28 DIAGNOSIS — I50.20 HFREF (HEART FAILURE WITH REDUCED EJECTION FRACTION) (HCC): ICD-10-CM

## 2022-09-28 DIAGNOSIS — C80.1 ADENOCARCINOMA OF UNKNOWN ORIGIN (HCC): ICD-10-CM

## 2022-09-28 PROBLEM — R10.9 ABDOMINAL PAIN: Status: ACTIVE | Noted: 2022-09-28

## 2022-09-28 LAB
ALBUMIN SERPL-MCNC: 2.8 G/DL (ref 3.5–5)
ALBUMIN/GLOB SERPL: 0.8 {RATIO} (ref 1.1–2.2)
ALP SERPL-CCNC: 307 U/L (ref 45–117)
ALT SERPL-CCNC: 68 U/L (ref 12–78)
ANION GAP SERPL CALC-SCNC: 8 MMOL/L (ref 5–15)
APPEARANCE UR: CLEAR
AST SERPL-CCNC: 18 U/L (ref 15–37)
ATRIAL RATE: 92 BPM
BACTERIA URNS QL MICRO: ABNORMAL /HPF
BASOPHILS # BLD: 0.1 K/UL (ref 0–0.1)
BASOPHILS NFR BLD: 1 % (ref 0–1)
BILIRUB SERPL-MCNC: 0.4 MG/DL (ref 0.2–1)
BILIRUB UR QL: NEGATIVE
BUN SERPL-MCNC: 7 MG/DL (ref 6–20)
BUN/CREAT SERPL: 11 (ref 12–20)
C DIFF GDH STL QL: NEGATIVE
C DIFF TOX A+B STL QL IA: NEGATIVE
CALCIUM SERPL-MCNC: 8.7 MG/DL (ref 8.5–10.1)
CALCULATED P AXIS, ECG09: 77 DEGREES
CALCULATED R AXIS, ECG10: 84 DEGREES
CALCULATED T AXIS, ECG11: 25 DEGREES
CHLORIDE SERPL-SCNC: 108 MMOL/L (ref 97–108)
CO2 SERPL-SCNC: 19 MMOL/L (ref 21–32)
COLOR UR: ABNORMAL
CREAT SERPL-MCNC: 0.63 MG/DL (ref 0.55–1.02)
DIAGNOSIS, 93000: NORMAL
DIFFERENTIAL METHOD BLD: ABNORMAL
EOSINOPHIL # BLD: 0.1 K/UL (ref 0–0.4)
EOSINOPHIL NFR BLD: 1 % (ref 0–7)
EPITH CASTS URNS QL MICRO: ABNORMAL /LPF
ERYTHROCYTE [DISTWIDTH] IN BLOOD BY AUTOMATED COUNT: 24.4 % (ref 11.5–14.5)
GLOBULIN SER CALC-MCNC: 3.5 G/DL (ref 2–4)
GLUCOSE SERPL-MCNC: 102 MG/DL (ref 65–100)
GLUCOSE UR STRIP.AUTO-MCNC: NEGATIVE MG/DL
HCT VFR BLD AUTO: 25.7 % (ref 35–47)
HGB BLD-MCNC: 8.2 G/DL (ref 11.5–16)
HGB UR QL STRIP: ABNORMAL
HYALINE CASTS URNS QL MICRO: ABNORMAL /LPF (ref 0–5)
IMM GRANULOCYTES # BLD AUTO: 0.1 K/UL (ref 0–0.04)
IMM GRANULOCYTES NFR BLD AUTO: 1 % (ref 0–0.5)
INTERPRETATION: NORMAL
KETONES UR QL STRIP.AUTO: 15 MG/DL
LEUKOCYTE ESTERASE UR QL STRIP.AUTO: ABNORMAL
LIPASE SERPL-CCNC: 73 U/L (ref 73–393)
LYMPHOCYTES # BLD: 0.8 K/UL (ref 0.8–3.5)
LYMPHOCYTES NFR BLD: 9 % (ref 12–49)
MAGNESIUM SERPL-MCNC: 1.7 MG/DL (ref 1.6–2.4)
MCH RBC QN AUTO: 26.8 PG (ref 26–34)
MCHC RBC AUTO-ENTMCNC: 31.9 G/DL (ref 30–36.5)
MCV RBC AUTO: 84 FL (ref 80–99)
MONOCYTES # BLD: 0.8 K/UL (ref 0–1)
MONOCYTES NFR BLD: 9 % (ref 5–13)
NEUTS SEG # BLD: 6.7 K/UL (ref 1.8–8)
NEUTS SEG NFR BLD: 79 % (ref 32–75)
NITRITE UR QL STRIP.AUTO: POSITIVE
NRBC # BLD: 0 K/UL (ref 0–0.01)
NRBC BLD-RTO: 0 PER 100 WBC
P-R INTERVAL, ECG05: 126 MS
PH UR STRIP: 7 [PH] (ref 5–8)
PLATELET # BLD AUTO: 447 K/UL (ref 150–400)
PLATELET COMMENTS,PCOM: ABNORMAL
PMV BLD AUTO: 9 FL (ref 8.9–12.9)
POTASSIUM SERPL-SCNC: 3.8 MMOL/L (ref 3.5–5.1)
PROT SERPL-MCNC: 6.3 G/DL (ref 6.4–8.2)
PROT UR STRIP-MCNC: NEGATIVE MG/DL
Q-T INTERVAL, ECG07: 348 MS
QRS DURATION, ECG06: 78 MS
QTC CALCULATION (BEZET), ECG08: 430 MS
RBC # BLD AUTO: 3.06 M/UL (ref 3.8–5.2)
RBC #/AREA URNS HPF: ABNORMAL /HPF (ref 0–5)
RBC MORPH BLD: ABNORMAL
SODIUM SERPL-SCNC: 135 MMOL/L (ref 136–145)
SP GR UR REFRACTOMETRY: 1.01
UR CULT HOLD, URHOLD: NORMAL
UROBILINOGEN UR QL STRIP.AUTO: 0.2 EU/DL (ref 0.2–1)
VENTRICULAR RATE, ECG03: 92 BPM
WBC # BLD AUTO: 8.6 K/UL (ref 3.6–11)
WBC URNS QL MICRO: >100 /HPF (ref 0–4)

## 2022-09-28 PROCEDURE — 96374 THER/PROPH/DIAG INJ IV PUSH: CPT

## 2022-09-28 PROCEDURE — 87086 URINE CULTURE/COLONY COUNT: CPT

## 2022-09-28 PROCEDURE — 87077 CULTURE AEROBIC IDENTIFY: CPT

## 2022-09-28 PROCEDURE — 81001 URINALYSIS AUTO W/SCOPE: CPT

## 2022-09-28 PROCEDURE — 80053 COMPREHEN METABOLIC PANEL: CPT

## 2022-09-28 PROCEDURE — 74011250636 HC RX REV CODE- 250/636: Performed by: HOSPITALIST

## 2022-09-28 PROCEDURE — 65270000029 HC RM PRIVATE

## 2022-09-28 PROCEDURE — 87324 CLOSTRIDIUM AG IA: CPT

## 2022-09-28 PROCEDURE — 83690 ASSAY OF LIPASE: CPT

## 2022-09-28 PROCEDURE — 99285 EMERGENCY DEPT VISIT HI MDM: CPT

## 2022-09-28 PROCEDURE — 93005 ELECTROCARDIOGRAM TRACING: CPT

## 2022-09-28 PROCEDURE — 96361 HYDRATE IV INFUSION ADD-ON: CPT

## 2022-09-28 PROCEDURE — 87506 IADNA-DNA/RNA PROBE TQ 6-11: CPT

## 2022-09-28 PROCEDURE — 85025 COMPLETE CBC W/AUTO DIFF WBC: CPT

## 2022-09-28 PROCEDURE — 96376 TX/PRO/DX INJ SAME DRUG ADON: CPT

## 2022-09-28 PROCEDURE — 74011250637 HC RX REV CODE- 250/637: Performed by: STUDENT IN AN ORGANIZED HEALTH CARE EDUCATION/TRAINING PROGRAM

## 2022-09-28 PROCEDURE — 74177 CT ABD & PELVIS W/CONTRAST: CPT

## 2022-09-28 PROCEDURE — 36415 COLL VENOUS BLD VENIPUNCTURE: CPT

## 2022-09-28 PROCEDURE — 77030038269 HC DRN EXT URIN PURWCK BARD -A

## 2022-09-28 PROCEDURE — 74011000250 HC RX REV CODE- 250: Performed by: HOSPITALIST

## 2022-09-28 PROCEDURE — 87186 SC STD MICRODIL/AGAR DIL: CPT

## 2022-09-28 PROCEDURE — C9113 INJ PANTOPRAZOLE SODIUM, VIA: HCPCS | Performed by: HOSPITALIST

## 2022-09-28 PROCEDURE — 83735 ASSAY OF MAGNESIUM: CPT

## 2022-09-28 PROCEDURE — 74011250636 HC RX REV CODE- 250/636: Performed by: STUDENT IN AN ORGANIZED HEALTH CARE EDUCATION/TRAINING PROGRAM

## 2022-09-28 PROCEDURE — 96375 TX/PRO/DX INJ NEW DRUG ADDON: CPT

## 2022-09-28 PROCEDURE — 74011000636 HC RX REV CODE- 636: Performed by: RADIOLOGY

## 2022-09-28 RX ORDER — KETOROLAC TROMETHAMINE 30 MG/ML
15 INJECTION, SOLUTION INTRAMUSCULAR; INTRAVENOUS ONCE
Status: COMPLETED | OUTPATIENT
Start: 2022-09-28 | End: 2022-09-28

## 2022-09-28 RX ORDER — ONDANSETRON 2 MG/ML
4 INJECTION INTRAMUSCULAR; INTRAVENOUS
Status: DISCONTINUED | OUTPATIENT
Start: 2022-09-28 | End: 2022-10-09

## 2022-09-28 RX ORDER — ACETAMINOPHEN 325 MG/1
650 TABLET ORAL
Status: DISCONTINUED | OUTPATIENT
Start: 2022-09-28 | End: 2022-10-20 | Stop reason: HOSPADM

## 2022-09-28 RX ORDER — SODIUM CHLORIDE 0.9 % (FLUSH) 0.9 %
5-40 SYRINGE (ML) INJECTION EVERY 8 HOURS
Status: DISCONTINUED | OUTPATIENT
Start: 2022-09-28 | End: 2022-10-20 | Stop reason: HOSPADM

## 2022-09-28 RX ORDER — LEVOFLOXACIN 5 MG/ML
500 INJECTION, SOLUTION INTRAVENOUS EVERY 24 HOURS
Status: DISCONTINUED | OUTPATIENT
Start: 2022-09-29 | End: 2022-09-30

## 2022-09-28 RX ORDER — ACETAMINOPHEN 500 MG
1000 TABLET ORAL ONCE
Status: DISPENSED | OUTPATIENT
Start: 2022-09-28 | End: 2022-09-28

## 2022-09-28 RX ORDER — ONDANSETRON 2 MG/ML
4 INJECTION INTRAMUSCULAR; INTRAVENOUS
Status: COMPLETED | OUTPATIENT
Start: 2022-09-28 | End: 2022-09-28

## 2022-09-28 RX ORDER — POLYETHYLENE GLYCOL 3350 17 G/17G
17 POWDER, FOR SOLUTION ORAL DAILY PRN
Status: DISCONTINUED | OUTPATIENT
Start: 2022-09-28 | End: 2022-10-03

## 2022-09-28 RX ORDER — METRONIDAZOLE 500 MG/100ML
500 INJECTION, SOLUTION INTRAVENOUS EVERY 8 HOURS
Status: DISCONTINUED | OUTPATIENT
Start: 2022-09-28 | End: 2022-10-01

## 2022-09-28 RX ORDER — SODIUM CHLORIDE 9 MG/ML
125 INJECTION, SOLUTION INTRAVENOUS CONTINUOUS
Status: DISPENSED | OUTPATIENT
Start: 2022-09-28 | End: 2022-09-29

## 2022-09-28 RX ORDER — KETOROLAC TROMETHAMINE 30 MG/ML
15 INJECTION, SOLUTION INTRAMUSCULAR; INTRAVENOUS
Status: DISPENSED | OUTPATIENT
Start: 2022-09-28 | End: 2022-10-01

## 2022-09-28 RX ORDER — ONDANSETRON 2 MG/ML
4 INJECTION INTRAMUSCULAR; INTRAVENOUS ONCE
Status: COMPLETED | OUTPATIENT
Start: 2022-09-28 | End: 2022-09-28

## 2022-09-28 RX ORDER — DICYCLOMINE HYDROCHLORIDE 10 MG/1
10 CAPSULE ORAL
Status: ACTIVE | OUTPATIENT
Start: 2022-09-28 | End: 2022-09-28

## 2022-09-28 RX ORDER — ONDANSETRON 4 MG/1
4 TABLET, ORALLY DISINTEGRATING ORAL
Status: DISCONTINUED | OUTPATIENT
Start: 2022-09-28 | End: 2022-10-09

## 2022-09-28 RX ORDER — PROCHLORPERAZINE EDISYLATE 5 MG/ML
10 INJECTION INTRAMUSCULAR; INTRAVENOUS
Status: DISCONTINUED | OUTPATIENT
Start: 2022-09-28 | End: 2022-10-20 | Stop reason: HOSPADM

## 2022-09-28 RX ORDER — ACETAMINOPHEN 650 MG/1
650 SUPPOSITORY RECTAL
Status: DISCONTINUED | OUTPATIENT
Start: 2022-09-28 | End: 2022-10-20 | Stop reason: HOSPADM

## 2022-09-28 RX ORDER — LEVOFLOXACIN 5 MG/ML
500 INJECTION, SOLUTION INTRAVENOUS ONCE
Status: COMPLETED | OUTPATIENT
Start: 2022-09-28 | End: 2022-09-28

## 2022-09-28 RX ORDER — METRONIDAZOLE 500 MG/100ML
500 INJECTION, SOLUTION INTRAVENOUS ONCE
Status: COMPLETED | OUTPATIENT
Start: 2022-09-28 | End: 2022-09-28

## 2022-09-28 RX ORDER — SODIUM CHLORIDE 0.9 % (FLUSH) 0.9 %
5-40 SYRINGE (ML) INJECTION AS NEEDED
Status: DISCONTINUED | OUTPATIENT
Start: 2022-09-28 | End: 2022-10-20 | Stop reason: HOSPADM

## 2022-09-28 RX ADMIN — KETOROLAC TROMETHAMINE 15 MG: 30 INJECTION, SOLUTION INTRAMUSCULAR; INTRAVENOUS at 11:53

## 2022-09-28 RX ADMIN — LEVOFLOXACIN 500 MG: 500 INJECTION, SOLUTION INTRAVENOUS at 10:56

## 2022-09-28 RX ADMIN — PANTOPRAZOLE SODIUM 40 MG: 40 INJECTION, POWDER, FOR SOLUTION INTRAVENOUS at 10:54

## 2022-09-28 RX ADMIN — ONDANSETRON 4 MG: 2 INJECTION INTRAMUSCULAR; INTRAVENOUS at 22:27

## 2022-09-28 RX ADMIN — SODIUM CHLORIDE 125 ML/HR: 9 INJECTION, SOLUTION INTRAVENOUS at 10:49

## 2022-09-28 RX ADMIN — KETOROLAC TROMETHAMINE 15 MG: 30 INJECTION, SOLUTION INTRAMUSCULAR; INTRAVENOUS at 21:02

## 2022-09-28 RX ADMIN — SODIUM CHLORIDE, PRESERVATIVE FREE 10 ML: 5 INJECTION INTRAVENOUS at 09:50

## 2022-09-28 RX ADMIN — METRONIDAZOLE 500 MG: 500 INJECTION, SOLUTION INTRAVENOUS at 09:50

## 2022-09-28 RX ADMIN — ONDANSETRON 4 MG: 2 INJECTION INTRAMUSCULAR; INTRAVENOUS at 15:51

## 2022-09-28 RX ADMIN — SODIUM CHLORIDE 1000 ML: 9 INJECTION, SOLUTION INTRAVENOUS at 06:21

## 2022-09-28 RX ADMIN — KETOROLAC TROMETHAMINE 15 MG: 30 INJECTION, SOLUTION INTRAMUSCULAR at 06:17

## 2022-09-28 RX ADMIN — SODIUM CHLORIDE, PRESERVATIVE FREE 10 ML: 5 INJECTION INTRAVENOUS at 15:52

## 2022-09-28 RX ADMIN — ONDANSETRON 4 MG: 2 INJECTION INTRAMUSCULAR; INTRAVENOUS at 06:17

## 2022-09-28 RX ADMIN — METRONIDAZOLE 500 MG: 500 INJECTION, SOLUTION INTRAVENOUS at 19:11

## 2022-09-28 RX ADMIN — ONDANSETRON 4 MG: 2 INJECTION INTRAMUSCULAR; INTRAVENOUS at 09:46

## 2022-09-28 RX ADMIN — PROCHLORPERAZINE EDISYLATE 10 MG: 5 INJECTION INTRAMUSCULAR; INTRAVENOUS at 12:25

## 2022-09-28 RX ADMIN — IOPAMIDOL 100 ML: 755 INJECTION, SOLUTION INTRAVENOUS at 08:26

## 2022-09-28 NOTE — ED PROVIDER NOTES
50-year-old female with history of A. fib, chronic back pain, COPD presents to the ED with chief complaint of lower abdominal pain for several weeks that is worsened for the past 24 to 48 hours. Patient reports having associated watery diarrhea at home as well as nausea and vomiting. Patient recently admitted for similar symptoms, had CT that showed liver metastasis of unknown origin, was discharged to rehab. Patient discharged from rehab yesterday and says that she did not feel well at home and so called the ambulance. No fevers, chills, chest pain, difficulty breathing, urinary symptoms. Per records, patient with chronic abdominal pain similar to this. The history is provided by the patient and medical records. Past Medical History:   Diagnosis Date    Adenoma of colon     Anemia     Arthritis     Atrial fibrillation (HCC)     Bloating     Chronic constipation     Chronic pain     back     COPD (chronic obstructive pulmonary disease) (HCC)     GERD (gastroesophageal reflux disease)     [place] (hard of hearing)     Hyponatremia     caused seizures x 2 per pt    Indigestion     Osteoporosis     Seizures ([address] 7/2020, 9/2020    x 2        Past Surgical History:   Procedure Laterality Date    COLONOSCOPY N/A 12/16/2021    COLONOSCOPY   :- performed by Darlene Boeck., MD at [place] [address] N/A 2/14/2022    .  performed by Tahir Kahn MD at [facility] MAIN OR    HX [address]    HX CHOLECYSTECTOMY  1982    HX HIP REPLACEMENT Left     pt stated hip was pinned, not replaced    HX HIP REPLACEMENT Right     pt stated hip was pinned, not replaced    HX HYSTERECTOMY      HX ORTHOPAEDIC  1990, 2011    bilateral hip fractures     HX ORTHOPAEDIC Left 2020    femur fracture    HX TONSILLECTOMY           Family History:   Problem Relation Age of Onset    Heart Disease Mother     Heart Disease Father     Liver Disease Father     Alcohol abuse Father     Anesth Problems Neg Hx Social History     Socioeconomic History    Marital status: SINGLE     Spouse name: Not on file    Number of children: Not on file    Years of education: Not on file    Highest education level: Not on file   Occupational History    Not on file   Tobacco Use    Smoking status: Former     Packs/day: 1.00     Years: 20.00     Pack years: 20.00     Types: Cigarettes     Quit date: 12/18/2011     Years since quitting: 10.7    Smokeless tobacco: Never   Vaping Use    Vaping Use: Never used   Substance and Sexual Activity    Alcohol use: Never    Drug use: Never    Sexual activity: Not Currently   Other Topics Concern    Not on file   Social History Narrative    Not on file     Social Determinants of Health     Financial Resource Strain: Not on file   Food Insecurity: Not on file   Transportation Needs: Not on file   Physical Activity: Not on file   Stress: Not on file   Social Connections: Not on file   Intimate Partner Violence: Not on file   Housing Stability: Not on file         ALLERGIES: Cefuroxime, Hydrocodone, Oxycodone, and Penicillins    Review of Systems   Constitutional:  Negative for chills and fever. HENT:  Negative for congestion and rhinorrhea. Respiratory:  Negative for cough and shortness of breath. Cardiovascular:  Negative for chest pain and leg swelling. Gastrointestinal:  Positive for abdominal pain, diarrhea, nausea and vomiting. Negative for constipation. Genitourinary:  Negative for difficulty urinating, dysuria and hematuria. Musculoskeletal:  Negative for back pain and neck pain. Skin:  Negative for color change and rash. Neurological:  Negative for dizziness, weakness, light-headedness, numbness and headaches. Psychiatric/Behavioral:  Negative for agitation and confusion. There were no vitals filed for this visit. Physical Exam  Constitutional:       General: She is not in acute distress. Appearance: She is well-developed.       Comments: Cachectic HENT:      Head: Normocephalic and atraumatic. Eyes:      General: No scleral icterus. Pupils: Pupils are equal, round, and reactive to light. Neck:      Trachea: No tracheal deviation. Cardiovascular:      Rate and Rhythm: Normal rate and regular rhythm. Heart sounds: No murmur heard. No friction rub. No gallop. Pulmonary:      Effort: Pulmonary effort is normal. No respiratory distress. Breath sounds: Normal breath sounds. No wheezing or rales. Abdominal:      General: Bowel sounds are normal. There is no distension. Palpations: Abdomen is soft. Tenderness: There is abdominal tenderness (BL lower). Musculoskeletal:         General: No deformity. Cervical back: Neck supple. Skin:     General: Skin is warm and dry. Neurological:      Mental Status: She is alert and oriented to person, place, and time. Psychiatric:         Behavior: Behavior normal.        MDM  Number of Diagnoses or Management Options  Diagnosis management comments: 78-year-old female presenting with abdominal pain and diarrhea. Will obtain basic labs, CT scan, treat with fluids and Zofran. Differential includes electrolyte abnormality, JOE, partial SBO. Dispo pending labs and imaging. Amount and/or Complexity of Data Reviewed  Clinical lab tests: ordered and reviewed  Tests in the radiology section of CPT®: ordered and reviewed  Decide to obtain previous medical records or to obtain history from someone other than the patient: yes           Procedures    7:51 AM  Patient signed out to Dr. Maria Guadalupe Cox. Awaiting CT result.     Recent Results (from the past 72 hour(s))   CBC WITH AUTOMATED DIFF    Collection Time: 09/28/22  5:51 AM   Result Value Ref Range    WBC 8.6 3.6 - 11.0 K/uL    RBC 3.06 (L) 3.80 - 5.20 M/uL    HGB 8.2 (L) 11.5 - 16.0 g/dL    HCT 25.7 (L) 35.0 - 47.0 %    MCV 84.0 80.0 - 99.0 FL    MCH 26.8 26.0 - 34.0 PG    MCHC 31.9 30.0 - 36.5 g/dL    RDW 24.4 (H) 11.5 - 14.5 % PLATELET 049 (H) 557 - 400 K/uL    MPV 9.0 8.9 - 12.9 FL    NRBC 0.0 0  WBC    ABSOLUTE NRBC 0.00 0.00 - 0.01 K/uL    NEUTROPHILS 79 (H) 32 - 75 %    LYMPHOCYTES 9 (L) 12 - 49 %    MONOCYTES 9 5 - 13 %    EOSINOPHILS 1 0 - 7 %    BASOPHILS 1 0 - 1 %    IMMATURE GRANULOCYTES 1 (H) 0.0 - 0.5 %    ABS. NEUTROPHILS 6.7 1.8 - 8.0 K/UL    ABS. LYMPHOCYTES 0.8 0.8 - 3.5 K/UL    ABS. MONOCYTES 0.8 0.0 - 1.0 K/UL    ABS. EOSINOPHILS 0.1 0.0 - 0.4 K/UL    ABS. BASOPHILS 0.1 0.0 - 0.1 K/UL    ABS. IMM. GRANS. 0.1 (H) 0.00 - 0.04 K/UL    DF SMEAR SCANNED      PLATELET COMMENTS Large Platelets      RBC COMMENTS ANISOCYTOSIS  2+        RBC COMMENTS TARGET CELLS  1+        RBC COMMENTS MICROCYTOSIS  1+        RBC COMMENTS SCHISTOCYTES  1+        RBC COMMENTS MACROCYTOSIS  PRESENT       LIPASE    Collection Time: 09/28/22  5:51 AM   Result Value Ref Range    Lipase 73 73 - 036 U/L   METABOLIC PANEL, COMPREHENSIVE    Collection Time: 09/28/22  6:17 AM   Result Value Ref Range    Sodium 135 (L) 136 - 145 mmol/L    Potassium 3.8 3.5 - 5.1 mmol/L    Chloride 108 97 - 108 mmol/L    CO2 19 (L) 21 - 32 mmol/L    Anion gap 8 5 - 15 mmol/L    Glucose 102 (H) 65 - 100 mg/dL    BUN 7 6 - 20 MG/DL    Creatinine 0.63 0.55 - 1.02 MG/DL    BUN/Creatinine ratio 11 (L) 12 - 20      GFR est AA >60 >60 ml/min/1.73m2    GFR est non-AA >60 >60 ml/min/1.73m2    Calcium 8.7 8.5 - 10.1 MG/DL    Bilirubin, total 0.4 0.2 - 1.0 MG/DL    ALT (SGPT) 68 12 - 78 U/L    AST (SGOT) 18 15 - 37 U/L    Alk.  phosphatase 307 (H) 45 - 117 U/L    Protein, total 6.3 (L) 6.4 - 8.2 g/dL    Albumin 2.8 (L) 3.5 - 5.0 g/dL    Globulin 3.5 2.0 - 4.0 g/dL    A-G Ratio 0.8 (L) 1.1 - 2.2     MAGNESIUM    Collection Time: 09/28/22  6:17 AM   Result Value Ref Range    Magnesium 1.7 1.6 - 2.4 mg/dL

## 2022-09-28 NOTE — PROGRESS NOTES
INTERVENTIONAL RADIOLOGY  Consult Note      Patient:  Jeannette Palomino  :  1945  Age:  77 y.o. MRN:  [id number]    Today's Date:  2022  Admission Date:  2022  Hospital Day:  0  Consult requested by:  Denys Ahn MD      Procedure requested:  CT guided liver abscess drain placement  Reason for procedure:  Liver fluid collection/abscess    Labs  Lab Results   Component Value Date/Time    WBC 8.6 2022 05:51 AM    HGB 8.2 (L) 2022 05:51 AM    HCT 25.7 (L) 2022 05:51 AM    PLATELET 210 (H)  05:51 AM    MCV 84.0 2022 05:51 AM     Lab Results   Component Value Date/Time    Sodium 135 (L) 2022 06:17 AM    Potassium 3.8 2022 06:17 AM    Chloride 108 2022 06:17 AM    CO2 19 (L) 2022 06:17 AM    Anion gap 8 2022 06:17 AM    Glucose 102 (H) 2022 06:17 AM    BUN 7 2022 06:17 AM    Creatinine 0.63 2022 06:17 AM    BUN/Creatinine ratio 11 (L) 2022 06:17 AM    GFR est AA >60 2022 06:17 AM    GFR est non-AA >60 2022 06:17 AM    Calcium 8.7 2022 06:17 AM     Lab Results   Component Value Date/Time    INR 1.0 2022 08:12 AM    Prothrombin time 10.0 2022 08:12 AM       Medications reviewed  I personally reviewed the following imaging studies:  CT Results (most recent):  Results from Hospital Encounter encounter on 22    CT ABD PELV W CONT    Narrative  EXAM: CT ABD PELV W CONT    INDICATION: Diffuse abdominal pain and diarrhea. Rectal constipation earlier  this month. Nonspecific liver disease on previous imaging. Biliary dilatation on  previous imaging. Elevated alkaline phosphatase. Normal white blood cell count,  bilirubin, lipase. COMPARISON: CT abdomen/pelvis on 2022. CONTRAST: 100 mL of Isovue-370. ORAL CONTRAST: None    TECHNIQUE:  Following the uneventful intravenous administration of contrast, thin axial  images were obtained through the abdomen and pelvis.  Coronal and sagittal  reconstructions were generated. CT dose reduction was achieved through use of a  standardized protocol tailored for this examination and automatic exposure  control for dose modulation. FINDINGS:  LOWER THORAX: Small right pleural effusion is new. No basilar pneumonia. Normal  cardiac size. LIVER: Segment 7/8 subcapsular collection of heterogeneous fluid measures 9.3 x  6.9 x 3.5 cm. Heterogeneous septated fluid attenuation throughout the right  hepatic lobe measures approximately 13 cm in oblique AP diameter. Central right  hepatic lobe peripherally enhancing lesion previously measured 1.9 cm and now  measures 2.4 cm. Unchanged mild intrahepatic biliary dilatation. BILIARY TREE: Cholecystectomy. Unchanged chronic biliary dilatation. No abrupt  termination. SPLEEN: Normal size. Lobulated contour. PANCREAS: Mild diffuse atrophy. No mass or inflammation. ADRENALS: Unremarkable. KIDNEYS: No hydronephrosis. Heterogeneous small multiple cysts. STOMACH: Unremarkable. SMALL BOWEL: No dilatation or wall thickening. COLON: Incomplete distention, limited evaluation. Fluid-filled cecum is in the  pelvis. APPENDIX: Normal.  PERITONEUM: No ascites or pneumoperitoneum. RETROPERITONEUM: Aortic atherosclerosis without aneurysm. 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 No lymphadenopathy. REPRODUCTIVE ORGANS: Hysterectomy. URINARY BLADDER: Incomplete distention, limited evaluation. BONES: Osteopenia. Femoral hardware. Spinal curvature. T11 partial compression  fracture. ABDOMINAL WALL: No mass or hernia. ADDITIONAL COMMENTS: Diffuse muscle atrophy. Impression  1. Progression of hepatic lesions and new large fluid collections in the liver. Intrahepatic abscesses from nonspecific infection are most likely. Consider  fluid sampling or drainage with CT guidance. 2. New small right pleural effusion. 3. Fluid-filled cecum may indicate infectious colitis.  No bowel obstruction. Assessment / Plan  Buddy Narvaez is a 77year old female who presented to the ED with bilateral lower quadrant abdominal pain for the past few weeks that has worsened over the last 48 hrs. Patient also reports watery diarrhea, nausea, and vomiting. Patient is s/p liver mass biopsy on 9/15/2022 which demonstrated metastatic adenocarcinoma. Review of the CT abd/pelvis performed today, demonstrates a fluid collection most consistent with subcapsular hematoma. Given clinical presentation without the presence of fever or leukocytosis we do not recommend IR intervention at this time due to increased risk of hemorrhage. Care team may consider repeat CT scan in 2-3 days. IR will re-evaluate as needed if patient develops fever/leukocytosis. Please call IR with any questions or concerns. Case discussed with Claudia Chery MD.    Thank you for asking us to participate in the care of this patient.       RUSLAN Garcia  [facility]      Dallin Hernandez MD

## 2022-09-28 NOTE — ED NOTES
Pt back from CT. Reports for several months her stomach \"feels like someone is wringing it out\" reports intermittent but \"too often coming\" pt reports she is very nauseous, will speak with MD.     Pt reports she lives at home with a roommate that is her son's . Pt reports her son is currently in senior living but reports she feels safe at home with her son in law.

## 2022-09-28 NOTE — ED NOTES
Pt had a few spoonfuls of soup but declined her pudding and juice. Reporting she \"doesn't want to push it\" with regards to PO intake. Pt assisted to position of comfort. Pillow and warm blankets provided.  Lights turned off and call bell within reach

## 2022-09-28 NOTE — H&P
[address] [facility] Adult  Hospitalist Group  History and Physical    Date of Service:  9/28/2022  Primary Care Provider: Janina Pagan NP  Source of information: The patient and Chart review    Chief Complaint: Abdominal Pain      History of Presenting Illness:   Hillary Giron is a 77 y.o. female is brought to the ED via EMS for abdominal pain and nausea. She also reported watery diarrhea. Patient was recently hospitalized from 9/12-9/16 for abdominal pain and constipation and was found to have metastatic disease. She underwent liver biopsy which showed adenocarcinoma. Patient denied fever or chills. She denied dysuria, urgency or frequency. Work-up in the ED was significant for an abnormal abdominal pelvic CT that showed progression of hepatic lesions with a new large fluid collection in the liver with suggestion that this could be intrahepatic abscess. There are also fluid-filled cecum. Right pleural effusion. Patient was started on levofloxacin and Flagyl and was referred to the hospitalist service for admission evaluation. REVIEW OF SYSTEMS:  A comprehensive review of systems was negative except for that written in the History of Present Illness. Past Medical History:   Diagnosis Date    Adenoma of colon     Anemia     Arthritis     Atrial fibrillation (HCC)     Bloating     Chronic constipation     Chronic pain     back     COPD (chronic obstructive pulmonary disease) (HCC)     GERD (gastroesophageal reflux disease)     [place] (hard of hearing)     Hyponatremia     caused seizures x 2 per pt    Indigestion     Osteoporosis     Seizures ([address] 7/2020, 9/2020    x 2       Past Surgical History:   Procedure Laterality Date    COLONOSCOPY N/A 12/16/2021    COLONOSCOPY   :- performed by Tawana Márquez., MD at [address] [facility] N/A 2/14/2022    .  performed by Louie Kwan MD at [address] 1982    HX HIP REPLACEMENT Left     pt stated hip was pinned, not replaced    HX HIP REPLACEMENT Right     pt stated hip was pinned, not replaced    HX HYSTERECTOMY      HX ORTHOPAEDIC  1990, 2011    bilateral hip fractures     HX ORTHOPAEDIC Left 2020    femur fracture    HX TONSILLECTOMY       Prior to Admission medications    Medication Sig Start Date End Date Taking? Authorizing Provider   dicyclomine (BENTYL) 10 mg capsule Take 1 Capsule by mouth four (4) times daily. 9/16/22   Yana Okeefe MD   ondansetron (ZOFRAN ODT) 4 mg disintegrating tablet Take 1 Tablet by mouth every eight (8) hours as needed for Nausea or Vomiting. 9/16/22   Yana Okeefe MD   polyethylene glycol (MIRALAX) 17 gram packet Take 1 Packet by mouth every twelve (12) hours. 9/16/22   Yana Okeefe MD   simethicone (MYLICON) 80 mg chewable tablet Take 1 Tablet by mouth four (4) times daily as needed for GI Pain. 9/16/22   Yana Okeefe MD   lubiPROStone (AMITIZA) 24 mcg capsule Take 1 Capsule by mouth two (2) times daily (with meals) for 30 days. Recommended by GI service 9/16/22 10/16/22  Yana Okeefe MD   butalbital-acetaminophen-caffeine (FIORICET, ESGIC) -40 mg per tablet Take 2 Tablets by mouth two (2) times daily as needed for Headache or Migraine. 9/16/22   Yana Okeefe MD   digoxin (LANOXIN) 0.125 mg tablet Take 0.125 mg by mouth daily. Provider, Historical   metoprolol tartrate (LOPRESSOR) 25 mg tablet Take 12.5 mg by mouth two (2) times a day. Provider, Historical   cyclobenzaprine (FLEXERIL) 10 mg tablet Take 10 mg by mouth three (3) times daily as needed. 3/15/22   Provider, Historical   aspirin 81 mg chewable tablet Take 81 mg by mouth daily. Provider, Historical   multivitamin (ONE A DAY) tablet Take 1 Tablet by mouth daily. Provider, Historical   acetaminophen (TYLENOL) 325 mg tablet Take 650 mg by mouth every four (4) hours as needed for Pain.     Provider, Historical   albuterol (PROVENTIL HFA, VENTOLIN HFA, PROAIR HFA) 90 mcg/actuation inhaler Take 2 Puffs by inhalation every six (6) hours as needed. 9/14/21   Provider, Historical   pantoprazole (PROTONIX) 40 mg tablet Take 40 mg by mouth daily. 12/15/20   Provider, Historical     Allergies   Allergen Reactions    Cefuroxime Hives    Hydrocodone Nausea and Vomiting    Other Medication Diarrhea and Nausea and Vomiting     PT REPORTS ALL MYCINS CAUSE SEVERE GI UPSET    Oxycodone Nausea and Vomiting    Penicillins Hives     Patient screened for any delayed non-IgE-mediated reaction to PCN. Patient notes the following:    No delayed non-IgE-mediated reaction to PCN    Hives 1969            Family History   Problem Relation Age of Onset    Heart Disease Mother     Heart Disease Father     Liver Disease Father     Alcohol abuse Father     Anesth Problems Neg Hx       Social History:  reports that she quit smoking about 10 years ago. She has a 20.00 pack-year smoking history. She has never used smokeless tobacco. She reports that she does not drink alcohol and does not use drugs. Family and social history were personally reviewed, all pertinent and relevant details are outlined as above. Objective:   Visit Vitals  BP (!) 102/59   Pulse 97   Temp 98.4 °F (36.9 °C)   Resp 20   Ht 5' 3\" (1.6 m)   Wt 43.1 kg (95 lb)   SpO2 100%   BMI 16.83 kg/m²      O2 Device: None (Room air)    PHYSICAL EXAM:        Constitutional:  No acute distress, cooperative, pleasant    HENT:  Oral mucosa moist, oropharynx benign. Eyes: Pupils are symmetrical, equal and reactive. Anicteric sclera, no pallor. Resp:   Breathing comfortably without tachypnea or evidence of accessory muscle use. CTA bilaterally. No wheezing/rhonchi/rales. CV:  Regular rhythm, normal rate, no murmurs, gallops, rubs    GI: Nondistended abdomen. Normoactive bowel sounds. Soft,non tender. No appreciable hepatosplenomegaly.       Musculoskeletal:  No edema, warm, 2+ pulses throughout Neurologic: Mental status: Alert, orientated to place, person and time. Cranial nerves:CN II-XII reviewed, grossly intact  Motor exam: Moves all extremities symmetrically, no gross focal deficit. Data Review: All diagnostic labs and studies have been reviewed. Abnormal Labs Reviewed   CBC WITH AUTOMATED DIFF - Abnormal; Notable for the following components:       Result Value    RBC 3.06 (*)     HGB 8.2 (*)     HCT 25.7 (*)     RDW 24.4 (*)     PLATELET 365 (*)     NEUTROPHILS 79 (*)     LYMPHOCYTES 9 (*)     IMMATURE GRANULOCYTES 1 (*)     ABS. IMM. GRANS. 0.1 (*)     All other components within normal limits   METABOLIC PANEL, COMPREHENSIVE - Abnormal; Notable for the following components:    Sodium 135 (*)     CO2 19 (*)     Glucose 102 (*)     BUN/Creatinine ratio 11 (*)     Alk. phosphatase 307 (*)     Protein, total 6.3 (*)     Albumin 2.8 (*)     A-G Ratio 0.8 (*)     All other components within normal limits   URINALYSIS W/ RFLX MICROSCOPIC - Abnormal; Notable for the following components:    Ketone 15 (*)     Blood TRACE (*)     Nitrites Positive (*)     Leukocyte Esterase MODERATE (*)     WBC >100 (*)     Bacteria 4+ (*)     All other components within normal limits       All Micro Results       Procedure Component Value Units Date/Time    URINE CULTURE HOLD SAMPLE [[id number]] Collected: 09/28/22 1037    Order Status: Completed Specimen: Serum Updated: 09/28/22 1046     Urine culture hold       Urine on hold in Microbiology dept for 2 days. If unpreserved urine is submitted, it cannot be used for addtional testing after 24 hours, recollection will be required. CULTURE, Brown An STAIN [[id number]]     Order Status: Sent Specimen: Wound from Abscess     C. DIFFICILE AG & TOXIN A/B [[id number]] Collected: 09/28/22 0617    Order Status: Sent Specimen: Stool Updated: 09/28/22 9818            IMAGING:   CT ABD PELV W CONT   Final Result      1.  Progression of hepatic lesions and new large fluid collections in the liver. Intrahepatic abscesses from nonspecific infection are most likely. Consider   fluid sampling or drainage with CT guidance. 2. New small right pleural effusion. 3. Fluid-filled cecum may indicate infectious colitis. No bowel obstruction. ECG/ECHO:    Results for orders placed or performed during the hospital encounter of 08/21/22   EKG, 12 LEAD, INITIAL   Result Value Ref Range    Ventricular Rate 95 BPM    Atrial Rate 95 BPM    P-R Interval 128 ms    QRS Duration 86 ms    Q-T Interval 352 ms    QTC Calculation (Bezet) 442 ms    Calculated P Axis 85 degrees    Calculated R Axis 80 degrees    Calculated T Axis -92 degrees    Diagnosis       Normal sinus rhythm  ST & T wave abnormality, consider inferior ischemia  Abnormal ECG  When compared with ECG of 29-JUN-2022 04:32,  fusion complexes are no longer present  premature supraventricular complexes are no longer present  T wave inversion now evident in Inferior leads  Inverted T waves have replaced nonspecific T wave abnormality in Lateral   leads  Confirmed by Shea Dorado MD. ([id number]) on 8/24/2022 10:12:30 AM          Assessment and Plan   Given the patient's current clinical presentation, there is a high level of concern for decompensation if discharged from the emergency department. Complex decision making was performed, which includes reviewing the patient's available past medical records, laboratory results, and imaging studies. Abdominal pain associate with nausea and vomiting and diarrhea  Liver adenocarcinoma based on recent biopsy, primary unknown  Acute diarrheal illness. --Admit patient to medical floor for further work-up and treatment. --Continue levofloxacin and metronidazole started in the ED.  --Radiology were consulted for liver drainage CT report suggested abscess.   However after further review radiology the liver fluid is most probably is hematoma than infectious, besides patient does not have fever or leukocytosis and recommended against drainage. --I have consulted oncology.  --C. difficile requested. History of chronic atrial fibrillation. Recent EKGs are NSR  --continue digoxin,metoprolol, low dose aspirin. Recent echo documented normal EF    Chronic normocytic anemia likely anemia of chronic disease. Monitor hemoglobin. CODE STATUS: Full Code   Patient is very clear about her desire to receive resuscitation in the event of cardiac or respiratory arrest however she stated she would not want to live long on artificial support. Surrogate decision maker/emergency contact:  Extended Emergency Contact Information  Primary Emergency Contact: Enrique Wilkerson Phone: [phone]  Mobile Phone: [phone]  Relation: Son  Secondary Emergency Contact: Gela Prieto  Mobile Phone: [phone]  Relation: Friend    DVT PROPHYLAXIS: SCDs, heparin  ANTICIPATED DISCHARGE: Greater than 48 hours. Signed By: Zurdo Escudero MD     September 28, 2022         Please note that this dictation may have been completed with Dragon, the [employer] voice recognition software. Quite often unanticipated grammatical, syntax, homophones, and other interpretive errors are inadvertently transcribed by the computer software. Please disregard these errors. Please excuse any errors that have escaped final proofreading.

## 2022-09-28 NOTE — ED NOTES
Has a final transfer review been performed? Yes    Reason for Admission: liver abscess, abd pain  Patient comes from: home  Mental Status: Alert and oriented  ADL:self care  Ambulation:mild difficulty  Pertinent Info/Safety Concerns: none  COVID Status: Not Tested  MEWS Score: 2  Vitals:    09/28/22 0559 09/28/22 0853 09/28/22 1028 09/28/22 1228   BP: 114/66 111/61 131/67 130/69   Pulse: 96 86 87 99   Resp: 20 18 26 16   Temp: 98.4 °F (36.9 °C) 98.1 °F (36.7 °C)     SpO2: 96% 98% 96% 96%   Weight:       Height:         Lines:   Peripheral IV 09/28/22 Anterior; Left Forearm (Active)      Transport mode:ED stretcher    Jhonny Johnston  being transferred to 501(unit) for routine progression of care     \"Notification of etransfer note given to (Name) Jordan Treadwell (Title) HERMAN

## 2022-09-28 NOTE — ED NOTES
7AM  Change of shift. Care of patient taken over from Dr. Jesus Bill; H&P reviewed, bedside handoff complete. Awaiting CT and labs      Labs show anemia but chronic  No leukocytosis  Alk phos elevated  AST/ALT/bilirubin normal  Electrolytes and renal function okay    CT of the abdomen shows progression of hepatic lesions and new large fluid collection in the liver concerning for intrahepatic abscess. I have ordered vancomycin, Flagyl and Levaquin to cover intra-abdominal abscess/infection. She is allergic to penicillins and cephalosporins. I have ordered ultrasound-guided drainage. I will admit. I discussed with the patient. Perfect Serve Consult for Admission  9:26 AM    ED Room Number: XT85/69  Patient Name and age:  Sherri Jacobo 77 y.o.  female  Working Diagnosis:   1. Liver metastases ([address]    2. Liver abscess        COVID-19 Suspicion:  no  Sepsis present:  no  Reassessment needed: no  Code Status:  Full Code  Readmission: no  Isolation Requirements:  no  Recommended Level of Care:  med/surg  Department:[facility] Adult ED - 21   Other: Patient is a 50-year-old female recent liver biopsy for questionable liver cancer coming in today with worsening abdominal pain and nausea. CT showing progression of liver lesions in addition to new large fluid collection concerning for abscess. I have started her on antibiotics. Will likely need drainage and further management.     Igor Shafer,

## 2022-09-28 NOTE — ED NOTES
Pt has repeatedly stated that she would like Fioricet for her abd and chronic back pain. Pt updated on results and plan of care. Pt reports she will take bentyl but has declined tylenol stating that it will not work and pt has also refused vancomycin stating that it gives her severe GI upset.  All drug allergies confirmed with patient before administering

## 2022-09-28 NOTE — ED NOTES
Assumed care of patient. Pt resting in position of comfort. Call bell within reach.  Report received from Nedra [address] Dr using sbar, pt to CT

## 2022-09-28 NOTE — H&P
Patient being admitted for liver abscess, recent diagnosis of adenocarcinoma by liver biopsy, primary unknown. Full code  Full note to follow.

## 2022-09-28 NOTE — ED NOTES
1106: Per radiology tech, radiologist does not feel pt needs CT procedure SEE NOTE. Dr Tiny Nieto is aware.     1115: pt talking on cell phone to her son, all needs addressed

## 2022-09-28 NOTE — ED TRIAGE NOTES
BIB by EMS for ABD pain and nausea. Pt states that she had a \"liver biopsy 10 days ago somewhere. Pt also endorses some diarrhea. Pt is AO x's 4    Moans and groans intermittently.

## 2022-09-28 NOTE — ED NOTES
Pt has been medicated for nausea and pain.  Given a couple ice chips per her request. Updated on plan of care

## 2022-09-28 NOTE — ED NOTES
Pt screaming loudly, rn at bedside and pt reports pain and nausea.  Will medicate with tordaol and speak with Dr Manjit Noonan regarding phenergan as pt states she takes that at home

## 2022-09-28 NOTE — ED NOTES
Report given to [facility]. Pt requesting something for nausea. pt also requesting pudding or crackers.

## 2022-09-29 LAB
ALBUMIN SERPL-MCNC: 2.4 G/DL (ref 3.5–5)
ALBUMIN/GLOB SERPL: 0.8 {RATIO} (ref 1.1–2.2)
ALP SERPL-CCNC: 255 U/L (ref 45–117)
ALT SERPL-CCNC: 50 U/L (ref 12–78)
ANION GAP SERPL CALC-SCNC: 7 MMOL/L (ref 5–15)
AST SERPL-CCNC: 18 U/L (ref 15–37)
BASOPHILS # BLD: 0.1 K/UL (ref 0–0.1)
BASOPHILS # BLD: 0.1 K/UL (ref 0–0.1)
BASOPHILS NFR BLD: 1 % (ref 0–1)
BASOPHILS NFR BLD: 1 % (ref 0–1)
BILIRUB SERPL-MCNC: 0.3 MG/DL (ref 0.2–1)
BUN SERPL-MCNC: 6 MG/DL (ref 6–20)
BUN/CREAT SERPL: 10 (ref 12–20)
CALCIUM SERPL-MCNC: 8.2 MG/DL (ref 8.5–10.1)
CEA SERPL-MCNC: 3.4 NG/ML
CHLORIDE SERPL-SCNC: 113 MMOL/L (ref 97–108)
CO2 SERPL-SCNC: 16 MMOL/L (ref 21–32)
CREAT SERPL-MCNC: 0.61 MG/DL (ref 0.55–1.02)
DIFFERENTIAL METHOD BLD: ABNORMAL
DIFFERENTIAL METHOD BLD: ABNORMAL
EOSINOPHIL # BLD: 0.1 K/UL (ref 0–0.4)
EOSINOPHIL # BLD: 0.2 K/UL (ref 0–0.4)
EOSINOPHIL NFR BLD: 2 % (ref 0–7)
EOSINOPHIL NFR BLD: 3 % (ref 0–7)
ERYTHROCYTE [DISTWIDTH] IN BLOOD BY AUTOMATED COUNT: 21.7 % (ref 11.5–14.5)
ERYTHROCYTE [DISTWIDTH] IN BLOOD BY AUTOMATED COUNT: 24.6 % (ref 11.5–14.5)
ERYTHROCYTE [DISTWIDTH] IN BLOOD BY AUTOMATED COUNT: 24.7 % (ref 11.5–14.5)
GLOBULIN SER CALC-MCNC: 3.2 G/DL (ref 2–4)
GLUCOSE SERPL-MCNC: 117 MG/DL (ref 65–100)
HCT VFR BLD AUTO: 21 % (ref 35–47)
HCT VFR BLD AUTO: 21.8 % (ref 35–47)
HCT VFR BLD AUTO: 26.6 % (ref 35–47)
HGB BLD-MCNC: 6.6 G/DL (ref 11.5–16)
HGB BLD-MCNC: 6.8 G/DL (ref 11.5–16)
HGB BLD-MCNC: 8.9 G/DL (ref 11.5–16)
HISTORY CHECKED?,CKHIST: NORMAL
IMM GRANULOCYTES # BLD AUTO: 0 K/UL (ref 0–0.04)
IMM GRANULOCYTES # BLD AUTO: 0.1 K/UL (ref 0–0.04)
IMM GRANULOCYTES NFR BLD AUTO: 0 % (ref 0–0.5)
IMM GRANULOCYTES NFR BLD AUTO: 1 % (ref 0–0.5)
LYMPHOCYTES # BLD: 0.5 K/UL (ref 0.8–3.5)
LYMPHOCYTES # BLD: 0.5 K/UL (ref 0.8–3.5)
LYMPHOCYTES NFR BLD: 8 % (ref 12–49)
LYMPHOCYTES NFR BLD: 8 % (ref 12–49)
MCH RBC QN AUTO: 26.4 PG (ref 26–34)
MCH RBC QN AUTO: 26.8 PG (ref 26–34)
MCH RBC QN AUTO: 27.7 PG (ref 26–34)
MCHC RBC AUTO-ENTMCNC: 31.2 G/DL (ref 30–36.5)
MCHC RBC AUTO-ENTMCNC: 31.4 G/DL (ref 30–36.5)
MCHC RBC AUTO-ENTMCNC: 33.5 G/DL (ref 30–36.5)
MCV RBC AUTO: 82.9 FL (ref 80–99)
MCV RBC AUTO: 84 FL (ref 80–99)
MCV RBC AUTO: 85.8 FL (ref 80–99)
MONOCYTES # BLD: 0.7 K/UL (ref 0–1)
MONOCYTES # BLD: 0.8 K/UL (ref 0–1)
MONOCYTES NFR BLD: 12 % (ref 5–13)
MONOCYTES NFR BLD: 12 % (ref 5–13)
NEUTS SEG # BLD: 4.5 K/UL (ref 1.8–8)
NEUTS SEG # BLD: 5.3 K/UL (ref 1.8–8)
NEUTS SEG NFR BLD: 75 % (ref 32–75)
NEUTS SEG NFR BLD: 77 % (ref 32–75)
NRBC # BLD: 0 K/UL (ref 0–0.01)
NRBC BLD-RTO: 0 PER 100 WBC
PLATELET # BLD AUTO: 374 K/UL (ref 150–400)
PLATELET # BLD AUTO: 374 K/UL (ref 150–400)
PLATELET # BLD AUTO: 404 K/UL (ref 150–400)
PLATELET COMMENTS,PCOM: ABNORMAL
PMV BLD AUTO: 8.7 FL (ref 8.9–12.9)
PMV BLD AUTO: 9 FL (ref 8.9–12.9)
PMV BLD AUTO: 9 FL (ref 8.9–12.9)
POTASSIUM SERPL-SCNC: 3.2 MMOL/L (ref 3.5–5.1)
PROT SERPL-MCNC: 5.6 G/DL (ref 6.4–8.2)
RBC # BLD AUTO: 2.5 M/UL (ref 3.8–5.2)
RBC # BLD AUTO: 2.54 M/UL (ref 3.8–5.2)
RBC # BLD AUTO: 3.21 M/UL (ref 3.8–5.2)
RBC MORPH BLD: ABNORMAL
SODIUM SERPL-SCNC: 136 MMOL/L (ref 136–145)
WBC # BLD AUTO: 6.1 K/UL (ref 3.6–11)
WBC # BLD AUTO: 6.4 K/UL (ref 3.6–11)
WBC # BLD AUTO: 6.8 K/UL (ref 3.6–11)

## 2022-09-29 PROCEDURE — 85027 COMPLETE CBC AUTOMATED: CPT

## 2022-09-29 PROCEDURE — P9016 RBC LEUKOCYTES REDUCED: HCPCS

## 2022-09-29 PROCEDURE — 86301 IMMUNOASSAY TUMOR CA 19-9: CPT

## 2022-09-29 PROCEDURE — 36430 TRANSFUSION BLD/BLD COMPNT: CPT

## 2022-09-29 PROCEDURE — 80053 COMPREHEN METABOLIC PANEL: CPT

## 2022-09-29 PROCEDURE — 74011250636 HC RX REV CODE- 250/636: Performed by: HOSPITALIST

## 2022-09-29 PROCEDURE — C9113 INJ PANTOPRAZOLE SODIUM, VIA: HCPCS | Performed by: HOSPITALIST

## 2022-09-29 PROCEDURE — 82378 CARCINOEMBRYONIC ANTIGEN: CPT

## 2022-09-29 PROCEDURE — 74011000250 HC RX REV CODE- 250: Performed by: HOSPITALIST

## 2022-09-29 PROCEDURE — 36415 COLL VENOUS BLD VENIPUNCTURE: CPT

## 2022-09-29 PROCEDURE — 86923 COMPATIBILITY TEST ELECTRIC: CPT

## 2022-09-29 PROCEDURE — 65270000029 HC RM PRIVATE

## 2022-09-29 PROCEDURE — 74011250637 HC RX REV CODE- 250/637: Performed by: STUDENT IN AN ORGANIZED HEALTH CARE EDUCATION/TRAINING PROGRAM

## 2022-09-29 PROCEDURE — 85025 COMPLETE CBC W/AUTO DIFF WBC: CPT

## 2022-09-29 PROCEDURE — 86900 BLOOD TYPING SEROLOGIC ABO: CPT

## 2022-09-29 RX ORDER — SODIUM CHLORIDE 9 MG/ML
250 INJECTION, SOLUTION INTRAVENOUS AS NEEDED
Status: DISCONTINUED | OUTPATIENT
Start: 2022-09-29 | End: 2022-10-20 | Stop reason: HOSPADM

## 2022-09-29 RX ORDER — DIGOXIN 125 MCG
0.12 TABLET ORAL DAILY
Status: DISCONTINUED | OUTPATIENT
Start: 2022-09-29 | End: 2022-10-04

## 2022-09-29 RX ORDER — ALBUTEROL SULFATE 0.83 MG/ML
2.5 SOLUTION RESPIRATORY (INHALATION)
Status: DISCONTINUED | OUTPATIENT
Start: 2022-09-29 | End: 2022-10-12

## 2022-09-29 RX ADMIN — SODIUM CHLORIDE, PRESERVATIVE FREE 10 ML: 5 INJECTION INTRAVENOUS at 23:08

## 2022-09-29 RX ADMIN — METRONIDAZOLE 500 MG: 500 INJECTION, SOLUTION INTRAVENOUS at 18:48

## 2022-09-29 RX ADMIN — METRONIDAZOLE 500 MG: 500 INJECTION, SOLUTION INTRAVENOUS at 11:12

## 2022-09-29 RX ADMIN — DIGOXIN 0.12 MG: 125 TABLET ORAL at 13:06

## 2022-09-29 RX ADMIN — ONDANSETRON 4 MG: 2 INJECTION INTRAMUSCULAR; INTRAVENOUS at 18:48

## 2022-09-29 RX ADMIN — SODIUM CHLORIDE, PRESERVATIVE FREE 10 ML: 5 INJECTION INTRAVENOUS at 13:11

## 2022-09-29 RX ADMIN — POTASSIUM BICARBONATE 20 MEQ: 782 TABLET, EFFERVESCENT ORAL at 13:06

## 2022-09-29 RX ADMIN — LEVOFLOXACIN 500 MG: 500 INJECTION, SOLUTION INTRAVENOUS at 13:06

## 2022-09-29 RX ADMIN — PANTOPRAZOLE SODIUM 40 MG: 40 INJECTION, POWDER, FOR SOLUTION INTRAVENOUS at 11:12

## 2022-09-29 RX ADMIN — ONDANSETRON 4 MG: 2 INJECTION INTRAMUSCULAR; INTRAVENOUS at 12:38

## 2022-09-29 RX ADMIN — METRONIDAZOLE 500 MG: 500 INJECTION, SOLUTION INTRAVENOUS at 01:34

## 2022-09-29 NOTE — PROGRESS NOTES
[address] [facility] Adult  Hospitalist Group                                                                                          Hospitalist Progress Note  Felipe Peck MD  Answering service: [phone] OR [phone] from in house phone        Date of Service:  2022  NAME:  Gudelia Lindsey  :  1945  MRN:  [id number]      Admission Summary:   Gudelia Lindsey is a 77 y.o. female is brought to the ED via EMS for abdominal pain and nausea. She also reported watery diarrhea. Patient was recently hospitalized from - for abdominal pain and constipation and was found to have metastatic disease. She underwent liver biopsy which showed adenocarcinoma. Patient denied fever or chills. She denied dysuria, urgency or frequency. Work-up in the ED was significant for an abnormal abdominal pelvic CT that showed progression of hepatic lesions with a new large fluid collection in the liver with suggestion that this could be intrahepatic abscess. There are also fluid-filled cecum. Right pleural effusion. Patient was started on levofloxacin and Flagyl and was referred to the hospitalist service for admission evaluation. Discussion with radiology later for drainage of fluid collection, felt more likely hematoma rather than infectious    Interval history / Subjective:   Endorses mild abdominal pain, nausea, diarrhea. Has questions regarding liver cancer including prognosis and next steps. Assessment & Plan:      Abdominal pain associate with nausea and vomiting and diarrhea  Liver adenocarcinoma based on recent biopsy, primary unknown  Acute diarrheal illness. --Presented with abdominal pain with nausea, vomiting, and diarrhea   --Radiology were consulted for liver drainage CT report suggested abscess.   However after further review radiology the liver fluid is most probably is hematoma than infectious, besides patient does not have fever or leukocytosis and recommended against drainage. --Recent diagnosis of metastatic liver adenocarcinoma   -- c. Diff negative   Plan:   --Continue levofloxacin and metronidazole  --Oncology consulted, appreciate recommendations   --Symptomatic care     Abnormal urinalysis  - UA with nitrites and leukocytes, concerning for infection   - Urine culture pending  - Continue levaquin      History of chronic atrial fibrillation. Recent EKGs are NSR  --continue digoxin, low dose aspirin. --Recent echo documented normal EF  --Hold AC in setting of dropping hemoglobin as below     Acute on chronic normocytic anemia likely anemia of chronic disease.   - Hbg 6.6 this morning, repeat 6.8   - No evidence of bleeding   - Obtain hemoccult stool  - Transfuse 1U pRBC   - F/u posttrans CBC         Code status: FULL  Prophylaxis: SCDs, HOLD AC given dropping hemoglobin  Care Plan discussed with:   Anticipated Disposition: 48h     Hospital Problems  Date Reviewed: 6/29/2022            Codes Class Noted POA    Liver abscess ICD-10-CM: K75.0  ICD-9-CM: 572.0  9/28/2022 Unknown        Abdominal pain ICD-10-CM: R10.9  ICD-9-CM: 789.00  9/28/2022 Unknown        Adenocarcinoma ([facility] [address] ICD-10-CM: C80.1  ICD-9-CM: 199.1  9/28/2022 Unknown             Review of Systems:   A comprehensive review of systems was negative except for that written in the HPI. Vital Signs:    Last 24hrs VS reviewed since prior progress note. Most recent are:  Visit Vitals  /72   Pulse (!) 105   Temp 98.1 °F (36.7 °C)   Resp 16   Ht 5' 3\" (1.6 m)   Wt 41.3 kg (91 lb 1.6 oz)   SpO2 97%   BMI 16.14 kg/m²       No intake or output data in the 24 hours ending 09/29/22 0720     Physical Examination:     I had a face to face encounter with this patient and independently examined them on 9/29/2022 as outlined below:          Constitutional:  No acute distress, cooperative, pleasant    ENT:  Oral mucosa moist, oropharynx benign. Resp:  CTA bilaterally. No wheezing/rhonchi/rales.  No accessory muscle use.    CV:  Regular rhythm, normal rate, no murmurs, gallops, rubs    GI:  Soft, non distended, non tender. normoactive bowel sounds, no hepatosplenomegaly     Musculoskeletal:  No edema, warm, 2+ pulses throughout    Neurologic:  Moves all extremities. AAOx3, CN II-XII reviewed            Data Review:    Review and/or order of clinical lab test  Review and/or order of tests in the radiology section of CPT  Review and/or order of tests in the medicine section of [facility]      Labs:     Recent Labs     09/29/22 0406 09/28/22  0551   WBC 6.1 8.6   HGB 6.6* 8.2*   HCT 21.0* 25.7*    447*     Recent Labs     09/29/22 0406 09/28/22  0617    135*   K 3.2* 3.8   * 108   CO2 16* 19*   BUN 6 7   CREA 0.61 0.63   * 102*   CA 8.2* 8.7   MG  --  1.7     Recent Labs     09/29/22 0406 09/28/22  0617 09/28/22  0551   ALT 50 68  --    * 307*  --    TBILI 0.3 0.4  --    TP 5.6* 6.3*  --    ALB 2.4* 2.8*  --    GLOB 3.2 3.5  --    LPSE  --   --  73     No results for input(s): INR, PTP, APTT, INREXT in the last 72 hours. No results for input(s): FE, TIBC, PSAT, FERR in the last 72 hours. Lab Results   Component Value Date/Time    Folate 27.1 (H) 08/22/2022 03:55 AM      No results for input(s): PH, PCO2, PO2 in the last 72 hours. No results for input(s): CPK, CKNDX, TROIQ in the last 72 hours.     No lab exists for component: CPKMB  Lab Results   Component Value Date/Time    Cholesterol, total 141 08/22/2022 03:55 AM    HDL Cholesterol 57 08/22/2022 03:55 AM    LDL, calculated 71.6 08/22/2022 03:55 AM    Triglyceride 62 08/22/2022 03:55 AM    CHOL/HDL Ratio 2.5 08/22/2022 03:55 AM     Lab Results   Component Value Date/Time    Glucose (POC) 145 (H) 02/14/2022 09:01 PM     Lab Results   Component Value Date/Time    Color YELLOW/STRAW 09/28/2022 10:37 AM    Appearance CLEAR 09/28/2022 10:37 AM    Specific gravity 1.010 09/28/2022 10:37 AM    pH (UA) 7.0 09/28/2022 10:37 AM    Protein Negative 09/28/2022 10:37 AM    Glucose Negative 09/28/2022 10:37 AM    Ketone 15 (A) 09/28/2022 10:37 AM    Bilirubin Negative 09/28/2022 10:37 AM    Urobilinogen 0.2 09/28/2022 10:37 AM    Nitrites Positive (A) 09/28/2022 10:37 AM    Leukocyte Esterase MODERATE (A) 09/28/2022 10:37 AM    Epithelial cells FEW 09/28/2022 10:37 AM    Bacteria 4+ (A) 09/28/2022 10:37 AM    WBC >100 (H) 09/28/2022 10:37 AM    RBC 0-5 09/28/2022 10:37 AM         Medications Reviewed:     Current Facility-Administered Medications   Medication Dose Route Frequency    sodium chloride (NS) flush 5-40 mL  5-40 mL IntraVENous Q8H    sodium chloride (NS) flush 5-40 mL  5-40 mL IntraVENous PRN    acetaminophen (TYLENOL) tablet 650 mg  650 mg Oral Q6H PRN    Or    acetaminophen (TYLENOL) suppository 650 mg  650 mg Rectal Q6H PRN    polyethylene glycol (MIRALAX) packet 17 g  17 g Oral DAILY PRN    ondansetron (ZOFRAN ODT) tablet 4 mg  4 mg Oral Q8H PRN    Or    ondansetron (ZOFRAN) injection 4 mg  4 mg IntraVENous Q6H PRN    ketorolac (TORADOL) injection 15 mg  15 mg IntraVENous Q6H PRN    pantoprazole (PROTONIX) 40 mg in 0.9% sodium chloride 10 mL injection  40 mg IntraVENous DAILY    0.9% sodium chloride infusion  125 mL/hr IntraVENous CONTINUOUS    levoFLOXacin (LEVAQUIN) 500 mg in D5W IVPB  500 mg IntraVENous Q24H    metroNIDAZOLE (FLAGYL) IVPB premix 500 mg  500 mg IntraVENous Q8H    prochlorperazine (COMPAZINE) injection 10 mg  10 mg IntraVENous Q6H PRN     ______________________________________________________________________  EXPECTED LENGTH OF STAY: 3d 4h  ACTUAL LENGTH OF STAY:          1                 Roxi Graff MD

## 2022-09-29 NOTE — PROGRESS NOTES
Transitions of care:  Pt expects to return home and resume [facility] with [facility]. Agency accepted on careport. Will need updated orders if that remains plan. Reason for Readmission:     abdominal pain, adenocarcinoma         RUR Score/Risk Level:    readmission, 26%, pt had recent hospitalizations 8/21-8/25, 9/ with discharge to [facility] SNF and then discharge home around 9/26 with readmission here soon after      PCP: First and Last name:  Lilibeth Donald NP   Name of Practice:  [facility] practice)    Are you a current patient: Yes/No: yes   Approximate date of last visit: August 2022, was in hospital in September and discharge to SNF   Can you participate in a virtual visit with your PCP:     Is a Care Conference indicated: unsure at this time, may benefit from palliative once further plans are clear for symptom management      Did you attend your follow up appointment (s): If not, why not: NA/, pt was in [facility] and then discharged home and readmitted here within 48 hours of SNF discharge. Resources/supports as identified by patient/family:   Pt reports her son in law Julieth Valdes is home now and is a source of support for her (previously was out of the country)       Top Challenges facing patient (as identified by patient/family and CM):     disease progression, symptom management per CM assessment, unsure that pt has complete understanding of current diagnosis and progression of CA. CM updated attending of this concern. Pt stated she felt that \"[place] fed me something wrong\" as she reports being sick at 2am on day she came home, throwing up and watery diarrhea. Chart indicates this could be related to disease/symptoms? Finances/Medication cost?     none  Transportation        Support system or lack thereof? Reports her son in law is her support  Living arrangements? Lives with son in law. She reports he can help her. Self-care/ADLs/Cognition? Pt reports she is able to manage ADL's at home, has PT with [facility]. Unsure if she can maintain this independence with new diagnosis. Current Advanced Directive/Advance Care Plan:  on file, son in law Josué Shelton, [phone], updated EMR. AMD on file. Plan for utilizing home health:   referral sent to [address] [place] [address],[address] as pt reports that PT was going to open her for services when she discharged from SNF but was then readmitted here. Transition of Care Plan:    Based on readmission, the patient's previous Plan of Care   has been evaluated and/or modified. The current Transition of Care Plan is:      return home. Pt very adamant that she will not return to SNF and she plans to return home at discharge and will continue with [facility]. Discussed concern with attending that pt may not have a good understanding of progression of new diagnosis of CA.           Readmission Assessment  Number of days since last admission?: 8-30 days (last admission 9/12-9/16)  Previous disposition: SNF (discharged to [facility] on 9/16)  What was the patient's/caregiver's perception as to why they think they needed to return back to the hospital?: Other (Comment) (new diagnosis of metastatic CA, advancing disease/sympoms)  Did you see a specialist, such as Cardiac, Pulmonary, Orthopedic Physician, etc. after you left the hospital?: Yes  Who advised the patient to return to the hospital?: Self-referral  Does the patient report anything that got in the way of taking their medications?: No  In our efforts to provide the best possible care to you and others like you, can you think of anything that we could have done to help you after you left the hospital the first time, so that you might not have needed to return so soon?: Discharge instructions that are concise, clear, and non contradictory

## 2022-09-29 NOTE — PROGRESS NOTES
Comprehensive Nutrition Assessment    Type and Reason for Visit: Initial    Nutrition Recommendations/Plan:   Continue full liquid diet, advancement to easy to chew per MD  Ensure enlive TID, magic cup BID, added snacks per preference       Malnutrition Assessment:  Malnutrition Status:  Severe malnutrition (09/29/22 0758)    Context:  Social/environmental circumstances     Findings of the 6 clinical characteristics of malnutrition:   Energy Intake:  No significant decrease in energy intake  Weight Loss:  Mild weight loass (specify amount and time period) (1% x 2 weeks)     Body Fat Loss:  Severe body fat loss, Buccal region, Orbital, Triceps   Muscle Mass Loss:  Severe muscle mass loss, Clavicles (pectoralis &deltoids), Calf (gastrocnemius), Temples (temporalis), Scapula (trapezius), Thigh (quadriceps)  Fluid Accumulation:  No significant fluid accumulation,     Strength:  Not performed        Nutrition Assessment:    Past Medical History:   Diagnosis Date    Adenoma of colon     Anemia     Arthritis     Atrial fibrillation (HCC)     Bloating     Chronic constipation     Chronic pain     back     COPD (chronic obstructive pulmonary disease) (HCC)     GERD (gastroesophageal reflux disease)     [place] (hard of hearing)     Hyponatremia     caused seizures x 2 per pt    Indigestion     Osteoporosis     Seizures ([address] 7/2020, 9/2020    x 2      77 y.o. female admitted with acute diarrhea, n/v. She was recently diagnosed with liver adenocarcinoma (liver mass biopsy 9/15/22)- oncology consulted. Per ED notes pt had CT showing progressive liver lesions with new large fluid collection concerning for abscess. Pt is known to RD from previous visits. Appears pt has lost 1# in the last 2 weeks which is not considered significant- she has a chronically low BMI d/t chronic low po intake. Pta pt was having abdominal pain for several weeks, escalated to watery diarrhea, n/v 2 days pta.  Yesterday she had a few bites of soup, pudding, crackers, but was fearful of further intake. She typically eats small frequent meals at home. She likes chocolate ensure, orange magic cup, vanilla ice cream, vegetables, pudding. Pt has top dentures, no bottom teeth. Prefers to be on an easy to chew diet. K 3.2- pt receiving efferK. Recent Labs     09/29/22  0406 09/28/22  0617   * 102*   BUN 6 7   CREA 0.61 0.63    135*   K 3.2* 3.8   * 108   CO2 16* 19*   CA 8.2* 8.7   MG  --  1.7       Nutritionally Significant Medications:  Flagyl, protoninx, efferK, IV NaCl  Prn: compazine, zofran      Estimated Daily Nutrient Needs:  Energy Requirements Based On: Kcal/kg  Weight Used for Energy Requirements: Current  Energy (kcal/day): 1435  Weight Used for Protein Requirements: Current  Protein (g/day): 74  Method Used for Fluid Requirements: 1 ml/kcal  Fluid (ml/day): 1435    Nutrition Related Findings:   Edema: No data recorded    Last BM: 09/28/22, Watery    Wounds: None      Current Nutrition Therapies:  Diet: full liquid  Supplements: none  Meal intake: No data found. Supplement intake: No data found. Nutrition Support: none      Anthropometric Measures:  Height: 5' 3\" (160 cm)  Ideal Body Weight (IBW): 115 lbs (52 kg)     Current Body Wt:  41.3 kg (91 lb 0.8 oz), 79.2 % IBW.  Standing scale  Current BMI (kg/m2): 16.1  Usual Body Weight: 41.7 kg (92 lb)  % Weight Change (Calculated): -1  Weight Adjustment: No adjustment                 BMI Category: Underweight (BMI less than 18.5)    Wt Readings from Last 10 Encounters:   09/29/22 41.3 kg (91 lb 1.6 oz)   09/15/22 41.7 kg (92 lb)   07/21/22 42.2 kg (93 lb)   06/29/22 42.2 kg (93 lb)   06/23/22 42.2 kg (93 lb)   06/16/22 42.2 kg (93 lb)   03/31/22 42.4 kg (93 lb 6.4 oz)   03/10/22 41.7 kg (92 lb)   03/01/22 43.1 kg (95 lb)   02/14/22 42.4 kg (93 lb 7.6 oz)           Nutrition Diagnosis:   Severe malnutrition related to inadequate protein-energy intake as evidenced by Criteria as identified in malnutrition assessment  Underweight related to inadequate protein-energy intake as evidenced by BMI    Nutrition Interventions:   Food and/or Nutrient Delivery: Continue current diet, Start oral nutrition supplement  Nutrition Education/Counseling: No recommendations at this time  Coordination of Nutrition Care: Continue to monitor while inpatient       Goals:     Goals: PO intake 50% or greater, by next RD assessment       Nutrition Monitoring and Evaluation:   Behavioral-Environmental Outcomes: None identified  Food/Nutrient Intake Outcomes: Diet advancement/tolerance, Food and nutrient intake, Supplement intake  Physical Signs/Symptoms Outcomes: Biochemical data, Chewing or swallowing, GI status, Meal time behavior, Nutrition focused physical findings, Weight    Discharge Planning:    Continue oral nutrition supplement    Kane Floyd RD  Available via [address] [place] [address]

## 2022-09-29 NOTE — CONSULTS
[address]    Name:  Winston Wilder  MR#:  [id number]  :  1945  ACCOUNT #:  [de-identified]  DATE OF SERVICE:  2022      HISTORY OF PRESENT ILLNESS:  The patient is a 28-year-old woman with a history of recently diagnosed adenocarcinoma involving the liver, who is seen for initial medical oncology evaluation. This patient states that she had a liver biopsy on 09/15/2022 and has not heard the results back regarding this. Apparently, it was ordered by Gastroenterology. She came to the hospital yesterday with abdominal pain, nausea, and watery diarrhea. Evaluation in the emergency room identified an apparent liver abscess as well as hepatic lobe enhancing lesion which had been seen previously. The patient states she had a colonoscopy earlier this year and had a large polyp removed and states that the polyp could not be completely excised. We do not have those records. PAST MEDICAL HISTORY:  Includes:  1. Anemia. 2.  Arthritis. 3.  AFib. 4.  COPD. 5.  GERD. 6.  Seizure disorder secondary to hyponatremia. 7.  Osteoporosis. PAST SURGICAL HISTORY:  Includes:  1. Colonoscopy 2021.  2.  Flexible sigmoidoscopy 2022. 3.  Cholecystectomy. 4.  Left hip replacement. 5.  Right hip replacement. 6.  Hysterectomy. 7.  Left femur fracture requiring ORIF. 8.  Tonsillectomy. SOCIAL HISTORY:  She is a former smoker. No history of alcohol abuse. She is single. FAMILY HISTORY:  Her grandmother had cancer of unknown type. ALLERGIES:  1. CEFUROXIME. 2.  HYDROCODONE. 3.  OXYCODONE. 4.  PENICILLIN. REVIEW OF SYSTEMS:  As noted above, otherwise noncontributory. PHYSICAL EXAMINATION:  GENERAL:  She is a frail, chronically ill-appearing woman in no apparent distress. VITAL SIGNS:  Temperature 98, pulse 106, /73, respirations 16, O2 saturation 99% on room air. HEENT:  EOMI. Nonicteric sclerae. NECK:  Supple. LUNGS:  Clear.   CARDIAC: Regular rate and rhythm, I/VI systolic murmur. ABDOMEN:  Soft. She has some right upper quadrant discomfort/tenderness. No rebound. No masses. No hepatosplenomegaly noted. EXTREMITIES:  No edema. NEUROPSYCHIATRIC:  Grossly intact. IMPRESSION:  1. Recently diagnosed adenocarcinoma involving the liver. The pathology was inconclusive as to a primary. Upper gastrointestinal/lower gastrointestinal and pancreatic primary were all felt to be possible. I would like to gather more information regarding the colonoscopies that have been done in the past 6 months and discuss with her gastroenterologist as well as Dr. Soco Adame. We will check baseline tumor markers; however, treatment will need to wait until the liver abscess is adequately treated. 2.  Liver abscess. \"Radiology now feels that this fluid collection is hematoma as opposed to abscess and has recommended against drainage of this fluid collection. \"  I would like to ask Infectious Disease opinion as deciding about beginning systemic therapy will hinge upon whether or not this is an abscess or not. 3.  Anemia. There is some element of chronic disease here, but blood loss or bleed secondary to the recent biopsy certainly may have contributed. She should be transfused for the hemoglobin of 6.8 that was recorded today. She had iron studies tested on 08/24/2022 showing an iron saturation of 12%. She should receive iron replacement therapy with oral iron for now, possibly IV iron if ID feels that active infection is unlikely. We will follow with you on behalf of [employer].       Curtis De La Torre MD      [id number]  D:  09/29/2022 11:47  T:  09/29/2022 14:02  JOB #:  [id number]

## 2022-09-29 NOTE — PROGRESS NOTES
Physician Progress Note      PATIENT:               Winston Wilder  CSN #:                  [id number]  :                       1945  ADMIT DATE:       2022 5:05 AM  DISCH DATE:  RESPONDING  PROVIDER #:        Luli Nichole MD          QUERY TEXT:    Patient admitted with possible Liver abscess and is noted to have BMI 16.1, with severe body fat and muscle mass loss noted on exam. If possible, please document in progress notes and discharge summary if you are evaluating and /or treating any of the following: The medical record reflects the following:  Risk Factors: recent diagnosis of liver CA, COPD  Clinical Indicators: pt with chronically low po intake due to abd pain and watery diarrhea. BMI 16.1. Pt noted to have severe body fat and muscle mass loss on exam. RD has documented that she meets criteria for Severe Malnutrition in the setting of inadequate protein energy intake aeb BMI. Treatment: Ensure Enlive TID, Magic Cup BID, ice cream/ pudding for snacks, RD to follow    ASPEN Criteria:  https://aspenjournals. onlinelibrary. menendez. com/doi/full/10.1177/7065356266478434    Thank you,  Mateus Stevens RN  Clinical Documentation  [phone], or via [employer]  Options provided:  -- Protein calorie malnutrition severe  -- Protein calorie malnutrition unspecified  -- Other - I will add my own diagnosis  -- Disagree - Not applicable / Not valid  -- Disagree - Clinically unable to determine / Unknown  -- Refer to Clinical Documentation Reviewer    PROVIDER RESPONSE TEXT:    This patient has severe protein calorie malnutrition.     Query created by: David Schuler on 2022 2:46 PM      Electronically signed by:  Luli Nichole MD 2022 4:58 PM

## 2022-09-29 NOTE — CONSULTS
Pt seen for new dx of cancer  Pt had liver bx 2 weeks ago, does not know anything further  Pt examined, chart reviewed  Note to follow    Erma Muir MD

## 2022-09-30 ENCOUNTER — APPOINTMENT (OUTPATIENT)
Dept: GENERAL RADIOLOGY | Age: 77
DRG: 435 | End: 2022-09-30
Attending: NURSE PRACTITIONER
Payer: MEDICARE

## 2022-09-30 LAB
ABO + RH BLD: NORMAL
ANION GAP SERPL CALC-SCNC: 12 MMOL/L (ref 5–15)
ANION GAP SERPL CALC-SCNC: 15 MMOL/L (ref 5–15)
ARTERIAL PATENCY WRIST A: POSITIVE
ATRIAL RATE: 144 BPM
ATRIAL RATE: 190 BPM
B-OH-BUTYR SERPL-SCNC: 3.04 MMOL/L
BASE DEFICIT BLD-SCNC: 9 MMOL/L
BASOPHILS # BLD: 0.1 K/UL (ref 0–0.1)
BASOPHILS NFR BLD: 1 % (ref 0–1)
BDY SITE: ABNORMAL
BLD PROD TYP BPU: NORMAL
BLOOD GROUP ANTIBODIES SERPL: NORMAL
BPU ID: NORMAL
BUN SERPL-MCNC: 3 MG/DL (ref 6–20)
BUN SERPL-MCNC: 5 MG/DL (ref 6–20)
BUN/CREAT SERPL: 6 (ref 12–20)
BUN/CREAT SERPL: 7 (ref 12–20)
CALCIUM SERPL-MCNC: 8 MG/DL (ref 8.5–10.1)
CALCIUM SERPL-MCNC: 8.5 MG/DL (ref 8.5–10.1)
CALCULATED R AXIS, ECG10: 118 DEGREES
CALCULATED R AXIS, ECG10: 93 DEGREES
CALCULATED T AXIS, ECG11: 126 DEGREES
CALCULATED T AXIS, ECG11: 76 DEGREES
CAMPYLOBACTER SPECIES, DNA: NEGATIVE
CANCER AG19-9 SERPL-ACNC: 21 U/ML (ref 0–35)
CHLORIDE SERPL-SCNC: 105 MMOL/L (ref 97–108)
CHLORIDE SERPL-SCNC: 108 MMOL/L (ref 97–108)
CO2 SERPL-SCNC: 15 MMOL/L (ref 21–32)
CO2 SERPL-SCNC: 18 MMOL/L (ref 21–32)
CREAT SERPL-MCNC: 0.53 MG/DL (ref 0.55–1.02)
CREAT SERPL-MCNC: 0.7 MG/DL (ref 0.55–1.02)
CROSSMATCH RESULT,%XM: NORMAL
DIAGNOSIS, 93000: NORMAL
DIAGNOSIS, 93000: NORMAL
DIFFERENTIAL METHOD BLD: ABNORMAL
ENTEROTOXIGEN E COLI, DNA: NEGATIVE
EOSINOPHIL # BLD: 0.1 K/UL (ref 0–0.4)
EOSINOPHIL NFR BLD: 2 % (ref 0–7)
ERYTHROCYTE [DISTWIDTH] IN BLOOD BY AUTOMATED COUNT: 22.3 % (ref 11.5–14.5)
GAS FLOW.O2 O2 DELIVERY SYS: ABNORMAL L/MIN
GLUCOSE SERPL-MCNC: 108 MG/DL (ref 65–100)
GLUCOSE SERPL-MCNC: 80 MG/DL (ref 65–100)
HCO3 BLD-SCNC: 13.3 MMOL/L (ref 22–26)
HCT VFR BLD AUTO: 25.1 % (ref 35–47)
HGB BLD-MCNC: 8.1 G/DL (ref 11.5–16)
IMM GRANULOCYTES # BLD AUTO: 0.1 K/UL (ref 0–0.04)
IMM GRANULOCYTES NFR BLD AUTO: 1 % (ref 0–0.5)
LACTATE SERPL-SCNC: 1 MMOL/L (ref 0.4–2)
LYMPHOCYTES # BLD: 0.6 K/UL (ref 0.8–3.5)
LYMPHOCYTES NFR BLD: 10 % (ref 12–49)
MAGNESIUM SERPL-MCNC: 1.3 MG/DL (ref 1.6–2.4)
MAGNESIUM SERPL-MCNC: 1.9 MG/DL (ref 1.6–2.4)
MCH RBC QN AUTO: 26.9 PG (ref 26–34)
MCHC RBC AUTO-ENTMCNC: 32.3 G/DL (ref 30–36.5)
MCV RBC AUTO: 83.4 FL (ref 80–99)
MONOCYTES # BLD: 0.9 K/UL (ref 0–1)
MONOCYTES NFR BLD: 14 % (ref 5–13)
NEUTS SEG # BLD: 4.6 K/UL (ref 1.8–8)
NEUTS SEG NFR BLD: 72 % (ref 32–75)
NRBC # BLD: 0 K/UL (ref 0–0.01)
NRBC BLD-RTO: 0 PER 100 WBC
P SHIGELLOIDES DNA STL QL NAA+PROBE: NEGATIVE
PCO2 BLD: 18.9 MMHG (ref 35–45)
PH BLD: 7.46 [PH] (ref 7.35–7.45)
PLATELET # BLD AUTO: 398 K/UL (ref 150–400)
PMV BLD AUTO: 8.9 FL (ref 8.9–12.9)
PO2 BLD: 78 MMHG (ref 80–100)
POTASSIUM SERPL-SCNC: 3 MMOL/L (ref 3.5–5.1)
POTASSIUM SERPL-SCNC: 3.6 MMOL/L (ref 3.5–5.1)
Q-T INTERVAL, ECG07: 240 MS
Q-T INTERVAL, ECG07: 354 MS
QRS DURATION, ECG06: 82 MS
QRS DURATION, ECG06: 90 MS
QTC CALCULATION (BEZET), ECG08: 426 MS
QTC CALCULATION (BEZET), ECG08: 544 MS
RBC # BLD AUTO: 3.01 M/UL (ref 3.8–5.2)
RBC MORPH BLD: ABNORMAL
SALMONELLA SPECIES, DNA: NEGATIVE
SAO2 % BLD: 96.5 % (ref 92–97)
SHIGA TOXIN PRODUCING, DNA: NEGATIVE
SHIGELLA SP+EIEC IPAH STL QL NAA+PROBE: NEGATIVE
SODIUM SERPL-SCNC: 135 MMOL/L (ref 136–145)
SODIUM SERPL-SCNC: 138 MMOL/L (ref 136–145)
SPECIMEN EXP DATE BLD: NORMAL
SPECIMEN TYPE: ABNORMAL
STATUS OF UNIT,%ST: NORMAL
UNIT DIVISION, %UDIV: 0
VENTRICULAR RATE, ECG03: 142 BPM
VENTRICULAR RATE, ECG03: 190 BPM
VIBRIO SPECIES, DNA: NEGATIVE
WBC # BLD AUTO: 6.4 K/UL (ref 3.6–11)
Y. ENTEROCOLITICA, DNA: NEGATIVE

## 2022-09-30 PROCEDURE — C9113 INJ PANTOPRAZOLE SODIUM, VIA: HCPCS | Performed by: HOSPITALIST

## 2022-09-30 PROCEDURE — 71045 X-RAY EXAM CHEST 1 VIEW: CPT

## 2022-09-30 PROCEDURE — 74011250636 HC RX REV CODE- 250/636: Performed by: NURSE PRACTITIONER

## 2022-09-30 PROCEDURE — 82803 BLOOD GASES ANY COMBINATION: CPT

## 2022-09-30 PROCEDURE — 74011000258 HC RX REV CODE- 258: Performed by: NURSE PRACTITIONER

## 2022-09-30 PROCEDURE — 74011000250 HC RX REV CODE- 250: Performed by: NURSE PRACTITIONER

## 2022-09-30 PROCEDURE — 85025 COMPLETE CBC W/AUTO DIFF WBC: CPT

## 2022-09-30 PROCEDURE — 74011250636 HC RX REV CODE- 250/636: Performed by: HOSPITALIST

## 2022-09-30 PROCEDURE — 74011250636 HC RX REV CODE- 250/636: Performed by: SPECIALIST

## 2022-09-30 PROCEDURE — 93005 ELECTROCARDIOGRAM TRACING: CPT

## 2022-09-30 PROCEDURE — 77030038269 HC DRN EXT URIN PURWCK BARD -A

## 2022-09-30 PROCEDURE — 80048 BASIC METABOLIC PNL TOTAL CA: CPT

## 2022-09-30 PROCEDURE — 36415 COLL VENOUS BLD VENIPUNCTURE: CPT

## 2022-09-30 PROCEDURE — 65660000001 HC RM ICU INTERMED STEPDOWN

## 2022-09-30 PROCEDURE — 74018 RADEX ABDOMEN 1 VIEW: CPT

## 2022-09-30 PROCEDURE — 74011250636 HC RX REV CODE- 250/636: Performed by: FAMILY MEDICINE

## 2022-09-30 PROCEDURE — 82010 KETONE BODYS QUAN: CPT

## 2022-09-30 PROCEDURE — 83605 ASSAY OF LACTIC ACID: CPT

## 2022-09-30 PROCEDURE — 83735 ASSAY OF MAGNESIUM: CPT

## 2022-09-30 PROCEDURE — 74011250636 HC RX REV CODE- 250/636: Performed by: INTERNAL MEDICINE

## 2022-09-30 PROCEDURE — 74011000250 HC RX REV CODE- 250: Performed by: HOSPITALIST

## 2022-09-30 PROCEDURE — 74011250636 HC RX REV CODE- 250/636: Performed by: STUDENT IN AN ORGANIZED HEALTH CARE EDUCATION/TRAINING PROGRAM

## 2022-09-30 PROCEDURE — 74011000258 HC RX REV CODE- 258: Performed by: INTERNAL MEDICINE

## 2022-09-30 PROCEDURE — 74011000250 HC RX REV CODE- 250: Performed by: FAMILY MEDICINE

## 2022-09-30 PROCEDURE — 74011000258 HC RX REV CODE- 258: Performed by: FAMILY MEDICINE

## 2022-09-30 PROCEDURE — 74011250637 HC RX REV CODE- 250/637: Performed by: NURSE PRACTITIONER

## 2022-09-30 PROCEDURE — 99223 1ST HOSP IP/OBS HIGH 75: CPT | Performed by: INTERNAL MEDICINE

## 2022-09-30 PROCEDURE — 74011250637 HC RX REV CODE- 250/637: Performed by: STUDENT IN AN ORGANIZED HEALTH CARE EDUCATION/TRAINING PROGRAM

## 2022-09-30 PROCEDURE — 36600 WITHDRAWAL OF ARTERIAL BLOOD: CPT

## 2022-09-30 PROCEDURE — 74011000250 HC RX REV CODE- 250: Performed by: STUDENT IN AN ORGANIZED HEALTH CARE EDUCATION/TRAINING PROGRAM

## 2022-09-30 RX ORDER — MAGNESIUM SULFATE HEPTAHYDRATE 40 MG/ML
2 INJECTION, SOLUTION INTRAVENOUS
Status: COMPLETED | OUTPATIENT
Start: 2022-09-30 | End: 2022-09-30

## 2022-09-30 RX ORDER — METOPROLOL TARTRATE 25 MG/1
25 TABLET, FILM COATED ORAL EVERY 12 HOURS
Status: DISCONTINUED | OUTPATIENT
Start: 2022-09-30 | End: 2022-10-07

## 2022-09-30 RX ORDER — DIGOXIN 0.25 MG/ML
250 INJECTION INTRAMUSCULAR; INTRAVENOUS
Status: COMPLETED | OUTPATIENT
Start: 2022-09-30 | End: 2022-09-30

## 2022-09-30 RX ORDER — DILTIAZEM HYDROCHLORIDE 5 MG/ML
10 INJECTION INTRAVENOUS ONCE
Status: COMPLETED | OUTPATIENT
Start: 2022-09-30 | End: 2022-09-30

## 2022-09-30 RX ORDER — SODIUM BICARBONATE IN D5W 150/1000ML
PLASTIC BAG, INJECTION (ML) INTRAVENOUS CONTINUOUS
Status: DISCONTINUED | OUTPATIENT
Start: 2022-09-30 | End: 2022-09-30 | Stop reason: CLARIF

## 2022-09-30 RX ORDER — SODIUM CHLORIDE 9 MG/ML
75 INJECTION, SOLUTION INTRAVENOUS CONTINUOUS
Status: DISCONTINUED | OUTPATIENT
Start: 2022-09-30 | End: 2022-10-01

## 2022-09-30 RX ORDER — POTASSIUM CHLORIDE 7.45 MG/ML
10 INJECTION INTRAVENOUS
Status: COMPLETED | OUTPATIENT
Start: 2022-09-30 | End: 2022-09-30

## 2022-09-30 RX ORDER — ADENOSINE 3 MG/ML
6 INJECTION, SOLUTION INTRAVENOUS ONCE
Status: COMPLETED | OUTPATIENT
Start: 2022-09-30 | End: 2022-09-30

## 2022-09-30 RX ORDER — SODIUM CHLORIDE 9 MG/ML
100 INJECTION, SOLUTION INTRAVENOUS CONTINUOUS
Status: DISCONTINUED | OUTPATIENT
Start: 2022-09-30 | End: 2022-09-30

## 2022-09-30 RX ORDER — SULFAMETHOXAZOLE AND TRIMETHOPRIM 800; 160 MG/1; MG/1
1 TABLET ORAL EVERY 12 HOURS
Status: COMPLETED | OUTPATIENT
Start: 2022-09-30 | End: 2022-10-02

## 2022-09-30 RX ORDER — ADENOSINE 3 MG/ML
12 INJECTION, SOLUTION INTRAVENOUS ONCE
Status: COMPLETED | OUTPATIENT
Start: 2022-09-30 | End: 2022-09-30

## 2022-09-30 RX ADMIN — ADENOSINE 6 MG: 3 INJECTION INTRAVENOUS at 05:51

## 2022-09-30 RX ADMIN — AMIODARONE HYDROCHLORIDE 1 MG/MIN: 50 INJECTION, SOLUTION INTRAVENOUS at 15:21

## 2022-09-30 RX ADMIN — MAGNESIUM SULFATE HEPTAHYDRATE 2 G: 40 INJECTION, SOLUTION INTRAVENOUS at 10:33

## 2022-09-30 RX ADMIN — DILTIAZEM HYDROCHLORIDE 5 MG: 5 INJECTION, SOLUTION INTRAVENOUS at 05:52

## 2022-09-30 RX ADMIN — POTASSIUM CHLORIDE 10 MEQ: 7.46 INJECTION, SOLUTION INTRAVENOUS at 07:28

## 2022-09-30 RX ADMIN — SODIUM CHLORIDE, PRESERVATIVE FREE 10 ML: 5 INJECTION INTRAVENOUS at 15:20

## 2022-09-30 RX ADMIN — SULFAMETHOXAZOLE AND TRIMETHOPRIM 1 TABLET: 800; 160 TABLET ORAL at 21:33

## 2022-09-30 RX ADMIN — METOPROLOL TARTRATE 25 MG: 25 TABLET, FILM COATED ORAL at 10:41

## 2022-09-30 RX ADMIN — ADENOSINE 12 MG: 3 INJECTION INTRAVENOUS at 05:51

## 2022-09-30 RX ADMIN — SODIUM CHLORIDE, PRESERVATIVE FREE 10 ML: 5 INJECTION INTRAVENOUS at 21:30

## 2022-09-30 RX ADMIN — POTASSIUM BICARBONATE 20 MEQ: 782 TABLET, EFFERVESCENT ORAL at 17:28

## 2022-09-30 RX ADMIN — DIGOXIN 0.12 MG: 125 TABLET ORAL at 08:36

## 2022-09-30 RX ADMIN — POTASSIUM CHLORIDE 10 MEQ: 7.46 INJECTION, SOLUTION INTRAVENOUS at 08:00

## 2022-09-30 RX ADMIN — PANTOPRAZOLE SODIUM 40 MG: 40 INJECTION, POWDER, FOR SOLUTION INTRAVENOUS at 08:39

## 2022-09-30 RX ADMIN — METRONIDAZOLE 500 MG: 500 INJECTION, SOLUTION INTRAVENOUS at 11:31

## 2022-09-30 RX ADMIN — SULFAMETHOXAZOLE AND TRIMETHOPRIM 1 TABLET: 800; 160 TABLET ORAL at 15:20

## 2022-09-30 RX ADMIN — DEXTROSE MONOHYDRATE 150 MG: 50 INJECTION, SOLUTION INTRAVENOUS at 21:27

## 2022-09-30 RX ADMIN — DIGOXIN 250 MCG: 250 INJECTION, SOLUTION INTRAMUSCULAR; INTRAVENOUS at 06:08

## 2022-09-30 RX ADMIN — ONDANSETRON 4 MG: 2 INJECTION INTRAMUSCULAR; INTRAVENOUS at 01:32

## 2022-09-30 RX ADMIN — SODIUM CHLORIDE 250 ML: 9 INJECTION, SOLUTION INTRAVENOUS at 11:50

## 2022-09-30 RX ADMIN — DILTIAZEM HYDROCHLORIDE 15 MG/HR: 5 INJECTION INTRAVENOUS at 10:29

## 2022-09-30 RX ADMIN — POTASSIUM CHLORIDE 10 MEQ: 7.46 INJECTION, SOLUTION INTRAVENOUS at 07:00

## 2022-09-30 RX ADMIN — METRONIDAZOLE 500 MG: 500 INJECTION, SOLUTION INTRAVENOUS at 18:53

## 2022-09-30 RX ADMIN — METOPROLOL TARTRATE 25 MG: 25 TABLET, FILM COATED ORAL at 21:28

## 2022-09-30 RX ADMIN — DEXTROSE MONOHYDRATE: 5 INJECTION, SOLUTION INTRAVENOUS at 08:09

## 2022-09-30 RX ADMIN — SODIUM CHLORIDE 75 ML/HR: 9 INJECTION, SOLUTION INTRAVENOUS at 17:45

## 2022-09-30 RX ADMIN — SODIUM CHLORIDE, PRESERVATIVE FREE 10 ML: 5 INJECTION INTRAVENOUS at 07:38

## 2022-09-30 RX ADMIN — METRONIDAZOLE 500 MG: 500 INJECTION, SOLUTION INTRAVENOUS at 01:32

## 2022-09-30 RX ADMIN — DILTIAZEM HYDROCHLORIDE 2.5 MG/HR: 5 INJECTION INTRAVENOUS at 08:09

## 2022-09-30 RX ADMIN — KETOROLAC TROMETHAMINE 15 MG: 30 INJECTION, SOLUTION INTRAMUSCULAR; INTRAVENOUS at 18:48

## 2022-09-30 NOTE — PROGRESS NOTES
Report given to [facility] Nurse Keisha Lima. Report included SBAR, MAR , KARDEX, LABS, INTAKE/OUT, VS, MEDS and care given  during the RAPID RESPONSE.

## 2022-09-30 NOTE — PROGRESS NOTES
Hematology-Oncology Progress Note    Boris Mcgregor  1945  [id number]  9/30/2022    Follow-up for: metastatic adenocarcinoam     [x]        Chart notes since last visit reviewed   [x]        Medications reviewed for allergies and interactions       Case discussed with the following:         []                            [x]        Nursing Staff                                                                         []        Pathologist                                                                        []        FAMILY      Subjective:     Spoke with patient who complains of: pt is very weak, moved to telemetry for tachycardia this am    Objective:   Patient Vitals for the past 24 hrs:   BP Temp Pulse Resp SpO2   09/30/22 0901 124/74 98.3 °F (36.8 °C) (!) 142 (!) 35 100 %   09/30/22 0836 118/74 -- (!) 147 -- --   09/30/22 0646 -- -- (!) 163 -- --   09/30/22 0645 124/68 97.2 °F (36.2 °C) (!) 149 27 97 %   09/30/22 0615 105/62 -- (!) 163 18 97 %   09/30/22 0610 113/66 -- (!) (P) 155 -- --   09/30/22 0607 108/68 -- (!) (P) 166 -- --   09/30/22 0600 112/79 -- (!) (P) 148 -- --   09/30/22 0143 139/72 98.6 °F (37 °C) 97 18 96 %   09/29/22 1535 135/74 97.4 °F (36.3 °C) (!) 102 16 99 %   09/29/22 1314 129/63 97.9 °F (36.6 °C) (!) 103 16 99 %   09/29/22 1303 126/72 97.5 °F (36.4 °C) 100 16 99 %   09/29/22 1245 129/64 98 °F (36.7 °C) 84 16 99 %   09/29/22 1240 134/76 98 °F (36.7 °C) 82 16 99 %       REVIEW OF SYSTEMS:    Constitutional: negative fever, negative chills, negative weight loss  Eyes:   negative visual changes  ENT:   negative sore throat, tongue or lip swelling  Respiratory:  negative cough, negative dyspnea  Cards:  negative for chest pain, palpitations, lower extremity edema  GI:   negative for nausea, vomiting, diarrhea, and abdominal pain  Neuro:  negative for headaches, dizziness, vertigo  []                        Full ROS o/w normal/non contributor    Constitutional:  Patient looks  []        Sick  [x]        Frail  []        Better                                                 []        Depressed    HEENT:  [x]   NC                         []   AT               []    ALOPECIA           Eyes: [x]   Normal               []    Icteric  Oropharynx: []    Normal                  []  Thrush               []   Dry  Mucositis: [x]    None                 Grade: []        I  []        II  []        III  []        IV  Neck:   [x]   Supple                  []  Rigid               JVD:    []   ABSENT       []   PRESENT  Lymphadenopathy:   []   None Noted            []   PRESENT    Chest:  [x]   Clear               []    Rhonchi                      Dec'd @     []  Right Base           []   Left Base    CV:             []   Regular              []  Irregular               [x]   Tachy                []   Murmur  Abdominal:   [x]    Soft              []   NON-tender               []   Tender      BS:    []   ABSENT                   []   PRESENT  Liver:     [x]  NON-palp                  []   EDGE- palp  Spleen: [x]   NON-palp                   []  EDGE - palp  Mass:   [x]   ABSENT                          []  PRESENT  Extr:    []  Lymphedema             []   Cyanosis      []  Clubbing  Edema:     [x]   NONE       []   PRESENT  Skin:  Intact [x]           Purpura []        Rash: []   ABSENT       []  PRESENT  Neuro:  [x]        Normal  []        Confused      Available labs reviewed:  Labs:    Recent Results (from the past 24 hour(s))   CEA    Collection Time: 09/29/22  6:59 PM   Result Value Ref Range    CEA 3.4 ng/mL   CBC WITH AUTOMATED DIFF    Collection Time: 09/29/22  6:59 PM   Result Value Ref Range    WBC 6.8 3.6 - 11.0 K/uL    RBC 3.21 (L) 3.80 - 5.20 M/uL    HGB 8.9 (L) 11.5 - 16.0 g/dL    HCT 26.6 (L) 35.0 - 47.0 %    MCV 82.9 80.0 - 99.0 FL    MCH 27.7 26.0 - 34.0 PG    MCHC 33.5 30.0 - 36.5 g/dL    RDW 21.7 (H) 11.5 - 14.5 %    PLATELET 034 (H) 137 - 400 K/uL    MPV 8.7 (L) 8.9 - 12.9 FL    NRBC 0.0 0  WBC    ABSOLUTE NRBC 0.00 0.00 - 0.01 K/uL    NEUTROPHILS 77 (H) 32 - 75 %    LYMPHOCYTES 8 (L) 12 - 49 %    MONOCYTES 12 5 - 13 %    EOSINOPHILS 2 0 - 7 %    BASOPHILS 1 0 - 1 %    IMMATURE GRANULOCYTES 0 0.0 - 0.5 %    ABS. NEUTROPHILS 5.3 1.8 - 8.0 K/UL    ABS. LYMPHOCYTES 0.5 (L) 0.8 - 3.5 K/UL    ABS. MONOCYTES 0.8 0.0 - 1.0 K/UL    ABS. EOSINOPHILS 0.1 0.0 - 0.4 K/UL    ABS. BASOPHILS 0.1 0.0 - 0.1 K/UL    ABS. IMM. GRANS. 0.0 0.00 - 0.04 K/UL    DF SMEAR SCANNED      RBC COMMENTS ANISOCYTOSIS  1+        RBC COMMENTS ANA CELLS  1+       CBC WITH AUTOMATED DIFF    Collection Time: 09/30/22  2:51 AM   Result Value Ref Range    WBC 6.4 3.6 - 11.0 K/uL    RBC 3.01 (L) 3.80 - 5.20 M/uL    HGB 8.1 (L) 11.5 - 16.0 g/dL    HCT 25.1 (L) 35.0 - 47.0 %    MCV 83.4 80.0 - 99.0 FL    MCH 26.9 26.0 - 34.0 PG    MCHC 32.3 30.0 - 36.5 g/dL    RDW 22.3 (H) 11.5 - 14.5 %    PLATELET 305 878 - 466 K/uL    MPV 8.9 8.9 - 12.9 FL    NRBC 0.0 0  WBC    ABSOLUTE NRBC 0.00 0.00 - 0.01 K/uL    NEUTROPHILS 72 32 - 75 %    LYMPHOCYTES 10 (L) 12 - 49 %    MONOCYTES 14 (H) 5 - 13 %    EOSINOPHILS 2 0 - 7 %    BASOPHILS 1 0 - 1 %    IMMATURE GRANULOCYTES 1 (H) 0.0 - 0.5 %    ABS. NEUTROPHILS 4.6 1.8 - 8.0 K/UL    ABS. LYMPHOCYTES 0.6 (L) 0.8 - 3.5 K/UL    ABS. MONOCYTES 0.9 0.0 - 1.0 K/UL    ABS. EOSINOPHILS 0.1 0.0 - 0.4 K/UL    ABS. BASOPHILS 0.1 0.0 - 0.1 K/UL    ABS. IMM.  GRANS. 0.1 (H) 0.00 - 0.04 K/UL    DF SMEAR SCANNED      RBC COMMENTS ANISOCYTOSIS  2+        RBC COMMENTS ANA CELLS  PRESENT        RBC COMMENTS POLYCHROMASIA  PRESENT       METABOLIC PANEL, BASIC    Collection Time: 09/30/22  2:51 AM   Result Value Ref Range    Sodium 138 136 - 145 mmol/L    Potassium 3.0 (L) 3.5 - 5.1 mmol/L    Chloride 108 97 - 108 mmol/L    CO2 15 (LL) 21 - 32 mmol/L    Anion gap 15 5 - 15 mmol/L    Glucose 80 65 - 100 mg/dL    BUN 3 (L) 6 - 20 MG/DL    Creatinine 0.53 (L) 0.55 - 1.02 MG/DL    BUN/Creatinine ratio 6 (L) 12 - 20      GFR est AA >60 >60 ml/min/1.73m2    GFR est non-AA >60 >60 ml/min/1.73m2    Calcium 8.0 (L) 8.5 - 10.1 MG/DL   MAGNESIUM    Collection Time: 09/30/22  2:51 AM   Result Value Ref Range    Magnesium 1.3 (L) 1.6 - 2.4 mg/dL   EKG, 12 LEAD, INITIAL    Collection Time: 09/30/22  5:30 AM   Result Value Ref Range    Ventricular Rate 190 BPM    Atrial Rate 190 BPM    QRS Duration 82 ms    Q-T Interval 240 ms    QTC Calculation (Bezet) 426 ms    Calculated R Axis 118 degrees    Calculated T Axis 126 degrees    Diagnosis       Undetermined rhythm  Right axis deviation  Low voltage QRS  Septal infarct , age undetermined  When compared with ECG of 28-SEP-2022 05:12,  Current undetermined rhythm precludes rhythm comparison, needs review  QRS axis shifted right  Septal infarct is now present  T wave inversion no longer evident in Anterior leads     EKG, 12 LEAD, INITIAL    Collection Time: 09/30/22  5:58 AM   Result Value Ref Range    Ventricular Rate 142 BPM    Atrial Rate 144 BPM    QRS Duration 90 ms    Q-T Interval 354 ms    QTC Calculation (Bezet) 544 ms    Calculated R Axis 93 degrees    Calculated T Axis 76 degrees    Diagnosis       Atrial fibrillation with rapid ventricular response  Rightward axis  Nonspecific ST and T wave abnormality , probably digitalis effect  When compared with ECG of 30-SEP-2022 05:30,  Previous ECG has undetermined rhythm, needs review  Nonspecific T wave abnormality no longer evident in Inferior leads  Nonspecific T wave abnormality, improved in Lateral leads         Available Xrays reviewed:    Chemotherapy monitored and toxicities assessed:    Assessment and Plan   Metastatic adenocarcinoma. .  I discussed this case with Dr. Amina Gonzalez who did a jennifer-colectomy for a large 6cm dysplastic polyp (zero of 29nodes)  in February 2022, and he noted that the original colonoscopy did not get past the polyp,  current ct scan worrisome for a cecal malignancy.       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 The pt needs GI eval when she is clinically stable,   she is very frail and is borderline for systemic therapy but hopefully can improve with nutrition etc and get started in a week  or two. \"Hepatic abscess\"  radiology feels that this is actually organized hematoma. Hgb 8.1 this am.  Will give iv iron after she stabelizes    Tachycardia. .   cardiology is seeing her. Will defer mgmt to them.   Shayy Au MD

## 2022-09-30 NOTE — CONSULTS
Nutrition Note    Nutrition consult received and appreciated for pt with poor appetite/intake. Pt known to nutrition services and is being followed per nutrition policy. Pt assessed to meet severe malnutrition criteria with oral nutrition supplements and snacks per pt preferences added. Will continue to follow pt progress.       Electronically signed by Ny Farley RD on 9/30/2022 at 9:52 AM    Contact via [facility] or ext [phone]

## 2022-09-30 NOTE — PROGRESS NOTES
Patient was re-evaluated on transfer to telemetry floor. Patient has no complaints at this time. HR has decreased but still irregular , between 120s-140s. BP has increased and more stable such that I will order Diltiazem IV drip and titrate to keep goal HR < 100. Consult cardiologist.  Continue telemetry monitoring.

## 2022-09-30 NOTE — PROGRESS NOTES
Given poor oral intake and persistent acidosis, though improved after bicarb drip, will place NGT and start tube feeds. Pt is amenable. Suspect metabolic acidosis is 2/2 limited oral intake and poor nutritional status. Na 135. Bicarb 18. Anion gap 12. Elevated beta hydroxybutyrate. Will start tube feeds and change fluids to normal saline at 75 cc/h. Nutrition consult placed this morning, appreciate any recommendations for tube feeds.      Edwige Hicks MD

## 2022-09-30 NOTE — PROGRESS NOTES
[address] [facility]  Hospitalist Group                                                                                          Hospitalist Progress Note  Shayla Murray MD  Answering service: [phone] from in house phone        Date of Service:  2022  NAME:  Mile Waldron  :  1945  MRN:  [id number]      Admission Summary:   Mile Waldron is a 77 y.o. female is brought to the ED via EMS for abdominal pain and nausea. She also reported watery diarrhea. Patient was recently hospitalized from - for abdominal pain and constipation and was found to have metastatic disease. She underwent liver biopsy which showed adenocarcinoma. Patient denied fever or chills. She denied dysuria, urgency or frequency. Work-up in the ED was significant for an abnormal abdominal pelvic CT that showed progression of hepatic lesions with a new large fluid collection in the liver with suggestion that this could be intrahepatic abscess. There are also fluid-filled cecum. Right pleural effusion. Patient was started on levofloxacin and Flagyl and was referred to the hospitalist service for admission evaluation. Discussion with radiology later for drainage of fluid collection, felt more likely hematoma rather than infectious    Interval history / Subjective:   Overnight, patient developed a fib with RVR with rate in the 190s. She was given adenosine x 2 doses, 500cc fluid bolus, followed by digoxin. Rate subsequently improved with stable BP, therefore patient transitioned to diltiazem drip. On rounds this morning, patient appears mildly uncomfortable. Denies current chest pain, palpitations, or SOB. States she has abdominal pain and poor appetite. Poor oral intake over previous day. Assessment & Plan:      Abdominal pain associate with nausea and vomiting and diarrhea  Liver adenocarcinoma based on recent biopsy, primary unknown  Acute diarrheal illness.   --Presented with abdominal pain with nausea, vomiting, and diarrhea   --Radiology were consulted for liver drainage CT report suggested abscess. However after further review radiology the liver fluid is most probably is hematoma than infectious, besides patient does not have fever or leukocytosis and recommended against drainage. --Recent diagnosis of metastatic liver adenocarcinoma   -- c. Diff negative   Plan:   --Continue levofloxacin and metronidazole  --Oncology consulted, appreciate recommendations   --Symptomatic care   - Obtain Enteric panel      History of chronic atrial fibrillation.    A fib with RVR, nPOA  --Continue dilt drip, titrate to goal HR <100   --Continue home digoxin   --Not on AC due to bleeding in the past, holding here due to dropped Hgb   --Cardiology consult   --Obtain ECHO once HR improves      Metabolic acidosis  Co2 on labs dropping, down to 15 this morning   Possibly 2/2 poor oral intake in setting of acute illness  Pt endorses poor appetite and decreased intake   Plan:   - Obtain ABG, lactic acid   - Start bicarb drip for volume resuscitation and acid correction   - nutrition consult     Abnormal urinalysis  - UA with nitrites and leukocytes, concerning for infection   - Urine culture pending  - Continue levaquin     Acute on chronic normocytic anemia likely anemia of chronic disease.   - Hbg 6.6 repeat 6.8, improved after transfusion 1U pRBC  - No evidence of bleeding   - Obtain hemoccult stool  -  Holding AC          Code status: FULL  Prophylaxis: SCDs, HOLD AC given dropping hemoglobin  Care Plan discussed with:   Anticipated Disposition: 48h     Hospital Problems  Date Reviewed: 6/29/2022            Codes Class Noted POA    Liver abscess ICD-10-CM: K75.0  ICD-9-CM: 572.0  9/28/2022 Unknown        Abdominal pain ICD-10-CM: R10.9  ICD-9-CM: 789.00  9/28/2022 Unknown        Adenocarcinoma (HCC) ICD-10-CM: C80.1  ICD-9-CM: 199.1  9/28/2022 Unknown             Review of Systems:   A comprehensive review of systems was negative except for that written in the HPI. Vital Signs:    Last 24hrs VS reviewed since prior progress note. Most recent are:  Visit Vitals  /74   Pulse (!) 147   Temp 97.2 °F (36.2 °C)   Resp 27   Ht 5' 3\" (1.6 m)   Wt 41.3 kg (91 lb 1.6 oz)   SpO2 97%   BMI 16.14 kg/m²         Intake/Output Summary (Last 24 hours) at 9/30/2022 5709  Last data filed at 9/29/2022 1859  Gross per 24 hour   Intake 300 ml   Output 300 ml   Net 0 ml        Physical Examination:     I had a face to face encounter with this patient and independently examined them on 9/30/2022 as outlined below:          Constitutional:  No acute distress, cooperative, pleasant, thin   ENT:  Oral mucosa moist, oropharynx benign. Resp:  CTA bilaterally. No wheezing/rhonchi/rales. No accessory muscle use. CV:  Tachycardiac, irregular, no murmurs, gallops, rubs    GI:  Soft, non distended, mildly tender. normoactive bowel sounds, no hepatosplenomegaly     Musculoskeletal:  No edema, warm, 2+ pulses throughout    Neurologic:  Moves all extremities. AAOx3, CN II-XII reviewed            Data Review:    Review and/or order of clinical lab test  Review and/or order of tests in the radiology section of CPT  Review and/or order of tests in the medicine section of CPT      Labs:     Recent Labs     09/30/22  0251 09/29/22  1859   WBC 6.4 6.8   HGB 8.1* 8.9*   HCT 25.1* 26.6*    404*     Recent Labs     09/30/22  0251 09/29/22  0406 09/28/22  0617    136 135*   K 3.0* 3.2* 3.8    113* 108   CO2 15* 16* 19*   BUN 3* 6 7   CREA 0.53* 0.61 0.63   GLU 80 117* 102*   CA 8.0* 8.2* 8.7   MG  --   --  1.7     Recent Labs     09/29/22  0406 09/28/22  0617 09/28/22  0551   ALT 50 68  --    * 307*  --    TBILI 0.3 0.4  --    TP 5.6* 6.3*  --    ALB 2.4* 2.8*  --    GLOB 3.2 3.5  --    LPSE  --   --  73     No results for input(s): INR, PTP, APTT, INREXT, INREXT in the last 72 hours.    No results for input(s): FE, TIBC, PSAT, FERR in the last 72 hours. Lab Results   Component Value Date/Time    Folate 27.1 (H) 08/22/2022 03:55 AM      No results for input(s): PH, PCO2, PO2 in the last 72 hours. No results for input(s): CPK, CKNDX, TROIQ in the last 72 hours.     No lab exists for component: CPKMB  Lab Results   Component Value Date/Time    Cholesterol, total 141 08/22/2022 03:55 AM    HDL Cholesterol 57 08/22/2022 03:55 AM    LDL, calculated 71.6 08/22/2022 03:55 AM    Triglyceride 62 08/22/2022 03:55 AM    CHOL/HDL Ratio 2.5 08/22/2022 03:55 AM     Lab Results   Component Value Date/Time    Glucose (POC) 145 (H) 02/14/2022 09:01 PM     Lab Results   Component Value Date/Time    Color YELLOW/STRAW 09/28/2022 10:37 AM    Appearance CLEAR 09/28/2022 10:37 AM    Specific gravity 1.010 09/28/2022 10:37 AM    pH (UA) 7.0 09/28/2022 10:37 AM    Protein Negative 09/28/2022 10:37 AM    Glucose Negative 09/28/2022 10:37 AM    Ketone 15 (A) 09/28/2022 10:37 AM    Bilirubin Negative 09/28/2022 10:37 AM    Urobilinogen 0.2 09/28/2022 10:37 AM    Nitrites Positive (A) 09/28/2022 10:37 AM    Leukocyte Esterase MODERATE (A) 09/28/2022 10:37 AM    Epithelial cells FEW 09/28/2022 10:37 AM    Bacteria 4+ (A) 09/28/2022 10:37 AM    WBC >100 (H) 09/28/2022 10:37 AM    RBC 0-5 09/28/2022 10:37 AM         Medications Reviewed:     Current Facility-Administered Medications   Medication Dose Route Frequency    potassium chloride 10 mEq in 100 ml IVPB  10 mEq IntraVENous Q1H    dilTIAZem (CARDIZEM) 125 mg in dextrose 5% 125 mL infusion  0-15 mg/hr IntraVENous TITRATE    potassium bicarb-citric acid (EFFER-K) tablet 20 mEq  20 mEq Oral Q4H    sodium bicarbonate (8.4%) 150 mEq in dextrose 5% 1,000 mL infusion   IntraVENous CONTINUOUS    0.9% sodium chloride infusion 250 mL  250 mL IntraVENous PRN    digoxin (LANOXIN) tablet 0.125 mg  0.125 mg Oral DAILY    albuterol (PROVENTIL VENTOLIN) nebulizer solution 2.5 mg  2.5 mg Nebulization Q6H PRN sodium chloride (NS) flush 5-40 mL  5-40 mL IntraVENous Q8H    sodium chloride (NS) flush 5-40 mL  5-40 mL IntraVENous PRN    acetaminophen (TYLENOL) tablet 650 mg  650 mg Oral Q6H PRN    Or    acetaminophen (TYLENOL) suppository 650 mg  650 mg Rectal Q6H PRN    polyethylene glycol (MIRALAX) packet 17 g  17 g Oral DAILY PRN    ondansetron (ZOFRAN ODT) tablet 4 mg  4 mg Oral Q8H PRN    Or    ondansetron (ZOFRAN) injection 4 mg  4 mg IntraVENous Q6H PRN    ketorolac (TORADOL) injection 15 mg  15 mg IntraVENous Q6H PRN    pantoprazole (PROTONIX) 40 mg in 0.9% sodium chloride 10 mL injection  40 mg IntraVENous DAILY    levoFLOXacin (LEVAQUIN) 500 mg in D5W IVPB  500 mg IntraVENous Q24H    metroNIDAZOLE (FLAGYL) IVPB premix 500 mg  500 mg IntraVENous Q8H    prochlorperazine (COMPAZINE) injection 10 mg  10 mg IntraVENous Q6H PRN     ______________________________________________________________________  EXPECTED LENGTH OF STAY: 3d 4h  ACTUAL LENGTH OF STAY:          [facility] MD Agnieszka     CRITICAL CARE ATTESTATION:  I had a face to face encounter with the patient, reviewed and interpreted patient data including clinical events, labs, images, vital signs, I/O's, and examined patient. I have discussed the case and the plan and management of the patient's care with the consulting services, the bedside nurses and necessary ancillary providers. NOTE OF PERSONAL INVOLVEMENT IN CARE   This patient has a high probability of imminent, clinically significant deterioration, which requires the highest level of preparedness to intervene urgently. I participated in the decision-making and personally managed or directed the management of the following life and organ supporting interventions that required my frequent assessment to treat or prevent imminent deterioration. I personally spent 30 minutes of critical care time.   This is time spent at this critically ill patient's bedside actively involved in patient care as well as the coordination of care and discussions with the patient's family. This does not include any procedural time which has been billed separately.

## 2022-09-30 NOTE — PROGRESS NOTES
Physician Progress Note      PATIENT:               Ray Brock  CSN #:                  [id number]  :                       1945  ADMIT DATE:       2022 5:05 AM  DISCH DATE:  RESPONDING  PROVIDER #:        Darryle Net MD          QUERY TEXT:    Pt admitted with Liver Hematoma and had previously undergone a Liver Bx on Sept 15.? If possible, please document in progress notes and discharge summary the relationship if any between the Liver hematoma and the Bx: The medical record reflects the following:  Risk Factors: recent diagnosis of Liver adenocarcinoma and had undergone a Bx on 9/15. Clinical Indicators: admitted with abd pain, diarrhea and N/V. Temp 98.4, HR 96, RR 20, /66. WBC 8.6, Hgb 6.8, Alp 307, CEA 3.4. CT abd showing progression of hepatic lesions and new fluid collections in liver. IR was consulted for possible drainage of lesions and has documented that it is likely a hematoma in the absence of fever and leukocytosis. Pt had previously undergone  a Liver Bx on 9/15. Treatment: Toradol prn, Compazine prn, IVF, full liquid diet, Oral nutritional supplement, Oncology and IR consults. Thank you,  Anibal Gramajo RN  Clinical Documentation  [phone], or via [employer]  Options provided:  -- Liver Hematoma ?is due to the procedure  -- Liver Hematoma is not due to the procedure, but is due to Liver adenocarcinoma  -- Other - I will add my own diagnosis  -- Disagree - Not applicable / Not valid  -- Disagree - Clinically unable to determine / Unknown  -- Refer to Clinical Documentation Reviewer    PROVIDER RESPONSE TEXT:    Provider is clinically unable to determine a response to this query.     Query created by: Cristino Damon on 2022 10:39 AM      Electronically signed by:  Darryle Net MD 2022 12:24 PM

## 2022-09-30 NOTE — CONSULTS
[facility]  Cardiology Care Note                  [x]Initial visit     []Established visit     Patient Name: Gudelia Lindsey - KQ:[id number] - RUP:[id number]  Primary Cardiologist: Luigi Joya and has been followed by cardiology in [place]. Consulting Cardiologist: Joel Pugh MD     Reason for initial visit:afib with RVR    HPI:   Ms. Eloina Rodriguez is a 77 y.o. female with PMH of PAFon digoxin and ASA , Left hemicolectomy in 2/2022 for adenomatous colonic polyp, anemia, HLD, GERD, seizrues, chronic back pain,  former smokr. . She presented with chief c/o abdominal pain and nausea. She was hospitalized earlier this month for abdominal pain and now found to have liver adenocarcinoma on recent liver biopsy with likely organized hepatic hematoma on CT scan. There is concern for cecal malignancy and metastatic colon cancer is suspected. Cardiology consulted as pt went into afib with RVR early this a.m. She denies any palpitations, dizziness, lightheadedness and no chest pain. She had some mild SOB last night but this is better. She feels weak. C/o some abdominal pain. No palpitations. She has received adocard 6 mg and 12 mg, iv diltiazem bolus and IV dig and started on diltiazem gtt. Later in day, she developed hypotension on diltizem gtt. BP is soft. She also exhibits metabolic acidosis thought to be due to poor po intake, malnutrition  CXR with mild diffuse interstia lopacities (edma vs invection)  SH: former smoker, no ETOH. FH:no FH of early CAD    SUBJECTIVE; Assessment and Plan     Likely metastatic adenocarcinoma:  Atrial fibrillation with rapid ventricular response  2. Hypokalemia/hypomagnesemia: repleted by primary team. Mg repleted. 3. Lliver adenocarcinoma (uncertain priimary). Per heme/onc  4. Abnormal UA: per priamry team.   5.  Anemia: ?acute on chronic. Stool OB pending also.      Patient is admitted to the hospital for noncardiac reasons but developed atrial fibrillation/flutter with rapid ventricular response. She does not stop responding to IV Cardizem and there was marginal room to give her beta-blockers were given low blood pressures. Reasonable to give IV hydration. EF appears to be normal.  Amiodarone is a reasonable option. Not a candidate for oral anticoagulation at this point of time. When she converts back into sinus rhythm, reasonable to transition to short-term amiodarone. If blood pressure improves, will also add beta-blocker again. Dr. Nigel Platt will follow over the weekend. ____________________________________________________________    Cardiac testing  07/21/22    ECHO ADULT FOLLOW-UP OR LIMITED 07/21/2022 7/21/2022    Interpretation Summary    Limited study for the assessment of ejection fraction. Left Ventricle: Normal left ventricular systolic function with a visually estimated EF of 55 - 60%. EF by 2D Simpsons Biplane is 56%. Left ventricle size is normal. Normal wall thickness. See diagram for wall motion findings. Tricuspid Valve: Mild to moderate regurgitation on limited color and Doppler interrogation. Pulmonary Arteries: Pulmonary hypertension not present. The estimated PASP is 27 mmHg. Signed by: Winston Cuevas MD on 7/21/2022  2:41 PM                Most recent HS troponins:  No results for input(s): TROPHS in the last 72 hours. No lab exists for component:  CKMB  ECG: afib with RVR, nonspecific t wave abnormality  Repeat EKG reviewed by Dr. Jaime Butts- atrial tachycardia with t wave abnormality V5, V6.  ?possible dig effect.    Review of Systems    []All other systems reviewed and all negative except as written in HPI    [] Patient unable to provide secondary to condition         Past Medical History:   Diagnosis Date    Adenoma of colon     Anemia     Arthritis     Atrial fibrillation (HCC)     Bloating     Chronic constipation     Chronic pain back     COPD (chronic obstructive pulmonary disease) (HCC)     GERD (gastroesophageal reflux disease)     [place] of hearing[place]     Hyponatremia     caused seizures x 2 per pt    Indigestion     Osteoporosis     Seizures ([address] 7/2020, 9/2020    x 2      Past Surgical History:   Procedure Laterality Date    COLONOSCOPY N/A 12/16/2021    COLONOSCOPY   :- performed by Ernesto Bunn., MD at [place] [address] N/A 2/14/2022    . performed by Aracely Orozco MD at [facility] MAIN OR    [address]    HX CHOLECYSTECTOMY  1982    HX HIP REPLACEMENT Left     pt stated hip was pinned, not replaced    HX HIP REPLACEMENT Right     pt stated hip was pinned, not replaced    HX HYSTERECTOMY      HX ORTHOPAEDIC  1990, 2011    bilateral hip fractures     HX ORTHOPAEDIC Left 2020    femur fracture    HX TONSILLECTOMY       Social Hx:  reports that she quit smoking about 10 years ago. She has a 20.00 pack-year smoking history. She has never used smokeless tobacco. She reports that she does not drink alcohol and does not use drugs. Family Hx: family history includes Alcohol abuse in her father; Heart Disease in her father and mother; Liver Disease in her father. Allergies   Allergen Reactions    Cefuroxime Hives    Hydrocodone Nausea and Vomiting    Other Medication Diarrhea and Nausea and Vomiting     PT REPORTS ALL MYCINS CAUSE SEVERE GI UPSET    Oxycodone Nausea and Vomiting    Penicillins Hives     Patient screened for any delayed non-IgE-mediated reaction to PCN.         Patient notes the following:    No delayed non-IgE-mediated reaction to PCN    Hives 1969                OBJECTIVE:  Wt Readings from Last 3 Encounters:   09/29/22 91 lb 1.6 oz (41.3 kg)   09/15/22 92 lb (41.7 kg)   07/21/22 93 lb (42.2 kg)       Intake/Output Summary (Last 24 hours) at 9/30/2022 1535  Last data filed at 9/30/2022 1600  Gross per 24 hour   Intake 260 ml   Output 800 ml   Net -540 ml         Physical Exam    Vitals:   Vitals:    09/30/22 1800 09/30/22 1900 09/30/22 2128 09/30/22 2154   BP:  126/68 (!) 111/59    Pulse: (!) 123 (!) 138 (!) 103 (!) 114   Resp:  18     Temp:  97.7 °F (36.5 °C)     SpO2:  100%     Weight:       Height:         Telemetry: afib rvr this a.m. now  BP (!) 111/59   Pulse (!) 114   Temp 97.7 °F (36.5 °C)   Resp 18   Ht 5' 3\" (1.6 m)   Wt 91 lb 1.6 oz (41.3 kg)   SpO2 100%   BMI 16.14 kg/m²   General:    Alert, cooperative, no distress. Cachectic   Psychiatric:    Normal Mood and affect    Eye/ENT:      Pupils equal, No asymmetry, Conjunctival pink. Able to hear voice at normal amplitude   Lungs:      Visibly symmetric chest expansion, No palpable tenderness. Clear to auscultation bilaterally. Heart[de-identified]    Regular rate and rhythm, S1, S2 normal, no murmur, click, rub or gallop. No JVD, Normal palpable peripheral pulses. No cyanosis   Abdomen:     Soft, non-tender. Bowel sounds normal. No masses,  No      organomegaly. Extremities:   Extremities normal, atraumatic, no edema. Neurologic:   CN II-XII grossly intact. No gross focal deficits           Data Review:     Radiology:   XR Results (most recent):  Results from Hospital Encounter encounter on 09/28/22    XR ABD (KUB)    Narrative  INDICATION: NG tube placement    EXAM: Abdomen KUB. 1958 hours. FINDINGS:  Semierect AP portable view of the abdomen shows NG tube tip projecting over the  left lower lung. Floor notified by phone. Impression  NG tube in the lung. CT Results (most recent):  Results from Hospital Encounter encounter on 09/28/22    CT ABD PELV W CONT    Narrative  EXAM: CT ABD PELV W CONT    INDICATION: Diffuse abdominal pain and diarrhea. Rectal constipation earlier  this month. Nonspecific liver disease on previous imaging. Biliary dilatation on  previous imaging. Elevated alkaline phosphatase. Normal white blood cell count,  bilirubin, lipase. COMPARISON: CT abdomen/pelvis on 9/12/2022.     CONTRAST: 100 mL of Isovue-370. ORAL CONTRAST: None    TECHNIQUE:  Following the uneventful intravenous administration of contrast, thin axial  images were obtained through the abdomen and pelvis. Coronal and sagittal  reconstructions were generated. CT dose reduction was achieved through use of a  standardized protocol tailored for this examination and automatic exposure  control for dose modulation. FINDINGS:  LOWER THORAX: Small right pleural effusion is new. No basilar pneumonia. Normal  cardiac size. LIVER: Segment 7/8 subcapsular collection of heterogeneous fluid measures 9.3 x  6.9 x 3.5 cm. Heterogeneous septated fluid attenuation throughout the right  hepatic lobe measures approximately 13 cm in oblique AP diameter. Central right  hepatic lobe peripherally enhancing lesion previously measured 1.9 cm and now  measures 2.4 cm. Unchanged mild intrahepatic biliary dilatation. BILIARY TREE: Cholecystectomy. Unchanged chronic biliary dilatation. No abrupt  termination. SPLEEN: Normal size. Lobulated contour. PANCREAS: Mild diffuse atrophy. No mass or inflammation. ADRENALS: Unremarkable. KIDNEYS: No hydronephrosis. Heterogeneous small multiple cysts. STOMACH: Unremarkable. SMALL BOWEL: No dilatation or wall thickening. COLON: Incomplete distention, limited evaluation. Fluid-filled cecum is in the  pelvis. APPENDIX: Normal.  PERITONEUM: No ascites or pneumoperitoneum. RETROPERITONEUM: Aortic atherosclerosis without aneurysm. 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 No lymphadenopathy. REPRODUCTIVE ORGANS: Hysterectomy. URINARY BLADDER: Incomplete distention, limited evaluation. BONES: Osteopenia. Femoral hardware. Spinal curvature. T11 partial compression  fracture. ABDOMINAL WALL: No mass or hernia. ADDITIONAL COMMENTS: Diffuse muscle atrophy. Impression  1. Progression of hepatic lesions and new large fluid collections in the liver.   Intrahepatic abscesses from nonspecific infection are most likely. Consider  fluid sampling or drainage with CT guidance. 2. New small right pleural effusion. 3. Fluid-filled cecum may indicate infectious colitis. No bowel obstruction. MRI Results (most recent):  No results found for this or any previous visit. No results for input(s): CPK, TROIQ in the last 72 hours. No lab exists for component: CKQMB, CPKMB, BMPP  Recent Labs     09/30/22  1623 09/30/22  0251   * 138   K 3.6 3.0*    108   CO2 18* 15*   BUN 5* 3*   CREA 0.70 0.53*   * 80   CA 8.5 8.0*     Recent Labs     09/30/22  0251 09/29/22  1859   WBC 6.4 6.8   HGB 8.1* 8.9*   HCT 25.1* 26.6*    404*     Recent Labs     09/29/22  0406 09/28/22  0617   * 307*     No results for input(s): CHOL, LDLC in the last 72 hours. No lab exists for component: TGL, HDLC,  HBA1C  No results for input(s): CRP, TSH, TSHEXT, TSHEXT in the last 72 hours.     No lab exists for component: ESR        Current meds:    Current Facility-Administered Medications:     dilTIAZem (CARDIZEM) 125 mg in dextrose 5% 125 mL infusion, 0-15 mg/hr, IntraVENous, TITRATE, [place], Spencer Huffman MD, Stopped at 09/30/22 1500    metoprolol tartrate (LOPRESSOR) tablet 25 mg, 25 mg, Oral, Q12H, Jenni Mercado NP, 25 mg at 09/30/22 2128    trimethoprim-sulfamethoxazole (BACTRIM DS, SEPTRA DS) 160-800 mg per tablet 1 Tablet, 1 Tablet, Oral, Q12H, Roxi Russo MD, 1 Tablet at 09/30/22 2133    amiodarone (CORDARONE) 375 mg/250 mL D5W infusion, 0.5-1 mg/min, IntraVENous, TITRATE, Joanie Mercado, NP, Last Rate: 40 mL/hr at 09/30/22 1521, 1 mg/min at 09/30/22 1521    0.9% sodium chloride infusion, 75 mL/hr, IntraVENous, CONTINUOUS, Roxi Sandoval Sa, MD, Last Rate: 75 mL/hr at 09/30/22 1745, 75 mL/hr at 09/30/22 1745    0.9% sodium chloride infusion 250 mL, 250 mL, IntraVENous, PRN, Roxi Sandoval Sa, MD    [facility] [place] by provider] digoxin (LANOXIN) tablet 0.125 mg, 0.125 mg, Oral, DAILY, Roxi Sandoval Sa MD CHERI, 0.125 mg at 09/30/22 0836    albuterol (PROVENTIL VENTOLIN) nebulizer solution 2.5 mg, 2.5 mg, Nebulization, Q6H PRN, Roxi Allen MD    sodium chloride (NS) flush 5-40 mL, 5-40 mL, IntraVENous, Q8H, BABATUNDE Velásquez MD, 10 mL at 09/30/22 2130    sodium chloride (NS) flush 5-40 mL, 5-40 mL, IntraVENous, PRN, SUNG Velásquez MD    acetaminophen (TYLENOL) tablet 650 mg, 650 mg, Oral, Q6H PRN **OR** acetaminophen (TYLENOL) suppository 650 mg, 650 mg, Rectal, Q6H PRN, KVNG Velásquez MD    polyethylene glycol (MIRALAX) packet 17 g, 17 g, Oral, DAILY PRN, Christen KPZQOWCLARIBEL ALICEA MD    ondansetron (ZOFRAN ODT) tablet 4 mg, 4 mg, Oral, Q8H PRN **OR** ondansetron (ZOFRAN) injection 4 mg, 4 mg, IntraVENous, Q6H PRN, MIKI Velásquez MD, 4 mg at 09/30/22 0132    ketorolac (TORADOL) injection 15 mg, 15 mg, IntraVENous, Q6H PRN, CUCA Velásquez MD, 15 mg at 09/30/22 1848    pantoprazole (PROTONIX) 40 mg in 0.9% sodium chloride 10 mL injection, 40 mg, IntraVENous, DAILY, TRAVIS VelásquezZCHERELLE ALICEA MD, 40 mg at 09/30/22 0839    metroNIDAZOLE (FLAGYL) IVPB premix 500 mg, 500 mg, IntraVENous, Q8H, Norma Velásquez MD, Last Rate: 100 mL/hr at 09/30/22 1853, 500 mg at 09/30/22 1853    prochlorperazine (COMPAZINE) injection 10 mg, 10 mg, IntraVENous, Q6H PRN, Norma Velásquez MD, 10 mg at 09/28/22 [address] [place] [address] MD Nicci  [facility] [place] [address]  Tarun Braun  [phone]      Ajith Farris NP

## 2022-09-30 NOTE — PROGRESS NOTES
Bedside and Verbal shift change report given to [address] [place] [address] (oncoming nurse) by Glendy An (offgoing nurse). Report included the following information SBAR, Kardex, Intake/Output, MAR, and Recent Results.

## 2022-09-30 NOTE — PROGRESS NOTES
[facility]                                          Critical Care Documentation    Name: Jeannette Palomino  YOB: 1945  MRN: [id number]  Admission Date: 9/28/2022  5:05 AM    Date of service: 9/30/2022    Active Diagnoses:    Hospital Problems  Date Reviewed: 6/29/2022            Codes Class Noted POA    Liver abscess ICD-10-CM: K75.0  ICD-9-CM: 572.0  9/28/2022 Unknown        Abdominal pain ICD-10-CM: R10.9  ICD-9-CM: 789.00  9/28/2022 Unknown        Adenocarcinoma ([facility] [address] ICD-10-CM: C80.1  ICD-9-CM: 199.1  9/28/2022 Unknown           Chief Complaint:  Patient does not provide    Clinical Presentation:  Patient is seen emergently for Rapid response code (RRC) with HR increased to 200s. On arrival at bedside, patient admits to mild SOB. Initially she did not admit to chest pain but rather c/o \"liver pain\" (RUQ pain). Vital signs:  /84     RR 20  O2sat 98% on room air    Physical Exam:     General:  Cachetic female in no acute respiratory distress. Head:  Normocephalic, without obvious abnormality, atraumatic   Eyes:  Conjunctivae/corneas clear. Pupils 2 mm reactive bilateral.   E/N/M/T: Nares normal. Septum midline.  No nasal drainage or sinus tenderness  Tongue midline/ non-edematous  Clear oropharynx   Neck: Normal appearance and movements, symmetrical, trachea midline  No palpable adenopathy  No thyroid enlargement, tenderness or nodules  No carotid bruit   No JVD  Trachea midline   Lungs:   Symmetrical chest expansion and respiratory effort  Clear to auscultation bilaterally   Chest wall:  No tenderness or deformity   Heart:  Tachycardic/ irregular rhythm  Normal S1 and S2; no murmur, click, rub or gallop   Abdomen:   Soft, RUQ tenderness  No rebound, guarding, or rigidity  Non-distended   Bowel sounds normal  No masses or hepatosplenomegaly  No hernias present Back: No costovertebral angle tenderness  No step-off deformity   Extremities: Extremities normal, atraumatic  No cyanosis or edema     Vascular/  Pulses: 2+ radial/ 1+ DP bilateral pulses   Integument/  Skin: No rashes or ulcers  Warm and dry   Musculo-      skeletal: Gait not tested  No calf tenderness   Neuro: GCS 15. Moves all extremities x 4. No slurred speech. No facial droop. Sensation grossly intact. Psych: Alert, oriented x 3     Geniturinary: Purewick external urinary catheter in place      Data Reviewed: All diagnostic labs and studies have been reviewed. Assessment and Plan:  Tachycardia  Atrial fibrillation with rapid ventricular response  -12 lead EKG at 05:30 hours showed undetermined rhythm, ST / T waves in septal leads at 190 bpm    Interventions:  Given Adenosine 6 mg, followed by 12 mg IV x 1 dose with transient decrease in HR. HR eventually decreased, fluctuating between 120s - 160s. Repeat SBP decreased to 90s to low 100s. Repeat 12 lead EKG at 05:58 hours showed Afib RVR with nonspecific ST/ T wave changes at 142 bpm  -started 0.9%  ml IV fluid bolus x 1    Initially considered for Diltiazem IV drip vs Amiodarone 150mg IV but as BP decreased after aforementioned drugs, patient was then given Digoxin 0.25 mg IV x 1 dose (followed by remaining 0.25 mg IV = for total 0.5 mg IV) to prevent hypotension. HR has decreased and patient subjectively more comfortable, in response to the aforementioned. As HR improved, patient was not cardioverted at this time. Patient remained A&Ox3 with no respiratory distress during evaluation.    Due to earlier subjective dyspnea, she was placed on O2 2 L NC for comfort.      -will continue monitoring and transfer to CVSU/ telemetry unit and consult cardiologist.    Medications Administered:   Sedation: [ ] yes [x ] no   Anxiolytics: [ ] yes [x ] no   Antiarrhythmics: [ x] yes [ ] no   Antihypertensives: [ ] yes [x ] no   Pressors: [ ] yes [x ] no   IVF's: [ x] yes [ ] no       Critical Care Attestation: This patient is unstable and critically ill. Due to a high probability of clinically significant, life threatening deterioration, the patient required my highest level of preparedness to intervene emergently and I personally spent this critical care time directly and personally managing the patient. This critical care time included obtaining a history; examining the patient; pulse oximetry; ordering and review of studies; arranging urgent treatment with development of a management plan; evaluation of patient's response to treatment; frequent reassessment; and, discussions with other providers and/or family. This critical care time was performed to assess and manage the high probability of imminent, life-threatening deterioration that could result in multi-organ failure and death. It was exclusive of separately billable procedures, treating other patients, and teaching time. Time Spent:     I personally spent 60 minutes in providing critical care time.     Nicolas Borden MD  9/30/2022  6:23 AM

## 2022-09-30 NOTE — PROGRESS NOTES
0730: Bedside and Verbal shift change report given to [address] [place] [address] (oncoming nurse) by Deni Jesus (offgoing nurse). Report included the following information SBAR, Kardex, Intake/Output, and MAR. In  conversation with the patient she mentioned that she previously lived in [place] [place] [facility] for 18 years, and moved back to [place] at the request of her son. The patient states that their son is incarcerated in [facility] and that their son is  to a man from [place] in what she calls a \"marriage of convenience. \" Patient states that she currently lives with her sons . Patient also sates that she has a daughter living in the area who has never forgiven her for taking a blood transfusion as the daughter is a [employer] witness. Dilt drip started, HR in 140s  Dilt drip titrated up, HR lowering into 120-130  Dilt drip at highest level hr in 90s-100s  Metoprolol administered  Bp dropped, orders received from [facility] turned off at 1150  NS 250mL bolus ordered and administered  BP recovered around 1220  HR maintaining between   1530: Bedside and Verbal shift change report given to Mariangel SUTTON (oncoming nurse) by [address] [place] [address] (offgoing nurse). Report included the following information SBAR, Kardex, Intake/Output, and MAR.

## 2022-09-30 NOTE — PROGRESS NOTES
1530: Bedside and Verbal shift change report given to HERMAN August (oncoming nurse) by Tova Cui RN (offgoing nurse). Report included the following information SBAR, Kardex, MAR, and Cardiac Rhythm NSR .     1651: MD Moo Garduno notified of converting back to NSR. No order adjustments at this time. 1915: NGT placed at 62 cm. Need to verify placement. 1923: HR sustaining 140-150s after NGT placement. Instructed pt to bear-down and blow on syringe however unsuccessful. NP Mark notified of pt condition and orders received to obtain an EKG. EKG indicated SVT/ST/A.fib RVR, trouble reading d/t pt exhibiting tachypnea. NP Mark at bedside. Per NP [facility], cardiology note unclear of plan. Per NP [facility] contact cardiology to determine best treatment plan. 1929: Cardiology paged. 1930: Bedside and Verbal shift change report given to Surinder Ricks RN (oncoming nurse) by Neema Petersen RN (offgoing nurse). Report included the following information SBAR, Kardex, MAR, and Cardiac Rhythm   . 2014: KUB indicated NGT in left lung. Ronen Dominique and [facility] NP aware. Per JOSEPHINE Flores NGT placement is possible cause for elevated HR, RN to remove NGT. RN Surinder Ricks aware. Surinder Ricks RN updated on all orders below:    2100: Per MD [place], administer 150 mg IV bolus of Amiodarone. Reassess in 20 minutes, if unsuccessful decreasing HR give additional 150 mg IV bolus of amiodarone. Reassess again in 20 minutes, if unsuccessful decreasing HR give 5 mg slow IVP Metoprolol (hold for SBP less than 105). If unsuccessful decreasing HR after IV Metoprolol call MD [place] back for additional orders. Per NP [facility], give first Amio bolus. After completion, [facility] NP will reassess pt at bedside d/t KUB findings.

## 2022-10-01 ENCOUNTER — APPOINTMENT (OUTPATIENT)
Dept: NON INVASIVE DIAGNOSTICS | Age: 77
DRG: 435 | End: 2022-10-01
Attending: NURSE PRACTITIONER
Payer: MEDICARE

## 2022-10-01 LAB
ALBUMIN SERPL-MCNC: 2.4 G/DL (ref 3.5–5)
ALBUMIN/GLOB SERPL: 0.9 {RATIO} (ref 1.1–2.2)
ALP SERPL-CCNC: 219 U/L (ref 45–117)
ALT SERPL-CCNC: 42 U/L (ref 12–78)
ANION GAP SERPL CALC-SCNC: 11 MMOL/L (ref 5–15)
AST SERPL-CCNC: 63 U/L (ref 15–37)
ATRIAL RATE: 95 BPM
ATRIAL RATE: 99 BPM
BASOPHILS # BLD: 0.1 K/UL (ref 0–0.1)
BASOPHILS NFR BLD: 1 % (ref 0–1)
BILIRUB DIRECT SERPL-MCNC: 0.1 MG/DL (ref 0–0.2)
BILIRUB SERPL-MCNC: 0.4 MG/DL (ref 0.2–1)
BUN SERPL-MCNC: 7 MG/DL (ref 6–20)
BUN/CREAT SERPL: 11 (ref 12–20)
CALCIUM SERPL-MCNC: 8 MG/DL (ref 8.5–10.1)
CALCULATED P AXIS, ECG09: 75 DEGREES
CALCULATED P AXIS, ECG09: 77 DEGREES
CALCULATED R AXIS, ECG10: 94 DEGREES
CALCULATED R AXIS, ECG10: 94 DEGREES
CALCULATED T AXIS, ECG11: 120 DEGREES
CALCULATED T AXIS, ECG11: 132 DEGREES
CHLORIDE SERPL-SCNC: 109 MMOL/L (ref 97–108)
CO2 SERPL-SCNC: 17 MMOL/L (ref 21–32)
CREAT SERPL-MCNC: 0.62 MG/DL (ref 0.55–1.02)
DIAGNOSIS, 93000: NORMAL
DIAGNOSIS, 93000: NORMAL
DIFFERENTIAL METHOD BLD: ABNORMAL
DIGOXIN SERPL-MCNC: 1.8 NG/ML (ref 0.9–2)
EOSINOPHIL # BLD: 0 K/UL (ref 0–0.4)
EOSINOPHIL NFR BLD: 0 % (ref 0–7)
ERYTHROCYTE [DISTWIDTH] IN BLOOD BY AUTOMATED COUNT: 22.6 % (ref 11.5–14.5)
GLOBULIN SER CALC-MCNC: 2.7 G/DL (ref 2–4)
GLUCOSE SERPL-MCNC: 114 MG/DL (ref 65–100)
HCT VFR BLD AUTO: 30.8 % (ref 35–47)
HGB BLD-MCNC: 9.6 G/DL (ref 11.5–16)
IMM GRANULOCYTES # BLD AUTO: 0 K/UL (ref 0–0.04)
IMM GRANULOCYTES NFR BLD AUTO: 0 % (ref 0–0.5)
LYMPHOCYTES # BLD: 0.7 K/UL (ref 0.8–3.5)
LYMPHOCYTES NFR BLD: 8 % (ref 12–49)
MCH RBC QN AUTO: 27.4 PG (ref 26–34)
MCHC RBC AUTO-ENTMCNC: 31.2 G/DL (ref 30–36.5)
MCV RBC AUTO: 87.7 FL (ref 80–99)
MONOCYTES # BLD: 1 K/UL (ref 0–1)
MONOCYTES NFR BLD: 11 % (ref 5–13)
NEUTS SEG # BLD: 7.5 K/UL (ref 1.8–8)
NEUTS SEG NFR BLD: 80 % (ref 32–75)
NRBC # BLD: 0 K/UL (ref 0–0.01)
NRBC BLD-RTO: 0 PER 100 WBC
P-R INTERVAL, ECG05: 112 MS
P-R INTERVAL, ECG05: 88 MS
PLATELET # BLD AUTO: 459 K/UL (ref 150–400)
PLATELET COMMENTS,PCOM: ABNORMAL
PMV BLD AUTO: 9.1 FL (ref 8.9–12.9)
POTASSIUM SERPL-SCNC: 3.8 MMOL/L (ref 3.5–5.1)
PROT SERPL-MCNC: 5.1 G/DL (ref 6.4–8.2)
Q-T INTERVAL, ECG07: 342 MS
Q-T INTERVAL, ECG07: 346 MS
QRS DURATION, ECG06: 76 MS
QRS DURATION, ECG06: 80 MS
QTC CALCULATION (BEZET), ECG08: 434 MS
QTC CALCULATION (BEZET), ECG08: 438 MS
RBC # BLD AUTO: 3.51 M/UL (ref 3.8–5.2)
RBC MORPH BLD: ABNORMAL
SODIUM SERPL-SCNC: 137 MMOL/L (ref 136–145)
VENTRICULAR RATE, ECG03: 95 BPM
VENTRICULAR RATE, ECG03: 99 BPM
WBC # BLD AUTO: 9.3 K/UL (ref 3.6–11)

## 2022-10-01 PROCEDURE — 80076 HEPATIC FUNCTION PANEL: CPT

## 2022-10-01 PROCEDURE — 80048 BASIC METABOLIC PNL TOTAL CA: CPT

## 2022-10-01 PROCEDURE — 36415 COLL VENOUS BLD VENIPUNCTURE: CPT

## 2022-10-01 PROCEDURE — 74011250636 HC RX REV CODE- 250/636: Performed by: NURSE PRACTITIONER

## 2022-10-01 PROCEDURE — 74011250637 HC RX REV CODE- 250/637: Performed by: NURSE PRACTITIONER

## 2022-10-01 PROCEDURE — 99233 SBSQ HOSP IP/OBS HIGH 50: CPT | Performed by: INTERNAL MEDICINE

## 2022-10-01 PROCEDURE — 65660000001 HC RM ICU INTERMED STEPDOWN

## 2022-10-01 PROCEDURE — 74011250636 HC RX REV CODE- 250/636: Performed by: STUDENT IN AN ORGANIZED HEALTH CARE EDUCATION/TRAINING PROGRAM

## 2022-10-01 PROCEDURE — 74011250636 HC RX REV CODE- 250/636: Performed by: HOSPITALIST

## 2022-10-01 PROCEDURE — C9113 INJ PANTOPRAZOLE SODIUM, VIA: HCPCS | Performed by: HOSPITALIST

## 2022-10-01 PROCEDURE — 93306 TTE W/DOPPLER COMPLETE: CPT

## 2022-10-01 PROCEDURE — 74011000258 HC RX REV CODE- 258: Performed by: STUDENT IN AN ORGANIZED HEALTH CARE EDUCATION/TRAINING PROGRAM

## 2022-10-01 PROCEDURE — 74011000258 HC RX REV CODE- 258: Performed by: NURSE PRACTITIONER

## 2022-10-01 PROCEDURE — 85025 COMPLETE CBC W/AUTO DIFF WBC: CPT

## 2022-10-01 PROCEDURE — 80162 ASSAY OF DIGOXIN TOTAL: CPT

## 2022-10-01 PROCEDURE — 74011250637 HC RX REV CODE- 250/637: Performed by: STUDENT IN AN ORGANIZED HEALTH CARE EDUCATION/TRAINING PROGRAM

## 2022-10-01 PROCEDURE — 74011250637 HC RX REV CODE- 250/637: Performed by: HOSPITALIST

## 2022-10-01 PROCEDURE — 74011000250 HC RX REV CODE- 250: Performed by: HOSPITALIST

## 2022-10-01 RX ORDER — METRONIDAZOLE 500 MG/100ML
500 INJECTION, SOLUTION INTRAVENOUS EVERY 12 HOURS
Status: DISCONTINUED | OUTPATIENT
Start: 2022-10-01 | End: 2022-10-01

## 2022-10-01 RX ORDER — DEXTROSE MONOHYDRATE AND SODIUM CHLORIDE 5; .9 G/100ML; G/100ML
75 INJECTION, SOLUTION INTRAVENOUS CONTINUOUS
Status: DISCONTINUED | OUTPATIENT
Start: 2022-10-01 | End: 2022-10-10

## 2022-10-01 RX ADMIN — SODIUM CHLORIDE 75 ML/HR: 9 INJECTION, SOLUTION INTRAVENOUS at 09:02

## 2022-10-01 RX ADMIN — DEXTROSE AND SODIUM CHLORIDE 75 ML/HR: 5; 900 INJECTION, SOLUTION INTRAVENOUS at 15:08

## 2022-10-01 RX ADMIN — AMIODARONE HYDROCHLORIDE 0.5 MG/MIN: 50 INJECTION, SOLUTION INTRAVENOUS at 12:30

## 2022-10-01 RX ADMIN — METOPROLOL TARTRATE 25 MG: 25 TABLET, FILM COATED ORAL at 09:04

## 2022-10-01 RX ADMIN — PROCHLORPERAZINE EDISYLATE 10 MG: 5 INJECTION INTRAMUSCULAR; INTRAVENOUS at 12:58

## 2022-10-01 RX ADMIN — METRONIDAZOLE 500 MG: 500 INJECTION, SOLUTION INTRAVENOUS at 09:05

## 2022-10-01 RX ADMIN — METRONIDAZOLE 500 MG: 500 INJECTION, SOLUTION INTRAVENOUS at 01:08

## 2022-10-01 RX ADMIN — PANTOPRAZOLE SODIUM 40 MG: 40 INJECTION, POWDER, FOR SOLUTION INTRAVENOUS at 09:04

## 2022-10-01 RX ADMIN — ONDANSETRON 4 MG: 4 TABLET, ORALLY DISINTEGRATING ORAL at 01:53

## 2022-10-01 RX ADMIN — SULFAMETHOXAZOLE AND TRIMETHOPRIM 1 TABLET: 800; 160 TABLET ORAL at 20:01

## 2022-10-01 RX ADMIN — PROCHLORPERAZINE EDISYLATE 10 MG: 5 INJECTION INTRAMUSCULAR; INTRAVENOUS at 20:00

## 2022-10-01 RX ADMIN — AMIODARONE HYDROCHLORIDE 0.5 MG/MIN: 50 INJECTION, SOLUTION INTRAVENOUS at 01:08

## 2022-10-01 RX ADMIN — THIAMINE HYDROCHLORIDE 100 MG: 100 INJECTION, SOLUTION INTRAMUSCULAR; INTRAVENOUS at 10:26

## 2022-10-01 RX ADMIN — SODIUM CHLORIDE, PRESERVATIVE FREE 10 ML: 5 INJECTION INTRAVENOUS at 06:00

## 2022-10-01 RX ADMIN — SODIUM CHLORIDE, PRESERVATIVE FREE 10 ML: 5 INJECTION INTRAVENOUS at 22:00

## 2022-10-01 RX ADMIN — ACETAMINOPHEN 650 MG: 325 TABLET, FILM COATED ORAL at 20:00

## 2022-10-01 RX ADMIN — ONDANSETRON 4 MG: 2 INJECTION INTRAMUSCULAR; INTRAVENOUS at 09:06

## 2022-10-01 RX ADMIN — SULFAMETHOXAZOLE AND TRIMETHOPRIM 1 TABLET: 800; 160 TABLET ORAL at 09:05

## 2022-10-01 NOTE — PROGRESS NOTES
Clinical Pharmacy Note: Metronidazole Dosing    Please note that the metronidazole dose for Satnam Pineda has been changed to 500 mg IV q12h per [facility]-approved protocol. Please contact the pharmacy with any questions.     DANITA MaiD

## 2022-10-01 NOTE — PROGRESS NOTES
NG tube placed by dayshift. Upon Xray, tube is misplaced. Tube removed. Patient REFUSES NG tube being replaced. Patient Afib controlled after removal  and then patient returned to SR after amiodarone decreased per orders. Nurse  will  monitor patient closely.

## 2022-10-01 NOTE — PROGRESS NOTES
[facility]  Hospitalist Group                                                                                          Hospitalist Progress Note  Edd Toure MD  Answering service: [phone] from in house phone        Date of Service:  10/1/2022  NAME:  Millicent Gomez  :  1945  MRN:  [id number]      Admission Summary:   Milliecnt Gomez is a 77 y.o. female is brought to the ED via EMS for abdominal pain and nausea. She also reported watery diarrhea. Patient was recently hospitalized from - for abdominal pain and constipation and was found to have metastatic disease. She underwent liver biopsy which showed adenocarcinoma. Patient denied fever or chills. She denied dysuria, urgency or frequency. Work-up in the ED was significant for an abnormal abdominal pelvic CT that showed progression of hepatic lesions with a new large fluid collection in the liver with suggestion that this could be intrahepatic abscess. There are also fluid-filled cecum. Right pleural effusion. Patient was started on levofloxacin and Flagyl and was referred to the hospitalist service for admission evaluation. Discussion with radiology later for drainage of fluid collection, felt more likely hematoma rather than infectious    Interval history / Subjective:   Overnight, patient developed a fib with RVR with rate in the 190s. She was given adenosine x 2 doses, 500cc fluid bolus, followed by digoxin. Rate subsequently improved with stable BP, therefore patient transitioned to diltiazem drip. On rounds this morning, patient appears mildly uncomfortable. Denies current chest pain, palpitations, or SOB. States she has abdominal pain and poor appetite. Poor oral intake over previous day. Assessment & Plan:      Abdominal pain associate with nausea and vomiting and diarrhea  Liver adenocarcinoma based on recent biopsy, primary unknown  Acute diarrheal illness.   --Presented with abdominal pain with nausea, vomiting, and diarrhea   --Radiology were consulted for liver drainage CT report suggested abscess. However after further review radiology the liver fluid is most probably is hematoma than infectious, besides patient does not have fever or leukocytosis and recommended against drainage. --Recent diagnosis of metastatic liver adenocarcinoma   -- c. Diff negative, enteric pathogens negative  - Levaquin DC due to amio infusion, started on bactrim therapy with monitoring of digoxin levels  Plan:   --Will discontinue flagyl given negative panels and unlikely to have abscess on imaging   --Oncology consulted, appreciate recommendations   --Symptomatic care   - Will plan to consult GI once medically more stable      History of chronic atrial fibrillation.    A fib with RVR, nPOA  --Developed a fib with RVR, which has been controlled overnight on amio gtt   --Anticoagulation has been held in the past due to bleeding, and given concerns of liver hematoma will defer for now   Plan:   - Continue amio drip, transition to oral when able   - F/u ECHO  - Cardiology consulted, appreciate assistance      Metabolic acidosis  Severe protein calorie malnutrition   History of eating disorder  Poor oral intake has resulted in metabolic acidosis   Patient discussed with nursing a history of severe eating disorder   NGT attempted 9/30 with placement into left bronchus, patient refused reattempt at placement  Intake has been limited here but patient is motivated not to have another feeding tube placed  Plan:   - IV thimaine started   - D5 normal saline started   - Nutrition consulted appreciate recommendations   - Ensure on meal trays ordered     Acute cystitis, GNRs  - UA with nitrites and leukocytes, concerning for infection   - Urine culture with GNRs  Plan:   - Pt with numerous medication contraindications with allergies and prolonged qtc   - Will continue bactrim for a total of 5 days and monitor digoxin levels  - F/u special and susceptibilities     Acute on chronic normocytic anemia likely anemia of chronic disease, stable  - Hbg 6.6 repeat 6.8, improved after transfusion 1U pRBC  - No evidence of active bleeding   - Obtain hemoccult stool  -  Holding AC          Code status: FULL  Prophylaxis: SCDs, HOLD AC given dropping hemoglobin  Care Plan discussed with:   Anticipated Disposition: 48h     Hospital Problems  Date Reviewed: 6/29/2022            Codes Class Noted POA    Liver abscess ICD-10-CM: K75.0  ICD-9-CM: 572.0  9/28/2022 Unknown        Abdominal pain ICD-10-CM: R10.9  ICD-9-CM: 789.00  9/28/2022 Unknown        Adenocarcinoma ([facility] [address] ICD-10-CM: C80.1  ICD-9-CM: 199.1  9/28/2022 Unknown             Review of Systems:   A comprehensive review of systems was negative except for that written in the HPI. Vital Signs:    Last 24hrs VS reviewed since prior progress note. Most recent are:  Visit Vitals  /61 (BP 1 Location: Right arm, BP Patient Position: At rest)   Pulse 80   Temp 98.2 °F (36.8 °C)   Resp 17   Ht 5' 3\" (1.6 m)   Wt 41.3 kg (91 lb 0.8 oz)   SpO2 96%   BMI 16.13 kg/m²         Intake/Output Summary (Last 24 hours) at 10/1/2022 1435  Last data filed at 10/1/2022 0672  Gross per 24 hour   Intake 300 ml   Output --   Net 300 ml        Physical Examination:     I had a face to face encounter with this patient and independently examined them on 10/1/2022 as outlined below:          Constitutional:  No acute distress, cooperative, pleasant, thin   ENT:  Oral mucosa moist, oropharynx benign. Resp:  CTA bilaterally. No wheezing/rhonchi/rales. No accessory muscle use. CV:  Regular rate, regular rhythm no murmurs, gallops, rubs    GI:  Soft, non distended, mildly tender. normoactive bowel sounds, no hepatosplenomegaly     Musculoskeletal:  No edema, warm, 2+ pulses throughout    Neurologic:  Moves all extremities.   AAOx3, CN II-XII reviewed            Data Review:    Review and/or order of clinical lab test  Review and/or order of tests in the radiology section of CPT  Review and/or order of tests in the medicine section of [facility]      Labs:     Recent Labs     10/01/22  0444 09/30/22  0251   WBC 9.3 6.4   HGB 9.6* 8.1*   HCT 30.8* 25.1*   * 398     Recent Labs     10/01/22  0444 09/30/22  1623 09/30/22  0251    135* 138   K 3.8 3.6 3.0*   * 105 108   CO2 17* 18* 15*   BUN 7 5* 3*   CREA 0.62 0.70 0.53*   * 108* 80   CA 8.0* 8.5 8.0*   MG  --  1.9 1.3*     Recent Labs     10/01/22  0444 09/29/22  0406   ALT 42 50   * 255*   TBILI 0.4 0.3   TP 5.1* 5.6*   ALB 2.4* 2.4*   GLOB 2.7 3.2     No results for input(s): INR, PTP, APTT, INREXT, INREXT in the last 72 hours. No results for input(s): FE, TIBC, PSAT, FERR in the last 72 hours. Lab Results   Component Value Date/Time    Folate 27.1 (H) 08/22/2022 03:55 AM      No results for input(s): PH, PCO2, PO2 in the last 72 hours. No results for input(s): CPK, CKNDX, TROIQ in the last 72 hours.     No lab exists for component: CPKMB  Lab Results   Component Value Date/Time    Cholesterol, total 141 08/22/2022 03:55 AM    HDL Cholesterol 57 08/22/2022 03:55 AM    LDL, calculated 71.6 08/22/2022 03:55 AM    Triglyceride 62 08/22/2022 03:55 AM    CHOL/HDL Ratio 2.5 08/22/2022 03:55 AM     Lab Results   Component Value Date/Time    Glucose (POC) 145 (H) 02/14/2022 09:01 PM     Lab Results   Component Value Date/Time    Color YELLOW/STRAW 09/28/2022 10:37 AM    Appearance CLEAR 09/28/2022 10:37 AM    Specific gravity 1.010 09/28/2022 10:37 AM    pH (UA) 7.0 09/28/2022 10:37 AM    Protein Negative 09/28/2022 10:37 AM    Glucose Negative 09/28/2022 10:37 AM    Ketone 15 (A) 09/28/2022 10:37 AM    Bilirubin Negative 09/28/2022 10:37 AM    Urobilinogen 0.2 09/28/2022 10:37 AM    Nitrites Positive (A) 09/28/2022 10:37 AM    Leukocyte Esterase MODERATE (A) 09/28/2022 10:37 AM    Epithelial cells FEW 09/28/2022 10:37 AM    Bacteria 4+ (A) 09/28/2022 10:37 AM    WBC >100 (H) 09/28/2022 10:37 AM    RBC 0-5 09/28/2022 10:37 AM         Medications Reviewed:     Current Facility-Administered Medications   Medication Dose Route Frequency    thiamine (B-1) 100 mg in 0.9% sodium chloride 50 mL IVPB  100 mg IntraVENous DAILY    metroNIDAZOLE (FLAGYL) IVPB premix 500 mg  500 mg IntraVENous Q12H    dextrose 5% and 0.9% NaCl infusion  75 mL/hr IntraVENous CONTINUOUS    metoprolol tartrate (LOPRESSOR) tablet 25 mg  25 mg Oral Q12H    trimethoprim-sulfamethoxazole (BACTRIM DS, SEPTRA DS) 160-800 mg per tablet 1 Tablet  1 Tablet Oral Q12H    amiodarone (CORDARONE) 375 mg/250 mL D5W infusion  0.5-1 mg/min IntraVENous TITRATE    0.9% sodium chloride infusion 250 mL  250 mL IntraVENous PRN    [Held by provider] digoxin (LANOXIN) tablet 0.125 mg  0.125 mg Oral DAILY    albuterol (PROVENTIL VENTOLIN) nebulizer solution 2.5 mg  2.5 mg Nebulization Q6H PRN    sodium chloride (NS) flush 5-40 mL  5-40 mL IntraVENous Q8H    sodium chloride (NS) flush 5-40 mL  5-40 mL IntraVENous PRN    acetaminophen (TYLENOL) tablet 650 mg  650 mg Oral Q6H PRN    Or    acetaminophen (TYLENOL) suppository 650 mg  650 mg Rectal Q6H PRN    polyethylene glycol (MIRALAX) packet 17 g  17 g Oral DAILY PRN    ondansetron (ZOFRAN ODT) tablet 4 mg  4 mg Oral Q8H PRN    Or    ondansetron (ZOFRAN) injection 4 mg  4 mg IntraVENous Q6H PRN    pantoprazole (PROTONIX) 40 mg in 0.9% sodium chloride 10 mL injection  40 mg IntraVENous DAILY    prochlorperazine (COMPAZINE) injection 10 mg  10 mg IntraVENous Q6H PRN     ______________________________________________________________________  EXPECTED LENGTH OF STAY: 3d 4h  ACTUAL LENGTH OF STAY:          [address], [place]     CRITICAL CARE ATTESTATION:  I had a face to face encounter with the patient, reviewed and interpreted patient data including clinical events, labs, images, vital signs, I/O's, and examined patient.   I have discussed the case and the plan and management of the patient's care with the consulting services, the bedside nurses and necessary ancillary providers. NOTE OF PERSONAL INVOLVEMENT IN CARE   This patient has a high probability of imminent, clinically significant deterioration, which requires the highest level of preparedness to intervene urgently. I participated in the decision-making and personally managed or directed the management of the following life and organ supporting interventions that required my frequent assessment to treat or prevent imminent deterioration. I personally spent 30 minutes of critical care time. This is time spent at this critically ill patient's bedside actively involved in patient care as well as the coordination of care and discussions with the patient's family. This does not include any procedural time which has been billed separately.

## 2022-10-01 NOTE — PROGRESS NOTES
End of Shift Note    Bedside shift change report given to RN (oncoming nurse) by Sin Simmons RN (offgoing nurse). Report included the following information SBAR    Shift worked:  [id number]     Shift summary and any significant changes:      Uneventful shift. Pt had two episodes of nausea, best relieved by compazine per pt. Poor PO intake, less than 25% of all meals. VSS NSR 70-85 bpm throughout shift.      Concerns for physician to address:   None     Zone phone for oncoming shift:    See staffing sheet         Sin Simmons RN

## 2022-10-01 NOTE — PROGRESS NOTES
Hematology-Oncology Progress Note    Alvarez Bentley  1945  [id number]  10/1/2022    Follow-up for: metastatic adenocarcinoam     [x]        Chart notes since last visit reviewed   [x]        Medications reviewed for allergies and interactions       Case discussed with the following:         []                            [x]        Nursing Staff                                                                         []        Pathologist                                                                        []        FAMILY      Subjective:     Spoke with patient who complains of: weakness and hip pain    Objective:   Patient Vitals for the past 24 hrs:   BP Temp Pulse Resp SpO2 Height Weight   10/01/22 1200 102/61 98.2 °F (36.8 °C) 79 17 96 % -- --   10/01/22 1139 94/61 -- -- -- -- 5' 3\" (1.6 m) 41.3 kg (91 lb 0.8 oz)   10/01/22 1000 -- -- 74 -- -- -- --   10/01/22 0800 94/61 98.2 °F (36.8 °C) 77 27 97 % -- --   10/01/22 0558 -- -- 75 -- -- -- --   10/01/22 0400 92/65 97.7 °F (36.5 °C) 76 17 98 % -- --   10/01/22 0200 -- -- 77 -- -- -- --   09/30/22 2330 109/73 98.3 °F (36.8 °C) (!) 120 17 100 % -- --   09/30/22 2154 -- -- (!) 114 -- -- -- --   09/30/22 2128 (!) 111/59 -- (!) 103 -- -- -- --   09/30/22 1900 126/68 97.7 °F (36.5 °C) (!) 138 18 100 % -- --   09/30/22 1800 -- -- (!) 123 -- -- -- --   09/30/22 1530 (!) 113/58 -- 99 27 100 % -- --   09/30/22 1504 106/66 97.7 °F (36.5 °C) 96 (!) 35 100 % -- --   09/30/22 1400 -- -- (!) 101 -- -- -- --         REVIEW OF SYSTEMS:    Constitutional: negative fever, negative chills, negative weight loss  Eyes:   negative visual changes  ENT:   negative sore throat, tongue or lip swelling  Respiratory:  negative cough, negative dyspnea  Cards:   negative for chest pain,   GI:   negative for nausea, vomiting, diarrhea, and abdominal pain  Neuro:  negative for headaches, dizziness, v  []                        Full ROS o/w normal/non contributor    Constitutional:  Patient looks  []        Sick  [x]        Frail  []        Better                                                 []        Depressed    HEENT:  [x]   NC                         []   AT               []    ALOPECIA           Eyes: [x]   Normal               []    Icteric  Oropharynx: []    Normal                  []  Thrush               []   Dry  Mucositis: [x]    None                 Grade: []        I  []        II  []        III  []        IV  Neck:   [x]   Supple                  []  Rigid               JVD:    []   ABSENT       []   PRESENT  Lymphadenopathy:   []   None Noted            []   PRESENT    Chest:  [x]   Clear               []    Rhonchi                      Dec'd @     []  Right Base           []   Left Base    CV:             []   Regular              []  Irregular               [x]   Tachy                []   Murmur  Abdominal:   [x]    Soft              [x]   NON-tender               []   Tender      BS:    []   ABSENT                   []   PRESENT  Liver:     [x]  NON-palp                  []   EDGE- palp  Spleen: [x]   NON-palp                   []  EDGE - palp  Mass:   [x]   ABSENT                          []  PRESENT  Extr:    []  Lymphedema             []   Cyanosis      []  Clubbing  Edema:     [x]   NONE       []   PRESENT  Skin:  Intact [x]           Purpura []        Rash: []   ABSENT       []  PRESENT  Neuro:  [x]        Normal  []        Confused      Available labs reviewed:  Labs:    Recent Results (from the past 24 hour(s))   EKG, 12 LEAD, INITIAL    Collection Time: 09/30/22  2:47 PM   Result Value Ref Range    Ventricular Rate 95 BPM    Atrial Rate 95 BPM    P-R Interval 88 ms    QRS Duration 80 ms    Q-T Interval 346 ms    QTC Calculation (Bezet) 434 ms    Calculated P Axis 75 degrees    Calculated R Axis 94 degrees    Calculated T Axis 132 degrees    Diagnosis       Sinus rhythm with short NC with premature supraventricular complexes  Low voltage QRS  Anterolateral infarct , age undetermined  When compared with ECG of 30-SEP-2022 13:18,  MANUAL COMPARISON REQUIRED, DATA IS UNCONFIRMED  Confirmed by Reford Ge ([id number]) on 10/1/2022 99:20:42 AM     METABOLIC PANEL, BASIC    Collection Time: 09/30/22  4:23 PM   Result Value Ref Range    Sodium 135 (L) 136 - 145 mmol/L    Potassium 3.6 3.5 - 5.1 mmol/L    Chloride 105 97 - 108 mmol/L    CO2 18 (L) 21 - 32 mmol/L    Anion gap 12 5 - 15 mmol/L    Glucose 108 (H) 65 - 100 mg/dL    BUN 5 (L) 6 - 20 MG/DL    Creatinine 0.70 0.55 - 1.02 MG/DL    BUN/Creatinine ratio 7 (L) 12 - 20      GFR est AA >60 >60 ml/min/1.73m2    GFR est non-AA >60 >60 ml/min/1.73m2    Calcium 8.5 8.5 - 10.1 MG/DL   MAGNESIUM    Collection Time: 09/30/22  4:23 PM   Result Value Ref Range    Magnesium 1.9 1.6 - 2.4 mg/dL   BETA-HYDROXYBUTYRATE    Collection Time: 09/30/22  4:23 PM   Result Value Ref Range    B-hydroxybutyrate 3.04 (H) <0.40 mmol/L   EKG, 12 LEAD, INITIAL    Collection Time: 09/30/22  7:27 PM   Result Value Ref Range    Ventricular Rate 142 BPM    Atrial Rate 141 BPM    QRS Duration 74 ms    Q-T Interval 266 ms    QTC Calculation (Bezet) 409 ms    Calculated R Axis 89 degrees    Calculated T Axis 54 degrees    Diagnosis       Supraventricular tachycardia  Low voltage QRS  Nonspecific T wave abnormality  When compared with ECG of 30-SEP-2022 14:47,  MANUAL COMPARISON REQUIRED, DATA IS UNCONFIRMED     CBC WITH AUTOMATED DIFF    Collection Time: 10/01/22  4:44 AM   Result Value Ref Range    WBC 9.3 3.6 - 11.0 K/uL    RBC 3.51 (L) 3.80 - 5.20 M/uL    HGB 9.6 (L) 11.5 - 16.0 g/dL    HCT 30.8 (L) 35.0 - 47.0 %    MCV 87.7 80.0 - 99.0 FL    MCH 27.4 26.0 - 34.0 PG    MCHC 31.2 30.0 - 36.5 g/dL    RDW 22.6 (H) 11.5 - 14.5 %    PLATELET 960 (H) 618 - 400 K/uL    MPV 9.1 8.9 - 12.9 FL    NRBC 0.0 0  WBC    ABSOLUTE NRBC 0.00 0.00 - 0.01 K/uL    NEUTROPHILS 80 (H) 32 - 75 %    LYMPHOCYTES 8 (L) 12 - 49 %    MONOCYTES 11 5 - 13 %    EOSINOPHILS 0 0 - 7 %    BASOPHILS 1 0 - 1 %    IMMATURE GRANULOCYTES 0 0.0 - 0.5 %    ABS. NEUTROPHILS 7.5 1.8 - 8.0 K/UL    ABS. LYMPHOCYTES 0.7 (L) 0.8 - 3.5 K/UL    ABS. MONOCYTES 1.0 0.0 - 1.0 K/UL    ABS. EOSINOPHILS 0.0 0.0 - 0.4 K/UL    ABS. BASOPHILS 0.1 0.0 - 0.1 K/UL    ABS. IMM. GRANS. 0.0 0.00 - 0.04 K/UL    DF SMEAR SCANNED      PLATELET COMMENTS Large Platelets      RBC COMMENTS ANISOCYTOSIS  2+        RBC COMMENTS OVALOCYTES  PRESENT        RBC COMMENTS ANA CELLS  PRESENT       METABOLIC PANEL, BASIC    Collection Time: 10/01/22  4:44 AM   Result Value Ref Range    Sodium 137 136 - 145 mmol/L    Potassium 3.8 3.5 - 5.1 mmol/L    Chloride 109 (H) 97 - 108 mmol/L    CO2 17 (L) 21 - 32 mmol/L    Anion gap 11 5 - 15 mmol/L    Glucose 114 (H) 65 - 100 mg/dL    BUN 7 6 - 20 MG/DL    Creatinine 0.62 0.55 - 1.02 MG/DL    BUN/Creatinine ratio 11 (L) 12 - 20      GFR est AA >60 >60 ml/min/1.73m2    GFR est non-AA >60 >60 ml/min/1.73m2    Calcium 8.0 (L) 8.5 - 10.1 MG/DL   HEPATIC FUNCTION PANEL    Collection Time: 10/01/22  4:44 AM   Result Value Ref Range    Protein, total 5.1 (L) 6.4 - 8.2 g/dL    Albumin 2.4 (L) 3.5 - 5.0 g/dL    Globulin 2.7 2.0 - 4.0 g/dL    A-G Ratio 0.9 (L) 1.1 - 2.2      Bilirubin, total 0.4 0.2 - 1.0 MG/DL    Bilirubin, direct 0.1 0.0 - 0.2 MG/DL    Alk.  phosphatase 219 (H) 45 - 117 U/L    AST (SGOT) 63 (H) 15 - 37 U/L    ALT (SGPT) 42 12 - 78 U/L   DIGOXIN    Collection Time: 10/01/22  4:44 AM   Result Value Ref Range    Digoxin level 1.8 0.90 - 2.00 NG/ML   ECHO ADULT COMPLETE    Collection Time: 10/01/22 10:44 AM   Result Value Ref Range    IVSd 1.3 (A) 0.6 - 0.9 cm    LVIDd 4.1 3.9 - 5.3 cm    LVIDs 3.5 cm    LVOT Diameter 1.8 cm    LVPWd 1.3 (A) 0.6 - 0.9 cm    LVOT Peak Gradient 2 mmHg    LVOT Peak Velocity 0.7 m/s    RVIDd 3.1 cm    RV Free Wall Peak S' 10 cm/s    LA Diameter 3.1 cm    LA Volume A/L 48 mL    LA Volume 2C 56 (A) 22 - 52 mL    LA Volume 4C 32 22 - 52 mL AV Area by Peak Velocity 1.8 cm2    AV Peak Gradient 4 mmHg    AV Peak Velocity 1.0 m/s    MV A Velocity 0.52 m/s    MV E Wave Deceleration Time 119.2 ms    MV E Velocity 0.54 m/s    LV E' Lateral Velocity 6 cm/s    LV E' Septal Velocity 7 cm/s    MV PHT 34.6 ms    MV Area by PHT 6.4 cm2    MR Peak Gradient 67 mmHg    MR Peak Velocity 4.1 m/s    PV Peak Gradient 2 mmHg    PV Max Velocity 0.8 m/s    TAPSE 1.6 (A) 1.7 cm    TR Peak Gradient 29 mmHg    TR Max Velocity 2.68 m/s    Fractional Shortening 2D 15 28 - 44 %    LVIDd Index 2.97 cm/m2    LVIDs Index 2.54 cm/m2    LV RWT Ratio 0.63     LV Mass 2D 193.5 (A) 67 - 162 g    LV Mass 2D Index 140.2 (A) 43 - 95 g/m2    MV E/A 1.04     E/E' Ratio (Averaged) 8.36     E/E' Lateral 9.00     E/E' Septal 7.71     LA Volume Index A/L 35 16 - 34 mL/m2    LVOT Area 2.5 cm2    LA Volume Index 2C 41 (A) 16 - 34 mL/m2    LA Volume Index 4C 23 16 - 34 mL/m2    LA Size Index 2.25 cm/m2    AV Velocity Ratio 0.70     MIKAL/BSA Peak Velocity 1.3 cm2/m2       Available Xrays reviewed:    Chemotherapy monitored and toxicities assessed:    Assessment and Plan   Metastatic adenocarcinoma. .      S/p  jennifer-colectomy for a large 6cm dysplastic polyp (zero of 29nodes)  in February 2022, and he noted that the original colonoscopy did not get past the polyp,  current ct scan worrisome for a cecal malignancy. 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 The pt needs GI eval when she is clinically stable,      \"Hepatic abscess\"  radiology feels that this is actually organized hematoma. Hgb 9.6 gm  this am.  Will give iv iron after she stabelizes    Tachycardia. .   cardiology is seeing her.        Cheng Espinosa MD

## 2022-10-02 LAB
ANION GAP SERPL CALC-SCNC: 7 MMOL/L (ref 5–15)
ATRIAL RATE: 141 BPM
BACTERIA SPEC CULT: ABNORMAL
BASOPHILS # BLD: 0.1 K/UL (ref 0–0.1)
BASOPHILS NFR BLD: 1 % (ref 0–1)
BUN SERPL-MCNC: 9 MG/DL (ref 6–20)
BUN/CREAT SERPL: 11 (ref 12–20)
CALCIUM SERPL-MCNC: 7.6 MG/DL (ref 8.5–10.1)
CALCULATED R AXIS, ECG10: 89 DEGREES
CALCULATED T AXIS, ECG11: 54 DEGREES
CC UR VC: ABNORMAL
CHLORIDE SERPL-SCNC: 109 MMOL/L (ref 97–108)
CO2 SERPL-SCNC: 20 MMOL/L (ref 21–32)
CREAT SERPL-MCNC: 0.82 MG/DL (ref 0.55–1.02)
DIAGNOSIS, 93000: NORMAL
DIFFERENTIAL METHOD BLD: ABNORMAL
DIGOXIN SERPL-MCNC: 1.1 NG/ML (ref 0.9–2)
ECHO AV AREA PEAK VELOCITY: 1.8 CM2
ECHO AV AREA/BSA PEAK VELOCITY: 1.3 CM2/M2
ECHO AV PEAK GRADIENT: 4 MMHG
ECHO AV PEAK VELOCITY: 1 M/S
ECHO AV VELOCITY RATIO: 0.7
ECHO LA DIAMETER INDEX: 2.25 CM/M2
ECHO LA DIAMETER: 3.1 CM
ECHO LA VOL 2C: 56 ML (ref 22–52)
ECHO LA VOL 4C: 32 ML (ref 22–52)
ECHO LA VOLUME AREA LENGTH: 48 ML
ECHO LA VOLUME INDEX A2C: 41 ML/M2 (ref 16–34)
ECHO LA VOLUME INDEX A4C: 23 ML/M2 (ref 16–34)
ECHO LA VOLUME INDEX AREA LENGTH: 35 ML/M2 (ref 16–34)
ECHO LV E' LATERAL VELOCITY: 6 CM/S
ECHO LV E' SEPTAL VELOCITY: 7 CM/S
ECHO LV FRACTIONAL SHORTENING: 15 % (ref 28–44)
ECHO LV INTERNAL DIMENSION DIASTOLE INDEX: 2.97 CM/M2
ECHO LV INTERNAL DIMENSION DIASTOLIC: 4.1 CM (ref 3.9–5.3)
ECHO LV INTERNAL DIMENSION SYSTOLIC INDEX: 2.54 CM/M2
ECHO LV INTERNAL DIMENSION SYSTOLIC: 3.5 CM
ECHO LV IVSD: 1.3 CM (ref 0.6–0.9)
ECHO LV MASS 2D: 193.5 G (ref 67–162)
ECHO LV MASS INDEX 2D: 140.2 G/M2 (ref 43–95)
ECHO LV POSTERIOR WALL DIASTOLIC: 1.3 CM (ref 0.6–0.9)
ECHO LV RELATIVE WALL THICKNESS RATIO: 0.63
ECHO LVOT AREA: 2.5 CM2
ECHO LVOT DIAM: 1.8 CM
ECHO LVOT PEAK GRADIENT: 2 MMHG
ECHO LVOT PEAK VELOCITY: 0.7 M/S
ECHO MV A VELOCITY: 0.52 M/S
ECHO MV AREA PHT: 6.4 CM2
ECHO MV E DECELERATION TIME (DT): 119.2 MS
ECHO MV E VELOCITY: 0.54 M/S
ECHO MV E/A RATIO: 1.04
ECHO MV E/E' LATERAL: 9
ECHO MV E/E' RATIO (AVERAGED): 8.36
ECHO MV E/E' SEPTAL: 7.71
ECHO MV PRESSURE HALF TIME (PHT): 34.6 MS
ECHO MV REGURGITANT PEAK GRADIENT: 67 MMHG
ECHO MV REGURGITANT PEAK VELOCITY: 4.1 M/S
ECHO PV MAX VELOCITY: 0.8 M/S
ECHO PV PEAK GRADIENT: 2 MMHG
ECHO RV FREE WALL PEAK S': 10 CM/S
ECHO RV INTERNAL DIMENSION: 3.1 CM
ECHO RV TAPSE: 1.6 CM (ref 1.7–?)
ECHO TV REGURGITANT MAX VELOCITY: 2.68 M/S
ECHO TV REGURGITANT PEAK GRADIENT: 29 MMHG
EOSINOPHIL # BLD: 0.2 K/UL (ref 0–0.4)
EOSINOPHIL NFR BLD: 2 % (ref 0–7)
ERYTHROCYTE [DISTWIDTH] IN BLOOD BY AUTOMATED COUNT: 22.5 % (ref 11.5–14.5)
GLUCOSE SERPL-MCNC: 140 MG/DL (ref 65–100)
HCT VFR BLD AUTO: 26.8 % (ref 35–47)
HGB BLD-MCNC: 8.8 G/DL (ref 11.5–16)
IMM GRANULOCYTES # BLD AUTO: 0 K/UL (ref 0–0.04)
IMM GRANULOCYTES NFR BLD AUTO: 0 % (ref 0–0.5)
LYMPHOCYTES # BLD: 1.2 K/UL (ref 0.8–3.5)
LYMPHOCYTES NFR BLD: 14 % (ref 12–49)
MCH RBC QN AUTO: 27 PG (ref 26–34)
MCHC RBC AUTO-ENTMCNC: 32.8 G/DL (ref 30–36.5)
MCV RBC AUTO: 82.2 FL (ref 80–99)
MONOCYTES # BLD: 0.8 K/UL (ref 0–1)
MONOCYTES NFR BLD: 9 % (ref 5–13)
NEUTS SEG # BLD: 6.2 K/UL (ref 1.8–8)
NEUTS SEG NFR BLD: 74 % (ref 32–75)
NRBC # BLD: 0 K/UL (ref 0–0.01)
NRBC BLD-RTO: 0 PER 100 WBC
PLATELET # BLD AUTO: 372 K/UL (ref 150–400)
PLATELET COMMENTS,PCOM: ABNORMAL
PMV BLD AUTO: 9.5 FL (ref 8.9–12.9)
POTASSIUM SERPL-SCNC: 3.1 MMOL/L (ref 3.5–5.1)
Q-T INTERVAL, ECG07: 266 MS
QRS DURATION, ECG06: 74 MS
QTC CALCULATION (BEZET), ECG08: 409 MS
RBC # BLD AUTO: 3.26 M/UL (ref 3.8–5.2)
RBC MORPH BLD: ABNORMAL
SERVICE CMNT-IMP: ABNORMAL
SODIUM SERPL-SCNC: 136 MMOL/L (ref 136–145)
VENTRICULAR RATE, ECG03: 142 BPM
WBC # BLD AUTO: 8.5 K/UL (ref 3.6–11)

## 2022-10-02 PROCEDURE — 74011000258 HC RX REV CODE- 258: Performed by: NURSE PRACTITIONER

## 2022-10-02 PROCEDURE — 94640 AIRWAY INHALATION TREATMENT: CPT

## 2022-10-02 PROCEDURE — 93306 TTE W/DOPPLER COMPLETE: CPT | Performed by: INTERNAL MEDICINE

## 2022-10-02 PROCEDURE — 74011250636 HC RX REV CODE- 250/636: Performed by: STUDENT IN AN ORGANIZED HEALTH CARE EDUCATION/TRAINING PROGRAM

## 2022-10-02 PROCEDURE — 99233 SBSQ HOSP IP/OBS HIGH 50: CPT | Performed by: INTERNAL MEDICINE

## 2022-10-02 PROCEDURE — 74011250637 HC RX REV CODE- 250/637: Performed by: NURSE PRACTITIONER

## 2022-10-02 PROCEDURE — 94664 DEMO&/EVAL PT USE INHALER: CPT

## 2022-10-02 PROCEDURE — 74011250637 HC RX REV CODE- 250/637: Performed by: STUDENT IN AN ORGANIZED HEALTH CARE EDUCATION/TRAINING PROGRAM

## 2022-10-02 PROCEDURE — C9113 INJ PANTOPRAZOLE SODIUM, VIA: HCPCS | Performed by: HOSPITALIST

## 2022-10-02 PROCEDURE — 74011250636 HC RX REV CODE- 250/636: Performed by: HOSPITALIST

## 2022-10-02 PROCEDURE — 74011250636 HC RX REV CODE- 250/636: Performed by: NURSE PRACTITIONER

## 2022-10-02 PROCEDURE — 74011000250 HC RX REV CODE- 250: Performed by: HOSPITALIST

## 2022-10-02 PROCEDURE — 74011000258 HC RX REV CODE- 258: Performed by: STUDENT IN AN ORGANIZED HEALTH CARE EDUCATION/TRAINING PROGRAM

## 2022-10-02 PROCEDURE — 85025 COMPLETE CBC W/AUTO DIFF WBC: CPT

## 2022-10-02 PROCEDURE — 74011000250 HC RX REV CODE- 250: Performed by: STUDENT IN AN ORGANIZED HEALTH CARE EDUCATION/TRAINING PROGRAM

## 2022-10-02 PROCEDURE — 80048 BASIC METABOLIC PNL TOTAL CA: CPT

## 2022-10-02 PROCEDURE — 74011250637 HC RX REV CODE- 250/637: Performed by: HOSPITALIST

## 2022-10-02 PROCEDURE — 36415 COLL VENOUS BLD VENIPUNCTURE: CPT

## 2022-10-02 PROCEDURE — 80162 ASSAY OF DIGOXIN TOTAL: CPT

## 2022-10-02 PROCEDURE — 65660000001 HC RM ICU INTERMED STEPDOWN

## 2022-10-02 RX ORDER — METOPROLOL TARTRATE 25 MG/1
12.5 TABLET, FILM COATED ORAL ONCE
Status: COMPLETED | OUTPATIENT
Start: 2022-10-02 | End: 2022-10-02

## 2022-10-02 RX ORDER — POTASSIUM CHLORIDE 750 MG/1
20 TABLET, FILM COATED, EXTENDED RELEASE ORAL EVERY 4 HOURS
Status: COMPLETED | OUTPATIENT
Start: 2022-10-02 | End: 2022-10-02

## 2022-10-02 RX ORDER — DIAZEPAM 5 MG/1
5 TABLET ORAL ONCE
Status: COMPLETED | OUTPATIENT
Start: 2022-10-02 | End: 2022-10-02

## 2022-10-02 RX ORDER — LANOLIN ALCOHOL/MO/W.PET/CERES
100 CREAM (GRAM) TOPICAL DAILY
Status: DISCONTINUED | OUTPATIENT
Start: 2022-10-04 | End: 2022-10-18

## 2022-10-02 RX ORDER — IPRATROPIUM BROMIDE AND ALBUTEROL SULFATE 2.5; .5 MG/3ML; MG/3ML
3 SOLUTION RESPIRATORY (INHALATION)
Status: DISCONTINUED | OUTPATIENT
Start: 2022-10-02 | End: 2022-10-03

## 2022-10-02 RX ORDER — KETOROLAC TROMETHAMINE 30 MG/ML
15 INJECTION, SOLUTION INTRAMUSCULAR; INTRAVENOUS
Status: COMPLETED | OUTPATIENT
Start: 2022-10-02 | End: 2022-10-02

## 2022-10-02 RX ORDER — DIAZEPAM 2 MG/1
2 TABLET ORAL
Status: DISCONTINUED | OUTPATIENT
Start: 2022-10-02 | End: 2022-10-06

## 2022-10-02 RX ADMIN — SODIUM CHLORIDE, PRESERVATIVE FREE 10 ML: 5 INJECTION INTRAVENOUS at 22:00

## 2022-10-02 RX ADMIN — SODIUM CHLORIDE, PRESERVATIVE FREE 10 ML: 5 INJECTION INTRAVENOUS at 07:41

## 2022-10-02 RX ADMIN — AMIODARONE HYDROCHLORIDE 1 MG/MIN: 50 INJECTION, SOLUTION INTRAVENOUS at 22:39

## 2022-10-02 RX ADMIN — DIAZEPAM 5 MG: 5 TABLET ORAL at 13:52

## 2022-10-02 RX ADMIN — ALBUTEROL SULFATE 2.5 MG: 2.5 SOLUTION RESPIRATORY (INHALATION) at 14:37

## 2022-10-02 RX ADMIN — SODIUM CHLORIDE, PRESERVATIVE FREE 10 ML: 5 INJECTION INTRAVENOUS at 15:28

## 2022-10-02 RX ADMIN — METOPROLOL TARTRATE 25 MG: 25 TABLET, FILM COATED ORAL at 21:27

## 2022-10-02 RX ADMIN — AMIODARONE HYDROCHLORIDE 0.5 MG/MIN: 50 INJECTION, SOLUTION INTRAVENOUS at 00:36

## 2022-10-02 RX ADMIN — THIAMINE HYDROCHLORIDE 100 MG: 100 INJECTION, SOLUTION INTRAMUSCULAR; INTRAVENOUS at 15:27

## 2022-10-02 RX ADMIN — DIAZEPAM 2 MG: 2 TABLET ORAL at 18:40

## 2022-10-02 RX ADMIN — METOPROLOL TARTRATE 12.5 MG: 25 TABLET, FILM COATED ORAL at 13:44

## 2022-10-02 RX ADMIN — POTASSIUM CHLORIDE 20 MEQ: 750 TABLET, FILM COATED, EXTENDED RELEASE ORAL at 15:25

## 2022-10-02 RX ADMIN — KETOROLAC TROMETHAMINE 15 MG: 30 INJECTION, SOLUTION INTRAMUSCULAR; INTRAVENOUS at 22:45

## 2022-10-02 RX ADMIN — POTASSIUM CHLORIDE 20 MEQ: 750 TABLET, FILM COATED, EXTENDED RELEASE ORAL at 13:52

## 2022-10-02 RX ADMIN — PROCHLORPERAZINE EDISYLATE 10 MG: 5 INJECTION INTRAMUSCULAR; INTRAVENOUS at 10:59

## 2022-10-02 RX ADMIN — POTASSIUM CHLORIDE 20 MEQ: 750 TABLET, FILM COATED, EXTENDED RELEASE ORAL at 10:59

## 2022-10-02 RX ADMIN — DEXTROSE AND SODIUM CHLORIDE 75 ML/HR: 5; 900 INJECTION, SOLUTION INTRAVENOUS at 03:19

## 2022-10-02 RX ADMIN — SULFAMETHOXAZOLE AND TRIMETHOPRIM 1 TABLET: 800; 160 TABLET ORAL at 22:11

## 2022-10-02 RX ADMIN — PANTOPRAZOLE SODIUM 40 MG: 40 INJECTION, POWDER, FOR SOLUTION INTRAVENOUS at 10:59

## 2022-10-02 RX ADMIN — AMIODARONE HYDROCHLORIDE 1 MG/MIN: 50 INJECTION, SOLUTION INTRAVENOUS at 14:24

## 2022-10-02 RX ADMIN — ALBUTEROL SULFATE 2.5 MG: 2.5 SOLUTION RESPIRATORY (INHALATION) at 20:08

## 2022-10-02 RX ADMIN — ACETAMINOPHEN 650 MG: 325 TABLET, FILM COATED ORAL at 21:27

## 2022-10-02 RX ADMIN — DEXTROSE AND SODIUM CHLORIDE 75 ML/HR: 5; 900 INJECTION, SOLUTION INTRAVENOUS at 17:34

## 2022-10-02 RX ADMIN — SULFAMETHOXAZOLE AND TRIMETHOPRIM 1 TABLET: 800; 160 TABLET ORAL at 11:04

## 2022-10-02 NOTE — PROGRESS NOTES
0800: Report received from Van Chris, [facility]. Patient resting in bed.  1345: HR 140s. Patient anxious and c/o of SOB. MD notified and orders received. 12.5 mg metoprolol given. Valium given. Wheezes auscultated. RT called for breathing treatment. 1415: HR down in 110s. Patient no longer dyspneic and anxious. Patient still wheezing. RT called again for treatment. 1430:RT at bedside for breathing treatment  1515: Patient resting comfortably. O2 sats remain 100% on 2 L/min O2 via nasal cannula. . /68. Will continue to monitor. 1840: Valium given for anxiety  2000: Bedside and Verbal shift change report given to Van Chris RN (oncoming nurse) by Collins Rojas RN (offgoing nurse). Report included the following information SBAR, Intake/Output, MAR, Accordion, Recent Results, and Cardiac Rhythm NSR/ST .

## 2022-10-02 NOTE — PROGRESS NOTES
[facility] Adult  Hospitalist Group                                                                                          Hospitalist Progress Note  Solitario Farris MD  Answering service: [phone] from in house phone        Date of Service:  10/2/2022  NAME:  Abe Meckel  :  1945  MRN:  [id number]      Admission Summary:   Abe Meckel is a 77 y.o. female is brought to the ED via EMS for abdominal pain and nausea. She also reported watery diarrhea. Patient was recently hospitalized from - for abdominal pain and constipation and was found to have metastatic disease. She underwent liver biopsy which showed adenocarcinoma. Patient denied fever or chills. She denied dysuria, urgency or frequency. Work-up in the ED was significant for an abnormal abdominal pelvic CT that showed progression of hepatic lesions with a new large fluid collection in the liver with suggestion that this could be intrahepatic abscess. There are also fluid-filled cecum. Right pleural effusion. Patient was started on levofloxacin and Flagyl and was referred to the hospitalist service for admission evaluation. Discussion with radiology later for drainage of fluid collection, felt more likely hematoma rather than infectious    Interval history / Subjective:   Patient reports she has been eating more over the past two days, with active effort. She wonders/queries if she will live for another year or not. She is interested in discussing symptom management and goals of care with palliative. No chest pain, SOB, denies abdominal pain this morning. Addendum: asked patient if she would like me to give her family an update and she declined. Assessment & Plan:      Abdominal pain associate with nausea and vomiting and diarrhea  Liver adenocarcinoma based on recent biopsy, primary unknown  Acute diarrheal illness.   --Presented with abdominal pain with nausea, vomiting, and diarrhea --Radiology were consulted for liver drainage CT report suggested abscess. However after further review radiology the liver fluid is most probably is hematoma than infectious, besides patient does not have fever or leukocytosis and recommended against drainage. --Recent diagnosis of metastatic liver adenocarcinoma   -- c. Diff negative, enteric pathogens negative  - s/p flagyl and levaquin, discontinued after negative infectious work-up for diarrhea  Plan:   --Oncology consulted, appreciate recommendations   --Symptomatic care   - Will plan to consult GI once medically more stable   - Palliative care consult for GOC and symptomatic management recommendations     History of chronic atrial fibrillation.    A fib with RVR, nPOA  --Developed a fib with RVR, which has been controlled overnight on amio gtt   --Anticoagulation has been held in the past due to bleeding, and given concerns of liver hematoma will defer for now   - Has done well on amio drip over past 24 hours with improvement in rate, in sinus this morning  Plan:   - Continue amio drip, transition to oral when able   - F/u ECHO   - Cardiology consulted, appreciate assistance      Metabolic acidosis, improving  Severe protein calorie malnutrition   History of eating disorder  Poor oral intake has resulted in metabolic acidosis   Patient discussed with nursing a history of severe eating disorder   NGT attempted 9/30 with placement into left bronchus, patient refused reattempt at placement  Intake has been limited here but patient is motivated not to have another feeding tube placed  - Acidosis improving and anion gap closed  Plan:   - IV thimaine started   - D5 normal saline started   - Nutrition consulted appreciate recommendations   - Ensure on meal trays ordered     Acute cystitis, GNRs  - UA with nitrites and leukocytes, concerning for infection   - Urine culture with GNRs  Plan:   - Pt with numerous medication contraindications with allergies and prolonged qtc   - Will continue bactrim for a total of 5 days (digoxin currently on hold)   - F/u special and susceptibilities     Acute on chronic normocytic anemia likely anemia of chronic disease, stable  - Hbg 6.6 repeat 6.8, improved after transfusion 1U pRBC  - No evidence of active bleeding   - Obtain hemoccult stool  -  Holding AC          Code status: FULL  Prophylaxis: SCDs, HOLD AC given dropping hemoglobin  Care Plan discussed with:   Anticipated Disposition: >48h     Hospital Problems  Date Reviewed: 6/29/2022            Codes Class Noted POA    Liver abscess ICD-10-CM: K75.0  ICD-9-CM: 572.0  9/28/2022 Unknown        Abdominal pain ICD-10-CM: R10.9  ICD-9-CM: 789.00  9/28/2022 Unknown        Adenocarcinoma ([facility] [address] ICD-10-CM: C80.1  ICD-9-CM: 199.1  9/28/2022 Unknown             Review of Systems:   A comprehensive review of systems was negative except for that written in the HPI. Vital Signs:    Last 24hrs VS reviewed since prior progress note. Most recent are:  Visit Vitals  BP (!) 88/51 (BP 1 Location: Left arm, BP Patient Position: Supine)   Pulse (!) 113   Temp 98.6 °F (37 °C)   Resp 24   Ht 5' 3\" (1.6 m)   Wt 41.3 kg (91 lb 0.8 oz)   SpO2 100%   BMI 16.13 kg/m²         Intake/Output Summary (Last 24 hours) at 10/2/2022 1306  Last data filed at 10/2/2022 0555  Gross per 24 hour   Intake 600 ml   Output 600 ml   Net 0 ml        Physical Examination:     I had a face to face encounter with this patient and independently examined them on 10/2/2022 as outlined below:          Constitutional:  No acute distress, cooperative, pleasant, thin   ENT:  Oral mucosa moist, oropharynx benign. Resp:  CTA bilaterally. No wheezing/rhonchi/rales. No accessory muscle use. CV:  Mildly tachy, regular rhythm no murmurs, gallops, rubs    GI:  Soft, non distended, mildly tender.  normoactive bowel sounds, no hepatosplenomegaly     Musculoskeletal:  No edema, warm, 2+ pulses throughout    Neurologic:  Moves all extremities. AAOx3, CN II-XII reviewed            Data Review:    Review and/or order of clinical lab test  Review and/or order of tests in the radiology section of CPT  Review and/or order of tests in the medicine section of CPT      Labs:     Recent Labs     10/02/22  0330 10/01/22  0444   WBC 8.5 9.3   HGB 8.8* 9.6*   HCT 26.8* 30.8*    459*     Recent Labs     10/02/22  0330 10/01/22  0444 09/30/22  1623 09/30/22  0251    137 135* 138   K 3.1* 3.8 3.6 3.0*   * 109* 105 108   CO2 20* 17* 18* 15*   BUN 9 7 5* 3*   CREA 0.82 0.62 0.70 0.53*   * 114* 108* 80   CA 7.6* 8.0* 8.5 8.0*   MG  --   --  1.9 1.3*     Recent Labs     10/01/22  0444   ALT 42   *   TBILI 0.4   TP 5.1*   ALB 2.4*   GLOB 2.7     No results for input(s): INR, PTP, APTT, INREXT, INREXT in the last 72 hours. No results for input(s): FE, TIBC, PSAT, FERR in the last 72 hours. Lab Results   Component Value Date/Time    Folate 27.1 (H) 08/22/2022 03:55 AM      No results for input(s): PH, PCO2, PO2 in the last 72 hours. No results for input(s): CPK, CKNDX, TROIQ in the last 72 hours.     No lab exists for component: CPKMB  Lab Results   Component Value Date/Time    Cholesterol, total 141 08/22/2022 03:55 AM    HDL Cholesterol 57 08/22/2022 03:55 AM    LDL, calculated 71.6 08/22/2022 03:55 AM    Triglyceride 62 08/22/2022 03:55 AM    CHOL/HDL Ratio 2.5 08/22/2022 03:55 AM     Lab Results   Component Value Date/Time    Glucose (POC) 145 (H) 02/14/2022 09:01 PM     Lab Results   Component Value Date/Time    Color YELLOW/STRAW 09/28/2022 10:37 AM    Appearance CLEAR 09/28/2022 10:37 AM    Specific gravity 1.010 09/28/2022 10:37 AM    pH (UA) 7.0 09/28/2022 10:37 AM    Protein Negative 09/28/2022 10:37 AM    Glucose Negative 09/28/2022 10:37 AM    Ketone 15 (A) 09/28/2022 10:37 AM    Bilirubin Negative 09/28/2022 10:37 AM    Urobilinogen 0.2 09/28/2022 10:37 AM    Nitrites Positive (A) 09/28/2022 10:37 AM    Leukocyte Esterase MODERATE (A) 09/28/2022 10:37 AM    Epithelial cells FEW 09/28/2022 10:37 AM    Bacteria 4+ (A) 09/28/2022 10:37 AM    WBC >100 (H) 09/28/2022 10:37 AM    RBC 0-5 09/28/2022 10:37 AM         Medications Reviewed:     Current Facility-Administered Medications   Medication Dose Route Frequency    potassium chloride SR (KLOR-CON 10) tablet 20 mEq  20 mEq Oral Q4H    thiamine (B-1) 100 mg in 0.9% sodium chloride 50 mL IVPB  100 mg IntraVENous DAILY    dextrose 5% and 0.9% NaCl infusion  75 mL/hr IntraVENous CONTINUOUS    metoprolol tartrate (LOPRESSOR) tablet 25 mg  25 mg Oral Q12H    trimethoprim-sulfamethoxazole (BACTRIM DS, SEPTRA DS) 160-800 mg per tablet 1 Tablet  1 Tablet Oral Q12H    amiodarone (CORDARONE) 375 mg/250 mL D5W infusion  0.5-1 mg/min IntraVENous TITRATE    0.9% sodium chloride infusion 250 mL  250 mL IntraVENous PRN    [Held by provider] digoxin (LANOXIN) tablet 0.125 mg  0.125 mg Oral DAILY    albuterol (PROVENTIL VENTOLIN) nebulizer solution 2.5 mg  2.5 mg Nebulization Q6H PRN    sodium chloride (NS) flush 5-40 mL  5-40 mL IntraVENous Q8H    sodium chloride (NS) flush 5-40 mL  5-40 mL IntraVENous PRN    acetaminophen (TYLENOL) tablet 650 mg  650 mg Oral Q6H PRN    Or    acetaminophen (TYLENOL) suppository 650 mg  650 mg Rectal Q6H PRN    polyethylene glycol (MIRALAX) packet 17 g  17 g Oral DAILY PRN    ondansetron (ZOFRAN ODT) tablet 4 mg  4 mg Oral Q8H PRN    Or    ondansetron (ZOFRAN) injection 4 mg  4 mg IntraVENous Q6H PRN    pantoprazole (PROTONIX) 40 mg in 0.9% sodium chloride 10 mL injection  40 mg IntraVENous DAILY    prochlorperazine (COMPAZINE) injection 10 mg  10 mg IntraVENous Q6H PRN     ______________________________________________________________________  EXPECTED LENGTH OF STAY: 3d 4h  ACTUAL LENGTH OF STAY:          1756 Maricruz Sheets MD     CRITICAL CARE ATTESTATION:  I had a face to face encounter with the patient, reviewed and interpreted patient data including clinical events, labs, images, vital signs, I/O's, and examined patient. I have discussed the case and the plan and management of the patient's care with the consulting services, the bedside nurses and necessary ancillary providers. NOTE OF PERSONAL INVOLVEMENT IN CARE   This patient has a high probability of imminent, clinically significant deterioration, which requires the highest level of preparedness to intervene urgently. I participated in the decision-making and personally managed or directed the management of the following life and organ supporting interventions that required my frequent assessment to treat or prevent imminent deterioration. I personally spent 30 minutes of critical care time. This is time spent at this critically ill patient's bedside actively involved in patient care as well as the coordination of care and discussions with the patient's family. This does not include any procedural time which has been billed separately.

## 2022-10-02 NOTE — PROGRESS NOTES
[facility]  Cardiology Care Note                  []Initial visit     [x]Established visit     Patient Name: Leandra Gonzalez - CNT:[id number] - XQ  Primary Cardiologist: Jane Cameron and has been followed by cardiology in [place]. Consulting Cardiologist: Zurdo Ca MD     Reason for initial visit:afib with RVR    HPI:   Ms. Shaneka Rodgers is a 77 y.o. female with PMH of PAFon digoxin and ASA , Left hemicolectomy in 2022 for adenomatous colonic polyp, anemia, HLD, GERD, seizrues, chronic back pain,  former smokr. . She presented with chief c/o abdominal pain and nausea. She was hospitalized earlier this month for abdominal pain and now found to have liver adenocarcinoma on recent liver biopsy with likely organized hepatic hematoma on CT scan. There is concern for cecal malignancy and metastatic colon cancer is suspected. Cardiology consulted as pt went into afib with RVR early this a.m. She denies any palpitations, dizziness, lightheadedness and no chest pain. She had some mild SOB last night but this is better. She feels weak. C/o some abdominal pain. No palpitations. She has received adocard 6 mg and 12 mg, iv diltiazem bolus and IV dig and started on diltiazem gtt. Later in day, she developed hypotension on diltizem gtt. BP is soft. She also exhibits metabolic acidosis thought to be due to poor po intake, malnutrition  CXR with mild diffuse interstia lopacities (edma vs invection)  SH: former smoker, no ETOH. FH:no FH of early CAD    SUBJECTIVE; Today, the patient remains in sinus rhythm, feeling fine, with blood pressure borderline low in the 90s to 100s. Only complaint is that she wants more Pepsi. Assessment and Plan     Likely metastatic adenocarcinoma:  Atrial fibrillation with rapid ventricular response  2. Hypokalemia/hypomagnesemia: repleted by primary team. Mg repleted.    3. Lliver adenocarcinoma (uncertain priimary). Per heme/onc  4. Abnormal UA: per priamry team.   5.  Anemia: ?acute on chronic. Stool OB pending also. Patient is admitted to the hospital for noncardiac reasons but developed atrial fibrillation/flutter with rapid ventricular response. She does not stop responding to IV Cardizem and there was marginal room to give her beta-blockers were given low blood pressures. Reasonable to give IV hydration. EF appears to be normal.  AF resolved with amiodarone. Not a candidate for oral anticoagulation at this point of time. Hopefully can convert to p.o. amiodarone soon. If blood pressure improves, will also add beta-blocker again.           ____________________________________________________________    Cardiac testing  07/21/22    ECHO ADULT FOLLOW-UP OR LIMITED 07/21/2022 7/21/2022    Interpretation Summary    Limited study for the assessment of ejection fraction. Left Ventricle: Normal left ventricular systolic function with a visually estimated EF of 55 - 60%. EF by 2D Simpsons Biplane is 56%. Left ventricle size is normal. Normal wall thickness. See diagram for wall motion findings. Tricuspid Valve: Mild to moderate regurgitation on limited color and Doppler interrogation. Pulmonary Arteries: Pulmonary hypertension not present. The estimated PASP is 27 mmHg. Signed by: Hi Mcleod MD on 7/21/2022  2:41 PM                Most recent HS troponins:  No results for input(s): TROPHS in the last 72 hours. No lab exists for component:  CKMB  ECG: afib with RVR, nonspecific t wave abnormality  Repeat EKG reviewed by Dr. Iker Valdes- atrial tachycardia with t wave abnormality V5, V6.  ?possible dig effect.    Review of Systems    []All other systems reviewed and all negative except as written in HPI    [] Patient unable to provide secondary to condition         Past Medical History:   Diagnosis Date    Adenoma of colon     Anemia     Arthritis     Atrial fibrillation ([facility] [address] Bloating     Chronic constipation     Chronic pain     back     COPD (chronic obstructive pulmonary disease) (HCC)     GERD (gastroesophageal reflux disease)     [place] (hard of hearing)     Hyponatremia     caused seizures x 2 per pt    Indigestion     Osteoporosis     Seizures ([address] 7/2020, 9/2020    x 2      Past Surgical History:   Procedure Laterality Date    COLONOSCOPY N/A 12/16/2021    COLONOSCOPY   :- performed by Casa Logan., MD at [place] [address] N/A 2/14/2022    . performed by Graciela Salcedo MD at [facility] MAIN OR    [address]    HX CHOLECYSTECTOMY  1982    HX HIP REPLACEMENT Left     pt stated hip was pinned, not replaced    HX HIP REPLACEMENT Right     pt stated hip was pinned, not replaced    HX HYSTERECTOMY      HX ORTHOPAEDIC  1990, 2011    bilateral hip fractures     HX ORTHOPAEDIC Left 2020    femur fracture    HX TONSILLECTOMY       Social Hx:  reports that she quit smoking about 10 years ago. She has a 20.00 pack-year smoking history. She has never used smokeless tobacco. She reports that she does not drink alcohol and does not use drugs. Family Hx: family history includes Alcohol abuse in her father; Heart Disease in her father and mother; Liver Disease in her father. Allergies   Allergen Reactions    Cefuroxime Hives    Hydrocodone Nausea and Vomiting    Other Medication Diarrhea and Nausea and Vomiting     PT REPORTS ALL MYCINS CAUSE SEVERE GI UPSET    Oxycodone Nausea and Vomiting    Penicillins Hives     Patient screened for any delayed non-IgE-mediated reaction to PCN.         Patient notes the following:    No delayed non-IgE-mediated reaction to PCN    Hives 1969                OBJECTIVE:  Wt Readings from Last 3 Encounters:   10/01/22 91 lb 0.8 oz (41.3 kg)   09/15/22 92 lb (41.7 kg)   07/21/22 93 lb (42.2 kg)       Intake/Output Summary (Last 24 hours) at 10/1/2022 2106  Last data filed at 10/1/2022 0452  Gross per 24 hour Intake 240 ml   Output --   Net 240 ml         Physical Exam    Vitals:   Vitals:    10/01/22 1400 10/01/22 1554 10/01/22 1800 10/01/22 2000   BP:  109/62     Pulse: 80 79 89 94   Resp:  16     Temp:  98.3 °F (36.8 °C)     SpO2:  100%     Weight:       Height:         Telemetry: afib rvr this a.m. now  /62 (BP 1 Location: Right arm, BP Patient Position: At rest)   Pulse 94   Temp 98.3 °F (36.8 °C)   Resp 16   Ht 5' 3\" (1.6 m)   Wt 91 lb 0.8 oz (41.3 kg)   SpO2 100%   BMI 16.13 kg/m²   General:    Alert, cooperative, no distress. Cachectic   Psychiatric:    Normal Mood and affect    Eye/ENT:      Pupils equal, No asymmetry, Conjunctival pink. Able to hear voice at normal amplitude   Lungs:      Visibly symmetric chest expansion, No palpable tenderness. Clear to auscultation bilaterally. Heart[de-identified]    Regular rate and rhythm, S1, S2 normal, no murmur, click, rub or gallop. No JVD, Normal palpable peripheral pulses. No cyanosis   Abdomen:     Soft, non-tender. Bowel sounds normal. No masses,  No      organomegaly. Extremities:   Extremities normal, atraumatic, no edema. Neurologic:   CN II-XII grossly intact. No gross focal deficits           Data Review:     Radiology:   XR Results (most recent):  Results from Hospital Encounter encounter on 09/28/22    XR ABD (KUB)    Narrative  INDICATION: NG tube placement    EXAM: Abdomen KUB. 1958 hours. FINDINGS:  Semierect AP portable view of the abdomen shows NG tube tip projecting over the  left lower lung. Floor notified by phone. Impression  NG tube in the lung. CT Results (most recent):  Results from Hospital Encounter encounter on 09/28/22    CT ABD PELV W CONT    Narrative  EXAM: CT ABD PELV W CONT    INDICATION: Diffuse abdominal pain and diarrhea. Rectal constipation earlier  this month. Nonspecific liver disease on previous imaging. Biliary dilatation on  previous imaging. Elevated alkaline phosphatase.  Normal white blood cell count,  bilirubin, lipase. COMPARISON: CT abdomen/pelvis on 9/12/2022. CONTRAST: 100 mL of Isovue-370. ORAL CONTRAST: None    TECHNIQUE:  Following the uneventful intravenous administration of contrast, thin axial  images were obtained through the abdomen and pelvis. Coronal and sagittal  reconstructions were generated. CT dose reduction was achieved through use of a  standardized protocol tailored for this examination and automatic exposure  control for dose modulation. FINDINGS:  LOWER THORAX: Small right pleural effusion is new. No basilar pneumonia. Normal  cardiac size. LIVER: Segment 7/8 subcapsular collection of heterogeneous fluid measures 9.3 x  6.9 x 3.5 cm. Heterogeneous septated fluid attenuation throughout the right  hepatic lobe measures approximately 13 cm in oblique AP diameter. Central right  hepatic lobe peripherally enhancing lesion previously measured 1.9 cm and now  measures 2.4 cm. Unchanged mild intrahepatic biliary dilatation. BILIARY TREE: Cholecystectomy. Unchanged chronic biliary dilatation. No abrupt  termination. SPLEEN: Normal size. Lobulated contour. PANCREAS: Mild diffuse atrophy. No mass or inflammation. ADRENALS: Unremarkable. KIDNEYS: No hydronephrosis. Heterogeneous small multiple cysts. STOMACH: Unremarkable. SMALL BOWEL: No dilatation or wall thickening. COLON: Incomplete distention, limited evaluation. Fluid-filled cecum is in the  pelvis. APPENDIX: Normal.  PERITONEUM: No ascites or pneumoperitoneum. RETROPERITONEUM: Aortic atherosclerosis without aneurysm. 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 No lymphadenopathy. REPRODUCTIVE ORGANS: Hysterectomy. URINARY BLADDER: Incomplete distention, limited evaluation. BONES: Osteopenia. Femoral hardware. Spinal curvature. T11 partial compression  fracture. ABDOMINAL WALL: No mass or hernia. ADDITIONAL COMMENTS: Diffuse muscle atrophy. Impression  1.  Progression of hepatic lesions and new large fluid collections in the liver. Intrahepatic abscesses from nonspecific infection are most likely. Consider  fluid sampling or drainage with CT guidance. 2. New small right pleural effusion. 3. Fluid-filled cecum may indicate infectious colitis. No bowel obstruction. MRI Results (most recent):  No results found for this or any previous visit. No results for input(s): CPK, TROIQ in the last 72 hours. No lab exists for component: CKQMB, CPKMB, BMPP  Recent Labs     10/01/22  0444 09/30/22  1623    135*   K 3.8 3.6   * 105   CO2 17* 18*   BUN 7 5*   CREA 0.62 0.70   * 108*   CA 8.0* 8.5     Recent Labs     10/01/22  0444 09/30/22  0251   WBC 9.3 6.4   HGB 9.6* 8.1*   HCT 30.8* 25.1*   * 398     Recent Labs     10/01/22  0444 09/29/22  0406   * 255*     No results for input(s): CHOL, LDLC in the last 72 hours. No lab exists for component: TGL, HDLC,  HBA1C  No results for input(s): CRP, TSH, TSHEXT, TSHEXT in the last 72 hours.     No lab exists for component: ESR        Current meds:    Current Facility-Administered Medications:     thiamine (B-1) 100 mg in 0.9% sodium chloride 50 mL IVPB, 100 mg, IntraVENous, DAILY, Roxi Russo MD, Last Rate: 200 mL/hr at 10/01/22 1026, 100 mg at 10/01/22 1026    dextrose 5% and 0.9% NaCl infusion, 75 mL/hr, IntraVENous, CONTINUOUS, [facility] Roxi Todd MD, Last Rate: 75 mL/hr at 10/01/22 1508, 75 mL/hr at 10/01/22 1508    metoprolol tartrate (LOPRESSOR) tablet 25 mg, 25 mg, Oral, Q12H, Gerri Mercado., NP, 25 mg at 10/01/22 4819    trimethoprim-sulfamethoxazole (BACTRIM DS, SEPTRA DS) 160-800 mg per tablet 1 Tablet, 1 Tablet, Oral, Q12H, Roxi Russo MD, 1 Tablet at 10/01/22 2001    amiodarone (CORDARONE) 375 mg/250 mL D5W infusion, 0.5-1 mg/min, IntraVENous, TITRATE, Gerri Mercado., NP, Last Rate: 20 mL/hr at 10/01/22 1230, 0.5 mg/min at 10/01/22 1230    0.9% sodium chloride infusion 250 mL, 250 mL, IntraVENous, PRN, Kamaljit Mackenzie MD    [facility] by provider] digoxin [facility]) tablet 0.125 mg, 0.125 mg, Oral, DAILY, Roxi Sandoval Sa, MD, 0.125 mg at 09/30/22 0836    albuterol (PROVENTIL VENTOLIN) nebulizer solution 2.5 mg, 2.5 mg, Nebulization, Q6H PRN, Roxi Sandoval Sa, MD    sodium chloride (NS) flush 5-40 mL, 5-40 mL, IntraVENous, Q8H, DIONISIO Velásquez MD, 10 mL at 10/01/22 0600    sodium chloride (NS) flush 5-40 mL, 5-40 mL, IntraVENous, PRN, Kristin Velásquez MD    acetaminophen (TYLENOL) tablet 650 mg, 650 mg, Oral, Q6H PRN, 650 mg at 10/01/22 2000 **OR** acetaminophen (TYLENOL) suppository 650 mg, 650 mg, Rectal, Q6H PRN, QI Velásquez MD    polyethylene glycol (MIRALAX) packet 17 g, 17 g, Oral, DAILY PRN, SANDIE Velásquez MD    ondansetron (ZOFRAN ODT) tablet 4 mg, 4 mg, Oral, Q8H PRN, 4 mg at 10/01/22 0153 **OR** ondansetron (ZOFRAN) injection 4 mg, 4 mg, IntraVENous, Q6H PRN, MACO Velásquez MD, 4 mg at 10/01/22 0906    pantoprazole (PROTONIX) 40 mg in 0.9% sodium chloride 10 mL injection, 40 mg, IntraVENous, DAILY, MOSES Velásquez MD, 40 mg at 10/01/22 0904    prochlorperazine (COMPAZINE) injection 10 mg, 10 mg, IntraVENous, Q6H PRN, Kimberli Hodges MD, 10 mg at 10/01/22 2000    Bettina Collins MD  [facility] [place] [address]  [address]  [phone]      Marylin Medina, JOSEPHINE

## 2022-10-03 ENCOUNTER — APPOINTMENT (OUTPATIENT)
Dept: ULTRASOUND IMAGING | Age: 77
DRG: 435 | End: 2022-10-03
Attending: STUDENT IN AN ORGANIZED HEALTH CARE EDUCATION/TRAINING PROGRAM
Payer: MEDICARE

## 2022-10-03 ENCOUNTER — APPOINTMENT (OUTPATIENT)
Dept: GENERAL RADIOLOGY | Age: 77
DRG: 435 | End: 2022-10-03
Attending: NURSE PRACTITIONER
Payer: MEDICARE

## 2022-10-03 ENCOUNTER — APPOINTMENT (OUTPATIENT)
Dept: GENERAL RADIOLOGY | Age: 77
DRG: 435 | End: 2022-10-03
Attending: STUDENT IN AN ORGANIZED HEALTH CARE EDUCATION/TRAINING PROGRAM
Payer: MEDICARE

## 2022-10-03 PROBLEM — G89.29 CHRONIC BILATERAL LOW BACK PAIN WITHOUT SCIATICA: Status: ACTIVE | Noted: 2022-10-03

## 2022-10-03 PROBLEM — R06.02 SHORTNESS OF BREATH: Status: ACTIVE | Noted: 2022-10-03

## 2022-10-03 PROBLEM — M54.50 CHRONIC BILATERAL LOW BACK PAIN WITHOUT SCIATICA: Status: ACTIVE | Noted: 2022-10-03

## 2022-10-03 PROBLEM — Z71.89 DNR (DO NOT RESUSCITATE) DISCUSSION: Status: ACTIVE | Noted: 2022-10-03

## 2022-10-03 PROBLEM — Z51.5 PALLIATIVE CARE ENCOUNTER: Status: ACTIVE | Noted: 2022-10-03

## 2022-10-03 LAB
ALBUMIN SERPL-MCNC: 2.7 G/DL (ref 3.5–5)
ALBUMIN/GLOB SERPL: 0.8 {RATIO} (ref 1.1–2.2)
ALP SERPL-CCNC: 206 U/L (ref 45–117)
ALT SERPL-CCNC: 350 U/L (ref 12–78)
ANION GAP SERPL CALC-SCNC: 14 MMOL/L (ref 5–15)
APPEARANCE UR: CLEAR
ARTERIAL PATENCY WRIST A: YES
AST SERPL-CCNC: 886 U/L (ref 15–37)
ATRIAL RATE: 144 BPM
ATRIAL RATE: 147 BPM
BACTERIA URNS QL MICRO: NEGATIVE /HPF
BASE DEFICIT BLDA-SCNC: 10.3 MMOL/L
BASOPHILS # BLD: 0 K/UL (ref 0–0.1)
BASOPHILS NFR BLD: 0 % (ref 0–1)
BDY SITE: ABNORMAL
BILIRUB DIRECT SERPL-MCNC: <0.1 MG/DL (ref 0–0.2)
BILIRUB SERPL-MCNC: 0.4 MG/DL (ref 0.2–1)
BILIRUB UR QL: NEGATIVE
BUN SERPL-MCNC: 9 MG/DL (ref 6–20)
BUN/CREAT SERPL: 11 (ref 12–20)
CALCIUM SERPL-MCNC: 7.8 MG/DL (ref 8.5–10.1)
CALCULATED R AXIS, ECG10: 76 DEGREES
CALCULATED R AXIS, ECG10: 80 DEGREES
CALCULATED T AXIS, ECG11: 19 DEGREES
CALCULATED T AXIS, ECG11: 38 DEGREES
CHLORIDE SERPL-SCNC: 110 MMOL/L (ref 97–108)
CO2 SERPL-SCNC: 10 MMOL/L (ref 21–32)
COLOR UR: NORMAL
COMMENT, HOLDF: NORMAL
CREAT SERPL-MCNC: 0.8 MG/DL (ref 0.55–1.02)
DIAGNOSIS, 93000: NORMAL
DIAGNOSIS, 93000: NORMAL
DIFFERENTIAL METHOD BLD: ABNORMAL
DIGOXIN SERPL-MCNC: 1.3 NG/ML (ref 0.9–2)
EOSINOPHIL # BLD: 0.2 K/UL (ref 0–0.4)
EOSINOPHIL NFR BLD: 1 % (ref 0–7)
EPITH CASTS URNS QL MICRO: NORMAL /LPF
ERYTHROCYTE [DISTWIDTH] IN BLOOD BY AUTOMATED COUNT: 23.1 % (ref 11.5–14.5)
GAS FLOW.O2 O2 DELIVERY SYS: 2 L/MIN
GLOBULIN SER CALC-MCNC: 3.2 G/DL (ref 2–4)
GLUCOSE SERPL-MCNC: 92 MG/DL (ref 65–100)
GLUCOSE UR STRIP.AUTO-MCNC: NEGATIVE MG/DL
HCO3 BLDA-SCNC: 13 MMOL/L (ref 22–26)
HCT VFR BLD AUTO: 32 % (ref 35–47)
HGB BLD-MCNC: 9.7 G/DL (ref 11.5–16)
HGB UR QL STRIP: NEGATIVE
HYALINE CASTS URNS QL MICRO: NORMAL /LPF (ref 0–5)
IMM GRANULOCYTES # BLD AUTO: 0.2 K/UL (ref 0–0.04)
IMM GRANULOCYTES NFR BLD AUTO: 1 % (ref 0–0.5)
KETONES UR QL STRIP.AUTO: NEGATIVE MG/DL
LACTATE SERPL-SCNC: 2.1 MMOL/L (ref 0.4–2)
LACTATE SERPL-SCNC: 2.4 MMOL/L (ref 0.4–2)
LACTATE SERPL-SCNC: 2.6 MMOL/L (ref 0.4–2)
LEUKOCYTE ESTERASE UR QL STRIP.AUTO: NEGATIVE
LYMPHOCYTES # BLD: 0.9 K/UL (ref 0.8–3.5)
LYMPHOCYTES NFR BLD: 6 % (ref 12–49)
MCH RBC QN AUTO: 26.6 PG (ref 26–34)
MCHC RBC AUTO-ENTMCNC: 30.3 G/DL (ref 30–36.5)
MCV RBC AUTO: 87.9 FL (ref 80–99)
MONOCYTES # BLD: 1.2 K/UL (ref 0–1)
MONOCYTES NFR BLD: 8 % (ref 5–13)
NEUTS SEG # BLD: 12.9 K/UL (ref 1.8–8)
NEUTS SEG NFR BLD: 84 % (ref 32–75)
NITRITE UR QL STRIP.AUTO: NEGATIVE
NRBC # BLD: 0.02 K/UL (ref 0–0.01)
NRBC BLD-RTO: 0.1 PER 100 WBC
PCO2 BLDA: 23 MMHG (ref 35–45)
PH BLDA: 7.37 [PH] (ref 7.35–7.45)
PH UR STRIP: 6.5 [PH] (ref 5–8)
PLATELET # BLD AUTO: 339 K/UL (ref 150–400)
PMV BLD AUTO: 9.8 FL (ref 8.9–12.9)
PO2 BLDA: 88 MMHG (ref 80–100)
POTASSIUM SERPL-SCNC: 4.6 MMOL/L (ref 3.5–5.1)
PROCALCITONIN SERPL-MCNC: 1.18 NG/ML
PROT SERPL-MCNC: 5.9 G/DL (ref 6.4–8.2)
PROT UR STRIP-MCNC: NEGATIVE MG/DL
Q-T INTERVAL, ECG07: 264 MS
Q-T INTERVAL, ECG07: 264 MS
QRS DURATION, ECG06: 80 MS
QRS DURATION, ECG06: 82 MS
QTC CALCULATION (BEZET), ECG08: 403 MS
QTC CALCULATION (BEZET), ECG08: 407 MS
RBC # BLD AUTO: 3.64 M/UL (ref 3.8–5.2)
RBC #/AREA URNS HPF: NORMAL /HPF (ref 0–5)
RBC MORPH BLD: ABNORMAL
SAMPLES BEING HELD,HOLD: NORMAL
SAO2 % BLD: 97 % (ref 92–97)
SAO2% DEVICE SAO2% SENSOR NAME: ABNORMAL
SODIUM SERPL-SCNC: 134 MMOL/L (ref 136–145)
SP GR UR REFRACTOMETRY: 1.01 (ref 1–1.03)
SPECIMEN SITE: ABNORMAL
UR CULT HOLD, URHOLD: NORMAL
UROBILINOGEN UR QL STRIP.AUTO: 0.2 EU/DL (ref 0.2–1)
VENTRICULAR RATE, ECG03: 140 BPM
VENTRICULAR RATE, ECG03: 143 BPM
WBC # BLD AUTO: 15.4 K/UL (ref 3.6–11)
WBC URNS QL MICRO: NORMAL /HPF (ref 0–4)

## 2022-10-03 PROCEDURE — 99233 SBSQ HOSP IP/OBS HIGH 50: CPT | Performed by: INTERNAL MEDICINE

## 2022-10-03 PROCEDURE — 74011250637 HC RX REV CODE- 250/637: Performed by: NURSE PRACTITIONER

## 2022-10-03 PROCEDURE — 82803 BLOOD GASES ANY COMBINATION: CPT

## 2022-10-03 PROCEDURE — C9113 INJ PANTOPRAZOLE SODIUM, VIA: HCPCS | Performed by: HOSPITALIST

## 2022-10-03 PROCEDURE — 74011250636 HC RX REV CODE- 250/636: Performed by: NURSE PRACTITIONER

## 2022-10-03 PROCEDURE — 71045 X-RAY EXAM CHEST 1 VIEW: CPT

## 2022-10-03 PROCEDURE — 80162 ASSAY OF DIGOXIN TOTAL: CPT

## 2022-10-03 PROCEDURE — 74011000258 HC RX REV CODE- 258: Performed by: STUDENT IN AN ORGANIZED HEALTH CARE EDUCATION/TRAINING PROGRAM

## 2022-10-03 PROCEDURE — 74011250636 HC RX REV CODE- 250/636: Performed by: HOSPITALIST

## 2022-10-03 PROCEDURE — 76705 ECHO EXAM OF ABDOMEN: CPT

## 2022-10-03 PROCEDURE — 36415 COLL VENOUS BLD VENIPUNCTURE: CPT

## 2022-10-03 PROCEDURE — 85025 COMPLETE CBC W/AUTO DIFF WBC: CPT

## 2022-10-03 PROCEDURE — 94640 AIRWAY INHALATION TREATMENT: CPT

## 2022-10-03 PROCEDURE — 87040 BLOOD CULTURE FOR BACTERIA: CPT

## 2022-10-03 PROCEDURE — 36556 INSERT NON-TUNNEL CV CATH: CPT

## 2022-10-03 PROCEDURE — 74011000250 HC RX REV CODE- 250: Performed by: HOSPITALIST

## 2022-10-03 PROCEDURE — 74011250636 HC RX REV CODE- 250/636: Performed by: STUDENT IN AN ORGANIZED HEALTH CARE EDUCATION/TRAINING PROGRAM

## 2022-10-03 PROCEDURE — 74011250637 HC RX REV CODE- 250/637: Performed by: STUDENT IN AN ORGANIZED HEALTH CARE EDUCATION/TRAINING PROGRAM

## 2022-10-03 PROCEDURE — 80076 HEPATIC FUNCTION PANEL: CPT

## 2022-10-03 PROCEDURE — 36600 WITHDRAWAL OF ARTERIAL BLOOD: CPT

## 2022-10-03 PROCEDURE — 74011000250 HC RX REV CODE- 250: Performed by: STUDENT IN AN ORGANIZED HEALTH CARE EDUCATION/TRAINING PROGRAM

## 2022-10-03 PROCEDURE — 02HV33Z INSERTION OF INFUSION DEVICE INTO SUPERIOR VENA CAVA, PERCUTANEOUS APPROACH: ICD-10-PCS | Performed by: STUDENT IN AN ORGANIZED HEALTH CARE EDUCATION/TRAINING PROGRAM

## 2022-10-03 PROCEDURE — 65660000001 HC RM ICU INTERMED STEPDOWN

## 2022-10-03 PROCEDURE — 81001 URINALYSIS AUTO W/SCOPE: CPT

## 2022-10-03 PROCEDURE — P9047 ALBUMIN (HUMAN), 25%, 50ML: HCPCS | Performed by: NURSE PRACTITIONER

## 2022-10-03 PROCEDURE — 30233N1 TRANSFUSION OF NONAUTOLOGOUS RED BLOOD CELLS INTO PERIPHERAL VEIN, PERCUTANEOUS APPROACH: ICD-10-PCS | Performed by: STUDENT IN AN ORGANIZED HEALTH CARE EDUCATION/TRAINING PROGRAM

## 2022-10-03 PROCEDURE — 84145 PROCALCITONIN (PCT): CPT

## 2022-10-03 PROCEDURE — 83605 ASSAY OF LACTIC ACID: CPT

## 2022-10-03 PROCEDURE — C1751 CATH, INF, PER/CENT/MIDLINE: HCPCS

## 2022-10-03 PROCEDURE — 80048 BASIC METABOLIC PNL TOTAL CA: CPT

## 2022-10-03 RX ORDER — BALSAM PERU/CASTOR OIL
OINTMENT (GRAM) TOPICAL 2 TIMES DAILY
Status: DISCONTINUED | OUTPATIENT
Start: 2022-10-03 | End: 2022-10-10

## 2022-10-03 RX ORDER — IPRATROPIUM BROMIDE AND ALBUTEROL SULFATE 2.5; .5 MG/3ML; MG/3ML
3 SOLUTION RESPIRATORY (INHALATION)
Status: DISCONTINUED | OUTPATIENT
Start: 2022-10-03 | End: 2022-10-04

## 2022-10-03 RX ORDER — BUMETANIDE 0.25 MG/ML
1 INJECTION INTRAMUSCULAR; INTRAVENOUS ONCE
Status: COMPLETED | OUTPATIENT
Start: 2022-10-03 | End: 2022-10-03

## 2022-10-03 RX ORDER — METRONIDAZOLE 500 MG/100ML
500 INJECTION, SOLUTION INTRAVENOUS EVERY 12 HOURS
Status: DISCONTINUED | OUTPATIENT
Start: 2022-10-03 | End: 2022-10-05

## 2022-10-03 RX ORDER — ALBUMIN HUMAN 50 G/1000ML
12.5 SOLUTION INTRAVENOUS ONCE
Status: ACTIVE | OUTPATIENT
Start: 2022-10-03 | End: 2022-10-04

## 2022-10-03 RX ORDER — FUROSEMIDE 10 MG/ML
20 INJECTION INTRAMUSCULAR; INTRAVENOUS ONCE
Status: COMPLETED | OUTPATIENT
Start: 2022-10-03 | End: 2022-10-03

## 2022-10-03 RX ORDER — AMOXICILLIN 250 MG
1 CAPSULE ORAL DAILY
Status: DISCONTINUED | OUTPATIENT
Start: 2022-10-03 | End: 2022-10-04

## 2022-10-03 RX ORDER — ALBUMIN HUMAN 250 G/1000ML
12.5 SOLUTION INTRAVENOUS ONCE
Status: COMPLETED | OUTPATIENT
Start: 2022-10-03 | End: 2022-10-03

## 2022-10-03 RX ORDER — MIDODRINE HYDROCHLORIDE 5 MG/1
2.5 TABLET ORAL
Status: DISCONTINUED | OUTPATIENT
Start: 2022-10-03 | End: 2022-10-04

## 2022-10-03 RX ORDER — POLYETHYLENE GLYCOL 3350 17 G/17G
17 POWDER, FOR SOLUTION ORAL DAILY
Status: DISCONTINUED | OUTPATIENT
Start: 2022-10-03 | End: 2022-10-05

## 2022-10-03 RX ORDER — BUTALBITAL, ACETAMINOPHEN AND CAFFEINE 50; 325; 40 MG/1; MG/1; MG/1
2 TABLET ORAL
Status: DISCONTINUED | OUTPATIENT
Start: 2022-10-03 | End: 2022-10-05

## 2022-10-03 RX ADMIN — DOCUSATE SODIUM 50MG AND SENNOSIDES 8.6MG 1 TABLET: 8.6; 5 TABLET, FILM COATED ORAL at 13:17

## 2022-10-03 RX ADMIN — METRONIDAZOLE 500 MG: 500 INJECTION, SOLUTION INTRAVENOUS at 22:24

## 2022-10-03 RX ADMIN — VANCOMYCIN HYDROCHLORIDE 500 MG: 500 INJECTION, POWDER, LYOPHILIZED, FOR SOLUTION INTRAVENOUS at 23:33

## 2022-10-03 RX ADMIN — IPRATROPIUM BROMIDE AND ALBUTEROL SULFATE 3 ML: .5; 3 SOLUTION RESPIRATORY (INHALATION) at 12:45

## 2022-10-03 RX ADMIN — DIAZEPAM 2 MG: 2 TABLET ORAL at 06:35

## 2022-10-03 RX ADMIN — PROCHLORPERAZINE EDISYLATE 10 MG: 5 INJECTION INTRAMUSCULAR; INTRAVENOUS at 15:36

## 2022-10-03 RX ADMIN — BUMETANIDE 1 MG: 0.25 INJECTION INTRAMUSCULAR; INTRAVENOUS at 17:58

## 2022-10-03 RX ADMIN — BUTALBITAL, ACETAMINOPHEN, AND CAFFEINE 2 TABLET: 50; 325; 40 TABLET ORAL at 13:37

## 2022-10-03 RX ADMIN — SODIUM CHLORIDE, PRESERVATIVE FREE 10 ML: 5 INJECTION INTRAVENOUS at 06:35

## 2022-10-03 RX ADMIN — MIDODRINE HYDROCHLORIDE 2.5 MG: 5 TABLET ORAL at 17:58

## 2022-10-03 RX ADMIN — METRONIDAZOLE 500 MG: 500 INJECTION, SOLUTION INTRAVENOUS at 08:35

## 2022-10-03 RX ADMIN — DOXYCYCLINE 100 MG: 100 INJECTION, POWDER, LYOPHILIZED, FOR SOLUTION INTRAVENOUS at 14:08

## 2022-10-03 RX ADMIN — Medication: at 18:03

## 2022-10-03 RX ADMIN — ALBUTEROL SULFATE 2.5 MG: 2.5 SOLUTION RESPIRATORY (INHALATION) at 01:58

## 2022-10-03 RX ADMIN — Medication: at 11:13

## 2022-10-03 RX ADMIN — IPRATROPIUM BROMIDE AND ALBUTEROL SULFATE 3 ML: .5; 3 SOLUTION RESPIRATORY (INHALATION) at 16:01

## 2022-10-03 RX ADMIN — VANCOMYCIN HYDROCHLORIDE 1000 MG: 1 INJECTION, POWDER, LYOPHILIZED, FOR SOLUTION INTRAVENOUS at 11:54

## 2022-10-03 RX ADMIN — ALBUMIN (HUMAN) 12.5 G: 0.25 INJECTION, SOLUTION INTRAVENOUS at 03:29

## 2022-10-03 RX ADMIN — SODIUM CHLORIDE, PRESERVATIVE FREE 10 ML: 5 INJECTION INTRAVENOUS at 14:09

## 2022-10-03 RX ADMIN — SODIUM BICARBONATE: 84 INJECTION, SOLUTION INTRAVENOUS at 13:16

## 2022-10-03 RX ADMIN — FUROSEMIDE 20 MG: 10 INJECTION, SOLUTION INTRAVENOUS at 11:09

## 2022-10-03 RX ADMIN — PANTOPRAZOLE SODIUM 40 MG: 40 INJECTION, POWDER, FOR SOLUTION INTRAVENOUS at 08:35

## 2022-10-03 RX ADMIN — ALBUTEROL SULFATE 2.5 MG: 2.5 SOLUTION RESPIRATORY (INHALATION) at 09:58

## 2022-10-03 RX ADMIN — POLYETHYLENE GLYCOL 3350 17 G: 17 POWDER, FOR SOLUTION ORAL at 13:17

## 2022-10-03 NOTE — PROGRESS NOTES
Physician Progress Note      PATIENT:               Jermaine Powell  CSN #:                  [id number]  :                       1945  ADMIT DATE:       2022 5:05 AM  DISCH DATE:  RESPONDING  PROVIDER #:        Yumiko Dougherty MD          QUERY TEXT:    Pt admitted with Abd pain, Nausea, vomiting, diarrhea. Pt noted to have Metastatic Adenocarcinoma, with documentation per Oncology consultant that Metastatic Colon CA is also suspected. Diarrheal Illness is documented. If possible, please document in progress notes and discharge summary if you are evaluating and /or treating any of the following: The medical record reflects the following:  Risk Factors: Metastatic adenocarcinoma  Clinical Indicators: pt admitted with Abd pain, N/V/D. VS on admission were 98.4, 96, 20, 114/66, WBC 8.6 (15.4 max) C-Diff and Enteric Panel have returned negative. CT Abd shows progression of hepatic lesions. Pt with hx of Metastatic Adenocarcinoma of Liver with documentation per Oncologist of fluid filled cecum worrisome for cecal metastasis and 'Suspect this is met colon cancer'. Treatment: Flagyl/ Doxycycline IV, IVF, Protonix, Thiamin IV, Albumin IV, Bentyl, Toradol IV prn pain. Palliative care consult has been made. Thank you,  Sushil Hoffman RN  Clinical Documentation  [phone], or via [employer]  Options provided:  -- Abd pain, Vomiting and Diarrhea due to Metastatic Adenocarcinoma  -- Abd pain, Vomiting and Diarrhea unrelated to Metastatic Adenocarcinoma  -- Other - I will add my own diagnosis  -- Disagree - Not applicable / Not valid  -- Disagree - Clinically unable to determine / Unknown  -- Refer to Clinical Documentation Reviewer    PROVIDER RESPONSE TEXT:    Provider is clinically unable to determine a response to this query.     Query created by: Hema Ba on 10/3/2022 11:11 AM      Electronically signed by:  Yumiko Dougherty MD 10/3/2022 12:52 PM

## 2022-10-03 NOTE — CONSULTS
Nutrition Note    MD consult received for Poor PO intakes and unintentional weight loss. RD already following pt. Spoke with pt at bedside. She reports poor PO intakes secondary to no BM in 5 days. Multiple ONS ordered, pt is drinking 1 Ensure each day and both Magic Cups daily which she really enjoys. Offered to change Ensure Enlive to Clear as she is not a huge fan of Ensure Enlive but she states those are even worse. Agreeable to try Magic Cup TID, will decrease Ensure Enlive down to 1x/day. Lunch tray observed, she only ate the [employer]. Also notes that she will need Easy to [employer] once it is advanced, currently ordered Full Liquid diet. Some food preferences obtained and added to diet order. Plan was for NGT placement for supplemental EN last week but unsuccessful placement of NGT and then pt refused reattempts. See note from 9/29 for full nutrition assessment.          Electronically signed by Vickie Snow RD  on 10/3/2022 at 3:23 PM  Contact via [employer]

## 2022-10-03 NOTE — PROGRESS NOTES
[facility]  Cardiology Care Note                  []Initial visit     [x]Established visit     Patient Name: Derek Givens - KWJ:[id number] - \[facility] [place] [facility]\"":[id number]  Primary Cardiologist: Mi Fermin and has been followed by cardiology in [place]. Consulting Cardiologist: Nasir Espinoza MD     Reason for initial visit:afib with RVR    HPI:   Ms. Lourdes Bruner is a 77 y.o. female with PMH of PAF on digoxin and ASA , Left hemicolectomy in 2/2022 for adenomatous colonic polyp, anemia, HLD, GERD, seizrues, chronic back pain,  former smokr. . She presented with chief c/o abdominal pain and nausea. She was hospitalized earlier this month for abdominal pain and now found to have liver adenocarcinoma on recent liver biopsy with likely organized hepatic hematoma on CT scan. There is concern for cecal malignancy and metastatic colon cancer is suspected. Cardiology consulted as pt went into afib with RVR early this a.m. She denies any palpitations, dizziness, lightheadedness and no chest pain. She had some mild SOB last night but this is better. She feels weak. C/o some abdominal pain. No palpitations. She has received adocard 6 mg and 12 mg, iv diltiazem bolus and IV dig and started on diltiazem gtt. Later in day, she developed hypotension on diltizem gtt. BP is soft. She also exhibits metabolic acidosis thought to be due to poor po intake, malnutritionCXR with mild diffuse interstia lopacities (edema vs invection)    SUBJECTIVE;  Pt is SOB today. Denies chest pain. She is hypotensive with SBP in the 90's. She remains in NSR, HR controlled today but had sinus tachycardia yesterday. SHe now has leukocytosis and bicarb is low on labs. Palliative care is at bedside. Echo now shows reduced LV function: EF 20-25%. CXR shows increased Rt pleural effusion.   Lactic acid is 2.4     Assessment and Plan     Likely metastatic adenocarcinoma  Atrial fibrillation with rapid ventricular response:  Now NSR. 3.   Cardiomyopathy: new. EF 20-25%  4. Acute systolic CHF  5. Elevated LFTs  6. Leukocytosis, WBC 15   6. Liver adenocarcinoma (uncertain priimary). Per heme/onc  7. Malnutrition: albumin 2.7.    8.  Anemia:  stable at 9.7.    9. SOB: multifactorial   10. Advanced directives: DNR noted. Patient is admitted to the hospital for noncardiac reasons but developed atrial fibrillation/flutter with rapid ventricular response. Afib resolved with amiodarone but will stop amiodarone today due to markedly elevated LFTs which could be due to liver adenocarcinoma or ?possible hepatic congestion given cardiomyopathy. Her Blood pressure is too low for metoprolol so will hold. Digoxin is on hold also, her dig level is NL. Today, she is SOB today which is likely multifactorial, possible pneumonia or sepsis with leukocytosis,  worsened pleural effusions, R>L, newly noted LV dysfunction. Her Cardiomyopathy may be tachycardia or stress induced. CAD can not be ruled out but she has no chest pain and she is a poor candidate for invasive cardiac testing. Hold off on GDMT given low BP. Agree with trial of IV diuretic (20 mg x 1 today). However, cardiomyopathy imay not be main contributor to SOB. Suspect there may be an underlying infectious, possible sepsis component in setting of metastatic adenocarcinoma as well as LV dysfunction. Palliative care was consulted and pt has elected DNR status. Pt was seen by Dr. Nestor Calix.          ____________________________________________________________    Cardiac testing  09/28/22    ECHO ADULT COMPLETE 10/02/2022 10/2/2022    Interpretation Summary    Left Ventricle: Severely reduced left ventricular systolic function with a visually estimated EF of 25 - 30%. Left ventricle size is normal. Mildly increased wall thickness.  There is severe hypokinesis of the mid to distal portions of the anterior, lateral, and inferior walls with akinesis at the apex. Findings could be consistent with coronary artery disease versus Takotsubo cardiomyopathy. Mitral Valve: Mild regurgitation. Tricuspid Valve: Moderate regurgitation. Signed by: Ludin Herrera MD on 10/2/2022 10:43 PM          07/21/22    ECHO ADULT FOLLOW-UP OR LIMITED 07/21/2022 7/21/2022    Interpretation Summary    Limited study for the assessment of ejection fraction. Left Ventricle: Normal left ventricular systolic function with a visually estimated EF of 55 - 60%. EF by 2D Simpsons Biplane is 56%. Left ventricle size is normal. Normal wall thickness. See diagram for wall motion findings. Tricuspid Valve: Mild to moderate regurgitation on limited color and Doppler interrogation. Pulmonary Arteries: Pulmonary hypertension not present. The estimated PASP is 27 mmHg. Signed by: Jasmin Quesada MD on 7/21/2022  2:41 PM                Most recent HS troponins:  No results for input(s): TROPHS in the last 72 hours. No lab exists for component:  CKMB  ECG: afib with RVR, nonspecific t wave abnormality  Repeat EKG reviewed by Dr. Ruth Weeks- atrial tachycardia with t wave abnormality V5, V6.  ?possible dig effect.    Review of Systems    []All other systems reviewed and all negative except as written in HPI    [] Patient unable to provide secondary to condition         Past Medical History:   Diagnosis Date    Adenoma of colon     Anemia     Arthritis     Atrial fibrillation (HCC)     Bloating     Chronic constipation     Chronic pain     back     COPD (chronic obstructive pulmonary disease) (HCC)     GERD (gastroesophageal reflux disease)     [place] (hard of hearing)     Hyponatremia     caused seizures x 2 per pt    Indigestion     Osteoporosis     Seizures ([address] 7/2020, 9/2020    x 2      Past Surgical History:   Procedure Laterality Date    COLONOSCOPY N/A 12/16/2021    COLONOSCOPY   :- performed by Tawana Moore MD at [facility] ENDOSCOPY    FLEXIBLE SIGMOIDOSCOPY N/A 2/14/2022    . performed by Aracely Orozco MD at [facility] MAIN OR    [address]    HX CHOLECYSTECTOMY  1982    HX HIP REPLACEMENT Left     pt stated hip was pinned, not replaced    HX HIP REPLACEMENT Right     pt stated hip was pinned, not replaced    HX HYSTERECTOMY      HX ORTHOPAEDIC  1990, 2011    bilateral hip fractures     HX ORTHOPAEDIC Left 2020    femur fracture    HX TONSILLECTOMY     SH: former smoker, no ETOH. FH:no FH of early CAD  Social Hx:  reports that she quit smoking about 10 years ago. She has a 20.00 pack-year smoking history. She has never used smokeless tobacco. She reports that she does not drink alcohol and does not use drugs. Family Hx: family history includes Alcohol abuse in her father; Heart Disease in her father and mother; Liver Disease in her father. Allergies   Allergen Reactions    Cefuroxime Hives    Hydrocodone Nausea and Vomiting    Other Medication Diarrhea and Nausea and Vomiting     PT REPORTS ALL MYCINS CAUSE SEVERE GI UPSET    Oxycodone Nausea and Vomiting    Penicillins Hives     Patient screened for any delayed non-IgE-mediated reaction to PCN.         Patient notes the following:    No delayed non-IgE-mediated reaction to PCN    Hives 1969                OBJECTIVE:  Wt Readings from Last 3 Encounters:   10/01/22 41.3 kg (91 lb 0.8 oz)   09/15/22 41.7 kg (92 lb)   07/21/22 42.2 kg (93 lb)       Intake/Output Summary (Last 24 hours) at 10/3/2022 1536  Last data filed at 10/3/2022 1353  Gross per 24 hour   Intake 1190 ml   Output 810 ml   Net 380 ml         Physical Exam    Vitals:   Vitals:    10/03/22 1110 10/03/22 1200 10/03/22 1258 10/03/22 1400   BP: (!) 100/45 121/66     Pulse: 83 88 87 98   Resp: 22 27     Temp: 97.6 °F (36.4 °C)      SpO2: 100%      Weight:       Height:         Telemetry: afib rvr this a.m. now  /66   Pulse 98   Temp 97.6 °F (36.4 °C)   Resp 27   Ht 5' 3\" (1.6 m)   Wt 41.3 kg (91 lb 0.8 oz)   SpO2 100%   BMI 16.13 kg/m²   General:    Alert, cooperative, Cachectic. ++SOB   Psychiatric:    Normal Mood and affect    Eye/ENT:      Pupils equal, No asymmetry, Conjunctival pink. Able to hear voice at normal amplitude   Lungs:      crackles bases, Rt base diminished    Heart[de-identified]    Regular rate and rhythm, S1, S2 normal, no murmur, click, rub or gallop. +JVD Normal palpable peripheral pulses. No cyanosis   Abdomen:     Soft, non-tender. Bowel sounds normal. No masses,  No      organomegaly. Extremities:   Extremities normal, atraumatic, trace edema   Neurologic:  alert, DAVIDSON         Data Review:     Tele:  Sinus rhythm and sinus tachycardia, mostly . Radiology:   XR Results (most recent):  Results from Hospital Encounter encounter on 09/28/22    XR CHEST PORT    Narrative  EXAM: XR CHEST PORT    DATE: 10/3/2022 10:39 AM    INDICATION: Increased WOB    COMPARISON: Chest radiograph 10/3/2022, chest CT 6/29/2022    TECHNIQUE: A portable AP radiograph of the chest was obtained. FINDINGS:  Stable cardiac and mediastinal contours. Stable bilateral hilar calcifications. No overt pulmonary edema. No significant interval change in diffusely prominent  interstitial markings throughout bilateral lung fields. Stable right infrahilar  airspace disease. Small bilateral pleural effusions, unchanged. No pneumothorax. Impression  No significant interval change. Prominent interstitial opacities bilaterally as  well as right infrahilar airspace disease. Stable small bilateral pleural  effusions. CT Results (most recent):  Results from Hospital Encounter encounter on 09/28/22    CT ABD PELV W CONT    Narrative  EXAM: CT ABD PELV W CONT    INDICATION: Diffuse abdominal pain and diarrhea. Rectal constipation earlier  this month. Nonspecific liver disease on previous imaging. Biliary dilatation on  previous imaging. Elevated alkaline phosphatase.  Normal white blood cell count,  bilirubin, lipase. COMPARISON: CT abdomen/pelvis on 9/12/2022. CONTRAST: 100 mL of Isovue-370. ORAL CONTRAST: None    TECHNIQUE:  Following the uneventful intravenous administration of contrast, thin axial  images were obtained through the abdomen and pelvis. Coronal and sagittal  reconstructions were generated. CT dose reduction was achieved through use of a  standardized protocol tailored for this examination and automatic exposure  control for dose modulation. FINDINGS:  LOWER THORAX: Small right pleural effusion is new. No basilar pneumonia. Normal  cardiac size. LIVER: Segment 7/8 subcapsular collection of heterogeneous fluid measures 9.3 x  6.9 x 3.5 cm. Heterogeneous septated fluid attenuation throughout the right  hepatic lobe measures approximately 13 cm in oblique AP diameter. Central right  hepatic lobe peripherally enhancing lesion previously measured 1.9 cm and now  measures 2.4 cm. Unchanged mild intrahepatic biliary dilatation. BILIARY TREE: Cholecystectomy. Unchanged chronic biliary dilatation. No abrupt  termination. SPLEEN: Normal size. Lobulated contour. PANCREAS: Mild diffuse atrophy. No mass or inflammation. ADRENALS: Unremarkable. KIDNEYS: No hydronephrosis. Heterogeneous small multiple cysts. STOMACH: Unremarkable. SMALL BOWEL: No dilatation or wall thickening. COLON: Incomplete distention, limited evaluation. Fluid-filled cecum is in the  pelvis. APPENDIX: Normal.  PERITONEUM: No ascites or pneumoperitoneum. RETROPERITONEUM: Aortic atherosclerosis without aneurysm. 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 No lymphadenopathy. REPRODUCTIVE ORGANS: Hysterectomy. URINARY BLADDER: Incomplete distention, limited evaluation. BONES: Osteopenia. Femoral hardware. Spinal curvature. T11 partial compression  fracture. ABDOMINAL WALL: No mass or hernia. ADDITIONAL COMMENTS: Diffuse muscle atrophy. Impression  1.  Progression of hepatic lesions and new large fluid collections in the liver. Intrahepatic abscesses from nonspecific infection are most likely. Consider  fluid sampling or drainage with CT guidance. 2. New small right pleural effusion. 3. Fluid-filled cecum may indicate infectious colitis. No bowel obstruction. MRI Results (most recent):  No results found for this or any previous visit. No results for input(s): CPK, TROIQ in the last 72 hours. No lab exists for component: CKQMB, CPKMB, BMPP  Recent Labs     10/03/22  0351 10/02/22  0330   * 136   K 4.6 3.1*   * 109*   CO2 10* 20*   BUN 9 9   CREA 0.80 0.82   GLU 92 140*   CA 7.8* 7.6*     Recent Labs     10/03/22  0351 10/02/22  0330   WBC 15.4* 8.5   HGB 9.7* 8.8*   HCT 32.0* 26.8*    372     Recent Labs     10/03/22  0351 10/01/22  0444   * 219*     No results for input(s): CHOL, LDLC in the last 72 hours. No lab exists for component: TGL, HDLC,  HBA1C  No results for input(s): CRP, TSH, TSHEXT, TSHEXT in the last 72 hours.     No lab exists for component: ESR        Current meds:    Current Facility-Administered Medications:     metroNIDAZOLE (FLAGYL) IVPB premix 500 mg, 500 mg, IntraVENous, Q12H, Roxi Russo MD, Last Rate: 100 mL/hr at 10/03/22 0835, 500 mg at 10/03/22 0835    balsam peru-castor oiL (VENELEX) ointment, , Topical, BID, Roxi Russo MD, Given at 10/03/22 1113    sodium bicarbonate (8.4%) 100 mEq in dextrose 5% 1,000 mL infusion, , IntraVENous, CONTINUOUS, Roxi Bullock MD, Last Rate: 42 mL/hr at 10/03/22 1316, New Bag at 10/03/22 1316    albuterol-ipratropium (DUO-NEB) 2.5 MG-0.5 MG/3 ML, 3 mL, Nebulization, Q4H RT, Roxi Russo MD, 3 mL at 10/03/22 1245    polyethylene glycol (MIRALAX) packet 17 g, 17 g, Oral, DAILY, Magaly Caicedo, NP, 17 g at 10/03/22 1317    Vancomycin - Pharmacy dosing, , Other, Rx Dosing/Monitoring, Roxi Bullock MD    [START ON 10/4/2022] vancomycin (VANCOCIN) 500 mg in 0.9% sodium chloride (MBP/ADV) 100 mL MBP, 500 mg, IntraVENous, Q12H, Roxi Russo MD    senna-docusate (PERICOLACE) 8.6-50 mg per tablet 1 Tablet, 1 Tablet, Oral, DAILY, Magaly Caicedo NP, 1 Tablet at 10/03/22 1317    butalbital-acetaminophen-caffeine (FIORICET, ESGIC) -40 mg per tablet 2 Tablet, 2 Tablet, Oral, BID PRN, Gloria BOSCH NP, 2 Tablet at 10/03/22 1337    doxycycline (VIBRAMYCIN) 100 mg in 0.9% sodium chloride (MBP/ADV) 100 mL MBP, 100 mg, IntraVENous, Q12H, Roxi Russo MD, Last Rate: 100 mL/hr at 10/03/22 1408, 100 mg at 10/03/22 1408    [START ON 10/4/2022] thiamine HCL (B-1) tablet 100 mg, 100 mg, Oral, DAILY, Roxi Russo MD    diazePAM (VALIUM) tablet 2 mg, 2 mg, Oral, Q6H PRN, Roxi Barakat MD, 2 mg at 10/03/22 1319    thiamine (B-1) 100 mg in 0.9% sodium chloride 50 mL IVPB, 100 mg, IntraVENous, DAILY, Roxi Russo MD, Last Rate: 200 mL/hr at 10/02/22 1527, 100 mg at 10/02/22 1527    [Held by provider] dextrose 5% and 0.9% NaCl infusion, 50 mL/hr, IntraVENous, CONTINUOUS, Rosana Flores NP, Stopped at 10/03/22 0830    [Held by provider] metoprolol tartrate (LOPRESSOR) tablet 25 mg, 25 mg, Oral, Q12H, Bonifacio Mercado NP, 25 mg at 10/02/22 2127    [Held by provider] amiodarone (CORDARONE) 375 mg/250 mL D5W infusion, 0.5-1 mg/min, IntraVENous, TITRATE, Bonifacio Mrecado NP, Last Rate: 20 mL/hr at 10/02/22 2244, 0.5 mg/min at 10/02/22 2244    0.9% sodium chloride infusion 250 mL, 250 mL, IntraVENous, PRN, Roxi Barakat MD    [facility] [place] by provider] digoxin (LANOXIN) tablet 0.125 mg, 0.125 mg, Oral, DAILY, Roxi Russo MD, 0.125 mg at 09/30/22 0836    albuterol (PROVENTIL VENTOLIN) nebulizer solution 2.5 mg, 2.5 mg, Nebulization, Q6H PRN, Roxi Barakat MD, 2.5 mg at 10/03/22 0958    sodium chloride (NS) flush 5-40 mL, 5-40 mL, IntraVENous, Q8H, Norma Velásquez MD, 10 mL at 10/03/22 1409    sodium chloride (NS) flush 5-40 mL, 5-40 mL, IntraVENous, PRN, RUFINA Velásquez MD    acetaminophen (TYLENOL) tablet 650 mg, 650 mg, Oral, Q6H PRN, 650 mg at 10/02/22 2127 **OR** acetaminophen (TYLENOL) suppository 650 mg, 650 mg, Rectal, Q6H PRN, Christen [id number] MD NIXON    ondansetron (ZOFRAN ODT) tablet 4 mg, 4 mg, Oral, Q8H PRN, 4 mg at 10/01/22 0153 **OR** ondansetron (ZOFRAN) injection 4 mg, 4 mg, IntraVENous, Q6H PRN, Christen [id number] MD NIXON, 4 mg at 10/01/22 0906    pantoprazole (PROTONIX) 40 mg in 0.9% sodium chloride 10 mL injection, 40 mg, IntraVENous, DAILY, MICHAEL Velásquez MD, 40 mg at 10/03/22 0835    prochlorperazine (COMPAZINE) injection 10 mg, 10 mg, IntraVENous, Q6H PRN, RICHI Velásquez MD, 10 mg at 10/02/22 [facility]  JOSEPHINE Mercado  [facility], [address] [address],[address]  [address]  [phone]      CC:Femi Alba NP

## 2022-10-03 NOTE — PROGRESS NOTES
[facility]  Cardiology Care Note                  []Initial visit     [x]Established visit     Patient Name: Gilles Horn - TDR:[id number] - LIRA:[id number]  Primary Cardiologist: Gregg Mccabe and has been followed by cardiology in [place]. Consulting Cardiologist: July Weiss MD     Reason for initial visit:afib with RVR    HPI:   Ms. Jay Ahumada is a 77 y.o. female with PMH of PAF on digoxin and ASA , Left hemicolectomy in 2/2022 for adenomatous colonic polyp, anemia, HLD, GERD, seizrues, chronic back pain,  former smokr. . She presented with chief c/o abdominal pain and nausea. She was hospitalized earlier this month for abdominal pain and now found to have liver adenocarcinoma on recent liver biopsy with likely organized hepatic hematoma on CT scan. There is concern for cecal malignancy and metastatic colon cancer is suspected. Cardiology consulted as pt went into afib with RVR early this a.m. She denies any palpitations, dizziness, lightheadedness and no chest pain. She had some mild SOB last night but this is better. She feels weak. C/o some abdominal pain. No palpitations. She has received adocard 6 mg and 12 mg, iv diltiazem bolus and IV dig and started on diltiazem gtt. Later in day, she developed hypotension on diltizem gtt. BP is soft. She also exhibits metabolic acidosis thought to be due to poor po intake, malnutrition  CXR with mild diffuse interstia lopacities (edma vs invection)  SH: former smoker, no ETOH. FH:no FH of early CAD    SUBJECTIVE; Today, the patient remains in sinus rhythm/ sinus tachy, feeling fine, with blood pressure borderline low in the 90s to 100s. Assessment and Plan     Likely metastatic adenocarcinoma:  Atrial fibrillation with rapid ventricular response  2. Hypokalemia/hypomagnesemia: repleted by primary team. Mg repleted.    3. Lliver adenocarcinoma (uncertain priimary). Per heme/onc  4. Abnormal UA: per priamry team.   5.  Anemia: ?acute on chronic. Stool OB pending also. Patient is admitted to the hospital for noncardiac reasons but developed atrial fibrillation/flutter with rapid ventricular response. She does not stop responding to IV Cardizem and there was marginal room to give her beta-blockers were given low blood pressures. Reasonable to give IV hydration. EF appears to be normal.  AF resolved with amiodarone. Not a candidate for oral anticoagulation at this point of time. Hopefully can convert to p.o. amiodarone soon. If blood pressure improves, will also add beta-blocker again. Discussed sinus tachycardia with Dr. Mckay Reach be expected with multiple medical problems as noted above, but reasonable to give extra amiodarone if excessive. No more than 2 g in a day.         ____________________________________________________________    Cardiac testing  07/21/22    ECHO ADULT FOLLOW-UP OR LIMITED 07/21/2022 7/21/2022    Interpretation Summary    Limited study for the assessment of ejection fraction. Left Ventricle: Normal left ventricular systolic function with a visually estimated EF of 55 - 60%. EF by 2D Simpsons Biplane is 56%. Left ventricle size is normal. Normal wall thickness. See diagram for wall motion findings. Tricuspid Valve: Mild to moderate regurgitation on limited color and Doppler interrogation. Pulmonary Arteries: Pulmonary hypertension not present. The estimated PASP is 27 mmHg. Signed by: Cheri Denise MD on 7/21/2022  2:41 PM                Most recent HS troponins:  No results for input(s): TROPHS in the last 72 hours. No lab exists for component:  CKMB  ECG: afib with RVR, nonspecific t wave abnormality  Repeat EKG reviewed by Dr. Dion Ba- atrial tachycardia with t wave abnormality V5, V6.  ?possible dig effect.    Review of Systems    []All other systems reviewed and all negative except as written in HPI    [] Patient unable to provide secondary to condition         Past Medical History:   Diagnosis Date    Adenoma of colon     Anemia     Arthritis     Atrial fibrillation (HCC)     Bloating     Chronic constipation     Chronic pain     back     COPD (chronic obstructive pulmonary disease) (HCC)     GERD (gastroesophageal reflux disease)     [place] (hard of hearing)     Hyponatremia     caused seizures x 2 per pt    Indigestion     Osteoporosis     Seizures ([address] 7/2020, 9/2020    x 2      Past Surgical History:   Procedure Laterality Date    COLONOSCOPY N/A 12/16/2021    COLONOSCOPY   :- performed by Kristin Arredondo MD at [place] [address] N/A 2/14/2022    . performed by Maricruz Cardenas MD at [facility] MAIN OR    [address]    HX CHOLECYSTECTOMY  1982    HX HIP REPLACEMENT Left     pt stated hip was pinned, not replaced    HX HIP REPLACEMENT Right     pt stated hip was pinned, not replaced    HX HYSTERECTOMY      HX ORTHOPAEDIC  1990, 2011    bilateral hip fractures     HX ORTHOPAEDIC Left 2020    femur fracture    HX TONSILLECTOMY       Social Hx:  reports that she quit smoking about 10 years ago. She has a 20.00 pack-year smoking history. She has never used smokeless tobacco. She reports that she does not drink alcohol and does not use drugs. Family Hx: family history includes Alcohol abuse in her father; Heart Disease in her father and mother; Liver Disease in her father. Allergies   Allergen Reactions    Cefuroxime Hives    Hydrocodone Nausea and Vomiting    Other Medication Diarrhea and Nausea and Vomiting     PT REPORTS ALL MYCINS CAUSE SEVERE GI UPSET    Oxycodone Nausea and Vomiting    Penicillins Hives     Patient screened for any delayed non-IgE-mediated reaction to PCN.         Patient notes the following:    No delayed non-IgE-mediated reaction to PCN    Hives 1969                OBJECTIVE:  Wt Readings from Last 3 Encounters:   10/01/22 91 lb 0.8 oz (41.3 kg)   09/15/22 92 lb (41.7 kg)   07/21/22 93 lb (42.2 kg)       Intake/Output Summary (Last 24 hours) at 10/2/2022 2043  Last data filed at 10/2/2022 1800  Gross per 24 hour   Intake 1775 ml   Output 0 ml   Net 1775 ml         Physical Exam    Vitals:   Vitals:    10/02/22 1515 10/02/22 1800 10/02/22 1917 10/02/22 2000   BP: 113/68  94/65    Pulse: (!) 104 90 88 92   Resp: 23  26    Temp: 98.3 °F (36.8 °C)  97.5 °F (36.4 °C)    SpO2: 100%  98%    Weight:       Height:         Telemetry: afib rvr this a.m. now  BP 94/65 (BP 1 Location: Right arm, BP Patient Position: At rest)   Pulse 92   Temp 97.5 °F (36.4 °C)   Resp 26   Ht 5' 3\" (1.6 m)   Wt 91 lb 0.8 oz (41.3 kg)   SpO2 98%   BMI 16.13 kg/m²   General:    Alert, cooperative, no distress. Cachectic   Psychiatric:    Normal Mood and affect    Eye/ENT:      Pupils equal, No asymmetry, Conjunctival pink. Able to hear voice at normal amplitude   Lungs:      Visibly symmetric chest expansion, No palpable tenderness. Clear to auscultation bilaterally. Heart[de-identified]    Regular rate and rhythm, S1, S2 normal, no murmur, click, rub or gallop. No JVD, Normal palpable peripheral pulses. No cyanosis   Abdomen:     Soft, non-tender. Bowel sounds normal. No masses,  No      organomegaly. Extremities:   Extremities normal, atraumatic, no edema. Neurologic:   CN II-XII grossly intact. No gross focal deficits           Data Review:     Tele:  Sinus rhythm and sinus tachycardia, mostly . Radiology:   XR Results (most recent):  Results from Hospital Encounter encounter on 09/28/22    XR ABD (KUB)    Narrative  INDICATION: NG tube placement    EXAM: Abdomen KUB. 1958 hours. FINDINGS:  Semierect AP portable view of the abdomen shows NG tube tip projecting over the  left lower lung. Floor notified by phone. Impression  NG tube in the lung.     CT Results (most recent):  Results from Hospital Encounter encounter on 09/28/22    CT ABD PELV W CONT    Narrative  EXAM: CT ABD PELV W CONT    INDICATION: Diffuse abdominal pain and diarrhea. Rectal constipation earlier  this month. Nonspecific liver disease on previous imaging. Biliary dilatation on  previous imaging. Elevated alkaline phosphatase. Normal white blood cell count,  bilirubin, lipase. COMPARISON: CT abdomen/pelvis on 9/12/2022. CONTRAST: 100 mL of Isovue-370. ORAL CONTRAST: None    TECHNIQUE:  Following the uneventful intravenous administration of contrast, thin axial  images were obtained through the abdomen and pelvis. Coronal and sagittal  reconstructions were generated. CT dose reduction was achieved through use of a  standardized protocol tailored for this examination and automatic exposure  control for dose modulation. FINDINGS:  LOWER THORAX: Small right pleural effusion is new. No basilar pneumonia. Normal  cardiac size. LIVER: Segment 7/8 subcapsular collection of heterogeneous fluid measures 9.3 x  6.9 x 3.5 cm. Heterogeneous septated fluid attenuation throughout the right  hepatic lobe measures approximately 13 cm in oblique AP diameter. Central right  hepatic lobe peripherally enhancing lesion previously measured 1.9 cm and now  measures 2.4 cm. Unchanged mild intrahepatic biliary dilatation. BILIARY TREE: Cholecystectomy. Unchanged chronic biliary dilatation. No abrupt  termination. SPLEEN: Normal size. Lobulated contour. PANCREAS: Mild diffuse atrophy. No mass or inflammation. ADRENALS: Unremarkable. KIDNEYS: No hydronephrosis. Heterogeneous small multiple cysts. STOMACH: Unremarkable. SMALL BOWEL: No dilatation or wall thickening. COLON: Incomplete distention, limited evaluation. Fluid-filled cecum is in the  pelvis. APPENDIX: Normal.  PERITONEUM: No ascites or pneumoperitoneum. RETROPERITONEUM: Aortic atherosclerosis without aneurysm. 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 No lymphadenopathy. REPRODUCTIVE ORGANS: Hysterectomy.   URINARY BLADDER: Incomplete distention, limited evaluation. BONES: Osteopenia. Femoral hardware. Spinal curvature. T11 partial compression  fracture. ABDOMINAL WALL: No mass or hernia. ADDITIONAL COMMENTS: Diffuse muscle atrophy. Impression  1. Progression of hepatic lesions and new large fluid collections in the liver. Intrahepatic abscesses from nonspecific infection are most likely. Consider  fluid sampling or drainage with CT guidance. 2. New small right pleural effusion. 3. Fluid-filled cecum may indicate infectious colitis. No bowel obstruction. MRI Results (most recent):  No results found for this or any previous visit. No results for input(s): CPK, TROIQ in the last 72 hours. No lab exists for component: CKQMB, CPKMB, BMPP  Recent Labs     10/02/22  0330 10/01/22  0444    137   K 3.1* 3.8   * 109*   CO2 20* 17*   BUN 9 7   CREA 0.82 0.62   * 114*   CA 7.6* 8.0*     Recent Labs     10/02/22  0330 10/01/22  0444   WBC 8.5 9.3   HGB 8.8* 9.6*   HCT 26.8* 30.8*    459*     Recent Labs     10/01/22  0444   *     No results for input(s): CHOL, LDLC in the last 72 hours. No lab exists for component: TGL, HDLC,  HBA1C  No results for input(s): CRP, TSH, TSHEXT, TSHEXT in the last 72 hours.     No lab exists for component: ESR        Current meds:    Current Facility-Administered Medications:     [START ON 10/4/2022] thiamine HCL (B-1) tablet 100 mg, 100 mg, Oral, DAILY, Roxi Loaiza MD    diazePAM (VALIUM) tablet 2 mg, 2 mg, Oral, Q6H PRN, Roxi Loaiza MD, 2 mg at 10/02/22 1840    albuterol-ipratropium (DUO-NEB) 2.5 MG-0.5 MG/3 ML, 3 mL, Nebulization, Q4H PRN, Roxi Loaiza MD    thiamine (B-1) 100 mg in 0.9% sodium chloride 50 mL IVPB, 100 mg, IntraVENous, DAILY, Roxi Russo MD, Last Rate: 200 mL/hr at 10/02/22 1527, 100 mg at 10/02/22 1527    dextrose 5% and 0.9% NaCl infusion, 75 mL/hr, IntraVENous, CONTINUOUS, Socorro Velasco MD, Last Rate: 75 mL/hr at 10/02/22 1734, 75 mL/hr at 10/02/22 1734    metoprolol tartrate (LOPRESSOR) tablet 25 mg, 25 mg, Oral, Q12H, Rayray Mercado, NP, 25 mg at 10/01/22 1034    trimethoprim-sulfamethoxazole (BACTRIM DS, SEPTRA DS) 160-800 mg per tablet 1 Tablet, 1 Tablet, Oral, Q12H, Roxi Allen MD, 1 Tablet at 10/02/22 1104    amiodarone (CORDARONE) 375 mg/250 mL D5W infusion, 0.5-1 mg/min, IntraVENous, TITRATE, Rayray Mercado, NP, Last Rate: 40 mL/hr at 10/02/22 1424, 1 mg/min at 10/02/22 1424    0.9% sodium chloride infusion 250 mL, 250 mL, IntraVENous, PRN, Roxi Allen MD    [facility] by provider] digoxin (LANOXIN) tablet 0.125 mg, 0.125 mg, Oral, DAILY, Roxi Allen MD, 0.125 mg at 09/30/22 0836    albuterol (PROVENTIL VENTOLIN) nebulizer solution 2.5 mg, 2.5 mg, Nebulization, Q6H PRN, Roxi Allen MD, 2.5 mg at 10/02/22 2008    sodium chloride (NS) flush 5-40 mL, 5-40 mL, IntraVENous, Q8H, MECHE Velásquez MD, 10 mL at 10/02/22 1528    sodium chloride (NS) flush 5-40 mL, 5-40 mL, IntraVENous, PRN, Christen IUVSCONRADOD MD NIXON    acetaminophen (TYLENOL) tablet 650 mg, 650 mg, Oral, Q6H PRN, 650 mg at 10/01/22 2000 **OR** acetaminophen (TYLENOL) suppository 650 mg, 650 mg, Rectal, Q6H PRN, Christen TVECCXF MD NIXON    polyethylene glycol (MIRALAX) packet 17 g, 17 g, Oral, DAILY PRN, Christen YQDOREEN ALICEA MD    ondansetron (ZOFRAN ODT) tablet 4 mg, 4 mg, Oral, Q8H PRN, 4 mg at 10/01/22 0153 **OR** ondansetron (ZOFRAN) injection 4 mg, 4 mg, IntraVENous, Q6H PRN, FRANCHESCA Velásquez MD, 4 mg at 10/01/22 0906    pantoprazole (PROTONIX) 40 mg in 0.9% sodium chloride 10 mL injection, 40 mg, IntraVENous, DAILY, MARIA DEL CARMEN Velásquez MD, 40 mg at 10/02/22 1059    prochlorperazine (COMPAZINE) injection 10 mg, 10 mg, IntraVENous, Q6H PRN, Darlin Velásquez MD, 10 mg at 10/02/22 1051    Meenu Yoder MD  [facility] [address] [place] [address]  [address] [facility] [address]  [phone]      Ajith Farris NP

## 2022-10-03 NOTE — PROGRESS NOTES
[facility]  Cardiology Care Note                  []Initial visit     [x]Established visit     Patient Name: Megha Crowder - LAE:[id number] - ZDT:[id number]  Primary Cardiologist: Leticia Gaytan and has been followed by cardiology in [place]. Consulting Cardiologist: Shashank Valverde MD     Reason for initial visit:afib with RVR    HPI:   Ms. Lyn Stafford is a 77 y.o. female with PMH of PAF on digoxin and ASA , Left hemicolectomy in 2/2022 for adenomatous colonic polyp, anemia, HLD, GERD, seizrues, chronic back pain,  former smokr. . She presented with chief c/o abdominal pain and nausea. She was hospitalized earlier this month for abdominal pain and now found to have liver adenocarcinoma on recent liver biopsy with likely organized hepatic hematoma on CT scan. There is concern for cecal malignancy and metastatic colon cancer is suspected. Cardiology consulted as pt went into afib with RVR early this a.m. She denies any palpitations, dizziness, lightheadedness and no chest pain. She had some mild SOB last night but this is better. She feels weak. C/o some abdominal pain. No palpitations. She has received adocard 6 mg and 12 mg, iv diltiazem bolus and IV dig and started on diltiazem gtt. Later in day, she developed hypotension on diltizem gtt. BP is soft. She also exhibits metabolic acidosis thought to be due to poor po intake, malnutrition  CXR with mild diffuse interstia lopacities (edma vs invection)  SH: former smoker, no ETOH. FH:no FH of early CAD    SUBJECTIVE; She had significant amount of sinus tachycardia yesterday, today dissecting down. Assessment and Plan     Likely metastatic adenocarcinoma:  Atrial fibrillation with rapid ventricular response  2. Hypokalemia/hypomagnesemia: repleted by primary team. Mg repleted. 3. Lliver adenocarcinoma (uncertain priimary). Per heme/onc  4.   Abnormal UA: per priamry team.   5.  Anemia: ?acute on chronic. Stool OB pending also. 6.  Possible acute systolic heart failure. 7.  Cardiomyopathy likely secondary to atrial fibrillation with rapid ventricular response/tachycardia/stress CMP      Patient is admitted to the hospital for noncardiac reasons but developed atrial fibrillation/flutter with rapid ventricular response. She does not stop responding to IV Cardizem and there was marginal room to give her beta-blockers were given low blood pressures. Reasonable to give IV hydration. EF appears to be normal.  AF resolved with amiodarone. Not a candidate for oral anticoagulation at this point of time. Hopefully can convert to p.o. amiodarone soon. If blood pressure improves, will also add beta-blocker again. Mostly has sinus tachycardia this morning. Increasing shortness of breath could be related to possible congestive heart failure although pneumonia is also a differential diagnosis. She does have evidence of JVD as well as significantly reduced LV systolic function. Hence reasonable to attempt low-dose diuretic. 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 ____________________________________________________________    Cardiac testing  07/21/22    ECHO ADULT FOLLOW-UP OR LIMITED 07/21/2022 7/21/2022    Interpretation Summary    Limited study for the assessment of ejection fraction. Left Ventricle: Normal left ventricular systolic function with a visually estimated EF of 55 - 60%. EF by 2D Simpsons Biplane is 56%. Left ventricle size is normal. Normal wall thickness. See diagram for wall motion findings. Tricuspid Valve: Mild to moderate regurgitation on limited color and Doppler interrogation. Pulmonary Arteries: Pulmonary hypertension not present. The estimated PASP is 27 mmHg.     Signed by: Myla Shin Mandi Erwin MD on 7/21/2022  2:41 PM                Most recent HS troponins:  No results for input(s): TROPHS in the last 72 hours. No lab exists for component:  CKMB  ECG: afib with RVR, nonspecific t wave abnormality  Repeat EKG reviewed by Dr. Duggan Lab- atrial tachycardia with t wave abnormality V5, V6.  ?possible dig effect. Review of Systems    []All other systems reviewed and all negative except as written in HPI    [] Patient unable to provide secondary to condition         Past Medical History:   Diagnosis Date    Adenoma of colon     Anemia     Arthritis     Atrial fibrillation (HCC)     Bloating     Chronic constipation     Chronic pain     back     COPD (chronic obstructive pulmonary disease) (HCC)     GERD (gastroesophageal reflux disease)     [place] (hard of hearing)     Hyponatremia     caused seizures x 2 per pt    Indigestion     Osteoporosis     Seizures ([address] 7/2020, 9/2020    x 2      Past Surgical History:   Procedure Laterality Date    COLONOSCOPY N/A 12/16/2021    COLONOSCOPY   :- performed by María Elena Del Valle MD at [place] [address] N/A 2/14/2022    . performed by Ye Blank MD at [address] [place] [address] OR    [facility]    HX CHOLECYSTECTOMY  1982    HX HIP REPLACEMENT Left     pt stated hip was pinned, not replaced    HX HIP REPLACEMENT Right     pt stated hip was pinned, not replaced    HX HYSTERECTOMY      HX ORTHOPAEDIC  1990, 2011    bilateral hip fractures     HX ORTHOPAEDIC Left 2020    femur fracture    HX TONSILLECTOMY       Social Hx:  reports that she quit smoking about 10 years ago. She has a 20.00 pack-year smoking history. She has never used smokeless tobacco. She reports that she does not drink alcohol and does not use drugs. Family Hx: family history includes Alcohol abuse in her father; Heart Disease in her father and mother; Liver Disease in her father.   Allergies   Allergen Reactions    Cefuroxime Hives    Hydrocodone Nausea and Vomiting Other Medication Diarrhea and Nausea and Vomiting     PT REPORTS ALL MYCINS CAUSE SEVERE GI UPSET    Oxycodone Nausea and Vomiting    Penicillins Hives     Patient screened for any delayed non-IgE-mediated reaction to PCN. Patient notes the following:    No delayed non-IgE-mediated reaction to PCN    Hives 1969                OBJECTIVE:  Wt Readings from Last 3 Encounters:   10/01/22 91 lb 0.8 oz (41.3 kg)   09/15/22 92 lb (41.7 kg)   07/21/22 93 lb (42.2 kg)       Intake/Output Summary (Last 24 hours) at 10/3/2022 1602  Last data filed at 10/3/2022 1353  Gross per 24 hour   Intake 1190 ml   Output 810 ml   Net 380 ml           Physical Exam    Vitals:   Vitals:    10/03/22 1110 10/03/22 1200 10/03/22 1258 10/03/22 1400   BP: (!) 100/45 121/66     Pulse: 83 88 87 98   Resp: 22 27     Temp: 97.6 °F (36.4 °C)      SpO2: 100%      Weight:       Height:         Telemetry: afib rvr this a.m. now  /66   Pulse 98   Temp 97.6 °F (36.4 °C)   Resp 27   Ht 5' 3\" (1.6 m)   Wt 91 lb 0.8 oz (41.3 kg)   SpO2 100%   BMI 16.13 kg/m²   General:    Alert, cooperative, no distress. Cachectic   Psychiatric:    Normal Mood and affect    Eye/ENT:      Pupils equal, No asymmetry, Conjunctival pink. Able to hear voice at normal amplitude   Lungs:      Visibly symmetric chest expansion, No palpable tenderness. Clear to auscultation bilaterally. Heart[de-identified]    Regular rate and rhythm, S1, S2 normal, no murmur, click, rub or gallop. No JVD, Normal palpable peripheral pulses. No cyanosis   Abdomen:     Soft, non-tender. Bowel sounds normal. No masses,  No      organomegaly. Extremities:   Extremities normal, atraumatic, no edema. Neurologic:   CN II-XII grossly intact. No gross focal deficits           Data Review:     Tele:  Sinus rhythm and sinus tachycardia, mostly .     Radiology:   XR Results (most recent):  Results from Hospital Encounter encounter on 09/28/22    XR CHEST PORT    Narrative  EXAM: XR CHEST PORT    DATE: 10/3/2022 10:39 AM    INDICATION: Increased WOB    COMPARISON: Chest radiograph 10/3/2022, chest CT 6/29/2022    TECHNIQUE: A portable AP radiograph of the chest was obtained. FINDINGS:  Stable cardiac and mediastinal contours. Stable bilateral hilar calcifications. No overt pulmonary edema. No significant interval change in diffusely prominent  interstitial markings throughout bilateral lung fields. Stable right infrahilar  airspace disease. Small bilateral pleural effusions, unchanged. No pneumothorax. Impression  No significant interval change. Prominent interstitial opacities bilaterally as  well as right infrahilar airspace disease. Stable small bilateral pleural  effusions. CT Results (most recent):  Results from Hospital Encounter encounter on 09/28/22    CT ABD PELV W CONT    Narrative  EXAM: CT ABD PELV W CONT    INDICATION: Diffuse abdominal pain and diarrhea. Rectal constipation earlier  this month. Nonspecific liver disease on previous imaging. Biliary dilatation on  previous imaging. Elevated alkaline phosphatase. Normal white blood cell count,  bilirubin, lipase. COMPARISON: CT abdomen/pelvis on 9/12/2022. CONTRAST: 100 mL of Isovue-370. ORAL CONTRAST: None    TECHNIQUE:  Following the uneventful intravenous administration of contrast, thin axial  images were obtained through the abdomen and pelvis. Coronal and sagittal  reconstructions were generated. CT dose reduction was achieved through use of a  standardized protocol tailored for this examination and automatic exposure  control for dose modulation. FINDINGS:  LOWER THORAX: Small right pleural effusion is new. No basilar pneumonia. Normal  cardiac size. LIVER: Segment 7/8 subcapsular collection of heterogeneous fluid measures 9.3 x  6.9 x 3.5 cm. Heterogeneous septated fluid attenuation throughout the right  hepatic lobe measures approximately 13 cm in oblique AP diameter.  Central right  hepatic lobe peripherally enhancing lesion previously measured 1.9 cm and now  measures 2.4 cm. Unchanged mild intrahepatic biliary dilatation. BILIARY TREE: Cholecystectomy. Unchanged chronic biliary dilatation. No abrupt  termination. SPLEEN: Normal size. Lobulated contour. PANCREAS: Mild diffuse atrophy. No mass or inflammation. ADRENALS: Unremarkable. KIDNEYS: No hydronephrosis. Heterogeneous small multiple cysts. STOMACH: Unremarkable. SMALL BOWEL: No dilatation or wall thickening. COLON: Incomplete distention, limited evaluation. Fluid-filled cecum is in the  pelvis. APPENDIX: Normal.  PERITONEUM: No ascites or pneumoperitoneum. RETROPERITONEUM: Aortic atherosclerosis without aneurysm. 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 No lymphadenopathy. REPRODUCTIVE ORGANS: Hysterectomy. URINARY BLADDER: Incomplete distention, limited evaluation. BONES: Osteopenia. Femoral hardware. Spinal curvature. T11 partial compression  fracture. ABDOMINAL WALL: No mass or hernia. ADDITIONAL COMMENTS: Diffuse muscle atrophy. Impression  1. Progression of hepatic lesions and new large fluid collections in the liver. Intrahepatic abscesses from nonspecific infection are most likely. Consider  fluid sampling or drainage with CT guidance. 2. New small right pleural effusion. 3. Fluid-filled cecum may indicate infectious colitis. No bowel obstruction. MRI Results (most recent):  No results found for this or any previous visit. No results for input(s): CPK, TROIQ in the last 72 hours.     No lab exists for component: CKQMB, CPKMB, BMPP  Recent Labs     10/03/22  0351 10/02/22  0330   * 136   K 4.6 3.1*   * 109*   CO2 10* 20*   BUN 9 9   CREA 0.80 0.82   GLU 92 140*   CA 7.8* 7.6*       Recent Labs     10/03/22  0351 10/02/22  0330   WBC 15.4* 8.5   HGB 9.7* 8.8*   HCT 32.0* 26.8*    372       Recent Labs     10/03/22  0351 10/01/22  0444   * 219*       No results for input(s): CHOL, LDLC in the last 72 hours. No lab exists for component: TGL, HDLC,  HBA1C  No results for input(s): CRP, TSH, TSHEXT, TSHEXT in the last 72 hours.     No lab exists for component: ESR        Current meds:    Current Facility-Administered Medications:     metroNIDAZOLE (FLAGYL) IVPB premix 500 mg, 500 mg, IntraVENous, Q12H, Roxi Russo MD, Last Rate: 100 mL/hr at 10/03/22 0835, 500 mg at 10/03/22 0835    balsam peru-castor oiL (VENELEX) ointment, , Topical, BID, Roxi Horner MD, Given at 10/03/22 1113    sodium bicarbonate (8.4%) 100 mEq in dextrose 5% 1,000 mL infusion, , IntraVENous, CONTINUOUS, Roxi Horner MD, Last Rate: 42 mL/hr at 10/03/22 1316, New Bag at 10/03/22 1316    albuterol-ipratropium (DUO-NEB) 2.5 MG-0.5 MG/3 ML, 3 mL, Nebulization, Q4H RT, Roxi Russo MD, 3 mL at 10/03/22 1601    polyethylene glycol (MIRALAX) packet 17 g, 17 g, Oral, DAILY, Magaly Caicedo, NP, 17 g at 10/03/22 1317    Vancomycin - Pharmacy dosing, , Other, Rx Dosing/Monitoring, Roxi Horner MD    [START ON 10/4/2022] vancomycin (VANCOCIN) 500 mg in 0.9% sodium chloride (MBP/ADV) 100 mL MBP, 500 mg, IntraVENous, Q12H, Roxi Russo MD    senna-docusate (PERICOLACE) 8.6-50 mg per tablet 1 Tablet, 1 Tablet, Oral, DAILY, Diamond BOSCH NP, 1 Tablet at 10/03/22 1317    butalbital-acetaminophen-caffeine (FIORICET, ESGIC) -40 mg per tablet 2 Tablet, 2 Tablet, Oral, BID PRN, Diamond BOSCH NP, 2 Tablet at 10/03/22 1337    doxycycline (VIBRAMYCIN) 100 mg in 0.9% sodium chloride (MBP/ADV) 100 mL MBP, 100 mg, IntraVENous, Q12H, Roxi Russo MD, Last Rate: 100 mL/hr at 10/03/22 1408, 100 mg at 10/03/22 1408    [START ON 10/4/2022] thiamine HCL (B-1) tablet 100 mg, 100 mg, Oral, DAILY, Roxi Horner MD    diazePAM (VALIUM) tablet 2 mg, 2 mg, Oral, Q6H PRN, Roxi Horner MD, 2 mg at 10/03/22 5872    thiamine (B-1) 100 mg in 0.9% sodium chloride 50 mL IVPB, 100 mg, IntraVENous, DAILY, Roxi Russo, MD, Last Rate: 200 mL/hr at 10/02/22 1527, 100 mg at 10/02/22 1527    [Held by provider] dextrose 5% and 0.9% NaCl infusion, 50 mL/hr, IntraVENous, CONTINUOUS, Rosana Flores NP, Stopped at 10/03/22 0830    [Held by provider] metoprolol tartrate (LOPRESSOR) tablet 25 mg, 25 mg, Oral, Q12H, Gerri Mercado NP, 25 mg at 10/02/22 2127    [Held by provider] amiodarone (CORDARONE) 375 mg/250 mL D5W infusion, 0.5-1 mg/min, IntraVENous, TITRATE, Gerri Mercado NP, Last Rate: 20 mL/hr at 10/02/22 2244, 0.5 mg/min at 10/02/22 2244    0.9% sodium chloride infusion 250 mL, 250 mL, IntraVENous, PRN, Roxi Dasilva MD    [facility] [place] by provider] digoxin (LANOXIN) tablet 0.125 mg, 0.125 mg, Oral, DAILY, Roxi Dasilva MD, 0.125 mg at 09/30/22 0836    albuterol (PROVENTIL VENTOLIN) nebulizer solution 2.5 mg, 2.5 mg, Nebulization, Q6H PRN, Roxi Dasilva MD, 2.5 mg at 10/03/22 5593    sodium chloride (NS) flush 5-40 mL, 5-40 mL, IntraVENous, Q8H, JATIN Velásquez MD, 10 mL at 10/03/22 1409    sodium chloride (NS) flush 5-40 mL, 5-40 mL, IntraVENous, PRN, Elton Velásquez MD    acetaminophen (TYLENOL) tablet 650 mg, 650 mg, Oral, Q6H PRN, 650 mg at 10/02/22 2127 **OR** acetaminophen (TYLENOL) suppository 650 mg, 650 mg, Rectal, Q6H PRN, YOON Velásquez MD    ondansetron (ZOFRAN ODT) tablet 4 mg, 4 mg, Oral, Q8H PRN, 4 mg at 10/01/22 0153 **OR** ondansetron (ZOFRAN) injection 4 mg, 4 mg, IntraVENous, Q6H PRN, ESDRAS Velásquez MD, 4 mg at 10/01/22 0906    pantoprazole (PROTONIX) 40 mg in 0.9% sodium chloride 10 mL injection, 40 mg, IntraVENous, DAILY, MARYANA Velásquez MD, 40 mg at 10/03/22 0835    prochlorperazine (COMPAZINE) injection 10 mg, 10 mg, IntraVENous, Q6H PRN, Esteban Shultz MD, 10 mg at 10/03/22 1539    Britni Daniels MD  [facility], [address] [place] [address] [place] [address] [facility]  [phone]      Angelito Bentley, NP

## 2022-10-03 NOTE — PERIOP NOTES
TRANSFER - IN REPORT:    Verbal report received from [facility] RN(name) on Sherri Jacobo  being received from [facility](unit) for ordered procedure      Report consisted of patients Situation, Background, Assessment and   Recommendations(SBAR). Information from the following report(s) SBAR, Kardex, ED Summary, Procedure Summary, Intake/Output, MAR, Recent Results, and Cardiac Rhythm Afib  was reviewed with the receiving nurse. Opportunity for questions and clarification was provided. Assessment completed upon patients arrival to unit and care assumed. Received in preop for placement of central line. Permit obtained, procedure explained, VS obtained. 2035 RIJ TLC placed by Dr Taylor Rome without difficulty. All ports with + blood return, flushed and capped    2045 PCXR completed at bedside    2055 TRANSFER - OUT REPORT:    Verbal report given to [facility] RN(name) on Sherri Jacobo  being transferred to [facility](unit) for routine progression of care       Report consisted of patients Situation, Background, Assessment and   Recommendations(SBAR). Information from the following report(s) SBAR, Kardex, and Procedure Summary was reviewed with the receiving nurse. Lines:   Triple Lumen 10/03/22 Right (Active)   Central Line Being Utilized No 10/03/22 2053   Criteria for Appropriate Use Other (comment) 10/03/22 2053   Site Assessment Clean, dry, & intact 10/03/22 2053   Infiltration Assessment 0 10/03/22 2053   Affected Extremity/Extremities Color distal to insertion site pink (or appropriate for race) 10/03/22 2053   Date of Last Dressing Change 10/03/22 10/03/22 2053   Dressing Status Clean, dry, & intact 10/03/22 2053   Dressing Type Disk with Chlorhexadine gluconate (CHG); Transparent 10/03/22 2053   Proximal Hub Color/Line Status White 10/03/22 2053   Positive Blood Return (Medial Site) Yes 10/03/22 2053   Medial Hub Color/Line Status Blue 10/03/22 2053   Positive Blood Return (Lateral Site) Yes 10/03/22 2053 Distal Hub Color/Line Status Brown 10/03/22 2053   Positive Blood Return (Site #3) Yes 10/03/22 2053   Alcohol Cap Used Yes 10/03/22 2053       Peripheral IV 09/29/22 Anterior; Left Forearm (Active)   Site Assessment Clean, dry, & intact;Pink;Swelling 10/02/22 0800   Phlebitis Assessment 2 10/02/22 0800   Infiltration Assessment 0 10/02/22 0800   Dressing Status Clean, dry, & intact 10/02/22 0800   Dressing Type Transparent;Tape 10/02/22 0800   Hub Color/Line Status Blue; Infusing 10/02/22 0800   Action Taken Open ports on tubing capped 10/02/22 0800   Alcohol Cap Used Yes 10/02/22 0800       Peripheral IV 10/02/22 Right (Active)       Peripheral IV 10/03/22 Posterior;Right Hand (Active)        Opportunity for questions and clarification was provided.       Patient transported with:   [employer]

## 2022-10-03 NOTE — PROGRESS NOTES
Received order for PICC line placement. Patient noted to have increase in WBC from yesterday. Patient also with order to have blood cultures drawn. PICC line placement on hold until blood cultures have NGTD 24-48 hours. Recommended to primary RN that central line may be appropriate if patient needing better access. Can reassess once blood cultures have resulted.

## 2022-10-03 NOTE — WOUND CARE
WOCN Note:     New consult placed for assessment of sacrum and heels. Chart reviewed. Assessed in room 462. Admitted DX:  Liver abscess     Assessment:   Patient is A&O x 4, communicative and requires assist with repositioning. Bed: foam mattress  Patient has a Pure wick. Patient reports no pain. Patient declined have legs elevated on pillows. 1. POA Sacrum, blanching pink erythema. Protected with large sacral foam dressing. 2.  POA Bilateral heels, blanching red erythema. Protected with small sacral foam dressing. Wound, Pressure Prevention & Skin Care Recommendations:    Minimize layers of linen/pads under patient to optimize support surface. 2.  Turn/reposition approximately every 2 hours and offload heels. 3.  Manage moisture/ Keep skin folds clean and dry/minimize brief usage. 4.  Sacrum and heels:  BID roll back foam and apply Venelex; Change foam every 3 days and as needed. 5.  P500 air mattress ordered. Discussed above plan with patient and RN.     Transition of Care:   Plan to follow as needed while admitted to hospital.    Jose Ramírez, MARKYN RN [facility] [place] [facility]  Available on [employer]  Office [phone]

## 2022-10-03 NOTE — CONSULTS
Palliative Medicine Consult  Rich: [phone] ([phone])    Patient Name: Tahir Shook  YOB: 1945    Date of Initial Consult: 10/3/2022  Reason for Consult: care decisions  Requesting Provider: Lidia Hoyt    Primary Care Physician: Eulalia Christy NP     SUMMARY:   Tahir Shook is a 77 y.o. presented to the ED on 9/28/2022 from home to the ED due to abdominal pain, nausea and diarrhea. Admitted with a diagnosis of possible liver abscess but more likely liver hematoma. Had liver biopsy on 9/15/2022. Also anemia and afib with rapid ventricular response. Hx metastatic adenocarcinoma      PMH:  metastatic adenocarcinoma, left colon resection 2/2022 (per patient this was for polyps)  traylor's esophagus, HH, GERD, [place], osteoporosis, seizures, chronic back pain, former smoker, PAF, hyperlipidemia, anemia, chronic constipation, COPD, R THR, L THR    Noted hospitalizations: September  (9/12/2022 to 9/16/2022 for abdominal pain. Liver biopsy done. August (8/21/2022 to 8/25/2022 for JOE, COPD, anemia)   June  (6/29/2022 to 6/30/2022 for COPD)    Current medical issues leading to Palliative Medicine involvement include:  care decisions. Noted on last admission, patient seen by palliative and AMD completed on 9-. At that time patient wanted CPR but not prolonged     Social:   Josué Moyer  (son-in-law)        Castro Rocha (son)  Patient is . 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 She has one daughter Corbin Rhodes) who she does not have a relationship with as Lien was upset that the patient has received blood transfusions in the past.  Lien is a [employer]. Irvin Casanova lives with the patient and provides some care. He fixes her breakfast but then leaves for work. Patient then feeds herself and dresses herself. She has a shower chair. Patient's son is Castro Rocha. He is currently incarcerated due to DUI.   He is expected to be in custodial for 7-8 more months. PALLIATIVE DIAGNOSES:   Palliative care encounter  DNR discussion   Shortness of breath  Constipation   Chronic back pain       PLAN:   Met with patient and introduced palliative care services  Talked about patient's medical condition and her understanding. She had liver biopsy on 9/12 but just recently learned of the results. Se is aware of her cancer diagnosis and the liver involvement. Cardiology NP in to talk to patient about her heart condition. NP explained her heart rhythm is now normal but her liver enzymes are elevated and she will need to stop the current medication. The NP explained her EF is 20-25% which is a change from her last echo. Patient seemed to understand this meant her heart was weak. We also talked about her other medical conditions including her COPD  See social history above. Patient has been independent with her care. Her son- in-law helps with some meal prep and takes her to appointments. Patient reports she is currently SOB. RRT was recently called. She states it feels hard to get the air in and out. Patient to be diuresed. O2 @ 2 LPM.   Feels slightly anxious. Patient is on valium 2 mg q 6 hours prn anxiety. Patient does report a decreased appetite. Has occasional nausea. Has prn compazine ordered. Constipation: No BM since 9/29. Change Miralax from prn to daily. Add Amber-colace 1 tab daily. Hx chronic back pain/generalized pain: add Fioricet 2 tabs bid prn per PTA med list   Discussed the goals of care with patient. Patient has an AMD.  Rosie Ko is her primary decision maker. We also discussed her code status. Patient verbalized understanding of her medical condition. We discussed what resuscitation/CPR would include. Patient did elect for a DNR code status. We did discuss comfort care and hospice. Patient had questions about her prognosis. We talked about her multiple medical conditions. Patient then stated she wants to continue treatment at this time. She desires to hug her son one more time before she dies. He is incarcerated and isn't due to be out for 7-8 months. Will continue with current plan of care. Spoke to the patient's son-in-law. We reviewed her current medical condition. Informed him of patient's decision for DNR status but otherwise continue care at this time   Initial consult note routed to primary continuity provider and/or primary health care team members  Communicated plan of care with: Palliative Chrissy GILMORE [facility] Team     GOALS OF CARE / TREATMENT PREFERENCES:     GOALS OF CARE:  Patient/Health Care Proxy Stated Goals: Prolong life (at this time)    TREATMENT PREFERENCES:   Code Status: DNR  (this decision was made today)    Patient and family's personal goals include:  at this time, patient would like to continue full care. Patient would like to hug her son one more time before she passes. Important upcoming milestones or family events: gorge will be in nursing home for another 7-8 months. Advance Care Planning:  [x] [facility] Interdisciplinary Team has updated the ACP Navigator with Health Care Decision Maker and Patient Capacity      Primary Decision Maker: Jaelyn Calderon - [phone]    Secondary Decision Maker: Jono Jacobs [facility] - [phone]  Advance Care Planning 8/22/2022   Confirm Advance Directive None   Patient Would Like to Complete Advance Directive No   Does the patient have other document types -       Medical Interventions: Limited additional interventions       Other:    As far as possible, the palliative care team has discussed with patient / health care proxy about goals of care / treatment preferences for patient.      HISTORY:     History obtained from: chart, team, family    CHIEF COMPLAINT: Pt admitted with aforementioned history and issues    HPI/SUBJECTIVE:    The patient is:   [x] Verbal and participatory  [] Non-participatory due to: medical condition        Clinical Pain Assessment (nonverbal scale for severity on nonverbal patients):   Clinical Pain Assessment  Severity: 4  Location: chronic low back pain  Character: aching  Effect: fioret helps          Duration: for how long has pt been experiencing pain (e.g., 2 days, 1 month, years)  Frequency: how often pain is an issue (e.g., several times per day, once every few days, constant)     FUNCTIONAL ASSESSMENT:     Palliative Performance Scale (PPS):  PPS: 30       PSYCHOSOCIAL/SPIRITUAL SCREENING:     Palliative IDT has assessed this patient for cultural preferences / practices and a referral made as appropriate to needs (Cultural Services, Patient Advocacy, Ethics, etc.)    Any spiritual / Orthodoxy concerns:  [] Yes /  [x] No  [] Unable to obtain this information  If \"Yes\" to discuss with pastoral care during IDT     Does caregiver feel burdened by caring for their loved one:   [] Yes /  [x] No /  [] No Caregiver Present/Available [] No Caregiver [] Pt Lives at [place] [address]  If \"Yes\" to discuss with social work during IDT    Anticipatory grief assessment:   [x] Normal  / [] Maladaptive   [] Unable to obtain this information  [] n/a  If \"Maladaptive\" to discuss with social work during IDT    ESAS Anxiety: Anxiety: 3    ESAS Depression: Depression: 0        REVIEW OF SYSTEMS:     Positive and pertinent negative findings in ROS are noted above in HPI. The following systems were [x] reviewed / [] unable to be reviewed as noted in HPI  Other findings are noted below. Systems: constitutional, ears/nose/mouth/throat, respiratory, gastrointestinal, genitourinary, musculoskeletal, integumentary, neurologic, psychiatric, endocrine. Positive findings noted below.   Modified ESAS Completed by: provider   Fatigue: 4 Drowsiness: 0   Depression: 0 Pain: 4   Anxiety: 3 Nausea: 2 (off and on per patient)   Anorexia: 5 Dyspnea: 3     Constipation: Yes (no BM since 9//29) PHYSICAL EXAM:     From RN flowsheet:  Wt Readings from Last 3 Encounters:   10/01/22 91 lb 0.8 oz (41.3 kg)   09/15/22 92 lb (41.7 kg)   07/21/22 93 lb (42.2 kg)     Blood pressure 121/66, pulse 98, temperature 97.6 °F (36.4 °C), resp. rate 27, height 5' 3\" (1.6 m), weight 91 lb 0.8 oz (41.3 kg), SpO2 100 %. Pain Scale 1: Numeric (0 - 10)  Pain Intensity 1: 0  Pain Onset 1: acute  Pain Location 1: Abdomen  Pain Orientation 1: Right  Pain Description 1: Aching  Pain Intervention(s) 1: Medication (see MAR)  Last bowel movement, if known:     Constitutional: resting in bed,   Eyes: pupils equal, anicteric  ENMT: no nasal discharge, moist mucous membranes  Cardiovascular: regular rhythm, + edema in lower legs  Respiratory: breathing not labored, symmetric, O2 @ 2LPM  Gastrointestinal: slightly distended, non-tender  : purewick in place   Musculoskeletal: no deformity, no tenderness to palpation, dressings over both heels  Skin: warm, dry  Neurologic: following commands, moving all extremities  Psychiatric: full affect, no hallucinations  Other:       HISTORY:     Active Problems:    Liver abscess (9/28/2022)      Abdominal pain (9/28/2022)      Adenocarcinoma ([facility] [address] (9/28/2022)    Past Medical History:   Diagnosis Date    Adenoma of colon     Anemia     Arthritis     Atrial fibrillation (HCC)     Bloating     Chronic constipation     Chronic pain     back     COPD (chronic obstructive pulmonary disease) (HCC)     GERD (gastroesophageal reflux disease)     [place] (hard of hearing)     Hyponatremia     caused seizures x 2 per pt    Indigestion     Osteoporosis     Seizures ([facility] [address] 7/2020, 9/2020    x 2       Past Surgical History:   Procedure Laterality Date    COLONOSCOPY N/A 12/16/2021    COLONOSCOPY   :- performed by Casa Logan., MD at [place] [address] N/A 2/14/2022    .  performed by Graciela Salcedo MD at [address] HIP REPLACEMENT Left     pt stated hip was pinned, not replaced    HX HIP REPLACEMENT Right     pt stated hip was pinned, not replaced    HX HYSTERECTOMY      HX ORTHOPAEDIC  1990, 2011    bilateral hip fractures     HX ORTHOPAEDIC Left 2020    femur fracture    HX TONSILLECTOMY        Family History   Problem Relation Age of Onset    Heart Disease Mother     Heart Disease Father     Liver Disease Father     Alcohol abuse Father     Anesth Problems Neg Hx       History reviewed, no pertinent family history. Social History     Tobacco Use    Smoking status: Former     Packs/day: 1.00     Years: 20.00     Pack years: 20.00     Types: Cigarettes     Quit date: 12/18/2011     Years since quitting: 10.8    Smokeless tobacco: Never   Substance Use Topics    Alcohol use: Never     Allergies   Allergen Reactions    Cefuroxime Hives    Hydrocodone Nausea and Vomiting    Other Medication Diarrhea and Nausea and Vomiting     PT REPORTS ALL MYCINS CAUSE SEVERE GI UPSET    Oxycodone Nausea and Vomiting    Penicillins Hives     Patient screened for any delayed non-IgE-mediated reaction to PCN.         Patient notes the following:    No delayed non-IgE-mediated reaction to PCN    Hives 1969            Current Facility-Administered Medications   Medication Dose Route Frequency    metroNIDAZOLE (FLAGYL) IVPB premix 500 mg  500 mg IntraVENous Q12H    balsam peru-castor oiL (VENELEX) ointment   Topical BID    sodium bicarbonate (8.4%) 100 mEq in dextrose 5% 1,000 mL infusion   IntraVENous CONTINUOUS    albuterol-ipratropium (DUO-NEB) 2.5 MG-0.5 MG/3 ML  3 mL Nebulization Q4H RT    polyethylene glycol (MIRALAX) packet 17 g  17 g Oral DAILY    Vancomycin - Pharmacy dosing   Other Rx Dosing/Monitoring    [START ON 10/4/2022] vancomycin (VANCOCIN) 500 mg in 0.9% sodium chloride (MBP/ADV) 100 mL MBP  500 mg IntraVENous Q12H    senna-docusate (PERICOLACE) 8.6-50 mg per tablet 1 Tablet  1 Tablet Oral DAILY butalbital-acetaminophen-caffeine (FIORICET, ESGIC) -40 mg per tablet 2 Tablet  2 Tablet Oral BID PRN    doxycycline (VIBRAMYCIN) 100 mg in 0.9% sodium chloride (MBP/ADV) 100 mL MBP  100 mg IntraVENous Q12H    [START ON 10/4/2022] thiamine HCL (B-1) tablet 100 mg  100 mg Oral DAILY    diazePAM (VALIUM) tablet 2 mg  2 mg Oral Q6H PRN    thiamine (B-1) 100 mg in 0.9% sodium chloride 50 mL IVPB  100 mg IntraVENous DAILY    [Held by provider] dextrose 5% and 0.9% NaCl infusion  50 mL/hr IntraVENous CONTINUOUS    [Held by provider] metoprolol tartrate (LOPRESSOR) tablet 25 mg  25 mg Oral Q12H    [Held by provider] amiodarone (CORDARONE) 375 mg/250 mL D5W infusion  0.5-1 mg/min IntraVENous TITRATE    0.9% sodium chloride infusion 250 mL  250 mL IntraVENous PRN    [Held by provider] digoxin (LANOXIN) tablet 0.125 mg  0.125 mg Oral DAILY    albuterol (PROVENTIL VENTOLIN) nebulizer solution 2.5 mg  2.5 mg Nebulization Q6H PRN    sodium chloride (NS) flush 5-40 mL  5-40 mL IntraVENous Q8H    sodium chloride (NS) flush 5-40 mL  5-40 mL IntraVENous PRN    acetaminophen (TYLENOL) tablet 650 mg  650 mg Oral Q6H PRN    Or    acetaminophen (TYLENOL) suppository 650 mg  650 mg Rectal Q6H PRN    ondansetron (ZOFRAN ODT) tablet 4 mg  4 mg Oral Q8H PRN    Or    ondansetron (ZOFRAN) injection 4 mg  4 mg IntraVENous Q6H PRN    pantoprazole (PROTONIX) 40 mg in 0.9% sodium chloride 10 mL injection  40 mg IntraVENous DAILY    prochlorperazine (COMPAZINE) injection 10 mg  10 mg IntraVENous Q6H PRN          LAB AND IMAGING FINDINGS:     Lab Results   Component Value Date/Time    WBC 15.4 (H) 10/03/2022 03:51 AM    HGB 9.7 (L) 10/03/2022 03:51 AM    PLATELET 300 11/20/4326 03:51 AM     Lab Results   Component Value Date/Time    Sodium 134 (L) 10/03/2022 03:51 AM    Potassium 4.6 10/03/2022 03:51 AM    Chloride 110 (H) 10/03/2022 03:51 AM    CO2 10 (LL) 10/03/2022 03:51 AM    BUN 9 10/03/2022 03:51 AM    Creatinine 0.80 10/03/2022 03:51 AM    Calcium 7.8 (L) 10/03/2022 03:51 AM    Magnesium 1.9 09/30/2022 04:23 PM    Phosphorus 3.3 02/15/2022 03:18 AM      Lab Results   Component Value Date/Time    Alk. phosphatase 206 (H) 10/03/2022 03:51 AM    Protein, total 5.9 (L) 10/03/2022 03:51 AM    Albumin 2.7 (L) 10/03/2022 03:51 AM    Globulin 3.2 10/03/2022 03:51 AM     Lab Results   Component Value Date/Time    INR 1.0 09/12/2022 08:12 AM    Prothrombin time 10.0 09/12/2022 08:12 AM    aPTT 23.9 08/22/2022 03:55 AM      Lab Results   Component Value Date/Time    Iron 39 08/24/2022 08:20 AM    TIBC 316 08/24/2022 08:20 AM    Iron % saturation 12 (L) 08/24/2022 08:20 AM    Ferritin 23 08/24/2022 08:20 AM      Lab Results   Component Value Date/Time    pH 7.37 10/03/2022 10:28 AM    PCO2 23 (L) 10/03/2022 10:28 AM    PO2 88 10/03/2022 10:28 AM     No components found for: GLPOC   No results found for: CPK, CKMB             Total time:  70 minutes  Counseling / coordination time, spent as noted above: 60  minutes  > 50% counseling / coordination?: yes    Prolonged service was provided for  []30 min   []75 min in face to face time in the presence of the patient, spent as noted above. Time Start:   Time End:   Note: this can only be billed with 95992 (initial) or 49406 (follow up). If multiple start / stop times, list each separately.

## 2022-10-03 NOTE — PROGRESS NOTES
Pharmacist Note - Vancomycin Dosing    Consult provided for this 77 y.o. female for indication of sepsis. Antibiotic regimen(s): doxycyline, metronidazole    Recent Labs     10/03/22  0351 10/02/22  0330 10/01/22  0444   WBC 15.4* 8.5 9.3   CREA 0.80 0.82 0.62   BUN 9 9 7     Frequency of BMP: every day through 10/6  Height: 160 cm  Weight: 41.3 kg  Est CrCl: 39 ml/min; UO: n/a ml/kg/hr  Temp (24hrs), Av.1 °F (36.7 °C), Min:97.5 °F (36.4 °C), Max:98.3 °F (36.8 °C)    Cultures:  pending    MRSA Swab ordered (if applicable)? YES    The plan below is expected to result in a target range of AUC/JOBY 400-600    Therapy will be initiated with a loading dose of 1000 mg IV x 1 to be followed by a maintenance dose of 500 mg IV every 24 hours. Pharmacy to follow patient daily and order levels / make dose adjustments as appropriate. *Vancomycin has been dosed used Bayesian kinetics software to target an AUC/JOBY of 400-600, which provides adequate exposure for an assumed infection due to MRSA with an JOBY of 1 or less while reducing the risk of nephrotoxicity as seen with traditional trough based dosing goals.

## 2022-10-03 NOTE — PROGRESS NOTES
[facility]  Hospitalist Group                                                                                          Hospitalist Progress Note  Nidhi Bhakta MD  Answering service: [phone] from in house phone        Date of Service:  10/3/2022  NAME:  hJonny Johnston  :  1945  MRN:  [id number]      Admission Summary:   Jhonny Johnston is a 77 y.o. female is brought to the ED via EMS for abdominal pain and nausea. She also reported watery diarrhea. Patient was recently hospitalized from - for abdominal pain and constipation and was found to have metastatic disease. She underwent liver biopsy which showed adenocarcinoma. Patient denied fever or chills. She denied dysuria, urgency or frequency. Work-up in the ED was significant for an abnormal abdominal pelvic CT that showed progression of hepatic lesions with a new large fluid collection in the liver with suggestion that this could be intrahepatic abscess. There are also fluid-filled cecum. Right pleural effusion. Patient was started on levofloxacin and Flagyl and was referred to the hospitalist service for admission evaluation. Discussion with radiology later for drainage of fluid collection, felt more likely hematoma rather than infectious    Interval history / Subjective:   Significant worsening this morning, patient is wheezing and short of breath. VSS, HR 80s in NSR. Rapid response and CODE SEPSIS called for worsening clinical status with lactic acidosis, leukocytosis, see below. Pt endorses chronic pain, SOB, wheezing.       Assessment & Plan:     Sepsis, etiology undetermined   - Worsened metabolic acidosis today compared to previous, new leukocytosis   - Lactic acid 2.4  - Was treated for E coli UTI; CXR with possible pna w/ opacities; liver cysts could be hematomas v abscesses; had stopped vanc as previously read to favor hematomas   Plan:   - Initiate broad spectrum abx; due to multiple allergies will use Vanc, Doxy, Flagyl. Discussed with pharmacy   - Cannot tolerate 30 ml/kg resuscitation due to edematous status and pleural effusions on CXR, will start bicarb drip for acidosis, was previously on D5 normal saline   - Blood cultures, Urine culture     Shortness of breath   History of tobacco abuse; suspect COPD without formal diagnosis   - Wheezing on exam today and SOB, though maintaining 100% with NC   - CXR with pulm edema and opacities   - ABG with normal pH, low CO2 and low Bicarb; suggests met acidosis with resp compensation   Plan:   - Duonebs q4h with RT   - Lasix 20mg IV for pulm edema, gentle given lactic acidosis as above   - Starting abx as above     Abdominal pain associate with nausea and vomiting and diarrhea  Liver adenocarcinoma based on recent biopsy, primary unknown  Acute diarrheal illness. --Presented with abdominal pain with nausea, vomiting, and diarrhea   --Radiology were consulted for liver drainage CT report suggested abscess. However after further review radiology the liver fluid is most probably is hematoma than infectious, besides patient does not have fever or leukocytosis and recommended against drainage. --Recent diagnosis of metastatic liver adenocarcinoma   -- c. Diff negative, enteric pathogens negative  - s/p flagyl and levaquin, discontinued after negative infectious work-up for diarrhea  -- Worsened LFTs today, possibly due to amio infusion   Plan:   --Oncology consulted, appreciate recommendations and any insight into patient's prognosis    --Symptomatic care   - GI consulted given worsened LFTs and appreciate insight into prognosis    - Palliative care consult, appreciate recommendations      History of chronic atrial fibrillation.    A fib with RVR, nPOA  HFrEF, 25-50%  --Developed a fib with RVR, which has been controlled overnight on amio gtt   --Anticoagulation has been held in the past due to bleeding, and given concerns of liver hematoma will defer for now   - Currently rate and rhythm controlled   -- LFT bump may be 2/2 amio infusion; lost access this morning therefore amio infusion stopped   - ECHO: Severely reduced left ventricular systolic function with a visually estimated EF of 25 - 30%. Left ventricle size is normal. Mildly increased wall thickness. There is severe hypokinesis of the mid to distal portions of the anterior, lateral, and inferior walls with akinesis at the apex. Findings could be consistent with coronary artery disease versus Takotsubo cardiomyopathy.   Plan:   - Stop amio infusion, BP too low for lopressor; cardiology to evaluate   - Appreciate cardiology insights into new severe reduced EF      Severe protein calorie malnutrition   History of eating disorder  Poor oral intake throughout admission   Patient discussed with nursing a history of severe eating disorder   NGT attempted 9/30 with placement into left bronchus, patient refused reattempt at placement  Intake has been limited here but patient is motivated not to have another feeding tube placed  - Previously, acidosis improved and gap closed off bicarb drip, significantly worsened today possibly 2/2 sepsis   Plan:   - IV thimaine started   - Bicarb drip with D5  - Nutrition consulted appreciate recommendations   - Ensure on meal trays ordered     Acute cystitis, E coli, resolved   - UA with nitrites and leukocytes, concerning for infection   S/p 5 days total of levaquin + bactrim, transitioned when started amio     Acute on chronic normocytic anemia likely anemia of chronic disease, stable  - Hbg 6.6 repeat 6.8, improved after transfusion 1U pRBC  - No evidence of active bleeding   - Obtain hemoccult stool  -  Holding AC          Code status: FULL  Prophylaxis: [address] discussed with: patient, nursing, consultants   Anticipated Disposition: >48h     Hospital Problems  Date Reviewed: 6/29/2022            Codes Class Noted POA    Liver abscess ICD-10-CM: K75.0  ICD-9-CM: 572.0  9/28/2022 Unknown        Abdominal pain ICD-10-CM: R10.9  ICD-9-CM: 789.00  9/28/2022 Unknown        Adenocarcinoma ([facility] [address] ICD-10-CM: C80.1  ICD-9-CM: 199.1  9/28/2022 Unknown             Review of Systems:   A comprehensive review of systems was negative except for that written in the HPI. Vital Signs:    Last 24hrs VS reviewed since prior progress note. Most recent are:  Visit Vitals  BP (!) 110/52   Pulse 81   Temp 98.2 °F (36.8 °C)   Resp 18   Ht 5' 3\" (1.6 m)   Wt 41.3 kg (91 lb 0.8 oz)   SpO2 100%   BMI 16.13 kg/m²         Intake/Output Summary (Last 24 hours) at 10/3/2022 1117  Last data filed at 10/3/2022 0440  Gross per 24 hour   Intake 1290 ml   Output 180 ml   Net 1110 ml        Physical Examination:     I had a face to face encounter with this patient and independently examined them on 10/3/2022 as outlined below:          Constitutional:  In mild distress, cooperative, pleasant, thin   ENT:  Oral mucosa moist, oropharynx benign. Resp:  Increased WOB, wheezing bilaterally that improved after duoneb. CV:  Regular rate and regular rhythm no murmurs, gallops, rubs    GI:  Soft, non distended, mildly tender. normoactive bowel sounds    Musculoskeletal:  No edema, warm, 2+ pulses throughout    Neurologic:  Moves all extremities.   AAOx3, CN II-XII reviewed            Data Review:    Review and/or order of clinical lab test  Review and/or order of tests in the radiology section of CPT  Review and/or order of tests in the medicine section of CPT      Labs:     Recent Labs     10/03/22  0351 10/02/22  0330   WBC 15.4* 8.5   HGB 9.7* 8.8*   HCT 32.0* 26.8*    372     Recent Labs     10/03/22  0351 10/02/22  0330 10/01/22  0444 09/30/22  1623   * 136 137 135*   K 4.6 3.1* 3.8 3.6   * 109* 109* 105   CO2 10* 20* 17* 18*   BUN 9 9 7 5*   CREA 0.80 0.82 0.62 0.70   GLU 92 140* 114* 108*   CA 7.8* 7.6* 8.0* 8.5   MG  --   --   --  1.9     Recent Labs     10/03/22  0351 10/01/22  0444   * 42   * 219*   TBILI 0.4 0.4   TP 5.9* 5.1*   ALB 2.7* 2.4*   GLOB 3.2 2.7     No results for input(s): INR, PTP, APTT, INREXT, INREXT in the last 72 hours. No results for input(s): FE, TIBC, PSAT, FERR in the last 72 hours. Lab Results   Component Value Date/Time    Folate 27.1 (H) 08/22/2022 03:55 AM      Recent Labs     10/03/22  1028   PH 7.37   PCO2 23*   PO2 88     No results for input(s): CPK, CKNDX, TROIQ in the last 72 hours.     No lab exists for component: CPKMB  Lab Results   Component Value Date/Time    Cholesterol, total 141 08/22/2022 03:55 AM    HDL Cholesterol 57 08/22/2022 03:55 AM    LDL, calculated 71.6 08/22/2022 03:55 AM    Triglyceride 62 08/22/2022 03:55 AM    CHOL/HDL Ratio 2.5 08/22/2022 03:55 AM     Lab Results   Component Value Date/Time    Glucose (POC) 145 (H) 02/14/2022 09:01 PM     Lab Results   Component Value Date/Time    Color YELLOW/STRAW 09/28/2022 10:37 AM    Appearance CLEAR 09/28/2022 10:37 AM    Specific gravity 1.010 09/28/2022 10:37 AM    pH (UA) 7.0 09/28/2022 10:37 AM    Protein Negative 09/28/2022 10:37 AM    Glucose Negative 09/28/2022 10:37 AM    Ketone 15 (A) 09/28/2022 10:37 AM    Bilirubin Negative 09/28/2022 10:37 AM    Urobilinogen 0.2 09/28/2022 10:37 AM    Nitrites Positive (A) 09/28/2022 10:37 AM    Leukocyte Esterase MODERATE (A) 09/28/2022 10:37 AM    Epithelial cells FEW 09/28/2022 10:37 AM    Bacteria 4+ (A) 09/28/2022 10:37 AM    WBC >100 (H) 09/28/2022 10:37 AM    RBC 0-5 09/28/2022 10:37 AM         Medications Reviewed:     Current Facility-Administered Medications   Medication Dose Route Frequency    metroNIDAZOLE (FLAGYL) IVPB premix 500 mg  500 mg IntraVENous Q12H    balsam peru-castor oiL (VENELEX) ointment   Topical BID    sodium bicarbonate (8.4%) 100 mEq in dextrose 5% 1,000 mL infusion   IntraVENous CONTINUOUS    doxycycline (VIBRAMYCIN) 100 mg in 0.9% sodium chloride (MBP/ADV) 100 mL MBP  100 mg IntraVENous Q12H albuterol-ipratropium (DUO-NEB) 2.5 MG-0.5 MG/3 ML  3 mL Nebulization Q4H RT    [START ON 10/4/2022] thiamine HCL (B-1) tablet 100 mg  100 mg Oral DAILY    diazePAM (VALIUM) tablet 2 mg  2 mg Oral Q6H PRN    thiamine (B-1) 100 mg in 0.9% sodium chloride 50 mL IVPB  100 mg IntraVENous DAILY    [Held by provider] dextrose 5% and 0.9% NaCl infusion  50 mL/hr IntraVENous CONTINUOUS    [Held by provider] metoprolol tartrate (LOPRESSOR) tablet 25 mg  25 mg Oral Q12H    [Held by provider] amiodarone (CORDARONE) 375 mg/250 mL D5W infusion  0.5-1 mg/min IntraVENous TITRATE    0.9% sodium chloride infusion 250 mL  250 mL IntraVENous PRN    [Held by provider] digoxin (LANOXIN) tablet 0.125 mg  0.125 mg Oral DAILY    albuterol (PROVENTIL VENTOLIN) nebulizer solution 2.5 mg  2.5 mg Nebulization Q6H PRN    sodium chloride (NS) flush 5-40 mL  5-40 mL IntraVENous Q8H    sodium chloride (NS) flush 5-40 mL  5-40 mL IntraVENous PRN    acetaminophen (TYLENOL) tablet 650 mg  650 mg Oral Q6H PRN    Or    acetaminophen (TYLENOL) suppository 650 mg  650 mg Rectal Q6H PRN    polyethylene glycol (MIRALAX) packet 17 g  17 g Oral DAILY PRN    ondansetron (ZOFRAN ODT) tablet 4 mg  4 mg Oral Q8H PRN    Or    ondansetron (ZOFRAN) injection 4 mg  4 mg IntraVENous Q6H PRN    pantoprazole (PROTONIX) 40 mg in 0.9% sodium chloride 10 mL injection  40 mg IntraVENous DAILY    prochlorperazine (COMPAZINE) injection 10 mg  10 mg IntraVENous Q6H PRN     ______________________________________________________________________  EXPECTED LENGTH OF STAY: 3d 4h  ACTUAL LENGTH OF STAY:          [address] [facility]     CRITICAL CARE ATTESTATION:  I had a face to face encounter with the patient, reviewed and interpreted patient data including clinical events, labs, images, vital signs, I/O's, and examined patient.   I have discussed the case and the plan and management of the patient's care with the consulting services, the bedside nurses and necessary ancillary providers. NOTE OF PERSONAL INVOLVEMENT IN CARE   This patient has a high probability of imminent, clinically significant deterioration, which requires the highest level of preparedness to intervene urgently. I participated in the decision-making and personally managed or directed the management of the following life and organ supporting interventions that required my frequent assessment to treat or prevent imminent deterioration. I personally spent 30 minutes of critical care time. This is time spent at this critically ill patient's bedside actively involved in patient care as well as the coordination of care and discussions with the patient's family. This does not include any procedural time which has been billed separately.

## 2022-10-04 PROBLEM — I50.20 HFREF (HEART FAILURE WITH REDUCED EJECTION FRACTION) (HCC): Status: ACTIVE | Noted: 2022-10-04

## 2022-10-04 PROBLEM — K59.04 CHRONIC IDIOPATHIC CONSTIPATION: Status: ACTIVE | Noted: 2022-10-04

## 2022-10-04 LAB
ANION GAP SERPL CALC-SCNC: 8 MMOL/L (ref 5–15)
ARTERIAL PATENCY WRIST A: POSITIVE
BASE DEFICIT BLDV-SCNC: 2.3 MMOL/L
BASOPHILS # BLD: 0.1 K/UL (ref 0–0.1)
BASOPHILS NFR BLD: 1 % (ref 0–1)
BDY SITE: ABNORMAL
BUN SERPL-MCNC: 9 MG/DL (ref 6–20)
BUN/CREAT SERPL: 13 (ref 12–20)
CALCIUM SERPL-MCNC: 7.8 MG/DL (ref 8.5–10.1)
CHLORIDE SERPL-SCNC: 107 MMOL/L (ref 97–108)
CO2 SERPL-SCNC: 21 MMOL/L (ref 21–32)
CREAT SERPL-MCNC: 0.7 MG/DL (ref 0.55–1.02)
DIFFERENTIAL METHOD BLD: ABNORMAL
DIGOXIN SERPL-MCNC: 0.9 NG/ML (ref 0.9–2)
EOSINOPHIL # BLD: 0.3 K/UL (ref 0–0.4)
EOSINOPHIL NFR BLD: 2 % (ref 0–7)
ERYTHROCYTE [DISTWIDTH] IN BLOOD BY AUTOMATED COUNT: 22.8 % (ref 11.5–14.5)
GAS FLOW.O2 O2 DELIVERY SYS: ABNORMAL L/MIN
GLUCOSE SERPL-MCNC: 97 MG/DL (ref 65–100)
HCO3 BLDV-SCNC: 20.4 MMOL/L (ref 23–28)
HCT VFR BLD AUTO: 26 % (ref 35–47)
HGB BLD-MCNC: 8.5 G/DL (ref 11.5–16)
IMM GRANULOCYTES # BLD AUTO: 0.1 K/UL (ref 0–0.04)
IMM GRANULOCYTES NFR BLD AUTO: 1 % (ref 0–0.5)
LACTATE SERPL-SCNC: 1.2 MMOL/L (ref 0.4–2)
LYMPHOCYTES # BLD: 0.8 K/UL (ref 0.8–3.5)
LYMPHOCYTES NFR BLD: 6 % (ref 12–49)
MAGNESIUM SERPL-MCNC: 1.3 MG/DL (ref 1.6–2.4)
MCH RBC QN AUTO: 27.4 PG (ref 26–34)
MCHC RBC AUTO-ENTMCNC: 32.7 G/DL (ref 30–36.5)
MCV RBC AUTO: 83.9 FL (ref 80–99)
MONOCYTES # BLD: 0.9 K/UL (ref 0–1)
MONOCYTES NFR BLD: 7 % (ref 5–13)
NEUTS SEG # BLD: 10.8 K/UL (ref 1.8–8)
NEUTS SEG NFR BLD: 83 % (ref 32–75)
NRBC # BLD: 0.02 K/UL (ref 0–0.01)
NRBC BLD-RTO: 0.2 PER 100 WBC
PCO2 BLDV: 26.6 MMHG (ref 41–51)
PH BLDV: 7.49 [PH] (ref 7.32–7.42)
PLATELET # BLD AUTO: 334 K/UL (ref 150–400)
PMV BLD AUTO: 9.4 FL (ref 8.9–12.9)
PO2 BLDV: 76 MMHG (ref 25–40)
POTASSIUM SERPL-SCNC: 3.3 MMOL/L (ref 3.5–5.1)
RBC # BLD AUTO: 3.1 M/UL (ref 3.8–5.2)
RBC MORPH BLD: ABNORMAL
SAO2 % BLDV: 96.5 % (ref 65–88)
SERVICE CMNT-IMP: ABNORMAL
SODIUM SERPL-SCNC: 136 MMOL/L (ref 136–145)
SPECIMEN TYPE: ABNORMAL
WBC # BLD AUTO: 13 K/UL (ref 3.6–11)

## 2022-10-04 PROCEDURE — 85025 COMPLETE CBC W/AUTO DIFF WBC: CPT

## 2022-10-04 PROCEDURE — 74011250636 HC RX REV CODE- 250/636: Performed by: EMERGENCY MEDICINE

## 2022-10-04 PROCEDURE — 99233 SBSQ HOSP IP/OBS HIGH 50: CPT | Performed by: INTERNAL MEDICINE

## 2022-10-04 PROCEDURE — 74011000250 HC RX REV CODE- 250: Performed by: STUDENT IN AN ORGANIZED HEALTH CARE EDUCATION/TRAINING PROGRAM

## 2022-10-04 PROCEDURE — 80048 BASIC METABOLIC PNL TOTAL CA: CPT

## 2022-10-04 PROCEDURE — 97161 PT EVAL LOW COMPLEX 20 MIN: CPT

## 2022-10-04 PROCEDURE — 36415 COLL VENOUS BLD VENIPUNCTURE: CPT

## 2022-10-04 PROCEDURE — 74011250637 HC RX REV CODE- 250/637: Performed by: STUDENT IN AN ORGANIZED HEALTH CARE EDUCATION/TRAINING PROGRAM

## 2022-10-04 PROCEDURE — 74011000250 HC RX REV CODE- 250: Performed by: HOSPITALIST

## 2022-10-04 PROCEDURE — 74011250636 HC RX REV CODE- 250/636: Performed by: STUDENT IN AN ORGANIZED HEALTH CARE EDUCATION/TRAINING PROGRAM

## 2022-10-04 PROCEDURE — 94640 AIRWAY INHALATION TREATMENT: CPT

## 2022-10-04 PROCEDURE — C9113 INJ PANTOPRAZOLE SODIUM, VIA: HCPCS | Performed by: HOSPITALIST

## 2022-10-04 PROCEDURE — 74011000258 HC RX REV CODE- 258: Performed by: STUDENT IN AN ORGANIZED HEALTH CARE EDUCATION/TRAINING PROGRAM

## 2022-10-04 PROCEDURE — 74011000250 HC RX REV CODE- 250: Performed by: EMERGENCY MEDICINE

## 2022-10-04 PROCEDURE — P9047 ALBUMIN (HUMAN), 25%, 50ML: HCPCS | Performed by: EMERGENCY MEDICINE

## 2022-10-04 PROCEDURE — 74011250637 HC RX REV CODE- 250/637: Performed by: NURSE PRACTITIONER

## 2022-10-04 PROCEDURE — 97530 THERAPEUTIC ACTIVITIES: CPT

## 2022-10-04 PROCEDURE — 82803 BLOOD GASES ANY COMBINATION: CPT

## 2022-10-04 PROCEDURE — 74011250636 HC RX REV CODE- 250/636: Performed by: HOSPITALIST

## 2022-10-04 PROCEDURE — 65610000003 HC RM ICU SURGICAL

## 2022-10-04 PROCEDURE — 83605 ASSAY OF LACTIC ACID: CPT

## 2022-10-04 PROCEDURE — 80162 ASSAY OF DIGOXIN TOTAL: CPT

## 2022-10-04 PROCEDURE — 74011250637 HC RX REV CODE- 250/637: Performed by: EMERGENCY MEDICINE

## 2022-10-04 PROCEDURE — 83735 ASSAY OF MAGNESIUM: CPT

## 2022-10-04 PROCEDURE — 74011000258 HC RX REV CODE- 258: Performed by: EMERGENCY MEDICINE

## 2022-10-04 RX ORDER — POTASSIUM CHLORIDE 750 MG/1
20 TABLET, FILM COATED, EXTENDED RELEASE ORAL EVERY 4 HOURS
Status: COMPLETED | OUTPATIENT
Start: 2022-10-04 | End: 2022-10-04

## 2022-10-04 RX ORDER — DIGOXIN 125 MCG
0.12 TABLET ORAL DAILY
Status: DISCONTINUED | OUTPATIENT
Start: 2022-10-05 | End: 2022-10-10

## 2022-10-04 RX ORDER — AMOXICILLIN 250 MG
1 CAPSULE ORAL
Status: COMPLETED | OUTPATIENT
Start: 2022-10-04 | End: 2022-10-04

## 2022-10-04 RX ORDER — DIGOXIN 125 MCG
0.06 TABLET ORAL DAILY
Status: DISCONTINUED | OUTPATIENT
Start: 2022-10-04 | End: 2022-10-04

## 2022-10-04 RX ORDER — BUMETANIDE 0.25 MG/ML
1 INJECTION INTRAMUSCULAR; INTRAVENOUS ONCE
Status: COMPLETED | OUTPATIENT
Start: 2022-10-04 | End: 2022-10-04

## 2022-10-04 RX ORDER — IPRATROPIUM BROMIDE 0.5 MG/2.5ML
0.5 SOLUTION RESPIRATORY (INHALATION)
Status: DISCONTINUED | OUTPATIENT
Start: 2022-10-04 | End: 2022-10-12

## 2022-10-04 RX ORDER — ALBUMIN HUMAN 250 G/1000ML
25 SOLUTION INTRAVENOUS ONCE
Status: COMPLETED | OUTPATIENT
Start: 2022-10-04 | End: 2022-10-04

## 2022-10-04 RX ORDER — AMOXICILLIN 250 MG
2 CAPSULE ORAL DAILY
Status: DISCONTINUED | OUTPATIENT
Start: 2022-10-05 | End: 2022-10-20 | Stop reason: HOSPADM

## 2022-10-04 RX ORDER — MAGNESIUM SULFATE HEPTAHYDRATE 40 MG/ML
2 INJECTION, SOLUTION INTRAVENOUS ONCE
Status: COMPLETED | OUTPATIENT
Start: 2022-10-04 | End: 2022-10-04

## 2022-10-04 RX ORDER — MIDODRINE HYDROCHLORIDE 5 MG/1
15 TABLET ORAL
Status: DISCONTINUED | OUTPATIENT
Start: 2022-10-04 | End: 2022-10-04

## 2022-10-04 RX ORDER — MIDODRINE HYDROCHLORIDE 5 MG/1
7.5 TABLET ORAL
Status: COMPLETED | OUTPATIENT
Start: 2022-10-04 | End: 2022-10-04

## 2022-10-04 RX ORDER — MIDODRINE HYDROCHLORIDE 5 MG/1
10 TABLET ORAL
Status: DISCONTINUED | OUTPATIENT
Start: 2022-10-04 | End: 2022-10-04

## 2022-10-04 RX ORDER — MIDODRINE HYDROCHLORIDE 5 MG/1
20 TABLET ORAL
Status: DISCONTINUED | OUTPATIENT
Start: 2022-10-04 | End: 2022-10-15

## 2022-10-04 RX ADMIN — SODIUM CHLORIDE, PRESERVATIVE FREE 10 ML: 5 INJECTION INTRAVENOUS at 15:34

## 2022-10-04 RX ADMIN — POTASSIUM CHLORIDE 20 MEQ: 750 TABLET, FILM COATED, EXTENDED RELEASE ORAL at 11:37

## 2022-10-04 RX ADMIN — MAGNESIUM SULFATE HEPTAHYDRATE 2 G: 40 INJECTION, SOLUTION INTRAVENOUS at 15:33

## 2022-10-04 RX ADMIN — POTASSIUM CHLORIDE 20 MEQ: 750 TABLET, FILM COATED, EXTENDED RELEASE ORAL at 15:37

## 2022-10-04 RX ADMIN — MIDODRINE HYDROCHLORIDE 2.5 MG: 5 TABLET ORAL at 09:06

## 2022-10-04 RX ADMIN — POLYETHYLENE GLYCOL 3350 17 G: 17 POWDER, FOR SOLUTION ORAL at 09:08

## 2022-10-04 RX ADMIN — IPRATROPIUM BROMIDE 0.5 MG: 0.5 SOLUTION RESPIRATORY (INHALATION) at 15:02

## 2022-10-04 RX ADMIN — MIDODRINE HYDROCHLORIDE 20 MG: 5 TABLET ORAL at 17:32

## 2022-10-04 RX ADMIN — SODIUM CHLORIDE, PRESERVATIVE FREE 10 ML: 5 INJECTION INTRAVENOUS at 02:27

## 2022-10-04 RX ADMIN — MIDODRINE HYDROCHLORIDE 7.5 MG: 5 TABLET ORAL at 14:39

## 2022-10-04 RX ADMIN — DOCUSATE SODIUM 50MG AND SENNOSIDES 8.6MG 1 TABLET: 8.6; 5 TABLET, FILM COATED ORAL at 09:07

## 2022-10-04 RX ADMIN — MAGNESIUM SULFATE HEPTAHYDRATE 2 G: 40 INJECTION, SOLUTION INTRAVENOUS at 12:44

## 2022-10-04 RX ADMIN — AMIODARONE HYDROCHLORIDE 1 MG/MIN: 50 INJECTION, SOLUTION INTRAVENOUS at 19:06

## 2022-10-04 RX ADMIN — VANCOMYCIN HYDROCHLORIDE 500 MG: 500 INJECTION, POWDER, LYOPHILIZED, FOR SOLUTION INTRAVENOUS at 11:37

## 2022-10-04 RX ADMIN — DOXYCYCLINE 100 MG: 100 INJECTION, POWDER, LYOPHILIZED, FOR SOLUTION INTRAVENOUS at 02:26

## 2022-10-04 RX ADMIN — METRONIDAZOLE 500 MG: 500 INJECTION, SOLUTION INTRAVENOUS at 20:37

## 2022-10-04 RX ADMIN — METRONIDAZOLE 500 MG: 500 INJECTION, SOLUTION INTRAVENOUS at 09:08

## 2022-10-04 RX ADMIN — DIGOXIN 0.06 MG: 125 TABLET ORAL at 11:36

## 2022-10-04 RX ADMIN — PANTOPRAZOLE SODIUM 40 MG: 40 INJECTION, POWDER, FOR SOLUTION INTRAVENOUS at 09:07

## 2022-10-04 RX ADMIN — Medication: at 09:08

## 2022-10-04 RX ADMIN — IPRATROPIUM BROMIDE AND ALBUTEROL SULFATE 3 ML: .5; 3 SOLUTION RESPIRATORY (INHALATION) at 07:02

## 2022-10-04 RX ADMIN — BUTALBITAL, ACETAMINOPHEN, AND CAFFEINE 2 TABLET: 50; 325; 40 TABLET ORAL at 20:30

## 2022-10-04 RX ADMIN — DIAZEPAM 2 MG: 2 TABLET ORAL at 20:41

## 2022-10-04 RX ADMIN — BUMETANIDE 1 MG: 0.25 INJECTION INTRAMUSCULAR; INTRAVENOUS at 12:44

## 2022-10-04 RX ADMIN — DOXYCYCLINE 100 MG: 100 INJECTION, POWDER, LYOPHILIZED, FOR SOLUTION INTRAVENOUS at 14:29

## 2022-10-04 RX ADMIN — BUTALBITAL, ACETAMINOPHEN, AND CAFFEINE 2 TABLET: 50; 325; 40 TABLET ORAL at 09:07

## 2022-10-04 RX ADMIN — Medication: at 19:15

## 2022-10-04 RX ADMIN — IPRATROPIUM BROMIDE 0.5 MG: 0.5 SOLUTION RESPIRATORY (INHALATION) at 20:07

## 2022-10-04 RX ADMIN — ALBUMIN (HUMAN) 25 G: 0.25 INJECTION, SOLUTION INTRAVENOUS at 15:29

## 2022-10-04 RX ADMIN — MIDODRINE HYDROCHLORIDE 2.5 MG: 5 TABLET ORAL at 11:36

## 2022-10-04 RX ADMIN — Medication 100 MG: at 09:07

## 2022-10-04 RX ADMIN — POTASSIUM CHLORIDE 20 MEQ: 750 TABLET, FILM COATED, EXTENDED RELEASE ORAL at 09:07

## 2022-10-04 RX ADMIN — SODIUM CHLORIDE, PRESERVATIVE FREE 10 ML: 5 INJECTION INTRAVENOUS at 08:37

## 2022-10-04 RX ADMIN — SENNOSIDES AND DOCUSATE SODIUM 1 TABLET: 50; 8.6 TABLET ORAL at 11:36

## 2022-10-04 NOTE — PROGRESS NOTES
Day #2 of Vancomycin  Indication:  sepsis  Current regimen:  500 mg Q12h  Abx regimen:  doxycyline, Flagyl, vancomycin  ID Following ?: NO  Concomitant nephrotoxic drugs (requires more frequent monitoring): None  Frequency of BMP?: daily    Recent Labs     10/04/22  0233 10/03/22  0351 10/02/22  0330   WBC 13.0* 15.4* 8.5   CREA 0.70 0.80 0.82   BUN 9 9 9     Est CrCl: ~45 ml/min; UO: 1.9 ml/kg/hr  Temp (24hrs), Av.8 °F (36.6 °C), Min:97.6 °F (36.4 °C), Max:98 °F (36.7 °C)    Cultures:    urine - E.coli - pan-sensitive    MRSA Swab ordered (if applicable)? N/A    Goal target range AUC/JOBY 400-600    Recent level history:  Date/Time Dose & Interval Measured Level (mcg/mL) Associated AUC/JOBY Dose Adjustment                                                   Plan: Continue current regimen. Level ordered with AM labs.

## 2022-10-04 NOTE — PROGRESS NOTES
1930: Bedside and Verbal shift change report given to HERMAN Harden (oncoming nurse) by Arlene Rhodes RN (offgoing nurse).  Report included the following information SBAR, Kardex, Intake/Output, MAR, Recent Results, and Cardiac Rhythm SR .

## 2022-10-04 NOTE — PROGRESS NOTES
Spiritual Care Assessment/Progress Note  [facility]      NAME: Adilia Hurley      MRN: [id number]  AGE: 77 y.o. SEX: female  [place] Affiliation: No preference   Language: English     10/4/2022     Total Time (in minutes): 15     Spiritual Assessment begun in [facility] SERVICES UNIT through conversation with:         []Patient        [] Family    [] Friend(s)        Reason for Consult: Palliative Care, Initial/Spiritual Assessment     Spiritual beliefs: (Please include comment if needed)     [] Identifies with a daniel tradition:         [] Supported by a daniel community:            [] Claims no spiritual orientation:           [] Seeking spiritual identity:                [] Adheres to an individual form of spirituality:           [x] Not able to assess:                           Identified resources for coping:      [] Prayer                               [] Music                  [] Guided Imagery     [] Family/friends                 [] Pet visits     [] Devotional reading                         [x] Unknown     [] Other:                                               Interventions offered during this visit: (See comments for more details)    Patient Interventions: Initial visit           Plan of Care:     [] Support spiritual and/or cultural needs    [] Support AMD and/or advance care planning process      [] Support grieving process   [] Coordinate Rites and/or Rituals    [] Coordination with community clergy   [] No spiritual needs identified at this time   [] Detailed Plan of Care below (See Comments)  [] Make referral to Music Therapy  [] Make referral to Pet Therapy     [] Make referral to Addiction services  [] Make referral to [facility]  [] Make referral to Spiritual Care Partner  [] No future visits requested        [x] Contact Spiritual Care for further referrals        Attempted visit for palliative initial spiritual assessment.  Unable to visit patient at this time as she was sleeping and did not awake. No family present. Patient's chart was consulted.   Chaplain Trammell MDiv, MS, [facility]

## 2022-10-04 NOTE — PROCEDURES
Central Line Placement    Start time: 10/3/2022 8:30 PM  End time: 10/3/2022 8:37 PM  Performed by: Jana Reynolds DO  Authorized by: Jana Reynolds DO     Indications: vascular access  Preanesthetic Checklist: patient identified, risks and benefits discussed, anesthesia consent, site marked, patient being monitored, timeout performed and fire risk safety assessment completed and verbalized    Timeout Time: 20:30 EDT       Pre-procedure: All elements of maximal sterile barrier technique followed?  Yes    2% Chlorhexidine for cutaneous antisepsis, Hand hygiene performed prior to catheter insertion and Ultrasound guidance    Sterile Ultrasound Technique followed?: Yes            Procedure:   Prep:  Chlorhexidine  Location: internal jugular  Orientation:  Right  Patient position:  Trendelenburg  Catheter type:  Triple lumen  Catheter size:  7 Fr  Catheter length:  16 cm  Number of attempts:  1  Successful placement: Yes      Assessment:   Post-procedure:  Catheter secured, sterile dressing applied and sterile dressing with CHG applied  Assessment:  Blood return through all ports, free fluid flow, guidewire removal verified and placement verified by x-ray  Insertion:  Uncomplicated  Patient tolerance:  Patient tolerated the procedure well with no immediate complications

## 2022-10-04 NOTE — PROGRESS NOTES
Hematology-Oncology Progress Note    Teddy Trotter  1945  [id number]  10/4/2022    Follow-up for: metastatic adenocarcinoam     [x]        Chart notes since last visit reviewed   [x]        Medications reviewed for allergies and interactions       Case discussed with the following:         []                            [x]        Nursing Staff                                                                         []        Pathologist                                                                        []        FAMILY      Subjective:     Spoke with patient who complains of: pt is very weak, no c/o pain, sob is improved    Objective:   Patient Vitals for the past 24 hrs:   BP Temp Pulse Resp SpO2   10/04/22 0837 (!) 103/47 97.9 °F (36.6 °C) -- 19 98 %   10/04/22 0702 -- -- -- -- 96 %   10/04/22 0600 (!) 91/53 -- (!) 102 19 --   10/04/22 0400 -- -- (!) 107 -- --   10/04/22 0315 (!) 88/40 98 °F (36.7 °C) (!) 105 16 98 %   10/04/22 0200 -- -- (!) 106 -- --   10/03/22 2300 (!) 102/51 97.9 °F (36.6 °C) (!) 105 16 91 %   10/03/22 2200 -- -- (!) 109 -- --   10/03/22 2048 (P) 102/60 -- (!) (P) 112 (P) 24 (P) 96 %   10/03/22 2017 114/70 -- (!) 120 22 96 %   10/03/22 2000 -- -- (!) 116 -- --   10/03/22 1800 -- -- (!) 117 -- --   10/03/22 1758 121/60 -- (!) 114 16 100 %   10/03/22 1658 103/61 -- (!) 117 24 --   10/03/22 1558 (!) 98/58 97.8 °F (36.6 °C) 92 21 --   10/03/22 1458 (!) 95/42 -- 88 17 (!) 89 %   10/03/22 1400 -- -- 98 -- --   10/03/22 1358 (!) 97/49 -- 96 22 --   10/03/22 1258 (!) 99/57 -- 87 22 --   10/03/22 1200 121/66 -- 88 27 --   10/03/22 1110 (!) 100/45 97.6 °F (36.4 °C) 83 22 100 %   10/03/22 1029 -- -- 81 -- --   10/03/22 1005 -- -- -- -- 100 %   10/03/22 0954 (!) 110/52 -- 80 18 99 %         REVIEW OF SYSTEMS:    Constitutional: negative fever, negative chills, negative weight loss  Eyes:   negative visual changes  ENT:   negative sore throat, tongue or lip swelling  Respiratory: negative cough, negative dyspnea  Cards:  negative for chest pain, palpitations, lower extremity edema  GI:   negative for nausea, vomiting, diarrhea, and abdominal pain  Neuro:  negative for headaches, dizziness, vertigo  []                        Full ROS o/w normal/non contributor    Constitutional:  Patient looks  []        Sick  [x]        Frail  []        Better                                                 []        Depressed    HEENT:  [x]   NC                         []   AT               []    ALOPECIA           Eyes: [x]   Normal               []    Icteric  Oropharynx: []    Normal                  []  Thrush               []   Dry  Mucositis: [x]    None                 Grade: []        I  []        II  []        III  []        IV  Neck:   [x]   Supple                  []  Rigid               JVD:    []   ABSENT       []   PRESENT  Lymphadenopathy:   []   None Noted            []   PRESENT    Chest:  [x]   Clear               []    Rhonchi                      Dec'd @     []  Right Base           []   Left Base    CV:             []   Regular              []  Irregular               [x]   Tachy                []   Murmur  Abdominal:   [x]    Soft              []   NON-tender               []   Tender      BS:    []   ABSENT                   []   PRESENT  Liver:     [x]  NON-palp                  []   EDGE- palp  Spleen: [x]   NON-palp                   []  EDGE - palp  Mass:   [x]   ABSENT                          []  PRESENT  Extr:    []  Lymphedema             []   Cyanosis      []  Clubbing  Edema:     [x]   NONE       []   PRESENT  Skin:  Intact [x]           Purpura []        Rash: []   ABSENT       []  PRESENT  Neuro:  [x]        Normal  []        Confused      Available labs reviewed:  Labs:    Recent Results (from the past 24 hour(s))   LACTIC ACID    Collection Time: 10/03/22 10:13 AM   Result Value Ref Range    Lactic acid 2.4 (HH) 0.4 - 2.0 MMOL/L   SAMPLES BEING HELD    Collection Time: 10/03/22 10:17 AM   Result Value Ref Range    SAMPLES BEING HELD 1RED     COMMENT        Add-on orders for these samples will be processed based on acceptable specimen integrity and analyte stability, which may vary by analyte. PROCALCITONIN    Collection Time: 10/03/22 10:17 AM   Result Value Ref Range    Procalcitonin 1.18 ng/mL   BLOOD GAS, ARTERIAL    Collection Time: 10/03/22 10:28 AM   Result Value Ref Range    pH 7.37 7.35 - 7.45      PCO2 23 (L) 35 - 45 mmHg    PO2 88 80 - 100 mmHg    O2 SAT 97 92 - 97 %    BICARBONATE 13 (L) 22 - 26 mmol/L    BASE DEFICIT 10.3 mmol/L    O2 METHOD NASAL CANNULA      O2 FLOW RATE 2.00 L/min    Sample source ARTERIAL      SITE RIGHT RADIAL      OSIRIS'S TEST YES     LACTIC ACID    Collection Time: 10/03/22  3:58 PM   Result Value Ref Range    Lactic acid 2.6 (HH) 0.4 - 2.0 MMOL/L   URINALYSIS W/MICROSCOPIC    Collection Time: 10/03/22  4:57 PM   Result Value Ref Range    Color YELLOW/STRAW      Appearance CLEAR CLEAR      Specific gravity 1.008 1.003 - 1.030      pH (UA) 6.5 5.0 - 8.0      Protein Negative NEG mg/dL    Glucose Negative NEG mg/dL    Ketone Negative NEG mg/dL    Bilirubin Negative NEG      Blood Negative NEG      Urobilinogen 0.2 0.2 - 1.0 EU/dL    Nitrites Negative NEG      Leukocyte Esterase Negative NEG      WBC 0-4 0 - 4 /hpf    RBC 0-5 0 - 5 /hpf    Epithelial cells FEW FEW /lpf    Bacteria Negative NEG /hpf    Hyaline cast 0-2 0 - 5 /lpf   URINE CULTURE HOLD SAMPLE    Collection Time: 10/03/22  4:57 PM    Specimen: Serum; Urine   Result Value Ref Range    Urine culture hold        Urine on hold in Microbiology dept for 2 days. If unpreserved urine is submitted, it cannot be used for addtional testing after 24 hours, recollection will be required.    LACTIC ACID    Collection Time: 10/03/22 10:07 PM   Result Value Ref Range    Lactic acid 2.1 (HH) 0.4 - 2.0 MMOL/L   DIGOXIN    Collection Time: 10/04/22  2:33 AM   Result Value Ref Range    Digoxin level 0.9 0.90 - 8.17 NG/ML   METABOLIC PANEL, BASIC    Collection Time: 10/04/22  2:33 AM   Result Value Ref Range    Sodium 136 136 - 145 mmol/L    Potassium 3.3 (L) 3.5 - 5.1 mmol/L    Chloride 107 97 - 108 mmol/L    CO2 21 21 - 32 mmol/L    Anion gap 8 5 - 15 mmol/L    Glucose 97 65 - 100 mg/dL    BUN 9 6 - 20 MG/DL    Creatinine 0.70 0.55 - 1.02 MG/DL    BUN/Creatinine ratio 13 12 - 20      eGFR >60 >60 ml/min/1.73m2    Calcium 7.8 (L) 8.5 - 10.1 MG/DL   CBC WITH AUTOMATED DIFF    Collection Time: 10/04/22  2:33 AM   Result Value Ref Range    WBC 13.0 (H) 3.6 - 11.0 K/uL    RBC 3.10 (L) 3.80 - 5.20 M/uL    HGB 8.5 (L) 11.5 - 16.0 g/dL    HCT 26.0 (L) 35.0 - 47.0 %    MCV 83.9 80.0 - 99.0 FL    MCH 27.4 26.0 - 34.0 PG    MCHC 32.7 30.0 - 36.5 g/dL    RDW 22.8 (H) 11.5 - 14.5 %    PLATELET 072 292 - 284 K/uL    MPV 9.4 8.9 - 12.9 FL    NRBC 0.2 (H) 0  WBC    ABSOLUTE NRBC 0.02 (H) 0.00 - 0.01 K/uL    NEUTROPHILS 83 (H) 32 - 75 %    LYMPHOCYTES 6 (L) 12 - 49 %    MONOCYTES 7 5 - 13 %    EOSINOPHILS 2 0 - 7 %    BASOPHILS 1 0 - 1 %    IMMATURE GRANULOCYTES 1 (H) 0.0 - 0.5 %    ABS. NEUTROPHILS 10.8 (H) 1.8 - 8.0 K/UL    ABS. LYMPHOCYTES 0.8 0.8 - 3.5 K/UL    ABS. MONOCYTES 0.9 0.0 - 1.0 K/UL    ABS. EOSINOPHILS 0.3 0.0 - 0.4 K/UL    ABS. BASOPHILS 0.1 0.0 - 0.1 K/UL    ABS. IMM. GRANS. 0.1 (H) 0.00 - 0.04 K/UL    DF SMEAR SCANNED      RBC COMMENTS ANISOCYTOSIS  2+       MAGNESIUM    Collection Time: 10/04/22  2:33 AM   Result Value Ref Range    Magnesium 1.3 (L) 1.6 - 2.4 mg/dL   LACTIC ACID    Collection Time: 10/04/22  2:36 AM   Result Value Ref Range    Lactic acid 1.2 0.4 - 2.0 MMOL/L       Available Xrays reviewed:    Chemotherapy monitored and toxicities assessed:    Assessment and Plan   Metastatic adenocarcinoma. .  I discussed this case with Dr. Fatimah Valdes who did a jennifer-colectomy for a large 6cm dysplastic polyp (zero of 29nodes)  in February 2022, and he noted that the original colonoscopy did not get past the polyp,  current ct scan worrisome for a cecal malignancy. 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 The pt needs GI eval when she is clinically stable,   she is very frail and is borderline for systemic therapy but hopefully can improve with nutrition etc and get started in a week  or two. \"Hepatic abscess\"  radiology feels that this is actually organized hematoma. Hgb 8.5 this am.  Will give iv iron today. Tachycardia. .   cardiology is seeing her. Will defer mgmt to them. Following    4. Disposition. . pt is too frail for intensive chemotherapy. She may be able to tolerate single agent xeloda but this is unlikely to benefit her. I discussed supportive care vs hospice with her and she is considering all of these options.       MD Robbi Moreira MD

## 2022-10-04 NOTE — PROGRESS NOTES
Asked to see Ms Lourdes Bruner for persistent hypotension    76 y/o F being managed for;    Likely metastatic adenocarcinoma:  Atrial fibrillation with rapid ventricular response  2. Hypokalemia/hypomagnesemia  3. Lliver adenocarcinoma (uncertain priimary). 4.  Anemia: ?acute on chronic. Stool OB pending also. 6.  Possible acute systolic heart failure. 7.  Cardiomyopathy likely secondary to atrial fibrillation with rapid ventricular response/tachycardia.     Increase midodrine dose, resuscitate as needed with albumin, hold off on diuresis for now, MAP goal > 65, if after max midodrine MAPs still < 65 will transfer to ICU for pressor therapy, HR goal < 110, on digoxin (room to go up on the dose), keep Mg > 2, K > 4, cont abx, cont other mgt

## 2022-10-04 NOTE — CONSULTS
HISTORY OF PRESENT ILLNESS: 15-year-old woman who presented with abdominal pain and nausea associated with watery diarrhea. Recently diagnosed with metastatic adenocarcinoma-unknown primary. In the ED CT done showed progression of hepatic lesions with new large fluid collection in the liver. On the floor she was being managed for;    Possible sepsis-possible hepatic abscess  Shortness of breath  CHF-echo on this admission showed an EF of 25 to 30% with impressive akinesis  A. fib RVR  UTI    Over the past 24 to 48 hours she developed hypotension, despite midodrine and resuscitation with albumin remained hypotensive. Being transferred to the ICU for pressor therapy. Patient seen, complaining of shortness of breath    Review of systems negative except for above    PAST MEDICAL HISTORY:  Past Medical History:   Diagnosis Date    Adenoma of colon     Anemia     Arthritis     Atrial fibrillation (HCC)     Bloating     Chronic constipation     Chronic pain     back     COPD (chronic obstructive pulmonary disease) (HCC)     GERD (gastroesophageal reflux disease)     [place] (hard of hearing)     Hyponatremia     caused seizures x 2 per pt    Indigestion     Osteoporosis     Seizures ([address] 7/2020, 9/2020    x 2          PAST SURGICAL HISTORY:  Past Surgical History:   Procedure Laterality Date    COLONOSCOPY N/A 12/16/2021    COLONOSCOPY   :- performed by Glenna Leslie MD at [place] [id number] N/A 2/14/2022    .  performed by Ny Perez MD at [facility] MAIN OR    [address] [facility]    HX CHOLECYSTECTOMY  1982    HX HIP REPLACEMENT Left     pt stated hip was pinned, not replaced    HX HIP REPLACEMENT Right     pt stated hip was pinned, not replaced    HX HYSTERECTOMY      HX ORTHOPAEDIC  1990, 2011    bilateral hip fractures     HX ORTHOPAEDIC Left 2020    femur fracture    HX TONSILLECTOMY           ALLERGIES:  Allergies   Allergen Reactions    Cefuroxime Hives    Hydrocodone Nausea and Vomiting    Other Medication Diarrhea and Nausea and Vomiting     PT REPORTS ALL MYCINS CAUSE SEVERE GI UPSET    Oxycodone Nausea and Vomiting    Penicillins Hives     Patient screened for any delayed non-IgE-mediated reaction to PCN.         Patient notes the following:    No delayed non-IgE-mediated reaction to PCN    Hives 1969               MEDICATIONS:  Current Outpatient Medications   Medication Instructions    acetaminophen (TYLENOL) 650 mg, Oral, EVERY 4 HOURS AS NEEDED    albuterol (PROVENTIL HFA, VENTOLIN HFA, PROAIR HFA) 90 mcg/actuation inhaler 2 Puffs, Inhalation, EVERY 6 HOURS AS NEEDED    aspirin 81 mg, Oral, DAILY    butalbital-acetaminophen-caffeine (FIORICET, ESGIC) -40 mg per tablet 2 Tablets, Oral, 2 TIMES DAILY AS NEEDED    cyclobenzaprine (FLEXERIL) 10 mg, Oral, 3 TIMES DAILY AS NEEDED    dicyclomine (BENTYL) 10 mg, Oral, 4 TIMES DAILY    digoxin (LANOXIN) 0.125 mg, Oral, DAILY    lubiPROStone (AMITIZA) 24 mcg, Oral, 2 TIMES DAILY WITH MEALS, Recommended by GI service    metoprolol tartrate (LOPRESSOR) 12.5 mg, Oral, 2 TIMES DAILY    multivitamin (ONE A DAY) tablet 1 Tablet, Oral, DAILY    ondansetron (ZOFRAN ODT) 4 mg, Oral, EVERY 8 HOURS AS NEEDED    pantoprazole (PROTONIX) 40 mg, Oral, DAILY    polyethylene glycol (MIRALAX) 17 g, Oral, EVERY 12 HOURS    simethicone (MYLICON) 80 mg, Oral, 4 TIMES DAILY AS NEEDED         FAMILY HISTORY:  Family History   Problem Relation Age of Onset    Heart Disease Mother     Heart Disease Father     Liver Disease Father     Alcohol abuse Father     Anesth Problems Neg Hx          SOCIAL HISTORY:  Social History     Tobacco Use    Smoking status: Former     Packs/day: 1.00     Years: 20.00     Pack years: 20.00     Types: Cigarettes     Quit date: 12/18/2011     Years since quitting: 10.8    Smokeless tobacco: Never   Vaping Use    Vaping Use: Never used   Substance Use Topics    Alcohol use: Never    Drug use: Never         VITAL SIGNS:  Visit Vitals  BP (!) 95/47   Pulse (!) 105   Temp 97.9 °F (36.6 °C)   Resp 19   Ht 5' 3\" (1.6 m)   Wt 41.3 kg (91 lb 0.8 oz)   SpO2 95%   BMI 16.13 kg/m²     PHYSICAL EXAMINATION:  Constitutional:  No acute distress, cooperative, pleasant, thin   ENT:  Oral mucosa moist, oropharynx benign. Resp:  Comfortable WOB, faint wheezing bilaterally    CV:  Irregular, tachy,  no murmurs, gallops, rubs    GI:  Soft, non distended, mildly tender. normoactive bowel sounds    Musculoskeletal:  Warm, 2+ pulses throughout. 2+ edema LEs     Neurologic:  Moves all extremities.   AAOx3, CN II-XII reviewed       LABORATORY ANALYSIS:  Recent Results (from the past 24 hour(s))   LACTIC ACID    Collection Time: 10/03/22 10:07 PM   Result Value Ref Range    Lactic acid 2.1 (HH) 0.4 - 2.0 MMOL/L   DIGOXIN    Collection Time: 10/04/22  2:33 AM   Result Value Ref Range    Digoxin level 0.9 0.90 - 1.28 NG/ML   METABOLIC PANEL, BASIC    Collection Time: 10/04/22  2:33 AM   Result Value Ref Range    Sodium 136 136 - 145 mmol/L    Potassium 3.3 (L) 3.5 - 5.1 mmol/L    Chloride 107 97 - 108 mmol/L    CO2 21 21 - 32 mmol/L    Anion gap 8 5 - 15 mmol/L    Glucose 97 65 - 100 mg/dL    BUN 9 6 - 20 MG/DL    Creatinine 0.70 0.55 - 1.02 MG/DL    BUN/Creatinine ratio 13 12 - 20      eGFR >60 >60 ml/min/1.73m2    Calcium 7.8 (L) 8.5 - 10.1 MG/DL   CBC WITH AUTOMATED DIFF    Collection Time: 10/04/22  2:33 AM   Result Value Ref Range    WBC 13.0 (H) 3.6 - 11.0 K/uL    RBC 3.10 (L) 3.80 - 5.20 M/uL    HGB 8.5 (L) 11.5 - 16.0 g/dL    HCT 26.0 (L) 35.0 - 47.0 %    MCV 83.9 80.0 - 99.0 FL    MCH 27.4 26.0 - 34.0 PG    MCHC 32.7 30.0 - 36.5 g/dL    RDW 22.8 (H) 11.5 - 14.5 %    PLATELET 193 522 - 688 K/uL    MPV 9.4 8.9 - 12.9 FL    NRBC 0.2 (H) 0  WBC    ABSOLUTE NRBC 0.02 (H) 0.00 - 0.01 K/uL    NEUTROPHILS 83 (H) 32 - 75 %    LYMPHOCYTES 6 (L) 12 - 49 %    MONOCYTES 7 5 - 13 %    EOSINOPHILS 2 0 - 7 %    BASOPHILS 1 0 - 1 %    IMMATURE GRANULOCYTES 1 (H) 0.0 - 0.5 %    ABS. NEUTROPHILS 10.8 (H) 1.8 - 8.0 K/UL    ABS. LYMPHOCYTES 0.8 0.8 - 3.5 K/UL    ABS. MONOCYTES 0.9 0.0 - 1.0 K/UL    ABS. EOSINOPHILS 0.3 0.0 - 0.4 K/UL    ABS. BASOPHILS 0.1 0.0 - 0.1 K/UL    ABS. IMM. GRANS. 0.1 (H) 0.00 - 0.04 K/UL    DF SMEAR SCANNED      RBC COMMENTS ANISOCYTOSIS  2+       MAGNESIUM    Collection Time: 10/04/22  2:33 AM   Result Value Ref Range    Magnesium 1.3 (L) 1.6 - 2.4 mg/dL   LACTIC ACID    Collection Time: 10/04/22  2:36 AM   Result Value Ref Range    Lactic acid 1.2 0.4 - 2.0 MMOL/L   POC VENOUS BLOOD GAS    Collection Time: 10/04/22  4:56 PM   Result Value Ref Range    Device: NASAL CANNULA      pH, venous (POC) 7.49 (H) 7.32 - 7.42      pCO2, venous (POC) 26.6 (L) 41 - 51 MMHG    pO2, venous (POC) 76 (H) 25 - 40 mmHg    HCO3, venous (POC) 20.4 (L) 23.0 - 28.0 MMOL/L    sO2, venous (POC) 96.5 (H) 65 - 88 %    Base deficit, venous (POC) 2.3 mmol/L    Allens test (POC) Positive      Site RIGHT RADIAL      Specimen type (POC) VENOUS BLOOD      Performed by Minor Amezcua      No results displayed because visit has over 200 results.         EKG Results       Procedure 720 Value Units Date/Time    EKG, 12 LEAD, INITIAL [[id number]] Collected: 09/30/22 1931    Order Status: Completed Updated: 10/03/22 1659     Ventricular Rate 140 BPM      Atrial Rate 147 BPM      QRS Duration 80 ms      Q-T Interval 264 ms      QTC Calculation (Bezet) 403 ms      Calculated R Axis 80 degrees      Calculated T Axis 19 degrees      Diagnosis --     Supraventricular tachycardia  Nonspecific ST abnormality    When compared with ECG of 30-SEP-2022 19:30,  No significant change  Confirmed by Kathy Cruz M.D., [place] ([id number]) on 10/3/2022 4:59:12 PM      EKG, 12 LEAD, INITIAL [[id number]] Collected: 09/30/22 1933    Order Status: Completed Updated: 10/03/22 0958     Ventricular Rate 143 BPM      Atrial Rate 144 BPM      QRS Duration 82 ms      Q-T Interval 264 ms      QTC Calculation (Bezet) 407 ms      Calculated R Axis 76 degrees      Calculated T Axis 38 degrees      Diagnosis --     Supraventricular tachycardia  Nonspecific ST abnormality    Confirmed by Enedina Clarke M.D., [facility] ([id number]) on 10/3/2022 9:57:58 AM      EKG, 12 LEAD, INITIAL [[id number]]     Order Status: Completed     EKG, 12 LEAD, INITIAL [[id number]] Collected: 09/30/22 1927    Order Status: Completed Updated: 10/02/22 0855     Ventricular Rate 142 BPM      Atrial Rate 141 BPM      QRS Duration 74 ms      Q-T Interval 266 ms      QTC Calculation (Bezet) 409 ms      Calculated R Axis 89 degrees      Calculated T Axis 54 degrees      Diagnosis --     Supraventricular tachycardia  Low voltage QRS  Nonspecific T wave abnormality  When compared with ECG of 30-SEP-2022 14:47,  MANUAL COMPARISON REQUIRED, DATA IS UNCONFIRMED  Confirmed by Severa Cabal ([id number]) on 10/2/2022 8:55:36 AM      EKG, 12 LEAD, INITIAL [[id number]] Collected: 09/30/22 1318    Order Status: Completed Updated: 10/01/22 1052     Ventricular Rate 99 BPM      Atrial Rate 99 BPM      P-R Interval 112 ms      QRS Duration 76 ms      Q-T Interval 342 ms      QTC Calculation (Bezet) 438 ms      Calculated P Axis 77 degrees      Calculated R Axis 94 degrees      Calculated T Axis 120 degrees      Diagnosis --     Sinus rhythm with premature supraventricular complexes  Rightward axis  Low voltage QRS  Septal infarct , age undetermined  When compared with ECG of 30-SEP-2022 05:58,  Sinus rhythm has replaced Atrial fibrillation  Non-specific change in ST segment in Anterior leads  Nonspecific T wave abnormality no longer evident in Inferior leads  Nonspecific T wave abnormality, improved in Anterolateral leads  Confirmed by Severa Cabal ([id number]) on 10/1/2022 10:52:14 AM      EKG, 12 LEAD, INITIAL [[id number]] Collected: 09/30/22 1447    Order Status: Completed Updated: 10/01/22 1049     Ventricular Rate 95 BPM      Atrial Rate 95 BPM      P-R Interval 88 ms      QRS Duration 80 ms      Q-T Interval 346 ms      QTC Calculation (Bezet) 434 ms      Calculated P Axis 75 degrees      Calculated R Axis 94 degrees      Calculated T Axis 132 degrees      Diagnosis --     Sinus rhythm with short VA with premature supraventricular complexes  Low voltage QRS  Anterolateral infarct , age undetermined  When compared with ECG of 30-SEP-2022 13:18,  MANUAL COMPARISON REQUIRED, DATA IS UNCONFIRMED  Confirmed by Ingrid Blackwell ([id number]) on 10/1/2022 10:49:13 AM      EKG, 12 LEAD, INITIAL [[id number]] Collected: 09/30/22 0530    Order Status: Completed Updated: 09/30/22 1448     Ventricular Rate 190 BPM      Atrial Rate 190 BPM      QRS Duration 82 ms      Q-T Interval 240 ms      QTC Calculation (Bezet) 426 ms      Calculated R Axis 118 degrees      Calculated T Axis 126 degrees      Diagnosis --     Atrial fibrillation with rapid ventricular response  Nonspecific ST abnormality  When compared with ECG of 28-SEP-2022 05:12,  Current undetermined rhythm precludes rhythm comparison, needs review  QRS axis shifted right  Septal infarct is now present  T wave inversion no longer evident in Anterior leads  Confirmed by Eladia Arciniega MD ([id number]) on 9/30/2022 2:48:20 PM      EKG, 12 LEAD, INITIAL [[id number]] Collected: 09/30/22 0558    Order Status: Completed Updated: 09/30/22 1447     Ventricular Rate 142 BPM      Atrial Rate 144 BPM      QRS Duration 90 ms      Q-T Interval 354 ms      QTC Calculation (Bezet) 544 ms      Calculated R Axis 93 degrees      Calculated T Axis 76 degrees      Diagnosis --     Atrial fibrillation with rapid ventricular response  Rightward axis  Nonspecific ST and T wave abnormality  When compared with ECG of 30-SEP-2022 05:30,  Previous ECG has undetermined rhythm, needs review  Nonspecific T wave abnormality no longer evident in Inferior leads  Nonspecific T wave abnormality, improved in Lateral leads  Confirmed by Eladia Arciniega MD [id number]) on 9/30/2022 2:47:08 PM      EKG, 12 LEAD, INITIAL [[id number]] Collected: 09/28/22 0512    Order Status: Completed Updated: 09/28/22 2141     Ventricular Rate 92 BPM      Atrial Rate 92 BPM      P-R Interval 126 ms      QRS Duration 78 ms      Q-T Interval 348 ms      QTC Calculation (Bezet) 430 ms      Calculated P Axis 77 degrees      Calculated R Axis 84 degrees      Calculated T Axis 25 degrees      Diagnosis --     Sinus rhythm with premature supraventricular complexes and with occasional   premature ventricular complexes  Nonspecific ST and T wave abnormality  When compared with ECG of 21-AUG-2022 20:06,  premature ventricular complexes are now present  premature supraventricular complexes are now present  Nonspecific T wave abnormality has replaced inverted T waves in Inferior   leads  T wave inversion more evident in Anterior leads  Confirmed by Velvet Hammer ([id number]) on 9/28/2022 9:41:22 PM                RADIOGRAPHIC STUDIES:  XR CHEST PORT  Narrative: EXAM: Portable CXR. 2051 hours. COMPARISON: 1032 hours. INDICATION: post line placement    FINDINGS:  Right jugular CVL has been placed with tip in the SVC without pneumothorax. There are stable small pleural effusions. Lungs are hyperinflated. There is no  acute airspace disease/edema. Heart size is normal. There are granulomatous  sylvia calcifications. Impression: Satisfactory CVL placement. No pneumothorax. Stable small pleural effusions. Emphysema. XR CHEST PORT  Narrative: EXAM: XR CHEST PORT    DATE: 10/3/2022 10:39 AM    INDICATION: Increased WOB    COMPARISON: Chest radiograph 10/3/2022, chest CT 6/29/2022    TECHNIQUE: A portable AP radiograph of the chest was obtained. FINDINGS:   Stable cardiac and mediastinal contours. Stable bilateral hilar calcifications. No overt pulmonary edema. No significant interval change in diffusely prominent  interstitial markings throughout bilateral lung fields. Stable right infrahilar  airspace disease. Small bilateral pleural effusions, unchanged.  No pneumothorax. Impression: No significant interval change. Prominent interstitial opacities bilaterally as  well as right infrahilar airspace disease. Stable small bilateral pleural  effusions. US ABD LTD  Narrative: INDICATION: eval hematoma v abscess in liver    COMPARISON: None    TECHNIQUE:  Routine ultrasound images of the abdomen were obtained. FINDINGS:  LIVER: Again noted is multiple branching tubular-like collections tracking  throughout the liver which demonstrate internal hypoechoic areas with echogenic  debris centrally. Also again noted is a subcapsular collection at the dome of  the liver which is not significantly changed from the prior CT. No peripheral  hyperemia is present around these collections. MAIN PORTAL VEIN: Main portal vein is patent. Samara Bloodgood GALLBLADDER: Surgically absent. COMMON BILE DUCT: 4.7 mm in diameter. No significant dilatation. PANCREAS: Not well visualized due to bowel gas though visualized portions are  unremarkable. RIGHT KIDNEY: 8.3 cm in length. Simple cyst right kidney requiring no further  follow-up. Possible 6 mm stone in the right kidney. .  OTHER: N/A. Impression: Complex collection within the liver and larger complex collection in the  subcapsular region of the liver. No peripheral hyperemia is identified. This may  represent resolving hematomas or abscesses. .   XR CHEST PORT  Narrative: EXAM:  XR CHEST PORT    INDICATION: Shortness of breath    COMPARISON: 9/30/2022    TECHNIQUE: Portable AP upright chest view at 0128 hours    FINDINGS: The cardiomediastinal contours are stable. Calcified mediastinal lymph  nodes are again noted. There are stable diffuse interstitial opacities. There are increased small  pleural effusions, right greater than left. There is no pneumothorax. The bones  and upper abdomen are stable. Impression: Stable diffuse interstitial opacities.  Increased small pleural effusions, right  greater than left.        DIAGNOSES:  Hypotension/shock-unspecified, possible sepsis  A. fib RVR  Acute respiratory failure  Hepatic abscesses versus organized hematomas  Metastatic adenocarcinoma      IMPRESSION AND PLAN:    Neuro  Delirium prevention strategies    Cardiac-continue hemodynamic monitoring, map goal greater than 65, on midodrine, Kenneth-Synephrine as needed, A. fib RVR, heart rate goal less than 110, keep magnesium above 2 and potassium above 4, increased digoxin dose, restart amiodarone infusion, EF of 25 to 30% with severe hypokinesis of the mid to distal portions of the anterior, lateral and inferior walls with akinesis at the apex with some RV dysfunction as well-CAD versus Takotsubo, should get a repeat echo in a few days, follow-up cardiology recommendations    Pulmonary-supplemental oxygen as needed, presumed COPD, nebs as needed, saturation goal greater than 90%    GI-diet as tolerated, PPI therapy    Renal-monitor urine output, correct electrolyte derangements as needed    Hematology-metastatic adenocarcinoma follow-up oncology recommendations, possible organized hepatic hematoma, SCDs only for DVT prophylaxis for now    ID-hepatic abscesses versus organized hematoma continue vancomycin/Flagyl/Doxy for now-follow-up micro studies    Endocrinology-keep glucose less than 180    Prognosis very guarded to poor at this time-follow-up oncology and palliative care team [address] discussions with patient and NOK    Critical care time-40 minutes    The patient is critically ill with single or multiple systems failure, severe metabolic derangement and/or infection, has potential for life threatening deterioration, requires high complexity decision making, frequent evaluation and titration of therapies and interpretation of data. This note has been written with voice recognition software.  While this note has been edited for accuracy, the software periodically misinterprets speech resulting in errors that might not have been caught in editing. In the event an unusual error is found in this record, please read the chart carefully and recognize, using context, where these substitutions or errors have occurred and please notify me to resolve the errors.

## 2022-10-04 NOTE — PROGRESS NOTES
Problem: Mobility Impaired (Adult and Pediatric)  Goal: *Acute Goals and Plan of Care (Insert Text)  Description: FUNCTIONAL STATUS PRIOR TO ADMISSION: Patient was modified independent using a rollator and single point cane for functional mobility PRN. Primarily ambulating throughout her home without device (anticipate furniture surfing). Denies falls. Progressive decline since last admission. Home O2. HOME SUPPORT PRIOR TO ADMISSION: The patient lived with her son in law. Son is currently incarcerated. Physical Therapy Goals  Initiated 10/4/2022  1. Patient will move from supine to sit and sit to supine , scoot up and down, and roll side to side in bed with modified independence within 7 day(s). 2.  Patient will transfer from bed to chair and chair to bed with supervision/set-up using the least restrictive device within 7 day(s). 3.  Patient will perform sit to stand with supervision/set-up within 7 day(s). 4.  Patient will ambulate with minimal assistance for 20 feet with the least restrictive device within 7 day(s). Outcome: Progressing Towards Goal   PHYSICAL THERAPY EVALUATION  Patient: Alvarez Bentley (89 y.o. female)  Date: 10/4/2022  Primary Diagnosis: Liver abscess [K75.0]  Abdominal pain [R10.9]  Adenocarcinoma ([address] [C80.1]  Procedure(s) (LRB):  INFUSION CATHETER INSERTION (N/A) 1 Day Post-Op   Precautions:   Fall, Bed Alarm      ASSESSMENT  Based on the objective data described below, the patient presents with impaired balance, decreased endurance, generalized weakness, CA related pain, and fatigue following admission for liver abscess, CA, and abdominal pain. Received in bed with a breathing treatment running. Agreeable to mobility and hopeful for OOB to chair. BP 90's/70's at rest (improved from earlier in the day). Overall supervision-min A for OOB to chair but unable to find a comfortable position and wished to return to bed.  Reported she felt as if her abdominal cavity was pressing into her chest/lungs and made it difficult to breathe (has baseline kyphosis as well). Noted wheezing throughout but stable SpO2 on 2L/min. She is currently deciding on goals of care and wishes to see PT again while she makes her decision-- would recommend SNF as is she is currently unable to care for herself. If home, may need w/c level and assist with all transfers OOB. Current Level of Function Impacting Discharge (mobility/balance): min A    Functional Outcome Measure: The patient scored Total Score: 1/28 on the Tinetti outcome measure which is indicative of high fall risk. Other factors to consider for discharge: advanced CA     Patient will benefit from skilled therapy intervention to address the above noted impairments. PLAN :  Recommendations and Planned Interventions: bed mobility training, transfer training, gait training, therapeutic exercises, neuromuscular re-education, edema management/control, patient and family training/education, and therapeutic activities      Frequency/Duration: Patient will be followed by physical therapy:  3 times a week to address goals. Recommendation for discharge: (in order for the patient to meet his/her long term goals)  Therapy up to 5 days/week in SNF setting, or home at w/c level and assist for all transfers and OOB    This discharge recommendation:  Has not yet been discussed the attending provider and/or case management    IF patient discharges home will need the following DME: to be determined (TBD)         SUBJECTIVE:   Patient stated I would like to hug my son before I die.     OBJECTIVE DATA SUMMARY:   HISTORY:    Past Medical History:   Diagnosis Date    Adenoma of colon     Anemia     Arthritis     Atrial fibrillation (HCC)     Bloating     Chronic constipation     Chronic pain     back     COPD (chronic obstructive pulmonary disease) (HCC)     GERD (gastroesophageal reflux disease)     [place] (hard of hearing)     Hyponatremia caused seizures x 2 per pt    Indigestion     Osteoporosis     Seizures ([facility] [address] 7/2020, 9/2020    x 2      Past Surgical History:   Procedure Laterality Date    COLONOSCOPY N/A 12/16/2021    COLONOSCOPY   :- performed by Naresh Enamorado MD at [place] [address] N/A 2/14/2022    . performed by Adalberto Zuniga MD at [facility] MAIN OR    [address]    HX CHOLECYSTECTOMY  1982    HX HIP REPLACEMENT Left     pt stated hip was pinned, not replaced    HX HIP REPLACEMENT Right     pt stated hip was pinned, not replaced    HX HYSTERECTOMY      HX ORTHOPAEDIC  1990, 2011    bilateral hip fractures     HX ORTHOPAEDIC Left 2020    femur fracture    HX TONSILLECTOMY         Personal factors and/or comorbidities impacting plan of care: PMH    Home Situation  Home Environment: Private residence  # Steps to Enter: 3  One/Two Story Residence: One story  Living Alone: No  Support Systems: Other Family Member(s) (son in law)  Patient Expects to be Discharged to[de-identified] Home  Current DME Used/Available at Home: [address], rollator, Cane, straight (scooter)    EXAMINATION/PRESENTATION/DECISION MAKING:   Critical Behavior:  Neurologic State: Alert  Orientation Level: Oriented X4  Cognition: Follows commands  Hearing: Auditory  Auditory Impairment: None  Range Of Motion:  AROM: Generally decreased, functional  Strength:    Strength: Generally decreased, functional  Tone & Sensation:   Tone: Normal  Sensation: Intact  Coordination:  Coordination: Generally decreased, functional  Functional Mobility:  Bed Mobility:  Supine to Sit: Supervision; Additional time  Sit to Supine: Minimum assistance  Scooting: Supervision  Transfers:  Sit to Stand: Minimum assistance  Stand to Sit: Minimum assistance  Stand Pivot Transfers: Minimum assistance     Balance:   Sitting: Impaired; Without support  Sitting - Static: Good (unsupported)  Sitting - Dynamic: Fair (occasional)  Standing: Impaired; With support  Standing - Static: Fair  Standing - Dynamic : Fair      Functional Measure:  Tinetti test:    Sitting Balance: 1  Arises: 0  Attempts to Rise: 0  Immediate Standing Balance: 0  Standing Balance: 0  Nudged: 0  Eyes Closed: 0  Turn 360 Degrees - Continuous/Discontinuous: 0  Turn 360 Degrees - Steady/Unsteady: 0  Sitting Down: 0  Balance Score: 1 Balance total score  Indication of Gait: 0  R Step Length/Height: 0  L Step Length/Height: 0  R Foot Clearance: 0  L Foot Clearance: 0  Step Symmetry: 0  Step Continuity: 0  Path: 0  Trunk: 0  Walking Time: 0  Gait Score: 0 Gait total score  Total Score: 1/28 Overall total score         Tinetti Tool Score Risk of Falls  <19 = High Fall Risk  19-24 = Moderate Fall Risk  25-28 = Low Fall Risk  Tinetti ME. Performance-Oriented Assessment of Mobility Problems in Elderly Patients. [address]; [id number].  (Scoring Description: PT Bulletin Feb. 10, 1993)    Older adults: Corie Bautista et al, 2009; n = 1000 [facility] elderly evaluated with ABC, NELSON, ADL, and IADL)  · Mean NELSON score for males aged 69-68 years = 26.21(3.40)  · Mean NELSON score for females age 69-68 years = 25.16(4.30)  · Mean NELSON score for males over 80 years = 23.29(6.02)  · Mean NELSON score for females over 80 years = 17.20(8.32)        Physical Therapy Evaluation Charge Determination   History Examination Presentation Decision-Making   HIGH Complexity :3+ comorbidities / personal factors will impact the outcome/ POC  HIGH Complexity : 4+ Standardized tests and measures addressing body structure, function, activity limitation and / or participation in recreation  LOW Complexity : Stable, uncomplicated  Other outcome measures Tinetti 1/28  HIGH       Based on the above components, the patient evaluation is determined to be of the following complexity level: LOW     Pain Rating:  Back pain    Activity Tolerance:   Good, requires frequent rest breaks, and observed SOB with activity      After treatment patient left in no apparent distress: Supine in bed, Call bell within reach, and Side rails x 3    COMMUNICATION/EDUCATION:   The patients plan of care was discussed with: Registered nurse and Case management. Fall prevention education was provided and the patient/caregiver indicated understanding., Patient/family have participated as able in goal setting and plan of care. , and Patient/family agree to work toward stated goals and plan of care.     Thank you for this referral.  Dearl Shiela, PT, DPT   Time Calculation: 20 mins

## 2022-10-04 NOTE — PROGRESS NOTES
2115: TRANSFER - IN REPORT:    Verbal report received from  (name) on Adilia Hurley  being received from Anesthesiology (unit) for routine progression of care      Report consisted of patients Situation, Background, Assessment and   Recommendations(SBAR). Information from the following report(s) SBAR, Kardex, and Procedure Summary was reviewed with the receiving nurse. Opportunity for questions and clarification was provided. Assessment completed upon patients arrival to unit and care assumed. Patient received from Anesthesiology for central line procedure. Connected to tele.

## 2022-10-04 NOTE — PROGRESS NOTES
Pt seen earlier this a.m., no chest pain, SOB she said was better, c/o back pain and requested fioricet. She has PAF with RVR, at times appears ST. D/w Dr. Keke Link digoxin at reduced dose.  Full note to follow,

## 2022-10-04 NOTE — PROGRESS NOTES
[facility]                                                                                          Hospitalist Progress Note  Solitario Farris MD  Answering service: [phone] from in house phone        Date of Service:  10/4/2022  NAME:  Abe Meckel  :  1945  MRN:  [id number]      Admission Summary:   Abe Meckel is a 77 y.o. female is brought to the ED via EMS for abdominal pain and nausea. She also reported watery diarrhea. Patient was recently hospitalized from - for abdominal pain and constipation and was found to have metastatic disease. She underwent liver biopsy which showed adenocarcinoma. Patient denied fever or chills. She denied dysuria, urgency or frequency. Work-up in the ED was significant for an abnormal abdominal pelvic CT that showed progression of hepatic lesions with a new large fluid collection in the liver with suggestion that this could be intrahepatic abscess. There are also fluid-filled cecum. Right pleural effusion. Patient was started on levofloxacin and Flagyl and was referred to the hospitalist service for admission evaluation. Discussion with radiology later for drainage of fluid collection, felt more likely hematoma rather than infectious    Interval history / Subjective:   Reports improved breathing today compared to yesterday. Continued abdominal pain. Ongoing [address] discussions. Assessment & Plan:     Sepsis, etiology undetermined   - Worsened metabolic acidosis today compared to previous, new leukocytosis   - Lactic acid 2.4, now resolved  - Was treated for E coli UTI; CXR with possible pna w/ opacities; liver cysts could be hematomas v abscesses; had stopped vanc as previously read to favor hematomas   - Leukocytosis improving today   Plan:   - Continue broad spectrum abx; due to multiple allergies will use Vanc, Doxy, Flagyl.  Discussed with pharmacy   - Received bicarb drip yesterday, improvement in acidosis today with volume resusc  - Blood cultures, Urine culture pending     Shortness of breath   History of tobacco abuse; suspect COPD without formal diagnosis   - Wheezing on exam today and SOB, though maintaining 100% with NC   - CXR with pulm edema and opacities   - ABG with normal pH, low CO2 and low Bicarb; suggests met acidosis with resp compensation   Plan:   - Duonebs q4h with RT   - Dose Bumex 1mg IV again today for SOB and edema   - Starting abx as above     Abdominal pain associate with nausea and vomiting and diarrhea  Liver adenocarcinoma based on recent biopsy, primary unknown  Acute diarrheal illness. --Presented with abdominal pain with nausea, vomiting, and diarrhea   --Radiology were consulted for liver drainage CT report suggested abscess. However after further review radiology the liver fluid is most probably is hematoma than infectious, besides patient does not have fever or leukocytosis and recommended against drainage. --Recent diagnosis of metastatic liver adenocarcinoma   -- c. Diff negative, enteric pathogens negative  - s/p flagyl and levaquin, discontinued after negative infectious work-up for diarrhea  -- Worsened LFTs today, possibly due to amio infusion   Plan:   --Oncology consulted, appreciate recommendations and any insight into patient's prognosis    --Symptomatic care   - GI consulted given worsened LFTs and appreciate insight into prognosis    - Palliative care consult, appreciate recommendations      History of chronic atrial fibrillation. A fib with RVR, nPOA  HFrEF, 25-50%  --Developed a fib with RVR, which has been controlled overnight on amio gtt   --Anticoagulation has been held in the past due to bleeding, and given concerns of liver hematoma will defer for now   -- LFT bump may be 2/2 amio infusion; stopped 10/04  - ECHO: Severely reduced left ventricular systolic function with a visually estimated EF of 25 - 30%.  Left ventricle size is normal. Mildly increased wall thickness. There is severe hypokinesis of the mid to distal portions of the anterior, lateral, and inferior walls with akinesis at the apex. Findings could be consistent with coronary artery disease versus Takotsubo cardiomyopathy.   Plan:   - Stop amio infusion, BP too low for lopressor; cardiology to evaluate   - Appreciate cardiology insights into new severe reduced EF   - Dose Bumex 1mg IV once  - HR has been 110s, a fib     Severe protein calorie malnutrition   History of eating disorder  Poor oral intake throughout admission   Patient discussed with nursing a history of severe eating disorder   NGT attempted 9/30 with placement into left bronchus, patient refused reattempt at placement  Intake has been limited here but patient is motivated not to have another feeding tube placed  - Previously, acidosis improved and gap closed off bicarb drip, significantly worsened today possibly 2/2 sepsis   Plan:   - IV thimaine started   - Nutrition consulted appreciate recommendations   - Ensure on meal trays ordered     Acute cystitis, E coli, resolved   - UA with nitrites and leukocytes, concerning for infection   S/p 5 days total of levaquin + bactrim, transitioned when started amio     Acute on chronic normocytic anemia likely anemia of chronic disease, stable  - Hbg 6.6 repeat 6.8, improved after transfusion 1U pRBC  - No evidence of active bleeding   - Obtain hemoccult stool  -  Holding AC          Code status: FULL  Prophylaxis: SCDs  Care Plan discussed with: patient, nursing, consultants   Anticipated Disposition: >48h     Hospital Problems  Date Reviewed: 6/29/2022            Codes Class Noted POA    Palliative care encounter ICD-10-CM: Z51.5  ICD-9-CM: V66.7  10/3/2022 Unknown        DNR (do not resuscitate) discussion ICD-10-CM: Z71.89  ICD-9-CM: V65.49  10/3/2022 Unknown        Shortness of breath ICD-10-CM: R06.02  ICD-9-CM: 786.05  10/3/2022 Unknown        Chronic bilateral low back pain without sciatica ICD-10-CM: M54.50, G89.29  ICD-9-CM: 724.2, 338.29  10/3/2022 Unknown        Liver abscess ICD-10-CM: K75.0  ICD-9-CM: 572.0  9/28/2022 Unknown        Abdominal pain ICD-10-CM: R10.9  ICD-9-CM: 789.00  9/28/2022 Unknown        Adenocarcinoma ([facility] [address] ICD-10-CM: C80.1  ICD-9-CM: 199.1  9/28/2022 Unknown             Review of Systems:   A comprehensive review of systems was negative except for that written in the HPI. Vital Signs:    Last 24hrs VS reviewed since prior progress note. Most recent are:  Visit Vitals  BP (!) 103/47 (BP 1 Location: Left arm, BP Patient Position: At rest)   Pulse (!) 116   Temp 97.9 °F (36.6 °C)   Resp 19   Ht 5' 3\" (1.6 m)   Wt 41.3 kg (91 lb 0.8 oz)   SpO2 98%   BMI 16.13 kg/m²         Intake/Output Summary (Last 24 hours) at 10/4/2022 1158  Last data filed at 10/4/2022 0315  Gross per 24 hour   Intake 1337.3 ml   Output 1580 ml   Net -242.7 ml        Physical Examination:     I had a face to face encounter with this patient and independently examined them on 10/4/2022 as outlined below:          Constitutional:  No acute distress, cooperative, pleasant, thin   ENT:  Oral mucosa moist, oropharynx benign. Resp:  Comfortable WOB, faint wheezing bilaterally    CV:  Irregular, tachy,  no murmurs, gallops, rubs    GI:  Soft, non distended, mildly tender. normoactive bowel sounds    Musculoskeletal:  Warm, 2+ pulses throughout. 2+ edema LEs     Neurologic:  Moves all extremities.   AAOx3, CN II-XII reviewed            Data Review:    Review and/or order of clinical lab test  Review and/or order of tests in the radiology section of CPT  Review and/or order of tests in the medicine section of CPT      Labs:     Recent Labs     10/04/22  0233 10/03/22  0351   WBC 13.0* 15.4*   HGB 8.5* 9.7*   HCT 26.0* 32.0*    339     Recent Labs     10/04/22  0233 10/03/22  0351 10/02/22  0330    134* 136   K 3.3* 4.6 3.1*    110* 109*   CO2 21 10* 20*   BUN 9 9 9 CREA 0.70 0.80 0.82   GLU 97 92 140*   CA 7.8* 7.8* 7.6*   MG 1.3*  --   --      Recent Labs     10/03/22  0351   *   *   TBILI 0.4   TP 5.9*   ALB 2.7*   GLOB 3.2     No results for input(s): INR, PTP, APTT, INREXT, INREXT in the last 72 hours. No results for input(s): FE, TIBC, PSAT, FERR in the last 72 hours. Lab Results   Component Value Date/Time    Folate 27.1 (H) 08/22/2022 03:55 AM      Recent Labs     10/03/22  1028   PH 7.37   PCO2 23*   PO2 88     No results for input(s): CPK, CKNDX, TROIQ in the last 72 hours.     No lab exists for component: CPKMB  Lab Results   Component Value Date/Time    Cholesterol, total 141 08/22/2022 03:55 AM    HDL Cholesterol 57 08/22/2022 03:55 AM    LDL, calculated 71.6 08/22/2022 03:55 AM    Triglyceride 62 08/22/2022 03:55 AM    CHOL/HDL Ratio 2.5 08/22/2022 03:55 AM     Lab Results   Component Value Date/Time    Glucose (POC) 145 (H) 02/14/2022 09:01 PM     Lab Results   Component Value Date/Time    Color YELLOW/STRAW 10/03/2022 04:57 PM    Appearance CLEAR 10/03/2022 04:57 PM    Specific gravity 1.008 10/03/2022 04:57 PM    Specific gravity 1.010 09/28/2022 10:37 AM    pH (UA) 6.5 10/03/2022 04:57 PM    Protein Negative 10/03/2022 04:57 PM    Glucose Negative 10/03/2022 04:57 PM    Ketone Negative 10/03/2022 04:57 PM    Bilirubin Negative 10/03/2022 04:57 PM    Urobilinogen 0.2 10/03/2022 04:57 PM    Nitrites Negative 10/03/2022 04:57 PM    Leukocyte Esterase Negative 10/03/2022 04:57 PM    Epithelial cells FEW 10/03/2022 04:57 PM    Bacteria Negative 10/03/2022 04:57 PM    WBC 0-4 10/03/2022 04:57 PM    RBC 0-5 10/03/2022 04:57 PM         Medications Reviewed:     Current Facility-Administered Medications   Medication Dose Route Frequency    phenol throat spray (CHLORASEPTIC) 1 Spray  1 Spray Oral PRN    potassium chloride SR (KLOR-CON 10) tablet 20 mEq  20 mEq Oral Q4H    [START ON 10/5/2022] Vancomycin level 10/5 with AM labs   Other ONCE    [START ON 10/5/2022] senna-docusate (PERICOLACE) 8.6-50 mg per tablet 2 Tablet  2 Tablet Oral DAILY    digoxin (LANOXIN) tablet 0.0625 mg  0.0625 mg Oral DAILY    bumetanide (BUMEX) injection 1 mg  1 mg IntraVENous ONCE    magnesium sulfate 2 g/50 ml IVPB (premix or compounded)  2 g IntraVENous ONCE    metroNIDAZOLE (FLAGYL) IVPB premix 500 mg  500 mg IntraVENous Q12H    balsam peru-castor oiL (VENELEX) ointment   Topical BID    albuterol-ipratropium (DUO-NEB) 2.5 MG-0.5 MG/3 ML  3 mL Nebulization Q4H RT    polyethylene glycol (MIRALAX) packet 17 g  17 g Oral DAILY    Vancomycin - Pharmacy dosing   Other Rx Dosing/Monitoring    vancomycin (VANCOCIN) 500 mg in 0.9% sodium chloride (MBP/ADV) 100 mL MBP  500 mg IntraVENous Q12H    butalbital-acetaminophen-caffeine (FIORICET, ESGIC) -40 mg per tablet 2 Tablet  2 Tablet Oral BID PRN    doxycycline (VIBRAMYCIN) 100 mg in 0.9% sodium chloride (MBP/ADV) 100 mL MBP  100 mg IntraVENous Q12H    midodrine (PROAMATINE) tablet 2.5 mg  2.5 mg Oral TID WITH MEALS    thiamine HCL (B-1) tablet 100 mg  100 mg Oral DAILY    diazePAM (VALIUM) tablet 2 mg  2 mg Oral Q6H PRN    [Held by provider] dextrose 5% and 0.9% NaCl infusion  50 mL/hr IntraVENous CONTINUOUS    [Held by provider] metoprolol tartrate (LOPRESSOR) tablet 25 mg  25 mg Oral Q12H    0.9% sodium chloride infusion 250 mL  250 mL IntraVENous PRN    albuterol (PROVENTIL VENTOLIN) nebulizer solution 2.5 mg  2.5 mg Nebulization Q6H PRN    sodium chloride (NS) flush 5-40 mL  5-40 mL IntraVENous Q8H    sodium chloride (NS) flush 5-40 mL  5-40 mL IntraVENous PRN    acetaminophen (TYLENOL) tablet 650 mg  650 mg Oral Q6H PRN    Or    acetaminophen (TYLENOL) suppository 650 mg  650 mg Rectal Q6H PRN    ondansetron (ZOFRAN ODT) tablet 4 mg  4 mg Oral Q8H PRN    Or    ondansetron (ZOFRAN) injection 4 mg  4 mg IntraVENous Q6H PRN    pantoprazole (PROTONIX) 40 mg in 0.9% sodium chloride 10 mL injection  40 mg IntraVENous DAILY prochlorperazine (COMPAZINE) injection 10 mg  10 mg IntraVENous Q6H PRN     ______________________________________________________________________  EXPECTED LENGTH OF STAY: 3d 4h  ACTUAL LENGTH OF STAY:          6                 Roxi Kent MD     CRITICAL CARE ATTESTATION:  I had a face to face encounter with the patient, reviewed and interpreted patient data including clinical events, labs, images, vital signs, I/O's, and examined patient. I have discussed the case and the plan and management of the patient's care with the consulting services, the bedside nurses and necessary ancillary providers. NOTE OF PERSONAL INVOLVEMENT IN CARE   This patient has a high probability of imminent, clinically significant deterioration, which requires the highest level of preparedness to intervene urgently. I participated in the decision-making and personally managed or directed the management of the following life and organ supporting interventions that required my frequent assessment to treat or prevent imminent deterioration. I personally spent 30 minutes of critical care time. This is time spent at this critically ill patient's bedside actively involved in patient care as well as the coordination of care and discussions with the patient's family. This does not include any procedural time which has been billed separately.

## 2022-10-04 NOTE — PROGRESS NOTES
[facility]  Cardiology Care Note                  []Initial visit     [x]Established visit     Patient Name: Satnam Pineda - AZJ:[birth year] - [facility] [place]:[id number]  Primary Cardiologist: Ciara Hurst and has been followed by cardiology in [place]. Consulting Cardiologist: Tyra Nickerson MD     Reason for initial visit:afib with RVR    HPI:   Ms. Carolina Jaeger is a 77 y.o. female with PMH of PAF on digoxin and ASA , Left hemicolectomy in 2/2022 for adenomatous colonic polyp, anemia, HLD, GERD, seizrues, chronic back pain,  former smokr. . She presented with chief c/o abdominal pain and nausea. She was hospitalized earlier this month for abdominal pain and now found to have liver adenocarcinoma on recent liver biopsy with likely organized hepatic hematoma on CT scan. There is concern for cecal malignancy and metastatic colon cancer is suspected. Cardiology consulted as pt went into afib with RVR early this a.m. She denies any palpitations, dizziness, lightheadedness and no chest pain. She had some mild SOB last night but this is better. She feels weak. C/o some abdominal pain. No palpitations. She has received adocard 6 mg and 12 mg, iv diltiazem bolus and IV dig and started on diltiazem gtt. Later in day, she developed hypotension on diltizem gtt. BP is soft. She also exhibits metabolic acidosis thought to be due to poor po intake, malnutrition  CXR with mild diffuse interstia lopacities   SH: former smoker, no ETOH. FH:no FH of early CAD    SUBJECTIVE; She had significant amount of sinus tachycardia yesterday, back in Afibb intermittently with RVR. Dyspnea better but significant back pain. Assessment and Plan     Likely metastatic adenocarcinoma:  Atrial fibrillation with rapid ventricular response  2. Hypokalemia/hypomagnesemia: repleted by primary team. Mg repleted. 3. Lliver adenocarcinoma (uncertain priimary).  Per heme/onc  4. Abnormal UA: per priamry team.   5.  Anemia: ?acute on chronic. Stool OB pending also. 6.  Possible acute systolic heart failure. 7.  Cardiomyopathy likely secondary stress CMP(more likely) than tachycardiomyopathy on personal review on echo    Patient is admitted to the hospital for noncardiac reasons but developed atrial fibrillation/flutter with rapid ventricular response. She does not stop responding to IV Cardizem and there was marginal room to give her beta-blockers were given low blood pressures. Reasonable to give IV hydration. EF appears to be normal.  AF resolved with amiodarone. Not a candidate for oral anticoagulation at this point of time. Amiodarone had to be discontinued because of significantly elevated LFTs. Will initiate on low-dose beta-blockers. Additionally we will restart digoxin at this time since there are no other good alternatives to control her ventricular response. Notably she is in A. fib at certain times and is in sinus with PACs at other times. Even in sinus rhythm her heart rates are about 110 bpm which is a compensatory response. Do not need to suppress it significantly. Overall prognosis is likely to be poor. I do not think she is a candidate for any kind of aggressive therapies. Since she is not even a candidate for oral anticoagulation, even electrical cardioversion would not be a great option in her case unless she is hemodynamically unstable and would like/accept cardioversion. Will be careful with vasopressors since it can worsen LVOT obstruction with stress cardiomyopathy. Careful/slow administration of fluids/albumin can be considered. ____________________________________________________________    Cardiac testing  07/21/22    ECHO ADULT FOLLOW-UP OR LIMITED 07/21/2022 7/21/2022    Interpretation Summary    Limited study for the assessment of ejection fraction.     Left Ventricle: Normal left ventricular systolic function with a visually estimated EF of 55 - 60%. EF by 2D Simpsons Biplane is 56%. Left ventricle size is normal. Normal wall thickness. See diagram for wall motion findings. Tricuspid Valve: Mild to moderate regurgitation on limited color and Doppler interrogation. Pulmonary Arteries: Pulmonary hypertension not present. The estimated PASP is 27 mmHg. Signed by: Dick Bueno MD on 7/21/2022  2:41 PM                Most recent HS troponins:  No results for input(s): TROPHS in the last 72 hours. No lab exists for component:  CKMB  ECG: afib with RVR, nonspecific t wave abnormality  Repeat EKG reviewed by Dr. Kareem Trent- atrial tachycardia with t wave abnormality V5, V6.  ?possible dig effect. Review of Systems    []All other systems reviewed and all negative except as written in HPI    [] Patient unable to provide secondary to condition         Past Medical History:   Diagnosis Date    Adenoma of colon     Anemia     Arthritis     Atrial fibrillation (HCC)     Bloating     Chronic constipation     Chronic pain     back     COPD (chronic obstructive pulmonary disease) (HCC)     GERD (gastroesophageal reflux disease)     [place] (hard of hearing)     Hyponatremia     caused seizures x 2 per pt    Indigestion     Osteoporosis     Seizures ([address] 7/2020, 9/2020    x 2      Past Surgical History:   Procedure Laterality Date    COLONOSCOPY N/A 12/16/2021    COLONOSCOPY   :- performed by Sisi Gilbert MD at [address] N/A 2/14/2022    . performed by Martin Germain MD at [facility] MAIN OR    [address]    HX CHOLECYSTECTOMY  1982    HX HIP REPLACEMENT Left     pt stated hip was pinned, not replaced    HX HIP REPLACEMENT Right     pt stated hip was pinned, not replaced    HX HYSTERECTOMY      HX ORTHOPAEDIC  1990, 2011    bilateral hip fractures     HX ORTHOPAEDIC Left 2020    femur fracture    HX TONSILLECTOMY       Social Hx:  reports that she quit smoking about 10 years ago.  She has a 20.00 pack-year smoking history. She has never used smokeless tobacco. She reports that she does not drink alcohol and does not use drugs. Family Hx: family history includes Alcohol abuse in her father; Heart Disease in her father and mother; Liver Disease in her father. Allergies   Allergen Reactions    Cefuroxime Hives    Hydrocodone Nausea and Vomiting    Other Medication Diarrhea and Nausea and Vomiting     PT REPORTS ALL MYCINS CAUSE SEVERE GI UPSET    Oxycodone Nausea and Vomiting    Penicillins Hives     Patient screened for any delayed non-IgE-mediated reaction to PCN. Patient notes the following:    No delayed non-IgE-mediated reaction to PCN    Hives 1969                OBJECTIVE:  Wt Readings from Last 3 Encounters:   10/01/22 91 lb 0.8 oz (41.3 kg)   09/15/22 92 lb (41.7 kg)   07/21/22 93 lb (42.2 kg)       Intake/Output Summary (Last 24 hours) at 10/4/2022 1743  Last data filed at 10/4/2022 0315  Gross per 24 hour   Intake 1287.3 ml   Output 600 ml   Net 687.3 ml           Physical Exam    Vitals:   Vitals:    10/04/22 1630 10/04/22 1640 10/04/22 1710 10/04/22 1730   BP: (!) 77/41 (!) 95/47 (!) 88/47 (!) 86/42   Pulse:   (!) 103    Resp:   16    Temp:       SpO2:       Weight:       Height:         Telemetry: afib rvr this a.m. now  BP (!) 86/42   Pulse (!) 103   Temp 97.9 °F (36.6 °C)   Resp 16   Ht 5' 3\" (1.6 m)   Wt 91 lb 0.8 oz (41.3 kg)   SpO2 95%   BMI 16.13 kg/m²   General:    Alert, cooperative, no distress. Cachectic   Psychiatric:    Normal Mood and affect    Eye/ENT:      Pupils equal, No asymmetry, Conjunctival pink. Able to hear voice at normal amplitude   Lungs:      Visibly symmetric chest expansion, No palpable tenderness. Clear to auscultation bilaterally. Heart[de-identified]    Regular rate and rhythm, S1, S2 normal, no murmur, click, rub or gallop. No JVD, Normal palpable peripheral pulses. No cyanosis   Abdomen:     Soft, non-tender.  Bowel sounds normal. No masses,  No organomegaly. Extremities:   Extremities normal, atraumatic, no edema. Neurologic:   CN II-XII grossly intact. No gross focal deficits           Data Review:     Tele:  Sinus rhythm and sinus tachycardia, mostly . Radiology:   XR Results (most recent):  Results from Hospital Encounter encounter on 09/28/22    XR CHEST PORT    Narrative  EXAM: Portable CXR. 2051 hours. COMPARISON: 1032 hours. INDICATION: post line placement    FINDINGS:  Right jugular CVL has been placed with tip in the SVC without pneumothorax. There are stable small pleural effusions. Lungs are hyperinflated. There is no  acute airspace disease/edema. Heart size is normal. There are granulomatous  sylvia calcifications. Impression  Satisfactory CVL placement. No pneumothorax. Stable small pleural effusions. Emphysema. CT Results (most recent):  Results from Hospital Encounter encounter on 09/28/22    CT ABD PELV W CONT    Narrative  EXAM: CT ABD PELV W CONT    INDICATION: Diffuse abdominal pain and diarrhea. Rectal constipation earlier  this month. Nonspecific liver disease on previous imaging. Biliary dilatation on  previous imaging. Elevated alkaline phosphatase. Normal white blood cell count,  bilirubin, lipase. COMPARISON: CT abdomen/pelvis on 9/12/2022. CONTRAST: 100 mL of Isovue-370. ORAL CONTRAST: None    TECHNIQUE:  Following the uneventful intravenous administration of contrast, thin axial  images were obtained through the abdomen and pelvis. Coronal and sagittal  reconstructions were generated. CT dose reduction was achieved through use of a  standardized protocol tailored for this examination and automatic exposure  control for dose modulation. FINDINGS:  LOWER THORAX: Small right pleural effusion is new. No basilar pneumonia. Normal  cardiac size. LIVER: Segment 7/8 subcapsular collection of heterogeneous fluid measures 9.3 x  6.9 x 3.5 cm.  Heterogeneous septated fluid attenuation throughout the right  hepatic lobe measures approximately 13 cm in oblique AP diameter. Central right  hepatic lobe peripherally enhancing lesion previously measured 1.9 cm and now  measures 2.4 cm. Unchanged mild intrahepatic biliary dilatation. BILIARY TREE: Cholecystectomy. Unchanged chronic biliary dilatation. No abrupt  termination. SPLEEN: Normal size. Lobulated contour. PANCREAS: Mild diffuse atrophy. No mass or inflammation. ADRENALS: Unremarkable. KIDNEYS: No hydronephrosis. Heterogeneous small multiple cysts. STOMACH: Unremarkable. SMALL BOWEL: No dilatation or wall thickening. COLON: Incomplete distention, limited evaluation. Fluid-filled cecum is in the  pelvis. APPENDIX: Normal.  PERITONEUM: No ascites or pneumoperitoneum. RETROPERITONEUM: Aortic atherosclerosis without aneurysm. 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 No lymphadenopathy. REPRODUCTIVE ORGANS: Hysterectomy. URINARY BLADDER: Incomplete distention, limited evaluation. BONES: Osteopenia. Femoral hardware. Spinal curvature. T11 partial compression  fracture. ABDOMINAL WALL: No mass or hernia. ADDITIONAL COMMENTS: Diffuse muscle atrophy. Impression  1. Progression of hepatic lesions and new large fluid collections in the liver. Intrahepatic abscesses from nonspecific infection are most likely. Consider  fluid sampling or drainage with CT guidance. 2. New small right pleural effusion. 3. Fluid-filled cecum may indicate infectious colitis. No bowel obstruction. MRI Results (most recent):  No results found for this or any previous visit. No results for input(s): CPK, TROIQ in the last 72 hours.     No lab exists for component: CKQMB, CPKMB, BMPP  Recent Labs     10/04/22  0233 10/03/22  0351    134*   K 3.3* 4.6    110*   CO2 21 10*   BUN 9 9   CREA 0.70 0.80   GLU 97 92   CA 7.8* 7.8*       Recent Labs     10/04/22  0233 10/03/22  0351   WBC 13.0* 15.4*   HGB 8.5* 9.7*   HCT 26.0* 32.0*    339       Recent Labs     10/03/22  0351   *       No results for input(s): CHOL, LDLC in the last 72 hours. No lab exists for component: TGL, HDLC,  HBA1C  No results for input(s): CRP, TSH, TSHEXT, TSHEXT in the last 72 hours.     No lab exists for component: ESR        Current meds:    Current Facility-Administered Medications:     phenol throat spray (CHLORASEPTIC) 1 Spray, 1 Spray, Oral, PRN, Rosana Flores NP    [START ON 10/5/2022] Vancomycin level 10/5 with AM labs, , Other, ONCE, Roxi Brooke MD    [START ON 10/5/2022] senna-docusate (PERICOLACE) 8.6-50 mg per tablet 2 Tablet, 2 Tablet, Oral, DAILY, Dario BOSCH NP    ipratropium (ATROVENT) 0.02 % nebulizer solution 0.5 mg, 0.5 mg, Nebulization, Q6H RT, Niranjan De Los Santos MD, 0.5 mg at 10/04/22 1502    PHENYLephrine (NIDA-SYNEPHRINE) 30 mg in 0.9% sodium chloride 250 mL infusion,  mcg/min, IntraVENous, TITRATE, Niranjan De Los Santos MD    midodrine (PROAMATINE) tablet 20 mg, 20 mg, Oral, TID WITH MEALS, Niranjan De Los Santos MD, 20 mg at 10/04/22 1732    amiodarone (CORDARONE) 375 mg/250 mL D5W infusion, 0.5-1 mg/min, IntraVENous, TITRATE, Niranjan De Los Santos MD    [START ON 10/5/2022] digoxin (LANOXIN) tablet 0.125 mg, 0.125 mg, Oral, DAILY, Niranjan De Los Santos MD    metroNIDAZOLE (FLAGYL) IVPB premix 500 mg, 500 mg, IntraVENous, Q12H, Roxi Russo MD, Last Rate: 100 mL/hr at 10/04/22 0908, 500 mg at 10/04/22 0908    balsam peru-castor oiL (VENELEX) ointment, , Topical, BID, Roxi Brooke MD, Given at 10/04/22 0908    polyethylene glycol (MIRALAX) packet 17 g, 17 g, Oral, DAILY, Magaly Caicedo NP, 17 g at 10/04/22 0908    Vancomycin - Pharmacy dosing, , Other, Rx Dosing/Monitoring, Roxi Brooke MD    vancomycin (VANCOCIN) 500 mg in 0.9% sodium chloride (MBP/ADV) 100 mL MBP, 500 mg, IntraVENous, Q12H, Roxi Russo MD, Last Rate: 100 mL/hr at 10/04/22 1137, 500 mg at 10/04/22 1137 butalbital-acetaminophen-caffeine (FIORICET, ESGIC) -40 mg per tablet 2 Tablet, 2 Tablet, Oral, BID PRN, Toy Jany BOSCH NP, 2 Tablet at 10/04/22 0907    doxycycline (VIBRAMYCIN) 100 mg in 0.9% sodium chloride (MBP/ADV) 100 mL MBP, 100 mg, IntraVENous, Q12H, Roxi Russo MD, Last Rate: 100 mL/hr at 10/04/22 1429, 100 mg at 10/04/22 1429    thiamine HCL (B-1) tablet 100 mg, 100 mg, Oral, DAILY, Roxi Fitch MD, 100 mg at 10/04/22 8385    diazePAM (VALIUM) tablet 2 mg, 2 mg, Oral, Q6H PRN, Roxi Fitch MD, 2 mg at 10/03/22 6820    [Held by provider] dextrose 5% and 0.9% NaCl infusion, 50 mL/hr, IntraVENous, CONTINUOUS, Rosana Flores NP, Stopped at 10/03/22 0830    [Held by provider] metoprolol tartrate (LOPRESSOR) tablet 25 mg, 25 mg, Oral, Q12H, Vasiliy Mercado NP, 25 mg at 10/02/22 2127    0.9% sodium chloride infusion 250 mL, 250 mL, IntraVENous, PRN, Roxi Fitch MD    albuterol (PROVENTIL VENTOLIN) nebulizer solution 2.5 mg, 2.5 mg, Nebulization, Q6H PRN, Roxi Fitch MD, 2.5 mg at 10/03/22 6054    sodium chloride (NS) flush 5-40 mL, 5-40 mL, IntraVENous, Q8H, GEORGINA VelásquezTKXDEVORAH ALICEA MD, 10 mL at 10/04/22 1534    sodium chloride (NS) flush 5-40 mL, 5-40 mL, IntraVENous, PRN, CRISTIAN Velásquez MD    acetaminophen (TYLENOL) tablet 650 mg, 650 mg, Oral, Q6H PRN, 650 mg at 10/02/22 2127 **OR** acetaminophen (TYLENOL) suppository 650 mg, 650 mg, Rectal, Q6H PRN, DIANA Velásquez MD    ondansetron (ZOFRAN ODT) tablet 4 mg, 4 mg, Oral, Q8H PRN, 4 mg at 10/01/22 0153 **OR** ondansetron (ZOFRAN) injection 4 mg, 4 mg, IntraVENous, Q6H PRN, KAVITHA Velásquez MD, 4 mg at 10/01/22 0906    pantoprazole (PROTONIX) 40 mg in 0.9% sodium chloride 10 mL injection, 40 mg, IntraVENous, DAILY, NICK Velásquez MD, 40 mg at 10/04/22 0907    prochlorperazine (COMPAZINE) injection 10 mg, 10 mg, IntraVENous, Q6H PRN, Marilee Zuniga MD, 10 mg at 10/03/22 1811 Jef Aaron MD  [facility], [address]  [address]  [phone]      Tana Frazier NP

## 2022-10-04 NOTE — PROGRESS NOTES
Palliative Medicine Consult  Rich: [phone] (2011)    Patient Name: Federico Power  YOB: 1945    Date of Initial Consult: 10/3/2022  Reason for Consult: care decisions  Requesting Provider: Neftali Gabriel    Primary Care Physician: Mariano Webb NP     SUMMARY:   Federico Power is a 77 y.o. presented to the ED on 9/28/2022 from home to the ED due to abdominal pain, nausea and diarrhea. Admitted with a diagnosis of possible liver abscess but more likely liver hematoma. Had liver biopsy on 9/15/2022. Also anemia and afib with rapid ventricular response. Hx metastatic adenocarcinoma      PMH:  metastatic adenocarcinoma, left colon resection 2/2022 (per patient this was for polyps)  traylor's esophagus, HH, GERD, [place], osteoporosis, seizures, chronic back pain, former smoker, PAF, hyperlipidemia, anemia, chronic constipation, COPD, R THR, L THR    Noted hospitalizations: September  (9/12/2022 to 9/16/2022 for abdominal pain. Liver biopsy done. August (8/21/2022 to 8/25/2022 for JOE, COPD, anemia)   June  (6/29/2022 to 6/30/2022 for COPD)    Current medical issues leading to Palliative Medicine involvement include:  care decisions. Noted on last admission, patient seen by palliative and AMD completed on 9-. At that time patient wanted CPR but not prolonged     Social:   Karen Patel  (son-in-law)        Bora Hayes (son)  Patient is . 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 She has one daughter Shin [place]) who she does not have a relationship with as Anabela Chidimitrios was upset that the patient has received blood transfusions in the past.  Anabela Chidimitrios is a [employer]. Winston Florentino lives with the patient and provides some care. He fixes her breakfast but then leaves for work. Patient then feeds herself and dresses herself. She has a shower chair. Patient's son is Bora Hayes. He is currently incarcerated due to DUI.   He is expected to be in correction for 7-8 more months. PALLIATIVE DIAGNOSES:   Palliative care encounter  DNR discussion   Shortness of breath  Constipation   Chronic back pain       PLAN:   Patient reports her SOB is slightly better. Remains on 2 LPM.  HR is 116 this am.   Patient stated the doctors have said she is not doing well. Patient did not want to talk further at this time but did agree for me to come back. I will follow up this afternoon. Patient is on valium 2 mg q 6 hours prn anxiety per the hospitalist.  She has received one dose in the last 24 hours. She did say she wished the dose was 5 mg. Yesterday,  patient requested Fioricet for pain as she takes this at home. She has had one dose in the last 24 hours. (Noted that she has N&V with oxycodone and hydrocodone). Patient has not had a BM. Will increase the Pericolace to 2 tabs per day. Continue Miralax. Patient states she has had issues with having either diarrhea or constipation for many years. She was bulimic/anorexic in the past.   Talked to Anjel Powers about the fact that patient would like to be able to see and hug her son, Dex Carolina. Anjel Powers is checking with Jono's  about a possible visit (whether this be in the correction or arrange for the patient to see him outside of the correction). I offered to write a letter to support her terminal condition and limited life expectancy). Plan to meet with Anjel Powers in the patient's room at 4:30  Patient is a DNR. Working to see if we can help with arrangements for her to visit her son. 10/3/2022  Met with patient and introduced palliative care services  Talked about patient's medical condition and her understanding. She had liver biopsy on 9/12 but just recently learned of the results. Se is aware of her cancer diagnosis and the liver involvement. Cardiology NP in to talk to patient about her heart condition.  NP explained her heart rhythm is now normal but her liver enzymes are elevated and she will need to stop the current medication. The NP explained her EF is 20-25% which is a change from her last echo. Patient seemed to understand this meant her heart was weak. We also talked about her other medical conditions including her COPD  See social history above. Patient has been independent with her care. Her son- in-law helps with some meal prep and takes her to appointments. Patient reports she is currently SOB. RRT was recently called. She states it feels hard to get the air in and out. Patient to be diuresed. O2 @ 2 LPM.   Feels slightly anxious. Patient is on valium 2 mg q 6 hours prn anxiety. Patient does report a decreased appetite. Has occasional nausea. Has prn compazine ordered. Constipation: No BM since 9/29. Change Miralax from prn to daily. Add Amber-colace 1 tab daily. Hx chronic back pain/generalized pain: add Fioricet 2 tabs bid prn per PTA med list   Discussed the goals of care with patient. Patient has an AMD.  Cornelia Galeana is her primary decision maker. We also discussed her code status. Patient verbalized understanding of her medical condition. We discussed what resuscitation/CPR would include. Patient did elect for a DNR code status. We did discuss comfort care and hospice. Patient had questions about her prognosis. We talked about her multiple medical conditions. Patient then stated she wants to continue treatment at this time. She desires to hug her son one more time before she dies. He is incarcerated and isn't due to be out for 7-8 months. Will continue with current plan of care. Spoke to the patient's son-in-law. We reviewed her current medical condition.    Informed him of patient's decision for DNR status but otherwise continue care at this time   Initial consult note routed to primary continuity provider and/or primary health care team members  Communicated plan of care with: Chrissy Abbasi Team     GOALS OF CARE / TREATMENT PREFERENCES:     GOALS OF CARE:  Patient/Health Care Proxy Stated Goals: Prolong life    TREATMENT PREFERENCES:   Code Status: DNR  (this decision was made on 10/3/2022    Patient and family's personal goals include:  at this time, patient would like to continue full care. Patient would like to hug her son one more time before she passes. Important upcoming milestones or family events: son will be in [facility] for another 7-8 months. Advance Care Planning:  [x] [facility] Interdisciplinary Team has updated the ACP Navigator with Health Care Decision Maker and Patient Capacity      Primary Decision Maker: tSorm Lazoa - [phone]    Secondary Decision Maker: ReynaldoJono desir - [facility] - [phone]  Advance Care Planning 8/22/2022   Confirm Advance Directive None   Patient Would Like to Complete Advance Directive No   Does the patient have other document types -       Medical Interventions: Limited additional interventions       Other:    As far as possible, the palliative care team has discussed with patient / health care proxy about goals of care / treatment preferences for patient.      HISTORY:     History obtained from: chart, team, family    CHIEF COMPLAINT: Pt admitted with aforementioned history and issues    HPI/SUBJECTIVE:    The patient is:   [x] Verbal and participatory  [] Non-participatory due to: medical condition        Clinical Pain Assessment (nonverbal scale for severity on nonverbal patients):   Clinical Pain Assessment  Severity: 3  Location: unchanged  Character: aching  Effect: fioret helps          Duration: for how long has pt been experiencing pain (e.g., 2 days, 1 month, years)  Frequency: how often pain is an issue (e.g., several times per day, once every few days, constant)     FUNCTIONAL ASSESSMENT:     Palliative Performance Scale (PPS):  PPS: 40       PSYCHOSOCIAL/SPIRITUAL SCREENING:     Palliative IDT has assessed this patient for cultural preferences / practices and a referral made as appropriate to needs (Cultural Services, Patient Advocacy, Ethics, etc.)    Any spiritual / Rastafari concerns:  [] Yes /  [x] No  [] Unable to obtain this information  If \"Yes\" to discuss with pastoral care during IDT     Does caregiver feel burdened by caring for their loved one:   [] Yes /  [x] No /  [] No Caregiver Present/Available [] No Caregiver [] Pt Lives at [place] [id number]  If \"Yes\" to discuss with social work during IDT    Anticipatory grief assessment:   [x] Normal  / [] Maladaptive   [] Unable to obtain this information  [] n/a  If \"Maladaptive\" to discuss with social work during IDT    ESAS Anxiety: Anxiety: 2    ESAS Depression: Depression: 0        REVIEW OF SYSTEMS:     Positive and pertinent negative findings in ROS are noted above in HPI. The following systems were [x] reviewed / [] unable to be reviewed as noted in HPI  Other findings are noted below. Systems: constitutional, ears/nose/mouth/throat, respiratory, gastrointestinal, genitourinary, musculoskeletal, integumentary, neurologic, psychiatric, endocrine. Positive findings noted below. Modified ESAS Completed by: provider   Fatigue: 3 Drowsiness: 0   Depression: 0 Pain: 3 (had 2 fioricet this am)   Anxiety: 2 Nausea: 0   Anorexia: 5 Dyspnea: 2     Constipation: Yes (will increase pericolace to 2 tabs q day)              PHYSICAL EXAM:     From RN flowsheet:  Wt Readings from Last 3 Encounters:   10/01/22 91 lb 0.8 oz (41.3 kg)   09/15/22 92 lb (41.7 kg)   07/21/22 93 lb (42.2 kg)     Blood pressure (!) 103/47, pulse (!) 116, temperature 97.9 °F (36.6 °C), resp. rate 19, height 5' 3\" (1.6 m), weight 91 lb 0.8 oz (41.3 kg), SpO2 98 %.     Pain Scale 1: Numeric (0 - 10)  Pain Intensity 1: 0  Pain Onset 1: acute  Pain Location 1: Abdomen  Pain Orientation 1: Right  Pain Description 1: Aching  Pain Intervention(s) 1: Medication (see MAR)  Last bowel movement, if known:     Constitutional: resting in bed, wants to get OOB  Eyes: pupils equal, anicteric  ENMT: no nasal discharge, moist mucous membranes  Cardiovascular: regular rhythm, + edema in lower legs  Respiratory: breathing not labored, symmetric, O2 @ 2LPM  Gastrointestinal: slightly distended, non-tender  : purewick in place   Musculoskeletal: no deformity, no tenderness to palpation, dressings over both heels  Skin: warm, dry  Neurologic: following commands, moving all extremities  Psychiatric: full affect, no hallucinations  Other:       HISTORY:     Active Problems:    Liver abscess (9/28/2022)      Abdominal pain (9/28/2022)      Adenocarcinoma ([address] (9/28/2022)      Palliative care encounter (10/3/2022)      DNR (do not resuscitate) discussion (10/3/2022)      Shortness of breath (10/3/2022)      Chronic bilateral low back pain without sciatica (10/3/2022)    Past Medical History:   Diagnosis Date    Adenoma of colon     Anemia     Arthritis     Atrial fibrillation (HCC)     Bloating     Chronic constipation     Chronic pain     back     COPD (chronic obstructive pulmonary disease) (HCC)     GERD (gastroesophageal reflux disease)     [place] (hard of hearing)     Hyponatremia     caused seizures x 2 per pt    Indigestion     Osteoporosis     Seizures ([address] 7/2020, 9/2020    x 2       Past Surgical History:   Procedure Laterality Date    COLONOSCOPY N/A 12/16/2021    COLONOSCOPY   :- performed by Ayana Delgadillo MD at [address] [place] [address] N/A 2/14/2022    .  performed by Tima Zealya MD at [facility] MAIN OR    [address]    HX CHOLECYSTECTOMY  1982    HX HIP REPLACEMENT Left     pt stated hip was pinned, not replaced    HX HIP REPLACEMENT Right     pt stated hip was pinned, not replaced    HX HYSTERECTOMY      HX ORTHOPAEDIC  1990, 2011    bilateral hip fractures     HX ORTHOPAEDIC Left 2020    femur fracture    HX TONSILLECTOMY        Family History   Problem Relation Age of Onset    Heart Disease Mother     Heart Disease Father     Liver Disease Father     Alcohol abuse Father     Anesth Problems Neg Hx       History reviewed, no pertinent family history. Social History     Tobacco Use    Smoking status: Former     Packs/day: 1.00     Years: 20.00     Pack years: 20.00     Types: Cigarettes     Quit date: 12/18/2011     Years since quitting: 10.8    Smokeless tobacco: Never   Substance Use Topics    Alcohol use: Never     Allergies   Allergen Reactions    Cefuroxime Hives    Hydrocodone Nausea and Vomiting    Other Medication Diarrhea and Nausea and Vomiting     PT REPORTS ALL MYCINS CAUSE SEVERE GI UPSET    Oxycodone Nausea and Vomiting    Penicillins Hives     Patient screened for any delayed non-IgE-mediated reaction to PCN.         Patient notes the following:    No delayed non-IgE-mediated reaction to PCN    Hives 1969            Current Facility-Administered Medications   Medication Dose Route Frequency    phenol throat spray (CHLORASEPTIC) 1 Spray  1 Spray Oral PRN    potassium chloride SR (KLOR-CON 10) tablet 20 mEq  20 mEq Oral Q4H    [START ON 10/5/2022] Vancomycin level 10/5 with AM labs   Other ONCE    [START ON 10/5/2022] senna-docusate (PERICOLACE) 8.6-50 mg per tablet 2 Tablet  2 Tablet Oral DAILY    senna-docusate (PERICOLACE) 8.6-50 mg per tablet 1 Tablet  1 Tablet Oral NOW    digoxin (LANOXIN) tablet 0.0625 mg  0.0625 mg Oral DAILY    metroNIDAZOLE (FLAGYL) IVPB premix 500 mg  500 mg IntraVENous Q12H    balsam peru-castor oiL (VENELEX) ointment   Topical BID    albuterol-ipratropium (DUO-NEB) 2.5 MG-0.5 MG/3 ML  3 mL Nebulization Q4H RT    polyethylene glycol (MIRALAX) packet 17 g  17 g Oral DAILY    Vancomycin - Pharmacy dosing   Other Rx Dosing/Monitoring    vancomycin (VANCOCIN) 500 mg in 0.9% sodium chloride (MBP/ADV) 100 mL MBP  500 mg IntraVENous Q12H    butalbital-acetaminophen-caffeine (FIORICET, ESGIC) -40 mg per tablet 2 Tablet  2 Tablet Oral BID PRN    doxycycline (VIBRAMYCIN) 100 mg in 0.9% sodium chloride (MBP/ADV) 100 mL MBP  100 mg IntraVENous Q12H    midodrine (PROAMATINE) tablet 2.5 mg  2.5 mg Oral TID WITH MEALS    thiamine HCL (B-1) tablet 100 mg  100 mg Oral DAILY    diazePAM (VALIUM) tablet 2 mg  2 mg Oral Q6H PRN    [Held by provider] dextrose 5% and 0.9% NaCl infusion  50 mL/hr IntraVENous CONTINUOUS    [Held by provider] metoprolol tartrate (LOPRESSOR) tablet 25 mg  25 mg Oral Q12H    0.9% sodium chloride infusion 250 mL  250 mL IntraVENous PRN    albuterol (PROVENTIL VENTOLIN) nebulizer solution 2.5 mg  2.5 mg Nebulization Q6H PRN    sodium chloride (NS) flush 5-40 mL  5-40 mL IntraVENous Q8H    sodium chloride (NS) flush 5-40 mL  5-40 mL IntraVENous PRN    acetaminophen (TYLENOL) tablet 650 mg  650 mg Oral Q6H PRN    Or    acetaminophen (TYLENOL) suppository 650 mg  650 mg Rectal Q6H PRN    ondansetron (ZOFRAN ODT) tablet 4 mg  4 mg Oral Q8H PRN    Or    ondansetron (ZOFRAN) injection 4 mg  4 mg IntraVENous Q6H PRN    pantoprazole (PROTONIX) 40 mg in 0.9% sodium chloride 10 mL injection  40 mg IntraVENous DAILY    prochlorperazine (COMPAZINE) injection 10 mg  10 mg IntraVENous Q6H PRN          LAB AND IMAGING FINDINGS:     Lab Results   Component Value Date/Time    WBC 13.0 (H) 10/04/2022 02:33 AM    HGB 8.5 (L) 10/04/2022 02:33 AM    PLATELET 604 26/23/1873 02:33 AM     Lab Results   Component Value Date/Time    Sodium 136 10/04/2022 02:33 AM    Potassium 3.3 (L) 10/04/2022 02:33 AM    Chloride 107 10/04/2022 02:33 AM    CO2 21 10/04/2022 02:33 AM    BUN 9 10/04/2022 02:33 AM    Creatinine 0.70 10/04/2022 02:33 AM    Calcium 7.8 (L) 10/04/2022 02:33 AM    Magnesium 1.3 (L) 10/04/2022 02:33 AM    Phosphorus 3.3 02/15/2022 03:18 AM      Lab Results   Component Value Date/Time    Alk.  phosphatase 206 (H) 10/03/2022 03:51 AM    Protein, total 5.9 (L) 10/03/2022 03:51 AM    Albumin 2.7 (L) 10/03/2022 03:51 AM    Globulin 3.2 10/03/2022 03:51 AM     Lab Results Component Value Date/Time    INR 1.0 09/12/2022 08:12 AM    Prothrombin time 10.0 09/12/2022 08:12 AM    aPTT 23.9 08/22/2022 03:55 AM      Lab Results   Component Value Date/Time    Iron 39 08/24/2022 08:20 AM    TIBC 316 08/24/2022 08:20 AM    Iron % saturation 12 (L) 08/24/2022 08:20 AM    Ferritin 23 08/24/2022 08:20 AM      Lab Results   Component Value Date/Time    pH 7.37 10/03/2022 10:28 AM    PCO2 23 (L) 10/03/2022 10:28 AM    PO2 88 10/03/2022 10:28 AM     No components found for: GLPOC   No results found for: CPK, CKMB             Total time:  25 minutes  Counseling / coordination time, spent as noted above: 20  minutes  > 50% counseling / coordination?: yes    Prolonged service was provided for  []30 min   []75 min in face to face time in the presence of the patient, spent as noted above. Time Start:   Time End:   Note: this can only be billed with 35058 (initial) or 55361 (follow up). If multiple start / stop times, list each separately.

## 2022-10-04 NOTE — PROGRESS NOTES
0730: Bedside and Verbal shift change report given to [address] [place] [address] (oncoming nurse) by Ranulfo Diaz (offgoing nurse). Report included the following information SBAR, Kardex, Intake/Output, and MAR. Patient noted to have elevated wbc and trending down bp. Rapid response called. Rapid nurse determined to call a code sepsis based on suspected infection, elevated wbc, temp of 97.6, and elevated lactic. Cultures drawn, lactic sent, and abx started all within one hour of code sepsis being called. Repeat lactic drawn and sodium bicarb started within 3-6 hours of code sepsis being called. 1840: TRANSFER - OUT REPORT:    Verbal report given to OR RN(name) on Mile Waldron  being transferred to OR (unit) for ordered procedure of [employer] Insertion. Report consisted of patients Situation, Background, Assessment and   Recommendations(SBAR). Information from the following report(s) SBAR, Kardex, Intake/Output, and MAR was reviewed with the receiving nurse. Lines:   Triple Lumen 10/03/22 Right (Active)   Central Line Being Utilized No 10/03/22 2053   Criteria for Appropriate Use Other (comment) 10/03/22 2053   Site Assessment Clean, dry, & intact 10/03/22 2053   Infiltration Assessment 0 10/03/22 2053   Affected Extremity/Extremities Color distal to insertion site pink (or appropriate for race) 10/03/22 2053   Date of Last Dressing Change 10/03/22 10/03/22 2053   Dressing Status Clean, dry, & intact 10/03/22 2053   Dressing Type Disk with Chlorhexadine gluconate (CHG); Transparent 10/03/22 2053   Proximal Hub Color/Line Status White 10/03/22 2053   Positive Blood Return (Medial Site) Yes 10/03/22 2053   Medial Hub Color/Line Status Blue 10/03/22 2053   Positive Blood Return (Lateral Site) Yes 10/03/22 2053   Distal Hub Color/Line Status Brown 10/03/22 2053   Positive Blood Return (Site #3) Yes 10/03/22 2053   Alcohol Cap Used Yes 10/03/22 2053       Peripheral IV 10/03/22 Posterior;Right Hand (Active)   Site Assessment Clean, dry, & intact 10/03/22 1558   Phlebitis Assessment 0 10/03/22 1558   Infiltration Assessment 0 10/03/22 1558   Dressing Status Clean, dry, & intact 10/03/22 1558   Dressing Type Transparent;Tape 10/03/22 1558   Hub Color/Line Status Blue;Flushed; Infusing 10/03/22 1558   Action Taken Open ports on tubing capped 10/03/22 1558   Alcohol Cap Used Yes 10/03/22 1558        Opportunity for questions and clarification was provided. Patient transported with:   Monitor  O2 @ 2 liters  Registered Nurse      Bedside and Verbal shift change report given to Fina SUTTON (oncoming nurse) by Liza Henry (offgoing nurse). Report included the following information SBAR, Kardex, Intake/Output, and MAR.

## 2022-10-05 LAB
25(OH)D3 SERPL-MCNC: 48.1 NG/ML (ref 30–100)
ALBUMIN SERPL-MCNC: 2.8 G/DL (ref 3.5–5)
ALBUMIN/GLOB SERPL: 1.3 {RATIO} (ref 1.1–2.2)
ALP SERPL-CCNC: 150 U/L (ref 45–117)
ALT SERPL-CCNC: 169 U/L (ref 12–78)
ANION GAP SERPL CALC-SCNC: 8 MMOL/L (ref 5–15)
AST SERPL-CCNC: 98 U/L (ref 15–37)
BASOPHILS # BLD: 0.1 K/UL (ref 0–0.1)
BASOPHILS NFR BLD: 1 % (ref 0–1)
BILIRUB DIRECT SERPL-MCNC: 0.2 MG/DL (ref 0–0.2)
BILIRUB SERPL-MCNC: 0.6 MG/DL (ref 0.2–1)
BUN SERPL-MCNC: 12 MG/DL (ref 6–20)
BUN/CREAT SERPL: 18 (ref 12–20)
CALCIUM SERPL-MCNC: 8.2 MG/DL (ref 8.5–10.1)
CHLORIDE SERPL-SCNC: 107 MMOL/L (ref 97–108)
CO2 SERPL-SCNC: 22 MMOL/L (ref 21–32)
CREAT SERPL-MCNC: 0.66 MG/DL (ref 0.55–1.02)
DIFFERENTIAL METHOD BLD: ABNORMAL
DIGOXIN SERPL-MCNC: 1.1 NG/ML (ref 0.9–2)
EOSINOPHIL # BLD: 0.4 K/UL (ref 0–0.4)
EOSINOPHIL NFR BLD: 4 % (ref 0–7)
ERYTHROCYTE [DISTWIDTH] IN BLOOD BY AUTOMATED COUNT: 23.4 % (ref 11.5–14.5)
GLOBULIN SER CALC-MCNC: 2.1 G/DL (ref 2–4)
GLUCOSE SERPL-MCNC: 111 MG/DL (ref 65–100)
HCT VFR BLD AUTO: 25.1 % (ref 35–47)
HGB BLD-MCNC: 8.5 G/DL (ref 11.5–16)
IMM GRANULOCYTES # BLD AUTO: 0.1 K/UL (ref 0–0.04)
IMM GRANULOCYTES NFR BLD AUTO: 1 % (ref 0–0.5)
LYMPHOCYTES # BLD: 0.9 K/UL (ref 0.8–3.5)
LYMPHOCYTES NFR BLD: 8 % (ref 12–49)
MCH RBC QN AUTO: 27.7 PG (ref 26–34)
MCHC RBC AUTO-ENTMCNC: 33.9 G/DL (ref 30–36.5)
MCV RBC AUTO: 81.8 FL (ref 80–99)
MONOCYTES # BLD: 1.1 K/UL (ref 0–1)
MONOCYTES NFR BLD: 10 % (ref 5–13)
NEUTS SEG # BLD: 8.2 K/UL (ref 1.8–8)
NEUTS SEG NFR BLD: 76 % (ref 32–75)
NRBC # BLD: 0 K/UL (ref 0–0.01)
NRBC BLD-RTO: 0 PER 100 WBC
PLATELET # BLD AUTO: 382 K/UL (ref 150–400)
PMV BLD AUTO: 9.7 FL (ref 8.9–12.9)
POTASSIUM SERPL-SCNC: 3.9 MMOL/L (ref 3.5–5.1)
PROT SERPL-MCNC: 4.9 G/DL (ref 6.4–8.2)
RBC # BLD AUTO: 3.07 M/UL (ref 3.8–5.2)
RBC MORPH BLD: ABNORMAL
RBC MORPH BLD: ABNORMAL
SODIUM SERPL-SCNC: 137 MMOL/L (ref 136–145)
VANCOMYCIN SERPL-MCNC: 19.7 UG/ML
WBC # BLD AUTO: 10.8 K/UL (ref 3.6–11)

## 2022-10-05 PROCEDURE — 74011250637 HC RX REV CODE- 250/637: Performed by: ANESTHESIOLOGY

## 2022-10-05 PROCEDURE — 36415 COLL VENOUS BLD VENIPUNCTURE: CPT

## 2022-10-05 PROCEDURE — 74011250636 HC RX REV CODE- 250/636: Performed by: INTERNAL MEDICINE

## 2022-10-05 PROCEDURE — 74011000250 HC RX REV CODE- 250: Performed by: HOSPITALIST

## 2022-10-05 PROCEDURE — 51798 US URINE CAPACITY MEASURE: CPT

## 2022-10-05 PROCEDURE — 74011250637 HC RX REV CODE- 250/637: Performed by: NURSE PRACTITIONER

## 2022-10-05 PROCEDURE — 99233 SBSQ HOSP IP/OBS HIGH 50: CPT | Performed by: INTERNAL MEDICINE

## 2022-10-05 PROCEDURE — 80076 HEPATIC FUNCTION PANEL: CPT

## 2022-10-05 PROCEDURE — 74011250636 HC RX REV CODE- 250/636: Performed by: HOSPITALIST

## 2022-10-05 PROCEDURE — 74011000250 HC RX REV CODE- 250: Performed by: EMERGENCY MEDICINE

## 2022-10-05 PROCEDURE — 74011250637 HC RX REV CODE- 250/637: Performed by: STUDENT IN AN ORGANIZED HEALTH CARE EDUCATION/TRAINING PROGRAM

## 2022-10-05 PROCEDURE — 74011000258 HC RX REV CODE- 258: Performed by: EMERGENCY MEDICINE

## 2022-10-05 PROCEDURE — 74011000258 HC RX REV CODE- 258: Performed by: STUDENT IN AN ORGANIZED HEALTH CARE EDUCATION/TRAINING PROGRAM

## 2022-10-05 PROCEDURE — 74011250636 HC RX REV CODE- 250/636: Performed by: STUDENT IN AN ORGANIZED HEALTH CARE EDUCATION/TRAINING PROGRAM

## 2022-10-05 PROCEDURE — 74011636637 HC RX REV CODE- 636/637: Performed by: EMERGENCY MEDICINE

## 2022-10-05 PROCEDURE — 94640 AIRWAY INHALATION TREATMENT: CPT

## 2022-10-05 PROCEDURE — 85025 COMPLETE CBC W/AUTO DIFF WBC: CPT

## 2022-10-05 PROCEDURE — 82306 VITAMIN D 25 HYDROXY: CPT

## 2022-10-05 PROCEDURE — 74011250637 HC RX REV CODE- 250/637: Performed by: EMERGENCY MEDICINE

## 2022-10-05 PROCEDURE — C9113 INJ PANTOPRAZOLE SODIUM, VIA: HCPCS | Performed by: HOSPITALIST

## 2022-10-05 PROCEDURE — 74011000250 HC RX REV CODE- 250: Performed by: INTERNAL MEDICINE

## 2022-10-05 PROCEDURE — 74011250636 HC RX REV CODE- 250/636: Performed by: EMERGENCY MEDICINE

## 2022-10-05 PROCEDURE — 80048 BASIC METABOLIC PNL TOTAL CA: CPT

## 2022-10-05 PROCEDURE — 77030038269 HC DRN EXT URIN PURWCK BARD -A

## 2022-10-05 PROCEDURE — 97530 THERAPEUTIC ACTIVITIES: CPT

## 2022-10-05 PROCEDURE — 80202 ASSAY OF VANCOMYCIN: CPT

## 2022-10-05 PROCEDURE — 80162 ASSAY OF DIGOXIN TOTAL: CPT

## 2022-10-05 PROCEDURE — 65610000003 HC RM ICU SURGICAL

## 2022-10-05 RX ORDER — THERA TABS 400 MCG
1 TAB ORAL DAILY
Status: DISCONTINUED | OUTPATIENT
Start: 2022-10-05 | End: 2022-10-18

## 2022-10-05 RX ORDER — PREDNISONE 20 MG/1
40 TABLET ORAL
Status: DISCONTINUED | OUTPATIENT
Start: 2022-10-05 | End: 2022-10-18

## 2022-10-05 RX ORDER — ADHESIVE BANDAGE
30 BANDAGE TOPICAL DAILY PRN
Status: DISCONTINUED | OUTPATIENT
Start: 2022-10-05 | End: 2022-10-20 | Stop reason: HOSPADM

## 2022-10-05 RX ORDER — BUTALBITAL, ACETAMINOPHEN AND CAFFEINE 50; 325; 40 MG/1; MG/1; MG/1
2 TABLET ORAL
Status: DISCONTINUED | OUTPATIENT
Start: 2022-10-05 | End: 2022-10-06

## 2022-10-05 RX ORDER — BACITRACIN 500 UNIT/G
1 PACKET (EA) TOPICAL
Status: COMPLETED | OUTPATIENT
Start: 2022-10-05 | End: 2022-10-05

## 2022-10-05 RX ORDER — POLYETHYLENE GLYCOL 3350 17 G/17G
17 POWDER, FOR SOLUTION ORAL 2 TIMES DAILY
Status: DISCONTINUED | OUTPATIENT
Start: 2022-10-05 | End: 2022-10-20 | Stop reason: HOSPADM

## 2022-10-05 RX ORDER — MEROPENEM 1 G/1
1 INJECTION, POWDER, FOR SOLUTION INTRAVENOUS EVERY 8 HOURS
Status: DISCONTINUED | OUTPATIENT
Start: 2022-10-05 | End: 2022-10-05 | Stop reason: DRUGHIGH

## 2022-10-05 RX ADMIN — IPRATROPIUM BROMIDE 0.5 MG: 0.5 SOLUTION RESPIRATORY (INHALATION) at 08:29

## 2022-10-05 RX ADMIN — Medication 1 PACKET: at 14:43

## 2022-10-05 RX ADMIN — MIDODRINE HYDROCHLORIDE 20 MG: 5 TABLET ORAL at 09:06

## 2022-10-05 RX ADMIN — DIGOXIN 0.12 MG: 125 TABLET ORAL at 09:15

## 2022-10-05 RX ADMIN — AMIODARONE HYDROCHLORIDE 0.5 MG/MIN: 50 INJECTION, SOLUTION INTRAVENOUS at 02:40

## 2022-10-05 RX ADMIN — BUTALBITAL, ACETAMINOPHEN, AND CAFFEINE 2 TABLET: 50; 325; 40 TABLET ORAL at 05:02

## 2022-10-05 RX ADMIN — Medication: at 17:25

## 2022-10-05 RX ADMIN — PANTOPRAZOLE SODIUM 40 MG: 40 INJECTION, POWDER, FOR SOLUTION INTRAVENOUS at 09:07

## 2022-10-05 RX ADMIN — MIDODRINE HYDROCHLORIDE 20 MG: 5 TABLET ORAL at 12:24

## 2022-10-05 RX ADMIN — BUTALBITAL, ACETAMINOPHEN, AND CAFFEINE 2 TABLET: 50; 325; 40 TABLET ORAL at 13:29

## 2022-10-05 RX ADMIN — DOXYCYCLINE 100 MG: 100 INJECTION, POWDER, LYOPHILIZED, FOR SOLUTION INTRAVENOUS at 02:38

## 2022-10-05 RX ADMIN — IPRATROPIUM BROMIDE 0.5 MG: 0.5 SOLUTION RESPIRATORY (INHALATION) at 14:04

## 2022-10-05 RX ADMIN — SODIUM CHLORIDE, PRESERVATIVE FREE 10 ML: 5 INJECTION INTRAVENOUS at 21:22

## 2022-10-05 RX ADMIN — Medication: at 09:21

## 2022-10-05 RX ADMIN — METRONIDAZOLE 500 MG: 500 INJECTION, SOLUTION INTRAVENOUS at 08:13

## 2022-10-05 RX ADMIN — MIDODRINE HYDROCHLORIDE 20 MG: 5 TABLET ORAL at 17:22

## 2022-10-05 RX ADMIN — SODIUM CHLORIDE, PRESERVATIVE FREE 10 ML: 5 INJECTION INTRAVENOUS at 13:27

## 2022-10-05 RX ADMIN — POLYETHYLENE GLYCOL 3350 17 G: 17 POWDER, FOR SOLUTION ORAL at 09:07

## 2022-10-05 RX ADMIN — Medication 100 MG: at 09:07

## 2022-10-05 RX ADMIN — THERA TABS 1 TABLET: TAB at 09:07

## 2022-10-05 RX ADMIN — VANCOMYCIN HYDROCHLORIDE 500 MG: 500 INJECTION, POWDER, LYOPHILIZED, FOR SOLUTION INTRAVENOUS at 00:51

## 2022-10-05 RX ADMIN — SODIUM CHLORIDE, PRESERVATIVE FREE 10 ML: 5 INJECTION INTRAVENOUS at 00:52

## 2022-10-05 RX ADMIN — PREDNISONE 40 MG: 20 TABLET ORAL at 11:06

## 2022-10-05 RX ADMIN — DOXYCYCLINE 100 MG: 100 INJECTION, POWDER, LYOPHILIZED, FOR SOLUTION INTRAVENOUS at 14:19

## 2022-10-05 RX ADMIN — SENNOSIDES AND DOCUSATE SODIUM 2 TABLET: 50; 8.6 TABLET ORAL at 09:07

## 2022-10-05 RX ADMIN — VANCOMYCIN HYDROCHLORIDE 500 MG: 500 INJECTION, POWDER, LYOPHILIZED, FOR SOLUTION INTRAVENOUS at 13:22

## 2022-10-05 RX ADMIN — BUTALBITAL, ACETAMINOPHEN, AND CAFFEINE 2 TABLET: 50; 325; 40 TABLET ORAL at 21:19

## 2022-10-05 RX ADMIN — IPRATROPIUM BROMIDE 0.5 MG: 0.5 SOLUTION RESPIRATORY (INHALATION) at 19:38

## 2022-10-05 RX ADMIN — AMIODARONE HYDROCHLORIDE 1 MG/MIN: 50 INJECTION, SOLUTION INTRAVENOUS at 14:14

## 2022-10-05 RX ADMIN — DIAZEPAM 2 MG: 2 TABLET ORAL at 21:19

## 2022-10-05 RX ADMIN — SODIUM CHLORIDE 1 G: 9 INJECTION INTRAMUSCULAR; INTRAVENOUS; SUBCUTANEOUS at 18:42

## 2022-10-05 NOTE — PROGRESS NOTES
Hematology-Oncology Progress Note    Yenifer Eric  1945  [id number]  10/5/2022    Follow-up for: metastatic adenocarcinoam     [x]        Chart notes since last visit reviewed   [x]        Medications reviewed for allergies and interactions       Case discussed with the following:         []                            [x]        Nursing Staff                                                                         []        Pathologist                                                                        []        FAMILY      Subjective:     Spoke with patient who complains of: pt is very weak, wants us to get her son out of care home so she can hug him before she dies.      Objective:   Patient Vitals for the past 24 hrs:   BP Temp Pulse Resp SpO2 Weight   10/05/22 1405 -- -- -- -- 95 % --   10/05/22 1400 129/69 -- 91 24 96 % --   10/05/22 1300 110/68 -- 88 14 95 % --   10/05/22 1200 97/62 97.4 °F (36.3 °C) 79 15 95 % --   10/05/22 1100 (!) 102/52 -- 78 14 93 % --   10/05/22 1000 95/65 -- (!) 101 20 94 % --   10/05/22 0900 (!) 103/57 -- 74 15 97 % --   10/05/22 0829 -- -- -- -- 95 % --   10/05/22 0800 103/61 97.2 °F (36.2 °C) 85 14 93 % --   10/05/22 0700 (!) 95/54 -- 70 14 92 % 41.5 kg (91 lb 7.9 oz)   10/05/22 0600 (!) 92/51 -- 76 15 94 % --   10/05/22 0500 104/61 -- 89 17 93 % --   10/05/22 0400 98/63 98.1 °F (36.7 °C) 73 17 95 % --   10/05/22 0300 99/63 -- 71 15 95 % --   10/05/22 0200 92/67 -- 75 13 97 % --   10/05/22 0100 (!) 107/54 -- 77 15 93 % --   10/05/22 0000 (!) 82/46 98.3 °F (36.8 °C) 80 16 96 % --   10/04/22 2300 (!) 99/51 -- 85 14 94 % --   10/04/22 2200 (!) 88/60 -- 83 14 94 % --   10/04/22 2100 (!) 97/53 -- 90 18 94 % --   10/04/22 2007 -- -- -- -- 96 % --   10/04/22 2000 (!) 103/59 97.7 °F (36.5 °C) (!) 109 21 95 % --   10/04/22 1900 (!) 97/55 -- (!) 105 21 94 % --   10/04/22 1825 (!) 100/41 97.4 °F (36.3 °C) (!) 109 28 95 % --   10/04/22 1800 (!) 120/49 -- (!) 109 21 97 % -- 10/04/22 1730 (!) 86/42 -- (!) 102 19 -- --   10/04/22 1710 (!) 88/47 -- (!) 103 16 -- --   10/04/22 1700 (!) 94/39 -- (!) 110 17 97 % --   10/04/22 1640 (!) 95/47 -- -- -- -- --   10/04/22 1630 (!) 77/41 -- -- -- -- --   10/04/22 1600 -- -- (!) 109 -- -- --   10/04/22 1530 (!) 101/54 -- -- -- -- --         REVIEW OF SYSTEMS:    Constitutional: negative fever, negative chills, negative weight loss  Eyes:   negative visual changes  ENT:   negative sore throat, tongue or lip swelling  Respiratory:  negative cough, negative dyspnea  Cards:  negative for chest pain, palpitations, lower extremity edema  GI:   negative for nausea, vomiting, diarrhea, and abdominal pain  Neuro:  negative for headaches, dizziness, vertigo  []                        Full ROS o/w normal/non contributor    Constitutional:  Patient looks  []        Sick  [x]        Frail  []        Better                                                 []        Depressed    HEENT:  [x]   NC                         []   AT               []    ALOPECIA           Eyes: [x]   Normal               []    Icteric  Oropharynx: []    Normal                  []  Thrush               []   Dry  Mucositis: [x]    None                 Grade: []        I  []        II  []        III  []        IV  Neck:   [x]   Supple                  []  Rigid               JVD:    []   ABSENT       []   PRESENT  Lymphadenopathy:   []   None Noted            []   PRESENT    Chest:  [x]   Clear               []    Rhonchi                      Dec'd @     []  Right Base           []   Left Base    CV:             []   Regular              []  Irregular               [x]   Tachy                []   Murmur  Abdominal:   [x]    Soft              []   NON-tender               []   Tender      BS:    []   ABSENT                   []   PRESENT  Liver:     [x]  NON-palp                  []   EDGE- palp  Spleen: [x]   NON-palp                   []  EDGE - palp  Mass:   [x]   ABSENT []  PRESENT  Extr:    []  Lymphedema             []   Cyanosis      []  Clubbing  Edema:     [x]   NONE       []   PRESENT  Skin:  Intact [x]           Purpura []        Rash: []   ABSENT       []  PRESENT  Neuro:  [x]        Normal  []        Confused      Available labs reviewed:  Labs:    Recent Results (from the past 24 hour(s))   POC VENOUS BLOOD GAS    Collection Time: 10/04/22  4:56 PM   Result Value Ref Range    Device: NASAL CANNULA      pH, venous (POC) 7.49 (H) 7.32 - 7.42      pCO2, venous (POC) 26.6 (L) 41 - 51 MMHG    pO2, venous (POC) 76 (H) 25 - 40 mmHg    HCO3, venous (POC) 20.4 (L) 23.0 - 28.0 MMOL/L    sO2, venous (POC) 96.5 (H) 65 - 88 %    Base deficit, venous (POC) 2.3 mmol/L    Allens test (POC) Positive      Site RIGHT RADIAL      Specimen type (POC) VENOUS BLOOD      Performed by Helen GONZALEZ    HEPATIC FUNCTION PANEL    Collection Time: 10/05/22  3:21 AM   Result Value Ref Range    Protein, total 4.9 (L) 6.4 - 8.2 g/dL    Albumin 2.8 (L) 3.5 - 5.0 g/dL    Globulin 2.1 2.0 - 4.0 g/dL    A-G Ratio 1.3 1.1 - 2.2      Bilirubin, total 0.6 0.2 - 1.0 MG/DL    Bilirubin, direct 0.2 0.0 - 0.2 MG/DL    Alk.  phosphatase 150 (H) 45 - 117 U/L    AST (SGOT) 98 (H) 15 - 37 U/L    ALT (SGPT) 169 (H) 12 - 78 U/L   DIGOXIN    Collection Time: 10/05/22  3:21 AM   Result Value Ref Range    Digoxin level 1.1 0.90 - 4.55 NG/ML   METABOLIC PANEL, BASIC    Collection Time: 10/05/22  3:21 AM   Result Value Ref Range    Sodium 137 136 - 145 mmol/L    Potassium 3.9 3.5 - 5.1 mmol/L    Chloride 107 97 - 108 mmol/L    CO2 22 21 - 32 mmol/L    Anion gap 8 5 - 15 mmol/L    Glucose 111 (H) 65 - 100 mg/dL    BUN 12 6 - 20 MG/DL    Creatinine 0.66 0.55 - 1.02 MG/DL    BUN/Creatinine ratio 18 12 - 20      eGFR >60 >60 ml/min/1.73m2    Calcium 8.2 (L) 8.5 - 10.1 MG/DL   CBC WITH AUTOMATED DIFF    Collection Time: 10/05/22  3:21 AM   Result Value Ref Range    WBC 10.8 3.6 - 11.0 K/uL    RBC 3.07 (L) 3.80 - 5.20 M/uL HGB 8.5 (L) 11.5 - 16.0 g/dL    HCT 25.1 (L) 35.0 - 47.0 %    MCV 81.8 80.0 - 99.0 FL    MCH 27.7 26.0 - 34.0 PG    MCHC 33.9 30.0 - 36.5 g/dL    RDW 23.4 (H) 11.5 - 14.5 %    PLATELET 018 930 - 902 K/uL    MPV 9.7 8.9 - 12.9 FL    NRBC 0.0 0  WBC    ABSOLUTE NRBC 0.00 0.00 - 0.01 K/uL    NEUTROPHILS 76 (H) 32 - 75 %    LYMPHOCYTES 8 (L) 12 - 49 %    MONOCYTES 10 5 - 13 %    EOSINOPHILS 4 0 - 7 %    BASOPHILS 1 0 - 1 %    IMMATURE GRANULOCYTES 1 (H) 0.0 - 0.5 %    ABS. NEUTROPHILS 8.2 (H) 1.8 - 8.0 K/UL    ABS. LYMPHOCYTES 0.9 0.8 - 3.5 K/UL    ABS. MONOCYTES 1.1 (H) 0.0 - 1.0 K/UL    ABS. EOSINOPHILS 0.4 0.0 - 0.4 K/UL    ABS. BASOPHILS 0.1 0.0 - 0.1 K/UL    ABS. IMM. GRANS. 0.1 (H) 0.00 - 0.04 K/UL    DF SMEAR SCANNED      RBC COMMENTS ANISOCYTOSIS  2+        RBC COMMENTS ANA CELLS  PRESENT       VANCOMYCIN, RANDOM    Collection Time: 10/05/22  3:21 AM   Result Value Ref Range    Vancomycin, random 19.7 UG/ML   VITAMIN D, 25 HYDROXY    Collection Time: 10/05/22  3:21 AM   Result Value Ref Range    Vitamin D 25-Hydroxy 48.1 30 - 100 ng/mL       Available Xrays reviewed:    Chemotherapy monitored and toxicities assessed:    Assessment and Plan   Metastatic adenocarcinoma. .  I discussed this case with Dr. Simon Aguilera who did a jennifer-colectomy for a large 6cm dysplastic polyp (zero of 29nodes)  in February 2022, and he noted that the original colonoscopy did not get past the polyp,  current ct scan worrisome for a cecal malignancy. 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 The pt needs GI eval when she is clinically stable,   she is very frail and is borderline for systemic therapy but hopefully can improve with nutrition etc and get started in a week  or two. \"Hepatic abscess\"  radiology feels that this is actually organized hematoma. Hgb 8.5 this am.  Will give iv iron today. Tachycardia. [facility] transferred to ICU for hypotension last night, but not needing pressors. She is on amiodarone drip.      4. Disposition. . she seems to be accepting of her fate.  Wants to see her son      MD Julio Riddle MD

## 2022-10-05 NOTE — PROGRESS NOTES
Day #1 of meropenem  Indication:  intra abdominal abscess  Current regimen:  1 gm q8h  Abx regimen: meropenem  Recent Labs     10/05/22  0321 10/04/22  0233 10/03/22  0351   WBC 10.8 13.0* 15.4*   CREA 0.66 0.70 0.80   BUN 12 9 9     Est CrCl: 40-50 ml/min; Temp (24hrs), Av.7 °F (36.5 °C), Min:97.2 °F (36.2 °C), Max:98.3 °F (36.8 °C)    Cultures:10/3 blood: ng x 3 days  10/3:  Urine: on hold (: E coli 100k colonies)    Plan: Change to 1 gm q12hr per renal dosing protocol for creatinine clearance 26-49 ml/min.

## 2022-10-05 NOTE — PROGRESS NOTES
Problem: Mobility Impaired (Adult and Pediatric)  Goal: *Acute Goals and Plan of Care (Insert Text)  Description: FUNCTIONAL STATUS PRIOR TO ADMISSION: Patient was modified independent using a rollator and single point cane for functional mobility PRN. Primarily ambulating throughout her home without device (anticipate furniture surfing). Denies falls. Progressive decline since last admission. Home O2. HOME SUPPORT PRIOR TO ADMISSION: The patient lived with her son in law. Son is currently incarcerated. Physical Therapy Goals  Initiated 10/4/2022  1. Patient will move from supine to sit and sit to supine , scoot up and down, and roll side to side in bed with modified independence within 7 day(s). 2.  Patient will transfer from bed to chair and chair to bed with supervision/set-up using the least restrictive device within 7 day(s). 3.  Patient will perform sit to stand with supervision/set-up within 7 day(s). 4.  Patient will ambulate with minimal assistance for 20 feet with the least restrictive device within 7 day(s). Outcome: Progressing Towards Goal   PHYSICAL THERAPY TREATMENT  Patient: Federico Power (35 y.o. female)  Date: 10/5/2022  Diagnosis: Liver abscess [K75.0]  Abdominal pain [R10.9]  Adenocarcinoma ([facility] [address] [C80.1] <principal problem not specified>  Procedure(s) (LRB):  INFUSION CATHETER INSERTION (N/A) 2 Days Post-Op  Precautions: Fall  Chart, physical therapy assessment, plan of care and goals were reviewed. ASSESSMENT  Patient continues with skilled PT services and is progressing towards goals. Patient received supine in bed, agreeable to transfer to [facility] as encouraged by RN. Overall mod A today for OOB and to [facility], as well as to steady during assisted hygiene. SpO2 stable on 2L/min throughout and HR stable low 100's. Less wheezing this date. Not agreeable to further mobility after toileting and returned to bed. Recommend SNF.      Current Level of Function Impacting Discharge (mobility/balance): mod A    Other factors to consider for discharge: now in CVICU         PLAN :  Patient continues to benefit from skilled intervention to address the above impairments. Continue treatment per established plan of care. to address goals. Recommendation for discharge: (in order for the patient to meet his/her long term goals)  Therapy up to 5 days/week in SNF setting    This discharge recommendation:  Has been made in collaboration with the attending provider and/or case management    IF patient discharges home will need the following DME: to be determined (TBD)       SUBJECTIVE:   Patient stated I peed.     OBJECTIVE DATA SUMMARY:   Critical Behavior:  Neurologic State: Alert  Orientation Level: Oriented X4  Cognition: Follows commands     Functional Mobility Training:  Bed Mobility:  Supine to Sit: Moderate assistance  Sit to Supine: Minimum assistance  Scooting: Moderate assistance  Transfers:  Sit to Stand: Moderate assistance  Stand to Sit: Minimum assistance  Bed to Chair: Moderate assistance  Balance:  Sitting: Impaired; Without support  Sitting - Static: Good (unsupported)  Sitting - Dynamic: Fair (occasional)  Standing: Impaired; With support  Standing - Static: Fair  Standing - Dynamic : Poor    Activity Tolerance:   Fair    After treatment patient left in no apparent distress:   Supine in bed, Heels elevated for pressure relief, and Call bell within reach    COMMUNICATION/COLLABORATION:   The patients plan of care was discussed with: Registered nurse.      Echo Zaidi PT, DPT   Time Calculation: 10 mins

## 2022-10-05 NOTE — PROGRESS NOTES
0730: Bedside and Verbal shift change report given to Amber Navarro RN (oncoming nurse) by Sonia Carter RN (offgoing nurse). Report included the following information SBAR, Intake/Output, MAR, Recent Results, and Cardiac Rhythm NSR .      0920: Leslie, NP with Palliative at bedside; updated by RN; Pt plan of care discussed     1015: Dr. Campbell at bedside; updated by RN; Orders received to increase Amio gtt and start PO steroid. 1235: Pt's RIJ CVC leaking; Dr. Campbell notified; orders received to take out central line and place peripherals if able. 1510: Pt has not voided since this morning; Bladder scanned 506      1519: RIJ CVC removed per protocol     1555: PT/OT at bedside; assisted to bedside commode; Pt voided 250 ml    1708: Dr. Campbell at bedside; updated on Pt condition; plan to get midline tomorrow     1731: Pt's Right upper forearm PIV leaking and infiltrated; taken out; Dr. Campbell notified; orders received to ice and elevate arm. 1930: Bedside and Verbal shift change report given to HERMAN Peng (oncoming nurse) by Amber Navarro RN (offgoing nurse). Report included the following information SBAR, Intake/Output, MAR, Recent Results, and Cardiac Rhythm NSR .

## 2022-10-05 NOTE — PROGRESS NOTES
0730: Bedside and Verbal shift change report given to [address] [place] [address] (oncoming nurse) by Jayme Caba RN (offgoing nurse). Report included the following information SBAR, Kardex, Intake/Output, and MAR. Pt having low bp throughout day. Medications to raise bp given throughout day. During times of hypotension pt was asymptomatic. Increase in midodrine ordered and albumin. These meds were administered to minimal effect, and soon bp was low again. Rn informed MD Eric Thomas MD advised to call rapid. Rapid called, due to pt being alert and asymptomatic the rapid was cancelled. Order to have pt transferred to ICU level of care was placed. Once room number was confirmed this RN went to CVICU to give report. Report given to Sandra Walker RN and the student nurse with her. 1800: TRANSFER - OUT REPORT:    Verbal report given to Sandra Walker RN (name) on Palma Jackson  being transferred to CVICU (unit) for change in patient condition(Decreased bp with low MAP)       Report consisted of patients Situation, Background, Assessment and   Recommendations(SBAR). Information from the following report(s) SBAR, Kardex, Intake/Output, MAR, Recent Results, and Cardiac Rhythm Sinus Tach  was reviewed with the receiving nurse. Lines:   Triple Lumen 10/03/22 Right (Active)   Central Line Being Utilized Yes 10/04/22 1825   Criteria for Appropriate Use Long term IV/antibiotic administration 10/04/22 1825   Site Assessment Clean, dry, & intact 10/04/22 1825   Infiltration Assessment 0 10/04/22 1825   Affected Extremity/Extremities Color distal to insertion site pink (or appropriate for race) 10/04/22 1825   Date of Last Dressing Change 10/03/22 10/04/22 1825   Dressing Status Clean, dry, & intact 10/04/22 1825   Dressing Type Disk with Chlorhexadine gluconate (CHG); Transparent 10/04/22 1825   Action Taken Open ports on tubing capped 10/04/22 1825   Proximal Hub Color/Line Status White;Flushed;Capped 10/04/22 1825   Positive Blood Return (Medial Site) Yes 10/04/22 1825   Medial Hub Color/Line Status Blue;Flushed;Capped 10/04/22 1825   Positive Blood Return (Lateral Site) Yes 10/04/22 1825   Distal Hub Color/Line Status Brown;Flushed;Capped 10/04/22 1825   Positive Blood Return (Site #3) Yes 10/04/22 1825   Alcohol Cap Used Yes 10/04/22 1825        Opportunity for questions and clarification was provided.       Patient transported with:   Monitor  O2 @ 2 liters  Registered Nurse

## 2022-10-05 NOTE — PROGRESS NOTES
Palliative Medicine Consult  Rich: [phone] ([phone])    Patient Name: Pearl Mclaughlin  YOB: 1945    Date of Initial Consult: 10/3/2022  Reason for Consult: care decisions  Requesting Provider: Zenia Raza    Primary Care Physician: Shirley Benjamin NP     SUMMARY:   Pearl Mclaughlin is a 77 y.o. presented to the ED on 9/28/2022 from home to the ED due to abdominal pain, nausea and diarrhea. Admitted with a diagnosis of possible liver abscess but more likely liver hematoma. Had liver biopsy on 9/15/2022. Also anemia and afib with rapid ventricular response. Hx metastatic adenocarcinoma      PMH:  metastatic adenocarcinoma, left colon resection 2/2022 (per patient this was for polyps)  traylor's esophagus, HH, GERD, [place], osteoporosis, seizures, chronic back pain, former smoker, PAF, hyperlipidemia, anemia, chronic constipation, COPD, R THR, L THR    Noted hospitalizations: September  (9/12/2022 to 9/16/2022 for abdominal pain. Liver biopsy done. August (8/21/2022 to 8/25/2022 for JOE, COPD, anemia)   June  (6/29/2022 to 6/30/2022 for COPD)    Current medical issues leading to Palliative Medicine involvement include:  care decisions. Noted on last admission, patient seen by palliative and AMD completed on 9-. Social:   [facility]  (son-in-law)        Abby Cabello (son)  Patient is . 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 She has one daughter Jacky Skiff) who she does not have a relationship with as Lien was upset that the patient has received blood transfusions in the past.  Lien is a [employer]. Heena Yoon lives with the patient and provides some care. He fixes her breakfast but then leaves for work. Patient then feeds herself and dresses herself. She has a shower chair. Patient's son is Abby Cabello. He is currently incarcerated due to DUI. He is expected to be in half-way for 7-8 more months. PALLIATIVE DIAGNOSES:   Palliative care encounter  Shortness of breath  Constipation   Chronic back pain       PLAN:   Patient transferred to a higher level of care yesterday afternoon due to low BP. BP has been stable and no additional support needed. Patient will be observed today   Talked more about patient's cancer. Patient said Dr. Christie Jacobs told her she will probably die before [place]. She said she never thought she would die of cancer. Dani Samira is working to see if a visit with the son can be arranged. Patient said she will elect for comfort care once she is able to see her son. Patient has not had a relationship with her daughter, Shameka Ochoa. Kalyani Lee was upset with the patient that she received blood as Kalyani Lee is a [employer]. Patient gave me her phone number and said I could call her 'if you want'. I did speak to Kalyani Lee and Kalyani Lee said she would call her mother. Patient later stated Kalyani Lee did call her. Constipation:  Patient is passing gas. On pericolace 2 tabs q am, Miralax daily. Will increase Miralax to bid and add MOM 30 ml daily prn. Patient reports her SOB is better. On 2 LPM.    Back pain:  has prn Fioricet. Palliative will continue to follow patient. I will be out of the office on 10/6. Call palliative office if care is needed, otherwise I will see her on 10/7     4 pm Addendum:   Reviewed a POST order with the patient. She does not want comfort care but does not want intubation/ventilator for respiratory distress. See POST form for these orders:   Do not use intubation or mechanical ventilation  May consider less invasive airway support (e.g. CPAP or BiPAP)  Use additional medical treatment, antibiotics, and cardiac monitoring as indicated. Hospital transfer if needed. Avoid intensive care unit if possible. See other orders:  patient would want vasopressors if needed (understands this includes ICU)     Will update Vladymir on the POST orders.      10/4/2022  Patient reports her SOB is slightly better. Remains on 2 LPM.  HR is 116 this am.   Patient stated the doctors have said she is not doing well. Patient did not want to talk further at this time but did agree for me to come back. I will follow up this afternoon. Patient is on valium 2 mg q 6 hours prn anxiety per the hospitalist.  She has received one dose in the last 24 hours. She did say she wished the dose was 5 mg. Yesterday,  patient requested Fioricet for pain as she takes this at home. She has had one dose in the last 24 hours. (Noted that she has N&V with oxycodone and hydrocodone). Patient has not had a BM. Will increase the Pericolace to 2 tabs per day. Continue Miralax. Patient states she has had issues with having either diarrhea or constipation for many years. She was bulimic/anorexic in the past.   Talked to Anjel Powers about the fact that patient would like to be able to see and hug her son, Steph Wang. Anjel Powers is checking with Jono's  about a possible visit (whether this be in the detention or arrange for the patient to see him outside of the detention). I offered to write a letter to support her terminal condition and limited life expectancy). Plan to meet with Anjel Powers in the patient's room at 4:30  Patient is a DNR. Working to see if we can help with arrangements for her to visit her son. 10/3/2022  Met with patient and introduced palliative care services  Talked about patient's medical condition and her understanding. She had liver biopsy on 9/12 but just recently learned of the results. Se is aware of her cancer diagnosis and the liver involvement. Cardiology NP in to talk to patient about her heart condition. NP explained her heart rhythm is now normal but her liver enzymes are elevated and she will need to stop the current medication. The NP explained her EF is 20-25% which is a change from her last echo. Patient seemed to understand this meant her heart was weak. We also talked about her other medical conditions including her COPD  See social history above. Patient has been independent with her care. Her son- in-law helps with some meal prep and takes her to appointments. Patient reports she is currently SOB. RRT was recently called. She states it feels hard to get the air in and out. Patient to be diuresed. O2 @ 2 LPM.   Feels slightly anxious. Patient is on valium 2 mg q 6 hours prn anxiety. Patient does report a decreased appetite. Has occasional nausea. Has prn compazine ordered. Constipation: No BM since 9/29. Change Miralax from prn to daily. Add Amber-colace 1 tab daily. Hx chronic back pain/generalized pain: add Fioricet 2 tabs bid prn per PTA med list   Discussed the goals of care with patient. Patient has an AMD.  Benigno Dakins is her primary decision maker. We also discussed her code status. Patient verbalized understanding of her medical condition. We discussed what resuscitation/CPR would include. Patient did elect for a DNR code status. We did discuss comfort care and hospice. Patient had questions about her prognosis. We talked about her multiple medical conditions. Patient then stated she wants to continue treatment at this time. She desires to hug her son one more time before she dies. He is incarcerated and isn't due to be out for 7-8 months. Will continue with current plan of care. Spoke to the patient's son-in-law. We reviewed her current medical condition.    Informed him of patient's decision for DNR status but otherwise continue care at this time   Initial consult note routed to primary continuity provider and/or primary health care team members  Communicated plan of care with: Palliative Chrissy GILMORE [facility] Team     GOALS OF CARE / TREATMENT PREFERENCES:     GOALS OF CARE:  Patient/Health Care Proxy Stated Goals: Prolong life (wants to see her son before she dies)    TREATMENT PREFERENCES:   Code Status: DNR  (this decision was made on 10/3/2022)    Patient and family's personal goals include:  at this time, patient would like to continue full care. Patient would like to hug her son one more time before she passes. Important upcoming milestones or family events: son will be in custodial for another 7-8 months. Advance Care Planning:  [x] The [facility] Interdisciplinary Team has updated the ACP Navigator with Health Care Decision Maker and Patient Capacity      Primary Decision Maker: Yifanbabar Perdomo - [phone]    Secondary Decision Maker: ReynaldoJono - Steven - [phone]  Advance Care Planning 8/22/2022   Confirm Advance Directive None   Patient Would Like to Complete Advance Directive No   Does the patient have other document types -       Medical Interventions: Limited additional interventions       Other:    As far as possible, the palliative care team has discussed with patient / health care proxy about goals of care / treatment preferences for patient.      HISTORY:     History obtained from: chart, team, family    CHIEF COMPLAINT: Pt admitted with aforementioned history and issues    HPI/SUBJECTIVE:    The patient is:   [x] Verbal and participatory  [] Non-participatory due to: medical condition        Clinical Pain Assessment (nonverbal scale for severity on nonverbal patients):   Clinical Pain Assessment  Severity: 2  Location: low back pain  Character: aching  Duration: chronic  Effect: fiorecet helps          Duration: for how long has pt been experiencing pain (e.g., 2 days, 1 month, years)  Frequency: how often pain is an issue (e.g., several times per day, once every few days, constant)     FUNCTIONAL ASSESSMENT:     Palliative Performance Scale (PPS):  PPS: 30       PSYCHOSOCIAL/SPIRITUAL SCREENING:     Palliative IDT has assessed this patient for cultural preferences / practices and a referral made as appropriate to needs (Cultural Services, Patient Advocacy, Ethics, etc.)    Any spiritual / Jehovah's witness concerns:  [] Yes /  [x] No  [] Unable to obtain this information  If \"Yes\" to discuss with pastoral care during IDT     Does caregiver feel burdened by caring for their loved one:   [] Yes /  [x] No /  [] No Caregiver Present/Available [] No Caregiver [] Pt [facility]  If \"Yes\" to discuss with social work during IDT    Anticipatory grief assessment:   [x] Normal  / [] Maladaptive   [] Unable to obtain this information  [] n/a  If \"Maladaptive\" to discuss with social work during IDT    ESAS Anxiety: Anxiety: 1    ESAS Depression: Depression: 0        REVIEW OF SYSTEMS:     Positive and pertinent negative findings in ROS are noted above in HPI. The following systems were [x] reviewed / [] unable to be reviewed as noted in HPI  Other findings are noted below. Systems: constitutional, ears/nose/mouth/throat, respiratory, gastrointestinal, genitourinary, musculoskeletal, integumentary, neurologic, psychiatric, endocrine. Positive findings noted below. Modified ESAS Completed by: provider   Fatigue: 3 Drowsiness: 0   Depression: 0 Pain: 2   Anxiety: 1 Nausea: 0   Anorexia: 5 Dyspnea: 1     Constipation: Yes              PHYSICAL EXAM:     From RN flowsheet:  Wt Readings from Last 3 Encounters:   10/05/22 91 lb 7.9 oz (41.5 kg)   09/15/22 92 lb (41.7 kg)   07/21/22 93 lb (42.2 kg)     Blood pressure (!) 103/57, pulse 74, temperature 97.2 °F (36.2 °C), resp. rate 15, height 5' 3\" (1.6 m), weight 91 lb 7.9 oz (41.5 kg), SpO2 97 %.     Pain Scale 1: Numeric (0 - 10)  Pain Intensity 1: 0  Pain Onset 1: Acute  Pain Location 1: Abdomen  Pain Orientation 1: Right  Pain Description 1: Aching  Pain Intervention(s) 1: Medication (see MAR)  Last bowel movement, if known:     Constitutional: resting in bed, alert, oriented   Eyes: pupils equal, anicteric  ENMT: no nasal discharge, moist mucous membranes  Cardiovascular: afib, + edema in lower legs and hands   Respiratory: breathing not labored, symmetric, O2 @ 2LPM  Gastrointestinal: distended, non-tender  :  Musculoskeletal: no deformity, no tenderness to palpation, dressings over both heels  Skin: warm, dry  Neurologic: following commands, moving all extremities  Psychiatric: full affect, no hallucinations  Other:       HISTORY:     Active Problems:    Liver abscess (9/28/2022)      Abdominal pain (9/28/2022)      Adenocarcinoma ([address] (9/28/2022)      Palliative care encounter (10/3/2022)      DNR (do not resuscitate) discussion (10/3/2022)      Shortness of breath (10/3/2022)      Chronic bilateral low back pain without sciatica (10/3/2022)      Chronic idiopathic constipation (10/4/2022)    Past Medical History:   Diagnosis Date    Adenoma of colon     Anemia     Arthritis     Atrial fibrillation (HCC)     Bloating     Chronic constipation     Chronic pain     back     COPD (chronic obstructive pulmonary disease) (HCC)     GERD (gastroesophageal reflux disease)     [place] (hard of hearing)     Hyponatremia     caused seizures x 2 per pt    Indigestion     Osteoporosis     Seizures ([address] 7/2020, 9/2020    x 2       Past Surgical History:   Procedure Laterality Date    COLONOSCOPY N/A 12/16/2021    COLONOSCOPY   :- performed by Sisi Gilbert MD at [place] [address] N/A 2/14/2022    . performed by Martin Germain MD at [facility] MAIN OR    HX [address]    HX CHOLECYSTECTOMY  1982    HX HIP REPLACEMENT Left     pt stated hip was pinned, not replaced    HX HIP REPLACEMENT Right     pt stated hip was pinned, not replaced    HX HYSTERECTOMY      HX ORTHOPAEDIC  1990, 2011    bilateral hip fractures     HX ORTHOPAEDIC Left 2020    femur fracture    HX TONSILLECTOMY        Family History   Problem Relation Age of Onset    Heart Disease Mother     Heart Disease Father     Liver Disease Father     Alcohol abuse Father     Anesth Problems Neg Hx       History reviewed, no pertinent family history.   Social History     Tobacco Use    Smoking status: Former     Packs/day: 1.00     Years: 20.00     Pack years: 20.00     Types: Cigarettes     Quit date: 12/18/2011     Years since quitting: 10.8    Smokeless tobacco: Never   Substance Use Topics    Alcohol use: Never     Allergies   Allergen Reactions    Cefuroxime Hives    Hydrocodone Nausea and Vomiting    Other Medication Diarrhea and Nausea and Vomiting     PT REPORTS ALL MYCINS CAUSE SEVERE GI UPSET    Oxycodone Nausea and Vomiting    Penicillins Hives     Patient screened for any delayed non-IgE-mediated reaction to PCN.         Patient notes the following:    No delayed non-IgE-mediated reaction to PCN    Hives 1969            Current Facility-Administered Medications   Medication Dose Route Frequency    alteplase (CATHFLO) 1 mg in sterile water (preservative free) 1 mL injection  1 mg InterCATHeter PRN    therapeutic multivitamin (THERAGRAN) tablet 1 Tablet  1 Tablet Oral DAILY    polyethylene glycol (MIRALAX) packet 17 g  17 g Oral BID    magnesium hydroxide (MILK OF MAGNESIA) 400 mg/5 mL oral suspension 30 mL  30 mL Oral DAILY PRN    phenol throat spray (CHLORASEPTIC) 1 Spray  1 Spray Oral PRN    senna-docusate (PERICOLACE) 8.6-50 mg per tablet 2 Tablet  2 Tablet Oral DAILY    ipratropium (ATROVENT) 0.02 % nebulizer solution 0.5 mg  0.5 mg Nebulization Q6H RT    PHENYLephrine (NIDA-SYNEPHRINE) 30 mg in 0.9% sodium chloride 250 mL infusion   mcg/min IntraVENous TITRATE    midodrine (PROAMATINE) tablet 20 mg  20 mg Oral TID WITH MEALS    amiodarone (CORDARONE) 375 mg/250 mL D5W infusion  0.5-1 mg/min IntraVENous TITRATE    digoxin (LANOXIN) tablet 0.125 mg  0.125 mg Oral DAILY    metroNIDAZOLE (FLAGYL) IVPB premix 500 mg  500 mg IntraVENous Q12H    balsam peru-castor oiL (VENELEX) ointment   Topical BID    Vancomycin - Pharmacy dosing   Other Rx Dosing/Monitoring    vancomycin (VANCOCIN) 500 mg in 0.9% sodium chloride (MBP/ADV) 100 mL MBP  500 mg IntraVENous Q12H    butalbital-acetaminophen-caffeine (FIORICET, ESGIC) -40 mg per tablet 2 Tablet  2 Tablet Oral BID PRN    doxycycline (VIBRAMYCIN) 100 mg in 0.9% sodium chloride (MBP/ADV) 100 mL MBP  100 mg IntraVENous Q12H    thiamine HCL (B-1) tablet 100 mg  100 mg Oral DAILY    diazePAM (VALIUM) tablet 2 mg  2 mg Oral Q6H PRN    [Held by provider] dextrose 5% and 0.9% NaCl infusion  50 mL/hr IntraVENous CONTINUOUS    [Held by provider] metoprolol tartrate (LOPRESSOR) tablet 25 mg  25 mg Oral Q12H    0.9% sodium chloride infusion 250 mL  250 mL IntraVENous PRN    albuterol (PROVENTIL VENTOLIN) nebulizer solution 2.5 mg  2.5 mg Nebulization Q6H PRN    sodium chloride (NS) flush 5-40 mL  5-40 mL IntraVENous Q8H    sodium chloride (NS) flush 5-40 mL  5-40 mL IntraVENous PRN    acetaminophen (TYLENOL) tablet 650 mg  650 mg Oral Q6H PRN    Or    acetaminophen (TYLENOL) suppository 650 mg  650 mg Rectal Q6H PRN    ondansetron (ZOFRAN ODT) tablet 4 mg  4 mg Oral Q8H PRN    Or    ondansetron (ZOFRAN) injection 4 mg  4 mg IntraVENous Q6H PRN    pantoprazole (PROTONIX) 40 mg in 0.9% sodium chloride 10 mL injection  40 mg IntraVENous DAILY    prochlorperazine (COMPAZINE) injection 10 mg  10 mg IntraVENous Q6H PRN          LAB AND IMAGING FINDINGS:     Lab Results   Component Value Date/Time    WBC 10.8 10/05/2022 03:21 AM    HGB 8.5 (L) 10/05/2022 03:21 AM    PLATELET 711 35/37/5777 03:21 AM     Lab Results   Component Value Date/Time    Sodium 137 10/05/2022 03:21 AM    Potassium 3.9 10/05/2022 03:21 AM    Chloride 107 10/05/2022 03:21 AM    CO2 22 10/05/2022 03:21 AM    BUN 12 10/05/2022 03:21 AM    Creatinine 0.66 10/05/2022 03:21 AM    Calcium 8.2 (L) 10/05/2022 03:21 AM    Magnesium 1.3 (L) 10/04/2022 02:33 AM    Phosphorus 3.3 02/15/2022 03:18 AM      Lab Results   Component Value Date/Time    Alk.  phosphatase 150 (H) 10/05/2022 03:21 AM    Protein, total 4.9 (L) 10/05/2022 03:21 AM    Albumin 2.8 (L) 10/05/2022 03:21 AM Globulin 2.1 10/05/2022 03:21 AM     Lab Results   Component Value Date/Time    INR 1.0 09/12/2022 08:12 AM    Prothrombin time 10.0 09/12/2022 08:12 AM    aPTT 23.9 08/22/2022 03:55 AM      Lab Results   Component Value Date/Time    Iron 39 08/24/2022 08:20 AM    TIBC 316 08/24/2022 08:20 AM    Iron % saturation 12 (L) 08/24/2022 08:20 AM    Ferritin 23 08/24/2022 08:20 AM      Lab Results   Component Value Date/Time    pH 7.37 10/03/2022 10:28 AM    PCO2 23 (L) 10/03/2022 10:28 AM    PO2 88 10/03/2022 10:28 AM     No components found for: GLPOC   No results found for: CPK, CKMB             Total time:  35 minutes  Counseling / coordination time, spent as noted above: 25  minutes  > 50% counseling / coordination?: yes    Prolonged service was provided for  []30 min   []75 min in face to face time in the presence of the patient, spent as noted above. Time Start:   Time End:   Note: this can only be billed with 42593 (initial) or 40204 (follow up). If multiple start / stop times, list each separately.

## 2022-10-05 NOTE — PROGRESS NOTES
[facility]  Cardiology Care Note                  []Initial visit     [x]Established visit     Patient Name: Mile Waldron - HLE:48/60/0543 - TNU:[id number]  Primary Cardiologist: Sara Williamson and has been followed by cardiology in [place]. Consulting Cardiologist: Blanca Jordan MD      Reason for initial visit:afib with RVR    Patient seen and examined by me with the above nurse practitioner. I personally performed all components of the history, physical, and medical decision making and agree with the assessment and plan with minor modifications as noted. Today the patient remains in NSR after requiring amiodarone. BP OK on high-dose midodrine. General PE  Gen:  NAD  Mental Status - Alert. General Appearance - Not in acute distress. HEENT:  PERRL, no carotid bruits or JVD  Chest and Lung Exam   Inspection: Accessory muscles - No use of accessory muscles in breathing. Auscultation:   Breath sounds: - Normal.   Cardiovascular   Inspection: Jugular vein - Bilateral - Inspection Normal.   Palpation/Percussion:   Apical Impulse: - Normal.   Auscultation: Rhythm - Regular. Heart Sounds - S1 WNL and S2 WNL. No S3 or S4. Murmurs & Other Heart Sounds: Auscultation of the heart reveals - No Murmurs. Peripheral Vascular   Upper Extremity: Inspection - Bilateral - No Cyanotic nailbeds or Digital clubbing. Lower Extremity:   Palpation: Edema - Bilateral - No edema. Abdomen:   Soft, non-tender, bowel sounds are active. Neuro: A&O times 3, CN and motor grossly WNL    Continue amio IV for now, watch LFT's, may change to PO amio tomorrow. Considering big picture, benefit of amio likely exceeds risk even if mild LFT elevation, as not likely to be the major factor in prognosis. Continue midodrine, but wean if able. Continue digoxin, monitor creatinine. Patient insists she does better on digoxin.     Note late signature for service provided on 10/5/22. HPI:   Ms. Carlos Oreilly is a 77 y.o. female with PMH of PAF on digoxin and ASA , Left hemicolectomy in 2/2022 for adenomatous colonic polyp, anemia, HLD, GERD, seizrues, chronic back pain,  former smokr. . She presented with chief c/o abdominal pain and nausea. She was hospitalized earlier this month for abdominal pain and now found to have liver adenocarcinoma on recent liver biopsy with likely organized hepatic hematoma on CT scan. There is concern for cecal malignancy and metastatic colon cancer is suspected. Cardiology consulted as pt went into afib with RVR early this a.m. She denies any palpitations, dizziness, lightheadedness and no chest pain. She had some mild SOB last night but this is better. She feels weak. C/o some abdominal pain. No palpitations. She has received adocard 6 mg and 12 mg, iv diltiazem bolus and IV dig and started on diltiazem gtt. Later in day, she developed hypotension on diltizem gtt. BP is soft. She also exhibits metabolic acidosis thought to be due to poor po intake, malnutritionCXR with mild diffuse interstia lopacities. Noted to have EF of 25-30% on echo. For afib with RVR, she was treated with amiodarone but LFTS elevated so this was stopped. Digoxin was resumed. She would not tolerate BB given lower BP. SUBJECTIVE/INterval HPI:  Noted events of last evening with hypotension despite low dose midodrine,. Midodrine dose was increased to 20 mg TID. She was given IV albumen yesterday as well. PAF with RVR yesterday as well as ST with PACS. Transferred to CVICU and amiodarone was started IV. LFTs, while elevated have trended down. SHe is now in NSR with 1 brief possible episode of PAF. She denies chest pain. States her breathing is better and she feels better today. CXR last evening with stable small pleural effusions, no edema. Abdomen a little more distended today. No BM in few days per nurse.  .      Assessment and Plan Likely metastatic adenocarcinoma:  Liver adenocarcinoma of uncertain primary site. - Heme/onc following. Atrial fibrillation with rapid ventricular response  -Yesterday exibited PAFwith RVR. , ST with PACs, but then afib with RVR last night  -Now in NSR- feels better in SR.    -Continue digoxin 0.125 mg daily (home med)  -Continue IV amiodarone for now, while not ideal in setting of elevated LFTs, she is doing better in NSR, will d/w Dr. Rosalva Patrick. Low BP requiring midodrine limits rate control medications.  -Not a candidate for Biopharmacopae given anemia, fraility,     3. Cardiomyopathy/Possible acute systolic heart failure  -Cardiomyopathy Likely secondary to stress CM (more likely) than tachycardiomyopathy   -Unable to tolerate GDMT at this time due to Low BP requiring midodrine    -continue digoxin for #2  -CXR with stable small pleural effusions and no pulmonary edema.  -Not a good candidate for invasive, aggressive cardiac procedures.    -Would be cautious with vasopressor support as can worsen LVOT obstruction with stress CM. 5.   Anemia:  Stable.hgb 8.5  6. Hypotension:  BP improved. She is on midodrine 20 mg TID- ?could this start to be weaned slowly- will d/w Dr. Rosalva Patrick. 7.   Advanced directives:  DNR. Palliative following. Further plan per Dr. Rosavla Patrick.          ____________________________________________________________    Cardiac testing  07/21/22    ECHO ADULT FOLLOW-UP OR LIMITED 07/21/2022 7/21/2022    Interpretation Summary    Limited study for the assessment of ejection fraction. Left Ventricle: Normal left ventricular systolic function with a visually estimated EF of 55 - 60%. EF by 2D Simpsons Biplane is 56%. Left ventricle size is normal. Normal wall thickness. See diagram for wall motion findings. Tricuspid Valve: Mild to moderate regurgitation on limited color and Doppler interrogation. Pulmonary Arteries: Pulmonary hypertension not present.  The estimated PASP is 27 mmHg.    Signed by: Eyal Head MD on 7/21/2022  2:41 PM          Most recent HS troponins:  No results for input(s): TROPHS in the last 72 hours. No lab exists for component:  CKMB  ECG: afib with RVR, nonspecific t wave abnormality  Repeat EKG reviewed by Dr. Ruth Kerns- atrial tachycardia with t wave abnormality V5, V6.  ?possible dig effect. Review of Systems    []All other systems reviewed and all negative except as written in HPI    [] Patient unable to provide secondary to condition         Past Medical History:   Diagnosis Date    Adenoma of colon     Anemia     Arthritis     Atrial fibrillation (HCC)     Bloating     Chronic constipation     Chronic pain     back     COPD (chronic obstructive pulmonary disease) (HCC)     GERD (gastroesophageal reflux disease)     [place] (hard of hearing)     Hyponatremia     caused seizures x 2 per pt    Indigestion     Osteoporosis     Seizures ([facility] [address] 7/2020, 9/2020    x 2      Past Surgical History:   Procedure Laterality Date    COLONOSCOPY N/A 12/16/2021    COLONOSCOPY   :- performed by Carey Lynn MD at [place] [address] N/A 2/14/2022    . performed by Urmila Felipe MD at [facility] MAIN OR    [address] [facility]    HX CHOLECYSTECTOMY  1982    HX HIP REPLACEMENT Left     pt stated hip was pinned, not replaced    HX HIP REPLACEMENT Right     pt stated hip was pinned, not replaced    HX HYSTERECTOMY      HX ORTHOPAEDIC  1990, 2011    bilateral hip fractures     HX ORTHOPAEDIC Left 2020    femur fracture    HX TONSILLECTOMY       SH: former smoker, no ETOH. FH:no FH of early CAD      Social Hx:  reports that she quit smoking about 10 years ago. She has a 20.00 pack-year smoking history. She has never used smokeless tobacco. She reports that she does not drink alcohol and does not use drugs. Family Hx: family history includes Alcohol abuse in her father; Heart Disease in her father and mother; Liver Disease in her father.   Allergies Allergen Reactions    Cefuroxime Hives    Hydrocodone Nausea and Vomiting    Other Medication Diarrhea and Nausea and Vomiting     PT REPORTS ALL MYCINS CAUSE SEVERE GI UPSET    Oxycodone Nausea and Vomiting    Penicillins Hives     Patient screened for any delayed non-IgE-mediated reaction to PCN. Patient notes the following:    No delayed non-IgE-mediated reaction to PCN    Hives 1969                OBJECTIVE:  Wt Readings from Last 3 Encounters:   10/05/22 41.5 kg (91 lb 7.9 oz)   09/15/22 41.7 kg (92 lb)   07/21/22 42.2 kg (93 lb)       Intake/Output Summary (Last 24 hours) at 10/5/2022 1459  Last data filed at 10/5/2022 1400  Gross per 24 hour   Intake 933.66 ml   Output 900 ml   Net 33.66 ml         Physical Exam    Vitals:   Vitals:    10/05/22 1200 10/05/22 1300 10/05/22 1400 10/05/22 1405   BP: 97/62 110/68 129/69    Pulse: 79 88 91    Resp: 15 14 24    Temp: 97.4 °F (36.3 °C)      SpO2: 95% 95% 96% 95%   Weight:       Height:         Telemetry: afib rvr this a.m. now  /69   Pulse 91   Temp 97.4 °F (36.3 °C)   Resp 24   Ht 5' 3\" (1.6 m)   Wt 41.5 kg (91 lb 7.9 oz)   SpO2 95%   BMI 16.21 kg/m²   General:    Alert, cooperative, no distress. Cachectic   Psychiatric:    Normal Mood and affect    Eye/ENT:      Pupils equal, No asymmetry, Conjunctival pink. Able to hear voice at normal amplitude   Lungs:      Visibly symmetric chest expansion, No palpable tenderness. Clear to auscultation bilaterally. Heart[de-identified]    Regular rate and rhythm, S1, S2 normal, no murmur, click, rub or gallop. No JVD, Normal palpable peripheral pulses. No cyanosis   Abdomen:     Soft, distended, Bowel sounds present   Extremities:   Extremities normal, atraumatic, no edema. Skin: multiple areas of ecchymosis on BUE, left forearm area of swelling, mild erythema and warmth.    Neurologic:   Alert, oriented, DAVIDSON, No gross focal deficits           Data Review:     Tele:  Sinus rhythm  1 ?brief episode of PAF Radiology:   XR Results (most recent):  Results from Hospital Encounter encounter on 09/28/22    XR CHEST PORT    Narrative  EXAM: Portable CXR. 2051 hours. COMPARISON: 1032 hours. INDICATION: post line placement    FINDINGS:  Right jugular CVL has been placed with tip in the SVC without pneumothorax. There are stable small pleural effusions. Lungs are hyperinflated. There is no  acute airspace disease/edema. Heart size is normal. There are granulomatous  sylvia calcifications. Impression  Satisfactory CVL placement. No pneumothorax. Stable small pleural effusions. Emphysema. CT Results (most recent):  Results from Hospital Encounter encounter on 09/28/22    CT ABD PELV W CONT    Narrative  EXAM: CT ABD PELV W CONT    INDICATION: Diffuse abdominal pain and diarrhea. Rectal constipation earlier  this month. Nonspecific liver disease on previous imaging. Biliary dilatation on  previous imaging. Elevated alkaline phosphatase. Normal white blood cell count,  bilirubin, lipase. COMPARISON: CT abdomen/pelvis on 9/12/2022. CONTRAST: 100 mL of Isovue-370. ORAL CONTRAST: None    TECHNIQUE:  Following the uneventful intravenous administration of contrast, thin axial  images were obtained through the abdomen and pelvis. Coronal and sagittal  reconstructions were generated. CT dose reduction was achieved through use of a  standardized protocol tailored for this examination and automatic exposure  control for dose modulation. FINDINGS:  LOWER THORAX: Small right pleural effusion is new. No basilar pneumonia. Normal  cardiac size. LIVER: Segment 7/8 subcapsular collection of heterogeneous fluid measures 9.3 x  6.9 x 3.5 cm. Heterogeneous septated fluid attenuation throughout the right  hepatic lobe measures approximately 13 cm in oblique AP diameter. Central right  hepatic lobe peripherally enhancing lesion previously measured 1.9 cm and now  measures 2.4 cm.  Unchanged mild intrahepatic biliary dilatation. BILIARY TREE: Cholecystectomy. Unchanged chronic biliary dilatation. No abrupt  termination. SPLEEN: Normal size. Lobulated contour. PANCREAS: Mild diffuse atrophy. No mass or inflammation. ADRENALS: Unremarkable. KIDNEYS: No hydronephrosis. Heterogeneous small multiple cysts. STOMACH: Unremarkable. SMALL BOWEL: No dilatation or wall thickening. COLON: Incomplete distention, limited evaluation. Fluid-filled cecum is in the  pelvis. APPENDIX: Normal.  PERITONEUM: No ascites or pneumoperitoneum. RETROPERITONEUM: Aortic atherosclerosis without aneurysm. 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 No lymphadenopathy. REPRODUCTIVE ORGANS: Hysterectomy. URINARY BLADDER: Incomplete distention, limited evaluation. BONES: Osteopenia. Femoral hardware. Spinal curvature. T11 partial compression  fracture. ABDOMINAL WALL: No mass or hernia. ADDITIONAL COMMENTS: Diffuse muscle atrophy. Impression  1. Progression of hepatic lesions and new large fluid collections in the liver. Intrahepatic abscesses from nonspecific infection are most likely. Consider  fluid sampling or drainage with CT guidance. 2. New small right pleural effusion. 3. Fluid-filled cecum may indicate infectious colitis. No bowel obstruction. MRI Results (most recent):  No results found for this or any previous visit. No results for input(s): CPK, TROIQ in the last 72 hours. No lab exists for component: CKQMB, CPKMB, BMPP  Recent Labs     10/05/22  0321 10/04/22  0233    136   K 3.9 3.3*    107   CO2 22 21   BUN 12 9   CREA 0.66 0.70   * 97   CA 8.2* 7.8*     Recent Labs     10/05/22  0321 10/04/22  0233   WBC 10.8 13.0*   HGB 8.5* 8.5*   HCT 25.1* 26.0*    334     Recent Labs     10/05/22  0321 10/03/22  0351   * 206*     No results for input(s): CHOL, LDLC in the last 72 hours.     No lab exists for component: TGL, HDLC,  HBA1C  No results for input(s): CRP, TSH, TSHEXT, TSHEXT in the last 72 hours.     No lab exists for component: ESR        Current meds:    Current Facility-Administered Medications:     alteplase (CATHFLO) 1 mg in sterile water (preservative free) 1 mL injection, 1 mg, InterCATHeter, PRN, Hillary Irving MD    therapeutic multivitamin (THERAGRAN) tablet 1 Tablet, 1 Tablet, Oral, DAILY, Hillary BOSCH MD, 1 Tablet at 10/05/22 0907    polyethylene glycol (MIRALAX) packet 17 g, 17 g, Oral, BID, Magaly Caicedo NP    magnesium hydroxide (MILK OF MAGNESIA) 400 mg/5 mL oral suspension 30 mL, 30 mL, Oral, DAILY PRN, Mic BOSCH NP    predniSONE (DELTASONE) tablet 40 mg, 40 mg, Oral, DAILY WITH BREAKFAST, Niranjan De Los Santos MD, 40 mg at 10/05/22 1106    phenol throat spray (CHLORASEPTIC) 1 Spray, 1 Spray, Oral, PRN, Rosana Flores NP    senna-docusate (PERICOLACE) 8.6-50 mg per tablet 2 Tablet, 2 Tablet, Oral, DAILY, Mic BOSCH NP, 2 Tablet at 10/05/22 0907    ipratropium (ATROVENT) 0.02 % nebulizer solution 0.5 mg, 0.5 mg, Nebulization, Q6H RT, Niranjan De Los Santos MD, 0.5 mg at 10/05/22 1404    PHENYLephrine (NIDA-SYNEPHRINE) 30 mg in 0.9% sodium chloride 250 mL infusion,  mcg/min, IntraVENous, TITRATE, Niranjan De Los Santos MD    midodrine (PROAMATINE) tablet 20 mg, 20 mg, Oral, TID WITH MEALS, Niranjan De Los Santos MD, 20 mg at 10/05/22 1224    amiodarone (CORDARONE) 375 mg/250 mL D5W infusion, 0.5-1 mg/min, IntraVENous, TITRATE, Niranjan De Los Santos MD, Last Rate: 40 mL/hr at 10/05/22 1414, 1 mg/min at 10/05/22 1414    digoxin (LANOXIN) tablet 0.125 mg, 0.125 mg, Oral, DAILY, Niranjan De Los Santos MD, 0.125 mg at 10/05/22 0915    metroNIDAZOLE (FLAGYL) IVPB premix 500 mg, 500 mg, IntraVENous, Q12H, Roxi Russo MD, Last Rate: 100 mL/hr at 10/05/22 0813, 500 mg at 10/05/22 0813    balsam peru-castor oiL (VENELEX) ointment, , Topical, BID, Jada De La Rosa MD, Given at 10/05/22 0416    Vancomycin - Pharmacy dosing, , Other, Rx Dosing/Monitoring, Roxi Wills MD    vancomycin (VANCOCIN) 500 mg in 0.9% sodium chloride (MBP/ADV) 100 mL MBP, 500 mg, IntraVENous, Q12H, Roxi Russo MD, Last Rate: 100 mL/hr at 10/05/22 1322, 500 mg at 10/05/22 1322    butalbital-acetaminophen-caffeine (FIORICET, ESGIC) -40 mg per tablet 2 Tablet, 2 Tablet, Oral, BID PRN, Mauricio Cartagenaet HIRO, NP, 2 Tablet at 10/05/22 1329    doxycycline (VIBRAMYCIN) 100 mg in 0.9% sodium chloride (MBP/ADV) 100 mL MBP, 100 mg, IntraVENous, Q12H, Roxi Russo MD, Last Rate: 100 mL/hr at 10/05/22 1419, 100 mg at 10/05/22 1419    thiamine HCL (B-1) tablet 100 mg, 100 mg, Oral, DAILY, Roxi Wills MD, 100 mg at 10/05/22 4368    diazePAM (VALIUM) tablet 2 mg, 2 mg, Oral, Q6H PRN, Roxi Wills MD, 2 mg at 10/04/22 2041    [Held by provider] dextrose 5% and 0.9% NaCl infusion, 50 mL/hr, IntraVENous, CONTINUOUS, Rosana Flores NP, Stopped at 10/03/22 0830    [Held by provider] metoprolol tartrate (LOPRESSOR) tablet 25 mg, 25 mg, Oral, Q12H, Yue Mercado NP, 25 mg at 10/02/22 2127    0.9% sodium chloride infusion 250 mL, 250 mL, IntraVENous, PRN, Roxi Wills MD    albuterol (PROVENTIL VENTOLIN) nebulizer solution 2.5 mg, 2.5 mg, Nebulization, Q6H PRN, Roxi Wills MD, 2.5 mg at 10/03/22 4409    sodium chloride (NS) flush 5-40 mL, 5-40 mL, IntraVENous, Q8H, SAL Velásquez MD, 10 mL at 10/05/22 1327    sodium chloride (NS) flush 5-40 mL, 5-40 mL, IntraVENous, PRN, BANDAR Velásquez MD    acetaminophen (TYLENOL) tablet 650 mg, 650 mg, Oral, Q6H PRN, 650 mg at 10/02/22 2127 **OR** acetaminophen (TYLENOL) suppository 650 mg, 650 mg, Rectal, Q6H PRN, CAMILLE Velásquez MD    ondansetron (ZOFRAN ODT) tablet 4 mg, 4 mg, Oral, Q8H PRN, 4 mg at 10/01/22 0153 **OR** ondansetron (ZOFRAN) injection 4 mg, 4 mg, IntraVENous, Q6H PRN, ERIK Velásquez MD, 4 mg at 10/01/22 0906    pantoprazole (PROTONIX) 40 mg in 0.9% sodium chloride 10 mL injection, 40 mg, IntraVENous, DAILY, Norma Velásquez MD, 40 mg at 10/05/22 0907    prochlorperazine (COMPAZINE) injection 10 mg, 10 mg, IntraVENous, Q6H PRN, BALDOMERO Velásquez MD, 10 mg at 10/03/22 [address] [facility].  JOSEPHINE Mercado  [facility] [address], [address] [place] [address]  [facility]  [phone]      CC:Femi Alba NP

## 2022-10-05 NOTE — CONSULTS
Infectious Disease Consult Note    Reason for Consult: liver abscess   Date of Consultation: October 5, 2022  Date of Admission: 9/28/2022  Referring Physician: Dr Jesus Liriano       HPI: Unable to obtain much history from the patient. Discussed with nurse in the ICU. Most history is obtained from chart review    Ms Shravan Solares is a 77year old lady with hx of malignancy, s/p liver biopsy (adenocarcinoma) ,  adenoma of the colon, atrial fibrillation, seizure who was admitted with abdominal symptoms including abdominal pain and nausea. There is also reports of diarrhea. She had an ultrasound-guided biopsy of the right hepatic lobe on 9/15/2022. She had a CT scan repeated on 9/28/2022 that was read as progression of hepatic lesions and new large fluid collections in the liver. Intrahepatic abscesses with nonspecific infection most likely. Ultrasound was read as complex collection within the liver and the larger complex collection in the subscapular region of the liver. This may represent resolving hematomas or abscess. Her blood culture during this admission was negative And testing for C. difficile is was negative    Patient tells me that she knows she has cancer.  She is otherwise not able to give me much history aprt from from feeling sick to   her stomach all the time, and back pain     She is on flagyl, doxycyline, and vancomycin iv           Past Medical History:  Past Medical History:   Diagnosis Date    Adenoma of colon     Anemia     Arthritis     Atrial fibrillation (HCC)     Bloating     Chronic constipation     Chronic pain     back     COPD (chronic obstructive pulmonary disease) (HCC)     GERD (gastroesophageal reflux disease)     [place] (hard of hearing)     Hyponatremia     caused seizures x 2 per pt    Indigestion     Osteoporosis     Seizures ([address] 7/2020, 9/2020    x 2          Surgical History:  Past Surgical History:   Procedure Laterality Date    COLONOSCOPY N/A 12/16/2021    COLONOSCOPY   :- performed by Darlene Boeck., MD at [facility] N/A 2/14/2022    . performed by Tahir Kahn MD at [facility] MAIN OR    HX [address]    HX CHOLECYSTECTOMY  1982    HX HIP REPLACEMENT Left     pt stated hip was pinned, not replaced    HX HIP REPLACEMENT Right     pt stated hip was pinned, not replaced    HX HYSTERECTOMY      HX ORTHOPAEDIC  1990, 2011    bilateral hip fractures     HX ORTHOPAEDIC Left 2020    femur fracture    HX TONSILLECTOMY           Family History:   Family History   Problem Relation Age of Onset    Heart Disease Mother     Heart Disease Father     Liver Disease Father     Alcohol abuse Father     Anesth Problems Neg Hx          Social History:     Patient denied alcohol drugs or smoking  No recent travel hx       Allergies: Allergies   Allergen Reactions    Cefuroxime Hives    Hydrocodone Nausea and Vomiting    Other Medication Diarrhea and Nausea and Vomiting     PT REPORTS ALL MYCINS CAUSE SEVERE GI UPSET    Oxycodone Nausea and Vomiting    Penicillins Hives     Patient screened for any delayed non-IgE-mediated reaction to PCN. Patient notes the following:    No delayed non-IgE-mediated reaction to PCN    Hives 1969               Review of Systems:     Attempted 10 point ROS but unable to fully obtain   Patient admits to back pain, feeling sick to her stomach    Medications:  No current facility-administered medications on file prior to encounter. Current Outpatient Medications on File Prior to Encounter   Medication Sig Dispense Refill    dicyclomine (BENTYL) 10 mg capsule Take 1 Capsule by mouth four (4) times daily. 1 Capsule 0    ondansetron (ZOFRAN ODT) 4 mg disintegrating tablet Take 1 Tablet by mouth every eight (8) hours as needed for Nausea or Vomiting. 1 Tablet 0    polyethylene glycol (MIRALAX) 17 gram packet Take 1 Packet by mouth every twelve (12) hours.  1 Each 0    simethicone (MYLICON) 80 mg chewable tablet Take 1 Tablet by mouth four (4) times daily as needed for GI Pain. 2 Tablet 0    lubiPROStone (AMITIZA) 24 mcg capsule Take 1 Capsule by mouth two (2) times daily (with meals) for 30 days. Recommended by GI service 10 Capsule 0    butalbital-acetaminophen-caffeine (FIORICET, ESGIC) -40 mg per tablet Take 2 Tablets by mouth two (2) times daily as needed for Headache or Migraine. 6 Tablet 0    digoxin (LANOXIN) 0.125 mg tablet Take 0.125 mg by mouth daily. metoprolol tartrate (LOPRESSOR) 25 mg tablet Take 12.5 mg by mouth two (2) times a day. cyclobenzaprine (FLEXERIL) 10 mg tablet Take 10 mg by mouth three (3) times daily as needed. aspirin 81 mg chewable tablet Take 81 mg by mouth daily. multivitamin (ONE A DAY) tablet Take 1 Tablet by mouth daily. acetaminophen (TYLENOL) 325 mg tablet Take 650 mg by mouth every four (4) hours as needed for Pain. albuterol (PROVENTIL HFA, VENTOLIN HFA, PROAIR HFA) 90 mcg/actuation inhaler Take 2 Puffs by inhalation every six (6) hours as needed. pantoprazole (PROTONIX) 40 mg tablet Take 40 mg by mouth daily.              Physical Exam:    Vitals: Patient Vitals for the past 24 hrs:   Temp Pulse Resp BP SpO2   10/05/22 1700 -- 85 16 109/61 98 %   10/05/22 1600 97.6 °F (36.4 °C) 94 21 128/75 --   10/05/22 1500 -- 90 24 132/62 --   10/05/22 1405 -- -- -- -- 95 %   10/05/22 1400 -- 91 24 129/69 96 %   10/05/22 1300 -- 88 14 110/68 95 %   10/05/22 1200 97.4 °F (36.3 °C) 79 15 97/62 95 %   10/05/22 1100 -- 78 14 (!) 102/52 93 %   10/05/22 1000 -- (!) 101 20 95/65 94 %   10/05/22 0900 -- 74 15 (!) 103/57 97 %   10/05/22 0829 -- -- -- -- 95 %   10/05/22 0800 97.2 °F (36.2 °C) 85 14 103/61 93 %   10/05/22 0700 -- 70 14 (!) 95/54 92 %   10/05/22 0600 -- 76 15 (!) 92/51 94 %   10/05/22 0500 -- 89 17 104/61 93 %   10/05/22 0400 98.1 °F (36.7 °C) 73 17 98/63 95 %   10/05/22 0300 -- 71 15 99/63 95 %   10/05/22 0200 -- 75 13 92/67 97 %   10/05/22 0100 -- 77 15 (!) 107/54 93 %   10/05/22 0000 98.3 °F (36.8 °C) 80 16 (!) 82/46 96 %   10/04/22 2300 -- 85 14 (!) 99/51 94 %   10/04/22 2200 -- 83 14 (!) 88/60 94 %   10/04/22 2100 -- 90 18 (!) 97/53 94 %   10/04/22 2007 -- -- -- -- 96 %   10/04/22 2000 97.7 °F (36.5 °C) (!) 109 21 (!) 103/59 95 %   10/04/22 1900 -- (!) 105 21 (!) 97/55 94 %   10/04/22 1825 97.4 °F (36.3 °C) (!) 109 28 (!) 100/41 95 %   10/04/22 1800 -- (!) 109 21 (!) 120/49 97 %   10/04/22 1730 -- (!) 102 19 (!) 86/42 --     GEN: NAD, cachetic appearing   HEENT:  no scleral icterus, , no thrush  CV: S1, S2   Lungs: Clear to auscultation bilaterally anteriorly  Abdomen: soft, tender all over   Extremities: no edema  Neuro: Alert, to self, verbal   Skin: no rash  Psych: agitated , non tearful   MSK no erythema knees, ankles       Labs:   Recent Results (from the past 24 hour(s))   HEPATIC FUNCTION PANEL    Collection Time: 10/05/22  3:21 AM   Result Value Ref Range    Protein, total 4.9 (L) 6.4 - 8.2 g/dL    Albumin 2.8 (L) 3.5 - 5.0 g/dL    Globulin 2.1 2.0 - 4.0 g/dL    A-G Ratio 1.3 1.1 - 2.2      Bilirubin, total 0.6 0.2 - 1.0 MG/DL    Bilirubin, direct 0.2 0.0 - 0.2 MG/DL    Alk.  phosphatase 150 (H) 45 - 117 U/L    AST (SGOT) 98 (H) 15 - 37 U/L    ALT (SGPT) 169 (H) 12 - 78 U/L   DIGOXIN    Collection Time: 10/05/22  3:21 AM   Result Value Ref Range    Digoxin level 1.1 0.90 - 5.77 NG/ML   METABOLIC PANEL, BASIC    Collection Time: 10/05/22  3:21 AM   Result Value Ref Range    Sodium 137 136 - 145 mmol/L    Potassium 3.9 3.5 - 5.1 mmol/L    Chloride 107 97 - 108 mmol/L    CO2 22 21 - 32 mmol/L    Anion gap 8 5 - 15 mmol/L    Glucose 111 (H) 65 - 100 mg/dL    BUN 12 6 - 20 MG/DL    Creatinine 0.66 0.55 - 1.02 MG/DL    BUN/Creatinine ratio 18 12 - 20      eGFR >60 >60 ml/min/1.73m2    Calcium 8.2 (L) 8.5 - 10.1 MG/DL   CBC WITH AUTOMATED DIFF    Collection Time: 10/05/22  3:21 AM   Result Value Ref Range    WBC 10.8 3.6 - 11.0 K/uL    RBC 3.07 (L) 3.80 - 5.20 M/uL HGB 8.5 (L) 11.5 - 16.0 g/dL    HCT 25.1 (L) 35.0 - 47.0 %    MCV 81.8 80.0 - 99.0 FL    MCH 27.7 26.0 - 34.0 PG    MCHC 33.9 30.0 - 36.5 g/dL    RDW 23.4 (H) 11.5 - 14.5 %    PLATELET 537 709 - 498 K/uL    MPV 9.7 8.9 - 12.9 FL    NRBC 0.0 0  WBC    ABSOLUTE NRBC 0.00 0.00 - 0.01 K/uL    NEUTROPHILS 76 (H) 32 - 75 %    LYMPHOCYTES 8 (L) 12 - 49 %    MONOCYTES 10 5 - 13 %    EOSINOPHILS 4 0 - 7 %    BASOPHILS 1 0 - 1 %    IMMATURE GRANULOCYTES 1 (H) 0.0 - 0.5 %    ABS. NEUTROPHILS 8.2 (H) 1.8 - 8.0 K/UL    ABS. LYMPHOCYTES 0.9 0.8 - 3.5 K/UL    ABS. MONOCYTES 1.1 (H) 0.0 - 1.0 K/UL    ABS. EOSINOPHILS 0.4 0.0 - 0.4 K/UL    ABS. BASOPHILS 0.1 0.0 - 0.1 K/UL    ABS. IMM.  GRANS. 0.1 (H) 0.00 - 0.04 K/UL    DF SMEAR SCANNED      RBC COMMENTS ANISOCYTOSIS  2+        RBC COMMENTS ANA CELLS  PRESENT       VANCOMYCIN, RANDOM    Collection Time: 10/05/22  3:21 AM   Result Value Ref Range    Vancomycin, random 19.7 UG/ML   VITAMIN D, 25 HYDROXY    Collection Time: 10/05/22  3:21 AM   Result Value Ref Range    Vitamin D 25-Hydroxy 48.1 30 - 100 ng/mL       Microbiology Data:       Blood: negative        Urine:  Contains abnormal data CULTURE, URINE  Order: [id number]  Collected 9/28/2022 10:37    Status: Final result    Specimen Information: Clean catch; Urine   0 Result Notes  Component Ref Range & Units 9/28/22 1037    Special Requests:   NO SPECIAL REQUESTS    [place] Count   >100,000   COLONIES/mL    Culture result:   ESCHERICHIA COLI Abnormal     Resulting [place]        Susceptibility     Escherichia coli     JOBY     Amikacin ($) Susceptible     Ampicillin ($) Intermediate     Ampicillin/sulbactam ($) Susceptible     Cefazolin ($) Susceptible     Cefepime ($$) Susceptible     Cefoxitin Susceptible     Ceftazidime ($) Susceptible     Ceftriaxone ($) Susceptible     Ciprofloxacin ($) Susceptible     Gentamicin ($) Susceptible     Levofloxacin ($) Susceptible     Meropenem ($$) Susceptible Nitrofurantoin Susceptible     Piperacillin/Tazobac ($) Susceptible     Tobramycin ($) Susceptible                          Imaging:     CT ABD PELV W CONT: Patient Communication     Add Comments   Not seen     Study Result    Narrative & Impression   EXAM: CT ABD PELV W CONT     INDICATION: Diffuse abdominal pain and diarrhea. Rectal constipation earlier  this month. Nonspecific liver disease on previous imaging. Biliary dilatation on  previous imaging. Elevated alkaline phosphatase. Normal white blood cell count,  bilirubin, lipase. COMPARISON: CT abdomen/pelvis on 9/12/2022. CONTRAST: 100 mL of Isovue-370. ORAL CONTRAST: None     TECHNIQUE:   Following the uneventful intravenous administration of contrast, thin axial  images were obtained through the abdomen and pelvis. Coronal and sagittal  reconstructions were generated. CT dose reduction was achieved through use of a  standardized protocol tailored for this examination and automatic exposure  control for dose modulation. FINDINGS:   LOWER THORAX: Small right pleural effusion is new. No basilar pneumonia. Normal  cardiac size. LIVER: Segment 7/8 subcapsular collection of heterogeneous fluid measures 9.3 x  6.9 x 3.5 cm. Heterogeneous septated fluid attenuation throughout the right  hepatic lobe measures approximately 13 cm in oblique AP diameter. Central right  hepatic lobe peripherally enhancing lesion previously measured 1.9 cm and now  measures 2.4 cm. Unchanged mild intrahepatic biliary dilatation. BILIARY TREE: Cholecystectomy. Unchanged chronic biliary dilatation. No abrupt  termination. SPLEEN: Normal size. Lobulated contour. PANCREAS: Mild diffuse atrophy. No mass or inflammation. ADRENALS: Unremarkable. KIDNEYS: No hydronephrosis. Heterogeneous small multiple cysts. STOMACH: Unremarkable. SMALL BOWEL: No dilatation or wall thickening. COLON: Incomplete distention, limited evaluation.  Fluid-filled cecum is in the  pelvis. APPENDIX: Normal.  PERITONEUM: No ascites or pneumoperitoneum. RETROPERITONEUM: Aortic atherosclerosis without aneurysm. 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 No lymphadenopathy. REPRODUCTIVE ORGANS: Hysterectomy. URINARY BLADDER: Incomplete distention, limited evaluation. BONES: Osteopenia. Femoral hardware. Spinal curvature. T11 partial compression  fracture. ABDOMINAL WALL: No mass or hernia. ADDITIONAL COMMENTS: Diffuse muscle atrophy. IMPRESSION     1. Progression of hepatic lesions and new large fluid collections in the liver. Intrahepatic abscesses from nonspecific infection are most likely. Consider  fluid sampling or drainage with CT guidance. 2. New small right pleural effusion. 3. Fluid-filled cecum may indicate infectious colitis. No bowel obstruction. Assessment / Plan:       1) Hepatic collections:  Abscess +/- hematoma in the setting of malignancy   S/P Liver biopsy 9/2022  On Vancomycin   Will stop doxycycline and flagyl   Instead meropenem IV ( monitor closely for seizures )  Recommend any possible drainage of collections but may be complicated in setting of malignancy. Please any cultures for bacterial aerobic, anaerobic, fungus, afb, cytology   As WBC is improving will hold off on empiric antifungal for now   If clinical worsening, consider adding anidulafungin   Noted palliative and oncology consulted for goals of care     2) copd  3) metastatic adenocarcinoma, liver involvement   4) tubular adenoma colon   5) seizure hx per chart             Thank for the opportunity to participate in the care of this patient. Please contact with questions or concerns.        Елена Sanchez DO  5:33 PM

## 2022-10-05 NOTE — PROGRESS NOTES
HISTORY OF PRESENT ILLNESS: 27-year-old woman who presented with abdominal pain and nausea associated with watery diarrhea. Recently diagnosed with metastatic adenocarcinoma-unknown primary. In the ED CT done showed progression of hepatic lesions with new large fluid collection in the liver. On the floor she was being managed for;    Possible sepsis-possible hepatic abscess  Shortness of breath  CHF-echo on this admission showed an EF of 25 to 30% with impressive akinesis  A. fib RVR  UTI    Over the past 24 to 48 hours she developed hypotension, despite midodrine and resuscitation with albumin remained hypotensive. Being transferred to the ICU for pressor therapy. Patient seen, complaining of shortness of breath    Interval history    10/5-ended up not requiring Kenneth-Synephrine yesterday, heart rate better controlled on amiodarone infusion    VITAL SIGNS:  Visit Vitals  /68   Pulse 88   Temp 97.4 °F (36.3 °C)   Resp 14   Ht 5' 3\" (1.6 m)   Wt 41.5 kg (91 lb 7.9 oz)   SpO2 95%   BMI 16.21 kg/m²     PHYSICAL EXAMINATION:  Constitutional:  No acute distress, cooperative, pleasant, thin   ENT:  Oral mucosa moist, oropharynx benign. Resp:  Comfortable WOB, faint wheezing bilaterally    CV:  Irregular, tachy,  no murmurs, gallops, rubs    GI:  Soft, non distended, mildly tender. normoactive bowel sounds    Musculoskeletal:  Warm, 2+ pulses throughout. 2+ edema LEs     Neurologic: Generalized weakness, moves all extremities.   AAOx3, CN II-XII reviewed       LABORATORY ANALYSIS:  Recent Results (from the past 24 hour(s))   POC VENOUS BLOOD GAS    Collection Time: 10/04/22  4:56 PM   Result Value Ref Range    Device: NASAL CANNULA      pH, venous (POC) 7.49 (H) 7.32 - 7.42      pCO2, venous (POC) 26.6 (L) 41 - 51 MMHG    pO2, venous (POC) 76 (H) 25 - 40 mmHg    HCO3, venous (POC) 20.4 (L) 23.0 - 28.0 MMOL/L    sO2, venous (POC) 96.5 (H) 65 - 88 %    Base deficit, venous (POC) 2.3 mmol/L    Allens test (POC) Positive      Site RIGHT RADIAL      Specimen type (POC) VENOUS BLOOD      Performed by Rain GONZALEZ    HEPATIC FUNCTION PANEL    Collection Time: 10/05/22  3:21 AM   Result Value Ref Range    Protein, total 4.9 (L) 6.4 - 8.2 g/dL    Albumin 2.8 (L) 3.5 - 5.0 g/dL    Globulin 2.1 2.0 - 4.0 g/dL    A-G Ratio 1.3 1.1 - 2.2      Bilirubin, total 0.6 0.2 - 1.0 MG/DL    Bilirubin, direct 0.2 0.0 - 0.2 MG/DL    Alk. phosphatase 150 (H) 45 - 117 U/L    AST (SGOT) 98 (H) 15 - 37 U/L    ALT (SGPT) 169 (H) 12 - 78 U/L   DIGOXIN    Collection Time: 10/05/22  3:21 AM   Result Value Ref Range    Digoxin level 1.1 0.90 - 5.74 NG/ML   METABOLIC PANEL, BASIC    Collection Time: 10/05/22  3:21 AM   Result Value Ref Range    Sodium 137 136 - 145 mmol/L    Potassium 3.9 3.5 - 5.1 mmol/L    Chloride 107 97 - 108 mmol/L    CO2 22 21 - 32 mmol/L    Anion gap 8 5 - 15 mmol/L    Glucose 111 (H) 65 - 100 mg/dL    BUN 12 6 - 20 MG/DL    Creatinine 0.66 0.55 - 1.02 MG/DL    BUN/Creatinine ratio 18 12 - 20      eGFR >60 >60 ml/min/1.73m2    Calcium 8.2 (L) 8.5 - 10.1 MG/DL   CBC WITH AUTOMATED DIFF    Collection Time: 10/05/22  3:21 AM   Result Value Ref Range    WBC 10.8 3.6 - 11.0 K/uL    RBC 3.07 (L) 3.80 - 5.20 M/uL    HGB 8.5 (L) 11.5 - 16.0 g/dL    HCT 25.1 (L) 35.0 - 47.0 %    MCV 81.8 80.0 - 99.0 FL    MCH 27.7 26.0 - 34.0 PG    MCHC 33.9 30.0 - 36.5 g/dL    RDW 23.4 (H) 11.5 - 14.5 %    PLATELET 329 663 - 766 K/uL    MPV 9.7 8.9 - 12.9 FL    NRBC 0.0 0  WBC    ABSOLUTE NRBC 0.00 0.00 - 0.01 K/uL    NEUTROPHILS 76 (H) 32 - 75 %    LYMPHOCYTES 8 (L) 12 - 49 %    MONOCYTES 10 5 - 13 %    EOSINOPHILS 4 0 - 7 %    BASOPHILS 1 0 - 1 %    IMMATURE GRANULOCYTES 1 (H) 0.0 - 0.5 %    ABS. NEUTROPHILS 8.2 (H) 1.8 - 8.0 K/UL    ABS. LYMPHOCYTES 0.9 0.8 - 3.5 K/UL    ABS. MONOCYTES 1.1 (H) 0.0 - 1.0 K/UL    ABS. EOSINOPHILS 0.4 0.0 - 0.4 K/UL    ABS. BASOPHILS 0.1 0.0 - 0.1 K/UL    ABS. IMM.  GRANS. 0.1 (H) 0.00 - 0.04 K/UL    DF SMEAR SCANNED      RBC COMMENTS ANISOCYTOSIS  2+        RBC COMMENTS ANA CELLS  PRESENT       VANCOMYCIN, RANDOM    Collection Time: 10/05/22  3:21 AM   Result Value Ref Range    Vancomycin, random 19.7 UG/ML   VITAMIN D, 25 HYDROXY    Collection Time: 10/05/22  3:21 AM   Result Value Ref Range    Vitamin D 25-Hydroxy 48.1 30 - 100 ng/mL     No results displayed because visit has over 200 results.         EKG Results       Procedure 720 Value Units Date/Time    EKG, 12 LEAD, INITIAL [[id number]] Collected: 09/30/22 1931    Order Status: Completed Updated: 10/03/22 1659     Ventricular Rate 140 BPM      Atrial Rate 147 BPM      QRS Duration 80 ms      Q-T Interval 264 ms      QTC Calculation (Bezet) 403 ms      Calculated R Axis 80 degrees      Calculated T Axis 19 degrees      Diagnosis --     Supraventricular tachycardia  Nonspecific ST abnormality    When compared with ECG of 30-SEP-2022 19:30,  No significant change  Confirmed by Demetria Jara M.D., Guy Deshpande ([id number]) on 10/3/2022 4:59:12 PM      EKG, 12 LEAD, INITIAL [[id number]] Collected: 09/30/22 1933    Order Status: Completed Updated: 10/03/22 0958     Ventricular Rate 143 BPM      Atrial Rate 144 BPM      QRS Duration 82 ms      Q-T Interval 264 ms      QTC Calculation (Bezet) 407 ms      Calculated R Axis 76 degrees      Calculated T Axis 38 degrees      Diagnosis --     Supraventricular tachycardia  Nonspecific ST abnormality    Confirmed by Demetria Jara M.D., Guy Deshpande ([id number]) on 10/3/2022 9:57:58 AM      EKG, 12 LEAD, INITIAL [[id number]]     Order Status: Completed     EKG, 12 LEAD, INITIAL [[id number]] Collected: 09/30/22 1927    Order Status: Completed Updated: 10/02/22 0855     Ventricular Rate 142 BPM      Atrial Rate 141 BPM      QRS Duration 74 ms      Q-T Interval 266 ms      QTC Calculation (Bezet) 409 ms      Calculated R Axis 89 degrees      Calculated T Axis 54 degrees      Diagnosis --     Supraventricular tachycardia  Low voltage QRS  Nonspecific T wave abnormality  When compared with ECG of 30-SEP-2022 14:47,  MANUAL COMPARISON REQUIRED, DATA IS UNCONFIRMED  Confirmed by Mindi Cedillo ([id number]) on 10/2/2022 8:55:36 AM      EKG, 12 LEAD, INITIAL [[id number]] Collected: 09/30/22 1318    Order Status: Completed Updated: 10/01/22 1052     Ventricular Rate 99 BPM      Atrial Rate 99 BPM      P-R Interval 112 ms      QRS Duration 76 ms      Q-T Interval 342 ms      QTC Calculation (Bezet) 438 ms      Calculated P Axis 77 degrees      Calculated R Axis 94 degrees      Calculated T Axis 120 degrees      Diagnosis --     Sinus rhythm with premature supraventricular complexes  Rightward axis  Low voltage QRS  Septal infarct , age undetermined  When compared with ECG of 30-SEP-2022 05:58,  Sinus rhythm has replaced Atrial fibrillation  Non-specific change in ST segment in Anterior leads  Nonspecific T wave abnormality no longer evident in Inferior leads  Nonspecific T wave abnormality, improved in Anterolateral leads  Confirmed by Mindi Cedillo ([id number]) on 10/1/2022 10:52:14 AM      EKG, 12 LEAD, INITIAL [[id number]] Collected: 09/30/22 1447    Order Status: Completed Updated: 10/01/22 1049     Ventricular Rate 95 BPM      Atrial Rate 95 BPM      P-R Interval 88 ms      QRS Duration 80 ms      Q-T Interval 346 ms      QTC Calculation (Bezet) 434 ms      Calculated P Axis 75 degrees      Calculated R Axis 94 degrees      Calculated T Axis 132 degrees      Diagnosis --     Sinus rhythm with short MO with premature supraventricular complexes  Low voltage QRS  Anterolateral infarct , age undetermined  When compared with ECG of 30-SEP-2022 13:18,  MANUAL COMPARISON REQUIRED, DATA IS UNCONFIRMED  Confirmed by Mindi Cedillo [id number]) on 10/1/2022 10:49:13 AM      EKG, 12 LEAD, INITIAL [[id number]] Collected: 09/30/22 0530    Order Status: Completed Updated: 09/30/22 1448     Ventricular Rate 190 BPM      Atrial Rate 190 BPM      QRS Duration 82 ms      Q-T Interval 240 ms      QTC Calculation (Bezet) 426 ms Calculated R Axis 118 degrees      Calculated T Axis 126 degrees      Diagnosis --     Atrial fibrillation with rapid ventricular response  Nonspecific ST abnormality  When compared with ECG of 28-SEP-2022 05:12,  Current undetermined rhythm precludes rhythm comparison, needs review  QRS axis shifted right  Septal infarct is now present  T wave inversion no longer evident in Anterior leads  Confirmed by Charo Oliver MD ([id number]) on 9/30/2022 2:48:20 PM      EKG, 12 LEAD, INITIAL [[id number]] Collected: 09/30/22 0558    Order Status: Completed Updated: 09/30/22 1447     Ventricular Rate 142 BPM      Atrial Rate 144 BPM      QRS Duration 90 ms      Q-T Interval 354 ms      QTC Calculation (Bezet) 544 ms      Calculated R Axis 93 degrees      Calculated T Axis 76 degrees      Diagnosis --     Atrial fibrillation with rapid ventricular response  Rightward axis  Nonspecific ST and T wave abnormality  When compared with ECG of 30-SEP-2022 05:30,  Previous ECG has undetermined rhythm, needs review  Nonspecific T wave abnormality no longer evident in Inferior leads  Nonspecific T wave abnormality, improved in Lateral leads  Confirmed by Charo Oliver MD [id number]) on 9/30/2022 2:47:08 PM      EKG, 12 LEAD, INITIAL [[id number]] Collected: 09/28/22 0512    Order Status: Completed Updated: 09/28/22 2141     Ventricular Rate 92 BPM      Atrial Rate 92 BPM      P-R Interval 126 ms      QRS Duration 78 ms      Q-T Interval 348 ms      QTC Calculation (Bezet) 430 ms      Calculated P Axis 77 degrees      Calculated R Axis 84 degrees      Calculated T Axis 25 degrees      Diagnosis --     Sinus rhythm with premature supraventricular complexes and with occasional   premature ventricular complexes  Nonspecific ST and T wave abnormality  When compared with ECG of 21-AUG-2022 20:06,  premature ventricular complexes are now present  premature supraventricular complexes are now present  Nonspecific T wave abnormality has replaced inverted T waves in Inferior   leads  T wave inversion more evident in Anterior leads  Confirmed by Antonella Toussaint ([id number]) on 9/28/2022 9:41:22 PM                RADIOGRAPHIC STUDIES:  XR CHEST PORT  Narrative: EXAM: Portable CXR. 2051 hours. COMPARISON: 1032 hours. INDICATION: post line placement    FINDINGS:  Right jugular CVL has been placed with tip in the SVC without pneumothorax. There are stable small pleural effusions. Lungs are hyperinflated. There is no  acute airspace disease/edema. Heart size is normal. There are granulomatous  sylvia calcifications. Impression: Satisfactory CVL placement. No pneumothorax. Stable small pleural effusions. Emphysema. XR CHEST PORT  Narrative: EXAM: XR CHEST PORT    DATE: 10/3/2022 10:39 AM    INDICATION: Increased WOB    COMPARISON: Chest radiograph 10/3/2022, chest CT 6/29/2022    TECHNIQUE: A portable AP radiograph of the chest was obtained. FINDINGS:   Stable cardiac and mediastinal contours. Stable bilateral hilar calcifications. No overt pulmonary edema. No significant interval change in diffusely prominent  interstitial markings throughout bilateral lung fields. Stable right infrahilar  airspace disease. Small bilateral pleural effusions, unchanged. No pneumothorax. Impression: No significant interval change. Prominent interstitial opacities bilaterally as  well as right infrahilar airspace disease. Stable small bilateral pleural  effusions. US ABD LTD  Narrative: INDICATION: eval hematoma v abscess in liver    COMPARISON: None    TECHNIQUE:  Routine ultrasound images of the abdomen were obtained. FINDINGS:  LIVER: Again noted is multiple branching tubular-like collections tracking  throughout the liver which demonstrate internal hypoechoic areas with echogenic  debris centrally. Also again noted is a subcapsular collection at the dome of  the liver which is not significantly changed from the prior CT.  No peripheral  hyperemia is present around these collections. MAIN PORTAL VEIN: Main portal vein is patent. Algis Broaden GALLBLADDER: Surgically absent. COMMON BILE DUCT: 4.7 mm in diameter. No significant dilatation. PANCREAS: Not well visualized due to bowel gas though visualized portions are  unremarkable. RIGHT KIDNEY: 8.3 cm in length. Simple cyst right kidney requiring no further  follow-up. Possible 6 mm stone in the right kidney. .  OTHER: N/A. Impression: Complex collection within the liver and larger complex collection in the  subcapsular region of the liver. No peripheral hyperemia is identified. This may  represent resolving hematomas or abscesses. .   XR CHEST PORT  Narrative: EXAM:  XR CHEST PORT    INDICATION: Shortness of breath    COMPARISON: 9/30/2022    TECHNIQUE: Portable AP upright chest view at 0128 hours    FINDINGS: The cardiomediastinal contours are stable. Calcified mediastinal lymph  nodes are again noted. There are stable diffuse interstitial opacities. There are increased small  pleural effusions, right greater than left. There is no pneumothorax. The bones  and upper abdomen are stable. Impression: Stable diffuse interstitial opacities. Increased small pleural effusions, right  greater than left.         DIAGNOSES:  Hypotension/shock-unspecified, possible sepsis  A. fib RVR  Acute respiratory failure  Hepatic abscesses versus organized hematomas  Metastatic adenocarcinoma      IMPRESSION AND PLAN:    Neuro  Delirium prevention strategies    Cardiac-continue hemodynamic monitoring, map goal greater than 65, on midodrine, A. fib RVR, heart rate goal less than 110, keep magnesium above 2 and potassium above 4, continue digoxin, amiodarone therapy, EF of 25 to 30% with severe hypokinesis of the mid to distal portions of the anterior, lateral and inferior walls with akinesis at the apex with some RV dysfunction as well-CAD versus Takotsubo, should get a repeat echo in a few days, follow-up cardiology recommendations    Pulmonary-supplemental oxygen as needed, presumed COPD, standing bronchodilator/steroid therapy for now, saturation goal 88 to 94%    GI-diet as tolerated, PPI therapy-Home med    Renal-monitor urine output, correct electrolyte derangements as needed    Hematology-metastatic adenocarcinoma follow-up oncology recommendations, possible organized hepatic hematoma, SCDs only for DVT prophylaxis for now    ID-hepatic abscesses versus organized hematoma continue vancomycin/Flagyl/Doxy for now-follow-up micro studies, ID consulted, follow-up recommendations    Endocrinology-keep euglycemic    Prognosis very guarded to poor at this time-follow-up oncology and palliative care team [address] discussions with patient and NOK    Time 35 mins    This note has been written with voice recognition software. While this note has been edited for accuracy, the software periodically misinterprets speech resulting in errors that might not have been caught in editing. In the event an unusual error is found in this record, please read the chart carefully and recognize, using context, where these substitutions or errors have occurred and please notify me to resolve the errors.

## 2022-10-05 NOTE — PROGRESS NOTES
1825: TRANSFER - IN REPORT:    Verbal report received from [place], RN(name) on [employer]  being received from [facility](unit) for change in patient condition(BP low)    Report consisted of patients Situation, Background, Assessment and   Recommendations(SBAR). Information from the following report(s) SBAR, Intake/Output, MAR, Recent Results, and Cardiac Rhythm Afib  was reviewed with the receiving nurse. Opportunity for questions and clarification was provided. Assessment completed upon patients arrival to unit and care assumed. 1845: Dual Skin Assessment performed w/ HERMAN Prieto    1906: Pt in Afib- Amio gtt started per orders     1930: Bedside and Verbal shift change report given to Bismark Kasper RN (oncoming nurse) by Marta Bass RN (offgoing nurse). Report included the following information SBAR, Intake/Output, MAR, Recent Results, and Cardiac Rhythm Afib .

## 2022-10-05 NOTE — PROGRESS NOTES
Day #3 of Vancomycin  Indication:  sepsis  - possible hepatic abscesses vs. organized hematoma  Current regimen:  500 mg Q12h  Abx regimen:  doxycyline, Flagyl, vancomycin  ID Following ?: NO  Concomitant nephrotoxic drugs (requires more frequent monitoring): None  Frequency of BMP?: daily thru 10/6    Recent Labs     10/05/22  0321 10/04/22  0233 10/03/22  0351   WBC 10.8 13.0* 15.4*   CREA 0.66 0.70 0.80   BUN 12 9 9     Est CrCl: ~45 ml/min; UO: >1 ml/kg/hr  Temp (24hrs), Av.8 °F (36.6 °C), Min:97.4 °F (36.3 °C), Max:98.3 °F (36.8 °C)    Cultures:    C.diff - negative   urine - E.coli - pan-sensitive (completed course of Bactrim)  10/3 blood: NGTD, prelim    MRSA Swab ordered (if applicable)? N/A    Goal target range AUC/JOBY 400-600    Recent level history:  Date/Time Dose & Interval Measured Level (mcg/mL) Associated AUC/JOBY Dose Adjustment    10/5 @ 0321 500mg q12h 19.7 (2.5 hrs p dose) 444 None                                           Plan: Continue current regimen.  AUC/JOBY within goal

## 2022-10-05 NOTE — PROGRESS NOTES
Order for Midline reviewed. Midline not appropriate as vesicants infusing. RN reports CL leaking. Recommend to switch out IJ at this time or PICC line.  RN to follow up w/MD.

## 2022-10-06 ENCOUNTER — APPOINTMENT (OUTPATIENT)
Dept: GENERAL RADIOLOGY | Age: 77
DRG: 435 | End: 2022-10-06
Attending: ANESTHESIOLOGY
Payer: MEDICARE

## 2022-10-06 PROBLEM — F41.9 ANXIETY: Status: ACTIVE | Noted: 2022-10-06

## 2022-10-06 PROBLEM — E43 SEVERE PROTEIN-CALORIE MALNUTRITION (HCC): Status: ACTIVE | Noted: 2022-10-06

## 2022-10-06 LAB
ANION GAP SERPL CALC-SCNC: 9 MMOL/L (ref 5–15)
BASOPHILS # BLD: 0 K/UL (ref 0–0.1)
BASOPHILS NFR BLD: 0 % (ref 0–1)
BUN SERPL-MCNC: 15 MG/DL (ref 6–20)
BUN/CREAT SERPL: 22 (ref 12–20)
CALCIUM SERPL-MCNC: 8.8 MG/DL (ref 8.5–10.1)
CHLORIDE SERPL-SCNC: 105 MMOL/L (ref 97–108)
CO2 SERPL-SCNC: 21 MMOL/L (ref 21–32)
CREAT SERPL-MCNC: 0.69 MG/DL (ref 0.55–1.02)
DIFFERENTIAL METHOD BLD: ABNORMAL
DIGOXIN SERPL-MCNC: 1.1 NG/ML (ref 0.9–2)
EOSINOPHIL # BLD: 0 K/UL (ref 0–0.4)
EOSINOPHIL NFR BLD: 0 % (ref 0–7)
ERYTHROCYTE [DISTWIDTH] IN BLOOD BY AUTOMATED COUNT: 23.8 % (ref 11.5–14.5)
GLUCOSE SERPL-MCNC: 103 MG/DL (ref 65–100)
HCT VFR BLD AUTO: 30 % (ref 35–47)
HGB BLD-MCNC: 9.8 G/DL (ref 11.5–16)
IMM GRANULOCYTES # BLD AUTO: 0.1 K/UL (ref 0–0.04)
IMM GRANULOCYTES NFR BLD AUTO: 1 % (ref 0–0.5)
LYMPHOCYTES # BLD: 0.7 K/UL (ref 0.8–3.5)
LYMPHOCYTES NFR BLD: 7 % (ref 12–49)
MCH RBC QN AUTO: 27 PG (ref 26–34)
MCHC RBC AUTO-ENTMCNC: 32.7 G/DL (ref 30–36.5)
MCV RBC AUTO: 82.6 FL (ref 80–99)
MONOCYTES # BLD: 1.1 K/UL (ref 0–1)
MONOCYTES NFR BLD: 11 % (ref 5–13)
NEUTS SEG # BLD: 8.2 K/UL (ref 1.8–8)
NEUTS SEG NFR BLD: 81 % (ref 32–75)
NRBC # BLD: 0.05 K/UL (ref 0–0.01)
NRBC BLD-RTO: 0.5 PER 100 WBC
PLATELET # BLD AUTO: 489 K/UL (ref 150–400)
PMV BLD AUTO: 9.8 FL (ref 8.9–12.9)
POTASSIUM SERPL-SCNC: 5 MMOL/L (ref 3.5–5.1)
RBC # BLD AUTO: 3.63 M/UL (ref 3.8–5.2)
RBC MORPH BLD: ABNORMAL
SODIUM SERPL-SCNC: 135 MMOL/L (ref 136–145)
WBC # BLD AUTO: 10.1 K/UL (ref 3.6–11)

## 2022-10-06 PROCEDURE — 80162 ASSAY OF DIGOXIN TOTAL: CPT

## 2022-10-06 PROCEDURE — 94640 AIRWAY INHALATION TREATMENT: CPT

## 2022-10-06 PROCEDURE — 71045 X-RAY EXAM CHEST 1 VIEW: CPT

## 2022-10-06 PROCEDURE — 76937 US GUIDE VASCULAR ACCESS: CPT

## 2022-10-06 PROCEDURE — 97165 OT EVAL LOW COMPLEX 30 MIN: CPT

## 2022-10-06 PROCEDURE — 74011000258 HC RX REV CODE- 258: Performed by: EMERGENCY MEDICINE

## 2022-10-06 PROCEDURE — 74011250636 HC RX REV CODE- 250/636: Performed by: STUDENT IN AN ORGANIZED HEALTH CARE EDUCATION/TRAINING PROGRAM

## 2022-10-06 PROCEDURE — C1751 CATH, INF, PER/CENT/MIDLINE: HCPCS

## 2022-10-06 PROCEDURE — 97535 SELF CARE MNGMENT TRAINING: CPT

## 2022-10-06 PROCEDURE — 74011250636 HC RX REV CODE- 250/636: Performed by: EMERGENCY MEDICINE

## 2022-10-06 PROCEDURE — 77030020365 HC SOL INJ SOD CL 0.9% 50ML

## 2022-10-06 PROCEDURE — C9113 INJ PANTOPRAZOLE SODIUM, VIA: HCPCS | Performed by: HOSPITALIST

## 2022-10-06 PROCEDURE — 74011250636 HC RX REV CODE- 250/636: Performed by: FAMILY MEDICINE

## 2022-10-06 PROCEDURE — 65270000046 HC RM TELEMETRY

## 2022-10-06 PROCEDURE — 74011000250 HC RX REV CODE- 250: Performed by: INTERNAL MEDICINE

## 2022-10-06 PROCEDURE — 74011250636 HC RX REV CODE- 250/636: Performed by: INTERNAL MEDICINE

## 2022-10-06 PROCEDURE — 74011250637 HC RX REV CODE- 250/637: Performed by: EMERGENCY MEDICINE

## 2022-10-06 PROCEDURE — 74011000258 HC RX REV CODE- 258: Performed by: STUDENT IN AN ORGANIZED HEALTH CARE EDUCATION/TRAINING PROGRAM

## 2022-10-06 PROCEDURE — 80048 BASIC METABOLIC PNL TOTAL CA: CPT

## 2022-10-06 PROCEDURE — 74011636637 HC RX REV CODE- 636/637: Performed by: EMERGENCY MEDICINE

## 2022-10-06 PROCEDURE — 85025 COMPLETE CBC W/AUTO DIFF WBC: CPT

## 2022-10-06 PROCEDURE — 74011250636 HC RX REV CODE- 250/636: Performed by: HOSPITALIST

## 2022-10-06 PROCEDURE — 74011000250 HC RX REV CODE- 250: Performed by: EMERGENCY MEDICINE

## 2022-10-06 PROCEDURE — 99232 SBSQ HOSP IP/OBS MODERATE 35: CPT | Performed by: INTERNAL MEDICINE

## 2022-10-06 PROCEDURE — 74011250636 HC RX REV CODE- 250/636: Performed by: NURSE PRACTITIONER

## 2022-10-06 PROCEDURE — 74011000250 HC RX REV CODE- 250: Performed by: FAMILY MEDICINE

## 2022-10-06 PROCEDURE — 74011250637 HC RX REV CODE- 250/637: Performed by: STUDENT IN AN ORGANIZED HEALTH CARE EDUCATION/TRAINING PROGRAM

## 2022-10-06 PROCEDURE — 36415 COLL VENOUS BLD VENIPUNCTURE: CPT

## 2022-10-06 PROCEDURE — 51798 US URINE CAPACITY MEASURE: CPT

## 2022-10-06 PROCEDURE — 99223 1ST HOSP IP/OBS HIGH 75: CPT | Performed by: INTERNAL MEDICINE

## 2022-10-06 PROCEDURE — 74011000250 HC RX REV CODE- 250: Performed by: STUDENT IN AN ORGANIZED HEALTH CARE EDUCATION/TRAINING PROGRAM

## 2022-10-06 PROCEDURE — 74011000250 HC RX REV CODE- 250: Performed by: HOSPITALIST

## 2022-10-06 PROCEDURE — 74011000258 HC RX REV CODE- 258: Performed by: FAMILY MEDICINE

## 2022-10-06 PROCEDURE — 74011250637 HC RX REV CODE- 250/637: Performed by: NURSE PRACTITIONER

## 2022-10-06 PROCEDURE — 74011250637 HC RX REV CODE- 250/637: Performed by: ANESTHESIOLOGY

## 2022-10-06 RX ORDER — DIAZEPAM 5 MG/1
5 TABLET ORAL
Status: DISCONTINUED | OUTPATIENT
Start: 2022-10-06 | End: 2022-10-20 | Stop reason: HOSPADM

## 2022-10-06 RX ORDER — LIDOCAINE 4 G/100G
1 PATCH TOPICAL EVERY 24 HOURS
Status: DISCONTINUED | OUTPATIENT
Start: 2022-10-06 | End: 2022-10-18

## 2022-10-06 RX ORDER — TRAMADOL HYDROCHLORIDE 50 MG/1
50 TABLET ORAL
Status: DISCONTINUED | OUTPATIENT
Start: 2022-10-06 | End: 2022-10-06

## 2022-10-06 RX ORDER — AMIODARONE HYDROCHLORIDE 200 MG/1
400 TABLET ORAL 2 TIMES DAILY
Status: DISCONTINUED | OUTPATIENT
Start: 2022-10-06 | End: 2022-10-11

## 2022-10-06 RX ORDER — TRAZODONE HYDROCHLORIDE 50 MG/1
50 TABLET ORAL
Status: DISCONTINUED | OUTPATIENT
Start: 2022-10-06 | End: 2022-10-20 | Stop reason: HOSPADM

## 2022-10-06 RX ORDER — HYDROMORPHONE HYDROCHLORIDE 1 MG/ML
0.2 INJECTION, SOLUTION INTRAMUSCULAR; INTRAVENOUS; SUBCUTANEOUS
Status: DISCONTINUED | OUTPATIENT
Start: 2022-10-06 | End: 2022-10-20 | Stop reason: HOSPADM

## 2022-10-06 RX ADMIN — BUTALBITAL, ACETAMINOPHEN, AND CAFFEINE 2 TABLET: 50; 325; 40 TABLET ORAL at 04:26

## 2022-10-06 RX ADMIN — SODIUM CHLORIDE 500 MG: 9 INJECTION INTRAMUSCULAR; INTRAVENOUS; SUBCUTANEOUS at 12:15

## 2022-10-06 RX ADMIN — SODIUM CHLORIDE 500 MG: 9 INJECTION INTRAMUSCULAR; INTRAVENOUS; SUBCUTANEOUS at 03:05

## 2022-10-06 RX ADMIN — MEROPENEM 1 G: 1 INJECTION, POWDER, FOR SOLUTION INTRAVENOUS at 21:09

## 2022-10-06 RX ADMIN — AMIODARONE HYDROCHLORIDE 0.5 MG/MIN: 50 INJECTION, SOLUTION INTRAVENOUS at 03:05

## 2022-10-06 RX ADMIN — VANCOMYCIN HYDROCHLORIDE 500 MG: 500 INJECTION, POWDER, LYOPHILIZED, FOR SOLUTION INTRAVENOUS at 03:04

## 2022-10-06 RX ADMIN — PANTOPRAZOLE SODIUM 40 MG: 40 INJECTION, POWDER, FOR SOLUTION INTRAVENOUS at 10:43

## 2022-10-06 RX ADMIN — ALBUTEROL SULFATE 2.5 MG: 2.5 SOLUTION RESPIRATORY (INHALATION) at 18:10

## 2022-10-06 RX ADMIN — HYDROMORPHONE HYDROCHLORIDE 0.2 MG: 1 INJECTION, SOLUTION INTRAMUSCULAR; INTRAVENOUS; SUBCUTANEOUS at 21:09

## 2022-10-06 RX ADMIN — TRAZODONE HYDROCHLORIDE 50 MG: 50 TABLET ORAL at 21:09

## 2022-10-06 RX ADMIN — AMIODARONE HYDROCHLORIDE 400 MG: 200 TABLET ORAL at 19:54

## 2022-10-06 RX ADMIN — AMIODARONE HYDROCHLORIDE 400 MG: 200 TABLET ORAL at 08:37

## 2022-10-06 RX ADMIN — ONDANSETRON 4 MG: 2 INJECTION INTRAMUSCULAR; INTRAVENOUS at 08:35

## 2022-10-06 RX ADMIN — BUTALBITAL, ACETAMINOPHEN, AND CAFFEINE 2 TABLET: 50; 325; 40 TABLET ORAL at 16:15

## 2022-10-06 RX ADMIN — MIDODRINE HYDROCHLORIDE 20 MG: 5 TABLET ORAL at 14:21

## 2022-10-06 RX ADMIN — IPRATROPIUM BROMIDE 0.5 MG: 0.5 SOLUTION RESPIRATORY (INHALATION) at 08:01

## 2022-10-06 RX ADMIN — DIAZEPAM 2 MG: 2 TABLET ORAL at 04:26

## 2022-10-06 RX ADMIN — MIDODRINE HYDROCHLORIDE 20 MG: 5 TABLET ORAL at 16:19

## 2022-10-06 RX ADMIN — DIAZEPAM 5 MG: 10 TABLET ORAL at 16:45

## 2022-10-06 RX ADMIN — THERA TABS 1 TABLET: TAB at 10:40

## 2022-10-06 RX ADMIN — Medication 100 MG: at 10:40

## 2022-10-06 RX ADMIN — PREDNISONE 40 MG: 20 TABLET ORAL at 10:40

## 2022-10-06 RX ADMIN — DIAZEPAM 2 MG: 2 TABLET ORAL at 10:36

## 2022-10-06 RX ADMIN — IPRATROPIUM BROMIDE 0.5 MG: 0.5 SOLUTION RESPIRATORY (INHALATION) at 13:00

## 2022-10-06 RX ADMIN — MIDODRINE HYDROCHLORIDE 20 MG: 5 TABLET ORAL at 10:46

## 2022-10-06 RX ADMIN — DIGOXIN 0.12 MG: 125 TABLET ORAL at 12:08

## 2022-10-06 RX ADMIN — IPRATROPIUM BROMIDE 0.5 MG: 0.5 SOLUTION RESPIRATORY (INHALATION) at 02:54

## 2022-10-06 RX ADMIN — VANCOMYCIN HYDROCHLORIDE 500 MG: 500 INJECTION, POWDER, LYOPHILIZED, FOR SOLUTION INTRAVENOUS at 14:28

## 2022-10-06 RX ADMIN — SODIUM CHLORIDE, PRESERVATIVE FREE 10 ML: 5 INJECTION INTRAVENOUS at 22:00

## 2022-10-06 RX ADMIN — SODIUM CHLORIDE, PRESERVATIVE FREE 10 ML: 5 INJECTION INTRAVENOUS at 07:22

## 2022-10-06 RX ADMIN — BUTALBITAL, ACETAMINOPHEN, AND CAFFEINE 2 TABLET: 50; 325; 40 TABLET ORAL at 10:35

## 2022-10-06 NOTE — PROGRESS NOTES
Comprehensive Nutrition Assessment    Type and Reason for Visit: Reassess    Nutrition Recommendations/Plan:     Recommend diet advancement to easy to chew    Continue Magic Cup at all meals, Ensure Enlive once/day         Malnutrition Assessment:  Malnutrition Status:  Severe malnutrition (10/06/22 1431)    Context:  Chronic illness     Findings of the 6 clinical characteristics of malnutrition:   Energy Intake:  No significant decrease in energy intake  Weight Loss:  Mild weight loass (specify amount and time period) (1% x 2 weeks)     Body Fat Loss:  Severe body fat loss, Buccal region, Orbital, Triceps   Muscle Mass Loss:  Severe muscle mass loss, Clavicles (pectoralis &deltoids), Calf (gastrocnemius), Temples (temporalis), Scapula (trapezius), Thigh (quadriceps)  Fluid Accumulation:  No significant fluid accumulation,     Strength:  Not performed        Nutrition Assessment:      10/6: follow-up. Pt tx to ICU on 10/4 d/t low BP, did not end of up needing tomasz, but on midodrine, amiodarone, and digoxin. Noted oncology recs for hospice as pt is too weak for chemo. Palliative is following. Hepatology consulted today for ?management of elevated LFTs. PO intake remains poor overall. Didn't eat BF this AM, but requesting oatmeal and Magic Cup for lunch. Still on full liquids and unclear why diet hasn't been advanced further. Having loose BMs, declined her stool softener today. Current weight of 104 lb up 13 lb overnight, so suspect inaccuracies. 10/3: MD consult received for Poor PO intakes and unintentional weight loss. RD already following pt. Spoke with pt at bedside. She reports poor PO intakes secondary to no BM in 5 days. Multiple ONS ordered, pt is drinking 1 Ensure each day and both Magic Cups daily which she really enjoys. Offered to change Ensure Enlive to Clear as she is not a huge fan of Ensure Enlive but she states those are even worse.   Agreeable to try Magic Cup TID, will decrease Ensure Enlive down to 1x/day. Lunch tray observed, she only ate the [employer]. Also notes that she will need Easy to [employer] once it is advanced, currently ordered Full Liquid diet. Some food preferences obtained and added to diet order. Plan was for NGT placement for supplemental EN last week but unsuccessful placement of NGT and then pt refused reattempts. 9/29: 77 y.o. female admitted with acute diarrhea, n/v. She was recently diagnosed with liver adenocarcinoma (liver mass biopsy 9/15/22)- oncology consulted. Per ED notes pt had CT showing progressive liver lesions with new large fluid collection concerning for abscess. Pt is known to RD from previous visits. Appears pt has lost 1# in the last 2 weeks which is not considered significant- she has a chronically low BMI d/t chronic low po intake. Pta pt was having abdominal pain for several weeks, escalated to watery diarrhea, n/v 2 days pta. Yesterday she had a few bites of soup, pudding, crackers, but was fearful of further intake. She typically eats small frequent meals at home. She likes chocolate ensure, orange magic cup, vanilla ice cream, vegetables, pudding. Pt has top dentures, no bottom teeth. Prefers to be on an easy to chew diet. K 3.2- pt receiving efferK.     Past Medical History:   Diagnosis Date    Adenoma of colon     Anemia     Arthritis     Atrial fibrillation (HCC)     Bloating     Chronic constipation     Chronic pain     back     COPD (chronic obstructive pulmonary disease) (HCC)     GERD (gastroesophageal reflux disease)     [place] (hard of hearing)     Hyponatremia     caused seizures x 2 per pt    Indigestion     Osteoporosis     Seizures ([facility] [address] 7/2020, 9/2020    x 2        Nutritionally Significant Medications:  amiodarone, digoxin, merrem, protonix, prednisone, MVI, thiamine, vancomycin, pericolace  PRN: compazine, zofran, valium      Estimated Daily Nutrient Needs:  Energy Requirements Based On: Kcal/kg  Weight Used for Energy Requirements: Current  Energy (kcal/day): 1435  Weight Used for Protein Requirements: Current  Protein (g/day): 74  Method Used for Fluid Requirements: 1 ml/kcal  Fluid (ml/day): 1435    Nutrition Related Findings:   Edema: LLE: 1+; Non-pitting (10/6/2022  8:00 AM)  LUE: 1+; Non-pitting (10/6/2022  8:00 AM)  RLE: 1+; Non-pitting (10/6/2022  8:00 AM)  RUE: 1+; Non-pitting (10/6/2022  8:00 AM)    Last BM: 10/05/22, Watery, Loose    Wounds: None      Current Nutrition Therapies:  Diet: full liquid  Supplements: Magic Cup TID, Ensure Enlive once/day  Meal intake: Patient Vitals for the past 168 hrs:   % Diet Eaten   10/06/22 1230 1 - 25%   10/05/22 1700 26 - 50%   10/05/22 1300 26 - 50%   10/05/22 0900 26 - 50%   10/04/22 1137 26 - 50%   10/04/22 0900 26 - 50%   10/03/22 0900 1 - 25%   09/30/22 1600 0%   09/30/22 1103 51 - 75%   09/30/22 0900 26 - 50%     Supplement intake: No data found. Nutrition Support: none      Anthropometric Measures:  Height: 5' 3\" (160 cm)  Ideal Body Weight (IBW): 115 lbs (52 kg)     Current Body Wt:  41.3 kg (91 lb 0.8 oz), 79.2 % IBW.  Standing scale  Current BMI (kg/m2): 16.1  Usual Body Weight: 41.7 kg (92 lb)  % Weight Change (Calculated): -1  Weight Adjustment: No adjustment                 BMI Category: Underweight (BMI less than 18.5)    Wt Readings from Last 10 Encounters:   10/06/22 47.3 kg (104 lb 4.4 oz)   09/15/22 41.7 kg (92 lb)   07/21/22 42.2 kg (93 lb)   06/29/22 42.2 kg (93 lb)   06/23/22 42.2 kg (93 lb)   06/16/22 42.2 kg (93 lb)   03/31/22 42.4 kg (93 lb 6.4 oz)   03/10/22 41.7 kg (92 lb)   03/01/22 43.1 kg (95 lb)   02/14/22 42.4 kg (93 lb 7.6 oz)     Last 3 Recorded Weights in this Encounter    10/01/22 1139 10/05/22 0700 10/06/22 0400   Weight: 41.3 kg (91 lb 0.8 oz) 41.5 kg (91 lb 7.9 oz) 47.3 kg (104 lb 4.4 oz)           Nutrition Diagnosis:   Severe malnutrition related to inadequate protein-energy intake as evidenced by Criteria as identified in malnutrition assessment      Nutrition Interventions:   Food and/or Nutrient Delivery: Modify current diet, Continue oral nutrition supplement  Nutrition Education/Counseling: No recommendations at this time  Coordination of Nutrition Care: Continue to monitor while inpatient       Goals:  Previous Goal Met: Progressing toward goal(s)  Goals: other (specify)  Specify Other Goals: PO intake >/=50% of meals with 2-3 ONS daily within 7 days    Nutrition Monitoring and Evaluation:   Behavioral-Environmental Outcomes: None identified  Food/Nutrient Intake Outcomes: Diet advancement/tolerance, Food and nutrient intake, Supplement intake  Physical Signs/Symptoms Outcomes: Biochemical data, GI status, Nausea/vomiting, Meal time behavior, Nutrition focused physical findings, Weight, Chewing or swallowing    Discharge Planning:    Continue oral nutrition supplement    Recent Labs     10/06/22  0948 10/05/22  0321 10/04/22  0233   * 111* 97   BUN 15 12 9   CREA 0.69 0.66 0.70   * 137 136   K 5.0 3.9 3.3*    107 107   CO2 21 22 21   CA 8.8 8.2* 7.8*   MG  --   --  1.3*       Recent Labs     10/05/22  0321   *   AST 98*   *   TBILI 0.6   TP 4.9*   ALB 2.8*   GLOB 2.1       Recent Labs     10/04/22  0236 10/03/22  2207 10/03/22  1558   LAC 1.2 2.1* 2.6*       Recent Labs     10/06/22  1104 10/05/22  0321   WBC 10.1 10.8   HGB 9.8* 8.5*   HCT 30.0* 25.1*   * 382       No results for input(s): PREALB in the last 72 hours. Prealbumin   Date Value Ref Range Status   08/22/2022 21.9 20.0 - 40.0 mg/dL Final       No results for input(s): TRIGL in the last 72 hours. Triglyceride   Date Value Ref Range Status   08/22/2022 62 <150 MG/DL Final     Comment:     Based on NCEP-ATP III:  Triglycerides <150 mg/dL  is considered normal, 150-199 mg/dL  borderline high,  200-499 mg/dL high and  greater than or equal to 500 mg/dL very high.        No results for input(s): GLUCPOC in the last 72 hours.     Lab Results   Component Value Date/Time    Hemoglobin A1c 5.2 02/04/2022 11:52 AM         Heidi Duarte RD  Available via [employer]

## 2022-10-06 NOTE — PROGRESS NOTES
[facility]  Cardiology Care Note                  []Initial visit     [x]Established visit     Patient Name: Rafi Floyd - JPY:[birth year] - PYZ:[id number]  Primary Cardiologist: Vania Burch and has been followed by cardiology in [place]. SUBJECTIVE/INterval HPI:  Pt  now remains in NSR. No evidence of afib today on tele review. She denies chest pain. Continues to report dyspnea. Heart rates better with SR. Assessment and Plan     Likely metastatic adenocarcinoma:  Liver adenocarcinoma of uncertain primary site. - Heme/onc following. Paroxysmal Atrial fibrillation with rapid ventricular response  - Now NSR.     -Continue digoxin 0.125 mg daily (home med). Will check digoxin level in a.m.  -Po amiodarone 400 mg BID for now with plan to wean. Monitor LFTs. Pt does not tolerate afib/rvr. Low BP limits rate control meds. Metoprolol is on hold. ol medications.  -Not a candidate for [place] given anemia, fraility, and organized liver hematoma on CT scan. 3.  Cardiomyopathy/Possible acute systolic heart failure  -Cardiomyopathy Likely secondary to stress CM (more likely) than tachycardiomyopathy   -Unable to tolerate GDMT at this time due to Low BP requiring midodrine    -continue digoxin for #2  -No need for diuretics today.   -Not a good candidate for invasive, aggressive cardiac procedures.    -Cautious with vasopressor support as can worsen LVOT obstruction with stress CM. 5.   Anemia:  Stable.hgb 9.8  6. Hypotension:  Still requiring midodrine 20 mg TID-   7. Advanced directives:  DNR. Palliative following. Overall prognosis poor. 8.   Elevated LFTs: dr. Javier Raymundo consulted. Suspects shock liver. No further intervention. BP better.  Still c.o dyspnea Which is multifactorial.          ____________________________________________________________    Cardiac testing  07/21/22    ECHO ADULT FOLLOW-UP OR LIMITED 07/21/2022 7/21/2022    Interpretation Summary    Limited study for the assessment of ejection fraction. Left Ventricle: Normal left ventricular systolic function with a visually estimated EF of 55 - 60%. EF by 2D Simpsons Biplane is 56%. Left ventricle size is normal. Normal wall thickness. See diagram for wall motion findings. Tricuspid Valve: Mild to moderate regurgitation on limited color and Doppler interrogation. Pulmonary Arteries: Pulmonary hypertension not present. The estimated PASP is 27 mmHg. Signed by: Mattie Schwartz MD on 7/21/2022  2:41 PM        HPI:   Ms. Yosi Lincoln is a 77 y.o. female with PMH of PAF on digoxin and ASA , Left hemicolectomy in 2/2022 for adenomatous colonic polyp, anemia, HLD, GERD, seizrues, chronic back pain,  former smokr. . She presented with chief c/o abdominal pain and nausea. She was hospitalized earlier this month for abdominal pain and now found to have liver adenocarcinoma on recent liver biopsy with likely organized hepatic hematoma on CT scan. There is concern for cecal malignancy and metastatic colon cancer is suspected. Cardiology consulted as pt went into afib with RVR early this a.m. She denies any palpitations, dizziness, lightheadedness and no chest pain. She had some mild SOB last night but this is better. She feels weak. C/o some abdominal pain. No palpitations. She has received adocard 6 mg and 12 mg, iv diltiazem bolus and IV dig and started on diltiazem gtt. Later in day, she developed hypotension on diltizem gtt. BP is soft. She also exhibits metabolic acidosis thought to be due to poor po intake, malnutritionCXR with mild diffuse interstia lopacities. Noted to have EF of 25-30% on echo. For afib with RVR, she was treated with amiodarone but LFTS elevated so this was stopped. Digoxin was resumed. She would not tolerate BB given lower BP.        Most recent HS troponins:  No results for input(s): TROPHS in the last 72 hours.    No lab exists for component:  CKMB  ECG: afib with RVR, nonspecific t wave abnormality  Repeat EKG reviewed by Dr. Nehal Hall- atrial tachycardia with t wave abnormality V5, V6.  ?possible dig effect. Review of Systems    []All other systems reviewed and all negative except as written in HPI    [] Patient unable to provide secondary to condition         Past Medical History:   Diagnosis Date    Adenoma of colon     Anemia     Arthritis     Atrial fibrillation (HCC)     Bloating     Chronic constipation     Chronic pain     back     COPD (chronic obstructive pulmonary disease) (HCC)     GERD (gastroesophageal reflux disease)     [place] (hard of hearing)     Hyponatremia     caused seizures x 2 per pt    Indigestion     Osteoporosis     Seizures ([facility] [address] 7/2020, 9/2020    x 2      Past Surgical History:   Procedure Laterality Date    COLONOSCOPY N/A 12/16/2021    COLONOSCOPY   :- performed by Ian Isaac MD at [place] [address] N/A 2/14/2022    . performed by Angela Hills MD at [facility] MAIN OR    [address]    HX CHOLECYSTECTOMY  1982    HX HIP REPLACEMENT Left     pt stated hip was pinned, not replaced    HX HIP REPLACEMENT Right     pt stated hip was pinned, not replaced    HX HYSTERECTOMY      HX ORTHOPAEDIC  1990, 2011    bilateral hip fractures     HX ORTHOPAEDIC Left 2020    femur fracture    HX TONSILLECTOMY       SH: former smoker, no ETOH. FH:no FH of early CAD      Social Hx:  reports that she quit smoking about 10 years ago. She has a 20.00 pack-year smoking history. She has never used smokeless tobacco. She reports that she does not drink alcohol and does not use drugs. Family Hx: family history includes Alcohol abuse in her father; Heart Disease in her father and mother; Liver Disease in her father.   Allergies   Allergen Reactions    Cefuroxime Hives    Hydrocodone Nausea and Vomiting    Other Medication Diarrhea and Nausea and Vomiting     PT REPORTS ALL MYCINS CAUSE SEVERE GI UPSET    Oxycodone Nausea and Vomiting    Penicillins Hives     Patient screened for any delayed non-IgE-mediated reaction to PCN. Patient notes the following:    No delayed non-IgE-mediated reaction to PCN    Hives 1969                OBJECTIVE:  Wt Readings from Last 3 Encounters:   10/06/22 104 lb 4.4 oz (47.3 kg)   09/15/22 92 lb (41.7 kg)   07/21/22 93 lb (42.2 kg)       Intake/Output Summary (Last 24 hours) at 10/6/2022 1710  Last data filed at 10/6/2022 1600  Gross per 24 hour   Intake 840 ml   Output 550 ml   Net 290 ml         Physical Exam    Vitals:   Vitals:    10/06/22 1300 10/06/22 1314 10/06/22 1400 10/06/22 1600   BP: (!) 103/53  120/67 107/67   Pulse: 72  (!) 113 85   Resp: 16  24 22   Temp:    (P) 97.9 °F (36.6 °C)   SpO2: 98% 98% 99% (P) 98%   Weight:       Height:         Telemetry: afib rvr this a.m. now  BP (P) 107/67 (BP 1 Location: Left lower arm, BP Patient Position: At rest)   Pulse 85   Temp (P) 97.9 °F (36.6 °C)   Resp 22   Ht 5' 3\" (1.6 m)   Wt 104 lb 4.4 oz (47.3 kg)   SpO2 (P) 98%   BMI 18.47 kg/m²   General:    Alert, cooperative, no distress. Cachectic   Psychiatric:    Normal Mood and affect    Eye/ENT:      Pupils equal, No asymmetry, Conjunctival pink. Able to hear voice at normal amplitude   Lungs:      Visibly symmetric chest expansion, No palpable tenderness. Clear to auscultation bilaterally. Heart[de-identified]    Regular rate and rhythm, S1, S2 normal, no murmur, click, rub or gallop. No JVD, Normal palpable peripheral pulses. No cyanosis   Abdomen:     Soft, distended, Bowel sounds present   Extremities:   Extremities normal, atraumatic, no edema.   Skin: multiple areas of ecchymosis on BUE,   Neurologic:   Alert, oriented, DAVIDSON, No gross focal deficits           Data Review:     Tele:  Sinus rhythm  1 ?brief episode of PAF   Radiology:   XR Results (most recent):  Results from Hospital Encounter encounter on 09/28/22    XR CHEST PORT    Narrative  EXAM:  XR CHEST PORT    INDICATION: Pleural effusion    COMPARISON: none    TECHNIQUE: Semiupright portable chest AP view    FINDINGS: Heart size is normal.    Small mediastinal lymph nodes are again noted. Moderate pulmonary edema  unchanged. Small bilateral pleural effusions unchanged. The visualized bones and upper  abdomen are age-appropriate. Impression  No significant change    CT Results (most recent):  Results from Hospital Encounter encounter on 09/28/22    CT ABD PELV W CONT    Narrative  EXAM: CT ABD PELV W CONT    INDICATION: Diffuse abdominal pain and diarrhea. Rectal constipation earlier  this month. Nonspecific liver disease on previous imaging. Biliary dilatation on  previous imaging. Elevated alkaline phosphatase. Normal white blood cell count,  bilirubin, lipase. COMPARISON: CT abdomen/pelvis on 9/12/2022. CONTRAST: 100 mL of Isovue-370. ORAL CONTRAST: None    TECHNIQUE:  Following the uneventful intravenous administration of contrast, thin axial  images were obtained through the abdomen and pelvis. Coronal and sagittal  reconstructions were generated. CT dose reduction was achieved through use of a  standardized protocol tailored for this examination and automatic exposure  control for dose modulation. FINDINGS:  LOWER THORAX: Small right pleural effusion is new. No basilar pneumonia. Normal  cardiac size. LIVER: Segment 7/8 subcapsular collection of heterogeneous fluid measures 9.3 x  6.9 x 3.5 cm. Heterogeneous septated fluid attenuation throughout the right  hepatic lobe measures approximately 13 cm in oblique AP diameter. Central right  hepatic lobe peripherally enhancing lesion previously measured 1.9 cm and now  measures 2.4 cm. Unchanged mild intrahepatic biliary dilatation. BILIARY TREE: Cholecystectomy. Unchanged chronic biliary dilatation. No abrupt  termination. SPLEEN: Normal size. Lobulated contour. PANCREAS: Mild diffuse atrophy.  No mass or inflammation. ADRENALS: Unremarkable. KIDNEYS: No hydronephrosis. Heterogeneous small multiple cysts. STOMACH: Unremarkable. SMALL BOWEL: No dilatation or wall thickening. COLON: Incomplete distention, limited evaluation. Fluid-filled cecum is in the  pelvis. APPENDIX: Normal.  PERITONEUM: No ascites or pneumoperitoneum. RETROPERITONEUM: Aortic atherosclerosis without aneurysm. 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 No lymphadenopathy. REPRODUCTIVE ORGANS: Hysterectomy. URINARY BLADDER: Incomplete distention, limited evaluation. BONES: Osteopenia. Femoral hardware. Spinal curvature. T11 partial compression  fracture. ABDOMINAL WALL: No mass or hernia. ADDITIONAL COMMENTS: Diffuse muscle atrophy. Impression  1. Progression of hepatic lesions and new large fluid collections in the liver. Intrahepatic abscesses from nonspecific infection are most likely. Consider  fluid sampling or drainage with CT guidance. 2. New small right pleural effusion. 3. Fluid-filled cecum may indicate infectious colitis. No bowel obstruction. MRI Results (most recent):  No results found for this or any previous visit. No results for input(s): CPK, TROIQ in the last 72 hours. No lab exists for component: CKQMB, CPKMB, BMPP  Recent Labs     10/06/22  0948 10/05/22  0321   * 137   K 5.0 3.9    107   CO2 21 22   BUN 15 12   CREA 0.69 0.66   * 111*   CA 8.8 8.2*     Recent Labs     10/06/22  1104 10/05/22  0321   WBC 10.1 10.8   HGB 9.8* 8.5*   HCT 30.0* 25.1*   * 382     Recent Labs     10/05/22  0321   *     No results for input(s): CHOL, LDLC in the last 72 hours. No lab exists for component: TGL, HDLC,  HBA1C  No results for input(s): CRP, TSH, TSHEXT, TSHEXT in the last 72 hours.     No lab exists for component: ESR        Current meds:    Current Facility-Administered Medications:     amiodarone (CORDARONE) tablet 400 mg, 400 mg, Oral, BID, Lilli Garcia MD, 400 mg at 10/06/22 4138    traZODone (DESYREL) tablet 50 mg, 50 mg, Oral, QHS, Niranjan De Los Santos MD    meropenem (MERREM) 1 g in 0.9% sodium chloride (MBP/ADV) 50 mL MBP, 1 g, IntraVENous, Q8H, Keiry Oneal MD    diazePAM (VALIUM) tablet 5 mg, 5 mg, Oral, Q6H PRN, Carson BOSCH NP, 5 mg at 10/06/22 1645    HYDROmorphone (DILAUDID) injection 0.2 mg, 0.2 mg, IntraVENous, Q3H PRN, Carson BOSCH NP    alteplase (CATHFLO) 1 mg in sterile water (preservative free) 1 mL injection, 1 mg, InterCATHeter, PRN, Lilli BOSCH MD    therapeutic multivitamin (THERAGRAN) tablet 1 Tablet, 1 Tablet, Oral, DAILY, Lilli BOSCH MD, 1 Tablet at 10/06/22 1040    polyethylene glycol (MIRALAX) packet 17 g, 17 g, Oral, BID, Nilam Caicedoeclatrell BOSCH NP    magnesium hydroxide (MILK OF MAGNESIA) 400 mg/5 mL oral suspension 30 mL, 30 mL, Oral, DAILY PRN, Carson BOSCH NP    predniSONE (DELTASONE) tablet 40 mg, 40 mg, Oral, DAILY WITH BREAKFAST, Niranjan De Los Santos MD, 40 mg at 10/06/22 1040    phenol throat spray (CHLORASEPTIC) 1 Spray, 1 Spray, Oral, PRN, Rosana Flores NP    senna-docusate (PERICOLACE) 8.6-50 mg per tablet 2 Tablet, 2 Tablet, Oral, DAILY, Carson BOSCH NP, 2 Tablet at 10/05/22 0907    ipratropium (ATROVENT) 0.02 % nebulizer solution 0.5 mg, 0.5 mg, Nebulization, Q6H RT, Niranjan De Los Santos MD, 0.5 mg at 10/06/22 1300    midodrine (PROAMATINE) tablet 20 mg, 20 mg, Oral, TID WITH MEALS, Niranjan De Los Santos MD, 20 mg at 10/06/22 1619    digoxin (LANOXIN) tablet 0.125 mg, 0.125 mg, Oral, DAILY, Niranjan De Los Santos MD, 0.125 mg at 10/06/22 1208    balsam peru-castor oiL (VENELEX) ointment, , Topical, BID, Radha Ortega MD, Given at 10/05/22 1725    Vancomycin - Pharmacy dosing, , Other, Rx Dosing/Monitoring, Roxi Hale MD    vancomycin (VANCOCIN) 500 mg in 0.9% sodium chloride (MBP/ADV) 100 mL MBP, 500 mg, IntraVENous, Q12H, Radha Ortega MD, Last Rate: 100 mL/hr at 10/06/22 1428, 500 mg at 10/06/22 1428    thiamine HCL (B-1) tablet 100 mg, 100 mg, Oral, DAILY, Roxi Russo MD, 100 mg at 10/06/22 1040    dextrose 5% and 0.9% NaCl infusion, 75 mL/hr, IntraVENous, CONTINUOUS, Alla Martinez MD, Stopped at 10/03/22 0830    [Held by provider] metoprolol tartrate (LOPRESSOR) tablet 25 mg, 25 mg, Oral, Q12H, Poncho Mercado Prom., NP, 25 mg at 10/02/22 2127    0.9% sodium chloride infusion 250 mL, 250 mL, IntraVENous, PRN, Roxi Restrepo MD    albuterol (PROVENTIL VENTOLIN) nebulizer solution 2.5 mg, 2.5 mg, Nebulization, Q6H PRN, Roxi Restrepo MD, 2.5 mg at 10/03/22 8392    sodium chloride (NS) flush 5-40 mL, 5-40 mL, IntraVENous, Q8H, FLORENCIO Velásquez MD, 10 mL at 10/06/22 0722    sodium chloride (NS) flush 5-40 mL, 5-40 mL, IntraVENous, PRN, Arabella Velásquez MD    acetaminophen (TYLENOL) tablet 650 mg, 650 mg, Oral, Q6H PRN, 650 mg at 10/02/22 2127 **OR** acetaminophen (TYLENOL) suppository 650 mg, 650 mg, Rectal, Q6H PRN, Christen [id number] MD NIXON    ondansetron (ZOFRAN ODT) tablet 4 mg, 4 mg, Oral, Q8H PRN, 4 mg at 10/01/22 0153 **OR** ondansetron (ZOFRAN) injection 4 mg, 4 mg, IntraVENous, Q6H PRN, Christen QMJBRJESSICA ALICEA MD, 4 mg at 10/06/22 0835    pantoprazole (PROTONIX) 40 mg in 0.9% sodium chloride 10 mL injection, 40 mg, IntraVENous, DAILY, Christen [id number] MD NIXON, 40 mg at 10/06/22 1043    prochlorperazine (COMPAZINE) injection 10 mg, 10 mg, IntraVENous, Q6H PRN, Hipolito Young MD, 10 mg at 10/03/22 1536    Velasquez Noel MD  [facility] [address] [place] [address]  [place],  [place]  [phone]      Mayra Meehan, NP

## 2022-10-06 NOTE — PROGRESS NOTES
Infectious Disease Clinic Note        HPI:   Ms Swati Vincent seen  Says back hurts       Medications:  No current facility-administered medications on file prior to encounter. Current Outpatient Medications on File Prior to Encounter   Medication Sig Dispense Refill    dicyclomine (BENTYL) 10 mg capsule Take 1 Capsule by mouth four (4) times daily. 1 Capsule 0    ondansetron (ZOFRAN ODT) 4 mg disintegrating tablet Take 1 Tablet by mouth every eight (8) hours as needed for Nausea or Vomiting. 1 Tablet 0    polyethylene glycol (MIRALAX) 17 gram packet Take 1 Packet by mouth every twelve (12) hours. 1 Each 0    simethicone (MYLICON) 80 mg chewable tablet Take 1 Tablet by mouth four (4) times daily as needed for GI Pain. 2 Tablet 0    lubiPROStone (AMITIZA) 24 mcg capsule Take 1 Capsule by mouth two (2) times daily (with meals) for 30 days. Recommended by GI service 10 Capsule 0    butalbital-acetaminophen-caffeine (FIORICET, ESGIC) -40 mg per tablet Take 2 Tablets by mouth two (2) times daily as needed for Headache or Migraine. 6 Tablet 0    digoxin (LANOXIN) 0.125 mg tablet Take 0.125 mg by mouth daily. metoprolol tartrate (LOPRESSOR) 25 mg tablet Take 12.5 mg by mouth two (2) times a day. cyclobenzaprine (FLEXERIL) 10 mg tablet Take 10 mg by mouth three (3) times daily as needed. aspirin 81 mg chewable tablet Take 81 mg by mouth daily. multivitamin (ONE A DAY) tablet Take 1 Tablet by mouth daily. acetaminophen (TYLENOL) 325 mg tablet Take 650 mg by mouth every four (4) hours as needed for Pain. albuterol (PROVENTIL HFA, VENTOLIN HFA, PROAIR HFA) 90 mcg/actuation inhaler Take 2 Puffs by inhalation every six (6) hours as needed. pantoprazole (PROTONIX) 40 mg tablet Take 40 mg by mouth daily.              Physical Exam:    Vitals: Patient Vitals for the past 24 hrs:   Temp Pulse Resp BP SpO2   10/06/22 1314 -- -- -- -- 98 %   10/06/22 1300 -- 72 16 (!) 103/53 98 %   10/06/22 1200 -- 97 25 123/67 97 %   10/06/22 1100 -- 87 20 (!) 98/54 100 %   10/06/22 1000 -- 81 22 (!) 107/57 99 %   10/06/22 0900 -- 79 19 (!) 102/48 98 %   10/06/22 0800 -- (!) 58 13 (!) 102/45 98 %   10/06/22 0759 -- -- -- -- 96 %   10/06/22 0700 -- 60 19 (!) 102/59 92 %   10/06/22 0600 -- 68 13 (!) 98/51 90 %   10/06/22 0500 -- 79 22 (!) 111/57 95 %   10/06/22 0400 97.6 °F (36.4 °C) 67 13 (!) 99/49 99 %   10/06/22 0300 -- 77 22 (!) 99/57 99 %   10/06/22 0256 -- -- -- -- 99 %   10/06/22 0200 -- 70 16 (!) 110/55 99 %   10/06/22 0100 -- 62 13 (!) 96/53 98 %   10/06/22 0000 97.6 °F (36.4 °C) 92 25 116/77 96 %   10/05/22 2300 -- 75 15 111/60 98 %   10/05/22 2200 -- 85 22 110/60 98 %   10/05/22 2100 -- 82 19 117/62 97 %   10/05/22 2000 97.6 °F (36.4 °C) 82 19 (!) 111/55 97 %   10/05/22 1939 -- -- -- -- 97 %   10/05/22 1900 -- 83 23 112/62 --   10/05/22 1800 -- 69 15 (!) 104/55 99 %   10/05/22 1700 -- 85 16 109/61 98 %   10/05/22 1600 97.6 °F (36.4 °C) 94 21 128/75 --   10/05/22 1500 -- 90 24 132/62 --     GEN: NAD, cachetic appearing   HEENT:  no scleral icterus, , no thrush  CV: S1, S2   Lungs: Clear to auscultation bilaterally anteriorly  Abdomen: soft, tender all over   Extremities: no edema  Neuro: Alert, to self, verbal   Skin: no rash  Back unable to fully assess       Labs:   Recent Results (from the past 24 hour(s))   METABOLIC PANEL, BASIC    Collection Time: 10/06/22  9:48 AM   Result Value Ref Range    Sodium 135 (L) 136 - 145 mmol/L    Potassium 5.0 3.5 - 5.1 mmol/L    Chloride 105 97 - 108 mmol/L    CO2 21 21 - 32 mmol/L    Anion gap 9 5 - 15 mmol/L    Glucose 103 (H) 65 - 100 mg/dL    BUN 15 6 - 20 MG/DL    Creatinine 0.69 0.55 - 1.02 MG/DL    BUN/Creatinine ratio 22 (H) 12 - 20      eGFR >60 >60 ml/min/1.73m2    Calcium 8.8 8.5 - 10.1 MG/DL   DIGOXIN    Collection Time: 10/06/22 11:04 AM   Result Value Ref Range    Digoxin level 1.1 0.90 - 2.00 NG/ML   CBC WITH AUTOMATED DIFF    Collection Time: 10/06/22 11:04 AM Result Value Ref Range    WBC 10.1 3.6 - 11.0 K/uL    RBC 3.63 (L) 3.80 - 5.20 M/uL    HGB 9.8 (L) 11.5 - 16.0 g/dL    HCT 30.0 (L) 35.0 - 47.0 %    MCV 82.6 80.0 - 99.0 FL    MCH 27.0 26.0 - 34.0 PG    MCHC 32.7 30.0 - 36.5 g/dL    RDW 23.8 (H) 11.5 - 14.5 %    PLATELET 461 (H) 144 - 400 K/uL    MPV 9.8 8.9 - 12.9 FL    NRBC 0.5 (H) 0  WBC    ABSOLUTE NRBC 0.05 (H) 0.00 - 0.01 K/uL    NEUTROPHILS 81 (H) 32 - 75 %    LYMPHOCYTES 7 (L) 12 - 49 %    MONOCYTES 11 5 - 13 %    EOSINOPHILS 0 0 - 7 %    BASOPHILS 0 0 - 1 %    IMMATURE GRANULOCYTES 1 (H) 0.0 - 0.5 %    ABS. NEUTROPHILS 8.2 (H) 1.8 - 8.0 K/UL    ABS. LYMPHOCYTES 0.7 (L) 0.8 - 3.5 K/UL    ABS. MONOCYTES 1.1 (H) 0.0 - 1.0 K/UL    ABS. EOSINOPHILS 0.0 0.0 - 0.4 K/UL    ABS. BASOPHILS 0.0 0.0 - 0.1 K/UL    ABS. IMM.  GRANS. 0.1 (H) 0.00 - 0.04 K/UL    DF SMEAR SCANNED      RBC COMMENTS ANISOCYTOSIS  2+        RBC COMMENTS ANA CELLS  PRESENT        RBC COMMENTS SCHISTOCYTES  PRESENT        RBC COMMENTS TARGET CELLS  PRESENT           Microbiology Data:       Blood: negative        Urine:  Contains abnormal data CULTURE, URINE  Order: [id number]  Collected 9/28/2022 10:37    Status: Final result    Specimen Information: Clean catch; Urine   0 Result Notes  Component Ref Range & Units 9/28/22 1037    Special Requests:   NO SPECIAL REQUESTS    [place] Count   >100,000   COLONIES/mL    Culture result:   ESCHERICHIA COLI Abnormal     Resulting [place]        Susceptibility     Escherichia coli     JOBY     Amikacin ($) Susceptible     Ampicillin ($) Intermediate     Ampicillin/sulbactam ($) Susceptible     Cefazolin ($) Susceptible     Cefepime ($$) Susceptible     Cefoxitin Susceptible     Ceftazidime ($) Susceptible     Ceftriaxone ($) Susceptible     Ciprofloxacin ($) Susceptible     Gentamicin ($) Susceptible     Levofloxacin ($) Susceptible     Meropenem ($$) Susceptible     Nitrofurantoin Susceptible     Piperacillin/Tazobac ($) Susceptible     Tobramycin ($) Susceptible                          Imaging:     CT ABD PELV W CONT: Patient Communication     Add Comments   Not seen     Study Result    Narrative & Impression   EXAM: CT ABD PELV W CONT     INDICATION: Diffuse abdominal pain and diarrhea. Rectal constipation earlier  this month. Nonspecific liver disease on previous imaging. Biliary dilatation on  previous imaging. Elevated alkaline phosphatase. Normal white blood cell count,  bilirubin, lipase. COMPARISON: CT abdomen/pelvis on 9/12/2022. CONTRAST: 100 mL of Isovue-370. ORAL CONTRAST: None     TECHNIQUE:   Following the uneventful intravenous administration of contrast, thin axial  images were obtained through the abdomen and pelvis. Coronal and sagittal  reconstructions were generated. CT dose reduction was achieved through use of a  standardized protocol tailored for this examination and automatic exposure  control for dose modulation. FINDINGS:   LOWER THORAX: Small right pleural effusion is new. No basilar pneumonia. Normal  cardiac size. LIVER: Segment 7/8 subcapsular collection of heterogeneous fluid measures 9.3 x  6.9 x 3.5 cm. Heterogeneous septated fluid attenuation throughout the right  hepatic lobe measures approximately 13 cm in oblique AP diameter. Central right  hepatic lobe peripherally enhancing lesion previously measured 1.9 cm and now  measures 2.4 cm. Unchanged mild intrahepatic biliary dilatation. BILIARY TREE: Cholecystectomy. Unchanged chronic biliary dilatation. No abrupt  termination. SPLEEN: Normal size. Lobulated contour. PANCREAS: Mild diffuse atrophy. No mass or inflammation. ADRENALS: Unremarkable. KIDNEYS: No hydronephrosis. Heterogeneous small multiple cysts. STOMACH: Unremarkable. SMALL BOWEL: No dilatation or wall thickening. COLON: Incomplete distention, limited evaluation. Fluid-filled cecum is in the  pelvis.   APPENDIX: Normal.  PERITONEUM: No ascites or pneumoperitoneum. RETROPERITONEUM: Aortic atherosclerosis without aneurysm. 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 No lymphadenopathy. REPRODUCTIVE ORGANS: Hysterectomy. URINARY BLADDER: Incomplete distention, limited evaluation. BONES: Osteopenia. Femoral hardware. Spinal curvature. T11 partial compression  fracture. ABDOMINAL WALL: No mass or hernia. ADDITIONAL COMMENTS: Diffuse muscle atrophy. IMPRESSION     1. Progression of hepatic lesions and new large fluid collections in the liver. Intrahepatic abscesses from nonspecific infection are most likely. Consider  fluid sampling or drainage with CT guidance. 2. New small right pleural effusion. 3. Fluid-filled cecum may indicate infectious colitis. No bowel obstruction. Assessment / Plan:       1) Hepatic collections:      Discussed with radiologist today and gave radiology hx of liver biopsy on 9/15 + for malignant cells.  The liver collections are significant and would expect to see a significant hbg drop if purely bleeding alone per radiology   Given liver biopsy history , immune compromised if not at least immunomodulated state, at risk for superimposed infection even if there is hematoma   Would be difficult to know for sure if hematoma vs abscess alone/combination  with certainty at this point   WBC improving with antibiotics  Primary malignancy  possibly colon per oncology   On Vancomycin and Meropenem IV  Noted palliative care consult recommended by oncology  If plans are not for hospice would recommend repeating CT next week of liver to see if evolution of liver lesions  Based on goals of care and repeat CT, consider drainage if any amenable collections to drainage and send for cultures   If clinical worsening in interim, start anidulafungin IV       2) copd  3) metastatic adenocarcinoma, liver involvement   4) tubular adenoma colon   5) seizure hx per chart             Thank for the opportunity to participate in the care of this patient. Please contact with questions or concerns.        Luis Boyce,   2:34 PM

## 2022-10-06 NOTE — PROGRESS NOTES
Occupational Therapy    Chart reviewed, patient with PICC team at bedside. Will follow up as able .     Angela Velasquez, MS, OTR/L

## 2022-10-06 NOTE — PROGRESS NOTES
Hematology-Oncology Progress Note    Sera Thorpe  1945  [id number]  10/6/2022    Follow-up for: metastatic adenocarcinoam     [x]        Chart notes since last visit reviewed   [x]        Medications reviewed for allergies and interactions       Case discussed with the following:         []                            [x]        Nursing Staff                                                                         []        Pathologist                                                                        []        FAMILY      Subjective:     Spoke with patient who complains of: pt is very weak, wants us to get her son out of shelter so she can hug him before she dies.  Having some back pain this am    Objective:   Patient Vitals for the past 24 hrs:   BP Temp Pulse Resp SpO2 Weight   10/06/22 0800 (!) 102/45 -- (!) 58 13 98 % --   10/06/22 0759 -- -- -- -- 96 % --   10/06/22 0700 (!) 102/59 -- 60 19 92 % --   10/06/22 0600 (!) 98/51 -- 68 13 90 % --   10/06/22 0500 (!) 111/57 -- 79 22 95 % --   10/06/22 0400 (!) 99/49 97.6 °F (36.4 °C) 67 13 99 % 47.3 kg (104 lb 4.4 oz)   10/06/22 0300 (!) 99/57 -- 77 22 99 % --   10/06/22 0256 -- -- -- -- 99 % --   10/06/22 0200 (!) 110/55 -- 70 16 99 % --   10/06/22 0100 (!) 96/53 -- 62 13 98 % --   10/06/22 0000 116/77 97.6 °F (36.4 °C) 92 25 96 % --   10/05/22 2300 111/60 -- 75 15 98 % --   10/05/22 2200 110/60 -- 85 22 98 % --   10/05/22 2100 117/62 -- 82 19 97 % --   10/05/22 2000 (!) 111/55 97.6 °F (36.4 °C) 82 19 97 % --   10/05/22 1939 -- -- -- -- 97 % --   10/05/22 1900 112/62 -- 83 23 -- --   10/05/22 1800 (!) 104/55 -- 69 15 99 % --   10/05/22 1700 109/61 -- 85 16 98 % --   10/05/22 1600 128/75 97.6 °F (36.4 °C) 94 21 -- --   10/05/22 1500 132/62 -- 90 24 -- --   10/05/22 1405 -- -- -- -- 95 % --   10/05/22 1400 129/69 -- 91 24 96 % --   10/05/22 1300 110/68 -- 88 14 95 % --   10/05/22 1200 97/62 97.4 °F (36.3 °C) 79 15 95 % --   10/05/22 1100 (!) 102/52 -- 78 14 93 % --         REVIEW OF SYSTEMS:    Constitutional: negative fever, negative chills, negative weight loss  Eyes:   negative visual changes  ENT:   negative sore throat, tongue or lip swelling  Respiratory:  negative cough, negative dyspnea  Cards:  negative for chest pain, palpitations, lower extremity edema  GI:   negative for nausea, vomiting, diarrhea, and abdominal pain  Neuro:  negative for headaches, dizziness, vertigo  []                        Full ROS o/w normal/non contributor    Constitutional:  Patient looks  []        Sick  [x]        Frail  []        Better                                                 []        Depressed    HEENT:  [x]   NC                         []   AT               []    ALOPECIA           Eyes: [x]   Normal               []    Icteric  Oropharynx: []    Normal                  []  Thrush               []   Dry  Mucositis: [x]    None                 Grade: []        I  []        II  []        III  []        IV  Neck:   [x]   Supple                  []  Rigid               JVD:    []   ABSENT       []   PRESENT  Lymphadenopathy:   []   None Noted            []   PRESENT    Chest:  [x]   Clear               []    Rhonchi                      Dec'd @     []  Right Base           []   Left Base    CV:             []   Regular              []  Irregular               [x]   Tachy                []   Murmur  Abdominal:   [x]    Soft              []   NON-tender               []   Tender      BS:    []   ABSENT                   []   PRESENT  Liver:     [x]  NON-palp                  []   EDGE- palp  Spleen: [x]   NON-palp                   []  EDGE - palp  Mass:   [x]   ABSENT                          []  PRESENT  Extr:    []  Lymphedema             []   Cyanosis      []  Clubbing  Edema:     [x]   NONE       []   PRESENT  Skin:  Intact [x]           Purpura []        Rash: []   ABSENT       []  PRESENT  Neuro:  [x]        Normal  []        Confused      Available labs reviewed:  Labs:    No results found for this or any previous visit (from the past 24 hour(s)). Available Xrays reviewed:    Chemotherapy monitored and toxicities assessed:    Assessment and Plan   Metastatic adenocarcinoma. .  I discussed this case with Dr. Narinder García who did a jennifer-colectomy for a large 6cm dysplastic polyp (zero of 29nodes)  in February 2022, and he noted that the original colonoscopy did not get past the polyp,  current ct scan worrisome for a cecal malignancy. 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 The pt needs GI eval when she is clinically stable,   she is very frail and is borderline for systemic therapy but hopefully can improve with nutrition etc and get started in a week  or two. \"Hepatic abscess\"  radiology feels that this is actually organized hematoma. Hgb 8.5 10/5      Tachycardia. Olu Fiore transferred to ICU for hypotension last night, but not needing pressors. She is on amiodarone drip. 4.  Disposition. . she seems to be accepting of her fate. Wants to see her son,  rec hospice consult .  Pt too weak for chemo       MD Shantell De Souza MD

## 2022-10-06 NOTE — PROGRESS NOTES
2000: Bedside shift change report given to Ginette BECKWITH RN (oncoming nurse) by Gurjit Araiza RN (offgoing nurse). Report included the following information SBAR, Intake/Output, MAR, Recent Results, Cardiac Rhythm NSR, and Alarm Parameters . Pt c/o chronic back pain, states she takes 2 tab Fiorcet q6h PRN at home    2022: Lindsey Summers MD notified of pts home regimen with Fiorcet. Okay to change to q6h PRN. Spoke with pharmacy to verify dosage limit, okay to place order as PRN    2200: pt frequently calling out, verbalizing her concerns regarding her sons visitation and processing her diagnosis with RN    0000: pt states she can't breath, PRN breathing treatment requested. O2 sat stable 98%, pt appears anxious-resp rate 25-35. Continues to talk about dying and questioning God's plan.  offered and declined    Pt has not voided in 6hrs, bladder scanned, revealed 368-no straight cath indicated per protocol    0030: pt back asleep when breathing treatment available, spo2 98%, will defer at this time    0240: Lindsey Summers MD at bedside, discussed plan of care and overnight events. Orders received for CXR, keep Amio gtt at 0.5    0300: pt still with no void. Bladder scan revealed 392-no straight cath indicated    0345: attempted to obtain labs. PIV with inadequate blood return, unable to successfully obtain with butterfly stick. Will notify dayshift team of need to draw labs with midline placement    0500: pt refused bath, allowed for linens to be changed when up to bedside commode. Pt able to void 250 on BSC    0715: Fred Demarco MD at bedside to complete Hepatology consult    0745: Zak Sparks MD updated by RN on overnight events, orders received for trazadone at bedtime    0800: Bedside shift change report given to Idalmis Farley RN (oncoming nurse) by Marli Porras RN (offgoing nurse). Report included the following information SBAR, Intake/Output, MAR, Recent Results, Cardiac Rhythm NSR, and Alarm Parameters .

## 2022-10-06 NOTE — PROGRESS NOTES
I have reviewed and agree w/ Nitish Barakat, Student RN assessment and documentation on this patient

## 2022-10-06 NOTE — PROGRESS NOTES
Problem: Self Care Deficits Care Plan (Adult)  Goal: *Acute Goals and Plan of Care (Insert Text)  Description:   FUNCTIONAL STATUS PRIOR TO ADMISSION: Patient was modified independent using a rollator for functional mobility. Usually did not use any DME at home, reports being independent in ADL/IADL, however per chart son in law assists with IADL    HOME SUPPORT: Lives with son in law who assists as needed (is not home 24/7). Son is recently incarcerated. Occupational Therapy Goals  Initiated 10/6/2022  1. Patient will perform grooming with minimal assistance/contact guard assist within 7 day(s). 2.  Patient will perform upper body dressing with minimal assistance/contact guard assist within 7 day(s). 3.  Patient will perform lower body dressing with moderate assistance  within 7 day(s). 4.  Patient will perform all aspects of toileting with moderate assistance  within 7 day(s). 5.  Patient will utilize energy conservation techniques during functional activities with verbal cues within 7 day(s). Outcome: Progressing Towards Goal   OCCUPATIONAL THERAPY EVALUATION  Patient: Gudelia Lindsey (75 y.o. female)  Date: 10/6/2022  Primary Diagnosis: Liver abscess [K75.0]  Abdominal pain [R10.9]  Adenocarcinoma ([address] [C80.1]  Procedure(s) (LRB):  INFUSION CATHETER INSERTION (N/A) 3 Days Post-Op   Precautions:  Fall    ASSESSMENT  Based on the objective data described below, the patient presents with impaired balance, strength, and activity tolerance impacting ability to complete ADL/iADL at baseline. Patient received OOB on BSC, amenable to session. Requires up to Mod-Max A mobility/ADL. Reports being independent with ADL at home. Completed grooming ADL at bedlevel with Min A for thoroughness. Educated on UE ROM to complete outside of therapy. Patient left reclined in bed, all needs in reach, NAD>     Current Level of Function Impacting Discharge (ADLs/self-care): up to     Functional Outcome Measure:   The patient scored Total: 40/100 on the Barthel Index outcome measure which is indicative of 60% impaired ability to care for basic self needs/dependency on others; inferred 50% dependency on others for instrumental ADLs. Other factors to consider for discharge: below baseline, acute decline since previous admission, hospice consult      Patient will benefit from skilled therapy intervention to address the above noted impairments. PLAN :  Recommendations and Planned Interventions: self care training, functional mobility training, therapeutic exercise, balance training, therapeutic activities, endurance activities, patient education, home safety training, and family training/education    Frequency/Duration: Patient will be followed by occupational therapy 3 times a week to address goals. Recommendation for discharge: (in order for the patient to meet his/her long term goals)  Therapy up to 5 days/week in SNF setting    This discharge recommendation:  Has not yet been discussed the attending provider and/or case management    IF patient discharges home will need the following DME: TBD pending progress       SUBJECTIVE:   Patient stated I bet it was great to work with me.     OBJECTIVE DATA SUMMARY:   HISTORY:   Past Medical History:   Diagnosis Date    Adenoma of colon     Anemia     Arthritis     Atrial fibrillation (HCC)     Bloating     Chronic constipation     Chronic pain     back     COPD (chronic obstructive pulmonary disease) (HCC)     GERD (gastroesophageal reflux disease)     [place] (hard of hearing)     Hyponatremia     caused seizures x 2 per pt    Indigestion     Osteoporosis     Seizures ([address] 7/2020, 9/2020    x 2      Past Surgical History:   Procedure Laterality Date    COLONOSCOPY N/A 12/16/2021    COLONOSCOPY   :- performed by Deni Busby MD at [place] [address] N/A 2/14/2022    .  performed by Colletta Bride, MD at [address] [place] [address] [address] [facility]    HX HIP REPLACEMENT Left     pt stated hip was pinned, not replaced    HX HIP REPLACEMENT Right     pt stated hip was pinned, not replaced    HX HYSTERECTOMY      HX ORTHOPAEDIC  1990, 2011    bilateral hip fractures     HX ORTHOPAEDIC Left 2020    femur fracture    HX TONSILLECTOMY         Expanded or extensive additional review of patient history:     Home Situation  Home Environment: Private residence  # Steps to Enter: 3  One/Two Story Residence: One story  Living Alone: No  Support Systems: Other Family Member(s) (son in law)  Patient Expects to be Discharged to[de-identified] Home  Current DME Used/Available at Home: Will Klippel, rollator, Cane, straight (scooter)  Tub or Shower Type: Shower    Hand dominance: Right    EXAMINATION OF PERFORMANCE DEFICITS:  Cognitive/Behavioral Status:  Neurologic State: Alert  Orientation Level: Oriented X4  Cognition: Follows commands;Decreased attention/concentration  Perception: Appears intact  Perseveration: Perseverates during ADLS  Safety/Judgement: Decreased awareness of need for safety;Decreased awareness of need for assistance;Decreased insight into deficits    Skin: intact where visible    Edema: BUE weeping edema    Hearing: Auditory  Auditory Impairment: Hard of hearing, bilateral, Hearing aid(s)  Hearing Aids/Status: With patient    Vision/Perceptual:    Tracking: Able to track stimulus in all quadrants w/o difficulty                      Acuity: Within Defined Limits         Range of Motion:  AROM: Generally decreased, functional                         Strength:  Strength: Generally decreased, functional                Coordination:  Coordination: Generally decreased, functional  Fine Motor Skills-Upper: Left Intact; Right Intact    Gross Motor Skills-Upper: Left Impaired;Right Impaired    Tone & Sensation:  Tone: Normal  Sensation: Intact                      Balance:  Sitting: Impaired; Without support  Sitting - Static: Good (unsupported)  Sitting - Dynamic: Fair (occasional)  Standing: Impaired  Standing - Static: Constant support; Fair  Standing - Dynamic : Constant support;Poor    Functional Mobility and Transfers for ADLs:  Bed Mobility:  Sit to Supine: Maximum assistance  Scooting: Minimum assistance    Transfers:  Sit to Stand: Minimum assistance;Assist x1;Additional time  Stand to Sit: Minimum assistance;Assist x1  Bed to Chair: Moderate assistance  Toilet Transfer : Minimum assistance (BSC)    ADL Assessment:  Feeding: Contact guard assistance    Oral Facial Hygiene/Grooming: Moderate assistance    Bathing: Moderate assistance    Upper Body Dressing: Moderate assistance    Lower Body Dressing: Maximum assistance    Toileting: Moderate assistance                ADL Intervention and task modifications:       Grooming  Grooming Assistance: Moderate assistance  Position Performed:  (HOB raised)  Washing Face: Moderate assistance  Washing Hands: Minimum assistance  Brushing/Combing Hair: Moderate assistance  Cues: Verbal cues provided                        Toileting  Bladder Hygiene: Minimum assistance  Bowel Hygiene: Maximum assistance  Clothing Management: Moderate assistance    Cognitive Retraining  Safety/Judgement: Decreased awareness of need for safety;Decreased awareness of need for assistance;Decreased insight into deficits    Functional Measure:  Barthel Index:    Bathin  Bladder: 5  Bowels: 10  Groomin  Dressin  Feedin  Mobility: 0  Stairs: 0  Toilet Use: 5  Transfer (Bed to Chair and Back): 5  Total: 40/100        The Barthel ADL Index: Guidelines  1. The index should be used as a record of what a patient does, not as a record of what a patient could do. 2. The main aim is to establish degree of independence from any help, physical or verbal, however minor and for whatever reason. 3. The need for supervision renders the patient not independent.   4. A patient's performance should be established using the best available evidence. Asking the patient, friends/relatives and nurses are the usual sources, but direct observation and common sense are also important. However direct testing is not needed. 5. Usually the patient's performance over the preceding 24-48 hours is important, but occasionally longer periods will be relevant. 6. Middle categories imply that the patient supplies over 50 per cent of the effort. 7. Use of aids to be independent is allowed. Don oR., Barthel, D.W. (9101). Functional evaluation: the Barthel Index. [facility] (14)2. Lico Hockey viji ADRIANNA Chavez, Mayda Durham., John May., Doug Matter, [address] (1999). Measuring the change indisability after inpatient rehabilitation; comparison of the responsiveness of the Barthel Index and Functional [place] Measure. Journal of Neurology, Neurosurgery, and Psychiatry, 66(4), 160-199. Mei Alvarenga, N.J.A, TERRENCE Foster, & Oskar Meza, M.A. (2004.) Assessment of post-stroke quality of life in cost-effectiveness studies: The usefulness of the Barthel Index and the EuroQoL-5D.  Quality of Life Research, 15, 607-26         Occupational Therapy Evaluation Charge Determination   History Examination Decision-Making   LOW Complexity : Brief history review  MEDIUM Complexity : 3-5 performance deficits relating to physical, cognitive , or psychosocial skils that result in activity limitations and / or participation restrictions LOW Complexity : No comorbidities that affect functional and no verbal or physical assistance needed to complete eval tasks       Based on the above components, the patient evaluation is determined to be of the following complexity level: LOW   Pain Rating:  None reported    Activity Tolerance:   Fair and requires rest breaks    After treatment patient left in no apparent distress:    Supine in bed, Heels elevated for pressure relief, Call bell within reach, and Side rails x 3    COMMUNICATION/EDUCATION:   The patients plan of care was discussed with: Physical therapist and Registered nurse. Home safety education was provided and the patient/caregiver indicated understanding., Patient/family have participated as able in goal setting and plan of care. , and Patient/family agree to work toward stated goals and plan of care. This patients plan of care is appropriate for delegation to [facility]ER[facility].     Thank you for this referral.  Krishna Mina OT  Time Calculation: 29 mins

## 2022-10-06 NOTE — PROCEDURES
Right arm swollen. Left basilic midline attempted/unsuccessful. Inserted 20gauge/8cm Endurance Extended Dwell Peripheral Catheter into left brachial vein using ultrasound guidance and sterile technique. The Endurance catheter is an extended dwell peripheral catheter and may remain in place 29 days. Blood samples can be obtained from this catheter. To obtain labs, a tourniquet may be used, flush with 10ml NS, waste 3ml, draw labs, flush with 20ml NS. Dressings needs to be changed with central line dressing kit and stat lock every 7 days by nurse. Patient tolerated procedure well, denies questions or concerns at this time.         MARKY PatelN [facility]-BC  Vascular Access Nurse

## 2022-10-06 NOTE — PROGRESS NOTES
[id number]: attempting to reach Hospitalist and Palliative regarding pain and anxiety management. No return phone call yet at [phone]: Bedside shift change report given to Dandy (oncoming nurse) by Mortimer Carney RN (offgoing nurse). Report included the following information SBAR, Procedure Summary, Intake/Output, MAR, and Recent Results.

## 2022-10-06 NOTE — PROGRESS NOTES
Palliative Medicine Consult  Rich: [phone] ([phone])    Patient Name: Wild Rodriguez  YOB: 1945    Date of Initial Consult: 10/3/2022  Reason for Consult: care decisions  Requesting Provider: Adrianne Patel    Primary Care Physician: Zarina Villanueva NP     SUMMARY:   Wild Rodriguez is a 77 y.o. presented to the ED on 9/28/2022 from home to the ED due to abdominal pain, nausea and diarrhea. Admitted with a diagnosis of possible liver abscess but more likely liver hematoma. Had liver biopsy on 9/15/2022. Also anemia and afib with rapid ventricular response. Hx metastatic adenocarcinoma      PMH:  metastatic adenocarcinoma, left colon resection 2/2022 (per patient this was for polyps)  traylor's esophagus, HH, GERD, [place], osteoporosis, seizures, chronic back pain, former smoker, PAF, hyperlipidemia, anemia, chronic constipation, COPD, R THR, L THR    Noted hospitalizations: September  (9/12/2022 to 9/16/2022 for abdominal pain. Liver biopsy done. August (8/21/2022 to 8/25/2022 for JOE, COPD, anemia)   June  (6/29/2022 to 6/30/2022 for COPD)    Current medical issues leading to Palliative Medicine involvement include:  care decisions. Noted on last admission, patient seen by palliative and AMD completed on 9-. Social:   Ian Ha  (son-in-law)        Paige Saldivar (son)  Patient is . 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 She has one daughter Sudhir Holden) who she does not have a relationship with as Jimmy German was upset that the patient has received blood transfusions in the past.  Emileantolin German is a Quaker. Lorena Lavernantolin lives with the patient and provides some care. He fixes her breakfast but then leaves for work. Patient then feeds herself and dresses herself. She has a shower chair. Patient's son is Paige Saldivar. He is currently incarcerated due to DUI. He is expected to be in [facility] for 7-8 more months. PALLIATIVE DIAGNOSES:   Palliative care encounter  Shortness of breath  Anxiety   Chronic back pain       PLAN:   RN called as patient c/o back pain. On Fioricet per her PTA meds. This is not helping. RN also reports patient is anxious. Patient does appear more frail. She does not appear more SOB. Will check in with Dr. Shanice Velasquez regarding pain and anxiety management. Will increase the valium from 2 mg to 5 mg po q 6 hours prn. Dc Fioricet    Add hydromorphone 0.2 mg IV q 3 hours prn. (Patient did not want tramadol)  Patient continues to state she is not ready for comfort care. We did talk about the fact that she may have to be at peace with not being able to see her son. 10/5/2022  Patient transferred to a higher level of care yesterday afternoon due to low BP. BP has been stable and no additional support needed. Patient will be observed today   Talked more about patient's cancer. Patient said Dr. Carlton Bradshaw told her she will probably die before [place]. She said she never thought she would die of cancer. Jade Nicole is working to see if a visit with the son can be arranged. Patient said she will elect for comfort care once she is able to see her son. Patient has not had a relationship with her daughter, Elgin Worrell. Yas Vuong was upset with the patient that she received blood as Yas Vuong is a Moravian. Patient gave me her phone number and said I could call her 'if you want'. I did speak to Yas Vuong and Yas Vuong said she would call her mother. Patient later stated Yas Vuong did call her. Constipation:  Patient is passing gas. On pericolace 2 tabs q am, Miralax daily. Will increase Miralax to bid and add MOM 30 ml daily prn. Patient reports her SOB is better. On 2 LPM.    Back pain:  has prn Fioricet. Palliative will continue to follow patient. I will be out of the office on 10/6.     Call palliative office if care is needed, otherwise I will see her on 10/7     4 pm Addendum:   Reviewed a POST order with the patient. She does not want comfort care but does not want intubation/ventilator for respiratory distress. See POST form for these orders:   Do not use intubation or mechanical ventilation  May consider less invasive airway support (e.g. CPAP or BiPAP)  Use additional medical treatment, antibiotics, and cardiac monitoring as indicated. Hospital transfer if needed. Avoid intensive care unit if possible. See other orders:  patient would want vasopressors if needed (understands this includes ICU)     Will update Vladymir on the POST orders. 10/4/2022  Patient reports her SOB is slightly better. Remains on 2 LPM.  HR is 116 this am.   Patient stated the doctors have said she is not doing well. Patient did not want to talk further at this time but did agree for me to come back. I will follow up this afternoon. Patient is on valium 2 mg q 6 hours prn anxiety per the hospitalist.  She has received one dose in the last 24 hours. She did say she wished the dose was 5 mg. Yesterday,  patient requested Fioricet for pain as she takes this at home. She has had one dose in the last 24 hours. (Noted that she has N&V with oxycodone and hydrocodone). Patient has not had a BM. Will increase the Pericolace to 2 tabs per day. Continue Miralax. Patient states she has had issues with having either diarrhea or constipation for many years. She was bulimic/anorexic in the past.   Talked to Anjel Powers about the fact that patient would like to be able to see and hug her son, Jaylyn Gray. Anjel Powres is checking with Jono's  about a possible visit (whether this be in the senior living or arrange for the patient to see him outside of the senior living). I offered to write a letter to support her terminal condition and limited life expectancy). Plan to meet with Anjel Powers in the patient's room at 4:30  Patient is a DNR. Working to see if we can help with arrangements for her to visit her son. 10/3/2022  Met with patient and introduced palliative care services  Talked about patient's medical condition and her understanding. She had liver biopsy on 9/12 but just recently learned of the results. Se is aware of her cancer diagnosis and the liver involvement. Cardiology NP in to talk to patient about her heart condition. NP explained her heart rhythm is now normal but her liver enzymes are elevated and she will need to stop the current medication. The NP explained her EF is 20-25% which is a change from her last echo. Patient seemed to understand this meant her heart was weak. We also talked about her other medical conditions including her COPD  See social history above. Patient has been independent with her care. Her son- in-law helps with some meal prep and takes her to appointments. Patient reports she is currently SOB. RRT was recently called. She states it feels hard to get the air in and out. Patient to be diuresed. O2 @ 2 LPM.   Feels slightly anxious. Patient is on valium 2 mg q 6 hours prn anxiety. Patient does report a decreased appetite. Has occasional nausea. Has prn compazine ordered. Constipation: No BM since 9/29. Change Miralax from prn to daily. Add Amber-colace 1 tab daily. Hx chronic back pain/generalized pain: add Fioricet 2 tabs bid prn per PTA med list   Discussed the goals of care with patient. Patient has an AMD.  Margarito Holliday is her primary decision maker. We also discussed her code status. Patient verbalized understanding of her medical condition. We discussed what resuscitation/CPR would include. Patient did elect for a DNR code status. We did discuss comfort care and hospice. Patient had questions about her prognosis. We talked about her multiple medical conditions. Patient then stated she wants to continue treatment at this time. She desires to hug her son one more time before she dies.  He is incarcerated and isn't due to be out for 7-8 months. Will continue with current plan of care. Spoke to the patient's son-in-law. We reviewed her current medical condition. Informed him of patient's decision for DNR status but otherwise continue care at this time   Initial consult note routed to primary continuity provider and/or primary health care team members  Communicated plan of care with: Palliative Chrissy [facility] Team     GOALS OF CARE / TREATMENT PREFERENCES:     GOALS OF CARE:  Patient/Health Care Proxy Stated Goals:  (no intubation)    TREATMENT PREFERENCES:   Code Status: DNR  (this decision was made on 10/3/2022)    Patient and family's personal goals include:  at this time, patient would like to continue full care. Patient would like to hug her son one more time before she passes. Important upcoming milestones or family events: son will be in shelter for another 7-8 months. Advance Care Planning:  [x] The [facility] Interdisciplinary Team has updated the ACP Navigator with Health Care Decision Maker and Patient Capacity      Primary Decision Maker: Raven Cruz - [phone]    Secondary Decision Maker: Jono Jacobs - Son - [phone]  Advance Care Planning 10/5/2022   Confirm Advance Directive Yes, on file   Patient Would Like to Complete Advance Directive -   Does the patient have other document types -       Medical Interventions: Limited additional interventions       Other:    As far as possible, the palliative care team has discussed with patient / health care proxy about goals of care / treatment preferences for patient.      HISTORY:     History obtained from: chart, team, family    CHIEF COMPLAINT: Pt admitted with aforementioned history and issues    HPI/SUBJECTIVE:    The patient is:   [x] Verbal and participatory  [] Non-participatory due to: medical condition        Clinical Pain Assessment (nonverbal scale for severity on nonverbal patients):   Clinical Pain Assessment  Severity: 7  Location: low back  Character: aching  Duration: chronic  Effect: fiorecet helps          Duration: for how long has pt been experiencing pain (e.g., 2 days, 1 month, years)  Frequency: how often pain is an issue (e.g., several times per day, once every few days, constant)     FUNCTIONAL ASSESSMENT:     Palliative Performance Scale (PPS):  PPS: 20       PSYCHOSOCIAL/SPIRITUAL SCREENING:     Palliative IDT has assessed this patient for cultural preferences / practices and a referral made as appropriate to needs (Cultural Services, Patient Advocacy, Ethics, etc.)    Any spiritual / Buddhist concerns:  [] Yes /  [x] No  [] Unable to obtain this information  If \"Yes\" to discuss with pastoral care during IDT     Does caregiver feel burdened by caring for their loved one:   [] Yes /  [x] No /  [] No Caregiver Present/Available [] No Caregiver [] Pt Lives at [place] [id number]  If \"Yes\" to discuss with social work during IDT    Anticipatory grief assessment:   [x] Normal  / [] Maladaptive   [] Unable to obtain this information  [] n/a  If \"Maladaptive\" to discuss with social work during IDT    ESAS Anxiety: Anxiety: 5    ESAS Depression: Depression: 3        REVIEW OF SYSTEMS:     Positive and pertinent negative findings in ROS are noted above in HPI. The following systems were [x] reviewed / [] unable to be reviewed as noted in HPI  Other findings are noted below. Systems: constitutional, ears/nose/mouth/throat, respiratory, gastrointestinal, genitourinary, musculoskeletal, integumentary, neurologic, psychiatric, endocrine. Positive findings noted below.   Modified ESAS Completed by: provider   Fatigue: 4 Drowsiness: 1   Depression: 3 Pain: 7   Anxiety: 5 Nausea: 0   Anorexia: 9 Dyspnea: 1     Constipation: No (had BM)     Stool Occurrence(s): 1        PHYSICAL EXAM:     From RN flowsheet:  Wt Readings from Last 3 Encounters:   10/06/22 104 lb 4.4 oz (47.3 kg)   09/15/22 92 lb (41.7 kg)   07/21/22 93 lb (42.2 kg)     Blood pressure 107/67, pulse 85, temperature 97 °F (36.1 °C), resp. rate 22, height 5' 3\" (1.6 m), weight 104 lb 4.4 oz (47.3 kg), SpO2 99 %.     Pain Scale 1: Numeric (0 - 10)  Pain Intensity 1: 4  Pain Onset 1: chronic  Pain Location 1: Back  Pain Orientation 1: Right  Pain Description 1: Aching  Pain Intervention(s) 1: Medication (see MAR)  Last bowel movement, if known:     Constitutional: resting in bed, alert, oriented, looks more frail today, anxious   Eyes: pupils equal, anicteric  ENMT: no nasal discharge, moist mucous membranes  Cardiovascular: afib, + edema in lower legs and hands   Respiratory: breathing not labored, symmetric, O2 @ 2LPM  Gastrointestinal: distended, non-tender  :  Musculoskeletal: no deformity, no tenderness to palpation, dressings over both heels  Skin: warm, dry  Neurologic: following commands, moving all extremities  Psychiatric: full affect, no hallucinations  Other:       HISTORY:     Active Problems:    Liver abscess (9/28/2022)      Abdominal pain (9/28/2022)      Adenocarcinoma ([address] (9/28/2022)      Palliative care encounter (10/3/2022)      DNR (do not resuscitate) discussion (10/3/2022)      Shortness of breath (10/3/2022)      Chronic bilateral low back pain without sciatica (10/3/2022)      Chronic idiopathic constipation (10/4/2022)      Severe protein-calorie malnutrition ([address] (10/6/2022)    Past Medical History:   Diagnosis Date    Adenoma of colon     Anemia     Arthritis     Atrial fibrillation (HCC)     Bloating     Chronic constipation     Chronic pain     back     COPD (chronic obstructive pulmonary disease) (HCC)     GERD (gastroesophageal reflux disease)     [place] (hard of hearing)     Hyponatremia     caused seizures x 2 per pt    Indigestion     Osteoporosis     Seizures ([address] 7/2020, 9/2020    x 2       Past Surgical History:   Procedure Laterality Date    COLONOSCOPY N/A 12/16/2021    COLONOSCOPY   :- performed by Carey Lynn MD at [address] SIGMOIDOSCOPY N/A 2/14/2022    . performed by Angela Hills MD at [facility] MAIN OR    HX [address]    HX CHOLECYSTECTOMY  1982    HX HIP REPLACEMENT Left     pt stated hip was pinned, not replaced    HX HIP REPLACEMENT Right     pt stated hip was pinned, not replaced    HX HYSTERECTOMY      HX ORTHOPAEDIC  1990, 2011    bilateral hip fractures     HX ORTHOPAEDIC Left 2020    femur fracture    HX TONSILLECTOMY        Family History   Problem Relation Age of Onset    Heart Disease Mother     Heart Disease Father     Liver Disease Father     Alcohol abuse Father     Anesth Problems Neg Hx       History reviewed, no pertinent family history. Social History     Tobacco Use    Smoking status: Former     Packs/day: 1.00     Years: 20.00     Pack years: 20.00     Types: Cigarettes     Quit date: 12/18/2011     Years since quitting: 10.8    Smokeless tobacco: Never   Substance Use Topics    Alcohol use: Never     Allergies   Allergen Reactions    Cefuroxime Hives    Hydrocodone Nausea and Vomiting    Other Medication Diarrhea and Nausea and Vomiting     PT REPORTS ALL MYCINS CAUSE SEVERE GI UPSET    Oxycodone Nausea and Vomiting    Penicillins Hives     Patient screened for any delayed non-IgE-mediated reaction to PCN.         Patient notes the following:    No delayed non-IgE-mediated reaction to PCN    Hives 1969            Current Facility-Administered Medications   Medication Dose Route Frequency    amiodarone (CORDARONE) tablet 400 mg  400 mg Oral BID    traZODone (DESYREL) tablet 50 mg  50 mg Oral QHS    meropenem (MERREM) 1 g in 0.9% sodium chloride (MBP/ADV) 50 mL MBP  1 g IntraVENous Q8H    alteplase (CATHFLO) 1 mg in sterile water (preservative free) 1 mL injection  1 mg InterCATHeter PRN    therapeutic multivitamin (THERAGRAN) tablet 1 Tablet  1 Tablet Oral DAILY    polyethylene glycol (MIRALAX) packet 17 g  17 g Oral BID    magnesium hydroxide (MILK OF MAGNESIA) 400 mg/5 mL oral suspension 30 mL  30 mL Oral DAILY PRN    predniSONE (DELTASONE) tablet 40 mg  40 mg Oral DAILY WITH BREAKFAST    butalbital-acetaminophen-caffeine (FIORICET, ESGIC) -40 mg per tablet 2 Tablet  2 Tablet Oral Q6H PRN    phenol throat spray (CHLORASEPTIC) 1 Spray  1 Spray Oral PRN    senna-docusate (PERICOLACE) 8.6-50 mg per tablet 2 Tablet  2 Tablet Oral DAILY    ipratropium (ATROVENT) 0.02 % nebulizer solution 0.5 mg  0.5 mg Nebulization Q6H RT    midodrine (PROAMATINE) tablet 20 mg  20 mg Oral TID WITH MEALS    digoxin (LANOXIN) tablet 0.125 mg  0.125 mg Oral DAILY    balsam peru-castor oiL (VENELEX) ointment   Topical BID    Vancomycin - Pharmacy dosing   Other Rx Dosing/Monitoring    vancomycin (VANCOCIN) 500 mg in 0.9% sodium chloride (MBP/ADV) 100 mL MBP  500 mg IntraVENous Q12H    thiamine HCL (B-1) tablet 100 mg  100 mg Oral DAILY    diazePAM (VALIUM) tablet 2 mg  2 mg Oral Q6H PRN    [Held by provider] dextrose 5% and 0.9% NaCl infusion  50 mL/hr IntraVENous CONTINUOUS    [Held by provider] metoprolol tartrate (LOPRESSOR) tablet 25 mg  25 mg Oral Q12H    0.9% sodium chloride infusion 250 mL  250 mL IntraVENous PRN    albuterol (PROVENTIL VENTOLIN) nebulizer solution 2.5 mg  2.5 mg Nebulization Q6H PRN    sodium chloride (NS) flush 5-40 mL  5-40 mL IntraVENous Q8H    sodium chloride (NS) flush 5-40 mL  5-40 mL IntraVENous PRN    acetaminophen (TYLENOL) tablet 650 mg  650 mg Oral Q6H PRN    Or    acetaminophen (TYLENOL) suppository 650 mg  650 mg Rectal Q6H PRN    ondansetron (ZOFRAN ODT) tablet 4 mg  4 mg Oral Q8H PRN    Or    ondansetron (ZOFRAN) injection 4 mg  4 mg IntraVENous Q6H PRN    pantoprazole (PROTONIX) 40 mg in 0.9% sodium chloride 10 mL injection  40 mg IntraVENous DAILY    prochlorperazine (COMPAZINE) injection 10 mg  10 mg IntraVENous Q6H PRN          LAB AND IMAGING FINDINGS:     Lab Results   Component Value Date/Time    WBC 10.1 10/06/2022 11:04 AM    HGB 9.8 (L) 10/06/2022 11:04 AM    PLATELET 195 (H) 10/06/2022 11:04 AM     Lab Results   Component Value Date/Time    Sodium 135 (L) 10/06/2022 09:48 AM    Potassium 5.0 10/06/2022 09:48 AM    Chloride 105 10/06/2022 09:48 AM    CO2 21 10/06/2022 09:48 AM    BUN 15 10/06/2022 09:48 AM    Creatinine 0.69 10/06/2022 09:48 AM    Calcium 8.8 10/06/2022 09:48 AM    Magnesium 1.3 (L) 10/04/2022 02:33 AM    Phosphorus 3.3 02/15/2022 03:18 AM      Lab Results   Component Value Date/Time    Alk. phosphatase 150 (H) 10/05/2022 03:21 AM    Protein, total 4.9 (L) 10/05/2022 03:21 AM    Albumin 2.8 (L) 10/05/2022 03:21 AM    Globulin 2.1 10/05/2022 03:21 AM     Lab Results   Component Value Date/Time    INR 1.0 09/12/2022 08:12 AM    Prothrombin time 10.0 09/12/2022 08:12 AM    aPTT 23.9 08/22/2022 03:55 AM      Lab Results   Component Value Date/Time    Iron 39 08/24/2022 08:20 AM    TIBC 316 08/24/2022 08:20 AM    Iron % saturation 12 (L) 08/24/2022 08:20 AM    Ferritin 23 08/24/2022 08:20 AM      Lab Results   Component Value Date/Time    pH 7.37 10/03/2022 10:28 AM    PCO2 23 (L) 10/03/2022 10:28 AM    PO2 88 10/03/2022 10:28 AM     No components found for: GLPOC   No results found for: CPK, CKMB             Total time:  35 minutes  Counseling / coordination time, spent as noted above: 25  minutes  > 50% counseling / coordination?: yes    Prolonged service was provided for  []30 min   []75 min in face to face time in the presence of the patient, spent as noted above. Time Start:   Time End:   Note: this can only be billed with 81719 (initial) or [id number] (follow up). If multiple start / stop times, list each separately.

## 2022-10-06 NOTE — CONSULTS
[facility], MD, FACP, [place], [place]      SEN Gomez S Idris, [facility]   Marni Lantigua, [facility]   Jess Hines, FNCRISTINA-C   Barbie Haywood, FNCRISTINA-KATHI Clarkecate Michele, [facility]       Bebeantolin Carey [address] [place] [address]    at [address], [address] Julian Marino  22.    [phone]    FAX: [address] [place] [address] Drive, [address] [place] [address], [address]    [phone]    FAX: [phone]       HEPATOLOGY CONSULT NOTE  I was asked to see this patient in consultation for management of elevated liver enzymes. I have reviewed the Emergency room note, Hospital admission note, Notes by all other physicians who have seen the patient during this hospitalization to date. I have reviewed the problem list, all past medical history and the reason for this hospitalization. I have reviewed the allergies and the medications the patient was taking at home prior to this hospitalization. HISTORY:  The patient is a 77 y.o. female with multiple medical issues. She had a decline in BP and was brought to the ICU. She was noted to have an elevation in AST and ALT. TBILI is normal.  Liver enzymes were previously normal and there is no history of chronic liver disease. Liver transaminases have peaked in the 800/350 range and have come down to 98/169. ECHO shows severely low LVEF 20-25%, Normal RV, moderate TR    Hepatology Plan:  Monitor liver enzymes. They are already coming down. ASSESSMENT AND PLAN:  Shock liver  The patient has shock liver with acute marked increase in liver transaminases that appear to already be coming down. Depending upon the severity of the insult the TBILI may remain normal or rise.   The rise in TBILI generally lags behind the rise and peak in liver enzymes by 3-5 days. Passive hepatic congestion  The patient has passive hepatic congestion due to severe LV failure, probably has pulmonary hypertension, and TR. This contributed to the acute rise in liver transaminases during the episode of hypotension. Metastatic disease to the liver  CT scan of the abdomen demonstrated a liver mass  Biopsy of the mass was positive for adenocarcinoma. Follow-up CT and US scans demonstrate multiple branching fluid collections  These are thought to be abscess and she is being empirically treated with multiple ABX   I suspect this is post-procedure bleeding after biopsy of the hepatic masses. With her normal WBC and clinical course I am not convinced this is abscess     Elevated ALP  This is secondary to metastatic disease to the liver. End-of-life care  The patient has advanced heart diease with low LVEF, and TR. She also has metastatic adenoCA with undefined primary. She is too weak and frail for chemotherapy. I think she should be considered for hospice care. SYSTEM REVIEW:  Constitution systems: Negative for fever, chills, weight gain, weight loss. Eyes: Negative for visual changes. ENT: Negative for sore throat, painful swallowing. Respiratory: Negative for cough, hemoptysis, SOB. Cardiology: Negative for chest pain, palpitations. GI:  Negative for constipation or diarrhea. : Negative for urinary frequency, dysuria, hematuria, nocturia. Skin: Negative for rash. Hematology: Negative for easy bruising, blood clots. Musculo-skelatal: Severe back pain. Neurologic: Negative for headaches, dizziness, vertigo, memory problems not related to HE. Psychology: Negative for anxiety, depression. FAMILY HISTORY:  The father  of ETOH cirrhosis. The mother  of cancer. There is no family history of liver disease. SOCIAL HISTORY:  The patient is .     The patient has 2 children,   The patient stopped using tobacco products in 2011    The patient has never consumed significant amounts of alcohol. The patient used to work as supervisor of waste water treatment plant. PHYSICAL EXAMINATION:  VS: per nursing note  General:  No acute distress. Eyes:  Sclera anicteric. ENT:  No oral lesions. Thyroid normal.  Nodes:  No adenopathy. Skin:  No spider angiomata. No jaundice. Respiratory:  Lungs clear to auscultation. Cardiovascular:  Regular heart rate. Abdomen:  Soft non-tender, no ascites  Extremities:  No lower extremity edema. Muscle wasting. Neurologic:  Alert and oriented. Cranial nerves grossly intact. No asterixis. LABORATORY:   Latest Reference Range & Units 9/28/22 06:17 9/29/22 04:06 10/1/22 04:44 10/3/22 03:51 10/5/22 03:21   WBC 3.6 - 11.0 K/uL  6.1 9.3 15.4 (H) 10.8   HGB 11.5 - 16.0 g/dL  6.6 (L) 9.6 (L) 9.7 (L) 8.5 (L)   PLATELET 399 - 662 K/uL  374 459 (H) 339 382   Sodium 136 - 145 mmol/L 135 (L) 136 137 134 (L) 137   Potassium 3.5 - 5.1 mmol/L 3.8 3.2 (L) 3.8 4.6 3.9   Chloride 97 - 108 mmol/L 108 113 (H) 109 (H) 110 (H) 107   CO2 21 - 32 mmol/L 19 (L) 16 (L) 17 (L) 10 (LL) 22   Glucose 65 - 100 mg/dL 102 (H) 117 (H) 114 (H) 92 111 (H)   BUN 6 - 20 MG/DL 7 6 7 9 12   Creatinine 0.55 - 1.02 MG/DL 0.63 0.61 0.62 0.80 0.66   Bilirubin, total 0.2 - 1.0 MG/DL 0.4 0.3 0.4 0.4 0.6   Bilirubin, direct 0.0 - 0.2 MG/DL   0.1 <0.1 0.2   Albumin 3.5 - 5.0 g/dL 2.8 (L) 2.4 (L) 2.4 (L) 2.7 (L) 2.8 (L)   ALT 12 - 78 U/L 68 50 42 350 (H) 169 (H)   AST 15 - 37 U/L 18 18 63 (H) 886 (H) 98 (H)   Alk.  phosphatase 45 - 117 U/L 307 (H) 255 (H) 219 (H) 206 (H) 150 (H)   (LL): Data is critically low  (H): Data is abnormally high  (L): Data is abnormally low      Olivia Hunter MD  [place] [address] [address] [place] [id number] Noemi Smith [address] [place] [address] 22. [phone]  FAX:  [address] [place]

## 2022-10-07 LAB
ALBUMIN SERPL-MCNC: 2.6 G/DL (ref 3.5–5)
ALBUMIN/GLOB SERPL: 1.1 {RATIO} (ref 1.1–2.2)
ALP SERPL-CCNC: 158 U/L (ref 45–117)
ALT SERPL-CCNC: 106 U/L (ref 12–78)
ANION GAP SERPL CALC-SCNC: 7 MMOL/L (ref 5–15)
AST SERPL-CCNC: 31 U/L (ref 15–37)
BASOPHILS # BLD: 0 K/UL (ref 0–0.1)
BASOPHILS NFR BLD: 0 % (ref 0–1)
BILIRUB DIRECT SERPL-MCNC: 0.2 MG/DL (ref 0–0.2)
BILIRUB SERPL-MCNC: 0.5 MG/DL (ref 0.2–1)
BUN SERPL-MCNC: 17 MG/DL (ref 6–20)
BUN/CREAT SERPL: 23 (ref 12–20)
CALCIUM SERPL-MCNC: 8.5 MG/DL (ref 8.5–10.1)
CHLORIDE SERPL-SCNC: 107 MMOL/L (ref 97–108)
CO2 SERPL-SCNC: 21 MMOL/L (ref 21–32)
CREAT SERPL-MCNC: 0.75 MG/DL (ref 0.55–1.02)
DIFFERENTIAL METHOD BLD: ABNORMAL
DIGOXIN SERPL-MCNC: 1.7 NG/ML (ref 0.9–2)
EOSINOPHIL # BLD: 0 K/UL (ref 0–0.4)
EOSINOPHIL NFR BLD: 0 % (ref 0–7)
ERYTHROCYTE [DISTWIDTH] IN BLOOD BY AUTOMATED COUNT: 23.9 % (ref 11.5–14.5)
GLOBULIN SER CALC-MCNC: 2.4 G/DL (ref 2–4)
GLUCOSE SERPL-MCNC: 112 MG/DL (ref 65–100)
HCT VFR BLD AUTO: 27.5 % (ref 35–47)
HGB BLD-MCNC: 8.9 G/DL (ref 11.5–16)
IMM GRANULOCYTES # BLD AUTO: 0.1 K/UL (ref 0–0.04)
IMM GRANULOCYTES NFR BLD AUTO: 1 % (ref 0–0.5)
LYMPHOCYTES # BLD: 0.6 K/UL (ref 0.8–3.5)
LYMPHOCYTES NFR BLD: 6 % (ref 12–49)
MCH RBC QN AUTO: 26.7 PG (ref 26–34)
MCHC RBC AUTO-ENTMCNC: 32.4 G/DL (ref 30–36.5)
MCV RBC AUTO: 82.6 FL (ref 80–99)
MONOCYTES # BLD: 0.9 K/UL (ref 0–1)
MONOCYTES NFR BLD: 10 % (ref 5–13)
NEUTS SEG # BLD: 7.6 K/UL (ref 1.8–8)
NEUTS SEG NFR BLD: 83 % (ref 32–75)
NRBC # BLD: 0.05 K/UL (ref 0–0.01)
NRBC BLD-RTO: 0.5 PER 100 WBC
PLATELET # BLD AUTO: 425 K/UL (ref 150–400)
PMV BLD AUTO: 9.4 FL (ref 8.9–12.9)
POTASSIUM SERPL-SCNC: 4.4 MMOL/L (ref 3.5–5.1)
PROT SERPL-MCNC: 5 G/DL (ref 6.4–8.2)
RBC # BLD AUTO: 3.33 M/UL (ref 3.8–5.2)
RBC MORPH BLD: ABNORMAL
SODIUM SERPL-SCNC: 135 MMOL/L (ref 136–145)
WBC # BLD AUTO: 9.2 K/UL (ref 3.6–11)

## 2022-10-07 PROCEDURE — 74011250636 HC RX REV CODE- 250/636: Performed by: HOSPITALIST

## 2022-10-07 PROCEDURE — 74011000258 HC RX REV CODE- 258: Performed by: FAMILY MEDICINE

## 2022-10-07 PROCEDURE — 74011250637 HC RX REV CODE- 250/637: Performed by: EMERGENCY MEDICINE

## 2022-10-07 PROCEDURE — 36415 COLL VENOUS BLD VENIPUNCTURE: CPT

## 2022-10-07 PROCEDURE — 74011250637 HC RX REV CODE- 250/637: Performed by: NURSE PRACTITIONER

## 2022-10-07 PROCEDURE — 74011250636 HC RX REV CODE- 250/636: Performed by: STUDENT IN AN ORGANIZED HEALTH CARE EDUCATION/TRAINING PROGRAM

## 2022-10-07 PROCEDURE — 74011250637 HC RX REV CODE- 250/637: Performed by: STUDENT IN AN ORGANIZED HEALTH CARE EDUCATION/TRAINING PROGRAM

## 2022-10-07 PROCEDURE — 74011000250 HC RX REV CODE- 250: Performed by: EMERGENCY MEDICINE

## 2022-10-07 PROCEDURE — 85025 COMPLETE CBC W/AUTO DIFF WBC: CPT

## 2022-10-07 PROCEDURE — 74011250636 HC RX REV CODE- 250/636: Performed by: FAMILY MEDICINE

## 2022-10-07 PROCEDURE — 94640 AIRWAY INHALATION TREATMENT: CPT

## 2022-10-07 PROCEDURE — 80162 ASSAY OF DIGOXIN TOTAL: CPT

## 2022-10-07 PROCEDURE — 74011636637 HC RX REV CODE- 636/637: Performed by: EMERGENCY MEDICINE

## 2022-10-07 PROCEDURE — 99232 SBSQ HOSP IP/OBS MODERATE 35: CPT | Performed by: INTERNAL MEDICINE

## 2022-10-07 PROCEDURE — 74011250636 HC RX REV CODE- 250/636: Performed by: NURSE PRACTITIONER

## 2022-10-07 PROCEDURE — 80076 HEPATIC FUNCTION PANEL: CPT

## 2022-10-07 PROCEDURE — 74011000250 HC RX REV CODE- 250: Performed by: HOSPITALIST

## 2022-10-07 PROCEDURE — 74011000258 HC RX REV CODE- 258: Performed by: STUDENT IN AN ORGANIZED HEALTH CARE EDUCATION/TRAINING PROGRAM

## 2022-10-07 PROCEDURE — C9113 INJ PANTOPRAZOLE SODIUM, VIA: HCPCS | Performed by: HOSPITALIST

## 2022-10-07 PROCEDURE — 80048 BASIC METABOLIC PNL TOTAL CA: CPT

## 2022-10-07 PROCEDURE — 65270000046 HC RM TELEMETRY

## 2022-10-07 RX ORDER — BUTALBITAL, ACETAMINOPHEN AND CAFFEINE 50; 325; 40 MG/1; MG/1; MG/1
2 TABLET ORAL
Status: DISCONTINUED | OUTPATIENT
Start: 2022-10-07 | End: 2022-10-20 | Stop reason: HOSPADM

## 2022-10-07 RX ORDER — METOPROLOL TARTRATE 25 MG/1
25 TABLET, FILM COATED ORAL EVERY 12 HOURS
Status: DISCONTINUED | OUTPATIENT
Start: 2022-10-07 | End: 2022-10-20 | Stop reason: HOSPADM

## 2022-10-07 RX ORDER — DIGOXIN 0.25 MG/ML
125 INJECTION INTRAMUSCULAR; INTRAVENOUS DAILY
Status: DISCONTINUED | OUTPATIENT
Start: 2022-10-07 | End: 2022-10-07

## 2022-10-07 RX ORDER — DIGOXIN 0.25 MG/ML
125 INJECTION INTRAMUSCULAR; INTRAVENOUS ONCE
Status: DISCONTINUED | OUTPATIENT
Start: 2022-10-07 | End: 2022-10-07

## 2022-10-07 RX ADMIN — AMIODARONE HYDROCHLORIDE 400 MG: 200 TABLET ORAL at 17:32

## 2022-10-07 RX ADMIN — HYDROMORPHONE HYDROCHLORIDE 0.2 MG: 1 INJECTION, SOLUTION INTRAMUSCULAR; INTRAVENOUS; SUBCUTANEOUS at 14:02

## 2022-10-07 RX ADMIN — METOPROLOL TARTRATE 25 MG: 25 TABLET, FILM COATED ORAL at 17:32

## 2022-10-07 RX ADMIN — HYDROMORPHONE HYDROCHLORIDE 0.2 MG: 1 INJECTION, SOLUTION INTRAMUSCULAR; INTRAVENOUS; SUBCUTANEOUS at 09:31

## 2022-10-07 RX ADMIN — MIDODRINE HYDROCHLORIDE 20 MG: 5 TABLET ORAL at 17:32

## 2022-10-07 RX ADMIN — POLYETHYLENE GLYCOL 3350 17 G: 17 POWDER, FOR SOLUTION ORAL at 09:32

## 2022-10-07 RX ADMIN — SODIUM CHLORIDE, PRESERVATIVE FREE 10 ML: 5 INJECTION INTRAVENOUS at 17:34

## 2022-10-07 RX ADMIN — Medication: at 17:33

## 2022-10-07 RX ADMIN — POLYETHYLENE GLYCOL 3350 17 G: 17 POWDER, FOR SOLUTION ORAL at 17:32

## 2022-10-07 RX ADMIN — SODIUM CHLORIDE, PRESERVATIVE FREE 10 ML: 5 INJECTION INTRAVENOUS at 20:53

## 2022-10-07 RX ADMIN — IPRATROPIUM BROMIDE 0.5 MG: 0.5 SOLUTION RESPIRATORY (INHALATION) at 02:45

## 2022-10-07 RX ADMIN — BUTALBITAL, ACETAMINOPHEN, AND CAFFEINE 2 TABLET: 50; 325; 40 TABLET ORAL at 11:52

## 2022-10-07 RX ADMIN — THERA TABS 1 TABLET: TAB at 09:31

## 2022-10-07 RX ADMIN — PREDNISONE 40 MG: 20 TABLET ORAL at 11:51

## 2022-10-07 RX ADMIN — AMIODARONE HYDROCHLORIDE 400 MG: 200 TABLET ORAL at 09:31

## 2022-10-07 RX ADMIN — DIAZEPAM 5 MG: 10 TABLET ORAL at 14:02

## 2022-10-07 RX ADMIN — DIAZEPAM 5 MG: 10 TABLET ORAL at 09:31

## 2022-10-07 RX ADMIN — MEROPENEM 1 G: 1 INJECTION, POWDER, FOR SOLUTION INTRAVENOUS at 20:52

## 2022-10-07 RX ADMIN — IPRATROPIUM BROMIDE 0.5 MG: 0.5 SOLUTION RESPIRATORY (INHALATION) at 12:56

## 2022-10-07 RX ADMIN — VANCOMYCIN HYDROCHLORIDE 500 MG: 500 INJECTION, POWDER, LYOPHILIZED, FOR SOLUTION INTRAVENOUS at 03:05

## 2022-10-07 RX ADMIN — VANCOMYCIN HYDROCHLORIDE 500 MG: 500 INJECTION, POWDER, LYOPHILIZED, FOR SOLUTION INTRAVENOUS at 15:47

## 2022-10-07 RX ADMIN — MEROPENEM 1 G: 1 INJECTION, POWDER, FOR SOLUTION INTRAVENOUS at 11:50

## 2022-10-07 RX ADMIN — HYDROMORPHONE HYDROCHLORIDE 0.2 MG: 1 INJECTION, SOLUTION INTRAMUSCULAR; INTRAVENOUS; SUBCUTANEOUS at 04:01

## 2022-10-07 RX ADMIN — DIGOXIN 0.12 MG: 125 TABLET ORAL at 09:31

## 2022-10-07 RX ADMIN — ONDANSETRON 4 MG: 2 INJECTION INTRAMUSCULAR; INTRAVENOUS at 02:26

## 2022-10-07 RX ADMIN — MEROPENEM 1 G: 1 INJECTION, POWDER, FOR SOLUTION INTRAVENOUS at 04:01

## 2022-10-07 RX ADMIN — MIDODRINE HYDROCHLORIDE 20 MG: 5 TABLET ORAL at 09:39

## 2022-10-07 RX ADMIN — BUTALBITAL, ACETAMINOPHEN, AND CAFFEINE 2 TABLET: 50; 325; 40 TABLET ORAL at 17:31

## 2022-10-07 RX ADMIN — Medication 100 MG: at 09:31

## 2022-10-07 RX ADMIN — MIDODRINE HYDROCHLORIDE 20 MG: 5 TABLET ORAL at 11:50

## 2022-10-07 RX ADMIN — PANTOPRAZOLE SODIUM 40 MG: 40 INJECTION, POWDER, FOR SOLUTION INTRAVENOUS at 09:31

## 2022-10-07 RX ADMIN — IPRATROPIUM BROMIDE 0.5 MG: 0.5 SOLUTION RESPIRATORY (INHALATION) at 18:39

## 2022-10-07 RX ADMIN — Medication: at 09:57

## 2022-10-07 RX ADMIN — SENNOSIDES AND DOCUSATE SODIUM 2 TABLET: 50; 8.6 TABLET ORAL at 09:31

## 2022-10-07 RX ADMIN — DIAZEPAM 5 MG: 10 TABLET ORAL at 01:10

## 2022-10-07 RX ADMIN — DIAZEPAM 5 MG: 10 TABLET ORAL at 20:53

## 2022-10-07 NOTE — PROGRESS NOTES
[facility]  Hospitalist Group     ICU Transfer/Accept Summary     [place] [address] [place]  DATE OF TRANSFER: 10/6/2022       PATIENT ID: Sherri Jacobo  MRN: [id number]   YOB: 1945    PRIMARY CARE PROVIDER: Alex Mcdaniel NP   DATE OF ADMISSION: 9/28/2022  5:05 AM    ATTENDING PHYSICIAN: Colette Burdick MD  CONSULTATIONS:   IP CONSULT TO HOSPITALIST  IP CONSULT TO ONCOLOGY  IP CONSULT TO PALLIATIVE CARE - PROVIDER  IP CONSULT TO GASTROENTEROLOGY  IP CONSULT TO INFECTIOUS DISEASES  IP CONSULT TO CARDIOLOGY  IP CONSULT TO CARDIOLOGY    PROCEDURES/SURGERIES:   Procedure(s): INFUSION CATHETER INSERTION    REASON FOR ADMISSION: <principal problem not specified>     HOSPITAL PROBLEM LIST:  Patient Active Problem List   Diagnosis Code    Adenomatous polyp of descending colon D12.4    Adenomatous colon polyp D12.6    Adenomatous polyp of colon D12.6    Acute respiratory failure (HCC) J96.00    Respiratory distress R06.03    Dehydration P58.5    Metabolic acidosis S55.82    Hypotension I95.9    Symptomatic anemia D64.9    Liver abscess K75.0    Abdominal pain R10.9    Adenocarcinoma (HCC) C80.1    Palliative care encounter Z51.5    DNR (do not resuscitate) discussion Z71.89    Shortness of breath R06.02    Chronic bilateral low back pain without sciatica M54.50, G89.29    HFrEF (heart failure with reduced ejection fraction) (HCC) I50.20    Chronic idiopathic constipation K59.04    Severe protein-calorie malnutrition (HCC) E43    Anxiety F41.9         Brief HPI and Hospital Course:      Sherri Jacobo is a 77 y.o. female is brought to the ED via EMS for abdominal pain and nausea. She also reported watery diarrhea. Patient was recently hospitalized from 9/12-9/16 for abdominal pain and constipation and was found to have metastatic disease. She underwent liver biopsy which showed adenocarcinoma. Patient denied fever or chills.   She denied dysuria, urgency or frequency. Work-up in the ED was significant for an abnormal abdominal pelvic CT that showed progression of hepatic lesions with a new large fluid collection in the liver with suggestion that this could be intrahepatic abscess. There are also fluid-filled cecum. Right pleural effusion. Patient was started on levofloxacin and Flagyl and was referred to the hospitalist service for admission evaluation.     Assessment and Plan:    Neuro- some ICU delerium- improved     Cardiac-continue hemodynamic monitoring, map goal greater than 65, on midodrine, A. fib RVR, heart rate goal less than 110, keep magnesium above 2 and potassium above 4, continue digoxin, amiodarone therapy, EF of 25 to 30% with severe hypokinesis of the mid to distal portions of the anterior, lateral and inferior walls with akinesis at the apex with some RV dysfunction as well-CAD versus Takotsubo, should get a repeat echo in a few days, follow-up cardiology recommendations     Pulmonary-supplemental oxygen as needed, presumed COPD, standing bronchodilator/steroid therapy for now, saturation goal 88 to 94%     GI-diet as tolerated, PPI therapy-Home med     Renal-monitor urine output, correct electrolyte derangements as needed     Hematology-metastatic adenocarcinoma follow-up oncology recommendations, possible organized hepatic hematoma, SCDs only for DVT prophylaxis for now     ID-hepatic abscesses versus organized hematoma continue vancomycin/Flagyl/Doxy for now-follow-up micro studies, ID & hepatology consulted, follow-up recommendations     Endocrinology-keep euglycemic    Anxiety- increased valium dose     Prognosis very guarded to poor at this time-follow-up oncology and palliative care team [address] discussions with patient and NOK           PHYSICAL EXAMINATION:  Visit Vitals  BP (!) 123/58 (BP 1 Location: Right upper arm, BP Patient Position: At rest)   Pulse 93   Temp 97.7 °F (36.5 °C)   Resp 24   Ht 5' 3\" (1.6 m)   Wt 47.3 kg (104 lb 4.4 oz)   SpO2 97%   BMI 18.47 kg/m²     Patient Vitals for the past 24 hrs:   Temp Pulse Resp BP SpO2   10/06/22 2000 97.7 °F (36.5 °C) 93 24 (!) 123/58 97 %   10/06/22 1600 97.9 °F (36.6 °C) 85 22 107/67 98 %   10/06/22 1400 -- (!) 113 24 120/67 99 %   10/06/22 1314 -- -- -- -- 98 %   10/06/22 1300 -- 72 16 (!) 103/53 98 %   10/06/22 1200 97 °F (36.1 °C) 97 25 123/67 97 %   10/06/22 1100 -- 87 20 (!) 98/54 100 %   10/06/22 1000 -- 81 22 (!) 107/57 99 %   10/06/22 0900 -- 79 19 (!) 102/48 98 %   10/06/22 0800 97.6 °F (36.4 °C) (!) 58 13 (!) 102/45 98 %   10/06/22 0759 -- -- -- -- 96 %   10/06/22 0700 -- 60 19 (!) 102/59 92 %   10/06/22 0600 -- 68 13 (!) 98/51 90 %   10/06/22 0500 -- 79 22 (!) 111/57 95 %   10/06/22 0400 97.6 °F (36.4 °C) 67 13 (!) 99/49 99 %   10/06/22 0300 -- 77 22 (!) 99/57 99 %   10/06/22 0256 -- -- -- -- 99 %   10/06/22 0200 -- 70 16 (!) 110/55 99 %   10/06/22 0100 -- 62 13 (!) 96/53 98 %      General:          Alert, cooperative, weak and chronically ill appearing  HEENT:           Atraumatic, MMM , O2 via NC           Neck:               Supple, symmetrical,  thyroid: non tender  Lungs:             decreased breath sounds bilaterally. No Wheezing or Rhonchi. No rales. Heart:              Regular  rhythm,  3/6 Systolic ejection  murmur    Abdomen:       Soft, non-tender. Not distended. Bowel sounds normal  Extremities:     No cyanosis. No clubbing,  +2 distal pulses, edema in all 4 ext sky upper ext  Skin:                Not pale.   Not Jaundiced  No rashes   Psych:             anxious  Neurologic:      Alert, severe generalized weakness,      Labs:     Recent Labs     10/06/22  1104 10/05/22  0321   WBC 10.1 10.8   HGB 9.8* 8.5*   HCT 30.0* 25.1*   * 382     Recent Labs     10/06/22  0948 10/05/22  0321 10/04/22  0233   * 137 136   K 5.0 3.9 3.3*    107 107   CO2 21 22 21   BUN 15 12 9   CREA 0.69 0.66 0.70   * 111* 97   CA 8.8 8.2* 7.8*   MG  --   -- 1.3*     Recent Labs     10/05/22  0321   *   *   TBILI 0.6   TP 4.9*   ALB 2.8*   GLOB 2.1     No results for input(s): INR, PTP, APTT, INREXT in the last 72 hours. No results for input(s): FE, TIBC, PSAT, FERR in the last 72 hours. Lab Results   Component Value Date/Time    Folate 27.1 (H) 08/22/2022 03:55 AM      No results for input(s): PH, PCO2, PO2 in the last 72 hours. No results for input(s): CPK, CKNDX, TROIQ in the last 72 hours.     No lab exists for component: CPKMB  Lab Results   Component Value Date/Time    Cholesterol, total 141 08/22/2022 03:55 AM    HDL Cholesterol 57 08/22/2022 03:55 AM    LDL, calculated 71.6 08/22/2022 03:55 AM    Triglyceride 62 08/22/2022 03:55 AM    CHOL/HDL Ratio 2.5 08/22/2022 03:55 AM     Lab Results   Component Value Date/Time    Glucose (POC) 145 (H) 02/14/2022 09:01 PM     Lab Results   Component Value Date/Time    Color YELLOW/STRAW 10/03/2022 04:57 PM    Appearance CLEAR 10/03/2022 04:57 PM    Specific gravity 1.008 10/03/2022 04:57 PM    Specific gravity 1.010 09/28/2022 10:37 AM    pH (UA) 6.5 10/03/2022 04:57 PM    Protein Negative 10/03/2022 04:57 PM    Glucose Negative 10/03/2022 04:57 PM    Ketone Negative 10/03/2022 04:57 PM    Bilirubin Negative 10/03/2022 04:57 PM    Urobilinogen 0.2 10/03/2022 04:57 PM    Nitrites Negative 10/03/2022 04:57 PM    Leukocyte Esterase Negative 10/03/2022 04:57 PM    Epithelial cells FEW 10/03/2022 04:57 PM    Bacteria Negative 10/03/2022 04:57 PM    WBC 0-4 10/03/2022 04:57 PM    RBC 0-5 10/03/2022 04:57 PM       Current Facility-Administered Medications:     amiodarone (CORDARONE) tablet 400 mg, 400 mg, Oral, BID, Niranjan De Los Santos MD, 400 mg at 10/06/22 1954    traZODone (DESYREL) tablet 50 mg, 50 mg, Oral, QHS, Niranjan De Los Santos MD, 50 mg at 10/06/22 2109    meropenem (MERREM) 1 g in 0.9% sodium chloride (MBP/ADV) 50 mL MBP, 1 g, IntraVENous, Q8H, Aurora Son MD, Last Rate: 16.7 mL/hr at 10/06/22 2109, 1 g at 10/06/22 2109    diazePAM (VALIUM) tablet 5 mg, 5 mg, Oral, Q6H PRN, Evalina Areas B, NP, 5 mg at 10/06/22 1645    HYDROmorphone (DILAUDID) injection 0.2 mg, 0.2 mg, IntraVENous, Q3H PRN, Evalina Areas B, NP, 0.2 mg at 10/06/22 2109    lidocaine 4 % patch 1 Patch, 1 Patch, TransDERmal, Q24H, Edward Bob MD, 1 Patch at 10/06/22 1954    alteplase (CATHFLO) 1 mg in sterile water (preservative free) 1 mL injection, 1 mg, InterCATHeter, PRN, Brenda Caceres MD    therapeutic multivitamin (THERAGRAN) tablet 1 Tablet, 1 Tablet, Oral, DAILY, Brenda BOSCH MD, 1 Tablet at 10/06/22 1040    polyethylene glycol (MIRALAX) packet 17 g, 17 g, Oral, BID, Magaly Caicedo NP    magnesium hydroxide (MILK OF MAGNESIA) 400 mg/5 mL oral suspension 30 mL, 30 mL, Oral, DAILY PRN, Evalina Areas B, NP    predniSONE (DELTASONE) tablet 40 mg, 40 mg, Oral, DAILY WITH BREAKFAST, Niranjan De Los Santos MD, 40 mg at 10/06/22 1040    phenol throat spray (CHLORASEPTIC) 1 Spray, 1 Spray, Oral, PRN, Rosana Flores, NP    senna-docusate (PERICOLACE) 8.6-50 mg per tablet 2 Tablet, 2 Tablet, Oral, DAILY, Evalina Areas B, NP, 2 Tablet at 10/05/22 0907    ipratropium (ATROVENT) 0.02 % nebulizer solution 0.5 mg, 0.5 mg, Nebulization, Q6H RT, Niranjan De Los Santos MD, 0.5 mg at 10/06/22 1300    midodrine (PROAMATINE) tablet 20 mg, 20 mg, Oral, TID WITH MEALS, Niranjan De Los Santos MD, 20 mg at 10/06/22 1619    digoxin (LANOXIN) tablet 0.125 mg, 0.125 mg, Oral, DAILY, Niranjan De Los Santos MD, 0.125 mg at 10/06/22 1208    balsam peru-castor oiL (VENELEX) ointment, , Topical, BID, Kathleen Stafford MD, Given at 10/05/22 1725    Vancomycin - Pharmacy dosing, , Other, Rx Dosing/Monitoring, Roxi Sheikh MD    vancomycin (VANCOCIN) 500 mg in 0.9% sodium chloride (MBP/ADV) 100 mL MBP, 500 mg, IntraVENous, Q12H, Roxi Russo MD, Last Rate: 100 mL/hr at 10/06/22 1428, 500 mg at 10/06/22 1428    thiamine HCL (B-1) tablet 100 mg, 100 mg, Oral, DAILY, Roxi Fitch MD, 100 mg at 10/06/22 1040    dextrose 5% and 0.9% NaCl infusion, 75 mL/hr, IntraVENous, CONTINUOUS, Opal Wang MD, Stopped at 10/03/22 0830    [Held by provider] metoprolol tartrate (LOPRESSOR) tablet 25 mg, 25 mg, Oral, Q12H, Vasiliy Mercado, NP, 25 mg at 10/02/22 2127    0.9% sodium chloride infusion 250 mL, 250 mL, IntraVENous, PRN, Roxi Fitch MD    albuterol (PROVENTIL VENTOLIN) nebulizer solution 2.5 mg, 2.5 mg, Nebulization, Q6H PRN, Roxi Fitch MD, 2.5 mg at 10/06/22 1810    sodium chloride (NS) flush 5-40 mL, 5-40 mL, IntraVENous, Q8H, SAEED Vleásquez MD, 10 mL at 10/06/22 2200    sodium chloride (NS) flush 5-40 mL, 5-40 mL, IntraVENous, PRN, JACEY Velásquez MD    acetaminophen (TYLENOL) tablet 650 mg, 650 mg, Oral, Q6H PRN, 650 mg at 10/02/22 2127 **OR** acetaminophen (TYLENOL) suppository 650 mg, 650 mg, Rectal, Q6H PRN, Christen GACMMMX MD NIXON    ondansetron (ZOFRAN ODT) tablet 4 mg, 4 mg, Oral, Q8H PRN, 4 mg at 10/01/22 0153 **OR** ondansetron (ZOFRAN) injection 4 mg, 4 mg, IntraVENous, Q6H PRN, SAWYER Velásquez MD, 4 mg at 10/06/22 0835    pantoprazole (PROTONIX) 40 mg in 0.9% sodium chloride 10 mL injection, 40 mg, IntraVENous, DAILY, Norma Velásquez MD, 40 mg at 10/06/22 1043    prochlorperazine (COMPAZINE) injection 10 mg, 10 mg, IntraVENous, Q6H PRN, Jolynn Velásquez MD, 10 mg at 10/03/22 1536     CODE STATUS:   Full Code   X DNR    Partial    Comfort Care       Signed:   Julian Arcos MD  Date of Service:  10/6/2022  11:55 PM

## 2022-10-07 NOTE — PROGRESS NOTES
[facility]  Cardiology Care Note                  []Initial visit     [x]Established visit     Patient Name: Sera Thorpe - YYO:[id number] - LUP:[id number]  Primary Cardiologist: Gris Atwood and has been followed by cardiology in [place]. SUBJECTIVE/INterval HPI:   Pt is currently in NSR. She had 1 episode of PAF with RVR, rate to 130's for approximately 10 minutes yesterday. She denies chest pain, some mild SOB which she is now attributing to her inability to urinate/urinary retention. Off-and-on and atrial fibrillation. At the time of my evaluation was in sinus rhythm with heart rates close to 100 bpm     Assessment and Plan       1. Paroxysmal Atrial fibrillation with rapid ventricular response  - Now NSR with 1 episode RVR yesterday, brief on rounds. Now back in afib with RVR this afternoon  -Continue digoxin po,  Dig level 1.1 yesterday.   -Amiodarone 400 mg BID for now, with plan to wean. Monitor LFTs.   -Restart Metoprolol 25 mg BID  for rate control with hold parameter of SBP 85. --Not a candidate for [employer] (anemia, fraility, and possible organized liver hematoma on CT scan)    Since there are limited options for management of atrial fibrillation beyond what she is on, recommend taking patient off telemetry because she has extremely poor short and intermediate term outcome. Once blood pressure improves, may escalate dose of metoprolol. 2.  Cardiomyopathy/Possible acute systolic heart failure: EF 25-30%  -Cardiomyopathy Likely secondary to stress CM (more likely) than tachycardiomyopathy   -Unable to tolerate GDMT (Low BP requiring midodrine). Continue digoxin.    -Cautious with vasopressor support as can worsen LVOT obstruction with stress CM.      3.   Likely metastatic adenocarcinoma:  -Liver adenocarcinoma of uncertain primary site.  -Heme/onc following- pt is too weak for chemo, hospice consult recommended by oncology.  -Palliative care following. 4.   Anemia: hgb 8.9.   5.   Hypotension: BP normalized on midodrine 20 mg TID-   6. Advanced directives:  DNR. Palliative following. Overall prognosis poor. 7.   Elevated LFTs: dr. Orozco Courser consulted. Suspects shock liver. Not much to add from a cardiovascular standpoint.          ____________________________________________________________    Cardiac testing  07/21/22    ECHO ADULT FOLLOW-UP OR LIMITED 07/21/2022 7/21/2022    Interpretation Summary    Limited study for the assessment of ejection fraction. Left Ventricle: Normal left ventricular systolic function with a visually estimated EF of 55 - 60%. EF by 2D Simpsons Biplane is 56%. Left ventricle size is normal. Normal wall thickness. See diagram for wall motion findings. Tricuspid Valve: Mild to moderate regurgitation on limited color and Doppler interrogation. Pulmonary Arteries: Pulmonary hypertension not present. The estimated PASP is 27 mmHg. Signed by: Kashif Hsieh MD on 7/21/2022  2:41 PM        HPI:   Ms. Shannan Pollack is a 77 y.o. female with PMH of PAF on digoxin and ASA , Left hemicolectomy in 2/2022 for adenomatous colonic polyp, anemia, HLD, GERD, seizrues, chronic back pain,  former smokr. . She presented with chief c/o abdominal pain and nausea. She was hospitalized earlier this month for abdominal pain and now found to have liver adenocarcinoma on recent liver biopsy with likely organized hepatic hematoma on CT scan. There is concern for cecal malignancy and metastatic colon cancer is suspected. Cardiology consulted as pt went into afib with RVR early this a.m. She denies any palpitations, dizziness, lightheadedness and no chest pain. She had some mild SOB last night but this is better. She feels weak. C/o some abdominal pain. No palpitations. She has received adocard 6 mg and 12 mg, iv diltiazem bolus and IV dig and started on diltiazem gtt.  Later in day, she developed hypotension on diltizem gtt. BP is soft. She also exhibits metabolic acidosis thought to be due to poor po intake, malnutritionCXR with mild diffuse interstia lopacities. Noted to have EF of 25-30% on echo. For afib with RVR, she was treated with amiodarone but LFTS elevated so this was stopped. Digoxin was resumed. She would not tolerate BB given lower BP. Most recent HS troponins:  No results for input(s): TROPHS in the last 72 hours. No lab exists for component:  CKMB  ECG: afib with RVR, nonspecific t wave abnormality  Repeat EKG reviewed by Dr. Maryann Caceres- atrial tachycardia with t wave abnormality V5, V6.  ?possible dig effect. Review of Systems    []All other systems reviewed and all negative except as written in HPI    [] Patient unable to provide secondary to condition         Past Medical History:   Diagnosis Date    Adenoma of colon     Anemia     Arthritis     Atrial fibrillation (HCC)     Bloating     Chronic constipation     Chronic pain     back     COPD (chronic obstructive pulmonary disease) (HCC)     GERD (gastroesophageal reflux disease)     [place] (hard of hearing)     Hyponatremia     caused seizures x 2 per pt    Indigestion     Osteoporosis     Seizures ([address] 7/2020, 9/2020    x 2      Past Surgical History:   Procedure Laterality Date    COLONOSCOPY N/A 12/16/2021    COLONOSCOPY   :- performed by Darlene Boeck., MD at [address] N/A 2/14/2022    . performed by Tahir Kahn MD at [facility] MAIN OR    [address]    HX CHOLECYSTECTOMY  1982    HX HIP REPLACEMENT Left     pt stated hip was pinned, not replaced    HX HIP REPLACEMENT Right     pt stated hip was pinned, not replaced    HX HYSTERECTOMY      HX ORTHOPAEDIC  1990, 2011    bilateral hip fractures     HX ORTHOPAEDIC Left 2020    femur fracture    HX TONSILLECTOMY       SH: former smoker, no ETOH.  FH:no FH of early CAD      Social Hx:  reports that she quit smoking about 10 years ago. She has a 20.00 pack-year smoking history. She has never used smokeless tobacco. She reports that she does not drink alcohol and does not use drugs. Family Hx: family history includes Alcohol abuse in her father; Heart Disease in her father and mother; Liver Disease in her father. Allergies   Allergen Reactions    Cefuroxime Hives    Hydrocodone Nausea and Vomiting    Other Medication Diarrhea and Nausea and Vomiting     PT REPORTS ALL MYCINS CAUSE SEVERE GI UPSET    Oxycodone Nausea and Vomiting    Penicillins Hives     Patient screened for any delayed non-IgE-mediated reaction to PCN. Patient notes the following:    No delayed non-IgE-mediated reaction to PCN    Hives 1969                OBJECTIVE:  Wt Readings from Last 3 Encounters:   10/07/22 111 lb 15.9 oz (50.8 kg)   09/15/22 92 lb (41.7 kg)   07/21/22 93 lb (42.2 kg)       Intake/Output Summary (Last 24 hours) at 10/7/2022 1748  Last data filed at 10/7/2022 1600  Gross per 24 hour   Intake 630 ml   Output 125 ml   Net 505 ml         Physical Exam    Vitals:   Vitals:    10/07/22 1256 10/07/22 1300 10/07/22 1400 10/07/22 1600   BP:    122/68   Pulse:  (!) 127 (!) 113 92   Resp:  17 21 16   Temp:    97.4 °F (36.3 °C)   SpO2: 95%   98%   Weight:       Height:         Telemetry: afib rvr this a.m. now  /68 (BP 1 Location: Right arm, BP Patient Position: At rest)   Pulse 92   Temp 97.4 °F (36.3 °C)   Resp 16   Ht 5' 3\" (1.6 m)   Wt 111 lb 15.9 oz (50.8 kg)   SpO2 98%   BMI 19.84 kg/m²   General:  Somnolent, cachectic   Psychiatric:    Normal Mood and affect    Eye/ENT:      Pupils equal, No asymmetry, Conjunctival pink. Able to hear voice at normal amplitude   Lungs:      Visibly symmetric chest expansion, No palpable tenderness. Clear to auscultation bilaterally. Heart[de-identified]  Irregular rate and rhythm, S1, S2 variable, no murmur, click, rub or gallop. No JVD, Normal palpable peripheral pulses.  No cyanosis Abdomen:     Soft, non-tender. Bowel sounds normal. No masses,  No      organomegaly. Extremities:   Extremities normal, atraumatic, no edema. Neurologic:   CN II-XII grossly intact. No gross focal deficits           Data Review:     Tele:  Sinus rhythm  1 brief episode of PAF with RVR  Radiology:   XR Results (most recent):  Results from Hospital Encounter encounter on 09/28/22    XR CHEST PORT    Narrative  EXAM:  XR CHEST PORT    INDICATION: Pleural effusion    COMPARISON: none    TECHNIQUE: Semiupright portable chest AP view    FINDINGS: Heart size is normal.    Small mediastinal lymph nodes are again noted. Moderate pulmonary edema  unchanged. Small bilateral pleural effusions unchanged. The visualized bones and upper  abdomen are age-appropriate. Impression  No significant change    CT Results (most recent):  Results from Hospital Encounter encounter on 09/28/22    CT ABD PELV W CONT    Narrative  EXAM: CT ABD PELV W CONT    INDICATION: Diffuse abdominal pain and diarrhea. Rectal constipation earlier  this month. Nonspecific liver disease on previous imaging. Biliary dilatation on  previous imaging. Elevated alkaline phosphatase. Normal white blood cell count,  bilirubin, lipase. COMPARISON: CT abdomen/pelvis on 9/12/2022. CONTRAST: 100 mL of Isovue-370. ORAL CONTRAST: None    TECHNIQUE:  Following the uneventful intravenous administration of contrast, thin axial  images were obtained through the abdomen and pelvis. Coronal and sagittal  reconstructions were generated. CT dose reduction was achieved through use of a  standardized protocol tailored for this examination and automatic exposure  control for dose modulation. FINDINGS:  LOWER THORAX: Small right pleural effusion is new. No basilar pneumonia. Normal  cardiac size. LIVER: Segment 7/8 subcapsular collection of heterogeneous fluid measures 9.3 x  6.9 x 3.5 cm.  Heterogeneous septated fluid attenuation throughout the right  hepatic lobe measures approximately 13 cm in oblique AP diameter. Central right  hepatic lobe peripherally enhancing lesion previously measured 1.9 cm and now  measures 2.4 cm. Unchanged mild intrahepatic biliary dilatation. BILIARY TREE: Cholecystectomy. Unchanged chronic biliary dilatation. No abrupt  termination. SPLEEN: Normal size. Lobulated contour. PANCREAS: Mild diffuse atrophy. No mass or inflammation. ADRENALS: Unremarkable. KIDNEYS: No hydronephrosis. Heterogeneous small multiple cysts. STOMACH: Unremarkable. SMALL BOWEL: No dilatation or wall thickening. COLON: Incomplete distention, limited evaluation. Fluid-filled cecum is in the  pelvis. APPENDIX: Normal.  PERITONEUM: No ascites or pneumoperitoneum. RETROPERITONEUM: Aortic atherosclerosis without aneurysm. 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 No lymphadenopathy. REPRODUCTIVE ORGANS: Hysterectomy. URINARY BLADDER: Incomplete distention, limited evaluation. BONES: Osteopenia. Femoral hardware. Spinal curvature. T11 partial compression  fracture. ABDOMINAL WALL: No mass or hernia. ADDITIONAL COMMENTS: Diffuse muscle atrophy. Impression  1. Progression of hepatic lesions and new large fluid collections in the liver. Intrahepatic abscesses from nonspecific infection are most likely. Consider  fluid sampling or drainage with CT guidance. 2. New small right pleural effusion. 3. Fluid-filled cecum may indicate infectious colitis. No bowel obstruction. MRI Results (most recent):  No results found for this or any previous visit. No results for input(s): CPK, TROIQ in the last 72 hours.     No lab exists for component: CKQMB, CPKMB, BMPP  Recent Labs     10/07/22  0411 10/06/22  0948   * 135*   K 4.4 5.0    105   CO2 21 21   BUN 17 15   CREA 0.75 0.69   * 103*   CA 8.5 8.8     Recent Labs     10/07/22  0411 10/06/22  1104   WBC 9.2 10.1   HGB 8.9* 9.8*   HCT 27.5* 30.0*   PLT 425* 489*     Recent Labs     10/07/22  0411 10/05/22  0321   * 150*     No results for input(s): CHOL, LDLC in the last 72 hours. No lab exists for component: TGL, HDLC,  HBA1C  No results for input(s): CRP, TSH, TSHEXT, TSHEXT in the last 72 hours.     No lab exists for component: ESR        Current meds:    Current Facility-Administered Medications:     butalbital-acetaminophen-caffeine (FIORICET, ESGIC) -40 mg per tablet 2 Tablet, 2 Tablet, Oral, Q6H PRN, Patricio BOSCH NP, 2 Tablet at 10/07/22 1731    metoprolol tartrate (LOPRESSOR) tablet 25 mg, 25 mg, Oral, Q12H, Manda Mercado NP, 25 mg at 10/07/22 1732    amiodarone (CORDARONE) tablet 400 mg, 400 mg, Oral, BID, Niranjan De Los Santos MD, 400 mg at 10/07/22 1732    traZODone (DESYREL) tablet 50 mg, 50 mg, Oral, QHS, Niranjan De Los Santos MD, 50 mg at 10/06/22 2109    meropenem (MERREM) 1 g in 0.9% sodium chloride (MBP/ADV) 50 mL MBP, 1 g, IntraVENous, Q8H, Pravin Tipton MD, Last Rate: 16.7 mL/hr at 10/07/22 1150, 1 g at 10/07/22 1150    diazePAM (VALIUM) tablet 5 mg, 5 mg, Oral, Q6H PRN, Patricio BOSCH NP, 5 mg at 10/07/22 1402    HYDROmorphone (DILAUDID) injection 0.2 mg, 0.2 mg, IntraVENous, Q3H PRN, Patricio BOSCH NP, 0.2 mg at 10/07/22 1402    lidocaine 4 % patch 1 Patch, 1 Patch, TransDERmal, Q24H, Pravin Tipton MD, 1 Patch at 10/06/22 1954    alteplase (CATHFLO) 1 mg in sterile water (preservative free) 1 mL injection, 1 mg, InterCATHeter, PRN, Irina BOSCH MD    therapeutic multivitamin (THERAGRAN) tablet 1 Tablet, 1 Tablet, Oral, DAILY, Niranjan De Los Santos MD, 1 Tablet at 10/07/22 0931    polyethylene glycol (MIRALAX) packet 17 g, 17 g, Oral, BID, Magaly Caicedo NP, 17 g at 10/07/22 1732    magnesium hydroxide (MILK OF MAGNESIA) 400 mg/5 mL oral suspension 30 mL, 30 mL, Oral, DAILY PRN, Patricio BOSCH NP    predniSONE (DELTASONE) tablet 40 mg, 40 mg, Oral, DAILY WITH BREAKFAST, Niranjan De Los Santos, MD, 40 mg at 10/07/22 1151    phenol throat spray (CHLORASEPTIC) 1 Spray, 1 Spray, Oral, PRN, Rosana Flores NP    senna-docusate (PERICOLACE) 8.6-50 mg per tablet 2 Tablet, 2 Tablet, Oral, DAILY, Arleen BOSCH, JOSEPHINE, 2 Tablet at 10/07/22 0931    ipratropium (ATROVENT) 0.02 % nebulizer solution 0.5 mg, 0.5 mg, Nebulization, Q6H RT, Niranjan De Los Santos MD, 0.5 mg at 10/07/22 1256    midodrine (PROAMATINE) tablet 20 mg, 20 mg, Oral, TID WITH MEALS, Niranjan De Los Santos MD, 20 mg at 10/07/22 1732    digoxin (LANOXIN) tablet 0.125 mg, 0.125 mg, Oral, DAILY, Niranjan De Los Santos MD, 0.125 mg at 10/07/22 0931    balsam peru-castor oiL (VENELEX) ointment, , Topical, BID, Roxi Restrepo MD, Given at 10/07/22 1733    Vancomycin - Pharmacy dosing, , Other, Rx Dosing/Monitoring, Roxi Restrepo MD    vancomycin (VANCOCIN) 500 mg in 0.9% sodium chloride (MBP/ADV) 100 mL MBP, 500 mg, IntraVENous, Q12H, Roxi Restrepo MD, Last Rate: 100 mL/hr at 10/07/22 1547, 500 mg at 10/07/22 1547    thiamine HCL (B-1) tablet 100 mg, 100 mg, Oral, DAILY, Roxi Restrepo MD, 100 mg at 10/07/22 0931    dextrose 5% and 0.9% NaCl infusion, 75 mL/hr, IntraVENous, CONTINUOUS, Alla Martinez MD, Stopped at 10/03/22 0830    0.9% sodium chloride infusion 250 mL, 250 mL, IntraVENous, PRN, Roxi Restrepo MD    albuterol (PROVENTIL VENTOLIN) nebulizer solution 2.5 mg, 2.5 mg, Nebulization, Q6H PRN, Roxi Restrepo MD, 2.5 mg at 10/06/22 1810    sodium chloride (NS) flush 5-40 mL, 5-40 mL, IntraVENous, Q8H, Christen YCLARIBELZROSALINDA ALICEA MD, 10 mL at 10/07/22 1734    sodium chloride (NS) flush 5-40 mL, 5-40 mL, IntraVENous, PRN, Christen XCGXDEON ALICEA MD    acetaminophen (TYLENOL) tablet 650 mg, 650 mg, Oral, Q6H PRN, 650 mg at 10/02/22 2127 **OR** acetaminophen (TYLENOL) suppository 650 mg, 650 mg, Rectal, Q6H PRN, CAMI Velásquez MD    ondansetron (ZOFRAN ODT) tablet 4 mg, 4 mg, Oral, Q8H PRN, 4 mg at 10/01/22 0153 **OR** ondansetron (ZOFRAN) injection 4 mg, 4 mg, IntraVENous, Q6H PRN, SPEEDY Velásquez MD, 4 mg at 10/07/22 0226    pantoprazole (PROTONIX) 40 mg in 0.9% sodium chloride 10 mL injection, 40 mg, IntraVENous, DAILY, CORNELL Velásquez MD, 40 mg at 10/07/22 0931    prochlorperazine (COMPAZINE) injection 10 mg, 10 mg, IntraVENous, Q6H PRN, Norma Velásquez MD, 10 mg at 10/03/22 1536    Mariano Ugalde MD  [facility] [address] [facility] [address]  [place]ar[place] Braun  [phone]      Tana Frazier NP

## 2022-10-07 NOTE — PROGRESS NOTES
Palliative Medicine Consult  Rich: [phone] ([phone])    Patient Name: Federico Power  YOB: 1945    Date of Initial Consult: 10/3/2022  Reason for Consult: care decisions  Requesting Provider: Neftali Gabriel    Primary Care Physician: Mariano Webb NP     SUMMARY:   Federico Power is a 77 y.o. presented to the ED on 9/28/2022 from home to the ED due to abdominal pain, nausea and diarrhea. Admitted with a diagnosis of possible liver abscess but more likely liver hematoma. Had liver biopsy on 9/15/2022. Also anemia and afib with rapid ventricular response. Hx metastatic adenocarcinoma      PMH:  metastatic adenocarcinoma, left colon resection 2/2022 (per patient this was for polyps)  traylor's esophagus, HH, GERD, [place], osteoporosis, seizures, chronic back pain, former smoker, PAF, hyperlipidemia, anemia, chronic constipation, COPD, R THR, L THR    Noted hospitalizations: September  (9/12/2022 to 9/16/2022 for abdominal pain. Liver biopsy done. August (8/21/2022 to 8/25/2022 for JOE, COPD, anemia)   June  (6/29/2022 to 6/30/2022 for COPD)    Current medical issues leading to Palliative Medicine involvement include:  care decisions. Noted on last admission, patient seen by palliative and AMD completed on 9-. Social:   Karen Patel  (son-in-law)        Bora Hayes (son)  Patient is . 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 She has one daughter Shin [place]) who she does not have a relationship with as Anabela Gregg was upset that the patient has received blood transfusions in the past.  Anabela Gregg is a [employer]. Winston Florentino lives with the patient and provides some care. He fixes her breakfast but then leaves for work. Patient then feeds herself and dresses herself. She has a shower chair. Patient's son is Bora Hayes. He is currently incarcerated due to DUI. He is expected to be in prison for 7-8 more months. PALLIATIVE DIAGNOSES:   Palliative care encounter  Shortness of breath  Anxiety   Chronic back pain       PLAN:   Patient appears less anxious and less frail than yesterday afternoon. RN reports she did sleep last pm.  Noted that trazodone 50 mg added  Anxiety:  Patient has received valium 5 mg po x 3 doses (including dose RN is administering at this time). Patient reports she is feeling better. Continue valium 5 mg q 6 hours prn. Chronic back pain: Patient has received hydromorphone 0.2 mg IV x 3 doses (including dose RN is administering at this time). Patient does not want the hydromorphone. She wants Fioricet 2 tabs q 6 hours as needed. Spoke to pharmacy about this dosage and frequency. Ok for Fioricet 2 tabs q 6 hours prn but do not exceed 6 tabs in 24 hours. RN and patient aware. Will continue prn dilaudid  Constipation:  per RN patient has had BM in the past 24 hours. Continue pericolace 2 tabs q am, Miralax bid, and prn MOM 30 mL  5. Awaiting transfer to telemetry bed. Addendum:  Letter written per [facility]'s request to support the son being able to visit the patient    10/6/2022  RN called as patient c/o back pain. On Fioricet per her PTA meds. This is not helping. RN also reports patient is anxious. Patient does appear more frail. She does not appear more SOB. Will check in with Dr. Fabien Oh regarding pain and anxiety management. Will increase the valium from 2 mg to 5 mg po q 6 hours prn. Dc Fioricet    Add hydromorphone 0.2 mg IV q 3 hours prn. (Patient did not want tramadol)  Patient continues to state she is not ready for comfort care. We did talk about the fact that she may have to be at peace with not being able to see her son. 10/5/2022  Patient transferred to a higher level of care yesterday afternoon due to low BP. BP has been stable and no additional support needed. Patient will be observed today   Talked more about patient's cancer.    Patient said Dr. Godwin Jacques told her she will probably die before [place]. She said she never thought she would die of cancer. Rex De Luna is working to see if a visit with the son can be arranged. Patient said she will elect for comfort care once she is able to see her son. Patient has not had a relationship with her daughter, Deisy Cohen. Ludmila Stephen was upset with the patient that she received blood as Ludmila Stephen is a Muslim. Patient gave me her phone number and said I could call her 'if you want'. I did speak to Ludmila Stephen and Ludmila Laznikhil said she would call her mother. Patient later stated Ludmila Hailee did call her. Constipation:  Patient is passing gas. On pericolace 2 tabs q am, Miralax daily. Will increase Miralax to bid and add MOM 30 ml daily prn. Patient reports her SOB is better. On 2 LPM.    Back pain:  has prn Fioricet. Palliative will continue to follow patient. I will be out of the office on 10/6. Call palliative office if care is needed, otherwise I will see her on 10/7     4 pm Addendum:   Reviewed a POST order with the patient. She does not want comfort care but does not want intubation/ventilator for respiratory distress. See POST form for these orders:   Do not use intubation or mechanical ventilation  May consider less invasive airway support (e.g. CPAP or BiPAP)  Use additional medical treatment, antibiotics, and cardiac monitoring as indicated. Hospital transfer if needed. Avoid intensive care unit if possible. See other orders:  patient would want vasopressors if needed (understands this includes ICU) No feeding tube     Will update Vladymir on the POST orders. 10/4/2022  Patient reports her SOB is slightly better. Remains on 2 LPM.  HR is 116 this am.   Patient stated the doctors have said she is not doing well. Patient did not want to talk further at this time but did agree for me to come back. I will follow up this afternoon.     Patient is on valium 2 mg q 6 hours prn anxiety per the hospitalist.  She has received one dose in the last 24 hours. She did say she wished the dose was 5 mg. Yesterday,  patient requested Fioricet for pain as she takes this at home. She has had one dose in the last 24 hours. (Noted that she has N&V with oxycodone and hydrocodone). Patient has not had a BM. Will increase the Pericolace to 2 tabs per day. Continue Miralax. Patient states she has had issues with having either diarrhea or constipation for many years. She was bulimic/anorexic in the past.   Talked to Anjel Powers about the fact that patient would like to be able to see and hug her son, April Watts. Anjel Powers is checking with Jono's  about a possible visit (whether this be in the detention or arrange for the patient to see him outside of the detention). I offered to write a letter to support her terminal condition and limited life expectancy). Plan to meet with Anjel Powers in the patient's room at 4:30  Patient is a DNR. Working to see if we can help with arrangements for her to visit her son. 10/3/2022  Met with patient and introduced palliative care services  Talked about patient's medical condition and her understanding. She had liver biopsy on 9/12 but just recently learned of the results. Se is aware of her cancer diagnosis and the liver involvement. Cardiology NP in to talk to patient about her heart condition. NP explained her heart rhythm is now normal but her liver enzymes are elevated and she will need to stop the current medication. The NP explained her EF is 20-25% which is a change from her last echo. Patient seemed to understand this meant her heart was weak. We also talked about her other medical conditions including her COPD  See social history above. Patient has been independent with her care. Her son- in-law helps with some meal prep and takes her to appointments. Patient reports she is currently SOB. RRT was recently called.   She states it feels hard to get the air in and out.  Patient to be diuresed. O2 @ 2 LPM.   Feels slightly anxious. Patient is on valium 2 mg q 6 hours prn anxiety. Patient does report a decreased appetite. Has occasional nausea. Has prn compazine ordered. Constipation: No BM since 9/29. Change Miralax from prn to daily. Add Amber-colace 1 tab daily. Hx chronic back pain/generalized pain: add Fioricet 2 tabs bid prn per PTA med list   Discussed the goals of care with patient. Patient has an AMD.  Uche Collins is her primary decision maker. We also discussed her code status. Patient verbalized understanding of her medical condition. We discussed what resuscitation/CPR would include. Patient did elect for a DNR code status. We did discuss comfort care and hospice. Patient had questions about her prognosis. We talked about her multiple medical conditions. Patient then stated she wants to continue treatment at this time. She desires to hug her son one more time before she dies. He is incarcerated and isn't due to be out for 7-8 months. Will continue with current plan of care. Spoke to the patient's son-in-law. We reviewed her current medical condition. Informed him of patient's decision for DNR status but otherwise continue care at this time   Initial consult note routed to primary continuity provider and/or primary health care team members  Communicated plan of care with: Suad Abbasi Team     GOALS OF CARE / TREATMENT PREFERENCES:     GOALS OF CARE:  Patient/Health Care Proxy Stated Goals: Prolong life (no intubation, is OK with BiPAP)    TREATMENT PREFERENCES:   Code Status: DNR  (this decision was made on 10/3/2022)    Patient and family's personal goals include:  at this time, patient would like to continue full care. Patient would like to hug her son one more time before she passes. Important upcoming milestones or family events: son will be in prison for another 7-8 months.       Advance Care Planning:  [x] [facility] Interdisciplinary Team has updated the ACP Navigator with Health Care Decision Maker and Patient Capacity      Primary Decision Maker: Dana Conde - [phone]    Secondary Decision Maker: Colonel Tee Harris - [phone]  Advance Care Planning 10/7/2022   Patient's Healthcare Decision Maker is: Named in scanned ACP document   Confirm Advance Directive -   Patient Would Like to Complete Advance Directive -   Does the patient have other document types MOST/MOLST/POST/POLST       Medical Interventions: Limited additional interventions       Other:    As far as possible, the palliative care team has discussed with patient / health care proxy about goals of care / treatment preferences for patient.      HISTORY:     History obtained from: chart, team, family    CHIEF COMPLAINT: Pt admitted with aforementioned history and issues    HPI/SUBJECTIVE:    The patient is:   [x] Verbal and participatory  [] Non-participatory due to: medical condition        Clinical Pain Assessment (nonverbal scale for severity on nonverbal patients):   Clinical Pain Assessment  Severity: 5  Location: low back  Character: aching  Duration: chronic  Effect: fiorecet helps          Duration: for how long has pt been experiencing pain (e.g., 2 days, 1 month, years)  Frequency: how often pain is an issue (e.g., several times per day, once every few days, constant)     FUNCTIONAL ASSESSMENT:     Palliative Performance Scale (PPS):  PPS: 30       PSYCHOSOCIAL/SPIRITUAL SCREENING:     Palliative IDT has assessed this patient for cultural preferences / practices and a referral made as appropriate to needs (Cultural Services, Patient Advocacy, Ethics, etc.)    Any spiritual / Yazdanism concerns:  [] Yes /  [x] No  [] Unable to obtain this information  If \"Yes\" to discuss with pastoral care during IDT     Does caregiver feel burdened by caring for their loved one:   [] Yes /  [x] No /  [] No Caregiver Present/Available [] No Caregiver [] Pt Lives at Facility  If \"Yes\" to discuss with social work during IDT    Anticipatory grief assessment:   [x] Normal  / [] Maladaptive   [] Unable to obtain this information  [] n/a  If \"Maladaptive\" to discuss with social work during IDT    ESAS Anxiety: Anxiety: 4    ESAS Depression: Depression: 2        REVIEW OF SYSTEMS:     Positive and pertinent negative findings in ROS are noted above in HPI. The following systems were [x] reviewed / [] unable to be reviewed as noted in HPI  Other findings are noted below. Systems: constitutional, ears/nose/mouth/throat, respiratory, gastrointestinal, genitourinary, musculoskeletal, integumentary, neurologic, psychiatric, endocrine. Positive findings noted below. Modified ESAS Completed by: provider   Fatigue: 3 Drowsiness: 0   Depression: 2 Pain: 5   Anxiety: 4 Nausea: 0   Anorexia: 8 Dyspnea: 1     Constipation: No     Stool Occurrence(s): 1        PHYSICAL EXAM:     From RN flowsheet:  Wt Readings from Last 3 Encounters:   10/06/22 104 lb 4.4 oz (47.3 kg)   09/15/22 92 lb (41.7 kg)   07/21/22 93 lb (42.2 kg)     Blood pressure (!) 102/50, pulse 78, temperature 97.4 °F (36.3 °C), resp. rate 10, height 5' 3\" (1.6 m), weight 104 lb 4.4 oz (47.3 kg), SpO2 98 %.     Pain Scale 1: Numeric (0 - 10)  Pain Intensity 1: 8  Pain Onset 1: Chronic  Pain Location 1: Back  Pain Orientation 1: Lower  Pain Description 1: Aching  Pain Intervention(s) 1: Medication (see MAR)  Last bowel movement, if known:  BM in the last 24 hours     Constitutional: resting in bed, alert, oriented, looks less frail and less anxious than yesterday   Eyes: pupils equal, anicteric  ENMT: no nasal discharge, moist mucous membranes  Cardiovascular: afib, + edema in lower legs and hands   Respiratory: breathing not labored, symmetric, O2 @ 2LPM  Gastrointestinal: distended, non-tender  :  Musculoskeletal: no deformity, no tenderness to palpation, dressings over both heels  Skin: warm, dry  Neurologic: following commands, moving all extremities  Psychiatric: full affect, no hallucinations  Other:       HISTORY:     Active Problems:    Liver abscess (9/28/2022)      Abdominal pain (9/28/2022)      Adenocarcinoma ([address] (9/28/2022)      Palliative care encounter (10/3/2022)      DNR (do not resuscitate) discussion (10/3/2022)      Shortness of breath (10/3/2022)      Chronic bilateral low back pain without sciatica (10/3/2022)      Chronic idiopathic constipation (10/4/2022)      Severe protein-calorie malnutrition ([address] (10/6/2022)      Anxiety (10/6/2022)    Past Medical History:   Diagnosis Date    Adenoma of colon     Anemia     Arthritis     Atrial fibrillation (HCC)     Bloating     Chronic constipation     Chronic pain     back     COPD (chronic obstructive pulmonary disease) (HCC)     GERD (gastroesophageal reflux disease)     [place] (hard of hearing)     Hyponatremia     caused seizures x 2 per pt    Indigestion     Osteoporosis     Seizures ([address] 7/2020, 9/2020    x 2       Past Surgical History:   Procedure Laterality Date    COLONOSCOPY N/A 12/16/2021    COLONOSCOPY   :- performed by Austen Tyler MD at [place] [id number] N/A 2/14/2022    . performed by Reta Swan MD at [facility] MAIN OR    HX [address]    HX CHOLECYSTECTOMY  1982    HX HIP REPLACEMENT Left     pt stated hip was pinned, not replaced    HX HIP REPLACEMENT Right     pt stated hip was pinned, not replaced    HX HYSTERECTOMY      HX ORTHOPAEDIC  1990, 2011    bilateral hip fractures     HX ORTHOPAEDIC Left 2020    femur fracture    HX TONSILLECTOMY        Family History   Problem Relation Age of Onset    Heart Disease Mother     Heart Disease Father     Liver Disease Father     Alcohol abuse Father     Anesth Problems Neg Hx       History reviewed, no pertinent family history.   Social History     Tobacco Use    Smoking status: Former     Packs/day: 1.00 Years: 20.00     Pack years: 20.00     Types: Cigarettes     Quit date: 12/18/2011     Years since quitting: 10.8    Smokeless tobacco: Never   Substance Use Topics    Alcohol use: Never     Allergies   Allergen Reactions    Cefuroxime Hives    Hydrocodone Nausea and Vomiting    Other Medication Diarrhea and Nausea and Vomiting     PT REPORTS ALL MYCINS CAUSE SEVERE GI UPSET    Oxycodone Nausea and Vomiting    Penicillins Hives     Patient screened for any delayed non-IgE-mediated reaction to PCN.         Patient notes the following:    No delayed non-IgE-mediated reaction to PCN    Hives 1969            Current Facility-Administered Medications   Medication Dose Route Frequency    butalbital-acetaminophen-caffeine (FIORICET, ESGIC) -40 mg per tablet 2 Tablet  2 Tablet Oral Q6H PRN    amiodarone (CORDARONE) tablet 400 mg  400 mg Oral BID    traZODone (DESYREL) tablet 50 mg  50 mg Oral QHS    meropenem (MERREM) 1 g in 0.9% sodium chloride (MBP/ADV) 50 mL MBP  1 g IntraVENous Q8H    diazePAM (VALIUM) tablet 5 mg  5 mg Oral Q6H PRN    HYDROmorphone (DILAUDID) injection 0.2 mg  0.2 mg IntraVENous Q3H PRN    lidocaine 4 % patch 1 Patch  1 Patch TransDERmal Q24H    alteplase (CATHFLO) 1 mg in sterile water (preservative free) 1 mL injection  1 mg InterCATHeter PRN    therapeutic multivitamin (THERAGRAN) tablet 1 Tablet  1 Tablet Oral DAILY    polyethylene glycol (MIRALAX) packet 17 g  17 g Oral BID    magnesium hydroxide (MILK OF MAGNESIA) 400 mg/5 mL oral suspension 30 mL  30 mL Oral DAILY PRN    predniSONE (DELTASONE) tablet 40 mg  40 mg Oral DAILY WITH BREAKFAST    phenol throat spray (CHLORASEPTIC) 1 Spray  1 Spray Oral PRN    senna-docusate (PERICOLACE) 8.6-50 mg per tablet 2 Tablet  2 Tablet Oral DAILY    ipratropium (ATROVENT) 0.02 % nebulizer solution 0.5 mg  0.5 mg Nebulization Q6H RT    midodrine (PROAMATINE) tablet 20 mg  20 mg Oral TID WITH MEALS    digoxin (LANOXIN) tablet 0.125 mg  0.125 mg Oral DAILY balsam peru-castor oiL (VENELEX) ointment   Topical BID    Vancomycin - Pharmacy dosing   Other Rx Dosing/Monitoring    vancomycin (VANCOCIN) 500 mg in 0.9% sodium chloride (MBP/ADV) 100 mL MBP  500 mg IntraVENous Q12H    thiamine HCL (B-1) tablet 100 mg  100 mg Oral DAILY    dextrose 5% and 0.9% NaCl infusion  75 mL/hr IntraVENous CONTINUOUS    [Held by provider] metoprolol tartrate (LOPRESSOR) tablet 25 mg  25 mg Oral Q12H    0.9% sodium chloride infusion 250 mL  250 mL IntraVENous PRN    albuterol (PROVENTIL VENTOLIN) nebulizer solution 2.5 mg  2.5 mg Nebulization Q6H PRN    sodium chloride (NS) flush 5-40 mL  5-40 mL IntraVENous Q8H    sodium chloride (NS) flush 5-40 mL  5-40 mL IntraVENous PRN    acetaminophen (TYLENOL) tablet 650 mg  650 mg Oral Q6H PRN    Or    acetaminophen (TYLENOL) suppository 650 mg  650 mg Rectal Q6H PRN    ondansetron (ZOFRAN ODT) tablet 4 mg  4 mg Oral Q8H PRN    Or    ondansetron (ZOFRAN) injection 4 mg  4 mg IntraVENous Q6H PRN    pantoprazole (PROTONIX) 40 mg in 0.9% sodium chloride 10 mL injection  40 mg IntraVENous DAILY    prochlorperazine (COMPAZINE) injection 10 mg  10 mg IntraVENous Q6H PRN          LAB AND IMAGING FINDINGS:     Lab Results   Component Value Date/Time    WBC 9.2 10/07/2022 04:11 AM    HGB 8.9 (L) 10/07/2022 04:11 AM    PLATELET 438 (H) 40/54/4091 04:11 AM     Lab Results   Component Value Date/Time    Sodium 135 (L) 10/07/2022 04:11 AM    Potassium 4.4 10/07/2022 04:11 AM    Chloride 107 10/07/2022 04:11 AM    CO2 21 10/07/2022 04:11 AM    BUN 17 10/07/2022 04:11 AM    Creatinine 0.75 10/07/2022 04:11 AM    Calcium 8.5 10/07/2022 04:11 AM    Magnesium 1.3 (L) 10/04/2022 02:33 AM    Phosphorus 3.3 02/15/2022 03:18 AM      Lab Results   Component Value Date/Time    Alk.  phosphatase 158 (H) 10/07/2022 04:11 AM    Protein, total 5.0 (L) 10/07/2022 04:11 AM    Albumin 2.6 (L) 10/07/2022 04:11 AM    Globulin 2.4 10/07/2022 04:11 AM     Lab Results   Component Value Date/Time    INR 1.0 09/12/2022 08:12 AM    Prothrombin time 10.0 09/12/2022 08:12 AM    aPTT 23.9 08/22/2022 03:55 AM      Lab Results   Component Value Date/Time    Iron 39 08/24/2022 08:20 AM    TIBC 316 08/24/2022 08:20 AM    Iron % saturation 12 (L) 08/24/2022 08:20 AM    Ferritin 23 08/24/2022 08:20 AM      Lab Results   Component Value Date/Time    pH 7.37 10/03/2022 10:28 AM    PCO2 23 (L) 10/03/2022 10:28 AM    PO2 88 10/03/2022 10:28 AM     No components found for: GLPOC   No results found for: CPK, CKMB             Total time:  35 minutes  Counseling / coordination time, spent as noted above: 30 minutes  > 50% counseling / coordination?: yes    Prolonged service was provided for  []30 min   []75 min in face to face time in the presence of the patient, spent as noted above. Time Start:   Time End:   Note: this can only be billed with 60601 (initial) or 26342 (follow up). If multiple start / stop times, list each separately.

## 2022-10-07 NOTE — PROGRESS NOTES
Hematology-Oncology Progress Note    Abe Meckel  1945  [id number]  10/7/2022    Follow-up for: metastatic adenocarcinoam     [x]        Chart notes since last visit reviewed   [x]        Medications reviewed for allergies and interactions       Case discussed with the following:         []                            []        Nursing Staff                                                                         []        Pathologist                                                                        []        FAMILY      Subjective:     Spoke with patient who complains of: pt is very weak, still having back pain, says we aren't giving her fioricet like she takes at home    Objective:   Patient Vitals for the past 24 hrs:   BP Temp Pulse Resp SpO2   10/07/22 0000 (!) 102/50 97.4 °F (36.3 °C) 78 10 98 %   10/06/22 2000 (!) 123/58 97.7 °F (36.5 °C) 93 24 97 %   10/06/22 1600 107/67 97.9 °F (36.6 °C) 85 22 98 %   10/06/22 1400 120/67 -- (!) 113 24 99 %   10/06/22 1314 -- -- -- -- 98 %   10/06/22 1300 (!) 103/53 -- 72 16 98 %   10/06/22 1200 123/67 97 °F (36.1 °C) 97 25 97 %   10/06/22 1100 (!) 98/54 -- 87 20 100 %   10/06/22 1000 (!) 107/57 -- 81 22 99 %         REVIEW OF SYSTEMS:    Constitutional: negative fever, negative chills, negative weight loss  Eyes:   negative visual changes  ENT:   negative sore throat, tongue or lip swelling  Respiratory:  negative cough, negative dyspnea  Cards:  negative for chest pain, palpitations, lower extremity edema  GI:   negative for nausea, vomiting, diarrhea, and abdominal pain  Neuro:  negative for headaches, dizziness, vertigo  []                        Full ROS o/w normal/non contributor    Constitutional:  Patient looks  []        Sick  [x]        Frail  []        Better                                                 []        Depressed    HEENT:  [x]   NC                         []   AT               []    ALOPECIA           Eyes: [x]   Normal []    Icteric  Oropharynx: []    Normal                  []  Thrush               []   Dry  Mucositis: [x]    None                 Grade: []        I  []        II  []        III  []        IV  Neck:   [x]   Supple                  []  Rigid               JVD:    []   ABSENT       []   PRESENT  Lymphadenopathy:   []   None Noted            []   PRESENT    Chest:  [x]   Clear               []    Rhonchi                      Dec'd @     []  Right Base           []   Left Base    CV:             []   Regular              []  Irregular               [x]   Tachy                []   Murmur  Abdominal:   [x]    Soft              []   NON-tender               []   Tender      BS:    []   ABSENT                   []   PRESENT  Liver:     [x]  NON-palp                  []   EDGE- palp  Spleen: [x]   NON-palp                   []  EDGE - palp  Mass:   [x]   ABSENT                          []  PRESENT  Extr:    []  Lymphedema             []   Cyanosis      []  Clubbing  Edema:     [x]   NONE       []   PRESENT  Skin:  Intact [x]           Purpura []        Rash: []   ABSENT       []  PRESENT  Neuro:  [x]        Normal  []        Confused      Available labs reviewed:  Labs:    Recent Results (from the past 24 hour(s))   DIGOXIN    Collection Time: 10/06/22 11:04 AM   Result Value Ref Range    Digoxin level 1.1 0.90 - 2.00 NG/ML   CBC WITH AUTOMATED DIFF    Collection Time: 10/06/22 11:04 AM   Result Value Ref Range    WBC 10.1 3.6 - 11.0 K/uL    RBC 3.63 (L) 3.80 - 5.20 M/uL    HGB 9.8 (L) 11.5 - 16.0 g/dL    HCT 30.0 (L) 35.0 - 47.0 %    MCV 82.6 80.0 - 99.0 FL    MCH 27.0 26.0 - 34.0 PG    MCHC 32.7 30.0 - 36.5 g/dL    RDW 23.8 (H) 11.5 - 14.5 %    PLATELET 919 (H) 136 - 400 K/uL    MPV 9.8 8.9 - 12.9 FL    NRBC 0.5 (H) 0  WBC    ABSOLUTE NRBC 0.05 (H) 0.00 - 0.01 K/uL    NEUTROPHILS 81 (H) 32 - 75 %    LYMPHOCYTES 7 (L) 12 - 49 %    MONOCYTES 11 5 - 13 %    EOSINOPHILS 0 0 - 7 %    BASOPHILS 0 0 - 1 % IMMATURE GRANULOCYTES 1 (H) 0.0 - 0.5 %    ABS. NEUTROPHILS 8.2 (H) 1.8 - 8.0 K/UL    ABS. LYMPHOCYTES 0.7 (L) 0.8 - 3.5 K/UL    ABS. MONOCYTES 1.1 (H) 0.0 - 1.0 K/UL    ABS. EOSINOPHILS 0.0 0.0 - 0.4 K/UL    ABS. BASOPHILS 0.0 0.0 - 0.1 K/UL    ABS. IMM. GRANS. 0.1 (H) 0.00 - 0.04 K/UL    DF SMEAR SCANNED      RBC COMMENTS ANISOCYTOSIS  2+        RBC COMMENTS ANA CELLS  PRESENT        RBC COMMENTS SCHISTOCYTES  PRESENT        RBC COMMENTS TARGET CELLS  PRESENT       HEPATIC FUNCTION PANEL    Collection Time: 10/07/22  4:11 AM   Result Value Ref Range    Protein, total 5.0 (L) 6.4 - 8.2 g/dL    Albumin 2.6 (L) 3.5 - 5.0 g/dL    Globulin 2.4 2.0 - 4.0 g/dL    A-G Ratio 1.1 1.1 - 2.2      Bilirubin, total 0.5 0.2 - 1.0 MG/DL    Bilirubin, direct 0.2 0.0 - 0.2 MG/DL    Alk. phosphatase 158 (H) 45 - 117 U/L    AST (SGOT) 31 15 - 37 U/L    ALT (SGPT) 106 (H) 12 - 78 U/L   DIGOXIN    Collection Time: 10/07/22  4:11 AM   Result Value Ref Range    Digoxin level 1.7 0.90 - 2.00 NG/ML   CBC WITH AUTOMATED DIFF    Collection Time: 10/07/22  4:11 AM   Result Value Ref Range    WBC 9.2 3.6 - 11.0 K/uL    RBC 3.33 (L) 3.80 - 5.20 M/uL    HGB 8.9 (L) 11.5 - 16.0 g/dL    HCT 27.5 (L) 35.0 - 47.0 %    MCV 82.6 80.0 - 99.0 FL    MCH 26.7 26.0 - 34.0 PG    MCHC 32.4 30.0 - 36.5 g/dL    RDW 23.9 (H) 11.5 - 14.5 %    PLATELET 127 (H) 790 - 400 K/uL    MPV 9.4 8.9 - 12.9 FL    NRBC 0.5 (H) 0  WBC    ABSOLUTE NRBC 0.05 (H) 0.00 - 0.01 K/uL    NEUTROPHILS 83 (H) 32 - 75 %    LYMPHOCYTES 6 (L) 12 - 49 %    MONOCYTES 10 5 - 13 %    EOSINOPHILS 0 0 - 7 %    BASOPHILS 0 0 - 1 %    IMMATURE GRANULOCYTES 1 (H) 0.0 - 0.5 %    ABS. NEUTROPHILS 7.6 1.8 - 8.0 K/UL    ABS. LYMPHOCYTES 0.6 (L) 0.8 - 3.5 K/UL    ABS. MONOCYTES 0.9 0.0 - 1.0 K/UL    ABS. EOSINOPHILS 0.0 0.0 - 0.4 K/UL    ABS. BASOPHILS 0.0 0.0 - 0.1 K/UL    ABS. IMM.  GRANS. 0.1 (H) 0.00 - 0.04 K/UL    DF SMEAR SCANNED      RBC COMMENTS ANISOCYTOSIS  2+       METABOLIC PANEL, BASIC    Collection Time: 10/07/22  4:11 AM   Result Value Ref Range    Sodium 135 (L) 136 - 145 mmol/L    Potassium 4.4 3.5 - 5.1 mmol/L    Chloride 107 97 - 108 mmol/L    CO2 21 21 - 32 mmol/L    Anion gap 7 5 - 15 mmol/L    Glucose 112 (H) 65 - 100 mg/dL    BUN 17 6 - 20 MG/DL    Creatinine 0.75 0.55 - 1.02 MG/DL    BUN/Creatinine ratio 23 (H) 12 - 20      eGFR >60 >60 ml/min/1.73m2    Calcium 8.5 8.5 - 10.1 MG/DL         Available Xrays reviewed:    Chemotherapy monitored and toxicities assessed:    Assessment and Plan   Metastatic adenocarcinoma. .  I discussed this case with Dr. Amina Gonzalez who did a jennifer-colectomy for a large 6cm dysplastic polyp (zero of 29nodes)  in February 2022, and he noted that the original colonoscopy did not get past the polyp,  current ct scan worrisome for a cecal malignancy. 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 \"Hepatic abscess\"  radiology feels that this is actually organized hematoma. Hgb 8.9 10/7           4. Disposition. . she seems to be accepting of her fate. Wants to see her son,  rec hospice consult .  Pt too weak for chemo       MD Rohini Solorio MD

## 2022-10-07 NOTE — PROGRESS NOTES
ID Follow up   Chart checked   Being seen by palliative       1) Hepatic collections:  Discussed with radiologist . The liver collections are significant and would expect to see a significant hbg drop if purely bleeding alone per radiology   Given liver biopsy history , immune compromised if not at least immunomodulated state, at risk for superimposed infection even if there is hematoma   Would be difficult to know for sure if hematoma vs abscess alone/combination  with certainty at this point   Primary malignancy  possibly colon per oncology   On Vancomycin and Meropenem IV  If plans are not for hospice would recommend repeating CT next week of liver to see if evolution of liver lesions  Based on goals of care and repeat CT, consider drainage if any amenable collections to drainage and send for cultures   If clinical worsening in interim, start anidulafungin IV   Final duration of antibiotics to be determined based on clinical and radiographic response  if plans to continue aggressive medical care     Alexsandra Clark,   1:25 PM

## 2022-10-07 NOTE — PROGRESS NOTES
Bedside and Verbal shift change report given to [address] (oncoming nurse) by Shirley Payne RN (offgoing nurse). Report included the following information SBAR, Kardex, Intake/Output, Med Rec Status, Cardiac Rhythm NSR, and Alarm Parameters . [id number]: Patient noted to be in Afib with one episode of RVR with exertion. With rest patient settled back to 110-120 Afib. NP Gertrudis Garcia notified. MD San at bedside, per MD do not give ordered digoxin, give metop once patient wakes up.

## 2022-10-07 NOTE — PROGRESS NOTES
[address] [facility], MD, FACP, Josue Kalee Pugh, [place]      SEN Hernandes, [facility]-BC   Kurt Smith [facility]   Krystal Ralph, LANI Gardner, [facility]       Bebe MontanezUNM [facility] Center[facility] [address]    at [address], [facility] Julian Marino  22.    [phone]    FAX: [address] [place] [address] [address], [address]    [phone]    FAX: [phone]       HEPATOLOGY PROGRESS NOTE  The patient is a 77 y.o. female with multiple medical issues. She had a decline in BP and was brought to the ICU. She was noted to have an elevation in AST and ALT. TBILI is normal.  Liver enzymes were previously normal and there is no history of chronic liver disease. Liver transaminases have peaked in the 800/350 range and have come down to normal.near normal.      ECHO shows severely low LVEF 20-25%, Normal RV, moderate TR    CT scan shows liver mass. Biopsy positive for adenocCA that is not [address] Repeat CT a few days after Biopsy of liver mass shows ultiple branching fluid collections. Hepatology Plan:  Nothing else for me to add. I will sign off      ASSESSMENT AND PLAN:  Shock liver  The patient has shock liver with acute marked increase in liver transaminases that appear to already be coming down. Depending upon the severity of the insult the TBILI may remain normal or rise. The rise in TBILI generally lags behind the rise and peak in liver enzymes by 3-5 days. Passive hepatic congestion  The patient has passive hepatic congestion due to severe LV failure, probably has pulmonary hypertension, and TR. This contributed to the acute rise in liver transaminases during the episode of hypotension.     Metastatic disease to the liver  CT scan of the abdomen demonstrated a liver mass  Biopsy of the mass was positive for adenocarcinoma. Follow-up CT and US scans demonstrate multiple branching fluid collections  These are thought to be abscess and she is being empirically treated with multiple ABX   I suspect this is post-procedure bleeding after biopsy of the hepatic masses. With her normal WBC and clinical course I am not convinced this is abscess     Elevated ALP  This is secondary to metastatic disease to the liver. End-of-life care  The patient has advanced heart diease with low LVEF, and TR. She also has metastatic adenoCA with undefined primary. She is too weak and frail for chemotherapy. I think she should be considered for hospice care. Her primary concern is seeing her son again who is currently incarcerated. Moving her to hospice care is likely to improve his chances for early release/parole       PHYSICAL EXAMINATION:  VS: per nursing note  General:  No acute distress. Eyes:  Sclera anicteric. ENT:  No oral lesions. Thyroid normal.  Nodes:  No adenopathy. Skin:  No spider angiomata. No jaundice. Respiratory:  Lungs clear to auscultation. Cardiovascular:  Regular heart rate. Abdomen:  Soft non-tender, no ascites  Extremities:  No lower extremity edema. Muscle wasting. Neurologic:  Alert and oriented. Cranial nerves grossly intact. No asterixis.     LABORATORY:   Latest Reference Range & Units 10/5/22 03:21 10/6/22 09:48 10/7/22 04:11   WBC 3.6 - 11.0 K/uL 10.8 10.1 9.2   HGB 11.5 - 16.0 g/dL 8.5 (L) 9.8 (L) 8.9 (L)   PLATELET 127 - 232 K/uL 382 489 (H) 425 (H)   Sodium 136 - 145 mmol/L 137 135 (L) 135 (L)   Potassium 3.5 - 5.1 mmol/L 3.9 5.0 4.4   Chloride 97 - 108 mmol/L 107 105 107   CO2 21 - 32 mmol/L 22 21 21   Glucose 65 - 100 mg/dL 111 (H) 103 (H) 112 (H)   BUN 6 - 20 MG/DL 12 15 17   Creatinine 0.55 - 1.02 MG/DL 0.66 0.69 0.75   Bilirubin, total 0.2 - 1.0 MG/DL 0.6  0.5   Albumin 3.5 - 5.0 g/dL 2.8 (L)  2.6 (L)   ALT 12 - 78 U/L 169 (H)  106 (H)   AST 15 - 37 U/L 98 (H)  31   Alk.  phosphatase 45 - 117 U/L 150 (H)  158 (H)   (L): Data is abnormally low  (H): Data is abnormally high      Geena Morales MD   [address] [place] [address]  [address] [facility] [address]. [phone]  FAX:  [address]

## 2022-10-08 LAB
ANION GAP SERPL CALC-SCNC: 8 MMOL/L (ref 5–15)
BACTERIA SPEC CULT: NORMAL
BASOPHILS # BLD: 0 K/UL (ref 0–0.1)
BASOPHILS NFR BLD: 0 % (ref 0–1)
BUN SERPL-MCNC: 21 MG/DL (ref 6–20)
BUN/CREAT SERPL: 30 (ref 12–20)
CALCIUM SERPL-MCNC: 8.6 MG/DL (ref 8.5–10.1)
CHLORIDE SERPL-SCNC: 106 MMOL/L (ref 97–108)
CO2 SERPL-SCNC: 23 MMOL/L (ref 21–32)
CREAT SERPL-MCNC: 0.71 MG/DL (ref 0.55–1.02)
DIFFERENTIAL METHOD BLD: ABNORMAL
DIGOXIN SERPL-MCNC: 1.9 NG/ML (ref 0.9–2)
EOSINOPHIL # BLD: 0 K/UL (ref 0–0.4)
EOSINOPHIL NFR BLD: 0 % (ref 0–7)
ERYTHROCYTE [DISTWIDTH] IN BLOOD BY AUTOMATED COUNT: 23.9 % (ref 11.5–14.5)
GLUCOSE SERPL-MCNC: 99 MG/DL (ref 65–100)
HCT VFR BLD AUTO: 31.5 % (ref 35–47)
HGB BLD-MCNC: 10.1 G/DL (ref 11.5–16)
IMM GRANULOCYTES # BLD AUTO: 0.1 K/UL (ref 0–0.04)
IMM GRANULOCYTES NFR BLD AUTO: 1 % (ref 0–0.5)
LYMPHOCYTES # BLD: 0.4 K/UL (ref 0.8–3.5)
LYMPHOCYTES NFR BLD: 3 % (ref 12–49)
MCH RBC QN AUTO: 27.2 PG (ref 26–34)
MCHC RBC AUTO-ENTMCNC: 32.1 G/DL (ref 30–36.5)
MCV RBC AUTO: 84.7 FL (ref 80–99)
MONOCYTES # BLD: 1 K/UL (ref 0–1)
MONOCYTES NFR BLD: 7 % (ref 5–13)
NEUTS SEG # BLD: 13.4 K/UL (ref 1.8–8)
NEUTS SEG NFR BLD: 89 % (ref 32–75)
NRBC # BLD: 0.04 K/UL (ref 0–0.01)
NRBC BLD-RTO: 0.3 PER 100 WBC
PLATELET # BLD AUTO: 447 K/UL (ref 150–400)
PMV BLD AUTO: 10 FL (ref 8.9–12.9)
POTASSIUM SERPL-SCNC: 4.8 MMOL/L (ref 3.5–5.1)
RBC # BLD AUTO: 3.72 M/UL (ref 3.8–5.2)
RBC MORPH BLD: ABNORMAL
RBC MORPH BLD: ABNORMAL
SERVICE CMNT-IMP: NORMAL
SODIUM SERPL-SCNC: 137 MMOL/L (ref 136–145)
WBC # BLD AUTO: 14.9 K/UL (ref 3.6–11)

## 2022-10-08 PROCEDURE — 74011250637 HC RX REV CODE- 250/637: Performed by: FAMILY MEDICINE

## 2022-10-08 PROCEDURE — 80162 ASSAY OF DIGOXIN TOTAL: CPT

## 2022-10-08 PROCEDURE — 74011250637 HC RX REV CODE- 250/637: Performed by: NURSE PRACTITIONER

## 2022-10-08 PROCEDURE — 74011250636 HC RX REV CODE- 250/636: Performed by: STUDENT IN AN ORGANIZED HEALTH CARE EDUCATION/TRAINING PROGRAM

## 2022-10-08 PROCEDURE — 74011000258 HC RX REV CODE- 258: Performed by: FAMILY MEDICINE

## 2022-10-08 PROCEDURE — 99232 SBSQ HOSP IP/OBS MODERATE 35: CPT | Performed by: INTERNAL MEDICINE

## 2022-10-08 PROCEDURE — 80048 BASIC METABOLIC PNL TOTAL CA: CPT

## 2022-10-08 PROCEDURE — 74011000250 HC RX REV CODE- 250: Performed by: EMERGENCY MEDICINE

## 2022-10-08 PROCEDURE — 74011250636 HC RX REV CODE- 250/636: Performed by: FAMILY MEDICINE

## 2022-10-08 PROCEDURE — 74011000250 HC RX REV CODE- 250: Performed by: FAMILY MEDICINE

## 2022-10-08 PROCEDURE — 74011000250 HC RX REV CODE- 250: Performed by: HOSPITALIST

## 2022-10-08 PROCEDURE — 74011250636 HC RX REV CODE- 250/636: Performed by: NURSE PRACTITIONER

## 2022-10-08 PROCEDURE — 36415 COLL VENOUS BLD VENIPUNCTURE: CPT

## 2022-10-08 PROCEDURE — C9113 INJ PANTOPRAZOLE SODIUM, VIA: HCPCS | Performed by: HOSPITALIST

## 2022-10-08 PROCEDURE — 65270000046 HC RM TELEMETRY

## 2022-10-08 PROCEDURE — 74011250636 HC RX REV CODE- 250/636: Performed by: HOSPITALIST

## 2022-10-08 PROCEDURE — 85025 COMPLETE CBC W/AUTO DIFF WBC: CPT

## 2022-10-08 PROCEDURE — 74011636637 HC RX REV CODE- 636/637: Performed by: EMERGENCY MEDICINE

## 2022-10-08 PROCEDURE — 74011000258 HC RX REV CODE- 258: Performed by: STUDENT IN AN ORGANIZED HEALTH CARE EDUCATION/TRAINING PROGRAM

## 2022-10-08 PROCEDURE — 74011250637 HC RX REV CODE- 250/637: Performed by: STUDENT IN AN ORGANIZED HEALTH CARE EDUCATION/TRAINING PROGRAM

## 2022-10-08 PROCEDURE — 94640 AIRWAY INHALATION TREATMENT: CPT

## 2022-10-08 PROCEDURE — 74011250637 HC RX REV CODE- 250/637: Performed by: EMERGENCY MEDICINE

## 2022-10-08 RX ORDER — SIMETHICONE 80 MG
80 TABLET,CHEWABLE ORAL
Status: DISCONTINUED | OUTPATIENT
Start: 2022-10-08 | End: 2022-10-20 | Stop reason: HOSPADM

## 2022-10-08 RX ADMIN — MEROPENEM 1 G: 1 INJECTION, POWDER, FOR SOLUTION INTRAVENOUS at 20:26

## 2022-10-08 RX ADMIN — METOPROLOL TARTRATE 25 MG: 25 TABLET, FILM COATED ORAL at 10:49

## 2022-10-08 RX ADMIN — IPRATROPIUM BROMIDE 0.5 MG: 0.5 SOLUTION RESPIRATORY (INHALATION) at 18:55

## 2022-10-08 RX ADMIN — MEROPENEM 1 G: 1 INJECTION, POWDER, FOR SOLUTION INTRAVENOUS at 04:42

## 2022-10-08 RX ADMIN — METOPROLOL TARTRATE 25 MG: 25 TABLET, FILM COATED ORAL at 21:21

## 2022-10-08 RX ADMIN — Medication: at 10:50

## 2022-10-08 RX ADMIN — IPRATROPIUM BROMIDE 0.5 MG: 0.5 SOLUTION RESPIRATORY (INHALATION) at 13:19

## 2022-10-08 RX ADMIN — THERA TABS 1 TABLET: TAB at 10:49

## 2022-10-08 RX ADMIN — VANCOMYCIN HYDROCHLORIDE 500 MG: 500 INJECTION, POWDER, LYOPHILIZED, FOR SOLUTION INTRAVENOUS at 03:23

## 2022-10-08 RX ADMIN — DIAZEPAM 5 MG: 10 TABLET ORAL at 10:50

## 2022-10-08 RX ADMIN — BUTALBITAL, ACETAMINOPHEN, AND CAFFEINE 2 TABLET: 50; 325; 40 TABLET ORAL at 06:27

## 2022-10-08 RX ADMIN — MIDODRINE HYDROCHLORIDE 20 MG: 5 TABLET ORAL at 19:02

## 2022-10-08 RX ADMIN — DIAZEPAM 5 MG: 10 TABLET ORAL at 18:47

## 2022-10-08 RX ADMIN — AMIODARONE HYDROCHLORIDE 400 MG: 200 TABLET ORAL at 19:02

## 2022-10-08 RX ADMIN — DIGOXIN 0.12 MG: 125 TABLET ORAL at 10:49

## 2022-10-08 RX ADMIN — Medication 100 MG: at 10:49

## 2022-10-08 RX ADMIN — Medication 80 MG: at 03:23

## 2022-10-08 RX ADMIN — SODIUM CHLORIDE, PRESERVATIVE FREE 10 ML: 5 INJECTION INTRAVENOUS at 14:39

## 2022-10-08 RX ADMIN — MEROPENEM 1 G: 1 INJECTION, POWDER, FOR SOLUTION INTRAVENOUS at 12:15

## 2022-10-08 RX ADMIN — PREDNISONE 40 MG: 20 TABLET ORAL at 10:53

## 2022-10-08 RX ADMIN — VANCOMYCIN HYDROCHLORIDE 500 MG: 500 INJECTION, POWDER, LYOPHILIZED, FOR SOLUTION INTRAVENOUS at 16:18

## 2022-10-08 RX ADMIN — PANTOPRAZOLE SODIUM 40 MG: 40 INJECTION, POWDER, FOR SOLUTION INTRAVENOUS at 10:50

## 2022-10-08 RX ADMIN — BUTALBITAL, ACETAMINOPHEN, AND CAFFEINE 2 TABLET: 50; 325; 40 TABLET ORAL at 14:37

## 2022-10-08 RX ADMIN — PROCHLORPERAZINE EDISYLATE 10 MG: 5 INJECTION INTRAMUSCULAR; INTRAVENOUS at 21:19

## 2022-10-08 RX ADMIN — HYDROMORPHONE HYDROCHLORIDE 0.2 MG: 1 INJECTION, SOLUTION INTRAMUSCULAR; INTRAVENOUS; SUBCUTANEOUS at 10:49

## 2022-10-08 RX ADMIN — MIDODRINE HYDROCHLORIDE 20 MG: 5 TABLET ORAL at 10:49

## 2022-10-08 RX ADMIN — BUTALBITAL, ACETAMINOPHEN, AND CAFFEINE 2 TABLET: 50; 325; 40 TABLET ORAL at 21:55

## 2022-10-08 RX ADMIN — TRAZODONE HYDROCHLORIDE 50 MG: 50 TABLET ORAL at 21:21

## 2022-10-08 RX ADMIN — PROCHLORPERAZINE EDISYLATE 10 MG: 5 INJECTION INTRAMUSCULAR; INTRAVENOUS at 02:13

## 2022-10-08 RX ADMIN — HYDROMORPHONE HYDROCHLORIDE 0.2 MG: 1 INJECTION, SOLUTION INTRAMUSCULAR; INTRAVENOUS; SUBCUTANEOUS at 06:27

## 2022-10-08 RX ADMIN — HYDROMORPHONE HYDROCHLORIDE 0.2 MG: 1 INJECTION, SOLUTION INTRAMUSCULAR; INTRAVENOUS; SUBCUTANEOUS at 18:47

## 2022-10-08 RX ADMIN — AMIODARONE HYDROCHLORIDE 400 MG: 200 TABLET ORAL at 10:49

## 2022-10-08 RX ADMIN — Medication: at 20:29

## 2022-10-08 RX ADMIN — MIDODRINE HYDROCHLORIDE 20 MG: 5 TABLET ORAL at 14:37

## 2022-10-08 NOTE — PROGRESS NOTES
[facility]  Cardiology Care Note                  []Initial visit     [x]Established visit     Patient Name: Adilia Hurley - IBB: - TY  Primary Cardiologist: Steve Maza and has been followed by cardiology in [place]. SUBJECTIVE/INterval HPI:   Sinus rhythm predominantly. Occasional afib. No report of chest pain     Assessment and Plan       1. Paroxysmal Atrial fibrillation with rapid ventricular response  - Continues predominantly NSR. Amio/BB/dig. No adjustments as this time. Dr. Iliana Lyon will follow back on Monday. --Not a candidate for [employer] (anemia, fraility, and possible organized liver hematoma on CT scan)    Since there are limited options for management of atrial fibrillation beyond what she is on, recommend taking patient off telemetry because she has extremely poor short and intermediate term outcome. Once blood pressure improves, may escalate dose of metoprolol. 2.  Cardiomyopathy/Possible acute systolic heart failure: EF 25-30%  -Cardiomyopathy Likely secondary to stress CM (more likely) than tachycardiomyopathy   -Unable to tolerate GDMT (Low BP requiring midodrine). Continue digoxin.    -Cautious with vasopressor support as can worsen LVOT obstruction with stress CM. 3.   Likely metastatic adenocarcinoma:  -Liver adenocarcinoma of uncertain primary site.  -Heme/onc following- pt is too weak for chemo, hospice consult recommended by oncology.  -Palliative care following. 4.   Anemia: hgb 8.9.   5.   Hypotension: BP normalized on midodrine 20 mg TID-   6. Advanced directives:  DNR. Palliative following. Overall prognosis poor. 7.   Elevated LFTs: dr. Jf Karimi consulted. Suspects shock liver.      Not much to add from a cardiovascular standpoint.          ____________________________________________________________    Cardiac testing  22    ECHO ADULT FOLLOW-UP OR LIMITED 07/21/2022 7/21/2022    Interpretation Summary    Limited study for the assessment of ejection fraction. Left Ventricle: Normal left ventricular systolic function with a visually estimated EF of 55 - 60%. EF by 2D Simpsons Biplane is 56%. Left ventricle size is normal. Normal wall thickness. See diagram for wall motion findings. Tricuspid Valve: Mild to moderate regurgitation on limited color and Doppler interrogation. Pulmonary Arteries: Pulmonary hypertension not present. The estimated PASP is 27 mmHg. Signed by: Maria Isabel Joshi MD on 7/21/2022  2:41 PM        HPI:   Ms. Cinthia Lezama is a 77 y.o. female with PMH of PAF on digoxin and ASA , Left hemicolectomy in 2/2022 for adenomatous colonic polyp, anemia, HLD, GERD, seizrues, chronic back pain,  former smokr. . She presented with chief c/o abdominal pain and nausea. She was hospitalized earlier this month for abdominal pain and now found to have liver adenocarcinoma on recent liver biopsy with likely organized hepatic hematoma on CT scan. There is concern for cecal malignancy and metastatic colon cancer is suspected. Cardiology consulted as pt went into afib with RVR early this a.m. She denies any palpitations, dizziness, lightheadedness and no chest pain. She had some mild SOB last night but this is better. She feels weak. C/o some abdominal pain. No palpitations. She has received adocard 6 mg and 12 mg, iv diltiazem bolus and IV dig and started on diltiazem gtt. Later in day, she developed hypotension on diltizem gtt. BP is soft. She also exhibits metabolic acidosis thought to be due to poor po intake, malnutritionCXR with mild diffuse interstia lopacities. Noted to have EF of 25-30% on echo. For afib with RVR, she was treated with amiodarone but LFTS elevated so this was stopped. Digoxin was resumed. She would not tolerate BB given lower BP.        Most recent HS troponins:  No results for input(s): TROPHS in the last 72 hours.    No lab exists for component:  CKMB  ECG: afib with RVR, nonspecific t wave abnormality  Repeat EKG reviewed by Dr. Andrews Barksdale- atrial tachycardia with t wave abnormality V5, V6.  ?possible dig effect. Review of Systems    []All other systems reviewed and all negative except as written in HPI    [] Patient unable to provide secondary to condition         Past Medical History:   Diagnosis Date    Adenoma of colon     Anemia     Arthritis     Atrial fibrillation (HCC)     Bloating     Chronic constipation     Chronic pain     back     COPD (chronic obstructive pulmonary disease) (HCC)     GERD (gastroesophageal reflux disease)     [place] (hard of hearing)     Hyponatremia     caused seizures x 2 per pt    Indigestion     Osteoporosis     Seizures ([facility] [address] 7/2020, 9/2020    x 2      Past Surgical History:   Procedure Laterality Date    COLONOSCOPY N/A 12/16/2021    COLONOSCOPY   :- performed by Jennifer Beltran MD at [place] [address] N/A 2/14/2022    . performed by Moy Castañeda MD at [facility] MAIN OR    [address]    HX CHOLECYSTECTOMY  1982    HX HIP REPLACEMENT Left     pt stated hip was pinned, not replaced    HX HIP REPLACEMENT Right     pt stated hip was pinned, not replaced    HX HYSTERECTOMY      HX ORTHOPAEDIC  1990, 2011    bilateral hip fractures     HX ORTHOPAEDIC Left 2020    femur fracture    HX TONSILLECTOMY       SH: former smoker, no ETOH. FH:no FH of early CAD      Social Hx:  reports that she quit smoking about 10 years ago. She has a 20.00 pack-year smoking history. She has never used smokeless tobacco. She reports that she does not drink alcohol and does not use drugs. Family Hx: family history includes Alcohol abuse in her father; Heart Disease in her father and mother; Liver Disease in her father.   Allergies   Allergen Reactions    Cefuroxime Hives    Hydrocodone Nausea and Vomiting    Other Medication Diarrhea and Nausea and Vomiting     PT REPORTS ALL MYCINS CAUSE SEVERE GI UPSET    Oxycodone Nausea and Vomiting    Penicillins Hives     Patient screened for any delayed non-IgE-mediated reaction to PCN. Patient notes the following:    No delayed non-IgE-mediated reaction to PCN    Hives 1968                OBJECTIVE:  Wt Readings from Last 3 Encounters:   10/08/22 104 lb 8 oz (47.4 kg)   09/15/22 92 lb (41.7 kg)   07/21/22 93 lb (42.2 kg)       Intake/Output Summary (Last 24 hours) at 10/8/2022 0955  Last data filed at 10/8/2022 0442  Gross per 24 hour   Intake 610 ml   Output 975 ml   Net -365 ml           Physical Exam    Vitals:   Vitals:    10/07/22 1600 10/07/22 2000 10/07/22 2355 10/08/22 0400   BP: 122/68 127/79 135/71 132/76   Pulse: 92 77 72 65   Resp: 16 14 14 16   Temp: 97.4 °F (36.3 °C) 97.5 °F (36.4 °C) 97.6 °F (36.4 °C) 97.5 °F (36.4 °C)   SpO2: 98% 99% 98% 99%   Weight:    104 lb 8 oz (47.4 kg)   Height:         Telemetry: afib rvr this a.m. now  /76 (BP 1 Location: Right leg, BP Patient Position: At rest)   Pulse 65   Temp 97.5 °F (36.4 °C)   Resp 16   Ht 5' 3\" (1.6 m)   Wt 104 lb 8 oz (47.4 kg)   SpO2 99%   BMI 18.51 kg/m²   General:  Somnolent, cachectic   Psychiatric:    Normal Mood and affect    Eye/ENT:      Pupils equal, No asymmetry, Conjunctival pink. Able to hear voice at normal amplitude   Lungs:      Visibly symmetric chest expansion, No palpable tenderness. Clear to auscultation bilaterally. Heart[de-identified]  Irregular rate and rhythm, S1, S2 variable, no murmur, click, rub or gallop. No JVD, Normal palpable peripheral pulses. No cyanosis   Abdomen:     Soft, non-tender. Bowel sounds normal. No masses,  No      organomegaly. Extremities:   Extremities normal, atraumatic, no edema. Neurologic:   CN II-XII grossly intact.  No gross focal deficits           Data Review:     Tele:  Sinus rhythm  1 brief episode of PAF with RVR  Radiology:   XR Results (most recent):  Results from [facility]  [facility] Encounter encounter on 09/28/22    XR CHEST PORT    Narrative  EXAM:  XR CHEST PORT    INDICATION: Pleural effusion    COMPARISON: none    TECHNIQUE: Semiupright portable chest AP view    FINDINGS: Heart size is normal.    Small mediastinal lymph nodes are again noted. Moderate pulmonary edema  unchanged. Small bilateral pleural effusions unchanged. The visualized bones and upper  abdomen are age-appropriate. Impression  No significant change    CT Results (most recent):  Results from Hospital Encounter encounter on 09/28/22    CT ABD PELV W CONT    Narrative  EXAM: CT ABD PELV W CONT    INDICATION: Diffuse abdominal pain and diarrhea. Rectal constipation earlier  this month. Nonspecific liver disease on previous imaging. Biliary dilatation on  previous imaging. Elevated alkaline phosphatase. Normal white blood cell count,  bilirubin, lipase. COMPARISON: CT abdomen/pelvis on 9/12/2022. CONTRAST: 100 mL of Isovue-370. ORAL CONTRAST: None    TECHNIQUE:  Following the uneventful intravenous administration of contrast, thin axial  images were obtained through the abdomen and pelvis. Coronal and sagittal  reconstructions were generated. CT dose reduction was achieved through use of a  standardized protocol tailored for this examination and automatic exposure  control for dose modulation. FINDINGS:  LOWER THORAX: Small right pleural effusion is new. No basilar pneumonia. Normal  cardiac size. LIVER: Segment 7/8 subcapsular collection of heterogeneous fluid measures 9.3 x  6.9 x 3.5 cm. Heterogeneous septated fluid attenuation throughout the right  hepatic lobe measures approximately 13 cm in oblique AP diameter. Central right  hepatic lobe peripherally enhancing lesion previously measured 1.9 cm and now  measures 2.4 cm. Unchanged mild intrahepatic biliary dilatation. BILIARY TREE: Cholecystectomy. Unchanged chronic biliary dilatation. No abrupt  termination. SPLEEN: Normal size. Lobulated contour.   PANCREAS: Mild diffuse atrophy. No mass or inflammation. ADRENALS: Unremarkable. KIDNEYS: No hydronephrosis. Heterogeneous small multiple cysts. STOMACH: Unremarkable. SMALL BOWEL: No dilatation or wall thickening. COLON: Incomplete distention, limited evaluation. Fluid-filled cecum is in the  pelvis. APPENDIX: Normal.  PERITONEUM: No ascites or pneumoperitoneum. RETROPERITONEUM: Aortic atherosclerosis without aneurysm. 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 No lymphadenopathy. REPRODUCTIVE ORGANS: Hysterectomy. URINARY BLADDER: Incomplete distention, limited evaluation. BONES: Osteopenia. Femoral hardware. Spinal curvature. T11 partial compression  fracture. ABDOMINAL WALL: No mass or hernia. ADDITIONAL COMMENTS: Diffuse muscle atrophy. Impression  1. Progression of hepatic lesions and new large fluid collections in the liver. Intrahepatic abscesses from nonspecific infection are most likely. Consider  fluid sampling or drainage with CT guidance. 2. New small right pleural effusion. 3. Fluid-filled cecum may indicate infectious colitis. No bowel obstruction. MRI Results (most recent):  No results found for this or any previous visit. No results for input(s): CPK, TROIQ in the last 72 hours. No lab exists for component: CKQMB, CPKMB, BMPP  Recent Labs     10/08/22  0459 10/07/22  0411    135*   K 4.8 4.4    107   CO2 23 21   BUN 21* 17   CREA 0.71 0.75   GLU 99 112*   CA 8.6 8.5       Recent Labs     10/08/22  0459 10/07/22  0411   WBC 14.9* 9.2   HGB 10.1* 8.9*   HCT 31.5* 27.5*   * 425*       Recent Labs     10/07/22  0411   *       No results for input(s): CHOL, LDLC in the last 72 hours. No lab exists for component: TGL, HDLC,  HBA1C  No results for input(s): CRP, TSH, TSHEXT, TSHEXT in the last 72 hours.     No lab exists for component: ESR        Current meds:    Current Facility-Administered Medications:     simethicone (MYLICON) tablet 80 mg, 80 mg, Oral, QID PRN, Alla Martinez MD, 80 mg at 10/08/22 0323    butalbital-acetaminophen-caffeine (FIORICET, ESGIC) -40 mg per tablet 2 Tablet, 2 Tablet, Oral, Q6H PRN, Phan Lawton NP, 2 Tablet at 10/08/22 9563    metoprolol tartrate (LOPRESSOR) tablet 25 mg, 25 mg, Oral, Q12H, Poncho Mercado, NP, 25 mg at 10/07/22 1732    amiodarone (CORDARONE) tablet 400 mg, 400 mg, Oral, BID, Marinell Boeck B, MD, 400 mg at 10/07/22 1732    traZODone (DESYREL) tablet 50 mg, 50 mg, Oral, QHS, Niranjan De Los Santos MD, 50 mg at 10/06/22 2109    meropenem (MERREM) 1 g in 0.9% sodium chloride (MBP/ADV) 50 mL MBP, 1 g, IntraVENous, Q8H, Alla Martinez MD, Last Rate: 16.7 mL/hr at 10/08/22 0442, 1 g at 10/08/22 0442    diazePAM (VALIUM) tablet 5 mg, 5 mg, Oral, Q6H PRN, Arleen BOSCH NP, 5 mg at 10/07/22 2053    HYDROmorphone (DILAUDID) injection 0.2 mg, 0.2 mg, IntraVENous, Q3H PRN, Arleen BOSCH NP, 0.2 mg at 10/08/22 0627    lidocaine 4 % patch 1 Patch, 1 Patch, TransDERmal, Q24H, Alla Martinez MD, 1 Patch at 10/06/22 1954    alteplase (CATHFLO) 1 mg in sterile water (preservative free) 1 mL injection, 1 mg, InterCATHeter, PRN, Marinell Boeck B, MD    therapeutic multivitamin (THERAGRAN) tablet 1 Tablet, 1 Tablet, Oral, DAILY, Niranjan De Los Santos MD, 1 Tablet at 10/07/22 0931    polyethylene glycol (MIRALAX) packet 17 g, 17 g, Oral, BID, Arleen BOSCH NP, 17 g at 10/07/22 1732    magnesium hydroxide (MILK OF MAGNESIA) 400 mg/5 mL oral suspension 30 mL, 30 mL, Oral, DAILY PRN, Arleen BOSCH NP    predniSONE (DELTASONE) tablet 40 mg, 40 mg, Oral, DAILY WITH BREAKFAST, Niranjan De Los Santos MD, 40 mg at 10/07/22 1151    phenol throat spray (CHLORASEPTIC) 1 Spray, 1 Spray, Oral, PRN, Rosana Flores NP    senna-docusate (PERICOLACE) 8.6-50 mg per tablet 2 Tablet, 2 Tablet, Oral, DAILY, Arleen BOSCH NP, 2 Tablet at 10/07/22 0931    ipratropium (ATROVENT) 0.02 % nebulizer solution 0.5 mg, 0.5 mg, Nebulization, Q6H RT, Niranjan De Los Santos MD, 0.5 mg at 10/07/22 1839    midodrine (PROAMATINE) tablet 20 mg, 20 mg, Oral, TID WITH MEALS, Niranjan De Los Santos MD, 20 mg at 10/07/22 1732    digoxin (LANOXIN) tablet 0.125 mg, 0.125 mg, Oral, DAILY, Niranjan De Los Santos MD, 0.125 mg at 10/07/22 0931    balsam peru-castor oiL (VENELEX) ointment, , Topical, BID, Roxi Wills MD, Given at 10/07/22 1733    Vancomycin - Pharmacy dosing, , Other, Rx Dosing/Monitoring, Roxi Wills MD    vancomycin (VANCOCIN) 500 mg in 0.9% sodium chloride (MBP/ADV) 100 mL MBP, 500 mg, IntraVENous, Q12H, Roxi Wills MD, Last Rate: 100 mL/hr at 10/08/22 0323, 500 mg at 10/08/22 0323    thiamine HCL (B-1) tablet 100 mg, 100 mg, Oral, DAILY, Roxi Wills MD, 100 mg at 10/07/22 0931    dextrose 5% and 0.9% NaCl infusion, 75 mL/hr, IntraVENous, CONTINUOUS, Kelton Curling, MD, Stopped at 10/03/22 0830    0.9% sodium chloride infusion 250 mL, 250 mL, IntraVENous, PRN, Roxi Wills MD    albuterol (PROVENTIL VENTOLIN) nebulizer solution 2.5 mg, 2.5 mg, Nebulization, Q6H PRN, Roxi Wills MD, 2.5 mg at 10/06/22 1810    sodium chloride (NS) flush 5-40 mL, 5-40 mL, IntraVENous, Q8H, Mindi Velásquez MD, 10 mL at 10/07/22 2053    sodium chloride (NS) flush 5-40 mL, 5-40 mL, IntraVENous, PRN, MAGGIE Velásquez MD    acetaminophen (TYLENOL) tablet 650 mg, 650 mg, Oral, Q6H PRN, 650 mg at 10/02/22 2127 **OR** acetaminophen (TYLENOL) suppository 650 mg, 650 mg, Rectal, Q6H PRN, HAWA Velásquez MD    ondansetron (ZOFRAN ODT) tablet 4 mg, 4 mg, Oral, Q8H PRN, 4 mg at 10/01/22 0153 **OR** ondansetron (ZOFRAN) injection 4 mg, 4 mg, IntraVENous, Q6H PRN, Mindi Velásquez MD, 4 mg at 10/07/22 0226    pantoprazole (PROTONIX) 40 mg in 0.9% sodium chloride 10 mL injection, 40 mg, IntraVENous, DAILY, EVELYN VelásquezGRSUMA ALICEA MD, 40 mg at 10/07/22 0931    prochlorperazine (COMPAZINE) injection 10 mg, 10 mg, IntraVENous, Q6H PRN, Esteban Shultz MD, 10 mg at 10/08/22 [facility] 8305, MD  [facility] [address] [facility] [address]  [place] [address] [address] [facility]  [phone]      Angelito Bentley NP

## 2022-10-08 NOTE — PROGRESS NOTES
[address] [facility]      Brief HPI and Hospital Course:      Mile Waldron is a 77 y.o. female is brought to the ED via EMS for abdominal pain and nausea. She also reported watery diarrhea. Patient was recently hospitalized from 9/12-9/16 for abdominal pain and constipation and was found to have metastatic disease. She underwent liver biopsy which showed adenocarcinoma. Patient denied fever or chills. She denied dysuria, urgency or frequency. Work-up in the ED was significant for an abnormal abdominal pelvic CT that showed progression of hepatic lesions with a new large fluid collection in the liver with suggestion that this could be intrahepatic abscess. There are also fluid-filled cecum. Right pleural effusion. Patient was started on levofloxacin and Flagyl and was referred to the hospitalist service for admission evaluation.     Subjective:    Pt seen and examined- she remains in the ICU due to lack of tele beds  She reports being in pain and short of breath today  Nursing reports afib and tachycardia today with HR in the 150's  Patient asking for another dose of valium this evening    Assessment and Plan:    Neuro- some ICU delerium- improved     Cardiac-continue hemodynamic monitoring, map goal greater than 65, on midodrine, A. fib RVR, heart rate goal less than 110, keep magnesium above 2 and potassium above 4, cardiology adjusting cardiac meds, EF of 25 to 30% with severe hypokinesis of the mid to distal portions of the anterior, lateral and inferior walls with akinesis at the apex with some RV dysfunction as well-CAD versus Takotsubo, should get a repeat echo in a few days, follow-up cardiology recommendations     Pulmonary-supplemental oxygen as needed, presumed COPD, standing bronchodilator/steroid therapy for now, saturation goal 88 to 94%     GI-diet as tolerated, PPI therapy-Home med     Renal-monitor urine output, correct electrolyte derangements as needed     Hematology-metastatic adenocarcinoma follow-up oncology recommendations, possible organized hepatic hematoma, SCDs only for DVT prophylaxis for now     ID-hepatic abscesses versus organized hematoma continue vancomycin/Flagyl/Doxy for now-follow-up micro studies, ID & hepatology consulted, follow-up recommendations     Endocrinology-keep euglycemic    Anxiety- increased valium dose     Prognosis very guarded to poor at this time-follow-up oncology and palliative care team [address] discussions with patient and NOK           PHYSICAL EXAMINATION:  Visit Vitals  /79 (BP 1 Location: Right leg, BP Patient Position: At rest)   Pulse 77   Temp 97.4 °F (36.3 °C)   Resp 14   Ht 5' 3\" (1.6 m)   Wt 50.8 kg (111 lb 15.9 oz)   SpO2 99%   BMI 19.84 kg/m²     Patient Vitals for the past 24 hrs:   Temp Pulse Resp BP SpO2   10/07/22 2000 -- 77 14 127/79 99 %   10/07/22 1600 97.4 °F (36.3 °C) 92 16 122/68 98 %   10/07/22 1400 -- (!) 113 21 -- --   10/07/22 1300 -- (!) 127 17 -- --   10/07/22 1256 -- -- -- -- 95 %   10/07/22 1230 -- (!) 150 24 -- --   10/07/22 1200 97.4 °F (36.3 °C) (!) 112 24 124/72 95 %   10/07/22 1100 -- (!) 101 10 105/61 100 %   10/07/22 1000 -- (!) 114 17 (!) 110/56 100 %   10/07/22 0900 -- 78 9 (!) 107/53 100 %   10/07/22 0800 97.7 °F (36.5 °C) 92 11 (!) 101/59 99 %   10/07/22 0400 -- 100 24 (!) 104/57 97 %   10/07/22 0000 97.4 °F (36.3 °C) 78 10 (!) 102/50 98 %        General:          Alert, cooperative, weak and chronically ill appearing  HEENT:           Atraumatic, MMM , O2 via NC           Neck:               Supple, symmetrical,  thyroid: non tender  Lungs:             decreased breath sounds bilaterally. No Wheezing or Rhonchi. No rales. Heart:              Regular  rhythm,  3/6 Systolic ejection  murmur    Abdomen:       Soft, non-tender. Not distended. Bowel sounds normal  Extremities:     No cyanosis.   No clubbing,  +2 distal pulses, edema in all 4 ext sky upper ext  Skin: Not pale. Not Jaundiced  No rashes   Psych:             anxious  Neurologic:      Alert, severe generalized weakness,      Labs:     Recent Labs     10/07/22  0411 10/06/22  1104   WBC 9.2 10.1   HGB 8.9* 9.8*   HCT 27.5* 30.0*   * 489*       Recent Labs     10/07/22  0411 10/06/22  0948 10/05/22  0321   * 135* 137   K 4.4 5.0 3.9    105 107   CO2 21 21 22   BUN 17 15 12   CREA 0.75 0.69 0.66   * 103* 111*   CA 8.5 8.8 8.2*       Recent Labs     10/07/22  0411 10/05/22  0321   * 169*   * 150*   TBILI 0.5 0.6   TP 5.0* 4.9*   ALB 2.6* 2.8*   GLOB 2.4 2.1       No results for input(s): INR, PTP, APTT, INREXT, INREXT in the last 72 hours. No results for input(s): FE, TIBC, PSAT, FERR in the last 72 hours. Lab Results   Component Value Date/Time    Folate 27.1 (H) 08/22/2022 03:55 AM        No results for input(s): PH, PCO2, PO2 in the last 72 hours. No results for input(s): CPK, CKNDX, TROIQ in the last 72 hours.     No lab exists for component: CPKMB  Lab Results   Component Value Date/Time    Cholesterol, total 141 08/22/2022 03:55 AM    HDL Cholesterol 57 08/22/2022 03:55 AM    LDL, calculated 71.6 08/22/2022 03:55 AM    Triglyceride 62 08/22/2022 03:55 AM    CHOL/HDL Ratio 2.5 08/22/2022 03:55 AM     Lab Results   Component Value Date/Time    Glucose (POC) 145 (H) 02/14/2022 09:01 PM     Lab Results   Component Value Date/Time    Color YELLOW/STRAW 10/03/2022 04:57 PM    Appearance CLEAR 10/03/2022 04:57 PM    Specific gravity 1.008 10/03/2022 04:57 PM    Specific gravity 1.010 09/28/2022 10:37 AM    pH (UA) 6.5 10/03/2022 04:57 PM    Protein Negative 10/03/2022 04:57 PM    Glucose Negative 10/03/2022 04:57 PM    Ketone Negative 10/03/2022 04:57 PM    Bilirubin Negative 10/03/2022 04:57 PM    Urobilinogen 0.2 10/03/2022 04:57 PM    Nitrites Negative 10/03/2022 04:57 PM    Leukocyte Esterase Negative 10/03/2022 04:57 PM    Epithelial cells FEW 10/03/2022 04:57 PM Bacteria Negative 10/03/2022 04:57 PM    WBC 0-4 10/03/2022 04:57 PM    RBC 0-5 10/03/2022 04:57 PM       Current Facility-Administered Medications:     butalbital-acetaminophen-caffeine (FIORICET, ESGIC) -40 mg per tablet 2 Tablet, 2 Tablet, Oral, Q6H PRN, Tamy BOSCH NP, 2 Tablet at 10/07/22 1731    metoprolol tartrate (LOPRESSOR) tablet 25 mg, 25 mg, Oral, Q12H, Galilea Mercado NP, 25 mg at 10/07/22 1732    amiodarone (CORDARONE) tablet 400 mg, 400 mg, Oral, BID, Niranjan De Los Santos MD, 400 mg at 10/07/22 1732    traZODone (DESYREL) tablet 50 mg, 50 mg, Oral, QHS, Niranjan De Los Santos MD, 50 mg at 10/06/22 2109    meropenem (MERREM) 1 g in 0.9% sodium chloride (MBP/ADV) 50 mL MBP, 1 g, IntraVENous, Q8H, Aurora Son MD, Last Rate: 16.7 mL/hr at 10/07/22 2052, 1 g at 10/07/22 2052    diazePAM (VALIUM) tablet 5 mg, 5 mg, Oral, Q6H PRN, Tamy BOSCH NP, 5 mg at 10/07/22 2053    HYDROmorphone (DILAUDID) injection 0.2 mg, 0.2 mg, IntraVENous, Q3H PRN, Tamy BOSCH NP, 0.2 mg at 10/07/22 1402    lidocaine 4 % patch 1 Patch, 1 Patch, TransDERmal, Q24H, Aurora Son MD, 1 Patch at 10/06/22 1954    alteplase (CATHFLO) 1 mg in sterile water (preservative free) 1 mL injection, 1 mg, InterCATHeter, PRN, Muriel BOSCH MD    therapeutic multivitamin (THERAGRAN) tablet 1 Tablet, 1 Tablet, Oral, DAILY, Niranjan De Los Santos MD, 1 Tablet at 10/07/22 0931    polyethylene glycol (MIRALAX) packet 17 g, 17 g, Oral, BID, Magaly Caicedo NP, 17 g at 10/07/22 1732    magnesium hydroxide (MILK OF MAGNESIA) 400 mg/5 mL oral suspension 30 mL, 30 mL, Oral, DAILY PRN, Tamy BOSCH, NP    predniSONE (DELTASONE) tablet 40 mg, 40 mg, Oral, DAILY WITH BREAKFAST, Niranjan De Los Santos MD, 40 mg at 10/07/22 1151    phenol throat spray (CHLORASEPTIC) 1 Spray, 1 Spray, Oral, PRN, Rosana Flores, NP    senna-docusate (PERICOLACE) 8.6-50 mg per tablet 2 Tablet, 2 Tablet, Oral, DAILY, Sascha, Damir Chan, JOSEPHINE, 2 Tablet at 10/07/22 0931    ipratropium (ATROVENT) 0.02 % nebulizer solution 0.5 mg, 0.5 mg, Nebulization, Q6H RT, Niranjan De Los Santos MD, 0.5 mg at 10/07/22 1839    midodrine (PROAMATINE) tablet 20 mg, 20 mg, Oral, TID WITH MEALS, Niranjan De Los Santos MD, 20 mg at 10/07/22 1732    digoxin (LANOXIN) tablet 0.125 mg, 0.125 mg, Oral, DAILY, Niranjan De Los Santos MD, 0.125 mg at 10/07/22 0931    balsam peru-castor oiL (VENELEX) ointment, , Topical, BID, Roxi Torres MD, Given at 10/07/22 1733    Vancomycin - Pharmacy dosing, , Other, Rx Dosing/Monitoring, Roxi Torres MD    vancomycin (VANCOCIN) 500 mg in 0.9% sodium chloride (MBP/ADV) 100 mL MBP, 500 mg, IntraVENous, Q12H, Roxi Torres MD, Last Rate: 100 mL/hr at 10/07/22 1547, 500 mg at 10/07/22 1547    thiamine HCL (B-1) tablet 100 mg, 100 mg, Oral, DAILY, Roxi Torres MD, 100 mg at 10/07/22 0931    dextrose 5% and 0.9% NaCl infusion, 75 mL/hr, IntraVENous, CONTINUOUS, Enriqueta Flores MD, Stopped at 10/03/22 0830    0.9% sodium chloride infusion 250 mL, 250 mL, IntraVENous, PRN, Roxi Torres MD    albuterol (PROVENTIL VENTOLIN) nebulizer solution 2.5 mg, 2.5 mg, Nebulization, Q6H PRN, Roxi Torres MD, 2.5 mg at 10/06/22 1810    sodium chloride (NS) flush 5-40 mL, 5-40 mL, IntraVENous, Q8H, AUGUSTO Velásquez MD, 10 mL at 10/07/22 2053    sodium chloride (NS) flush 5-40 mL, 5-40 mL, IntraVENous, PRN, MARIBEL Velásquez MD    acetaminophen (TYLENOL) tablet 650 mg, 650 mg, Oral, Q6H PRN, 650 mg at 10/02/22 2127 **OR** acetaminophen (TYLENOL) suppository 650 mg, 650 mg, Rectal, Q6H PRN, TRIXIE Velásquez MD    ondansetron (ZOFRAN ODT) tablet 4 mg, 4 mg, Oral, Q8H PRN, 4 mg at 10/01/22 0153 **OR** ondansetron (ZOFRAN) injection 4 mg, 4 mg, IntraVENous, Q6H PRN, Leonardo Velásquez MD, 4 mg at 10/07/22 0226    pantoprazole (PROTONIX) 40 mg in 0.9% sodium chloride 10 mL injection, 40 mg, IntraVENous, DAILY, KATELIN Velásquez MD, 40 mg at 10/07/22 0931    prochlorperazine (COMPAZINE) injection 10 mg, 10 mg, IntraVENous, Q6H PRN, Elton Velásquez MD, 10 mg at 10/03/22 1536     CODE STATUS:   Full Code   X DNR    Partial    Comfort Care       Signed:   Cate Guzman MD  Date of Service:  10/7/2022

## 2022-10-08 NOTE — PROGRESS NOTES
2000- Bedside and Verbal shift change report given to Hillary (oncoming nurse) by Karyna Warner (offgoing nurse). Report included the following information SBAR, Intake/Output, Cardiac Rhythm SR, and Alarm Parameters . Shift summary-  Patient slept from 2300- 0500, urinated 850 total in BSC, RUE very weepy, no Afib noted. 0800- Bedside and Verbal shift change report given to Karyna Warner (oncoming nurse) by Hillary (offgoing nurse). Report included the following information SBAR, Intake/Output, Recent Results, Cardiac Rhythm SR, and Alarm Parameters .

## 2022-10-08 NOTE — PROGRESS NOTES
[facility] [facility]      Brief HPI and Hospital Course:      Janine Nick is a 77 y.o. female is brought to the ED via EMS for abdominal pain and nausea. She also reported watery diarrhea. Patient was recently hospitalized from 9/12-9/16 for abdominal pain and constipation and was found to have metastatic disease. She underwent liver biopsy which showed adenocarcinoma. Patient denied fever or chills. She denied dysuria, urgency or frequency. Work-up in the ED was significant for an abnormal abdominal pelvic CT that showed progression of hepatic lesions with a new large fluid collection in the liver with suggestion that this could be intrahepatic abscess. There are also fluid-filled cecum. Right pleural effusion. Patient was started on levofloxacin and Flagyl and was referred to the hospitalist service for admission evaluation.     Subjective:    Pt seen and examined- she remains in the ICU due to lack of tele beds  She reported pain and short of breath yesterday- no complaints this am  Nursing reports afib and tachycardia yesterday with HR in the 150's- HR this am normal  Appreciate input from consultants    Assessment and Plan:    Neuro- some ICU delerium- improved     Cardiac-continue hemodynamic monitoring, map goal greater than 65, on midodrine, A. fib RVR, heart rate goal less than 110, keep magnesium above 2 and potassium above 4, cardiology adjusting cardiac meds, EF of 25 to 30% with severe hypokinesis of the mid to distal portions of the anterior, lateral and inferior walls with akinesis at the apex with some RV dysfunction as well-CAD versus Takotsubo, should get a repeat echo in a few days, follow-up cardiology recommendations     Pulmonary-supplemental oxygen as needed, presumed COPD, standing bronchodilator/steroid therapy for now, saturation goal 88 to 94%     GI-diet as tolerated, PPI therapy-Home med     Renal-monitor urine output, correct electrolyte derangements as needed     Heme/Onc-liver mass biopsy  positive for adenoCA that is not HCC. Pt had jennifer-colectomy for a large 6cm dysplastic polyp (zero of 29nodes)  in February 2022,  current ct scan worrisome for a cecal malignancy. 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 Recommending hospice. ID-hepatic abscesses versus organized hematoma continue vancomycin/Flagyl/Doxy for now-follow-up micro studies, ID consulted, follow-up recommendations= If plans are not for hospice recs include repeating CT next week of liver to see if evolution of liver lesions. Based on goals of care and repeat CT, consider drainage if any amenable collections to drainage and send for cultures. If clinical worsening in interim, start anidulafungin IV     Elevated LFTs and bilirubin-due to Shock liver  eval from hepatology- acute marked increase in liver transaminases that appear to already be coming down. The rise in TBILI generally lags behind the rise and peak in liver enzymes by 3-5 days.   metastatic disease to the liver noted     Endocrinology-keep euglycemic    Anxiety- increased valium dose     Prognosis very guarded to poor at this time-follow-up oncology and palliative care team [address] discussions with patient and NOK           PHYSICAL EXAMINATION:  Visit Vitals  /76 (BP 1 Location: Right leg, BP Patient Position: At rest)   Pulse 65   Temp 97.5 °F (36.4 °C)   Resp 16   Ht 5' 3\" (1.6 m)   Wt 47.4 kg (104 lb 8 oz)   SpO2 99%   BMI 18.51 kg/m²     Patient Vitals for the past 24 hrs:   Temp Pulse Resp BP SpO2   10/08/22 0400 97.5 °F (36.4 °C) 65 16 132/76 99 %   10/07/22 2355 97.6 °F (36.4 °C) 72 14 135/71 98 %   10/07/22 2000 97.5 °F (36.4 °C) 77 14 127/79 99 %   10/07/22 1600 97.4 °F (36.3 °C) 92 16 122/68 98 %   10/07/22 1400 -- (!) 113 21 -- --   10/07/22 1300 -- (!) 127 17 -- --   10/07/22 1256 -- -- -- -- 95 %   10/07/22 1230 -- (!) 150 24 -- --   10/07/22 1200 97.4 °F (36.3 °C) (!) 112 24 124/72 95 % 10/07/22 1100 -- (!) 101 10 105/61 100 %   10/07/22 1000 -- (!) 114 17 (!) 110/56 100 %   10/07/22 0900 -- 78 9 (!) 107/53 100 %        General:          Alert, cooperative, weak and chronically ill appearing  HEENT:           Atraumatic, MMM , O2 via NC           Neck:               Supple, symmetrical,  thyroid: non tender  Lungs:             decreased breath sounds bilaterally. No Wheezing or Rhonchi. No rales. Heart:              Regular  rhythm,  3/6 Systolic ejection  murmur    Abdomen:       Soft, non-tender. Not distended. Bowel sounds normal  Extremities:     No cyanosis. No clubbing,  +2 distal pulses, edema in all 4 ext sky upper ext  Skin:                Not pale. Not Jaundiced  No rashes   Psych:             anxious  Neurologic:      Alert, severe generalized weakness,      Labs:     Recent Labs     10/08/22  0459 10/07/22  0411   WBC 14.9* 9.2   HGB 10.1* 8.9*   HCT 31.5* 27.5*   * 425*       Recent Labs     10/08/22  0459 10/07/22  0411 10/06/22  0948    135* 135*   K 4.8 4.4 5.0    107 105   CO2 23 21 21   BUN 21* 17 15   CREA 0.71 0.75 0.69   GLU 99 112* 103*   CA 8.6 8.5 8.8       Recent Labs     10/07/22  0411   *   *   TBILI 0.5   TP 5.0*   ALB 2.6*   GLOB 2.4       No results for input(s): INR, PTP, APTT, INREXT, INREXT in the last 72 hours. No results for input(s): FE, TIBC, PSAT, FERR in the last 72 hours. Lab Results   Component Value Date/Time    Folate 27.1 (H) 08/22/2022 03:55 AM        No results for input(s): PH, PCO2, PO2 in the last 72 hours. No results for input(s): CPK, CKNDX, TROIQ in the last 72 hours.     No lab exists for component: CPKMB  Lab Results   Component Value Date/Time    Cholesterol, total 141 08/22/2022 03:55 AM    HDL Cholesterol 57 08/22/2022 03:55 AM    LDL, calculated 71.6 08/22/2022 03:55 AM    Triglyceride 62 08/22/2022 03:55 AM    CHOL/HDL Ratio 2.5 08/22/2022 03:55 AM     Lab Results   Component Value Date/Time Glucose (POC) 145 (H) 02/14/2022 09:01 PM     Lab Results   Component Value Date/Time    Color YELLOW/STRAW 10/03/2022 04:57 PM    Appearance CLEAR 10/03/2022 04:57 PM    Specific gravity 1.008 10/03/2022 04:57 PM    Specific gravity 1.010 09/28/2022 10:37 AM    pH (UA) 6.5 10/03/2022 04:57 PM    Protein Negative 10/03/2022 04:57 PM    Glucose Negative 10/03/2022 04:57 PM    Ketone Negative 10/03/2022 04:57 PM    Bilirubin Negative 10/03/2022 04:57 PM    Urobilinogen 0.2 10/03/2022 04:57 PM    Nitrites Negative 10/03/2022 04:57 PM    Leukocyte Esterase Negative 10/03/2022 04:57 PM    Epithelial cells FEW 10/03/2022 04:57 PM    Bacteria Negative 10/03/2022 04:57 PM    WBC 0-4 10/03/2022 04:57 PM    RBC 0-5 10/03/2022 04:57 PM       Current Facility-Administered Medications:     simethicone (MYLICON) tablet 80 mg, 80 mg, Oral, QID PRN, Keiry Oneal MD, 80 mg at 10/08/22 0323    butalbital-acetaminophen-caffeine (FIORICET, ESGIC) -40 mg per tablet 2 Tablet, 2 Tablet, Oral, Q6H PRN, Carson BOSCH NP, 2 Tablet at 10/08/22 3200    metoprolol tartrate (LOPRESSOR) tablet 25 mg, 25 mg, Oral, Q12H, Bradley Mercado ClinkCharbel, NP, 25 mg at 10/07/22 1732    amiodarone (CORDARONE) tablet 400 mg, 400 mg, Oral, BID, Niranjan De Los Santos MD, 400 mg at 10/07/22 1732    traZODone (DESYREL) tablet 50 mg, 50 mg, Oral, QHS, Niranjan De Los Santos MD, 50 mg at 10/06/22 2109    meropenem (MERREM) 1 g in 0.9% sodium chloride (MBP/ADV) 50 mL MBP, 1 g, IntraVENous, Q8H, Keiry Oneal MD, Last Rate: 16.7 mL/hr at 10/08/22 0442, 1 g at 10/08/22 0442    diazePAM (VALIUM) tablet 5 mg, 5 mg, Oral, Q6H PRN, Carson Colby B, NP, 5 mg at 10/07/22 2053    HYDROmorphone (DILAUDID) injection 0.2 mg, 0.2 mg, IntraVENous, Q3H PRN, Carson Colby B, NP, 0.2 mg at 10/08/22 0627    lidocaine 4 % patch 1 Patch, 1 Patch, TransDERmal, Q24H, Keiry Oneal MD, 1 Patch at 10/06/22 1954    alteplase (CATHFLO) 1 mg in sterile water (preservative free) 1 mL injection, 1 mg, InterCATHeter, PRN, Frances BOSCH MD    therapeutic multivitamin (THERAGRAN) tablet 1 Tablet, 1 Tablet, Oral, DAILY, Niranjan De Los Santos MD, 1 Tablet at 10/07/22 0931    polyethylene glycol (MIRALAX) packet 17 g, 17 g, Oral, BID, Magaly Caicedo NP, 17 g at 10/07/22 1732    magnesium hydroxide (MILK OF MAGNESIA) 400 mg/5 mL oral suspension 30 mL, 30 mL, Oral, DAILY PRN, Linnea BOSCH NP    predniSONE (DELTASONE) tablet 40 mg, 40 mg, Oral, DAILY WITH BREAKFAST, Niranjan De Los Santos MD, 40 mg at 10/07/22 1151    phenol throat spray (CHLORASEPTIC) 1 Spray, 1 Spray, Oral, PRN, Rosana Flores NP    senna-docusate (PERICOLACE) 8.6-50 mg per tablet 2 Tablet, 2 Tablet, Oral, DAILY, Linnea BOSCH NP, 2 Tablet at 10/07/22 0931    ipratropium (ATROVENT) 0.02 % nebulizer solution 0.5 mg, 0.5 mg, Nebulization, Q6H RT, Niranjan De Los Santos MD, 0.5 mg at 10/07/22 1839    midodrine (PROAMATINE) tablet 20 mg, 20 mg, Oral, TID WITH MEALS, Niranjan De Los Santos MD, 20 mg at 10/07/22 1732    digoxin (LANOXIN) tablet 0.125 mg, 0.125 mg, Oral, DAILY, Niranjan De Los Santos MD, 0.125 mg at 10/07/22 0931    balsam peru-castor oiL (VENELEX) ointment, , Topical, BID, Dejuan Sheehan MD, Given at 10/07/22 1733    Vancomycin - Pharmacy dosing, , Other, Rx Dosing/Monitoring, Roxi Damon MD    vancomycin (VANCOCIN) 500 mg in 0.9% sodium chloride (MBP/ADV) 100 mL MBP, 500 mg, IntraVENous, Q12H, Roxi Russo MD, Last Rate: 100 mL/hr at 10/08/22 0323, 500 mg at 10/08/22 0323    thiamine HCL (B-1) tablet 100 mg, 100 mg, Oral, DAILY, Roxi Russo MD, 100 mg at 10/07/22 0931    dextrose 5% and 0.9% NaCl infusion, 75 mL/hr, IntraVENous, CONTINUOUS, Migel Rosales MD, Stopped at 10/03/22 0830    0.9% sodium chloride infusion 250 mL, 250 mL, IntraVENous, PRN, Roxi Damon MD    albuterol (PROVENTIL VENTOLIN) nebulizer solution 2.5 mg, 2.5 mg, Nebulization, Q6H PRN, Roxi Damon MD, 2.5 mg at 10/06/22 1810    sodium chloride (NS) flush 5-40 mL, 5-40 mL, IntraVENous, Q8H, Christen [id number] MD NIXON, 10 mL at 10/07/22 2053    sodium chloride (NS) flush 5-40 mL, 5-40 mL, IntraVENous, PRN, Christen [id number] MD NIXON    acetaminophen (TYLENOL) tablet 650 mg, 650 mg, Oral, Q6H PRN, 650 mg at 10/02/22 2127 **OR** acetaminophen (TYLENOL) suppository 650 mg, 650 mg, Rectal, Q6H PRN, GLADYS Velásquez MD    ondansetron (ZOFRAN ODT) tablet 4 mg, 4 mg, Oral, Q8H PRN, 4 mg at 10/01/22 0153 **OR** ondansetron (ZOFRAN) injection 4 mg, 4 mg, IntraVENous, Q6H PRN, ANGELIKA Velásquez MD, 4 mg at 10/07/22 0226    pantoprazole (PROTONIX) 40 mg in 0.9% sodium chloride 10 mL injection, 40 mg, IntraVENous, DAILY, Christen [id number] MD NIXON, 40 mg at 10/07/22 0931    prochlorperazine (COMPAZINE) injection 10 mg, 10 mg, IntraVENous, Q6H PRN, Dallin Velásquez MD, 10 mg at 10/08/22 1705     CODE STATUS:   Full Code   X DNR    Partial    Comfort Care       Signed:   Traci Santana MD  Date of Service:  10/8/2022

## 2022-10-09 LAB
ALBUMIN SERPL-MCNC: 2.4 G/DL (ref 3.5–5)
ALBUMIN/GLOB SERPL: 1 {RATIO} (ref 1.1–2.2)
ALP SERPL-CCNC: 160 U/L (ref 45–117)
ALT SERPL-CCNC: 69 U/L (ref 12–78)
ANION GAP SERPL CALC-SCNC: 4 MMOL/L (ref 5–15)
AST SERPL-CCNC: 33 U/L (ref 15–37)
BASOPHILS # BLD: 0 K/UL (ref 0–0.1)
BASOPHILS NFR BLD: 0 % (ref 0–1)
BILIRUB DIRECT SERPL-MCNC: 0.1 MG/DL (ref 0–0.2)
BILIRUB SERPL-MCNC: 0.4 MG/DL (ref 0.2–1)
BUN SERPL-MCNC: 22 MG/DL (ref 6–20)
BUN/CREAT SERPL: 31 (ref 12–20)
CALCIUM SERPL-MCNC: 8.3 MG/DL (ref 8.5–10.1)
CHLORIDE SERPL-SCNC: 105 MMOL/L (ref 97–108)
CO2 SERPL-SCNC: 26 MMOL/L (ref 21–32)
CREAT SERPL-MCNC: 0.71 MG/DL (ref 0.55–1.02)
DIFFERENTIAL METHOD BLD: ABNORMAL
DIGOXIN SERPL-MCNC: 2 NG/ML (ref 0.9–2)
EOSINOPHIL # BLD: 0 K/UL (ref 0–0.4)
EOSINOPHIL NFR BLD: 0 % (ref 0–7)
ERYTHROCYTE [DISTWIDTH] IN BLOOD BY AUTOMATED COUNT: 23.9 % (ref 11.5–14.5)
GLOBULIN SER CALC-MCNC: 2.4 G/DL (ref 2–4)
GLUCOSE SERPL-MCNC: 99 MG/DL (ref 65–100)
HCT VFR BLD AUTO: 30.1 % (ref 35–47)
HGB BLD-MCNC: 9.9 G/DL (ref 11.5–16)
IMM GRANULOCYTES # BLD AUTO: 0.2 K/UL (ref 0–0.04)
IMM GRANULOCYTES NFR BLD AUTO: 1 % (ref 0–0.5)
LYMPHOCYTES # BLD: 0.6 K/UL (ref 0.8–3.5)
LYMPHOCYTES NFR BLD: 4 % (ref 12–49)
MCH RBC QN AUTO: 27.3 PG (ref 26–34)
MCHC RBC AUTO-ENTMCNC: 32.9 G/DL (ref 30–36.5)
MCV RBC AUTO: 83.1 FL (ref 80–99)
MONOCYTES # BLD: 0.9 K/UL (ref 0–1)
MONOCYTES NFR BLD: 6 % (ref 5–13)
NEUTS SEG # BLD: 13.7 K/UL (ref 1.8–8)
NEUTS SEG NFR BLD: 89 % (ref 32–75)
NRBC # BLD: 0.04 K/UL (ref 0–0.01)
NRBC BLD-RTO: 0.3 PER 100 WBC
PLATELET # BLD AUTO: 355 K/UL (ref 150–400)
PMV BLD AUTO: 9.6 FL (ref 8.9–12.9)
POTASSIUM SERPL-SCNC: 4.4 MMOL/L (ref 3.5–5.1)
PROT SERPL-MCNC: 4.8 G/DL (ref 6.4–8.2)
RBC # BLD AUTO: 3.62 M/UL (ref 3.8–5.2)
RBC MORPH BLD: ABNORMAL
SODIUM SERPL-SCNC: 135 MMOL/L (ref 136–145)
WBC # BLD AUTO: 15.4 K/UL (ref 3.6–11)

## 2022-10-09 PROCEDURE — 94640 AIRWAY INHALATION TREATMENT: CPT

## 2022-10-09 PROCEDURE — 74011250637 HC RX REV CODE- 250/637: Performed by: STUDENT IN AN ORGANIZED HEALTH CARE EDUCATION/TRAINING PROGRAM

## 2022-10-09 PROCEDURE — 80048 BASIC METABOLIC PNL TOTAL CA: CPT

## 2022-10-09 PROCEDURE — 74011000250 HC RX REV CODE- 250: Performed by: EMERGENCY MEDICINE

## 2022-10-09 PROCEDURE — 85025 COMPLETE CBC W/AUTO DIFF WBC: CPT

## 2022-10-09 PROCEDURE — 36415 COLL VENOUS BLD VENIPUNCTURE: CPT

## 2022-10-09 PROCEDURE — 74011250636 HC RX REV CODE- 250/636: Performed by: FAMILY MEDICINE

## 2022-10-09 PROCEDURE — 74011250636 HC RX REV CODE- 250/636: Performed by: STUDENT IN AN ORGANIZED HEALTH CARE EDUCATION/TRAINING PROGRAM

## 2022-10-09 PROCEDURE — 74011250636 HC RX REV CODE- 250/636: Performed by: NURSE PRACTITIONER

## 2022-10-09 PROCEDURE — 74011250637 HC RX REV CODE- 250/637: Performed by: NURSE PRACTITIONER

## 2022-10-09 PROCEDURE — 74011000250 HC RX REV CODE- 250: Performed by: HOSPITALIST

## 2022-10-09 PROCEDURE — 74011250636 HC RX REV CODE- 250/636: Performed by: HOSPITALIST

## 2022-10-09 PROCEDURE — 65270000046 HC RM TELEMETRY

## 2022-10-09 PROCEDURE — 80076 HEPATIC FUNCTION PANEL: CPT

## 2022-10-09 PROCEDURE — 74011250637 HC RX REV CODE- 250/637: Performed by: EMERGENCY MEDICINE

## 2022-10-09 PROCEDURE — 74011636637 HC RX REV CODE- 636/637: Performed by: EMERGENCY MEDICINE

## 2022-10-09 PROCEDURE — 80162 ASSAY OF DIGOXIN TOTAL: CPT

## 2022-10-09 PROCEDURE — 74011000258 HC RX REV CODE- 258: Performed by: FAMILY MEDICINE

## 2022-10-09 PROCEDURE — 74011000258 HC RX REV CODE- 258: Performed by: STUDENT IN AN ORGANIZED HEALTH CARE EDUCATION/TRAINING PROGRAM

## 2022-10-09 PROCEDURE — C9113 INJ PANTOPRAZOLE SODIUM, VIA: HCPCS | Performed by: HOSPITALIST

## 2022-10-09 RX ORDER — ONDANSETRON 4 MG/1
4 TABLET, ORALLY DISINTEGRATING ORAL
Status: DISCONTINUED | OUTPATIENT
Start: 2022-10-09 | End: 2022-10-20 | Stop reason: HOSPADM

## 2022-10-09 RX ORDER — ONDANSETRON 2 MG/ML
4 INJECTION INTRAMUSCULAR; INTRAVENOUS
Status: DISCONTINUED | OUTPATIENT
Start: 2022-10-09 | End: 2022-10-20 | Stop reason: HOSPADM

## 2022-10-09 RX ADMIN — TRAZODONE HYDROCHLORIDE 50 MG: 50 TABLET ORAL at 21:16

## 2022-10-09 RX ADMIN — Medication: at 10:10

## 2022-10-09 RX ADMIN — MEROPENEM 1 G: 1 INJECTION, POWDER, FOR SOLUTION INTRAVENOUS at 03:42

## 2022-10-09 RX ADMIN — MEROPENEM 1 G: 1 INJECTION, POWDER, FOR SOLUTION INTRAVENOUS at 12:11

## 2022-10-09 RX ADMIN — DIAZEPAM 5 MG: 10 TABLET ORAL at 09:00

## 2022-10-09 RX ADMIN — BUTALBITAL, ACETAMINOPHEN, AND CAFFEINE 2 TABLET: 50; 325; 40 TABLET ORAL at 09:00

## 2022-10-09 RX ADMIN — PREDNISONE 40 MG: 20 TABLET ORAL at 10:15

## 2022-10-09 RX ADMIN — PANTOPRAZOLE SODIUM 40 MG: 40 INJECTION, POWDER, FOR SOLUTION INTRAVENOUS at 10:09

## 2022-10-09 RX ADMIN — DIAZEPAM 5 MG: 10 TABLET ORAL at 16:37

## 2022-10-09 RX ADMIN — VANCOMYCIN HYDROCHLORIDE 500 MG: 500 INJECTION, POWDER, LYOPHILIZED, FOR SOLUTION INTRAVENOUS at 03:42

## 2022-10-09 RX ADMIN — AMIODARONE HYDROCHLORIDE 400 MG: 200 TABLET ORAL at 17:08

## 2022-10-09 RX ADMIN — Medication: at 17:49

## 2022-10-09 RX ADMIN — SENNOSIDES AND DOCUSATE SODIUM 2 TABLET: 50; 8.6 TABLET ORAL at 10:08

## 2022-10-09 RX ADMIN — AMIODARONE HYDROCHLORIDE 400 MG: 200 TABLET ORAL at 10:08

## 2022-10-09 RX ADMIN — IPRATROPIUM BROMIDE 0.5 MG: 0.5 SOLUTION RESPIRATORY (INHALATION) at 00:48

## 2022-10-09 RX ADMIN — BUTALBITAL, ACETAMINOPHEN, AND CAFFEINE 2 TABLET: 50; 325; 40 TABLET ORAL at 16:37

## 2022-10-09 RX ADMIN — Medication 100 MG: at 10:08

## 2022-10-09 RX ADMIN — VANCOMYCIN HYDROCHLORIDE 500 MG: 500 INJECTION, POWDER, LYOPHILIZED, FOR SOLUTION INTRAVENOUS at 17:08

## 2022-10-09 RX ADMIN — IPRATROPIUM BROMIDE 0.5 MG: 0.5 SOLUTION RESPIRATORY (INHALATION) at 19:03

## 2022-10-09 RX ADMIN — IPRATROPIUM BROMIDE 0.5 MG: 0.5 SOLUTION RESPIRATORY (INHALATION) at 13:04

## 2022-10-09 RX ADMIN — THERA TABS 1 TABLET: TAB at 10:08

## 2022-10-09 RX ADMIN — MEROPENEM 1 G: 1 INJECTION, POWDER, FOR SOLUTION INTRAVENOUS at 20:39

## 2022-10-09 RX ADMIN — HYDROMORPHONE HYDROCHLORIDE 0.2 MG: 1 INJECTION, SOLUTION INTRAMUSCULAR; INTRAVENOUS; SUBCUTANEOUS at 12:22

## 2022-10-09 RX ADMIN — SODIUM CHLORIDE, PRESERVATIVE FREE 10 ML: 5 INJECTION INTRAVENOUS at 21:16

## 2022-10-09 RX ADMIN — DIGOXIN 0.12 MG: 125 TABLET ORAL at 12:14

## 2022-10-09 RX ADMIN — METOPROLOL TARTRATE 25 MG: 25 TABLET, FILM COATED ORAL at 20:40

## 2022-10-09 RX ADMIN — SODIUM CHLORIDE, PRESERVATIVE FREE 10 ML: 5 INJECTION INTRAVENOUS at 16:37

## 2022-10-09 NOTE — PROGRESS NOTES
Day #7 of Vancomycin  Indication:  sepsis  - liver abscess +/- hematoma in the setting of malignancy  Current regimen:  500 mg Q12h  Abx regimen:  vancomycin + merrem  ID Following ?: YES  Concomitant nephrotoxic drugs (requires more frequent monitoring): None  Frequency of BMP?: daily    Recent Labs     10/09/22  0358 10/08/22  0459 10/07/22  0411   WBC 15.4* 14.9* 9.2   CREA 0.71 0.71 0.75   BUN 22* 21* 17     Est CrCl: 50-55 ml/min; UO: 0.8 ml/kg/hr  Temp (24hrs), Av.5 °F (36.4 °C), Min:97.4 °F (36.3 °C), Max:97.6 °F (36.4 °C)    Cultures:    C.diff - negative   urine - E.coli - pan-sensitive (completed course of Bactrim)  10/3 blood: NGTD, final    MRSA Swab ordered (if applicable)? N/A    Goal target range AUC/JOBY 400-600    Recent level history:  Date/Time Dose & Interval Measured Level (mcg/mL) Associated AUC/JOBY Dose Adjustment    10/5 @ 0321 500mg q12h 19.7 (2.5 hrs p dose) 444 None                                           Plan: Continue current regimen for predicted AUC/JOBY within goal range. Obtain random level with AM labs to re-assess current dosing given duration of therapy.

## 2022-10-09 NOTE — PROGRESS NOTES
[facility] Adult  Hospitalist Group      Brief HPI and Hospital Course:      Teddy Trotter is a 77 y.o. female is brought to the ED via EMS for abdominal pain and nausea. She also reported watery diarrhea. Patient was recently hospitalized from 9/12-9/16 for abdominal pain and constipation and was found to have metastatic disease. She underwent liver biopsy which showed adenocarcinoma. Patient denied fever or chills. She denied dysuria, urgency or frequency. Work-up in the ED was significant for an abnormal abdominal pelvic CT that showed progression of hepatic lesions with a new large fluid collection in the liver with suggestion that this could be intrahepatic abscess. There are also fluid-filled cecum. Right pleural effusion. Patient was started on levofloxacin and Flagyl and was referred to the hospitalist service for admission evaluation.     Subjective:    Pt seen and examined- she remains in the ICU due to lack of tele beds  She reported pain and short of breath today- wants her fioricet and zofran \" and hurry it up\"  stable HR overnight  Appreciate input from consultants    Assessment and Plan:    Neuro- some ICU delerium- improved     Cardiac-continue hemodynamic monitoring, map goal greater than 65, on midodrine, A. fib RVR, heart rate goal less than 110, keep magnesium above 2 and potassium above 4, cardiology adjusting cardiac meds, EF of 25 to 30% with severe hypokinesis of the mid to distal portions of the anterior, lateral and inferior walls with akinesis at the apex with some RV dysfunction as well-CAD versus Takotsubo, should get a repeat echo in a few days, follow-up cardiology recommendations     Pulmonary-supplemental oxygen as needed, presumed COPD, standing bronchodilator/steroid therapy for now, saturation goal 88 to 94%     GI-diet as tolerated, PPI therapy-Home med     Renal-monitor urine output, correct electrolyte derangements as needed Heme/Onc-liver mass biopsy  positive for adenoCA that is not HCC. Pt had jennifer-colectomy for a large 6cm dysplastic polyp (zero of 29nodes)  in February 2022,  current ct scan worrisome for a cecal malignancy. 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 Recommending hospice. ID-hepatic abscesses versus organized hematoma continue vancomycin/Flagyl/Doxy for now-follow-up micro studies, ID consulted, follow-up recommendations= If plans are not for hospice recs include repeating CT next week of liver to see if evolution of liver lesions. Based on goals of care and repeat CT, consider drainage if any amenable collections to drainage and send for cultures. If clinical worsening in interim, start anidulafungin IV     Elevated LFTs and bilirubin-due to Shock liver  eval from hepatology- acute marked increase in liver transaminases that appear to already be coming down. The rise in TBILI generally lags behind the rise and peak in liver enzymes by 3-5 days.   metastatic disease to the liver noted     Endocrinology-keep euglycemic    Anxiety- increased valium dose     Prognosis very guarded to poor at this time-follow-up oncology and palliative care team [address] discussions with patient and NOK           PHYSICAL EXAMINATION:  Visit Vitals  /66 (BP 1 Location: Right leg, BP Patient Position: At rest)   Pulse 80   Temp 97.4 °F (36.3 °C)   Resp 28   Ht 5' 3\" (1.6 m)   Wt 47.4 kg (104 lb 8 oz)   SpO2 98%   BMI 18.51 kg/m²     Patient Vitals for the past 24 hrs:   Temp Pulse Resp BP SpO2   10/09/22 0800 -- 80 28 -- --   10/09/22 0402 97.4 °F (36.3 °C) 63 20 123/66 98 %   10/09/22 0002 -- (!) 59 14 126/76 96 %   10/08/22 2002 97.6 °F (36.4 °C) 62 12 126/78 98 %   10/08/22 1557 97.4 °F (36.3 °C) 60 13 128/68 97 %   10/08/22 1319 -- -- -- -- 96 %   10/08/22 1200 97.3 °F (36.3 °C) 74 15 124/74 95 %        General:          Alert, cooperative, weak and chronically ill appearing  HEENT:           Atraumatic, MMM , O2 via NC Neck:               Supple, symmetrical,  thyroid: non tender  Lungs:             decreased breath sounds bilaterally. No Wheezing or Rhonchi. No rales. Heart:              Regular  rhythm,  3/6 Systolic ejection  murmur    Abdomen:       Soft, non-tender. Not distended. Bowel sounds normal  Extremities:     No cyanosis. No clubbing,  +2 distal pulses, edema in all 4 ext sky upper ext  Skin:                Not pale. Not Jaundiced  No rashes   Psych:             anxious  Neurologic:      Alert, severe generalized weakness,      Labs:     Recent Labs     10/09/22  0358 10/08/22  0459   WBC 15.4* 14.9*   HGB 9.9* 10.1*   HCT 30.1* 31.5*    447*       Recent Labs     10/09/22  0358 10/08/22  0459 10/07/22  0411   * 137 135*   K 4.4 4.8 4.4    106 107   CO2 26 23 21   BUN 22* 21* 17   CREA 0.71 0.71 0.75   GLU 99 99 112*   CA 8.3* 8.6 8.5       Recent Labs     10/09/22  0358 10/07/22  0411   ALT 69 106*   * 158*   TBILI 0.4 0.5   TP 4.8* 5.0*   ALB 2.4* 2.6*   GLOB 2.4 2.4       No results for input(s): INR, PTP, APTT, INREXT, INREXT in the last 72 hours. No results for input(s): FE, TIBC, PSAT, FERR in the last 72 hours. Lab Results   Component Value Date/Time    Folate 27.1 (H) 08/22/2022 03:55 AM        No results for input(s): PH, PCO2, PO2 in the last 72 hours. No results for input(s): CPK, CKNDX, TROIQ in the last 72 hours.     No lab exists for component: CPKMB  Lab Results   Component Value Date/Time    Cholesterol, total 141 08/22/2022 03:55 AM    HDL Cholesterol 57 08/22/2022 03:55 AM    LDL, calculated 71.6 08/22/2022 03:55 AM    Triglyceride 62 08/22/2022 03:55 AM    CHOL/HDL Ratio 2.5 08/22/2022 03:55 AM     Lab Results   Component Value Date/Time    Glucose (POC) 145 (H) 02/14/2022 09:01 PM     Lab Results   Component Value Date/Time    Color YELLOW/STRAW 10/03/2022 04:57 PM    Appearance CLEAR 10/03/2022 04:57 PM    Specific gravity 1.008 10/03/2022 04:57 PM    Specific gravity 1.010 09/28/2022 10:37 AM    pH (UA) 6.5 10/03/2022 04:57 PM    Protein Negative 10/03/2022 04:57 PM    Glucose Negative 10/03/2022 04:57 PM    Ketone Negative 10/03/2022 04:57 PM    Bilirubin Negative 10/03/2022 04:57 PM    Urobilinogen 0.2 10/03/2022 04:57 PM    Nitrites Negative 10/03/2022 04:57 PM    Leukocyte Esterase Negative 10/03/2022 04:57 PM    Epithelial cells FEW 10/03/2022 04:57 PM    Bacteria Negative 10/03/2022 04:57 PM    WBC 0-4 10/03/2022 04:57 PM    RBC 0-5 10/03/2022 04:57 PM       Current Facility-Administered Medications:     ondansetron (ZOFRAN ODT) tablet 4 mg, 4 mg, Oral, Q8H PRN **OR** ondansetron (ZOFRAN) injection 4 mg, 4 mg, IntraVENous, Q4H PRN, Demetrio Villegas MD    simethicone (MYLICON) tablet 80 mg, 80 mg, Oral, QID PRN, Demetrio Villegas MD, 80 mg at 10/08/22 0323    butalbital-acetaminophen-caffeine (FIORICET, ESGIC) -40 mg per tablet 2 Tablet, 2 Tablet, Oral, Q6H PRN, Winston BOSCH NP, 2 Tablet at 10/08/22 2155    metoprolol tartrate (LOPRESSOR) tablet 25 mg, 25 mg, Oral, Q12H, Ishaan Mercado, NP, 25 mg at 10/08/22 2121    amiodarone (CORDARONE) tablet 400 mg, 400 mg, Oral, BID, Niranjan De Los Santos MD, 400 mg at 10/08/22 1902    traZODone (DESYREL) tablet 50 mg, 50 mg, Oral, QHS, Niranjan De Los Santos MD, 50 mg at 10/08/22 2121    meropenem (MERREM) 1 g in 0.9% sodium chloride (MBP/ADV) 50 mL MBP, 1 g, IntraVENous, Q8H, Demetrio Villegas MD, Last Rate: 16.7 mL/hr at 10/09/22 0342, 1 g at 10/09/22 0342    diazePAM (VALIUM) tablet 5 mg, 5 mg, Oral, Q6H PRN, Winston BOSCH NP, 5 mg at 10/08/22 1847    HYDROmorphone (DILAUDID) injection 0.2 mg, 0.2 mg, IntraVENous, Q3H PRN, Winston BOSCH NP, 0.2 mg at 10/08/22 1847    lidocaine 4 % patch 1 Patch, 1 Patch, TransDERmal, Q24H, Demetrio Villegas MD, 1 Patch at 10/06/22 1954    alteplase (CATHFLO) 1 mg in sterile water (preservative free) 1 mL injection, 1 mg, InterCATHeter, PRN, Terrence Argueta MD therapeutic multivitamin (THERAGRAN) tablet 1 Tablet, 1 Tablet, Oral, DAILY, Niranjan De Los Santos MD, 1 Tablet at 10/08/22 1049    polyethylene glycol (MIRALAX) packet 17 g, 17 g, Oral, BID, Magaly Caicedo NP, 17 g at 10/07/22 1732    magnesium hydroxide (MILK OF MAGNESIA) 400 mg/5 mL oral suspension 30 mL, 30 mL, Oral, DAILY PRN, Yohan BOSCH NP    predniSONE (DELTASONE) tablet 40 mg, 40 mg, Oral, DAILY WITH BREAKFAST, Niranjan De Los Santos MD, 40 mg at 10/08/22 1053    phenol throat spray (CHLORASEPTIC) 1 Spray, 1 Spray, Oral, PRN, Rosana Flores, JOSEPHINE    senna-docusate (PERICOLACE) 8.6-50 mg per tablet 2 Tablet, 2 Tablet, Oral, DAILY, Yohan BOSCH NP, 2 Tablet at 10/07/22 0931    ipratropium (ATROVENT) 0.02 % nebulizer solution 0.5 mg, 0.5 mg, Nebulization, Q6H RT, Niranjan De Los Santos MD, 0.5 mg at 10/09/22 0048    midodrine (PROAMATINE) tablet 20 mg, 20 mg, Oral, TID WITH MEALS, Niranjan De Los Santos MD, 20 mg at 10/08/22 1902    digoxin (LANOXIN) tablet 0.125 mg, 0.125 mg, Oral, DAILY, Niranjan De Los Santos MD, 0.125 mg at 10/08/22 1049    balsam peru-castor oiL (VENELEX) ointment, , Topical, BID, Henrietta Garay MD, Given at 10/08/22 2029    Vancomycin - Pharmacy dosing, , Other, Rx Dosing/Monitoring, Roxi Kay MD    vancomycin (VANCOCIN) 500 mg in 0.9% sodium chloride (MBP/ADV) 100 mL MBP, 500 mg, IntraVENous, Q12H, Roxi Russo MD, Last Rate: 100 mL/hr at 10/09/22 0342, 500 mg at 10/09/22 0342    thiamine HCL (B-1) tablet 100 mg, 100 mg, Oral, DAILY, Roxi Russo MD, 100 mg at 10/08/22 1049    dextrose 5% and 0.9% NaCl infusion, 75 mL/hr, IntraVENous, CONTINUOUS, Amilcar Johnson MD, Stopped at 10/03/22 0830    0.9% sodium chloride infusion 250 mL, 250 mL, IntraVENous, PRN, Chevis FluvannaRoxi MD    albuterol (PROVENTIL VENTOLIN) nebulizer solution 2.5 mg, 2.5 mg, Nebulization, Q6H PRN, Roxi Russo MD, 2.5 mg at 10/06/22 1810    sodium chloride (NS) flush 5-40 mL, 5-40 mL, IntraVENous, Q8H, KVNG Velásquez MD, 10 mL at 10/08/22 1439    sodium chloride (NS) flush 5-40 mL, 5-40 mL, IntraVENous, PRN, MARIE Velásquez MD    acetaminophen (TYLENOL) tablet 650 mg, 650 mg, Oral, Q6H PRN, 650 mg at 10/02/22 2127 **OR** acetaminophen (TYLENOL) suppository 650 mg, 650 mg, Rectal, Q6H PRN, MARIA E Velásquez MD    pantoprazole (PROTONIX) 40 mg in 0.9% sodium chloride 10 mL injection, 40 mg, IntraVENous, DAILY, ARIELLE Velásquez MD, 40 mg at 10/08/22 1050    prochlorperazine (COMPAZINE) injection 10 mg, 10 mg, IntraVENous, Q6H PRN, Buffy Velásquez MD, 10 mg at 10/08/22 2119     CODE STATUS:   Full Code   X DNR    Partial    Comfort Care       Signed:   Luis Daniel Hagan MD  Date of Service:  10/9/2022

## 2022-10-09 NOTE — PROGRESS NOTES
0730: Bedside and Verbal shift change report given to Nenita Todd RN/Ginette RN (oncoming nurse) by Arti Newman (offgoing nurse). Report included the following information SBAR, Intake/Output, MAR, Recent Results, Cardiac Rhythm NSR, and Alarm Parameters . 0936: Pt HR in low 60s, SBP 140s. Nydia Galvez MD notified, ok to give metoprolol and midodrine. At time of administration HR consistenly 50s and SBP 160s, held metoprolol and midodrine per administration instructions/patient safety. 1645: Pt in Afib. Nydia Galvez MD notified, no further intervention required unless rate >120    2000: Bedside and Verbal shift change report given to Palma Freeman (oncoming nurse) by Nenita Todd RN/Ginette RN (offgoing nurse). Report included the following information SBAR, Procedure Summary, Intake/Output, Recent Results, Cardiac Rhythm Afib, and Alarm Parameters .

## 2022-10-09 NOTE — PROGRESS NOTES
2000- Bedside and Verbal shift change report given to Jil Corral (oncoming nurse) by Sierra Brandt (offgoing nurse). Report included the following information MAR, results, SR. Shift summary- Patient slept most of night, very augmentative when awake, no afib noted overnight. 0800- Bedside and Verbal shift change report given to Rosie (oncoming nurse) by Jil Corral (offgoing nurse). Report included the following information SBAR, Intake/Output, Recent Results, Cardiac Rhythm SR, and Alarm Parameters .

## 2022-10-10 ENCOUNTER — APPOINTMENT (OUTPATIENT)
Dept: GENERAL RADIOLOGY | Age: 77
DRG: 435 | End: 2022-10-10
Attending: FAMILY MEDICINE
Payer: MEDICARE

## 2022-10-10 LAB
ANION GAP SERPL CALC-SCNC: 4 MMOL/L (ref 5–15)
BASOPHILS # BLD: 0 K/UL (ref 0–0.1)
BASOPHILS NFR BLD: 0 % (ref 0–1)
BUN SERPL-MCNC: 21 MG/DL (ref 6–20)
BUN/CREAT SERPL: 30 (ref 12–20)
CALCIUM SERPL-MCNC: 8.6 MG/DL (ref 8.5–10.1)
CHLORIDE SERPL-SCNC: 107 MMOL/L (ref 97–108)
CO2 SERPL-SCNC: 25 MMOL/L (ref 21–32)
CREAT SERPL-MCNC: 0.7 MG/DL (ref 0.55–1.02)
DIFFERENTIAL METHOD BLD: ABNORMAL
DIGOXIN SERPL-MCNC: 2.1 NG/ML (ref 0.9–2)
EOSINOPHIL # BLD: 0 K/UL (ref 0–0.4)
EOSINOPHIL NFR BLD: 0 % (ref 0–7)
ERYTHROCYTE [DISTWIDTH] IN BLOOD BY AUTOMATED COUNT: 23.9 % (ref 11.5–14.5)
GLUCOSE SERPL-MCNC: 97 MG/DL (ref 65–100)
HCT VFR BLD AUTO: 33.2 % (ref 35–47)
HGB BLD-MCNC: 10.9 G/DL (ref 11.5–16)
IMM GRANULOCYTES # BLD AUTO: 0.1 K/UL (ref 0–0.04)
IMM GRANULOCYTES NFR BLD AUTO: 1 % (ref 0–0.5)
LYMPHOCYTES # BLD: 0.4 K/UL (ref 0.8–3.5)
LYMPHOCYTES NFR BLD: 3 % (ref 12–49)
MAGNESIUM SERPL-MCNC: 1.5 MG/DL (ref 1.6–2.4)
MCH RBC QN AUTO: 27 PG (ref 26–34)
MCHC RBC AUTO-ENTMCNC: 32.8 G/DL (ref 30–36.5)
MCV RBC AUTO: 82.2 FL (ref 80–99)
MONOCYTES # BLD: 0.8 K/UL (ref 0–1)
MONOCYTES NFR BLD: 6 % (ref 5–13)
NEUTS SEG # BLD: 12.8 K/UL (ref 1.8–8)
NEUTS SEG NFR BLD: 90 % (ref 32–75)
NRBC # BLD: 0.03 K/UL (ref 0–0.01)
NRBC BLD-RTO: 0.2 PER 100 WBC
PLATELET # BLD AUTO: 325 K/UL (ref 150–400)
PMV BLD AUTO: 9.4 FL (ref 8.9–12.9)
POTASSIUM SERPL-SCNC: 4.2 MMOL/L (ref 3.5–5.1)
RBC # BLD AUTO: 4.04 M/UL (ref 3.8–5.2)
RBC MORPH BLD: ABNORMAL
SODIUM SERPL-SCNC: 136 MMOL/L (ref 136–145)
VANCOMYCIN SERPL-MCNC: 19.3 UG/ML
WBC # BLD AUTO: 14.1 K/UL (ref 3.6–11)

## 2022-10-10 PROCEDURE — 99231 SBSQ HOSP IP/OBS SF/LOW 25: CPT | Performed by: INTERNAL MEDICINE

## 2022-10-10 PROCEDURE — 74011250637 HC RX REV CODE- 250/637: Performed by: NURSE PRACTITIONER

## 2022-10-10 PROCEDURE — 94640 AIRWAY INHALATION TREATMENT: CPT

## 2022-10-10 PROCEDURE — 74011250636 HC RX REV CODE- 250/636: Performed by: FAMILY MEDICINE

## 2022-10-10 PROCEDURE — 80202 ASSAY OF VANCOMYCIN: CPT

## 2022-10-10 PROCEDURE — C9113 INJ PANTOPRAZOLE SODIUM, VIA: HCPCS | Performed by: HOSPITALIST

## 2022-10-10 PROCEDURE — 74018 RADEX ABDOMEN 1 VIEW: CPT

## 2022-10-10 PROCEDURE — 80048 BASIC METABOLIC PNL TOTAL CA: CPT

## 2022-10-10 PROCEDURE — 74011250636 HC RX REV CODE- 250/636: Performed by: STUDENT IN AN ORGANIZED HEALTH CARE EDUCATION/TRAINING PROGRAM

## 2022-10-10 PROCEDURE — 74011000258 HC RX REV CODE- 258: Performed by: FAMILY MEDICINE

## 2022-10-10 PROCEDURE — 74011250636 HC RX REV CODE- 250/636: Performed by: HOSPITALIST

## 2022-10-10 PROCEDURE — 36415 COLL VENOUS BLD VENIPUNCTURE: CPT

## 2022-10-10 PROCEDURE — 74011250636 HC RX REV CODE- 250/636: Performed by: NURSE PRACTITIONER

## 2022-10-10 PROCEDURE — 65270000046 HC RM TELEMETRY

## 2022-10-10 PROCEDURE — 71045 X-RAY EXAM CHEST 1 VIEW: CPT

## 2022-10-10 PROCEDURE — 74011250637 HC RX REV CODE- 250/637: Performed by: STUDENT IN AN ORGANIZED HEALTH CARE EDUCATION/TRAINING PROGRAM

## 2022-10-10 PROCEDURE — 83735 ASSAY OF MAGNESIUM: CPT

## 2022-10-10 PROCEDURE — 74011250637 HC RX REV CODE- 250/637: Performed by: EMERGENCY MEDICINE

## 2022-10-10 PROCEDURE — 74011000250 HC RX REV CODE- 250: Performed by: HOSPITALIST

## 2022-10-10 PROCEDURE — 99232 SBSQ HOSP IP/OBS MODERATE 35: CPT | Performed by: INTERNAL MEDICINE

## 2022-10-10 PROCEDURE — 74011250637 HC RX REV CODE- 250/637: Performed by: FAMILY MEDICINE

## 2022-10-10 PROCEDURE — 74011000250 HC RX REV CODE- 250: Performed by: FAMILY MEDICINE

## 2022-10-10 PROCEDURE — 85025 COMPLETE CBC W/AUTO DIFF WBC: CPT

## 2022-10-10 PROCEDURE — 74011000258 HC RX REV CODE- 258: Performed by: STUDENT IN AN ORGANIZED HEALTH CARE EDUCATION/TRAINING PROGRAM

## 2022-10-10 PROCEDURE — 97530 THERAPEUTIC ACTIVITIES: CPT

## 2022-10-10 PROCEDURE — 80162 ASSAY OF DIGOXIN TOTAL: CPT

## 2022-10-10 PROCEDURE — 74011000250 HC RX REV CODE- 250: Performed by: EMERGENCY MEDICINE

## 2022-10-10 PROCEDURE — 74011636637 HC RX REV CODE- 636/637: Performed by: EMERGENCY MEDICINE

## 2022-10-10 PROCEDURE — 74011000250 HC RX REV CODE- 250

## 2022-10-10 RX ORDER — DEXTROSE MONOHYDRATE AND SODIUM CHLORIDE 5; .45 G/100ML; G/100ML
50 INJECTION, SOLUTION INTRAVENOUS CONTINUOUS
Status: DISCONTINUED | OUTPATIENT
Start: 2022-10-10 | End: 2022-10-15

## 2022-10-10 RX ORDER — DIGOXIN 125 MCG
0.06 TABLET ORAL DAILY
Status: DISCONTINUED | OUTPATIENT
Start: 2022-10-10 | End: 2022-10-18

## 2022-10-10 RX ORDER — DIGOXIN 125 MCG
0.06 TABLET ORAL DAILY
Status: DISCONTINUED | OUTPATIENT
Start: 2022-10-11 | End: 2022-10-10

## 2022-10-10 RX ORDER — BALSAM PERU/CASTOR OIL
OINTMENT (GRAM) TOPICAL 3 TIMES DAILY
Status: DISCONTINUED | OUTPATIENT
Start: 2022-10-10 | End: 2022-10-20 | Stop reason: HOSPADM

## 2022-10-10 RX ORDER — MAGNESIUM SULFATE HEPTAHYDRATE 40 MG/ML
2 INJECTION, SOLUTION INTRAVENOUS ONCE
Status: COMPLETED | OUTPATIENT
Start: 2022-10-10 | End: 2022-10-10

## 2022-10-10 RX ORDER — BACITRACIN 500 UNIT/G
PACKET (EA) TOPICAL
Status: COMPLETED
Start: 2022-10-10 | End: 2022-10-10

## 2022-10-10 RX ADMIN — MEROPENEM 1 G: 1 INJECTION, POWDER, FOR SOLUTION INTRAVENOUS at 03:58

## 2022-10-10 RX ADMIN — Medication: at 21:16

## 2022-10-10 RX ADMIN — THERA TABS 1 TABLET: TAB at 09:45

## 2022-10-10 RX ADMIN — PREDNISONE 40 MG: 20 TABLET ORAL at 11:48

## 2022-10-10 RX ADMIN — HYDROMORPHONE HYDROCHLORIDE 0.2 MG: 1 INJECTION, SOLUTION INTRAMUSCULAR; INTRAVENOUS; SUBCUTANEOUS at 21:17

## 2022-10-10 RX ADMIN — IPRATROPIUM BROMIDE 0.5 MG: 0.5 SOLUTION RESPIRATORY (INHALATION) at 18:52

## 2022-10-10 RX ADMIN — DIAZEPAM 5 MG: 10 TABLET ORAL at 01:34

## 2022-10-10 RX ADMIN — MIDODRINE HYDROCHLORIDE 20 MG: 5 TABLET ORAL at 17:05

## 2022-10-10 RX ADMIN — DEXTROSE AND SODIUM CHLORIDE 50 ML/HR: 5; 450 INJECTION, SOLUTION INTRAVENOUS at 12:01

## 2022-10-10 RX ADMIN — SODIUM CHLORIDE, PRESERVATIVE FREE 10 ML: 5 INJECTION INTRAVENOUS at 21:21

## 2022-10-10 RX ADMIN — Medication: at 17:00

## 2022-10-10 RX ADMIN — VANCOMYCIN HYDROCHLORIDE 500 MG: 500 INJECTION, POWDER, LYOPHILIZED, FOR SOLUTION INTRAVENOUS at 05:22

## 2022-10-10 RX ADMIN — MIDODRINE HYDROCHLORIDE 20 MG: 5 TABLET ORAL at 11:48

## 2022-10-10 RX ADMIN — Medication: at 09:30

## 2022-10-10 RX ADMIN — MEROPENEM 1 G: 1 INJECTION, POWDER, FOR SOLUTION INTRAVENOUS at 19:09

## 2022-10-10 RX ADMIN — MEROPENEM 1 G: 1 INJECTION, POWDER, FOR SOLUTION INTRAVENOUS at 11:49

## 2022-10-10 RX ADMIN — DIAZEPAM 5 MG: 10 TABLET ORAL at 10:47

## 2022-10-10 RX ADMIN — METOPROLOL TARTRATE 25 MG: 25 TABLET, FILM COATED ORAL at 21:17

## 2022-10-10 RX ADMIN — AMIODARONE HYDROCHLORIDE 400 MG: 200 TABLET ORAL at 17:05

## 2022-10-10 RX ADMIN — MAGNESIUM SULFATE HEPTAHYDRATE 2 G: 40 INJECTION, SOLUTION INTRAVENOUS at 10:49

## 2022-10-10 RX ADMIN — Medication: at 17:06

## 2022-10-10 RX ADMIN — BUTALBITAL, ACETAMINOPHEN, AND CAFFEINE 2 TABLET: 50; 325; 40 TABLET ORAL at 09:38

## 2022-10-10 RX ADMIN — AMIODARONE HYDROCHLORIDE 400 MG: 200 TABLET ORAL at 09:41

## 2022-10-10 RX ADMIN — Medication: at 10:00

## 2022-10-10 RX ADMIN — IPRATROPIUM BROMIDE 0.5 MG: 0.5 SOLUTION RESPIRATORY (INHALATION) at 08:38

## 2022-10-10 RX ADMIN — Medication 100 MG: at 09:44

## 2022-10-10 RX ADMIN — TRAZODONE HYDROCHLORIDE 50 MG: 50 TABLET ORAL at 21:18

## 2022-10-10 RX ADMIN — PANTOPRAZOLE SODIUM 40 MG: 40 INJECTION, POWDER, FOR SOLUTION INTRAVENOUS at 09:51

## 2022-10-10 RX ADMIN — BUTALBITAL, ACETAMINOPHEN, AND CAFFEINE 2 TABLET: 50; 325; 40 TABLET ORAL at 17:14

## 2022-10-10 RX ADMIN — SODIUM CHLORIDE, PRESERVATIVE FREE 10 ML: 5 INJECTION INTRAVENOUS at 15:24

## 2022-10-10 RX ADMIN — METOPROLOL TARTRATE 25 MG: 25 TABLET, FILM COATED ORAL at 09:10

## 2022-10-10 RX ADMIN — POLYETHYLENE GLYCOL 3350 17 G: 17 POWDER, FOR SOLUTION ORAL at 09:50

## 2022-10-10 RX ADMIN — DIAZEPAM 5 MG: 10 TABLET ORAL at 18:47

## 2022-10-10 RX ADMIN — VANCOMYCIN HYDROCHLORIDE 500 MG: 500 INJECTION, POWDER, LYOPHILIZED, FOR SOLUTION INTRAVENOUS at 17:18

## 2022-10-10 RX ADMIN — SENNOSIDES AND DOCUSATE SODIUM 2 TABLET: 50; 8.6 TABLET ORAL at 09:48

## 2022-10-10 RX ADMIN — DIGOXIN 0.06 MG: 125 TABLET ORAL at 11:49

## 2022-10-10 RX ADMIN — SODIUM CHLORIDE, PRESERVATIVE FREE 10 ML: 5 INJECTION INTRAVENOUS at 05:23

## 2022-10-10 NOTE — PROGRESS NOTES
Infectious Disease Clinic Note        HPI:   Ms Barajas Backer seen  She feel asleep just for I went to see and per nurse  Discussed with nursing  No diarrhea or other acute issues  Patient has been complaining of some back pain      Medications:  No current facility-administered medications on file prior to encounter. Current Outpatient Medications on File Prior to Encounter   Medication Sig Dispense Refill    dicyclomine (BENTYL) 10 mg capsule Take 1 Capsule by mouth four (4) times daily. 1 Capsule 0    ondansetron (ZOFRAN ODT) 4 mg disintegrating tablet Take 1 Tablet by mouth every eight (8) hours as needed for Nausea or Vomiting. 1 Tablet 0    polyethylene glycol (MIRALAX) 17 gram packet Take 1 Packet by mouth every twelve (12) hours. 1 Each 0    simethicone (MYLICON) 80 mg chewable tablet Take 1 Tablet by mouth four (4) times daily as needed for GI Pain. 2 Tablet 0    lubiPROStone (AMITIZA) 24 mcg capsule Take 1 Capsule by mouth two (2) times daily (with meals) for 30 days. Recommended by GI service 10 Capsule 0    butalbital-acetaminophen-caffeine (FIORICET, ESGIC) -40 mg per tablet Take 2 Tablets by mouth two (2) times daily as needed for Headache or Migraine. 6 Tablet 0    digoxin (LANOXIN) 0.125 mg tablet Take 0.125 mg by mouth daily. metoprolol tartrate (LOPRESSOR) 25 mg tablet Take 12.5 mg by mouth two (2) times a day. cyclobenzaprine (FLEXERIL) 10 mg tablet Take 10 mg by mouth three (3) times daily as needed. aspirin 81 mg chewable tablet Take 81 mg by mouth daily. multivitamin (ONE A DAY) tablet Take 1 Tablet by mouth daily. acetaminophen (TYLENOL) 325 mg tablet Take 650 mg by mouth every four (4) hours as needed for Pain. albuterol (PROVENTIL HFA, VENTOLIN HFA, PROAIR HFA) 90 mcg/actuation inhaler Take 2 Puffs by inhalation every six (6) hours as needed. pantoprazole (PROTONIX) 40 mg tablet Take 40 mg by mouth daily.              Physical Exam:    Vitals: Patient Vitals for the past 24 hrs:   Temp Pulse Resp BP SpO2   10/10/22 1140 97.5 °F (36.4 °C) 75 19 (!) 99/58 99 %   10/10/22 0930 -- (!) 127 29 111/67 --   10/10/22 0910 -- (!) 116 29 (!) 86/73 (!) 77 %   10/10/22 0900 -- 98 17 99/63 94 %   10/10/22 0836 -- -- -- -- 91 %   10/10/22 0800 98.3 °F (36.8 °C) (!) 103 24 (!) 137/97 97 %   10/10/22 0400 97.6 °F (36.4 °C) 97 22 (!) 140/91 96 %   10/10/22 0000 98 °F (36.7 °C) (!) 110 18 137/82 95 %   10/09/22 2000 97.5 °F (36.4 °C) (!) 106 26 130/86 95 %   10/09/22 1600 97.5 °F (36.4 °C) 89 23 (!) 137/96 96 %     GEN: NAD,   Lungs: Nonlabored  Abdomen: soft,   Extremities: no edema  Neuro: Patient sleeping  Skin: no rash on face  Back unable to fully assess       Labs:   Recent Results (from the past 24 hour(s))   DIGOXIN    Collection Time: 10/10/22  4:07 AM   Result Value Ref Range    Digoxin level 2.1 (H) 0.90 - 2.00 NG/ML   CBC WITH AUTOMATED DIFF    Collection Time: 10/10/22  4:07 AM   Result Value Ref Range    WBC 14.1 (H) 3.6 - 11.0 K/uL    RBC 4.04 3.80 - 5.20 M/uL    HGB 10.9 (L) 11.5 - 16.0 g/dL    HCT 33.2 (L) 35.0 - 47.0 %    MCV 82.2 80.0 - 99.0 FL    MCH 27.0 26.0 - 34.0 PG    MCHC 32.8 30.0 - 36.5 g/dL    RDW 23.9 (H) 11.5 - 14.5 %    PLATELET 716 075 - 079 K/uL    MPV 9.4 8.9 - 12.9 FL    NRBC 0.2 (H) 0  WBC    ABSOLUTE NRBC 0.03 (H) 0.00 - 0.01 K/uL    NEUTROPHILS 90 (H) 32 - 75 %    LYMPHOCYTES 3 (L) 12 - 49 %    MONOCYTES 6 5 - 13 %    EOSINOPHILS 0 0 - 7 %    BASOPHILS 0 0 - 1 %    IMMATURE GRANULOCYTES 1 (H) 0.0 - 0.5 %    ABS. NEUTROPHILS 12.8 (H) 1.8 - 8.0 K/UL    ABS. LYMPHOCYTES 0.4 (L) 0.8 - 3.5 K/UL    ABS. MONOCYTES 0.8 0.0 - 1.0 K/UL    ABS. EOSINOPHILS 0.0 0.0 - 0.4 K/UL    ABS. BASOPHILS 0.0 0.0 - 0.1 K/UL    ABS. IMM.  GRANS. 0.1 (H) 0.00 - 0.04 K/UL    DF SMEAR SCANNED      RBC COMMENTS ANISOCYTOSIS  2+       METABOLIC PANEL, BASIC    Collection Time: 10/10/22  4:07 AM   Result Value Ref Range    Sodium 136 136 - 145 mmol/L    Potassium 4.2 3.5 - 5.1 mmol/L    Chloride 107 97 - 108 mmol/L    CO2 25 21 - 32 mmol/L    Anion gap 4 (L) 5 - 15 mmol/L    Glucose 97 65 - 100 mg/dL    BUN 21 (H) 6 - 20 MG/DL    Creatinine 0.70 0.55 - 1.02 MG/DL    BUN/Creatinine ratio 30 (H) 12 - 20      eGFR >60 >60 ml/min/1.73m2    Calcium 8.6 8.5 - 10.1 MG/DL   VANCOMYCIN, RANDOM    Collection Time: 10/10/22  4:07 AM   Result Value Ref Range    Vancomycin, random 19.3 UG/ML   MAGNESIUM    Collection Time: 10/10/22  4:07 AM   Result Value Ref Range    Magnesium 1.5 (L) 1.6 - 2.4 mg/dL       Microbiology Data:       Blood: negative        Urine:  Contains abnormal data CULTURE, URINE  Order: [id number]  Collected 9/28/2022 10:37    Status: Final result    Specimen Information: Clean catch; Urine   0 Result Notes  Component Ref Range & Units 9/28/22 1037    Special Requests:   NO SPECIAL REQUESTS    [place] Count   >100,000   COLONIES/mL    Culture result:   ESCHERICHIA COLI Abnormal     Resulting Agency  [address]        Susceptibility     Escherichia coli     JOBY     Amikacin ($) Susceptible     Ampicillin ($) Intermediate     Ampicillin/sulbactam ($) Susceptible     Cefazolin ($) Susceptible     Cefepime ($$) Susceptible     Cefoxitin Susceptible     Ceftazidime ($) Susceptible     Ceftriaxone ($) Susceptible     Ciprofloxacin ($) Susceptible     Gentamicin ($) Susceptible     Levofloxacin ($) Susceptible     Meropenem ($$) Susceptible     Nitrofurantoin Susceptible     Piperacillin/Tazobac ($) Susceptible     Tobramycin ($) Susceptible                          Imaging:     CT ABD PELV W CONT: Patient Communication     Add Comments   Not seen     Study Result    Narrative & Impression   EXAM: CT ABD PELV W CONT     INDICATION: Diffuse abdominal pain and diarrhea. Rectal constipation earlier  this month. Nonspecific liver disease on previous imaging. Biliary dilatation on  previous imaging. Elevated alkaline phosphatase.  Normal white blood cell count,  bilirubin, lipase. COMPARISON: CT abdomen/pelvis on 9/12/2022. CONTRAST: 100 mL of Isovue-370. ORAL CONTRAST: None     TECHNIQUE:   Following the uneventful intravenous administration of contrast, thin axial  images were obtained through the abdomen and pelvis. Coronal and sagittal  reconstructions were generated. CT dose reduction was achieved through use of a  standardized protocol tailored for this examination and automatic exposure  control for dose modulation. FINDINGS:   LOWER THORAX: Small right pleural effusion is new. No basilar pneumonia. Normal  cardiac size. LIVER: Segment 7/8 subcapsular collection of heterogeneous fluid measures 9.3 x  6.9 x 3.5 cm. Heterogeneous septated fluid attenuation throughout the right  hepatic lobe measures approximately 13 cm in oblique AP diameter. Central right  hepatic lobe peripherally enhancing lesion previously measured 1.9 cm and now  measures 2.4 cm. Unchanged mild intrahepatic biliary dilatation. BILIARY TREE: Cholecystectomy. Unchanged chronic biliary dilatation. No abrupt  termination. SPLEEN: Normal size. Lobulated contour. PANCREAS: Mild diffuse atrophy. No mass or inflammation. ADRENALS: Unremarkable. KIDNEYS: No hydronephrosis. Heterogeneous small multiple cysts. STOMACH: Unremarkable. SMALL BOWEL: No dilatation or wall thickening. COLON: Incomplete distention, limited evaluation. Fluid-filled cecum is in the  pelvis. APPENDIX: Normal.  PERITONEUM: No ascites or pneumoperitoneum. RETROPERITONEUM: Aortic atherosclerosis without aneurysm. 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 No lymphadenopathy. REPRODUCTIVE ORGANS: Hysterectomy. URINARY BLADDER: Incomplete distention, limited evaluation. BONES: Osteopenia. Femoral hardware. Spinal curvature. T11 partial compression  fracture. ABDOMINAL WALL: No mass or hernia. ADDITIONAL COMMENTS: Diffuse muscle atrophy. IMPRESSION     1.  Progression of hepatic lesions and new large fluid collections in the liver. Intrahepatic abscesses from nonspecific infection are most likely. Consider  fluid sampling or drainage with CT guidance. 2. New small right pleural effusion. 3. Fluid-filled cecum may indicate infectious colitis. No bowel obstruction. Assessment / Plan:       1) Hepatic collections:     of liver biopsy on 9/15 + for malignant cells. The liver collections are significant and would expect to see a significant hbg drop if purely bleeding alone per radiologist  Given liver biopsy history , immune compromised if not at least immunomodulated state, at risk for superimposed infection even if there is hematoma   Would be difficult to know for sure if hematoma vs abscess alone/combination  with certainty at this point   Primary malignancy  possibly colon per oncology   On Vancomycin and Meropenem IV   Recommend repeat CT, consider drainage if any amenable collections to drainage and send for cultures based on goal of care   If clinical worsening in interim, start anidulafungin IV       2) copd  3) metastatic adenocarcinoma, liver involvement   4) tubular adenoma colon   5) seizure hx per chart             Thank for the opportunity to participate in the care of this patient. Please contact with questions or concerns.        Alexsandra Clark DO  3:03 PM

## 2022-10-10 NOTE — PROGRESS NOTES
2000: Report received from Sera Napoles, RN, care assumed of patient. 2030: Patient yelling out from bed calling her son, repeating herself and searching for her call bell. Call bell in patients hand, patient not using. Changed battery on patients left hearing aid at patients insistence despite patient stating that hearing aids were working. 2100: Changed battery on right hearing aid. 0130: Patient woke up yelling again. Patient looking for the call bell that is in her hand. Medicated for sleep per emar.     0301: Patient converted to SR.    0400: Labs drawn. 0530: Patient sleeping. 0800: AM care deferred to maintain sleep. Bedside shift change report given to Micha Hartley RN (oncoming nurse) by Jl Dacosta RN (offgoing nurse). Report included the following information SBAR, Kardex, Procedure Summary, Accordion, Recent Results, and Cardiac Rhythm Afib converted to SR with PACs at 0301 . Problem: Risk for Spread of Infection  Goal: Prevent transmission of infectious organism to others  Description: Prevent the transmission of infectious organisms to other patients, staff members, and visitors.   Outcome: Resolved/Met

## 2022-10-10 NOTE — PROGRESS NOTES
Problem: Mobility Impaired (Adult and Pediatric)  Goal: *Acute Goals and Plan of Care (Insert Text)  Description: FUNCTIONAL STATUS PRIOR TO ADMISSION: Patient was modified independent using a rollator and single point cane for functional mobility PRN. Primarily ambulating throughout her home without device (anticipate furniture surfing). Denies falls. Progressive decline since last admission. Home O2. HOME SUPPORT PRIOR TO ADMISSION: The patient lived with her son in law. Son is currently incarcerated. Physical Therapy Goals  Initiated 10/4/2022  1. Patient will move from supine to sit and sit to supine , scoot up and down, and roll side to side in bed with modified independence within 7 day(s). 2.  Patient will transfer from bed to chair and chair to bed with supervision/set-up using the least restrictive device within 7 day(s). 3.  Patient will perform sit to stand with supervision/set-up within 7 day(s). 4.  Patient will ambulate with minimal assistance for 20 feet with the least restrictive device within 7 day(s). Outcome: Progressing Towards Goal   PHYSICAL THERAPY TREATMENT  Patient: Adilia Hurley (54 y.o. female)  Date: 10/10/2022  Diagnosis: Liver abscess [K75.0]  Abdominal pain [R10.9]  Adenocarcinoma ([facility] [address] [C80.1] <principal problem not specified>  Procedure(s) (LRB):  INFUSION CATHETER INSERTION (N/A) 7 Days Post-Op  Precautions: Fall  Chart, physical therapy assessment, plan of care and goals were reviewed. ASSESSMENT  Patient continues with skilled PT services and is progressing towards goals. Patient found soundly sleeping in bed requiring increased time to awake to voice/tactile cues. Patient with minimal motivation to participate and noted to be self-limiting throughout compared to RN report. Patient requires max A x 1-2 for all mobility, coming to EOB and participating in two sit <> stand trials.  Patient returned to supine and quarter turned to L with pillows placed for comfort and safety. Patient will require SNF at discharge      Current Level of Function Impacting Discharge (mobility/balance): max A 1-2     Other factors to consider for discharge: self limiting          PLAN :  Patient continues to benefit from skilled intervention to address the above impairments. Continue treatment per established plan of care. to address goals. Recommendation for discharge: (in order for the patient to meet his/her long term goals)  Therapy up to 5 days/week in SNF setting    This discharge recommendation:  A follow-up discussion with the attending provider and/or case management is planned    IF patient discharges home will need the following DME: to be determined (TBD)       SUBJECTIVE:   Patient stated I don't want to do anything.     OBJECTIVE DATA SUMMARY:   Critical Behavior:  Neurologic State: Alert, Confused  Orientation Level: Oriented to time, Oriented to place, Oriented to person (conversationally, patient with statements that are not appropriate to current siutation)  Cognition: Decreased attention/concentration, Follows commands, Impaired decision making, Poor safety awareness  Safety/Judgement: Decreased awareness of need for safety, Decreased awareness of need for assistance, Decreased insight into deficits  Functional Mobility Training:  Bed Mobility:  Rolling: Maximum assistance;Assist x1  Supine to Sit: Maximum assistance;Assist x1  Sit to Supine: Maximum assistance;Assist x2  Scooting: Maximum assistance;Assist x1        Transfers:  Sit to Stand: Maximum assistance;Assist x2  Stand to Sit: Maximum assistance;Assist x2                             Balance:  Sitting: Impaired; Without support  Sitting - Static: Fair (occasional)  Sitting - Dynamic: Poor (constant support)  Standing: Impaired; With support  Standing - Static: Poor  Standing - Dynamic : Poor    Pain Ratin/10    Activity Tolerance:   Fair and requires frequent rest breaks    After treatment patient left in no apparent distress:   Supine in bed, Call bell within reach, and quarter turned to L    COMMUNICATION/COLLABORATION:   The patients plan of care was discussed with: Occupational therapist and Registered nurse.      Brittnee Winchester, PT   Time Calculation: 32 mins

## 2022-10-10 NOTE — PROGRESS NOTES
Day #8 of Vancomycin  Indication:  sepsis  - liver abscess +/- hematoma in the setting of malignancy  Current regimen:  500 mg Q12h  Abx regimen:  vancomycin + merrem  ID Following ?: YES  Concomitant nephrotoxic drugs (requires more frequent monitoring): None  Frequency of BMP?: daily    Recent Labs     10/10/22  0407 10/09/22  0358 10/08/22  0459   WBC 14.1* 15.4* 14.9*   CREA 0.70 0.71 0.71   BUN 21* 22* 21*     Est CrCl: 50-55 ml/min; UO: 0.8 ml/kg/hr  Temp (24hrs), Av.6 °F (36.4 °C), Min:96.5 °F (35.8 °C), Max:98.3 °F (36.8 °C)    Cultures:    C.diff - negative   urine - E.coli - pan-sensitive (completed course of Bactrim)  10/3 blood: NGTD, final    MRSA Swab ordered (if applicable)?  N/A    Goal target range AUC/JOBY 400-600    Recent level history:  Date/Time Dose & Interval Measured Level (mcg/mL) Associated AUC/JOBY Dose Adjustment    10/5 @ 0321 500mg q12h 19.7 (2.5 hrs p dose) 444 None   10/10 @ 0407 500mg Q12h 19.3 523 None                                    Plan: Continue current regimen

## 2022-10-10 NOTE — PROGRESS NOTES
Pharmacist Note - Vancomycin Dosing  Therapy day 8  Indication: Sepsis  Current regimen: 500mg IV [id number]    Recent Labs     10/10/22  0407 10/09/22  0358 10/08/22  0459   WBC 14.1* 15.4* 14.9*   CREA 0.70 0.71 0.71   BUN 21* 22* 21*       A random vancomycin level of 19.3 mcg/mL was obtained and from this level, the patient's AUC24 is calculated to be 523 with the current regimen. Goal target range AUC/JOBY 400-600      Plan: Continue current regimen. Pharmacy will continue to monitor this patient daily for changes in clinical status and renal function. *Random vancomycin levels are used to calculate AUC/JOBY, this level should not be interpreted as a trough. Vancomycin has been dosed using Bayesian kinetics software to target an AUC24:JOBY of 400-600, which provides adequate exposure for as assumed infection due to MRSA with an JOBY of 1 or less while reducing the risk of nephrotoxicity as seen with traditional trough based dosing goals.

## 2022-10-10 NOTE — WOUND CARE
WOCN Note:      Follow up assessment of sacrum and heels. Chart reviewed. Assessed in room 6120 with [facility]. Admitted DX:  Liver abscess      Assessment:   Patient is A&O x 4, communicative and requires assist with repositioning. Bed: jayashree catarino  Patient has a Pure wick. Patient reports no pain. 1. POA Sacrum, blanching pink erythema. Protected with large sacral foam dressing and applied Venelex. 2.  POA Bilateral heels, blanching red erythema. Applied Venelex. 3.  Lumbar spine, non blanching red erythema. Scattered over field 5 x 3 x 0 cm; Protect with foam dressing and applied Venelex. Wound, Pressure Prevention & Skin Care Recommendations:    Minimize layers of linen/pads under patient to optimize support surface. 2.  Turn/reposition approximately every 2 hours and offload heels. 3.  Manage moisture/ Keep skin folds clean and dry/minimize brief usage. 4.  Sacrum, heels and lumbar spine:  TID roll back foam and apply Venelex; Change foam every 3 days and as needed. 5. Specialty bed:  Order Prius air mattress when transferred out of CVICU. Discussed above plan with patient and Stephen Blas RN.      Transition of Care:   Plan to follow as needed while admitted to hospital.     MARKY ArenasN RN [facility] [place] [facility]  Available on Perfect Serve  Office [phone]

## 2022-10-10 NOTE — PROGRESS NOTES
[facility]  Cardiology Care Note                  []Initial visit     [x]Established visit     Patient Name: Tahir Shook - [facility]:84/99/[id number] - HMR:[id number]  Primary Cardiologist: Jaspreet Burris and has been followed by cardiology in [place]. SUBJECTIVE/INterval HPI:.  Lesser burden of atrial fibrillation/flutter. Overall heart rates majority of the time are in sinus with rates in the 100-1 10 range with bursts of PAF regarding heart rates up to 130s     Assessment and Plan       1. Paroxysmal Atrial fibrillation with rapid ventricular response  - Still with episodes of PAF with RVR. Continue amiodarone 400 po BID. we will plan to de-escalate in the next few days. Digoxin level 2.1. We will reduce digoxin to 0.0625 mg daily. Continue metoprolol 25 mg BID. IF bp continues to tolerate, can consider increasing to 50 mg BID.   --Not a candidate for OAC (anemia, fraility, and possible organized liver hematoma on CT scan)    Per Dr. Willian Shannon, Since there are limited options for management of atrial fibrillation beyond what she is on, recommend taking patient off telemetry because she has extremely poor short and intermediate term outcome. 2.  Cardiomyopathy/Possible acute systolic heart failure: EF 25-30%  -CM Likely secondary to stress CM (more likely) than tachycardiomyopathy   -Unable to tolerate full GDMT  Continue digoxin. Can consider change to toprol XL before discharge as bp tolerates lopressor.   -Cautious with vasopressor support as can worsen LVOT obstruction with stress CM. 3.   Likely metastatic adenocarcinoma:  -Liver adenocarcinoma of uncertain primary site.  -Heme/onc following- pt is too weak for chemo, hospice consult recommended by oncology.  -Palliative care following. 4.   Anemia: hgb improved to 10.9  5.    Hypomagnesemia: repleted by primary team.   6.   Advanced directives:  DNR. Palliative following. Overall prognosis poor. ____________________________________________________________    Cardiac testing  07/21/22    ECHO ADULT FOLLOW-UP OR LIMITED 07/21/2022 7/21/2022    Interpretation Summary    Limited study for the assessment of ejection fraction. Left Ventricle: Normal left ventricular systolic function with a visually estimated EF of 55 - 60%. EF by 2D Simpsons Biplane is 56%. Left ventricle size is normal. Normal wall thickness. See diagram for wall motion findings. Tricuspid Valve: Mild to moderate regurgitation on limited color and Doppler interrogation. Pulmonary Arteries: Pulmonary hypertension not present. The estimated PASP is 27 mmHg. Signed by: Kashif Hsieh MD on 7/21/2022  2:41 PM        HPI:   Ms. Shannan Pollack is a 77 y.o. female with PMH of PAF on digoxin and ASA , Left hemicolectomy in 2/2022 for adenomatous colonic polyp, anemia, HLD, GERD, seizrues, chronic back pain,  former smokr. . She presented with chief c/o abdominal pain and nausea. She was hospitalized earlier this month for abdominal pain and now found to have liver adenocarcinoma on recent liver biopsy with likely organized hepatic hematoma on CT scan. There is concern for cecal malignancy and metastatic colon cancer is suspected. Cardiology consulted as pt went into afib with RVR early this a.m. She denies any palpitations, dizziness, lightheadedness and no chest pain. She had some mild SOB last night but this is better. She feels weak. C/o some abdominal pain. No palpitations. She has received adocard 6 mg and 12 mg, iv diltiazem bolus and IV dig and started on diltiazem gtt. Later in day, she developed hypotension on diltizem gtt. BP is soft. She also exhibits metabolic acidosis thought to be due to poor po intake, malnutritionCXR with mild diffuse interstia lopacities. Noted to have EF of 25-30% on echo.  For afib with RVR, she was treated with amiodarone but LFTS elevated so this was stopped. Digoxin was resumed. She would not tolerate BB given lower BP. Most recent HS troponins:  No results for input(s): TROPHS in the last 72 hours. No lab exists for component:  CKMB  ECG: afib with RVR, nonspecific t wave abnormality  Repeat EKG reviewed by Dr. Floresita Blackburn- atrial tachycardia with t wave abnormality V5, V6.  ?possible dig effect. Review of Systems    []All other systems reviewed and all negative except as written in HPI    [x] Patient unable to provide secondary to condition         Past Medical History:   Diagnosis Date    Adenoma of colon     Anemia     Arthritis     Atrial fibrillation (HCC)     Bloating     Chronic constipation     Chronic pain     back     COPD (chronic obstructive pulmonary disease) (HCC)     GERD (gastroesophageal reflux disease)     [place] (hard of hearing)     Hyponatremia     caused seizures x 2 per pt    Indigestion     Osteoporosis     Seizures ([facility] [address] 7/2020, 9/2020    x 2      Past Surgical History:   Procedure Laterality Date    COLONOSCOPY N/A 12/16/2021    COLONOSCOPY   :- performed by Abad Valverde MD at [place] [address] N/A 2/14/2022    . performed by Mirlande Talbot MD at [facility] MAIN OR    [address] [facility]    HX CHOLECYSTECTOMY  1982    HX HIP REPLACEMENT Left     pt stated hip was pinned, not replaced    HX HIP REPLACEMENT Right     pt stated hip was pinned, not replaced    HX HYSTERECTOMY      HX ORTHOPAEDIC  1990, 2011    bilateral hip fractures     HX ORTHOPAEDIC Left 2020    femur fracture    HX TONSILLECTOMY       SH: former smoker, no ETOH. FH:no FH of early CAD      Social Hx:  reports that she quit smoking about 10 years ago. She has a 20.00 pack-year smoking history. She has never used smokeless tobacco. She reports that she does not drink alcohol and does not use drugs.   Family Hx: family history includes Alcohol abuse in her father; Heart Disease in her father and mother; Liver Disease in her father. Allergies   Allergen Reactions    Cefuroxime Hives    Hydrocodone Nausea and Vomiting    Other Medication Diarrhea and Nausea and Vomiting     PT REPORTS ALL MYCINS CAUSE SEVERE GI UPSET    Oxycodone Nausea and Vomiting    Penicillins Hives     Patient screened for any delayed non-IgE-mediated reaction to PCN. Patient notes the following:    No delayed non-IgE-mediated reaction to PCN    Hives 1969                OBJECTIVE:  Wt Readings from Last 3 Encounters:   10/11/22 113 lb 1.5 oz (51.3 kg)   09/15/22 92 lb (41.7 kg)   07/21/22 93 lb (42.2 kg)       Intake/Output Summary (Last 24 hours) at 10/11/2022 1609  Last data filed at 10/11/2022 1200  Gross per 24 hour   Intake 1250 ml   Output 350 ml   Net 900 ml         Physical Exam    Vitals:   Vitals:    10/11/22 0746 10/11/22 0800 10/11/22 1200 10/11/22 1507   BP:  (!) 97/44 (!) 104/59    Pulse:  72 66    Resp:  22 21    Temp:  98.6 °F (37 °C) 97.1 °F (36.2 °C)    SpO2: 100% 98% 97% 100%   Weight:       Height:         Telemetry: afib rvr this a.m. now  BP (!) 104/59 (BP 1 Location: Right upper arm, BP Patient Position: At rest)   Pulse 66   Temp 97.1 °F (36.2 °C)   Resp 21   Ht 5' 3\" (1.6 m)   Wt 113 lb 1.5 oz (51.3 kg)   SpO2 100%   BMI 20.03 kg/m²   General:    Alert, cooperative, reports intermittent shortness of breath. Psychiatric:    Normal Mood and affect    Eye/ENT:      Pupils equal, No asymmetry, Conjunctival pink. Able to hear voice at normal amplitude   Lungs:      Visibly symmetric chest expansion, No palpable tenderness. Clear to auscultation bilaterally. Heart[de-identified]    Regular rate and rhythm, S1, S2 variable no murmur, click, rub or gallop. No JVD, Normal palpable peripheral pulses. No cyanosis   Abdomen:     Soft, non-tender. Bowel sounds normal. No masses,  No      organomegaly. Extremities:   Extremities normal, atraumatic, no edema. Neurologic:   CN II-XII grossly intact.  No gross focal deficits     '      Data Review: Tele:  Sinus rhythm  1 brief episode of PAF with RVR  Radiology:   XR Results (most recent):  Results from Hospital Encounter encounter on 09/28/22    XR ABD PORT  1 V    Narrative  PROCEDURE: XR ABD PORT  1 V    COMPARISON: 10/3/2022    REASON FOR STUDY: abd distension    FINDINGS: Single frontal view the abdomen demonstrates gaseous distention of  presumably:. Large amount of debris or stool is noted in the left upper quadrant  which may be within the stomach. Impression  Limited study. Gaseous distention of presumably colon in the mid  abdomen. CT Results (most recent): MRI Results (most recent):  No results found for this or any previous visit. No results for input(s): CPK, TROIQ in the last 72 hours. No lab exists for component: CKQMB, CPKMB, BMPP  Recent Labs     10/11/22  0508 10/10/22  0407   * 136   K 4.6 4.2    107   CO2 25 25   BUN 22* 21*   CREA 0.61 0.70   * 97   CA 8.1* 8.6     Recent Labs     10/11/22  0508 10/10/22  0407   WBC 8.6 14.1*   HGB 9.8* 10.9*   HCT 30.6* 33.2*    325     Recent Labs     10/11/22  0508 10/09/22  0358   * 160*     No results for input(s): CHOL, LDLC in the last 72 hours. No lab exists for component: TGL, HDLC,  HBA1C  No results for input(s): CRP, TSH, TSHEXT, TSHEXT in the last 72 hours.     No lab exists for component: ESR        Current meds:    Current Facility-Administered Medications:     iohexoL (OMNIPAQUE) 240 mg iodine/mL solution 50 mL, 50 mL, Oral, RAD ONCE, Ruth Haji MD    amiodarone (CORDARONE) tablet 200 mg, 200 mg, Oral, BID, Yun Mercado, JOSEPHINE Wolfe ON 10/27/2022] amiodarone (CORDARONE) tablet 200 mg, 200 mg, Oral, DAILY, Yun Mercado, NP    balsam peru-castor oiL (VENELEX) ointment, , Topical, TID, Ruth Haji MD, Given at 10/11/22 1041    dextrose 5 % - 0.45% NaCl infusion, 50 mL/hr, IntraVENous, CONTINUOUS, Ruth Haji MD, Last Rate: 50 mL/hr at 10/10/22 1201, 50 mL/hr at 10/10/22 1201    digoxin (LANOXIN) tablet 0.0625 mg, 0.0625 mg, Oral, DAILY, Vonda Mercado., NP, 0.0625 mg at 10/11/22 1036    ondansetron (ZOFRAN ODT) tablet 4 mg, 4 mg, Oral, Q8H PRN **OR** ondansetron (ZOFRAN) injection 4 mg, 4 mg, IntraVENous, Q4H PRN, Derik Echevarria MD    simethicone (MYLICON) tablet 80 mg, 80 mg, Oral, QID PRN, Derik Echevarria MD, 80 mg at 10/08/22 0323    butalbital-acetaminophen-caffeine (FIORICET, ESGIC) -40 mg per tablet 2 Tablet, 2 Tablet, Oral, Q6H PRN, Autumn Buttery, NP, 2 Tablet at 10/11/22 1043    metoprolol tartrate (LOPRESSOR) tablet 25 mg, 25 mg, Oral, Q12H, Vonda Mercado, NP, 25 mg at 10/11/22 1037    traZODone (DESYREL) tablet 50 mg, 50 mg, Oral, QHS, Niranjan De Los Santos MD, 50 mg at 10/10/22 2118    meropenem (MERREM) 1 g in 0.9% sodium chloride (MBP/ADV) 50 mL MBP, 1 g, IntraVENous, Q8H, Derik Echevarria MD, Last Rate: 16.7 mL/hr at 10/11/22 1208, 1 g at 10/11/22 1208    diazePAM (VALIUM) tablet 5 mg, 5 mg, Oral, Q6H PRN, Anjum BOSCH NP, 5 mg at 10/11/22 1301    HYDROmorphone (DILAUDID) injection 0.2 mg, 0.2 mg, IntraVENous, Q3H PRN, Anjum BOSCH NP, 0.2 mg at 10/11/22 1600    lidocaine 4 % patch 1 Patch, 1 Patch, TransDERmal, Q24H, Derik Echevarria MD, 1 Patch at 10/10/22 1705    alteplase (CATHFLO) 1 mg in sterile water (preservative free) 1 mL injection, 1 mg, InterCATHeter, PRN, Chacha BOSCH MD    therapeutic multivitamin (THERAGRAN) tablet 1 Tablet, 1 Tablet, Oral, DAILY, Chacha BOSCH MD, 1 Tablet at 10/11/22 1037    polyethylene glycol (MIRALAX) packet 17 g, 17 g, Oral, BID, Anjum BOSCH NP, 17 g at 10/10/22 0950    magnesium hydroxide (MILK OF MAGNESIA) 400 mg/5 mL oral suspension 30 mL, 30 mL, Oral, DAILY PRN, Anjum BOSCH NP    predniSONE (DELTASONE) tablet 40 mg, 40 mg, Oral, DAILY WITH BREAKFAST, Niranjan De Los Santos MD, 40 mg at 10/11/22 1036    phenol throat spray (CHLORASEPTIC) 1 Spray, 1 Spray, Oral, PRN, Mayank Maza NP    senna-docusate (PERICOLACE) 8.6-50 mg per tablet 2 Tablet, 2 Tablet, Oral, DAILY, Arnaldo BOSCH NP, 2 Tablet at 10/11/22 1036    ipratropium (ATROVENT) 0.02 % nebulizer solution 0.5 mg, 0.5 mg, Nebulization, Q6H RT, Niranjan De Los Santos MD, 0.5 mg at 10/11/22 1506    midodrine (PROAMATINE) tablet 20 mg, 20 mg, Oral, TID WITH MEALS, Niranjan De Los Santos MD, 20 mg at 10/11/22 1301    Vancomycin - Pharmacy dosing, , Other, Rx Dosing/Monitoring, Roxi Zepeda MD    vancomycin (VANCOCIN) 500 mg in 0.9% sodium chloride (MBP/ADV) 100 mL MBP, 500 mg, IntraVENous, Q12H, Roxi Zepeda MD, Last Rate: 100 mL/hr at 10/11/22 0529, 500 mg at 10/11/22 0529    thiamine HCL (B-1) tablet 100 mg, 100 mg, Oral, DAILY, Roxi Zepeda MD, 100 mg at 10/11/22 1036    0.9% sodium chloride infusion 250 mL, 250 mL, IntraVENous, PRN, Roxi Zepeda MD    albuterol (PROVENTIL VENTOLIN) nebulizer solution 2.5 mg, 2.5 mg, Nebulization, Q6H PRN, Roxi Zepeda MD, 2.5 mg at 10/06/22 1810    sodium chloride (NS) flush 5-40 mL, 5-40 mL, IntraVENous, Q8H, Betsy eVlásquez MD, 10 mL at 10/10/22 2121    sodium chloride (NS) flush 5-40 mL, 5-40 mL, IntraVENous, PRN, Betsy Velásquez MD    acetaminophen (TYLENOL) tablet 650 mg, 650 mg, Oral, Q6H PRN, 650 mg at 10/02/22 2127 **OR** acetaminophen (TYLENOL) suppository 650 mg, 650 mg, Rectal, Q6H PRN, Betsy Velásquez MD    pantoprazole (PROTONIX) 40 mg in 0.9% sodium chloride 10 mL injection, 40 mg, IntraVENous, DAILY, SEVEN Velásquez MD, 40 mg at 10/11/22 1036    prochlorperazine (COMPAZINE) injection 10 mg, 10 mg, IntraVENous, Q6H PRN, Betsy Velásquez MD, 10 mg at 10/08/22 4250    Omar Lawrence MD  [facility] [address] [place] [address]  Count includes the [facility]  [phone]      Noah Paulino, NP

## 2022-10-10 NOTE — PROGRESS NOTES
0800:Bedside shift change report given to Genesis Mohamud  (oncoming nurse) by Bhumika Perez (offgoing nurse). Report included the following information SBAR, Kardex, ED Summary, Intake/Output, MAR, Accordion, Recent Results, Med Rec Status, Cardiac Rhythm NSR, Alarm Parameters , Quality Measures, and Dual Neuro Assessment. Patient designated as non-ICU patient; remains in ICU room with ICU monitoring, but VS ordered Q4 hours    0900: Upon ambulation to bedside commode for patient insisted urination, tachycardia noted on tele monitor; unable to determine if Afib at this time due to care needed by patient on commode. Lopressor PO admin once patient stable on commode. 1000: Wound Care RN at  - assessed sacral and spinal wounds. See Wound Note for detailed assessment findings     1030: Dr Shanice Velasquez texted via Perfect Serve- notified of MG 1.5 and patients I/O status (low PO intake, void 400 ml - but no void overnight; edema ; MIV and Mg repletion ordered)    1215: Increased respiratory effort noted from patient. Mentation unchanged, RR 28,sats on 2 L 97%. Dr Shanice Velasquez texted via Perfect [employer] and updated to status. CXR and KUB ordered    1225: Dr Shanice Velsaquez at . Assessed patient. Will await Xrays     1300: CXR and KUB performed     1305: Infectious Disease MD at , patient asleep; updated to status per RN. No additional orders received at this time    1450: Physical Therapist and RN at ; Pt able to stand at side of bed and side shuffle with assistance x2 persons. ; Returned to bed. 1700: Pt's son, Don Krueger telephoned; able to speak with patient. Coherent conversation noted.  Pt with expressed emotions of sadness; reassurance given by son and RN

## 2022-10-10 NOTE — CONSULTS
Palliative Medicine Consult  Vill[place]: [phone]     Patient Name: Kane Robertson  YOB: 1945     Spoke to [employer]. Johnna Barney a letter to the court requesting that the patient's son be able to visit her. [employer] says the motion is to be made at court tomorrow  He should hear something back from the son's  either later tomorrow or Wednesday.

## 2022-10-10 NOTE — PROGRESS NOTES
Physical therapy:  10/10/2022    Chart reviewed in preparation for PT treatment. Spoke with RN who stated patient was restless this morning and was just able to begin sleeping - requesting PT defer at this time. Will defer for now and continue to follow as able.      Thank you,    Jacky Lundborg, PT, DPT

## 2022-10-10 NOTE — PROGRESS NOTES
[address] [facility] Adult  Hospitalist Group      Brief HPI and Hospital Course:      Chacha Dumont is a 77 y.o. female is brought to the ED via EMS for abdominal pain and nausea. She also reported watery diarrhea. Patient was recently hospitalized from 9/12-9/16 for abdominal pain and constipation and was found to have metastatic disease. She underwent liver biopsy which showed adenocarcinoma. Patient denied fever or chills. She denied dysuria, urgency or frequency. Work-up in the ED was significant for an abnormal abdominal pelvic CT that showed progression of hepatic lesions with a new large fluid collection in the liver with suggestion that this could be intrahepatic abscess. There are also fluid-filled cecum. Right pleural effusion. Patient was started on levofloxacin and Flagyl and was referred to the hospitalist service for admission evaluation.     Subjective:    Pt seen and examined- she remains in the ICU due to lack of tele beds  She reported back pain to me this morning ; mild tachycardia overnight  Appreciate input from consultants, elevated digoxin level noted    Assessment and Plan:    Neuro- some ICU delerium- improved     Cardiac-continue hemodynamic monitoring, map goal greater than 65, on midodrine, A. fib RVR, heart rate goal less than 110, keep magnesium above 2 and potassium above 4, cardiology adjusting cardiac meds, EF of 25 to 30% with severe hypokinesis of the mid to distal portions of the anterior, lateral and inferior walls with akinesis at the apex with some RV dysfunction as well-CAD versus Takotsubo, should get a repeat echo in a few days, follow-up cardiology recommendations, digoxin on hold due to elevated levels    Hypomagnesemia-magnesium given needs repeat lab check-ordered add on magnesium level to this morning's labs     Pulmonary-supplemental oxygen as needed, presumed COPD, standing bronchodilator/steroid therapy for now, saturation goal 88 to 94%  Stable oxygen levels on 2 L via nasal cannula     GI-diet as tolerated, PPI therapy-Home med     Renal-monitor urine output, corrected electrolyte derangements   Normal creatinine minimally elevated BUN     Heme/Onc-liver mass biopsy  positive for adenoCA that is not HCC. Pt had jennifer-colectomy for a large 6cm dysplastic polyp (zero of 29nodes)  in February 2022,  current ct scan worrisome for a cecal malignancy. 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 Recommending hospice. ID-hepatic abscesses versus organized hematoma continue vancomycin/Flagyl/Doxy for now-follow-up micro studies, ID consulted, follow-up recommendations= If plans are not for hospice recs include repeating CT next week of liver to see if evolution of liver lesions. Based on goals of care and repeat CT, consider drainage if any amenable collections to drainage and send for cultures. If clinical worsening in interim, start anidulafungin IV     Elevated LFTs and bilirubin-due to Shock liver-resolved  eval from hepatology- acute marked increase in liver transaminases that appear to already be coming down. The rise in TBILI generally lags behind the rise and peak in liver enzymes by 3-5 days.   metastatic disease to the liver noted  Minimally elevated alk phos persists likely due to cancer/metastatic disease     Endocrinology-keep euglycemic    Anxiety-stable on increased valium dose     Prognosis very guarded to poor at this time-follow-up oncology and palliative care team Suzie Baleshilizabeth discussions with patient and NOK           PHYSICAL EXAMINATION:  Visit Vitals  BP (!) 140/91 (BP 1 Location: Right arm, BP Patient Position: Supine)   Pulse 97   Temp 97.6 °F (36.4 °C)   Resp 22   Ht 5' 3\" (1.6 m)   Wt 49.7 kg (109 lb 9.1 oz)   SpO2 96%   BMI 19.41 kg/m²     Patient Vitals for the past 24 hrs:   Temp Pulse Resp BP SpO2   10/10/22 0400 97.6 °F (36.4 °C) 97 22 (!) 140/91 96 %   10/10/22 0000 98 °F (36.7 °C) (!) 110 18 137/82 95 %   10/09/22 2000 97.5 °F (36.4 °C) (!) 106 26 130/86 95 %   10/09/22 1600 97.5 °F (36.4 °C) 89 23 (!) 137/96 96 %   10/09/22 1400 -- 79 15 -- 98 %   10/09/22 1306 -- -- -- -- 96 %   10/09/22 1200 (!) 96.5 °F (35.8 °C) 75 21 (!) 150/88 96 %   10/09/22 0900 -- 62 19 -- 98 %        General:          Alert, cooperative, weak and chronically ill appearing  HEENT:           Atraumatic, MMM , O2 via NC           Neck:               Supple, symmetrical,  thyroid: non tender  Lungs:             decreased breath sounds bilaterally. No Wheezing or Rhonchi. No rales. Heart:              Regular  rhythm,  3/6 Systolic ejection  murmur    Abdomen:       Soft, non-tender. Not distended. Bowel sounds normal  Extremities:     No cyanosis. No clubbing,  +2 distal pulses, edema in all 4 ext sky upper ext  Skin:                Not pale. Not Jaundiced  No rashes   Psych:             anxious  Neurologic:      Alert, severe generalized weakness,      Labs:     Recent Labs     10/10/22  0407 10/09/22  0358   WBC 14.1* 15.4*   HGB 10.9* 9.9*   HCT 33.2* 30.1*    355       Recent Labs     10/10/22  0407 10/09/22  0358 10/08/22  0459    135* 137   K 4.2 4.4 4.8    105 106   CO2 25 26 23   BUN 21* 22* 21*   CREA 0.70 0.71 0.71   GLU 97 99 99   CA 8.6 8.3* 8.6       Recent Labs     10/09/22  0358   ALT 69   *   TBILI 0.4   TP 4.8*   ALB 2.4*   GLOB 2.4       No results for input(s): INR, PTP, APTT, INREXT, INREXT in the last 72 hours. No results for input(s): FE, TIBC, PSAT, FERR in the last 72 hours. Lab Results   Component Value Date/Time    Folate 27.1 (H) 08/22/2022 03:55 AM        No results for input(s): PH, PCO2, PO2 in the last 72 hours. No results for input(s): CPK, CKNDX, TROIQ in the last 72 hours.     No lab exists for component: CPKMB  Lab Results   Component Value Date/Time    Cholesterol, total 141 08/22/2022 03:55 AM    HDL Cholesterol 57 08/22/2022 03:55 AM    LDL, calculated 71.6 08/22/2022 03:55 AM    Triglyceride 62 08/22/2022 03:55 AM    CHOL/HDL Ratio 2.5 08/22/2022 03:55 AM     Lab Results   Component Value Date/Time    Glucose (POC) 145 (H) 02/14/2022 09:01 PM     Lab Results   Component Value Date/Time    Color YELLOW/STRAW 10/03/2022 04:57 PM    Appearance CLEAR 10/03/2022 04:57 PM    Specific gravity 1.008 10/03/2022 04:57 PM    Specific gravity 1.010 09/28/2022 10:37 AM    pH (UA) 6.5 10/03/2022 04:57 PM    Protein Negative 10/03/2022 04:57 PM    Glucose Negative 10/03/2022 04:57 PM    Ketone Negative 10/03/2022 04:57 PM    Bilirubin Negative 10/03/2022 04:57 PM    Urobilinogen 0.2 10/03/2022 04:57 PM    Nitrites Negative 10/03/2022 04:57 PM    Leukocyte Esterase Negative 10/03/2022 04:57 PM    Epithelial cells FEW 10/03/2022 04:57 PM    Bacteria Negative 10/03/2022 04:57 PM    WBC 0-4 10/03/2022 04:57 PM    RBC 0-5 10/03/2022 04:57 PM       Current Facility-Administered Medications:     ondansetron (ZOFRAN ODT) tablet 4 mg, 4 mg, Oral, Q8H PRN **OR** ondansetron (ZOFRAN) injection 4 mg, 4 mg, IntraVENous, Q4H PRN, Raquel Keys MD    simethicone (MYLICON) tablet 80 mg, 80 mg, Oral, QID PRN, Raquel Keys MD, 80 mg at 10/08/22 0323    butalbital-acetaminophen-caffeine (FIORICET, ESGIC) -40 mg per tablet 2 Tablet, 2 Tablet, Oral, Q6H PRN, Adwoa BOSCH NP, 2 Tablet at 10/09/22 1637    metoprolol tartrate (LOPRESSOR) tablet 25 mg, 25 mg, Oral, Q12H, Zimmer, Melody Meckel., NP, 25 mg at 10/09/22 2040    amiodarone (CORDARONE) tablet 400 mg, 400 mg, Oral, BID, Niranjan De Los Santos MD, 400 mg at 10/09/22 1708    traZODone (DESYREL) tablet 50 mg, 50 mg, Oral, QHS, Niranjan De Los Santos MD, 50 mg at 10/09/22 2116    meropenem (MERREM) 1 g in 0.9% sodium chloride (MBP/ADV) 50 mL MBP, 1 g, IntraVENous, Q8H, Raquel Keys MD, Last Rate: 16.7 mL/hr at 10/10/22 0358, 1 g at 10/10/22 0358    diazePAM (VALIUM) tablet 5 mg, 5 mg, Oral, Q6H PRN, Adwoa BOSCH NP, 5 mg at 10/10/22 0134    HYDROmorphone (DILAUDID) injection 0.2 mg, 0.2 mg, IntraVENous, Q3H PRN, Ting BOSCH NP, 0.2 mg at 10/09/22 1222    lidocaine 4 % patch 1 Patch, 1 Patch, TransDERmal, Q24H, Enriqueta Flores MD, 1 Patch at 10/06/22 1954    alteplase (CATHFLO) 1 mg in sterile water (preservative free) 1 mL injection, 1 mg, InterCATHeter, PRN, Arpan BOSCH MD    therapeutic multivitamin (THERAGRAN) tablet 1 Tablet, 1 Tablet, Oral, DAILY, Niranjan De Los Santos MD, 1 Tablet at 10/09/22 1008    polyethylene glycol (MIRALAX) packet 17 g, 17 g, Oral, BID, Magaly Caicedo NP, 17 g at 10/07/22 1732    magnesium hydroxide (MILK OF MAGNESIA) 400 mg/5 mL oral suspension 30 mL, 30 mL, Oral, DAILY PRN, Ting BOSCH NP    predniSONE (DELTASONE) tablet 40 mg, 40 mg, Oral, DAILY WITH BREAKFAST, Niranjan De Los Santos MD, 40 mg at 10/09/22 1015    phenol throat spray (CHLORASEPTIC) 1 Spray, 1 Spray, Oral, PRN, Rosana Flores NP    senna-docusate (PERICOLACE) 8.6-50 mg per tablet 2 Tablet, 2 Tablet, Oral, DAILY, Ting BOSCH NP, 2 Tablet at 10/09/22 1008    ipratropium (ATROVENT) 0.02 % nebulizer solution 0.5 mg, 0.5 mg, Nebulization, Q6H RT, Niranjan De Los Santos MD, 0.5 mg at 10/09/22 1903    midodrine (PROAMATINE) tablet 20 mg, 20 mg, Oral, TID WITH MEALS, Niranjan De Los Santos MD, 20 mg at 10/08/22 1902    [Held by provider] digoxin (LANOXIN) tablet 0.125 mg, 0.125 mg, Oral, DAILY, Niranjan De Los Santos MD, 0.125 mg at 10/09/22 1214    balsam peru-castor oiL (VENELEX) ointment, , Topical, BID, Maia Jewell MD, Given at 10/09/22 1749    Vancomycin - Pharmacy dosing, , Other, Rx Dosing/Monitoring, Roxi Torres MD    vancomycin (VANCOCIN) 500 mg in 0.9% sodium chloride (MBP/ADV) 100 mL MBP, 500 mg, IntraVENous, Q12H, Roxi Russo MD, Last Rate: 100 mL/hr at 10/10/22 0522, 500 mg at 10/10/22 0522    thiamine HCL (B-1) tablet 100 mg, 100 mg, Oral, DAILY, Roxi Russo MD, 100 mg at 10/09/22 1008    dextrose 5% and 0.9% NaCl infusion, 75 mL/hr, IntraVENous, CONTINUOUS, Alla Martinez MD, Stopped at 10/03/22 0830    0.9% sodium chloride infusion 250 mL, 250 mL, IntraVENous, PRN, Roxi Restrepo MD    albuterol (PROVENTIL VENTOLIN) nebulizer solution 2.5 mg, 2.5 mg, Nebulization, Q6H PRN, Roxi Resterpo MD, 2.5 mg at 10/06/22 1810    sodium chloride (NS) flush 5-40 mL, 5-40 mL, IntraVENous, Q8H, KAELA Velásquez MD, 10 mL at 10/10/22 0523    sodium chloride (NS) flush 5-40 mL, 5-40 mL, IntraVENous, PRN, KRISSY Velásquez MD    acetaminophen (TYLENOL) tablet 650 mg, 650 mg, Oral, Q6H PRN, 650 mg at 10/02/22 2127 **OR** acetaminophen (TYLENOL) suppository 650 mg, 650 mg, Rectal, Q6H PRN, FEMI Velásquez MD    pantoprazole (PROTONIX) 40 mg in 0.9% sodium chloride 10 mL injection, 40 mg, IntraVENous, DAILY, LORIE Velásquez MD, 40 mg at 10/09/22 1009    prochlorperazine (COMPAZINE) injection 10 mg, 10 mg, IntraVENous, Q6H PRN, Arabella Velásquez MD, 10 mg at 10/08/22 2119     CODE STATUS:   Full Code   X DNR    Partial    Comfort Care       Signed:   Priscila Mayer MD  Date of Service:  10/10/2022

## 2022-10-11 ENCOUNTER — APPOINTMENT (OUTPATIENT)
Dept: CT IMAGING | Age: 77
DRG: 435 | End: 2022-10-11
Attending: FAMILY MEDICINE
Payer: MEDICARE

## 2022-10-11 LAB
ALBUMIN SERPL-MCNC: 2.3 G/DL (ref 3.5–5)
ALBUMIN/GLOB SERPL: 1.2 {RATIO} (ref 1.1–2.2)
ALP SERPL-CCNC: 162 U/L (ref 45–117)
ALT SERPL-CCNC: 50 U/L (ref 12–78)
ANION GAP SERPL CALC-SCNC: 4 MMOL/L (ref 5–15)
AST SERPL-CCNC: 36 U/L (ref 15–37)
BASOPHILS # BLD: 0 K/UL (ref 0–0.1)
BASOPHILS NFR BLD: 0 % (ref 0–1)
BILIRUB DIRECT SERPL-MCNC: 0.2 MG/DL (ref 0–0.2)
BILIRUB SERPL-MCNC: 0.5 MG/DL (ref 0.2–1)
BUN SERPL-MCNC: 22 MG/DL (ref 6–20)
BUN/CREAT SERPL: 36 (ref 12–20)
CALCIUM SERPL-MCNC: 8.1 MG/DL (ref 8.5–10.1)
CHLORIDE SERPL-SCNC: 106 MMOL/L (ref 97–108)
CO2 SERPL-SCNC: 25 MMOL/L (ref 21–32)
CREAT SERPL-MCNC: 0.61 MG/DL (ref 0.55–1.02)
DIFFERENTIAL METHOD BLD: ABNORMAL
DIGOXIN SERPL-MCNC: 1.8 NG/ML (ref 0.9–2)
EOSINOPHIL # BLD: 0 K/UL (ref 0–0.4)
EOSINOPHIL NFR BLD: 0 % (ref 0–7)
ERYTHROCYTE [DISTWIDTH] IN BLOOD BY AUTOMATED COUNT: 23.4 % (ref 11.5–14.5)
GLOBULIN SER CALC-MCNC: 2 G/DL (ref 2–4)
GLUCOSE SERPL-MCNC: 132 MG/DL (ref 65–100)
HCT VFR BLD AUTO: 30.6 % (ref 35–47)
HGB BLD-MCNC: 9.8 G/DL (ref 11.5–16)
IMM GRANULOCYTES # BLD AUTO: 0.1 K/UL (ref 0–0.04)
IMM GRANULOCYTES NFR BLD AUTO: 1 % (ref 0–0.5)
LYMPHOCYTES # BLD: 0.4 K/UL (ref 0.8–3.5)
LYMPHOCYTES NFR BLD: 5 % (ref 12–49)
MAGNESIUM SERPL-MCNC: 1.9 MG/DL (ref 1.6–2.4)
MCH RBC QN AUTO: 27.4 PG (ref 26–34)
MCHC RBC AUTO-ENTMCNC: 32 G/DL (ref 30–36.5)
MCV RBC AUTO: 85.5 FL (ref 80–99)
MONOCYTES # BLD: 0.7 K/UL (ref 0–1)
MONOCYTES NFR BLD: 8 % (ref 5–13)
NEUTS SEG # BLD: 7.4 K/UL (ref 1.8–8)
NEUTS SEG NFR BLD: 86 % (ref 32–75)
NRBC # BLD: 0.03 K/UL (ref 0–0.01)
NRBC BLD-RTO: 0.3 PER 100 WBC
PLATELET # BLD AUTO: 272 K/UL (ref 150–400)
PMV BLD AUTO: 10 FL (ref 8.9–12.9)
POTASSIUM SERPL-SCNC: 4.6 MMOL/L (ref 3.5–5.1)
PROT SERPL-MCNC: 4.3 G/DL (ref 6.4–8.2)
RBC # BLD AUTO: 3.58 M/UL (ref 3.8–5.2)
RBC MORPH BLD: ABNORMAL
RBC MORPH BLD: ABNORMAL
SODIUM SERPL-SCNC: 135 MMOL/L (ref 136–145)
WBC # BLD AUTO: 8.6 K/UL (ref 3.6–11)

## 2022-10-11 PROCEDURE — 74011636637 HC RX REV CODE- 636/637: Performed by: EMERGENCY MEDICINE

## 2022-10-11 PROCEDURE — 74011250636 HC RX REV CODE- 250/636: Performed by: NURSE PRACTITIONER

## 2022-10-11 PROCEDURE — 97110 THERAPEUTIC EXERCISES: CPT

## 2022-10-11 PROCEDURE — 99232 SBSQ HOSP IP/OBS MODERATE 35: CPT | Performed by: INTERNAL MEDICINE

## 2022-10-11 PROCEDURE — 74011250637 HC RX REV CODE- 250/637: Performed by: EMERGENCY MEDICINE

## 2022-10-11 PROCEDURE — 74011000636 HC RX REV CODE- 636: Performed by: FAMILY MEDICINE

## 2022-10-11 PROCEDURE — C9113 INJ PANTOPRAZOLE SODIUM, VIA: HCPCS | Performed by: HOSPITALIST

## 2022-10-11 PROCEDURE — 74011000250 HC RX REV CODE- 250: Performed by: EMERGENCY MEDICINE

## 2022-10-11 PROCEDURE — 74011250637 HC RX REV CODE- 250/637: Performed by: STUDENT IN AN ORGANIZED HEALTH CARE EDUCATION/TRAINING PROGRAM

## 2022-10-11 PROCEDURE — 74011250636 HC RX REV CODE- 250/636: Performed by: STUDENT IN AN ORGANIZED HEALTH CARE EDUCATION/TRAINING PROGRAM

## 2022-10-11 PROCEDURE — 74011000250 HC RX REV CODE- 250: Performed by: HOSPITALIST

## 2022-10-11 PROCEDURE — 80076 HEPATIC FUNCTION PANEL: CPT

## 2022-10-11 PROCEDURE — 74011250636 HC RX REV CODE- 250/636: Performed by: FAMILY MEDICINE

## 2022-10-11 PROCEDURE — 85025 COMPLETE CBC W/AUTO DIFF WBC: CPT

## 2022-10-11 PROCEDURE — 94640 AIRWAY INHALATION TREATMENT: CPT

## 2022-10-11 PROCEDURE — 51798 US URINE CAPACITY MEASURE: CPT

## 2022-10-11 PROCEDURE — 74011250636 HC RX REV CODE- 250/636: Performed by: HOSPITALIST

## 2022-10-11 PROCEDURE — 74011000258 HC RX REV CODE- 258: Performed by: STUDENT IN AN ORGANIZED HEALTH CARE EDUCATION/TRAINING PROGRAM

## 2022-10-11 PROCEDURE — 77030038269 HC DRN EXT URIN PURWCK BARD -A

## 2022-10-11 PROCEDURE — 80048 BASIC METABOLIC PNL TOTAL CA: CPT

## 2022-10-11 PROCEDURE — 74011250637 HC RX REV CODE- 250/637: Performed by: NURSE PRACTITIONER

## 2022-10-11 PROCEDURE — 80162 ASSAY OF DIGOXIN TOTAL: CPT

## 2022-10-11 PROCEDURE — 83735 ASSAY OF MAGNESIUM: CPT

## 2022-10-11 PROCEDURE — 74011000258 HC RX REV CODE- 258: Performed by: FAMILY MEDICINE

## 2022-10-11 PROCEDURE — 36415 COLL VENOUS BLD VENIPUNCTURE: CPT

## 2022-10-11 PROCEDURE — 65270000046 HC RM TELEMETRY

## 2022-10-11 PROCEDURE — 74177 CT ABD & PELVIS W/CONTRAST: CPT

## 2022-10-11 RX ORDER — AMIODARONE HYDROCHLORIDE 200 MG/1
200 TABLET ORAL DAILY
Status: DISCONTINUED | OUTPATIENT
Start: 2022-10-27 | End: 2022-10-11

## 2022-10-11 RX ORDER — AMIODARONE HYDROCHLORIDE 200 MG/1
400 TABLET ORAL ONCE
Status: COMPLETED | OUTPATIENT
Start: 2022-10-11 | End: 2022-10-11

## 2022-10-11 RX ORDER — AMIODARONE HYDROCHLORIDE 200 MG/1
200 TABLET ORAL DAILY
Status: DISCONTINUED | OUTPATIENT
Start: 2022-10-27 | End: 2022-10-20 | Stop reason: HOSPADM

## 2022-10-11 RX ORDER — AMIODARONE HYDROCHLORIDE 200 MG/1
200 TABLET ORAL 2 TIMES DAILY
Status: DISCONTINUED | OUTPATIENT
Start: 2022-10-11 | End: 2022-10-18

## 2022-10-11 RX ORDER — IPRATROPIUM BROMIDE 0.5 MG/2.5ML
SOLUTION RESPIRATORY (INHALATION)
Status: DISPENSED
Start: 2022-10-11 | End: 2022-10-11

## 2022-10-11 RX ADMIN — MIDODRINE HYDROCHLORIDE 20 MG: 5 TABLET ORAL at 18:15

## 2022-10-11 RX ADMIN — IOPAMIDOL 100 ML: 755 INJECTION, SOLUTION INTRAVENOUS at 13:42

## 2022-10-11 RX ADMIN — MIDODRINE HYDROCHLORIDE 20 MG: 5 TABLET ORAL at 10:36

## 2022-10-11 RX ADMIN — SODIUM CHLORIDE, PRESERVATIVE FREE 10 ML: 5 INJECTION INTRAVENOUS at 21:35

## 2022-10-11 RX ADMIN — MEROPENEM 1 G: 1 INJECTION, POWDER, FOR SOLUTION INTRAVENOUS at 12:08

## 2022-10-11 RX ADMIN — MIDODRINE HYDROCHLORIDE 20 MG: 5 TABLET ORAL at 13:01

## 2022-10-11 RX ADMIN — Medication 100 MG: at 10:36

## 2022-10-11 RX ADMIN — IPRATROPIUM BROMIDE 0.5 MG: 0.5 SOLUTION RESPIRATORY (INHALATION) at 02:40

## 2022-10-11 RX ADMIN — METOPROLOL TARTRATE 25 MG: 25 TABLET, FILM COATED ORAL at 10:37

## 2022-10-11 RX ADMIN — SENNOSIDES AND DOCUSATE SODIUM 2 TABLET: 50; 8.6 TABLET ORAL at 10:36

## 2022-10-11 RX ADMIN — IPRATROPIUM BROMIDE 0.5 MG: 0.5 SOLUTION RESPIRATORY (INHALATION) at 15:06

## 2022-10-11 RX ADMIN — MEROPENEM 1 G: 1 INJECTION, POWDER, FOR SOLUTION INTRAVENOUS at 04:25

## 2022-10-11 RX ADMIN — AMIODARONE HYDROCHLORIDE 200 MG: 200 TABLET ORAL at 18:15

## 2022-10-11 RX ADMIN — HYDROMORPHONE HYDROCHLORIDE 0.2 MG: 1 INJECTION, SOLUTION INTRAMUSCULAR; INTRAVENOUS; SUBCUTANEOUS at 16:00

## 2022-10-11 RX ADMIN — IPRATROPIUM BROMIDE 0.5 MG: 0.5 SOLUTION RESPIRATORY (INHALATION) at 07:46

## 2022-10-11 RX ADMIN — IPRATROPIUM BROMIDE 0.5 MG: 0.5 SOLUTION RESPIRATORY (INHALATION) at 19:56

## 2022-10-11 RX ADMIN — VANCOMYCIN HYDROCHLORIDE 500 MG: 500 INJECTION, POWDER, LYOPHILIZED, FOR SOLUTION INTRAVENOUS at 05:29

## 2022-10-11 RX ADMIN — BUTALBITAL, ACETAMINOPHEN, AND CAFFEINE 2 TABLET: 50; 325; 40 TABLET ORAL at 19:18

## 2022-10-11 RX ADMIN — DIAZEPAM 5 MG: 10 TABLET ORAL at 13:01

## 2022-10-11 RX ADMIN — Medication: at 21:34

## 2022-10-11 RX ADMIN — PANTOPRAZOLE SODIUM 40 MG: 40 INJECTION, POWDER, FOR SOLUTION INTRAVENOUS at 10:36

## 2022-10-11 RX ADMIN — AMIODARONE HYDROCHLORIDE 400 MG: 200 TABLET ORAL at 11:10

## 2022-10-11 RX ADMIN — MEROPENEM 1 G: 1 INJECTION, POWDER, FOR SOLUTION INTRAVENOUS at 20:19

## 2022-10-11 RX ADMIN — THERA TABS 1 TABLET: TAB at 10:37

## 2022-10-11 RX ADMIN — BUTALBITAL, ACETAMINOPHEN, AND CAFFEINE 2 TABLET: 50; 325; 40 TABLET ORAL at 10:43

## 2022-10-11 RX ADMIN — DIAZEPAM 5 MG: 10 TABLET ORAL at 21:32

## 2022-10-11 RX ADMIN — TRAZODONE HYDROCHLORIDE 50 MG: 50 TABLET ORAL at 21:32

## 2022-10-11 RX ADMIN — VANCOMYCIN HYDROCHLORIDE 500 MG: 500 INJECTION, POWDER, LYOPHILIZED, FOR SOLUTION INTRAVENOUS at 16:43

## 2022-10-11 RX ADMIN — DIGOXIN 0.06 MG: 125 TABLET ORAL at 10:36

## 2022-10-11 RX ADMIN — PREDNISONE 40 MG: 20 TABLET ORAL at 10:36

## 2022-10-11 RX ADMIN — Medication: at 18:24

## 2022-10-11 RX ADMIN — HYDROMORPHONE HYDROCHLORIDE 0.2 MG: 1 INJECTION, SOLUTION INTRAMUSCULAR; INTRAVENOUS; SUBCUTANEOUS at 21:31

## 2022-10-11 RX ADMIN — Medication: at 10:41

## 2022-10-11 RX ADMIN — BUTALBITAL, ACETAMINOPHEN, AND CAFFEINE 2 TABLET: 50; 325; 40 TABLET ORAL at 02:30

## 2022-10-11 NOTE — PROGRESS NOTES
[facility]  Cardiology Care Note                  []Initial visit     [x]Established visit     Patient Name: Sherri Jacobo - XXI:[id number] - PJO:[id number]  Primary Cardiologist: Dariela Richey and has been followed by cardiology in [place]. SUBJECTIVE/INterval HPI:. Remains in NSR today, some PAF yesterday but rate better. Intermittently combative and somnolent. Palliative and  are working with court system to request release for her son to visit pt. (Court motion planned for tomorrow). Assessment and Plan       1. Paroxysmal Atrial fibrillation with rapid ventricular response  - Remains in NSR today. Continue amiodarone with plan to de-escalate therapy over the next 1 month. May leave her on amiodarone short-term for about 3 months.  -Continue digoxin at 0.0625 mg daily po. (Digoxin level 1.8 today). This can be continued long-term.  -Continue metoprolol 25 mg BID with hold parameters. --Not a candidate for [email or web address] (anemia, fraility, and possible organized liver hematoma on CT scan). Overall prognosis poor. Due to limited expected survival, no other interventions will be needed for stroke risk reduction. 2.  Cardiomyopathy/Possible acute systolic heart failure: EF 25-30%  -CM Likely secondary to stress CM (more likely) than tachycardiomyopathy   -Unable to tolerate full GDMT  Continue digoxin.   -Cautious with vasopressor support as can worsen LVOT obstruction with stress CM. 3.   Likely metastatic adenocarcinoma:  -Liver adenocarcinoma of uncertain primary site.  -Heme/onc following- pt is too weak for chemo, hospice consult recommended by oncology.  -Palliative care following. 4.   Anemia: hgb improved to 10.9  5. Hypomagnesemia: repleted by primary team.   6.   Advanced directives:  DNR. Palliative following. Overall prognosis poor. ____________________________________________________________    Cardiac testing  07/21/22    ECHO ADULT FOLLOW-UP OR LIMITED 07/21/2022 7/21/2022    Interpretation Summary    Limited study for the assessment of ejection fraction. Left Ventricle: Normal left ventricular systolic function with a visually estimated EF of 55 - 60%. EF by 2D Simpsons Biplane is 56%. Left ventricle size is normal. Normal wall thickness. See diagram for wall motion findings. Tricuspid Valve: Mild to moderate regurgitation on limited color and Doppler interrogation. Pulmonary Arteries: Pulmonary hypertension not present. The estimated PASP is 27 mmHg. Signed by: Marli Porras MD on 7/21/2022  2:41 PM        HPI:   Ms. Lyn Stafford is a 77 y.o. female with PMH of PAF on digoxin and ASA , Left hemicolectomy in 2/2022 for adenomatous colonic polyp, anemia, HLD, GERD, seizrues, chronic back pain,  former smokr. . She presented with chief c/o abdominal pain and nausea. She was hospitalized earlier this month for abdominal pain and now found to have liver adenocarcinoma on recent liver biopsy with likely organized hepatic hematoma on CT scan. There is concern for cecal malignancy and metastatic colon cancer is suspected. Cardiology consulted as pt went into afib with RVR early this a.m. She denies any palpitations, dizziness, lightheadedness and no chest pain. She had some mild SOB last night but this is better. She feels weak. C/o some abdominal pain. No palpitations. She has received adocard 6 mg and 12 mg, iv diltiazem bolus and IV dig and started on diltiazem gtt. Later in day, she developed hypotension on diltizem gtt. BP is soft. She also exhibits metabolic acidosis thought to be due to poor po intake, malnutritionCXR with mild diffuse interstia lopacities. Noted to have EF of 25-30% on echo. For afib with RVR, she was treated with amiodarone but LFTS elevated so this was stopped. Digoxin was resumed.  She would not tolerate BB given lower BP. Most recent HS troponins:  No results for input(s): TROPHS in the last 72 hours. No lab exists for component:  CKMB  ECG: afib with RVR, nonspecific t wave abnormality  Repeat EKG reviewed by Dr. Nirav Bean- atrial tachycardia with t wave abnormality V5, V6.  ?possible dig effect. Review of Systems    []All other systems reviewed and all negative except as written in HPI    [x] Patient unable to provide secondary to condition         Past Medical History:   Diagnosis Date    Adenoma of colon     Anemia     Arthritis     Atrial fibrillation (HCC)     Bloating     Chronic constipation     Chronic pain     back     COPD (chronic obstructive pulmonary disease) (HCC)     GERD (gastroesophageal reflux disease)     Circle (hard of hearing)     Hyponatremia     caused seizures x 2 per pt    Indigestion     Osteoporosis     Seizures ([address] 7/2020, 9/2020    x 2      Past Surgical History:   Procedure Laterality Date    COLONOSCOPY N/A 12/16/2021    COLONOSCOPY   :- performed by Leonor Rosa., MD [facility] N/A 2/14/2022    . performed by Lakhwinder Saez MD at [facility] MAIN OR    [address]    HX CHOLECYSTECTOMY  1982    HX HIP REPLACEMENT Left     pt stated hip was pinned, not replaced    HX HIP REPLACEMENT Right     pt stated hip was pinned, not replaced    HX HYSTERECTOMY      HX ORTHOPAEDIC  1990, 2011    bilateral hip fractures     HX ORTHOPAEDIC Left 2020    femur fracture    HX TONSILLECTOMY       SH: former smoker, no ETOH. FH:no FH of early CAD      Social Hx:  reports that she quit smoking about 10 years ago. She has a 20.00 pack-year smoking history. She has never used smokeless tobacco. She reports that she does not drink alcohol and does not use drugs. Family Hx: family history includes Alcohol abuse in her father; Heart Disease in her father and mother; Liver Disease in her father.   Allergies   Allergen Reactions    Cefuroxime Hives    Hydrocodone Nausea and Vomiting    Other Medication Diarrhea and Nausea and Vomiting     PT REPORTS ALL MYCINS CAUSE SEVERE GI UPSET    Oxycodone Nausea and Vomiting    Penicillins Hives     Patient screened for any delayed non-IgE-mediated reaction to PCN. Patient notes the following:    No delayed non-IgE-mediated reaction to PCN    Hives 1969                OBJECTIVE:  Wt Readings from Last 3 Encounters:   10/11/22 113 lb 1.5 oz (51.3 kg)   09/15/22 92 lb (41.7 kg)   07/21/22 93 lb (42.2 kg)       Intake/Output Summary (Last 24 hours) at 10/11/2022 1607  Last data filed at 10/11/2022 1200  Gross per 24 hour   Intake 1250 ml   Output 350 ml   Net 900 ml         Physical Exam    Vitals:   Vitals:    10/11/22 0746 10/11/22 0800 10/11/22 1200 10/11/22 1507   BP:  (!) 97/44 (!) 104/59    Pulse:  72 66    Resp:  22 21    Temp:  98.6 °F (37 °C) 97.1 °F (36.2 °C)    SpO2: 100% 98% 97% 100%   Weight:       Height:         Telemetry: NSR, PAF yesterday but rate improved. BP (!) 104/59 (BP 1 Location: Right upper arm, BP Patient Position: At rest)   Pulse 66   Temp 97.1 °F (36.2 °C)   Resp 21   Ht 5' 3\" (1.6 m)   Wt 113 lb 1.5 oz (51.3 kg)   SpO2 100%   BMI 20.03 kg/m²   General:     Alert, cachectic, coughing after eating. Weak appearing. Psychiatric:    Normal Mood and affect    Eye/ENT:      Pupils equal, No asymmetry, Conjunctival pink. Able to hear voice at normal amplitude   Lungs:      Visibly symmetric chest expansion, No palpable tenderness. Diminished bases. Poor deep inspiratory effort. Heart[de-identified]    Regular rate and rhythm, S1, S2 variable, no murmur, click, rub or gallop. No JVD, Normal palpable peripheral pulses. No cyanosis   Abdomen:     Soft, non-tender. Bowel sounds normal. No masses,  No      organomegaly.    Extremities:   Extremities normal, atraumatic, tr-1+ BLE edema   Neurologic:   alert,         Data Review:     Radiology:   XR Results (most recent):  Results from [facility] Encounter encounter on 09/28/22    XR ABD PORT  1 V    Narrative  PROCEDURE: XR ABD PORT  1 V    COMPARISON: 10/3/2022    REASON FOR STUDY: abd distension    FINDINGS: Single frontal view the abdomen demonstrates gaseous distention of  presumably:. Large amount of debris or stool is noted in the left upper quadrant  which may be within the stomach. Impression  Limited study. Gaseous distention of presumably colon in the mid  abdomen. CT Results (most recent): MRI Results (most recent):  No results found for this or any previous visit. No results for input(s): CPK, TROIQ in the last 72 hours. No lab exists for component: CKQMB, CPKMB, BMPP  Recent Labs     10/11/22  0508 10/10/22  0407   * 136   K 4.6 4.2    107   CO2 25 25   BUN 22* 21*   CREA 0.61 0.70   * 97   CA 8.1* 8.6     Recent Labs     10/11/22  0508 10/10/22  0407   WBC 8.6 14.1*   HGB 9.8* 10.9*   HCT 30.6* 33.2*    325     Recent Labs     10/11/22  0508 10/09/22  0358   * 160*     No results for input(s): CHOL, LDLC in the last 72 hours. No lab exists for component: TGL, HDLC,  HBA1C  No results for input(s): CRP, TSH, TSHEXT, TSHEXT in the last 72 hours.     No lab exists for component: ESR        Current meds:    Current Facility-Administered Medications:     iohexoL (OMNIPAQUE) 240 mg iodine/mL solution 50 mL, 50 mL, Oral, RAD ONCE, Edward Bob MD    amiodarone (CORDARONE) tablet 200 mg, 200 mg, Oral, BID, Monika Mercado, JOSEPHINE Hewitt ON 10/27/2022] amiodarone (CORDARONE) tablet 200 mg, 200 mg, Oral, DAILY, Monika Mercado, NP    balsam peru-castor oiL (VENELEX) ointment, , Topical, TID, Edward Bob MD, Given at 10/11/22 1041    dextrose 5 % - 0.45% NaCl infusion, 50 mL/hr, IntraVENous, CONTINUOUS, Edward Bob MD, Last Rate: 50 mL/hr at 10/10/22 1201, 50 mL/hr at 10/10/22 1201    digoxin (LANOXIN) tablet 0.0625 mg, 0.0625 mg, Oral, DAILY, Monika Mercado, NP, 0.0625 mg at 10/11/22 1036    ondansetron (ZOFRAN ODT) tablet 4 mg, 4 mg, Oral, Q8H PRN **OR** ondansetron (ZOFRAN) injection 4 mg, 4 mg, IntraVENous, Q4H PRN, Alla Martinez MD    simethicone (MYLICON) tablet 80 mg, 80 mg, Oral, QID PRN, Alla Martinez MD, 80 mg at 10/08/22 1265    butalbital-acetaminophen-caffeine (FIORICET, ESGIC) -40 mg per tablet 2 Tablet, 2 Tablet, Oral, Q6H PRN, Phan Lawton NP, 2 Tablet at 10/11/22 1043    metoprolol tartrate (LOPRESSOR) tablet 25 mg, 25 mg, Oral, Q12H, Poncho Mercado NP, 25 mg at 10/11/22 1037    traZODone (DESYREL) tablet 50 mg, 50 mg, Oral, QHS, Niranjan De Los Santos MD, 50 mg at 10/10/22 2118    meropenem (MERREM) 1 g in 0.9% sodium chloride (MBP/ADV) 50 mL MBP, 1 g, IntraVENous, Q8H, Alla Martinez MD, Last Rate: 16.7 mL/hr at 10/11/22 1208, 1 g at 10/11/22 1208    diazePAM (VALIUM) tablet 5 mg, 5 mg, Oral, Q6H PRN, Arleen BOSCH NP, 5 mg at 10/11/22 1301    HYDROmorphone (DILAUDID) injection 0.2 mg, 0.2 mg, IntraVENous, Q3H PRN, Arleen BOSCH NP, 0.2 mg at 10/11/22 1600    lidocaine 4 % patch 1 Patch, 1 Patch, TransDERmal, Q24H, Alla Martinez MD, 1 Patch at 10/10/22 1705    alteplase (CATHFLO) 1 mg in sterile water (preservative free) 1 mL injection, 1 mg, InterCATHeter, PRN, Inessa BOSCH MD    therapeutic multivitamin (THERAGRAN) tablet 1 Tablet, 1 Tablet, Oral, DAILY, Inessa BOSCH MD, 1 Tablet at 10/11/22 1037    polyethylene glycol (MIRALAX) packet 17 g, 17 g, Oral, BID, Arleen BOSCH NP, 17 g at 10/10/22 0950    magnesium hydroxide (MILK OF MAGNESIA) 400 mg/5 mL oral suspension 30 mL, 30 mL, Oral, DAILY PRN, Arleen BOSCH NP    predniSONE (DELTASONE) tablet 40 mg, 40 mg, Oral, DAILY WITH BREAKFAST, Niranjan De Los Santos MD, 40 mg at 10/11/22 1036    phenol throat spray (CHLORASEPTIC) 1 Spray, 1 Spray, Oral, PRN, Rosana Flores, NP    senna-docusate (PERICOLACE) 8.6-50 mg per tablet 2 Tablet, 2 Tablet, Oral, DAILY, Sascha Usman Rutledge, JOSEPHINE, 2 Tablet at 10/11/22 1036    ipratropium (ATROVENT) 0.02 % nebulizer solution 0.5 mg, 0.5 mg, Nebulization, Q6H RT, Niranjan De Los Santos MD, 0.5 mg at 10/11/22 1506    midodrine (PROAMATINE) tablet 20 mg, 20 mg, Oral, TID WITH MEALS, Niranjan De Los Santos MD, 20 mg at 10/11/22 1301    Vancomycin - Pharmacy dosing, , Other, Rx Dosing/Monitoring, Roxi Sandoval Sa, MD    vancomycin (VANCOCIN) 500 mg in 0.9% sodium chloride (MBP/ADV) 100 mL MBP, 500 mg, IntraVENous, Q12H, Roxi Sandoval Sa, MD, Last Rate: 100 mL/hr at 10/11/22 0529, 500 mg at 10/11/22 0529    thiamine HCL (B-1) tablet 100 mg, 100 mg, Oral, DAILY, Roxi Sandoval Sa, MD, 100 mg at 10/11/22 1036    0.9% sodium chloride infusion 250 mL, 250 mL, IntraVENous, PRN, Roxi Sandoval Sa, MD    albuterol (PROVENTIL VENTOLIN) nebulizer solution 2.5 mg, 2.5 mg, Nebulization, Q6H PRN, Roxi Sandoval Sa, MD, 2.5 mg at 10/06/22 1810    sodium chloride (NS) flush 5-40 mL, 5-40 mL, IntraVENous, Q8H, Kristin Velsáquez MD, 10 mL at 10/10/22 2121    sodium chloride (NS) flush 5-40 mL, 5-40 mL, IntraVENous, PRN, Kristin Velásquez MD    acetaminophen (TYLENOL) tablet 650 mg, 650 mg, Oral, Q6H PRN, 650 mg at 10/02/22 2127 **OR** acetaminophen (TYLENOL) suppository 650 mg, 650 mg, Rectal, Q6H PRN, Kristin Velásquez MD    pantoprazole (PROTONIX) 40 mg in 0.9% sodium chloride 10 mL injection, 40 mg, IntraVENous, DAILY, ALLY Velásquez MD, 40 mg at 10/11/22 1036    prochlorperazine (COMPAZINE) injection 10 mg, 10 mg, IntraVENous, Q6H PRN, Kimberli Hodges MD, 10 mg at 10/08/22 5619    Hany Orozco MD  [facility] [address] [place] [address]  [phone]      Marylin Medina, NP

## 2022-10-11 NOTE — PROGRESS NOTES
0800: Bedside and Verbal shift change report given to [id number] [place] [address] (oncoming nurse) by Fatmata Fofana (offgoing nurse). Report included the following information SBAR, Kardex, ED Summary, Intake/Output, MAR, Recent Results, and Cardiac Rhythm NSR .      1104: Pt refusing CT with oral contrast. Received IV contrast.    1200: Dr. Chad Mabry at bedside. Pt agrees to CT as long as there is no contrast.    1330: Pt off floor for CT with RN.    6195: Pt returned to CVICU.    1800: Bladder scanned patient: 842 ml    1830: Straight cath performed: 800 ml emptied    2000: Bedside and Verbal shift change report given to Fatmata Fofana and Tatyana Smith (oncoming nurse) by [id number] [facility] (offgoing nurse). Report included the following information SBAR, Kardex, Intake/Output, MAR, Recent Results, and Cardiac Rhythm NSR .

## 2022-10-11 NOTE — PROGRESS NOTES
[id number] [facility] Adult  Hospitalist Group      Brief HPI and Hospital Course:      Janine Nick is a 77 y.o. female is brought to the ED via EMS for abdominal pain and nausea. She also reported watery diarrhea. Patient was recently hospitalized from 9/12-9/16 for abdominal pain and constipation and was found to have metastatic disease. She underwent liver biopsy which showed adenocarcinoma. Patient denied fever or chills. She denied dysuria, urgency or frequency. Work-up in the ED was significant for an abnormal abdominal pelvic CT that showed progression of hepatic lesions with a new large fluid collection in the liver with suggestion that this could be intrahepatic abscess. There are also fluid-filled cecum. Right pleural effusion. Patient was started on levofloxacin and Flagyl and was referred to the hospitalist service for admission evaluation. Subjective:    Pt seen and examined- she remains in the ICU due to lack of tele beds  She reports having leg pain and they feel cold to her.    CT abd/pelvis ordered for today  Appreciate input from consultants, elevated digoxin level noted    Assessment and Plan:    Neuro- some ICU delerium- improved     Cardiac-continue hemodynamic monitoring, map goal greater than 65, on midodrine, A. fib RVR, heart rate goal less than 110, keep magnesium above 2 and potassium above 4, cardiology adjusting cardiac meds, EF of 25 to 30% with severe hypokinesis of the mid to distal portions of the anterior, lateral and inferior walls with akinesis at the apex with some RV dysfunction as well-CAD versus Takotsubo, should get a repeat echo in a few days, follow-up cardiology recommendations, digoxin on hold due to elevated levels    Hypomagnesemia-resolved after IV magnesium given;       Pulmonary-supplemental oxygen as needed, presumed COPD, standing bronchodilator/steroid therapy for now, saturation goal 88 to 94%  Stable oxygen levels on 2-3 L via nasal cannula     GI-diet as tolerated, PPI therapy-Home med     Renal-monitor urine output, corrected electrolyte derangements   Normal creatinine minimally elevated BUN     Heme/Onc-liver mass biopsy  positive for adenoCA that is not HCC. Pt had jennifer-colectomy for a large 6cm dysplastic polyp (zero of 29nodes)  in February 2022,  current ct scan worrisome for a cecal malignancy. 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 Recommending hospice. ID-hepatic abscesses versus organized hematoma continue vancomycin/Flagyl/Doxy for now-follow-up micro studies, ID consulted, follow-up recommendations= CT abd/pelvis today to see evolution of liver lesions. Based on goals of care and repeat CT, consider drainage if any amenable collections to drainage and send for cultures. If clinical worsening in interim, start anidulafungin IV   Normal wbc today    Elevated LFTs and bilirubin-due to Shock liver-resolved  eval from hepatology- acute marked increase in liver transaminases that appear to already be coming down. The rise in TBILI generally lags behind the rise and peak in liver enzymes by 3-5 days.   metastatic disease to the liver noted  Minimally elevated alk phos persists likely due to cancer/metastatic disease     Endocrinology-keep euglycemic    Anxiety-stable on increased valium dose     Prognosis very guarded to poor at this time-follow-up oncology and palliative care team [address] discussions with patient and NOK           PHYSICAL EXAMINATION:  Visit Vitals  /69 (BP 1 Location: Right upper arm, BP Patient Position: At rest)   Pulse 61   Temp 98 °F (36.7 °C)   Resp 14   Ht 5' 3\" (1.6 m)   Wt 51.3 kg (113 lb 1.5 oz)   SpO2 100%   BMI 20.03 kg/m²     Patient Vitals for the past 24 hrs:   Temp Pulse Resp BP SpO2   10/11/22 0746 -- -- -- -- 100 %   10/11/22 0400 98 °F (36.7 °C) 61 14 -- 99 %   10/11/22 0000 97.7 °F (36.5 °C) 64 14 109/69 100 %   10/10/22 2000 98 °F (36.7 °C) 75 27 -- 100 %   10/10/22 1910 -- 76 23 102/62 100 %   10/10/22 1600 97.8 °F (36.6 °C) 78 16 (!) 102/55 100 %   10/10/22 1500 -- 77 22 -- --   10/10/22 1450 -- (!) 101 (!) 31 -- 95 %   10/10/22 1440 -- 84 20 -- 96 %   10/10/22 1140 97.5 °F (36.4 °C) 75 19 (!) 99/58 99 %   10/10/22 0930 -- (!) 127 29 111/67 --        General:          Alert, cooperative, weak and chronically ill appearing  HEENT:           Atraumatic, MMM , O2 via NC           Neck:               Supple, symmetrical,  thyroid: non tender  Lungs:             decreased breath sounds bilaterally. No Wheezing or Rhonchi. No rales. Heart:              Regular  rhythm,  3/6 Systolic ejection  murmur    Abdomen:       Soft, non-tender. Not distended. Bowel sounds normal  Extremities:     No cyanosis. No clubbing,  +2 distal pulses, edema in all 4 ext sky upper ext  Skin:                Not pale. Not Jaundiced  No rashes   Psych:             anxious  Neurologic:      Alert, severe generalized weakness,      Labs:     Recent Labs     10/11/22  0508 10/10/22  0407   WBC 8.6 14.1*   HGB 9.8* 10.9*   HCT 30.6* 33.2*    325       Recent Labs     10/11/22  0508 10/10/22  0407 10/09/22  0358   * 136 135*   K 4.6 4.2 4.4    107 105   CO2 25 25 26   BUN 22* 21* 22*   CREA 0.61 0.70 0.71   * 97 99   CA 8.1* 8.6 8.3*   MG 1.9 1.5*  --        Recent Labs     10/11/22  0508 10/09/22  0358   ALT 50 69   * 160*   TBILI 0.5 0.4   TP 4.3* 4.8*   ALB 2.3* 2.4*   GLOB 2.0 2.4       No results for input(s): INR, PTP, APTT, INREXT, INREXT in the last 72 hours. No results for input(s): FE, TIBC, PSAT, FERR in the last 72 hours. Lab Results   Component Value Date/Time    Folate 27.1 (H) 08/22/2022 03:55 AM        No results for input(s): PH, PCO2, PO2 in the last 72 hours. No results for input(s): CPK, CKNDX, TROIQ in the last 72 hours.     No lab exists for component: CPKMB  Lab Results   Component Value Date/Time    Cholesterol, total 141 08/22/2022 03:55 AM    HDL Cholesterol 57 08/22/2022 03:55 AM    LDL, calculated 71.6 08/22/2022 03:55 AM    Triglyceride 62 08/22/2022 03:55 AM    CHOL/HDL Ratio 2.5 08/22/2022 03:55 AM     Lab Results   Component Value Date/Time    Glucose (POC) 145 (H) 02/14/2022 09:01 PM     Lab Results   Component Value Date/Time    Color YELLOW/STRAW 10/03/2022 04:57 PM    Appearance CLEAR 10/03/2022 04:57 PM    Specific gravity 1.008 10/03/2022 04:57 PM    Specific gravity 1.010 09/28/2022 10:37 AM    pH (UA) 6.5 10/03/2022 04:57 PM    Protein Negative 10/03/2022 04:57 PM    Glucose Negative 10/03/2022 04:57 PM    Ketone Negative 10/03/2022 04:57 PM    Bilirubin Negative 10/03/2022 04:57 PM    Urobilinogen 0.2 10/03/2022 04:57 PM    Nitrites Negative 10/03/2022 04:57 PM    Leukocyte Esterase Negative 10/03/2022 04:57 PM    Epithelial cells FEW 10/03/2022 04:57 PM    Bacteria Negative 10/03/2022 04:57 PM    WBC 0-4 10/03/2022 04:57 PM    RBC 0-5 10/03/2022 04:57 PM       Current Facility-Administered Medications:     ipratropium (ATROVENT) 0.02 % nebulizer solution, , , ,     balsam peru-castor oiL (VENELEX) ointment, , Topical, TID, Darrell Carpenter MD, Given at 10/10/22 2116    dextrose 5 % - 0.45% NaCl infusion, 50 mL/hr, IntraVENous, CONTINUOUS, Darrell Carpenter MD, Last Rate: 50 mL/hr at 10/10/22 1201, 50 mL/hr at 10/10/22 1201    digoxin (LANOXIN) tablet 0.0625 mg, 0.0625 mg, Oral, DAILY, Katharina Mercado., NP, 0.0625 mg at 10/10/22 1149    ondansetron (ZOFRAN ODT) tablet 4 mg, 4 mg, Oral, Q8H PRN **OR** ondansetron (ZOFRAN) injection 4 mg, 4 mg, IntraVENous, Q4H PRN, Darrell Carpenter MD    simethicone (MYLICON) tablet 80 mg, 80 mg, Oral, QID PRN, Darrell Carpenter MD, 80 mg at 10/08/22 0323    butalbital-acetaminophen-caffeine (FIORICET, ESGIC) -40 mg per tablet 2 Tablet, 2 Tablet, Oral, Q6H PRN, Shade BOSCH, NP, 2 Tablet at 10/11/22 0230    metoprolol tartrate (LOPRESSOR) tablet 25 mg, 25 mg, Oral, Q12H, Katharina Mercado, NP, 25 mg at 10/10/22 2117 amiodarone (CORDARONE) tablet 400 mg, 400 mg, Oral, BID, Niranjan De Los Santos MD, 400 mg at 10/10/22 1705    traZODone (DESYREL) tablet 50 mg, 50 mg, Oral, QHS, Niranjan De Los Santos MD, 50 mg at 10/10/22 2118    meropenem (MERREM) 1 g in 0.9% sodium chloride (MBP/ADV) 50 mL MBP, 1 g, IntraVENous, Q8H, Lita Mckeon MD, Last Rate: 16.7 mL/hr at 10/11/22 0425, 1 g at 10/11/22 0425    diazePAM (VALIUM) tablet 5 mg, 5 mg, Oral, Q6H PRN, Saad BOSCH NP, 5 mg at 10/10/22 1847    HYDROmorphone (DILAUDID) injection 0.2 mg, 0.2 mg, IntraVENous, Q3H PRN, Saad BOSCH NP, 0.2 mg at 10/10/22 2117    lidocaine 4 % patch 1 Patch, 1 Patch, TransDERmal, Q24H, Lita Mckeon MD, 1 Patch at 10/10/22 1705    alteplase (CATHFLO) 1 mg in sterile water (preservative free) 1 mL injection, 1 mg, InterCATHeter, PRN, Malissa Ceja MD    therapeutic multivitamin (THERAGRAN) tablet 1 Tablet, 1 Tablet, Oral, DAILY, aMlissa BOSCH MD, 1 Tablet at 10/10/22 0945    polyethylene glycol (MIRALAX) packet 17 g, 17 g, Oral, BID, Saad BOSCH NP, 17 g at 10/10/22 0950    magnesium hydroxide (MILK OF MAGNESIA) 400 mg/5 mL oral suspension 30 mL, 30 mL, Oral, DAILY PRN, Saad BOSCH NP    predniSONE (DELTASONE) tablet 40 mg, 40 mg, Oral, DAILY WITH BREAKFAST, Niranjan De Los Santos MD, 40 mg at 10/10/22 1148    phenol throat spray (CHLORASEPTIC) 1 Spray, 1 Spray, Oral, PRN, Rosana Flores NP    senna-docusate (PERICOLACE) 8.6-50 mg per tablet 2 Tablet, 2 Tablet, Oral, DAILY, Saad Jackson B, NP, 2 Tablet at 10/10/22 0948    ipratropium (ATROVENT) 0.02 % nebulizer solution 0.5 mg, 0.5 mg, Nebulization, Q6H RT, Niranjan De Los Santos MD, 0.5 mg at 10/11/22 0746    midodrine (PROAMATINE) tablet 20 mg, 20 mg, Oral, TID WITH MEALS, Niranjan De Los Santos MD, 20 mg at 10/10/22 1705    Vancomycin - Pharmacy dosing, , Other, Rx Dosing/Monitoring, Roxi He MD    vancomycin (VANCOCIN) 500 mg in 0.9% sodium chloride (MBP/ADV) 100 mL MBP, 500 mg, IntraVENous, Q12H, Roxi Restrepo MD, Last Rate: 100 mL/hr at 10/11/22 0529, 500 mg at 10/11/22 0529    thiamine HCL (B-1) tablet 100 mg, 100 mg, Oral, DAILY, Roxi Restrepo MD, 100 mg at 10/10/22 0944    0.9% sodium chloride infusion 250 mL, 250 mL, IntraVENous, PRN, Roxi Restrepo MD    albuterol (PROVENTIL VENTOLIN) nebulizer solution 2.5 mg, 2.5 mg, Nebulization, Q6H PRN, Roxi Restrepo MD, 2.5 mg at 10/06/22 1810    sodium chloride (NS) flush 5-40 mL, 5-40 mL, IntraVENous, Q8H, KHANG Velásquez MD, 10 mL at 10/10/22 2121    sodium chloride (NS) flush 5-40 mL, 5-40 mL, IntraVENous, PRN, KODY VelásquezYDKURTIS ALICEA MD    acetaminophen (TYLENOL) tablet 650 mg, 650 mg, Oral, Q6H PRN, 650 mg at 10/02/22 2127 **OR** acetaminophen (TYLENOL) suppository 650 mg, 650 mg, Rectal, Q6H PRN, Christen UZWTNRR MD NIXON    pantoprazole (PROTONIX) 40 mg in 0.9% sodium chloride 10 mL injection, 40 mg, IntraVENous, DAILY, MAURICE Velásquez MD, 40 mg at 10/10/22 0951    prochlorperazine (COMPAZINE) injection 10 mg, 10 mg, IntraVENous, Q6H PRN, Arabella Velásquez MD, 10 mg at 10/08/22 2119     CODE STATUS:   Full Code   X DNR    Partial    Comfort Care       Signed:   Priscila Mayer MD  Date of Service:  10/11/2022

## 2022-10-11 NOTE — PROGRESS NOTES
Infectious Disease Clinic Note        HPI:   Ms Jay Ahumada seen  Says she has some back pain   Otherwise not very interactive       Medications:  No current facility-administered medications on file prior to encounter. Current Outpatient Medications on File Prior to Encounter   Medication Sig Dispense Refill    dicyclomine (BENTYL) 10 mg capsule Take 1 Capsule by mouth four (4) times daily. 1 Capsule 0    ondansetron (ZOFRAN ODT) 4 mg disintegrating tablet Take 1 Tablet by mouth every eight (8) hours as needed for Nausea or Vomiting. 1 Tablet 0    polyethylene glycol (MIRALAX) 17 gram packet Take 1 Packet by mouth every twelve (12) hours. 1 Each 0    simethicone (MYLICON) 80 mg chewable tablet Take 1 Tablet by mouth four (4) times daily as needed for GI Pain. 2 Tablet 0    lubiPROStone (AMITIZA) 24 mcg capsule Take 1 Capsule by mouth two (2) times daily (with meals) for 30 days. Recommended by GI service 10 Capsule 0    butalbital-acetaminophen-caffeine (FIORICET, ESGIC) -40 mg per tablet Take 2 Tablets by mouth two (2) times daily as needed for Headache or Migraine. 6 Tablet 0    digoxin (LANOXIN) 0.125 mg tablet Take 0.125 mg by mouth daily. metoprolol tartrate (LOPRESSOR) 25 mg tablet Take 12.5 mg by mouth two (2) times a day. cyclobenzaprine (FLEXERIL) 10 mg tablet Take 10 mg by mouth three (3) times daily as needed. aspirin 81 mg chewable tablet Take 81 mg by mouth daily. multivitamin (ONE A DAY) tablet Take 1 Tablet by mouth daily. acetaminophen (TYLENOL) 325 mg tablet Take 650 mg by mouth every four (4) hours as needed for Pain. albuterol (PROVENTIL HFA, VENTOLIN HFA, PROAIR HFA) 90 mcg/actuation inhaler Take 2 Puffs by inhalation every six (6) hours as needed. pantoprazole (PROTONIX) 40 mg tablet Take 40 mg by mouth daily.              Physical Exam:    Vitals: Patient Vitals for the past 24 hrs:   Temp Pulse Resp BP SpO2   10/11/22 1200 97.1 °F (36.2 °C) 66 21 (!) 104/59 97 %   10/11/22 0800 98.6 °F (37 °C) 72 22 (!) 97/44 98 %   10/11/22 0746 -- -- -- -- 100 %   10/11/22 0400 98 °F (36.7 °C) 61 14 -- 99 %   10/11/22 0000 97.7 °F (36.5 °C) 64 14 109/69 100 %   10/10/22 2000 98 °F (36.7 °C) 75 27 -- 100 %   10/10/22 1910 -- 76 23 102/62 100 %   10/10/22 1600 97.8 °F (36.6 °C) 78 16 (!) 102/55 100 %   10/10/22 1500 -- 77 22 -- --   10/10/22 1450 -- (!) 101 (!) 31 -- 95 %   10/10/22 1440 -- 84 20 -- 96 %     GEN: NAD,   CV S1 2 reg   Lungs: Nonlabored  Abdomen: soft,   Extremities: no edema  Neuro:awake alert to self, minimally verbal   Skin: no rash on face  Back unable to fully assess but on limited exam, no erythema or tenderness       Labs:   Recent Results (from the past 24 hour(s))   HEPATIC FUNCTION PANEL    Collection Time: 10/11/22  5:08 AM   Result Value Ref Range    Protein, total 4.3 (L) 6.4 - 8.2 g/dL    Albumin 2.3 (L) 3.5 - 5.0 g/dL    Globulin 2.0 2.0 - 4.0 g/dL    A-G Ratio 1.2 1.1 - 2.2      Bilirubin, total 0.5 0.2 - 1.0 MG/DL    Bilirubin, direct 0.2 0.0 - 0.2 MG/DL    Alk. phosphatase 162 (H) 45 - 117 U/L    AST (SGOT) 36 15 - 37 U/L    ALT (SGPT) 50 12 - 78 U/L   DIGOXIN    Collection Time: 10/11/22  5:08 AM   Result Value Ref Range    Digoxin level 1.8 0.90 - 2.00 NG/ML   CBC WITH AUTOMATED DIFF    Collection Time: 10/11/22  5:08 AM   Result Value Ref Range    WBC 8.6 3.6 - 11.0 K/uL    RBC 3.58 (L) 3.80 - 5.20 M/uL    HGB 9.8 (L) 11.5 - 16.0 g/dL    HCT 30.6 (L) 35.0 - 47.0 %    MCV 85.5 80.0 - 99.0 FL    MCH 27.4 26.0 - 34.0 PG    MCHC 32.0 30.0 - 36.5 g/dL    RDW 23.4 (H) 11.5 - 14.5 %    PLATELET 727 715 - 269 K/uL    MPV 10.0 8.9 - 12.9 FL    NRBC 0.3 (H) 0  WBC    ABSOLUTE NRBC 0.03 (H) 0.00 - 0.01 K/uL    NEUTROPHILS 86 (H) 32 - 75 %    LYMPHOCYTES 5 (L) 12 - 49 %    MONOCYTES 8 5 - 13 %    EOSINOPHILS 0 0 - 7 %    BASOPHILS 0 0 - 1 %    IMMATURE GRANULOCYTES 1 (H) 0.0 - 0.5 %    ABS. NEUTROPHILS 7.4 1.8 - 8.0 K/UL    ABS.  LYMPHOCYTES 0.4 (L) 0.8 - 3.5 K/UL    ABS. MONOCYTES 0.7 0.0 - 1.0 K/UL    ABS. EOSINOPHILS 0.0 0.0 - 0.4 K/UL    ABS. BASOPHILS 0.0 0.0 - 0.1 K/UL    ABS. IMM.  GRANS. 0.1 (H) 0.00 - 0.04 K/UL    DF SMEAR SCANNED      RBC COMMENTS ANISOCYTOSIS  3+        RBC COMMENTS ANA CELLS  PRESENT       METABOLIC PANEL, BASIC    Collection Time: 10/11/22  5:08 AM   Result Value Ref Range    Sodium 135 (L) 136 - 145 mmol/L    Potassium 4.6 3.5 - 5.1 mmol/L    Chloride 106 97 - 108 mmol/L    CO2 25 21 - 32 mmol/L    Anion gap 4 (L) 5 - 15 mmol/L    Glucose 132 (H) 65 - 100 mg/dL    BUN 22 (H) 6 - 20 MG/DL    Creatinine 0.61 0.55 - 1.02 MG/DL    BUN/Creatinine ratio 36 (H) 12 - 20      eGFR >60 >60 ml/min/1.73m2    Calcium 8.1 (L) 8.5 - 10.1 MG/DL   MAGNESIUM    Collection Time: 10/11/22  5:08 AM   Result Value Ref Range    Magnesium 1.9 1.6 - 2.4 mg/dL       Microbiology Data:       Blood: negative        Urine:  Contains abnormal data CULTURE, URINE  Order: [id number]  Collected 9/28/2022 10:37    Status: Final result    Specimen Information: Clean catch; Urine   0 Result Notes  Component Ref Range & Units 9/28/22 1037    Special Requests:   NO SPECIAL REQUESTS    [place] Count   >100,000   COLONIES/mL    Culture result:   ESCHERICHIA COLI Abnormal     Resulting Agency  [address]        Susceptibility     Escherichia coli     JOBY     Amikacin ($) Susceptible     Ampicillin ($) Intermediate     Ampicillin/sulbactam ($) Susceptible     Cefazolin ($) Susceptible     Cefepime ($$) Susceptible     Cefoxitin Susceptible     Ceftazidime ($) Susceptible     Ceftriaxone ($) Susceptible     Ciprofloxacin ($) Susceptible     Gentamicin ($) Susceptible     Levofloxacin ($) Susceptible     Meropenem ($$) Susceptible     Nitrofurantoin Susceptible     Piperacillin/Tazobac ($) Susceptible     Tobramycin ($) Susceptible                          Imaging:     CT ABD PELV W CONT: Patient Communication     Add Comments   Not seen     Study Result    Narrative & Impression   EXAM: CT ABD PELV W CONT     INDICATION: Diffuse abdominal pain and diarrhea. Rectal constipation earlier  this month. Nonspecific liver disease on previous imaging. Biliary dilatation on  previous imaging. Elevated alkaline phosphatase. Normal white blood cell count,  bilirubin, lipase. COMPARISON: CT abdomen/pelvis on 9/12/2022. CONTRAST: 100 mL of Isovue-370. ORAL CONTRAST: None     TECHNIQUE:   Following the uneventful intravenous administration of contrast, thin axial  images were obtained through the abdomen and pelvis. Coronal and sagittal  reconstructions were generated. CT dose reduction was achieved through use of a  standardized protocol tailored for this examination and automatic exposure  control for dose modulation. FINDINGS:   LOWER THORAX: Small right pleural effusion is new. No basilar pneumonia. Normal  cardiac size. LIVER: Segment 7/8 subcapsular collection of heterogeneous fluid measures 9.3 x  6.9 x 3.5 cm. Heterogeneous septated fluid attenuation throughout the right  hepatic lobe measures approximately 13 cm in oblique AP diameter. Central right  hepatic lobe peripherally enhancing lesion previously measured 1.9 cm and now  measures 2.4 cm. Unchanged mild intrahepatic biliary dilatation. BILIARY TREE: Cholecystectomy. Unchanged chronic biliary dilatation. No abrupt  termination. SPLEEN: Normal size. Lobulated contour. PANCREAS: Mild diffuse atrophy. No mass or inflammation. ADRENALS: Unremarkable. KIDNEYS: No hydronephrosis. Heterogeneous small multiple cysts. STOMACH: Unremarkable. SMALL BOWEL: No dilatation or wall thickening. COLON: Incomplete distention, limited evaluation. Fluid-filled cecum is in the  pelvis. APPENDIX: Normal.  PERITONEUM: No ascites or pneumoperitoneum. RETROPERITONEUM: Aortic atherosclerosis without aneurysm. 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 No lymphadenopathy.   REPRODUCTIVE ORGANS: Hysterectomy. URINARY BLADDER: Incomplete distention, limited evaluation. BONES: Osteopenia. Femoral hardware. Spinal curvature. T11 partial compression  fracture. ABDOMINAL WALL: No mass or hernia. ADDITIONAL COMMENTS: Diffuse muscle atrophy. IMPRESSION     1. Progression of hepatic lesions and new large fluid collections in the liver. Intrahepatic abscesses from nonspecific infection are most likely. Consider  fluid sampling or drainage with CT guidance. 2. New small right pleural effusion. 3. Fluid-filled cecum may indicate infectious colitis. No bowel obstruction. Assessment / Plan:       1) Hepatic collections:     of liver biopsy on 9/15 + for malignant cells. The liver collections are significant and would expect to see a significant hbg drop if purely bleeding alone per radiologist  Given liver biopsy history , immune compromised if not at least immunomodulated state, at risk for superimposed infection even if there is hematoma   Would be difficult to know for sure if hematoma vs abscess alone/combination  with certainty at this point   Primary malignancy  possibly colon per oncology   On Vancomycin and Meropenem IV empirically   Wbc improving  For CT today. Based on goals of care/CT findings, recommended  drainage if any amenable collections to drainage. Ms Guilherme Renteria looks very frail and appreciate palliative's involvement  If clinical worsening in interim, start anidulafungin IV       2) copd  3) metastatic adenocarcinoma, liver involvement   4) tubular adenoma colon   5) seizure hx per chart             Thank for the opportunity to participate in the care of this patient. Please contact with questions or concerns.        Alexsandra Clark,   1:27 PM

## 2022-10-11 NOTE — PROGRESS NOTES
1930: Bedside and Verbal shift change report given to [place] Carl (oncoming nurse) by Dutch Diaz (offgoing nurse). Report included the following information SBAR, Kardex, Recent Results, Med Rec Status, Cardiac Rhythm NSR, and Alarm Parameters . Shift Uneventful, patient stable overnight but tearful about possibilities of the future. 0730: Bedside and Verbal shift change report given to [facility] RND[facility] (oncoming nurse) by Hortencia Abebe RN (offgoing nurse). Report included the following information SBAR, Kardex, Recent Results, Med Rec Status, Cardiac Rhythm NSR, and Alarm Parameters .

## 2022-10-11 NOTE — PROGRESS NOTES
Problem: Mobility Impaired (Adult and Pediatric)  Goal: *Acute Goals and Plan of Care (Insert Text)  Description: FUNCTIONAL STATUS PRIOR TO ADMISSION: Patient was modified independent using a rollator and single point cane for functional mobility PRN. Primarily ambulating throughout her home without device (anticipate furniture surfing). Denies falls. Progressive decline since last admission. Home O2. HOME SUPPORT PRIOR TO ADMISSION: The patient lived with her son in law. Son is currently incarcerated. Physical Therapy Goals  Updated 10/11/2022 - goals downgraded  1. Patient will move from supine to sit and sit to supine , scoot up and down, and roll side to side in bed with moderate assistance within 7 day(s). 2.  Patient will transfer from bed to chair and chair to bed with moderate assistance using the least restrictive device within 7 day(s). 3.  Patient will perform sit to stand with moderate assistance within 7 day(s). 4.  Patient will ambulate with moderate assistance for 20 feet with the least restrictive device within 7 day(s). Physical Therapy Goals  Initiated 10/4/2022  1. Patient will move from supine to sit and sit to supine , scoot up and down, and roll side to side in bed with modified independence within 7 day(s). 2.  Patient will transfer from bed to chair and chair to bed with supervision/set-up using the least restrictive device within 7 day(s). 3.  Patient will perform sit to stand with supervision/set-up within 7 day(s). 4.  Patient will ambulate with minimal assistance for 20 feet with the least restrictive device within 7 day(s).        Outcome: Not Progressing Towards Goal   PHYSICAL THERAPY TREATMENT: WEEKLY REASSESSMENT  Patient: Hillary Giron (50 y.o. female)  Date: 10/11/2022  Primary Diagnosis: Liver abscess [K75.0]  Abdominal pain [R10.9]  Adenocarcinoma ([address] [C80.1]  Procedure(s) (LRB):  INFUSION CATHETER INSERTION (N/A) 8 Days Post-Op   Precautions: Fall      ASSESSMENT  Patient continues with skilled PT services and is not progressing towards goals. Patient found supine in bed, initially declining participation in PT but eventually agreeable to bed level exercise with encouragement. Patient completed LE exercises with minimal AAROM Provided as needed. Despite further encouragement patient not agreeable to mobilizing to EOB or repositioning in bed for pressure relief. Patient became tearful stating she wanted to see her son (see CM note). Patient provided with reassurance and instructed to complete LE exercises throughout the day as able. RN made aware of patient status. Patient's progression toward goals since last assessment: patient not progressing toward goals; all goals downgraded    Current Level of Function Impacting Discharge (mobility/balance): max A x 2 all mobility     Functional Outcome Measure: The patient scored 0/28 on the tinetti outcome measure which is indicative of high risk for falls. Other factors to consider for discharge: limited motivation to participate          PLAN :  Goals have been updated based on progression since last assessment. Patient continues to benefit from skilled intervention to address the above impairments. Recommendations and Planned Interventions: bed mobility training, transfer training, gait training, therapeutic exercises, neuromuscular re-education, patient and family training/education, and therapeutic activities      Frequency/Duration: Patient will be followed by physical therapy:  3 times a week to address goals.     Recommendation for discharge: (in order for the patient to meet his/her long term goals)  Therapy up to 5 days/week in SNF setting    This discharge recommendation:  A follow-up discussion with the attending provider and/or case management is planned    IF patient discharges home will need the following DME: to be determined (TBD)         SUBJECTIVE:   Patient stated I want to see my son before I die.     OBJECTIVE DATA SUMMARY:   HISTORY:    Past Medical History:   Diagnosis Date    Adenoma of colon     Anemia     Arthritis     Atrial fibrillation (HCC)     Bloating     Chronic constipation     Chronic pain     back     COPD (chronic obstructive pulmonary disease) (HCC)     GERD (gastroesophageal reflux disease)     [place] (hard of hearing)     Hyponatremia     caused seizures x 2 per pt    Indigestion     Osteoporosis     Seizures ([address] 7/2020, 9/2020    x 2      Past Surgical History:   Procedure Laterality Date    COLONOSCOPY N/A 12/16/2021    COLONOSCOPY   :- performed by Deni Busby MD at [address] [place] [address] N/A 2/14/2022    . performed by Colletta Bride, MD at [facility] MAIN OR    [address] [facility]    HX CHOLECYSTECTOMY  1982    HX HIP REPLACEMENT Left     pt stated hip was pinned, not replaced    HX HIP REPLACEMENT Right     pt stated hip was pinned, not replaced    HX HYSTERECTOMY      HX ORTHOPAEDIC  1990, 2011    bilateral hip fractures     HX ORTHOPAEDIC Left 2020    femur fracture    HX TONSILLECTOMY         Personal factors and/or comorbidities impacting plan of care: malignant cancer     Home Situation  Home Environment: Private residence  # Steps to Enter: 3  One/Two Story Residence: One story  Living Alone: No  Support Systems: Other Family Member(s)  Patient Expects to be Discharged to[de-identified] Other:  Current DME Used/Available at Home: [address], rollator  Tub or Shower Type: Shower    EXAMINATION/PRESENTATION/DECISION MAKING:   Critical Behavior:  Neurologic State: Alert, Drowsy  Orientation Level: Oriented X4  Cognition: Decreased command following, Decreased attention/concentration  Safety/Judgement: Decreased awareness of need for safety, Decreased awareness of need for assistance, Decreased insight into deficits  Hearing:   Auditory  Auditory Impairment: None  Hearing Aids/Status: With patient  Skin:  intact  Edema: none noted  Range Of Motion:  AROM: Generally decreased, functional           PROM: Generally decreased, functional           Strength:    Strength: Grossly decreased, non-functional                    Tone & Sensation:   Tone: Normal              Sensation: Intact               Coordination:  Coordination: Generally decreased, functional  Vision:      Functional Mobility:  Bed Mobility:    Scooting: Maximum assistance;Assist x2 (to [facility])    Therapeutic Exercises:   2 x 5 reps AAROM SAQ, heel slides, ankle pumps bilaterally     Functional Measure:  Tinetti test:    Sitting Balance: 0  Arises: 0  Attempts to Rise: 0  Immediate Standing Balance: 0  Standing Balance: 0  Nudged: 0  Eyes Closed: 0  Turn 360 Degrees - Continuous/Discontinuous: 0  Turn 360 Degrees - Steady/Unsteady: 0  Sitting Down: 0  Balance Score: 0 Balance total score  Indication of Gait: 0  R Step Length/Height: 0  L Step Length/Height: 0  R Foot Clearance: 0  L Foot Clearance: 0  Step Symmetry: 0  Step Continuity: 0  Path: 0  Trunk: 0  Walking Time: 0  Gait Score: 0 Gait total score  Total Score: 0/28 Overall total score         Tinetti Tool Score Risk of Falls  <19 = High Fall Risk  19-24 = Moderate Fall Risk  25-28 = Low Fall Risk  Tinetti ME. Performance-Oriented Assessment of Mobility Problems in Elderly Patients. [address]; [id number].  (Scoring Description: PT Bulletin Feb. 10, 1993)    Older adults: Stephanie Eller et al, 2009; n = [address] [facility] elderly evaluated with ABC, NELSON, ADL, and IADL)  · Mean NELSON score for males aged 69-68 years = 26.21(3.40)  · Mean NELSON score for females age 69-68 years = 25.16(4.30)  · Mean NELSON score for males over 80 years = 23.29(6.02)  · Mean NELSON score for females over 80 years = 17.20(8.32)           Pain Rating:  Patient stated she was in pain but provided no further information- RN aware     Activity Tolerance:   Poor and limited motivation to participate     After treatment patient left in no apparent distress:   Supine in bed and Call bell within reach    COMMUNICATION/EDUCATION:   The patients plan of care was discussed with: Registered nurse. Fall prevention education was provided and the patient/caregiver indicated understanding.     Thank you for this referral.  Richa Samayoa, PT   Time Calculation: 9 mins

## 2022-10-11 NOTE — PROGRESS NOTES
Transitions of Care Plan  RUR: 25% - high  Clinical Update: IV abx; metastatic liver cx  Consults: Palliative; Therapy; ID  Baseline: open with [facility]; prev at [facility]  Barriers to Discharge: goals of care  Disposition: Anticipate hospice - home vs [facility] vs [facility]   Estimated Discharge Date: 2+ days    Chart reviewed for clinical update - patient with metastatic cancer on IV antibiotics; appropriate for comfort measures. Palliative Medicine working with patient on trying to have her son visit her before making the decision to go comfort. Son currently incarcerated - see Palliative note. Motion with the court expected today for decision. Patient on IV abx and supplemental oxygen - once comfort will need to assess for [facility] or [facility] - if not an option will have to work with son in law on support for home hospice. CM will continue to follow.     Linsey Lauren, MPH  Care Manager [facility]  Available via [address] [place] [address] or

## 2022-10-12 LAB
ANION GAP SERPL CALC-SCNC: 3 MMOL/L (ref 5–15)
BASOPHILS # BLD: 0 K/UL (ref 0–0.1)
BASOPHILS NFR BLD: 0 % (ref 0–1)
BUN SERPL-MCNC: 18 MG/DL (ref 6–20)
BUN/CREAT SERPL: 27 (ref 12–20)
CALCIUM SERPL-MCNC: 8 MG/DL (ref 8.5–10.1)
CHLORIDE SERPL-SCNC: 106 MMOL/L (ref 97–108)
CO2 SERPL-SCNC: 26 MMOL/L (ref 21–32)
CREAT SERPL-MCNC: 0.67 MG/DL (ref 0.55–1.02)
DIFFERENTIAL METHOD BLD: ABNORMAL
DIGOXIN SERPL-MCNC: 1.5 NG/ML (ref 0.9–2)
EOSINOPHIL # BLD: 0 K/UL (ref 0–0.4)
EOSINOPHIL NFR BLD: 0 % (ref 0–7)
ERYTHROCYTE [DISTWIDTH] IN BLOOD BY AUTOMATED COUNT: 23.3 % (ref 11.5–14.5)
GLUCOSE SERPL-MCNC: 120 MG/DL (ref 65–100)
HCT VFR BLD AUTO: 31 % (ref 35–47)
HGB BLD-MCNC: 10.2 G/DL (ref 11.5–16)
IMM GRANULOCYTES # BLD AUTO: 0.1 K/UL (ref 0–0.04)
IMM GRANULOCYTES NFR BLD AUTO: 1 % (ref 0–0.5)
LYMPHOCYTES # BLD: 0.4 K/UL (ref 0.8–3.5)
LYMPHOCYTES NFR BLD: 4 % (ref 12–49)
MCH RBC QN AUTO: 27.3 PG (ref 26–34)
MCHC RBC AUTO-ENTMCNC: 32.9 G/DL (ref 30–36.5)
MCV RBC AUTO: 82.9 FL (ref 80–99)
MONOCYTES # BLD: 0.6 K/UL (ref 0–1)
MONOCYTES NFR BLD: 6 % (ref 5–13)
NEUTS SEG # BLD: 8.5 K/UL (ref 1.8–8)
NEUTS SEG NFR BLD: 89 % (ref 32–75)
NRBC # BLD: 0.05 K/UL (ref 0–0.01)
NRBC BLD-RTO: 0.5 PER 100 WBC
PLATELET # BLD AUTO: 288 K/UL (ref 150–400)
PLATELET COMMENTS,PCOM: ABNORMAL
PMV BLD AUTO: 9.7 FL (ref 8.9–12.9)
POTASSIUM SERPL-SCNC: 3.8 MMOL/L (ref 3.5–5.1)
RBC # BLD AUTO: 3.74 M/UL (ref 3.8–5.2)
RBC MORPH BLD: ABNORMAL
SODIUM SERPL-SCNC: 135 MMOL/L (ref 136–145)
WBC # BLD AUTO: 9.6 K/UL (ref 3.6–11)

## 2022-10-12 PROCEDURE — 74011000250 HC RX REV CODE- 250: Performed by: HOSPITALIST

## 2022-10-12 PROCEDURE — 74011250636 HC RX REV CODE- 250/636: Performed by: NURSE PRACTITIONER

## 2022-10-12 PROCEDURE — 74011250637 HC RX REV CODE- 250/637: Performed by: NURSE PRACTITIONER

## 2022-10-12 PROCEDURE — 74011250636 HC RX REV CODE- 250/636: Performed by: HOSPITALIST

## 2022-10-12 PROCEDURE — 74011636637 HC RX REV CODE- 636/637: Performed by: EMERGENCY MEDICINE

## 2022-10-12 PROCEDURE — 74011250637 HC RX REV CODE- 250/637: Performed by: EMERGENCY MEDICINE

## 2022-10-12 PROCEDURE — 51798 US URINE CAPACITY MEASURE: CPT

## 2022-10-12 PROCEDURE — 80048 BASIC METABOLIC PNL TOTAL CA: CPT

## 2022-10-12 PROCEDURE — 85025 COMPLETE CBC W/AUTO DIFF WBC: CPT

## 2022-10-12 PROCEDURE — 74011000250 HC RX REV CODE- 250: Performed by: EMERGENCY MEDICINE

## 2022-10-12 PROCEDURE — 80162 ASSAY OF DIGOXIN TOTAL: CPT

## 2022-10-12 PROCEDURE — 74011250636 HC RX REV CODE- 250/636: Performed by: STUDENT IN AN ORGANIZED HEALTH CARE EDUCATION/TRAINING PROGRAM

## 2022-10-12 PROCEDURE — 74011000250 HC RX REV CODE- 250: Performed by: FAMILY MEDICINE

## 2022-10-12 PROCEDURE — 74011000258 HC RX REV CODE- 258: Performed by: STUDENT IN AN ORGANIZED HEALTH CARE EDUCATION/TRAINING PROGRAM

## 2022-10-12 PROCEDURE — 36415 COLL VENOUS BLD VENIPUNCTURE: CPT

## 2022-10-12 PROCEDURE — 74011250637 HC RX REV CODE- 250/637: Performed by: STUDENT IN AN ORGANIZED HEALTH CARE EDUCATION/TRAINING PROGRAM

## 2022-10-12 PROCEDURE — 74011250637 HC RX REV CODE- 250/637: Performed by: FAMILY MEDICINE

## 2022-10-12 PROCEDURE — C9113 INJ PANTOPRAZOLE SODIUM, VIA: HCPCS | Performed by: HOSPITALIST

## 2022-10-12 PROCEDURE — 74011000258 HC RX REV CODE- 258: Performed by: FAMILY MEDICINE

## 2022-10-12 PROCEDURE — 77010033678 HC OXYGEN DAILY

## 2022-10-12 PROCEDURE — 99232 SBSQ HOSP IP/OBS MODERATE 35: CPT | Performed by: INTERNAL MEDICINE

## 2022-10-12 PROCEDURE — 74011250636 HC RX REV CODE- 250/636: Performed by: INTERNAL MEDICINE

## 2022-10-12 PROCEDURE — 97535 SELF CARE MNGMENT TRAINING: CPT

## 2022-10-12 PROCEDURE — 94640 AIRWAY INHALATION TREATMENT: CPT

## 2022-10-12 PROCEDURE — 74011250636 HC RX REV CODE- 250/636: Performed by: FAMILY MEDICINE

## 2022-10-12 PROCEDURE — 74011000258 HC RX REV CODE- 258: Performed by: INTERNAL MEDICINE

## 2022-10-12 PROCEDURE — 65270000046 HC RM TELEMETRY

## 2022-10-12 PROCEDURE — 94664 DEMO&/EVAL PT USE INHALER: CPT

## 2022-10-12 RX ORDER — IPRATROPIUM BROMIDE AND ALBUTEROL SULFATE 2.5; .5 MG/3ML; MG/3ML
3 SOLUTION RESPIRATORY (INHALATION)
Status: DISCONTINUED | OUTPATIENT
Start: 2022-10-12 | End: 2022-10-20 | Stop reason: HOSPADM

## 2022-10-12 RX ADMIN — MEROPENEM 1 G: 1 INJECTION, POWDER, FOR SOLUTION INTRAVENOUS at 12:30

## 2022-10-12 RX ADMIN — HYDROMORPHONE HYDROCHLORIDE 0.2 MG: 1 INJECTION, SOLUTION INTRAMUSCULAR; INTRAVENOUS; SUBCUTANEOUS at 03:07

## 2022-10-12 RX ADMIN — IPRATROPIUM BROMIDE 0.5 MG: 0.5 SOLUTION RESPIRATORY (INHALATION) at 08:11

## 2022-10-12 RX ADMIN — PANTOPRAZOLE SODIUM 40 MG: 40 INJECTION, POWDER, FOR SOLUTION INTRAVENOUS at 12:30

## 2022-10-12 RX ADMIN — Medication: at 15:24

## 2022-10-12 RX ADMIN — METOPROLOL TARTRATE 25 MG: 25 TABLET, FILM COATED ORAL at 21:06

## 2022-10-12 RX ADMIN — BUTALBITAL, ACETAMINOPHEN, AND CAFFEINE 2 TABLET: 50; 325; 40 TABLET ORAL at 02:38

## 2022-10-12 RX ADMIN — SODIUM CHLORIDE, PRESERVATIVE FREE 10 ML: 5 INJECTION INTRAVENOUS at 21:14

## 2022-10-12 RX ADMIN — TRAZODONE HYDROCHLORIDE 50 MG: 50 TABLET ORAL at 21:06

## 2022-10-12 RX ADMIN — MIDODRINE HYDROCHLORIDE 20 MG: 5 TABLET ORAL at 12:30

## 2022-10-12 RX ADMIN — VANCOMYCIN HYDROCHLORIDE 500 MG: 500 INJECTION, POWDER, LYOPHILIZED, FOR SOLUTION INTRAVENOUS at 06:14

## 2022-10-12 RX ADMIN — VANCOMYCIN HYDROCHLORIDE 500 MG: 500 INJECTION, POWDER, LYOPHILIZED, FOR SOLUTION INTRAVENOUS at 18:01

## 2022-10-12 RX ADMIN — MEROPENEM 1 G: 1 INJECTION, POWDER, FOR SOLUTION INTRAVENOUS at 03:56

## 2022-10-12 RX ADMIN — SODIUM CHLORIDE 200 MG: 9 INJECTION, SOLUTION INTRAVENOUS at 15:23

## 2022-10-12 RX ADMIN — PREDNISONE 40 MG: 20 TABLET ORAL at 12:31

## 2022-10-12 RX ADMIN — MIDODRINE HYDROCHLORIDE 20 MG: 5 TABLET ORAL at 18:01

## 2022-10-12 RX ADMIN — SODIUM CHLORIDE, PRESERVATIVE FREE 10 ML: 5 INJECTION INTRAVENOUS at 06:16

## 2022-10-12 RX ADMIN — IPRATROPIUM BROMIDE 0.5 MG: 0.5 SOLUTION RESPIRATORY (INHALATION) at 22:06

## 2022-10-12 RX ADMIN — SODIUM CHLORIDE, PRESERVATIVE FREE 10 ML: 5 INJECTION INTRAVENOUS at 14:00

## 2022-10-12 RX ADMIN — MEROPENEM 1 G: 1 INJECTION, POWDER, FOR SOLUTION INTRAVENOUS at 20:53

## 2022-10-12 RX ADMIN — DEXTROSE AND SODIUM CHLORIDE 50 ML/HR: 5; 450 INJECTION, SOLUTION INTRAVENOUS at 23:56

## 2022-10-12 RX ADMIN — Medication: at 21:22

## 2022-10-12 RX ADMIN — BUTALBITAL, ACETAMINOPHEN, AND CAFFEINE 2 TABLET: 50; 325; 40 TABLET ORAL at 12:48

## 2022-10-12 RX ADMIN — Medication 100 MG: at 12:33

## 2022-10-12 RX ADMIN — THERA TABS 1 TABLET: TAB at 12:33

## 2022-10-12 RX ADMIN — SENNOSIDES AND DOCUSATE SODIUM 2 TABLET: 50; 8.6 TABLET ORAL at 12:33

## 2022-10-12 RX ADMIN — POLYETHYLENE GLYCOL 3350 17 G: 17 POWDER, FOR SOLUTION ORAL at 12:30

## 2022-10-12 RX ADMIN — POLYETHYLENE GLYCOL 3350 17 G: 17 POWDER, FOR SOLUTION ORAL at 18:01

## 2022-10-12 RX ADMIN — DIGOXIN 0.06 MG: 125 TABLET ORAL at 12:49

## 2022-10-12 RX ADMIN — IPRATROPIUM BROMIDE 0.5 MG: 0.5 SOLUTION RESPIRATORY (INHALATION) at 14:17

## 2022-10-12 NOTE — PROGRESS NOTES
1930:Bedside and Verbal shift change report given to Migel ward RN and Mikaela Alonzo (oncoming nurse) by Joseph Franco RN and Jimmie Márquez (offgoing nurse). Report included the following information SBAR, Procedure Summary, Intake/Output, MAR, Recent Results, Cardiac Rhythm NSR, and Alarm Parameters . 0215: Patient bladder scanned, maximum measured amount 345. Patient complains of feeling like she needs to void, given multiple previous straight caths, hatch placed per protocol. 9958: Bedside and Verbal shift change report given to IMMARIE RN (oncoming nurse) by Migel ward RN and Mikaela Alonzo (offgoing nurse). Report included the following information SBAR, Procedure Summary, Intake/Output, MAR, Recent Results, Cardiac Rhythm NSR, and Alarm Parameters .

## 2022-10-12 NOTE — PROGRESS NOTES
TRANSFER - IN REPORT:    26H Verbal report received from Daisy/Ashlyn (name) on Sharlene Memos  being received from CVICU (unit) for routine progression of care      Report consisted of patients Situation, Background, Assessment and   Recommendations(SBAR). Information from the following report(s) Kardex, Intake/Output, MAR, Med Rec Status, and Cardiac Rhythm NSR  was reviewed with the receiving nurse. Opportunity for questions and clarification was provided. 2359P Assessment completed upon patients arrival to unit and care assumed. 0800H    Bedside and Verbal shift change report given to Prem Cortez RN (oncoming nurse) by Steve Dotson RN (offgoing nurse). Report included the following information SBAR, ED Summary, Intake/Output, MAR, Med Rec Status, and Cardiac Rhythm NSR .     Johanna Samuel RN

## 2022-10-12 NOTE — PROGRESS NOTES
Physical Therapy    Reviewed chart and attempted to treat pt. Pt reporting back pain on arrival. Pt agreeable to positional change to assist with pain control. Pt declining EOB. Pt confused. Pt  was total assist for repositioning. Pt repeatedly asking for Fioricet. RN present and addressing.  Aborted further attempts of mobility

## 2022-10-12 NOTE — PROGRESS NOTES
JEREMIE:    RUR 24%    Disposition: Anticipate hospice.       Currently IVabx for comfort measures    Barriers to discharge: goals of care  Home vs [facility] vs [facility]  Discharge 2+days      Primary contact: Cherry Mc [phone]    Kamryn Park, RN/CRM  [phone]

## 2022-10-12 NOTE — PROGRESS NOTES
[address] [facility] Adult  Hospitalist Group      Brief HPI and Hospital Course:      Gilles Horn is a 77 y.o. female is brought to the ED via EMS for abdominal pain and nausea. She also reported watery diarrhea. Patient was recently hospitalized from 9/12-9/16 for abdominal pain and constipation and was found to have metastatic disease. She underwent liver biopsy which showed adenocarcinoma. Patient denied fever or chills. She denied dysuria, urgency or frequency. Work-up in the ED was significant for an abnormal abdominal pelvic CT that showed progression of hepatic lesions with a new large fluid collection in the liver with suggestion that this could be intrahepatic abscess. There are also fluid-filled cecum. Right pleural effusion. Patient was started on levofloxacin and Flagyl and was referred to the hospitalist service for admission evaluation.     Subjective:    Pt seen and examined- she was transferred out of the ICU to [facility] today  CT abd/pelvis from yesterday reviewed and consulted IR for CT guided drain age of liver lesions tomorrow  Appreciate input from consultants, restarted digoxin - held amiodarone due to low BP    Assessment and Plan:    Neuro- some ICU delerium- resolved; but occasional periods of confusion when she get Valium     Cardiac-continue hemodynamic monitoring, map goal greater than 65, on midodrine, A. fib RVR, heart rate goal less than 110, keep magnesium above 2 and potassium above 4, cardiology adjusting cardiac meds, EF of 25 to 30% with severe hypokinesis of the mid to distal portions of the anterior, lateral and inferior walls with akinesis at the apex with some RV dysfunction as well-CAD versus Takotsubo, should get a repeat echo in a few days, follow-up cardiology recommendations, digoxin on hold due to elevated levels    Hypomagnesemia-resolved after IV magnesium given;       Pulmonary-supplemental oxygen as needed, presumed COPD, standing bronchodilator/steroid therapy for now, saturation goal 88 to 94%  Stable oxygen levels on 2-3 L via nasal cannula     GI-diet as tolerated, PPI therapy-Home med     Renal-monitor urine output, corrected electrolyte derangements   Normal creatinine minimally elevated BUN     Heme/Onc-liver mass biopsy  positive for adenoCA that is not HCC. Pt had jennifer-colectomy for a large 6cm dysplastic polyp (zero of 29nodes)  in February 2022,  current ct scan worrisome for a cecal malignancy. 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 Recommending hospice. ID-hepatic abscesses versus organized hematoma continue vancomycin/Flagyl/Doxy for now-follow-up micro studies, ID consulted, follow-up recommendations= repeat CT abd/pelvis to look at evolution of liver lesions. Consulted IR for CT guided drainage of fluid collections- they will try and sample one of these lesions tomorrow- if bloody- will send for culture, of purulent- they will try to place drain tube    Elevated LFTs and bilirubin-due to Shock liver-resolved  eval from hepatology- acute marked increase in liver transaminases that appear to already be coming down. The rise in TBILI generally lags behind the rise and peak in liver enzymes by 3-5 days.   metastatic disease to the liver noted  Minimally elevated alk phos persists likely due to cancer/metastatic disease     Endocrinology-keep euglycemic    Anxiety-stable on increased valium dose     Prognosis very guarded to poor at this time-follow-up oncology and palliative care team [address] discussions with patient and NOK           PHYSICAL EXAMINATION:  Visit Vitals  /66   Pulse 85   Temp 97.5 °F (36.4 °C)   Resp 19   Ht 5' 3\" (1.6 m)   Wt 52.1 kg (114 lb 13.8 oz)   SpO2 98%   BMI 20.35 kg/m²     Patient Vitals for the past 24 hrs:   Temp Pulse Resp BP SpO2   10/12/22 1500 -- 85 19 106/66 98 %   10/12/22 1400 -- 80 -- -- --   10/12/22 1237 -- 83 -- 94/61 --   10/12/22 1200 -- 81 -- -- --   10/12/22 1151 97.5 °F (36.4 °C) 71 12 (!) 107/56 99 %   10/12/22 1000 -- 82 -- -- --   10/12/22 0830 -- -- -- -- 100 %   10/12/22 0811 -- -- -- -- 100 %   10/12/22 0724 97.5 °F (36.4 °C) 76 19 119/65 98 %   10/12/22 0633 -- 76 13 113/66 100 %   10/12/22 0513 (!) 96.4 °F (35.8 °C) 69 15 131/68 100 %   10/12/22 0000 97.4 °F (36.3 °C) 61 11 117/66 100 %   10/11/22 2000 97.7 °F (36.5 °C) 62 13 103/62 99 %   10/11/22 1956 -- -- -- -- 95 %        General:          sleepy, cooperative, weak and chronically ill appearing  HEENT:           Atraumatic, MMM , O2 via NC           Neck:               Supple, symmetrical,  thyroid: non tender  Lungs:             decreased breath sounds bilaterally. No Wheezing or Rhonchi. No rales. Heart:              Regular  rhythm,  3/6 Systolic ejection  murmur    Abdomen:       Soft, non-tender. Not distended. Bowel sounds normal  Extremities:     No cyanosis. No clubbing,  +2 distal pulses, edema in all 4 ext sky upper ext  Skin:                Not pale. Not Jaundiced  No rashes   Psych:             anxious  Neurologic:      Alert, severe generalized weakness,      Labs:     Recent Labs     10/12/22  0233 10/11/22  0508   WBC 9.6 8.6   HGB 10.2* 9.8*   HCT 31.0* 30.6*    272       Recent Labs     10/12/22  0233 10/11/22  0508 10/10/22  0407   * 135* 136   K 3.8 4.6 4.2    106 107   CO2 26 25 25   BUN 18 22* 21*   CREA 0.67 0.61 0.70   * 132* 97   CA 8.0* 8.1* 8.6   MG  --  1.9 1.5*       Recent Labs     10/11/22  0508   ALT 50   *   TBILI 0.5   TP 4.3*   ALB 2.3*   GLOB 2.0       No results for input(s): INR, PTP, APTT, INREXT, INREXT in the last 72 hours. No results for input(s): FE, TIBC, PSAT, FERR in the last 72 hours. Lab Results   Component Value Date/Time    Folate 27.1 (H) 08/22/2022 03:55 AM        No results for input(s): PH, PCO2, PO2 in the last 72 hours. No results for input(s): CPK, CKNDX, TROIQ in the last 72 hours.     No lab exists for component: CPKMB  Lab Results   Component Value Date/Time    Cholesterol, total 141 08/22/2022 03:55 AM    HDL Cholesterol 57 08/22/2022 03:55 AM    LDL, calculated 71.6 08/22/2022 03:55 AM    Triglyceride 62 08/22/2022 03:55 AM    CHOL/HDL Ratio 2.5 08/22/2022 03:55 AM     Lab Results   Component Value Date/Time    Glucose (POC) 145 (H) 02/14/2022 09:01 PM     Lab Results   Component Value Date/Time    Color YELLOW/STRAW 10/03/2022 04:57 PM    Appearance CLEAR 10/03/2022 04:57 PM    Specific gravity 1.008 10/03/2022 04:57 PM    Specific gravity 1.010 09/28/2022 10:37 AM    pH (UA) 6.5 10/03/2022 04:57 PM    Protein Negative 10/03/2022 04:57 PM    Glucose Negative 10/03/2022 04:57 PM    Ketone Negative 10/03/2022 04:57 PM    Bilirubin Negative 10/03/2022 04:57 PM    Urobilinogen 0.2 10/03/2022 04:57 PM    Nitrites Negative 10/03/2022 04:57 PM    Leukocyte Esterase Negative 10/03/2022 04:57 PM    Epithelial cells FEW 10/03/2022 04:57 PM    Bacteria Negative 10/03/2022 04:57 PM    WBC 0-4 10/03/2022 04:57 PM    RBC 0-5 10/03/2022 04:57 PM       Current Facility-Administered Medications:     anidulafungin (ERAXIS) 200 mg in 0.9% sodium chloride 260 mL IVPB, 200 mg, IntraVENous, ONCE, Alexsandra Clark R, DO, Last Rate: 83.9 mL/hr at 10/12/22 1523, 200 mg at 10/12/22 1523    [START ON 10/13/2022] anidulafungin (ERAXIS) 100 mg in 0.9% sodium chloride 130 mL IVPB, 100 mg, IntraVENous, Q24H, Lj Clarkitha R, DO    amiodarone (CORDARONE) tablet 200 mg, 200 mg, Oral, BID, Jenni Mercado, NP, 200 mg at 10/11/22 1815    [START ON 10/27/2022] amiodarone (CORDARONE) tablet 200 mg, 200 mg, Oral, DAILY, Julio Mercado., NP    balsam peru-castor oiL (VENELEX) ointment, , Topical, TID, Michelle Solano MD, Given at 10/12/22 1524    dextrose 5 % - 0.45% NaCl infusion, 50 mL/hr, IntraVENous, CONTINUOUS, Michelle Solano MD, Last Rate: 50 mL/hr at 10/10/22 1201, 50 mL/hr at 10/10/22 1201    digoxin (LANOXIN) tablet 0.0625 mg, 0.0625 mg, Oral, DAILY, Raphael Catrachito, NP, 0.0625 mg at 10/12/22 1249    ondansetron (ZOFRAN ODT) tablet 4 mg, 4 mg, Oral, Q8H PRN **OR** ondansetron (ZOFRAN) injection 4 mg, 4 mg, IntraVENous, Q4H PRN, Opal Wang MD    simethicone (MYLICON) tablet 80 mg, 80 mg, Oral, QID PRN, Opal Wang MD, 80 mg at 10/08/22 3417    butalbital-acetaminophen-caffeine (FIORICET, ESGIC) -40 mg per tablet 2 Tablet, 2 Tablet, Oral, Q6H PRN, Kelsi Ward NP, 2 Tablet at 10/12/22 1248    metoprolol tartrate (LOPRESSOR) tablet 25 mg, 25 mg, Oral, Q12H, Vasiliy Mercado NP, 25 mg at 10/11/22 1037    traZODone (DESYREL) tablet 50 mg, 50 mg, Oral, QHS, Niranjan De Los Santos MD, 50 mg at 10/11/22 2132    meropenem (MERREM) 1 g in 0.9% sodium chloride (MBP/ADV) 50 mL MBP, 1 g, IntraVENous, Q8H, Opal Wang MD, Last Rate: 16.7 mL/hr at 10/12/22 1230, 1 g at 10/12/22 1230    diazePAM (VALIUM) tablet 5 mg, 5 mg, Oral, Q6H PRN, Toy Jany BOSCH, NP, 5 mg at 10/11/22 2132    HYDROmorphone (DILAUDID) injection 0.2 mg, 0.2 mg, IntraVENous, Q3H PRN, Toy Gaw B, NP, 0.2 mg at 10/12/22 0307    lidocaine 4 % patch 1 Patch, 1 Patch, TransDERmal, Q24H, Opal Wang MD, 1 Patch at 10/10/22 1705    alteplase (CATHFLO) 1 mg in sterile water (preservative free) 1 mL injection, 1 mg, InterCATHeter, PRN, Ashley BOSCH MD    therapeutic multivitamin (THERAGRAN) tablet 1 Tablet, 1 Tablet, Oral, DAILY, Ashley BOSCH MD, 1 Tablet at 10/12/22 1233    polyethylene glycol (MIRALAX) packet 17 g, 17 g, Oral, BID, Toy Gaw B, NP, 17 g at 10/12/22 1230    magnesium hydroxide (MILK OF MAGNESIA) 400 mg/5 mL oral suspension 30 mL, 30 mL, Oral, DAILY PRN, Toy Gaw B, NP    predniSONE (DELTASONE) tablet 40 mg, 40 mg, Oral, DAILY WITH BREAKFAST, Niranjan De Los Santos MD, 40 mg at 10/12/22 1231    phenol throat spray (CHLORASEPTIC) 1 Spray, 1 Spray, Oral, PRN, Rosana Flores, NP    senna-docusate (PERICOLACE) 8.6-50 mg per tablet 2 Tablet, 2 Tablet, Oral, DAILY, Dario BOSCH, JOSEPHINE, 2 Tablet at 10/12/22 1233    ipratropium (ATROVENT) 0.02 % nebulizer solution 0.5 mg, 0.5 mg, Nebulization, Q6H RT, Niranjan De Los Santos MD, 0.5 mg at 10/12/22 1417    midodrine (PROAMATINE) tablet 20 mg, 20 mg, Oral, TID WITH MEALS, Niranjan De Los Santos MD, 20 mg at 10/12/22 1230    Vancomycin - Pharmacy dosing, , Other, Rx Dosing/Monitoring, Roxi Brooke MD    vancomycin (VANCOCIN) 500 mg in 0.9% sodium chloride (MBP/ADV) 100 mL MBP, 500 mg, IntraVENous, Q12H, Roxi Brooke MD, Last Rate: 100 mL/hr at 10/12/22 0614, 500 mg at 10/12/22 9678    thiamine HCL (B-1) tablet 100 mg, 100 mg, Oral, DAILY, Roxi Brooke MD, 100 mg at 10/12/22 1233    0.9% sodium chloride infusion 250 mL, 250 mL, IntraVENous, PRN, Roxi Brooke MD    albuterol (PROVENTIL VENTOLIN) nebulizer solution 2.5 mg, 2.5 mg, Nebulization, Q6H PRN, Roxi Brooke MD, 2.5 mg at 10/06/22 1810    sodium chloride (NS) flush 5-40 mL, 5-40 mL, IntraVENous, Q8H, GLORIA Velásquez MD, 10 mL at 10/12/22 0616    sodium chloride (NS) flush 5-40 mL, 5-40 mL, IntraVENous, PRN, LACEY Velásquez MD    acetaminophen (TYLENOL) tablet 650 mg, 650 mg, Oral, Q6H PRN, 650 mg at 10/02/22 2127 **OR** acetaminophen (TYLENOL) suppository 650 mg, 650 mg, Rectal, Q6H PRN, MAMIE Velásquez MD    pantoprazole (PROTONIX) 40 mg in 0.9% sodium chloride 10 mL injection, 40 mg, IntraVENous, DAILY, PAULETTE Velásquez MD, 40 mg at 10/12/22 1230    prochlorperazine (COMPAZINE) injection 10 mg, 10 mg, IntraVENous, Q6H PRN, Blake Velásquez MD, 10 mg at 10/08/22 7169     CODE STATUS:   Full Code   X DNR    Partial    Comfort Care       Signed:   Parke Cogan, MD  Date of Service:  10/12/2022

## 2022-10-12 NOTE — PROGRESS NOTES
I have read and agree with [facility]'s documentation and care. I have reviewed the MAR and all flow sheets associated with patient's care today.

## 2022-10-12 NOTE — PROGRESS NOTES
Infectious Disease Clinic Note        HPI:   Ms Shaneka Rodgers seen  Says yes to pain   Not otherwise interactive       Medications:  No current facility-administered medications on file prior to encounter. Current Outpatient Medications on File Prior to Encounter   Medication Sig Dispense Refill    dicyclomine (BENTYL) 10 mg capsule Take 1 Capsule by mouth four (4) times daily. 1 Capsule 0    ondansetron (ZOFRAN ODT) 4 mg disintegrating tablet Take 1 Tablet by mouth every eight (8) hours as needed for Nausea or Vomiting. 1 Tablet 0    polyethylene glycol (MIRALAX) 17 gram packet Take 1 Packet by mouth every twelve (12) hours. 1 Each 0    simethicone (MYLICON) 80 mg chewable tablet Take 1 Tablet by mouth four (4) times daily as needed for GI Pain. 2 Tablet 0    lubiPROStone (AMITIZA) 24 mcg capsule Take 1 Capsule by mouth two (2) times daily (with meals) for 30 days. Recommended by GI service 10 Capsule 0    butalbital-acetaminophen-caffeine (FIORICET, ESGIC) -40 mg per tablet Take 2 Tablets by mouth two (2) times daily as needed for Headache or Migraine. 6 Tablet 0    digoxin (LANOXIN) 0.125 mg tablet Take 0.125 mg by mouth daily. metoprolol tartrate (LOPRESSOR) 25 mg tablet Take 12.5 mg by mouth two (2) times a day. cyclobenzaprine (FLEXERIL) 10 mg tablet Take 10 mg by mouth three (3) times daily as needed. aspirin 81 mg chewable tablet Take 81 mg by mouth daily. multivitamin (ONE A DAY) tablet Take 1 Tablet by mouth daily. acetaminophen (TYLENOL) 325 mg tablet Take 650 mg by mouth every four (4) hours as needed for Pain. albuterol (PROVENTIL HFA, VENTOLIN HFA, PROAIR HFA) 90 mcg/actuation inhaler Take 2 Puffs by inhalation every six (6) hours as needed. pantoprazole (PROTONIX) 40 mg tablet Take 40 mg by mouth daily.              Physical Exam:    Vitals: Patient Vitals for the past 24 hrs:   Temp Pulse Resp BP SpO2   10/12/22 1237 -- 83 -- 94/61 --   10/12/22 1200 -- 81 -- -- --   10/12/22 1151 97.5 °F (36.4 °C) 71 12 (!) 107/56 99 %   10/12/22 1000 -- 82 -- -- --   10/12/22 0830 -- -- -- -- 100 %   10/12/22 0811 -- -- -- -- 100 %   10/12/22 0724 97.5 °F (36.4 °C) 76 19 119/65 98 %   10/12/22 0633 -- 76 13 113/66 100 %   10/12/22 0513 (!) 96.4 °F (35.8 °C) 69 15 131/68 100 %   10/12/22 0000 97.4 °F (36.3 °C) 61 11 117/66 100 %   10/11/22 2000 97.7 °F (36.5 °C) 62 13 103/62 99 %   10/11/22 1956 -- -- -- -- 95 %   10/11/22 1600 97.3 °F (36.3 °C) 67 17 (!) 101/58 100 %   10/11/22 1507 -- -- -- -- 100 %     GEN: NAD,   CV S1 2 reg   Lungs: Nonlabored  Abdomen: soft, distended, not much tenderness   Extremities: no edema  Neuro:awake alert to self, minimally verbal   Skin: no rash on face, extremities,       Labs:   Recent Results (from the past 24 hour(s))   DIGOXIN    Collection Time: 10/12/22  2:33 AM   Result Value Ref Range    Digoxin level 1.5 0.90 - 2.00 NG/ML   CBC WITH AUTOMATED DIFF    Collection Time: 10/12/22  2:33 AM   Result Value Ref Range    WBC 9.6 3.6 - 11.0 K/uL    RBC 3.74 (L) 3.80 - 5.20 M/uL    HGB 10.2 (L) 11.5 - 16.0 g/dL    HCT 31.0 (L) 35.0 - 47.0 %    MCV 82.9 80.0 - 99.0 FL    MCH 27.3 26.0 - 34.0 PG    MCHC 32.9 30.0 - 36.5 g/dL    RDW 23.3 (H) 11.5 - 14.5 %    PLATELET 510 352 - 091 K/uL    MPV 9.7 8.9 - 12.9 FL    NRBC 0.5 (H) 0  WBC    ABSOLUTE NRBC 0.05 (H) 0.00 - 0.01 K/uL    NEUTROPHILS 89 (H) 32 - 75 %    LYMPHOCYTES 4 (L) 12 - 49 %    MONOCYTES 6 5 - 13 %    EOSINOPHILS 0 0 - 7 %    BASOPHILS 0 0 - 1 %    IMMATURE GRANULOCYTES 1 (H) 0.0 - 0.5 %    ABS. NEUTROPHILS 8.5 (H) 1.8 - 8.0 K/UL    ABS. LYMPHOCYTES 0.4 (L) 0.8 - 3.5 K/UL    ABS. MONOCYTES 0.6 0.0 - 1.0 K/UL    ABS. EOSINOPHILS 0.0 0.0 - 0.4 K/UL    ABS. BASOPHILS 0.0 0.0 - 0.1 K/UL    ABS. IMM.  GRANS. 0.1 (H) 0.00 - 0.04 K/UL    DF SMEAR SCANNED      PLATELET COMMENTS Large Platelets      RBC COMMENTS ANISOCYTOSIS  3+        RBC COMMENTS ANA CELLS  PRESENT        RBC COMMENTS SCHISTOCYTES  PRESENT       METABOLIC PANEL, BASIC    Collection Time: 10/12/22  2:33 AM   Result Value Ref Range    Sodium 135 (L) 136 - 145 mmol/L    Potassium 3.8 3.5 - 5.1 mmol/L    Chloride 106 97 - 108 mmol/L    CO2 26 21 - 32 mmol/L    Anion gap 3 (L) 5 - 15 mmol/L    Glucose 120 (H) 65 - 100 mg/dL    BUN 18 6 - 20 MG/DL    Creatinine 0.67 0.55 - 1.02 MG/DL    BUN/Creatinine ratio 27 (H) 12 - 20      eGFR >60 >60 ml/min/1.73m2    Calcium 8.0 (L) 8.5 - 10.1 MG/DL       Microbiology Data:       Blood: negative        Urine:  Contains abnormal data CULTURE, URINE  Order: [id number]  Collected 9/28/2022 10:37    Status: Final result    Specimen Information: Clean catch; Urine   0 Result Notes  Component Ref Range & Units 9/28/22 1037    Special Requests:   NO SPECIAL REQUESTS    [place] Count   >100,000   COLONIES/mL    Culture result:   ESCHERICHIA COLI Abnormal     Resulting Agency  [address]        Susceptibility     Escherichia coli     JOBY     Amikacin ($) Susceptible     Ampicillin ($) Intermediate     Ampicillin/sulbactam ($) Susceptible     Cefazolin ($) Susceptible     Cefepime ($$) Susceptible     Cefoxitin Susceptible     Ceftazidime ($) Susceptible     Ceftriaxone ($) Susceptible     Ciprofloxacin ($) Susceptible     Gentamicin ($) Susceptible     Levofloxacin ($) Susceptible     Meropenem ($$) Susceptible     Nitrofurantoin Susceptible     Piperacillin/Tazobac ($) Susceptible     Tobramycin ($) Susceptible                          Imaging:     CT ABD PELV W CONT: Patient Communication     Add Comments   Not seen     Study Result    Narrative & Impression   EXAM: CT ABD PELV W CONT     INDICATION: Diffuse abdominal pain and diarrhea. Rectal constipation earlier  this month. Nonspecific liver disease on previous imaging. Biliary dilatation on  previous imaging. Elevated alkaline phosphatase. Normal white blood cell count,  bilirubin, lipase. COMPARISON: CT abdomen/pelvis on 9/12/2022. CONTRAST: 100 mL of Isovue-370. ORAL CONTRAST: None     TECHNIQUE:   Following the uneventful intravenous administration of contrast, thin axial  images were obtained through the abdomen and pelvis. Coronal and sagittal  reconstructions were generated. CT dose reduction was achieved through use of a  standardized protocol tailored for this examination and automatic exposure  control for dose modulation. FINDINGS:   LOWER THORAX: Small right pleural effusion is new. No basilar pneumonia. Normal  cardiac size. LIVER: Segment 7/8 subcapsular collection of heterogeneous fluid measures 9.3 x  6.9 x 3.5 cm. Heterogeneous septated fluid attenuation throughout the right  hepatic lobe measures approximately 13 cm in oblique AP diameter. Central right  hepatic lobe peripherally enhancing lesion previously measured 1.9 cm and now  measures 2.4 cm. Unchanged mild intrahepatic biliary dilatation. BILIARY TREE: Cholecystectomy. Unchanged chronic biliary dilatation. No abrupt  termination. SPLEEN: Normal size. Lobulated contour. PANCREAS: Mild diffuse atrophy. No mass or inflammation. ADRENALS: Unremarkable. KIDNEYS: No hydronephrosis. Heterogeneous small multiple cysts. STOMACH: Unremarkable. SMALL BOWEL: No dilatation or wall thickening. COLON: Incomplete distention, limited evaluation. Fluid-filled cecum is in the  pelvis. APPENDIX: Normal.  PERITONEUM: No ascites or pneumoperitoneum. RETROPERITONEUM: Aortic atherosclerosis without aneurysm. 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 No lymphadenopathy. REPRODUCTIVE ORGANS: Hysterectomy. URINARY BLADDER: Incomplete distention, limited evaluation. BONES: Osteopenia. Femoral hardware. Spinal curvature. T11 partial compression  fracture. ABDOMINAL WALL: No mass or hernia. ADDITIONAL COMMENTS: Diffuse muscle atrophy. IMPRESSION     1. Progression of hepatic lesions and new large fluid collections in the liver.   Intrahepatic abscesses from nonspecific infection are most likely. Consider  fluid sampling or drainage with CT guidance. 2. New small right pleural effusion. 3. Fluid-filled cecum may indicate infectious colitis. No bowel obstruction. Assessment / Plan:       1) Hepatic collections:     of liver biopsy on 9/15 + for malignant cells  CT repeated 10/11/22 \" The right hepatic lobe contains multiple hyperdense collections which  appear to be connected. Largest axial measurement is 10.4 x 6.7 cm, not  significantly changed. Cranially there is communication with a hyperdense 8.4 x  5.6 cm hepatic dome collection, 3-13. Caudally, there is communication with a  small subcapsular collection in the inferior right hepatic lobe, 3-29. Overall  progression of multiple hepatic hypodensities compared to 9/12/2022. For example  2.4 x 2.4 cm segment 5 hepatic lesion previously measured 1.9 x 1.6 cm. Multiple  new lesions in the left hepatic lobe, for example 9 mm segment 2 lesion, 3-16. Plan:   Given liver biopsy history , immune compromised if not at least immunomodulated state, at risk for superimposed infection in addition to concern for hematoma    CT wt new lesions lesions in the L lobe -- would expect some drop in HBG wt forming new hematomas  ? ? On Vancomycin and Meropenem IV empirically     Will add anidulafungin given new lesions and no cultures to help target therapy      Based on goals of care/CT findings, recommended  drainage if any amenable collections to drainage. Ms Stephen Benítez looks very frail and appreciate palliative's involvement    I called radiology to discuss but unable to connect when I attempted       Will discuss with primary team     2) copd  3) metastatic adenocarcinoma, liver involvement   4) tubular adenoma colon   5) seizure hx per chart             Thank for the opportunity to participate in the care of this patient. Please contact with questions or concerns.        Joseline Us, DO  1:26 PM

## 2022-10-12 NOTE — PROGRESS NOTES
Problem: Self Care Deficits Care Plan (Adult)  Goal: *Acute Goals and Plan of Care (Insert Text)  Description:   FUNCTIONAL STATUS PRIOR TO ADMISSION: Patient was modified independent using a rollator for functional mobility. Usually did not use any DME at home, reports being independent in ADL/IADL, however per chart son in law assists with IADL    HOME SUPPORT: Lives with son in law who assists as needed (is not home 24/7). Son is recently incarcerated. Update 10/12/22: Patient is considering comfort care though wants to see son before she dies    Occupational Therapy Goals  Weekly re-assessment. Goals downgraded 10/12/22  1. Patient will bring cup to mouth with min assist within 7 days  2. Patient will wash face with mod assist within 7 days  3. Patient will roll R/L with mod assist for bed pan use within 7 days. 4. Patient will tolerate UE A/AROM HEP for >5 min with up to max assist within 7 days. Initiated 10/6/2022- reviewed 10/12/22- no goals met. 1.  Patient will perform grooming with minimal assistance/contact guard assist within 7 day(s). 2.  Patient will perform upper body dressing with minimal assistance/contact guard assist within 7 day(s). 3.  Patient will perform lower body dressing with moderate assistance  within 7 day(s). 4.  Patient will perform all aspects of toileting with moderate assistance  within 7 day(s). 5.  Patient will utilize energy conservation techniques during functional activities with verbal cues within 7 day(s).     Outcome: Not Progressing Towards Goal   OCCUPATIONAL THERAPY TREATMENT/WEEKLY RE-EVALUATION  Patient: Gudelia Lindsey (53 y.o. female)  Date: 10/12/2022  Diagnosis: Liver abscess [K75.0]  Abdominal pain [R10.9]  Adenocarcinoma ([facility] [address] [C80.1] <principal problem not specified>  Procedure(s) (LRB):  INFUSION CATHETER INSERTION (N/A) 9 Days Post-Op  Precautions: Fall  Chart, occupational therapy assessment, plan of care, and goals were reviewed. ASSESSMENT  Based on the objective data described below, patient has had significant decline over the past week. At evaluation she was able to transfer OOB to chair or BSC with min to mod assist, and participated in ADL tasks with increased assist for LB ADL. Currently the patient requires total care for all ADL including self feeding and grooming. Patient is edematous throughout her body. Her arms are so swollen she is unable to lift her full arm or flex her elbow enough to reach her face. LE edema noted as well- she is tucker to complete ankle pumps but cannot lift her legs. Chart review reveals that patient has gotten information regarding comfort care, though is hoping to see her son (who is incarcerated) once more before she dies. At this time, OT goals have been downgraded. Patient verbalized desire for therapy to continue when questioned, though unclear if she fully understood discussion. Will trial sessions this coming week and discharge if patient no longer wants to participate or is unable medically. Current Level of Function Impacting Discharge (ADLs): total assist for all ADL and mobility    Other factors to consider for discharge:          PLAN :  Goals have been updated based on progression since last assessment. Patient continues to benefit from skilled intervention to address the above impairments. Continue to follow patient 3 times a week to address goals. Recommend with staff: UE/LE PROM, prop arm and LE for skin protection and edema mgmt     Recommend next OT session: A/AROM, feeding strategies    Recommendation for discharge: (in order for the patient to meet his/her long term goals)  To be determined: hospice?     This discharge recommendation:  Has been made in collaboration with the attending provider and/or case management    Equipment recommendations for successful discharge (if) home: TBD       SUBJECTIVE:   Patient somewhat confused today     OBJECTIVE DATA SUMMARY: Cognitive/Behavioral Status:  Neurologic State: Irritable;Drowsy; Confused  Orientation Level: Oriented to person  Cognition: Decreased attention/concentration;Decreased command following             Functional Mobility and Transfers for ADLs:  Bed Mobility:  Rolling: Total assistance    Transfers:             Balance:       ADL Intervention:  Feeding  Feeding Assistance: Total assistance (dependent)       Patient is able to grasp cup though unable to lift it from supportive surface, or flex elbow enough to reach face       Pain:  Back pain reported- patient requesting Fioricet.  Nurse present     Activity Tolerance:   Poor    After treatment patient left in no apparent distress:   Supine in bed, Heels elevated for pressure relief, Patient positioned in right sidelying for pressure relief, Call bell within reach, and Side rails x 3    COMMUNICATION/COLLABORATION:   The patients plan of care was discussed with: Physical Therapy Assistant and Registered Nurse    Candie Merrill OT  Time Calculation: 23 mins

## 2022-10-12 NOTE — PROGRESS NOTES
Comprehensive Nutrition Assessment    Type and Reason for Visit: Reassess    Nutrition Recommendations/Plan:     Recommend diet advancement to easy to chew - while continuing to send more liquid items as easier for pt to get down, but should be allowed opportunity to eat more solid food if she desires. Continue Magic Cup at all meals, d/c'd Ensure all together. Malnutrition Assessment:  Malnutrition Status:  Severe malnutrition (10/06/22 1431)    Context:  Chronic illness     Findings of the 6 clinical characteristics of malnutrition:   Energy Intake:  No significant decrease in energy intake  Weight Loss:  Mild weight loass (specify amount and time period) (1% x 2 weeks)     Body Fat Loss:  Severe body fat loss, Buccal region, Orbital, Triceps   Muscle Mass Loss:  Severe muscle mass loss, Clavicles (pectoralis &deltoids), Calf (gastrocnemius), Temples (temporalis), Scapula (trapezius), Thigh (quadriceps)  Fluid Accumulation:  No significant fluid accumulation,     Strength:  Not performed        Nutrition Assessment:      10/12: follow-up. Pt tx out of ICU to [facility]. Very flat affect. Difficult to move, has to be fed. Arms/ hand edematous. Palliative following closely and appears may be transitioning to comfort measures soon, but they are currently trying to arrange a visit from her incarcerated son first.  She remains on full liquids. Ensure on BF tray and she stated to not even open it. She does like the [employer] and continues to eat. Seems pretty minimal otherwise. Taking some soups and oatmeal, but hasn't been given opportunity to eat more solid items if she desired. Weight trending up, likely combination of worsening fluid retention and bed scale discrepancies. A few weeks ago, standing scale = 91 lb.     10/6: follow-up. Pt tx to ICU on 10/4 d/t low BP, did not end of up needing tomasz, but on midodrine, amiodarone, and digoxin.   Noted oncology recs for hospice as pt is too weak for chemo. Palliative is following. Hepatology consulted today for ?management of elevated LFTs. PO intake remains poor overall. Didn't eat BF this AM, but requesting oatmeal and Magic Cup for lunch. Still on full liquids and unclear why diet hasn't been advanced further. Having loose BMs, declined her stool softener today. Current weight of 104 lb up 13 lb overnight, so suspect inaccuracies. 10/3: MD consult received for Poor PO intakes and unintentional weight loss. RD already following pt. Spoke with pt at bedside. She reports poor PO intakes secondary to no BM in 5 days. Multiple ONS ordered, pt is drinking 1 Ensure each day and both Magic Cups daily which she really enjoys. Offered to change Ensure Enlive to Clear as she is not a huge fan of Ensure Enlive but she states those are even worse. Agreeable to try Magic Cup TID, will decrease Ensure Enlive down to 1x/day. Lunch tray observed, she only ate the [employer]. Also notes that she will need Easy to [employer] once it is advanced, currently ordered Full Liquid diet. Some food preferences obtained and added to diet order. Plan was for NGT placement for supplemental EN last week but unsuccessful placement of NGT and then pt refused reattempts. 9/29: 77 y.o. female admitted with acute diarrhea, n/v. She was recently diagnosed with liver adenocarcinoma (liver mass biopsy 9/15/22)- oncology consulted. Per ED notes pt had CT showing progressive liver lesions with new large fluid collection concerning for abscess. Pt is known to RD from previous visits. Appears pt has lost 1# in the last 2 weeks which is not considered significant- she has a chronically low BMI d/t chronic low po intake. Pta pt was having abdominal pain for several weeks, escalated to watery diarrhea, n/v 2 days pta. Yesterday she had a few bites of soup, pudding, crackers, but was fearful of further intake. She typically eats small frequent meals at home.  She likes chocolate ensure, orange magic cup, vanilla ice cream, vegetables, pudding. Pt has top dentures, no bottom teeth. Prefers to be on an easy to chew diet. K 3.2- pt receiving efferK.     Past Medical History:   Diagnosis Date    Adenoma of colon     Anemia     Arthritis     Atrial fibrillation (HCC)     Bloating     Chronic constipation     Chronic pain     back     COPD (chronic obstructive pulmonary disease) (HCC)     GERD (gastroesophageal reflux disease)     [place] (hard of hearing)     Hyponatremia     caused seizures x 2 per pt    Indigestion     Osteoporosis     Seizures ([address] 7/2020, 9/2020    x 2        Nutritionally Significant Medications:  amiodarone, D5-1/2 NS @ 50 mL/hr, digoxin, merrem, protonix, prednisone, MVI, thiamine, vancomycin, pericolace  PRN: dilaudid, compazine, zofran, valium      Estimated Daily Nutrient Needs:  Energy Requirements Based On: Kcal/kg  Weight Used for Energy Requirements: Current  Energy (kcal/day): 1435  Weight Used for Protein Requirements: Current  Protein (g/day): 74  Method Used for Fluid Requirements: 1 ml/kcal  Fluid (ml/day): 1435    Nutrition Related Findings:   Edema: LLE: Pitting; 3+ (10/12/2022  6:14 AM)  LUE: Pitting; 3+ (10/12/2022  6:14 AM)  RLE: Pitting; 3+ (10/12/2022  6:14 AM)  RUE: Pitting; 3+ (10/12/2022  6:14 AM)    Last BM: 10/11/22, Other (comment) (MARLO)    Wounds: None      Current Nutrition Therapies:  Diet: full liquid  Supplements: Magic Cup TID, Ensure Enlive once/day  Meal intake: Patient Vitals for the past 168 hrs:   % Diet Eaten   10/11/22 0800 1 - 25%   10/10/22 1740 1 - 25%   10/10/22 0900 1 - 25%   10/09/22 1400 1 - 25%   10/09/22 0900 1 - 25%   10/08/22 1319 1 - 25%   10/08/22 1200 0%   10/08/22 0800 0%   10/07/22 1600 1 - 25%   10/07/22 1256 1 - 25%   10/07/22 0800 1 - 25%   10/06/22 1230 1 - 25%   10/05/22 1700 26 - 50%   10/05/22 1300 26 - 50%     Supplement intake: Patient Vitals for the past 168 hrs:   Supplement intake %   10/11/22 0800 0%   10/10/22 1740 0%   10/08/22 1200 0%   10/08/22 0800 0%   10/07/22 0800 1 - 25%     Nutrition Support: none      Anthropometric Measures:  Height: 5' 3\" (160 cm)  Ideal Body Weight (IBW): 115 lbs (52 kg)     Current Body Wt:  41.3 kg (91 lb 0.8 oz), 79.2 % IBW.  Standing scale  Current BMI (kg/m2): 16.1  Usual Body Weight: 41.7 kg (92 lb)  % Weight Change (Calculated): -1  Weight Adjustment: No adjustment                 BMI Category: Underweight (BMI less than 18.5)    Wt Readings from Last 10 Encounters:   10/06/22 47.3 kg (104 lb 4.4 oz)   09/15/22 41.7 kg (92 lb)   07/21/22 42.2 kg (93 lb)   06/29/22 42.2 kg (93 lb)   06/23/22 42.2 kg (93 lb)   06/16/22 42.2 kg (93 lb)   03/31/22 42.4 kg (93 lb 6.4 oz)   03/10/22 41.7 kg (92 lb)   03/01/22 43.1 kg (95 lb)   02/14/22 42.4 kg (93 lb 7.6 oz)     Last 3 Recorded Weights in this Encounter    10/10/22 0800 10/11/22 0400 10/12/22 0514   Weight: 48.4 kg (106 lb 11.2 oz) 51.3 kg (113 lb 1.5 oz) 52.1 kg (114 lb 13.8 oz)           Nutrition Diagnosis:   Severe malnutrition related to inadequate protein-energy intake as evidenced by Criteria as identified in malnutrition assessment      Nutrition Interventions:   Food and/or Nutrient Delivery: Modify current diet, Continue oral nutrition supplement  Nutrition Education/Counseling: No recommendations at this time  Coordination of Nutrition Care: Continue to monitor while inpatient       Goals:  Previous Goal Met: No progress toward goal(s)  Goals: other (specify)  Specify Other Goals: PO intake >/=25% of meals with 1-3 ONS daily within 7 days    Nutrition Monitoring and Evaluation:   Behavioral-Environmental Outcomes: None identified  Food/Nutrient Intake Outcomes: Diet advancement/tolerance, Food and nutrient intake, Supplement intake  Physical Signs/Symptoms Outcomes: Biochemical data, Chewing or swallowing, GI status, Fluid status or edema, Meal time behavior, Nutrition focused physical findings, Weight    Discharge Planning:    Continue oral nutrition supplement    Recent Labs     10/12/22  0233 10/11/22  0508 10/10/22  0407   * 132* 97   BUN 18 22* 21*   CREA 0.67 0.61 0.70   * 135* 136   K 3.8 4.6 4.2    106 107   CO2 26 25 25   CA 8.0* 8.1* 8.6   MG  --  1.9 1.5*       Recent Labs     10/11/22  0508   ALT 50   AST 36   *   TBILI 0.5   TP 4.3*   ALB 2.3*   GLOB 2.0       No results for input(s): LAC in the last 72 hours. Recent Labs     10/12/22  0233 10/11/22  0508   WBC 9.6 8.6   HGB 10.2* 9.8*   HCT 31.0* 30.6*    272       No results for input(s): PREALB in the last 72 hours. Prealbumin   Date Value Ref Range Status   08/22/2022 21.9 20.0 - 40.0 mg/dL Final       No results for input(s): TRIGL in the last 72 hours. Triglyceride   Date Value Ref Range Status   08/22/2022 62 <150 MG/DL Final     Comment:     Based on NCEP-ATP III:  Triglycerides <150 mg/dL  is considered normal, 150-199 mg/dL  borderline high,  200-499 mg/dL high and  greater than or equal to 500 mg/dL very high. No results for input(s): GLUCPOC in the last 72 hours.     Lab Results   Component Value Date/Time    Hemoglobin A1c 5.2 02/04/2022 11:52 AM         Perla Tatum RD  Available via [employer]

## 2022-10-13 LAB
ABO + RH BLD: NORMAL
ALBUMIN SERPL-MCNC: 2.6 G/DL (ref 3.5–5)
ALBUMIN/GLOB SERPL: 1 {RATIO} (ref 1.1–2.2)
ALP SERPL-CCNC: 191 U/L (ref 45–117)
ALT SERPL-CCNC: 49 U/L (ref 12–78)
ANION GAP SERPL CALC-SCNC: 5 MMOL/L (ref 5–15)
AST SERPL-CCNC: 41 U/L (ref 15–37)
BASOPHILS # BLD: 0 K/UL (ref 0–0.1)
BASOPHILS NFR BLD: 0 % (ref 0–1)
BILIRUB DIRECT SERPL-MCNC: 0.3 MG/DL (ref 0–0.2)
BILIRUB SERPL-MCNC: 0.6 MG/DL (ref 0.2–1)
BLOOD GROUP ANTIBODIES SERPL: NORMAL
BUN SERPL-MCNC: 13 MG/DL (ref 6–20)
BUN/CREAT SERPL: 18 (ref 12–20)
CALCIUM SERPL-MCNC: 8.3 MG/DL (ref 8.5–10.1)
CHLORIDE SERPL-SCNC: 104 MMOL/L (ref 97–108)
CO2 SERPL-SCNC: 26 MMOL/L (ref 21–32)
CREAT SERPL-MCNC: 0.72 MG/DL (ref 0.55–1.02)
DIFFERENTIAL METHOD BLD: ABNORMAL
DIGOXIN SERPL-MCNC: 1.1 NG/ML (ref 0.9–2)
EOSINOPHIL # BLD: 0 K/UL (ref 0–0.4)
EOSINOPHIL NFR BLD: 0 % (ref 0–7)
ERYTHROCYTE [DISTWIDTH] IN BLOOD BY AUTOMATED COUNT: 23.6 % (ref 11.5–14.5)
GLOBULIN SER CALC-MCNC: 2.7 G/DL (ref 2–4)
GLUCOSE SERPL-MCNC: 82 MG/DL (ref 65–100)
HCT VFR BLD AUTO: 33.5 % (ref 35–47)
HGB BLD-MCNC: 10.9 G/DL (ref 11.5–16)
IMM GRANULOCYTES # BLD AUTO: 0.2 K/UL (ref 0–0.04)
IMM GRANULOCYTES NFR BLD AUTO: 1 % (ref 0–0.5)
INR PPP: 1.3 (ref 0.9–1.1)
LYMPHOCYTES # BLD: 0.8 K/UL (ref 0.8–3.5)
LYMPHOCYTES NFR BLD: 5 % (ref 12–49)
MCH RBC QN AUTO: 27.8 PG (ref 26–34)
MCHC RBC AUTO-ENTMCNC: 32.5 G/DL (ref 30–36.5)
MCV RBC AUTO: 85.5 FL (ref 80–99)
MONOCYTES # BLD: 1.1 K/UL (ref 0–1)
MONOCYTES NFR BLD: 7 % (ref 5–13)
NEUTS SEG # BLD: 13.1 K/UL (ref 1.8–8)
NEUTS SEG NFR BLD: 87 % (ref 32–75)
NRBC # BLD: 0.05 K/UL (ref 0–0.01)
NRBC BLD-RTO: 0.3 PER 100 WBC
PLATELET # BLD AUTO: 293 K/UL (ref 150–400)
PMV BLD AUTO: 9.7 FL (ref 8.9–12.9)
POTASSIUM SERPL-SCNC: 3.8 MMOL/L (ref 3.5–5.1)
PROT SERPL-MCNC: 5.3 G/DL (ref 6.4–8.2)
PROTHROMBIN TIME: 13 SEC (ref 9–11.1)
RBC # BLD AUTO: 3.92 M/UL (ref 3.8–5.2)
RBC MORPH BLD: ABNORMAL
RBC MORPH BLD: ABNORMAL
SODIUM SERPL-SCNC: 135 MMOL/L (ref 136–145)
SPECIMEN EXP DATE BLD: NORMAL
WBC # BLD AUTO: 15.2 K/UL (ref 3.6–11)

## 2022-10-13 PROCEDURE — 99232 SBSQ HOSP IP/OBS MODERATE 35: CPT | Performed by: INTERNAL MEDICINE

## 2022-10-13 PROCEDURE — 74011000250 HC RX REV CODE- 250: Performed by: FAMILY MEDICINE

## 2022-10-13 PROCEDURE — 80048 BASIC METABOLIC PNL TOTAL CA: CPT

## 2022-10-13 PROCEDURE — 80076 HEPATIC FUNCTION PANEL: CPT

## 2022-10-13 PROCEDURE — C9113 INJ PANTOPRAZOLE SODIUM, VIA: HCPCS | Performed by: HOSPITALIST

## 2022-10-13 PROCEDURE — 74011250636 HC RX REV CODE- 250/636: Performed by: INTERNAL MEDICINE

## 2022-10-13 PROCEDURE — 74011000250 HC RX REV CODE- 250: Performed by: HOSPITALIST

## 2022-10-13 PROCEDURE — 74011250636 HC RX REV CODE- 250/636: Performed by: HOSPITALIST

## 2022-10-13 PROCEDURE — 85025 COMPLETE CBC W/AUTO DIFF WBC: CPT

## 2022-10-13 PROCEDURE — 65270000046 HC RM TELEMETRY

## 2022-10-13 PROCEDURE — 85610 PROTHROMBIN TIME: CPT

## 2022-10-13 PROCEDURE — 74011250637 HC RX REV CODE- 250/637: Performed by: STUDENT IN AN ORGANIZED HEALTH CARE EDUCATION/TRAINING PROGRAM

## 2022-10-13 PROCEDURE — 74011000258 HC RX REV CODE- 258: Performed by: STUDENT IN AN ORGANIZED HEALTH CARE EDUCATION/TRAINING PROGRAM

## 2022-10-13 PROCEDURE — 74011000258 HC RX REV CODE- 258: Performed by: INTERNAL MEDICINE

## 2022-10-13 PROCEDURE — 86900 BLOOD TYPING SEROLOGIC ABO: CPT

## 2022-10-13 PROCEDURE — 36415 COLL VENOUS BLD VENIPUNCTURE: CPT

## 2022-10-13 PROCEDURE — 74011250637 HC RX REV CODE- 250/637: Performed by: NURSE PRACTITIONER

## 2022-10-13 PROCEDURE — 74011636637 HC RX REV CODE- 636/637: Performed by: EMERGENCY MEDICINE

## 2022-10-13 PROCEDURE — 97530 THERAPEUTIC ACTIVITIES: CPT

## 2022-10-13 PROCEDURE — 74011250637 HC RX REV CODE- 250/637: Performed by: EMERGENCY MEDICINE

## 2022-10-13 PROCEDURE — 74011250636 HC RX REV CODE- 250/636: Performed by: FAMILY MEDICINE

## 2022-10-13 PROCEDURE — 97535 SELF CARE MNGMENT TRAINING: CPT

## 2022-10-13 PROCEDURE — 74011000258 HC RX REV CODE- 258: Performed by: FAMILY MEDICINE

## 2022-10-13 PROCEDURE — 74011250636 HC RX REV CODE- 250/636: Performed by: STUDENT IN AN ORGANIZED HEALTH CARE EDUCATION/TRAINING PROGRAM

## 2022-10-13 PROCEDURE — 80162 ASSAY OF DIGOXIN TOTAL: CPT

## 2022-10-13 RX ADMIN — METOPROLOL TARTRATE 25 MG: 25 TABLET, FILM COATED ORAL at 21:53

## 2022-10-13 RX ADMIN — SODIUM CHLORIDE, PRESERVATIVE FREE 10 ML: 5 INJECTION INTRAVENOUS at 16:01

## 2022-10-13 RX ADMIN — SODIUM CHLORIDE, PRESERVATIVE FREE 10 ML: 5 INJECTION INTRAVENOUS at 10:56

## 2022-10-13 RX ADMIN — Medication: at 10:56

## 2022-10-13 RX ADMIN — PREDNISONE 40 MG: 20 TABLET ORAL at 13:52

## 2022-10-13 RX ADMIN — POLYETHYLENE GLYCOL 3350 17 G: 17 POWDER, FOR SOLUTION ORAL at 10:53

## 2022-10-13 RX ADMIN — VANCOMYCIN HYDROCHLORIDE 500 MG: 500 INJECTION, POWDER, LYOPHILIZED, FOR SOLUTION INTRAVENOUS at 07:28

## 2022-10-13 RX ADMIN — SODIUM CHLORIDE, PRESERVATIVE FREE 10 ML: 5 INJECTION INTRAVENOUS at 22:01

## 2022-10-13 RX ADMIN — MEROPENEM 1 G: 1 INJECTION, POWDER, FOR SOLUTION INTRAVENOUS at 13:51

## 2022-10-13 RX ADMIN — POLYETHYLENE GLYCOL 3350 17 G: 17 POWDER, FOR SOLUTION ORAL at 18:22

## 2022-10-13 RX ADMIN — MIDODRINE HYDROCHLORIDE 20 MG: 5 TABLET ORAL at 18:35

## 2022-10-13 RX ADMIN — DIGOXIN 0.06 MG: 125 TABLET ORAL at 10:59

## 2022-10-13 RX ADMIN — PANTOPRAZOLE SODIUM 40 MG: 40 INJECTION, POWDER, FOR SOLUTION INTRAVENOUS at 10:53

## 2022-10-13 RX ADMIN — MIDODRINE HYDROCHLORIDE 20 MG: 5 TABLET ORAL at 13:52

## 2022-10-13 RX ADMIN — MEROPENEM 1 G: 1 INJECTION, POWDER, FOR SOLUTION INTRAVENOUS at 06:32

## 2022-10-13 RX ADMIN — TRAZODONE HYDROCHLORIDE 50 MG: 50 TABLET ORAL at 21:53

## 2022-10-13 RX ADMIN — AMIODARONE HYDROCHLORIDE 200 MG: 200 TABLET ORAL at 10:57

## 2022-10-13 RX ADMIN — Medication: at 22:00

## 2022-10-13 RX ADMIN — VANCOMYCIN HYDROCHLORIDE 500 MG: 500 INJECTION, POWDER, LYOPHILIZED, FOR SOLUTION INTRAVENOUS at 18:32

## 2022-10-13 RX ADMIN — Medication 100 MG: at 10:53

## 2022-10-13 RX ADMIN — SODIUM CHLORIDE 100 MG: 9 INJECTION, SOLUTION INTRAVENOUS at 15:58

## 2022-10-13 RX ADMIN — Medication: at 18:21

## 2022-10-13 RX ADMIN — THERA TABS 1 TABLET: TAB at 10:53

## 2022-10-13 RX ADMIN — AMIODARONE HYDROCHLORIDE 200 MG: 200 TABLET ORAL at 18:20

## 2022-10-13 RX ADMIN — MEROPENEM 1 G: 1 INJECTION, POWDER, FOR SOLUTION INTRAVENOUS at 21:53

## 2022-10-13 RX ADMIN — SENNOSIDES AND DOCUSATE SODIUM 2 TABLET: 50; 8.6 TABLET ORAL at 10:54

## 2022-10-13 RX ADMIN — MIDODRINE HYDROCHLORIDE 20 MG: 5 TABLET ORAL at 11:07

## 2022-10-13 NOTE — PROGRESS NOTES
Hematology-Oncology Progress Note    Rafi Floyd  1945  [id number]  10/13/2022    Follow-up for: metastatic adenocarcinoam     [x]        Chart notes since last visit reviewed   [x]        Medications reviewed for allergies and interactions       Case discussed with the following:         []                            []        Nursing Staff                                                                         []        Pathologist                                                                        []        FAMILY      Subjective:     Spoke with patient who complains of: pt is very weak, despondent. Her pain is better controlled .     Objective:   Patient Vitals for the past 24 hrs:   BP Temp Pulse Resp SpO2   10/13/22 1022 -- -- 78 -- --   10/13/22 0815 124/64 -- 74 19 100 %   10/13/22 0730 116/61 -- 72 14 97 %   10/13/22 0600 (!) 112/58 -- 77 20 99 %   10/13/22 0430 (!) 111/57 -- 73 16 97 %   10/13/22 0400 -- -- 73 -- --   10/13/22 0200 -- -- 69 -- --   10/12/22 2330 125/68 97.8 °F (36.6 °C) 72 16 100 %   10/12/22 2206 -- -- -- -- 96 %   10/12/22 2200 -- -- 92 -- --   10/12/22 2100 115/71 98.6 °F (37 °C) 95 17 96 %   10/12/22 2000 -- -- 99 -- 96 %   10/12/22 1849 102/78 -- 97 22 97 %   10/12/22 1801 101/60 -- 86 -- --   10/12/22 1500 106/66 -- 85 19 98 %   10/12/22 1400 -- -- 80 -- --   10/12/22 1237 94/61 -- 83 -- --   10/12/22 1200 -- -- 81 -- --   10/12/22 1151 (!) 107/56 97.5 °F (36.4 °C) 71 12 99 %   10/12/22 1120 -- -- -- -- 97 %         REVIEW OF SYSTEMS:    Constitutional: negative fever, negative chills, negative weight loss  Eyes:   negative visual changes  ENT:   negative sore throat, tongue or lip swelling  Respiratory:  negative cough, negative dyspnea  Cards:  negative for chest pain, palpitations, lower extremity edema  GI:   negative for nausea, vomiting, diarrhea, and abdominal pain  Neuro:  negative for headaches, dizziness, vertigo  []                        Full ROS o/w normal/non contributor    Constitutional:  Patient looks  []        Sick  [x]        Frail  []        Better                                                 []        Depressed    HEENT:  [x]   NC                         []   AT               []    ALOPECIA           Eyes: [x]   Normal               []    Icteric  Oropharynx: []    Normal                  []  Thrush               []   Dry  Mucositis: [x]    None                 Grade: []        I  []        II  []        III  []        IV  Neck:   [x]   Supple                  []  Rigid               JVD:    []   ABSENT       []   PRESENT  Lymphadenopathy:   []   None Noted            []   PRESENT    Chest:  [x]   Clear               []    Rhonchi                      Dec'd @     []  Right Base           []   Left Base    CV:             []   Regular              []  Irregular               [x]   Tachy                []   Murmur  Abdominal:   [x]    Soft              []   NON-tender               []   Tender      BS:    []   ABSENT                   []   PRESENT  Liver:     [x]  NON-palp                  []   EDGE- palp  Spleen: [x]   NON-palp                   []  EDGE - palp  Mass:   [x]   ABSENT                          []  PRESENT  Extr:    []  Lymphedema             []   Cyanosis      []  Clubbing  Edema:     [x]   NONE       []   PRESENT  Skin:  Intact [x]           Purpura []        Rash: []   ABSENT       []  PRESENT  Neuro:  [x]        Normal  []        Confused      Available labs reviewed:  Labs:    No results found for this or any previous visit (from the past 24 hour(s)). Available Xrays reviewed:    Chemotherapy monitored and toxicities assessed:    Assessment and Plan   Metastatic adenocarcinoma. .  I discussed this case with Dr. Higinio Hutson who did a jennifer-colectomy for a large 6cm dysplastic polyp (zero of 29nodes)  in February 2022, and he noted that the original colonoscopy did not get past the polyp,  current ct scan worrisome for a cecal malignancy. 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 4.  Disposition. . she seems to be accepting of her fate. Wants to see her son,  palliative is seeing her.    Please re-consult if we can be of assistance            Lakhwinder Crowder MD

## 2022-10-13 NOTE — PROGRESS NOTES
Infectious Disease Clinic Note        HPI:   Ms Linnea Haynes seen  Says she has pain all over         Medications:  No current facility-administered medications on file prior to encounter. Current Outpatient Medications on File Prior to Encounter   Medication Sig Dispense Refill    dicyclomine (BENTYL) 10 mg capsule Take 1 Capsule by mouth four (4) times daily. 1 Capsule 0    ondansetron (ZOFRAN ODT) 4 mg disintegrating tablet Take 1 Tablet by mouth every eight (8) hours as needed for Nausea or Vomiting. 1 Tablet 0    polyethylene glycol (MIRALAX) 17 gram packet Take 1 Packet by mouth every twelve (12) hours. 1 Each 0    simethicone (MYLICON) 80 mg chewable tablet Take 1 Tablet by mouth four (4) times daily as needed for GI Pain. 2 Tablet 0    lubiPROStone (AMITIZA) 24 mcg capsule Take 1 Capsule by mouth two (2) times daily (with meals) for 30 days. Recommended by GI service 10 Capsule 0    butalbital-acetaminophen-caffeine (FIORICET, ESGIC) -40 mg per tablet Take 2 Tablets by mouth two (2) times daily as needed for Headache or Migraine. 6 Tablet 0    digoxin (LANOXIN) 0.125 mg tablet Take 0.125 mg by mouth daily. metoprolol tartrate (LOPRESSOR) 25 mg tablet Take 12.5 mg by mouth two (2) times a day. cyclobenzaprine (FLEXERIL) 10 mg tablet Take 10 mg by mouth three (3) times daily as needed. aspirin 81 mg chewable tablet Take 81 mg by mouth daily. multivitamin (ONE A DAY) tablet Take 1 Tablet by mouth daily. acetaminophen (TYLENOL) 325 mg tablet Take 650 mg by mouth every four (4) hours as needed for Pain. albuterol (PROVENTIL HFA, VENTOLIN HFA, PROAIR HFA) 90 mcg/actuation inhaler Take 2 Puffs by inhalation every six (6) hours as needed. pantoprazole (PROTONIX) 40 mg tablet Take 40 mg by mouth daily.              Physical Exam:    Vitals: Patient Vitals for the past 24 hrs:   Temp Pulse Resp BP SpO2   10/13/22 1214 -- 84 -- -- --   10/13/22 1144 -- 79 16 125/61 100 % 10/13/22 1057 -- 78 -- 123/61 --   10/13/22 1022 -- 78 -- -- --   10/13/22 0815 -- 74 19 124/64 100 %   10/13/22 0730 -- 72 14 116/61 97 %   10/13/22 0600 -- 77 20 (!) 112/58 99 %   10/13/22 0430 -- 73 16 (!) 111/57 97 %   10/13/22 0400 -- 73 -- -- --   10/13/22 0200 -- 69 -- -- --   10/12/22 2330 97.8 °F (36.6 °C) 72 16 125/68 100 %   10/12/22 2206 -- -- -- -- 96 %   10/12/22 2200 -- 92 -- -- --   10/12/22 2100 98.6 °F (37 °C) 95 17 115/71 96 %   10/12/22 2000 -- 99 -- -- 96 %   10/12/22 1849 -- 97 22 102/78 97 %   10/12/22 1801 -- 86 -- 101/60 --   10/12/22 1500 -- 85 19 106/66 98 %   10/12/22 1400 -- 80 -- -- --   10/12/22 1237 -- 83 -- 94/61 --     GEN: NAD,   CV S1 2 reg   Lungs: Nonlabored  Abdomen: soft, distended, tender Rside   Extremities: no edema  Neuro:awake alert to self, minimally verbal   Skin: no rash on face, extremities,       Labs:   No results found for this or any previous visit (from the past 24 hour(s)).       Microbiology Data:       Blood: negative        Urine:  Contains abnormal data CULTURE, URINE  Order: [id number]  Collected 9/28/2022 10:37    Status: Final result    Specimen Information: Clean catch; Urine   0 Result Notes  Component Ref Range & Units 9/28/22 1037    Special Requests:   NO SPECIAL REQUESTS    [place] Count   >100,000   COLONIES/mL    Culture result:   ESCHERICHIA COLI Abnormal     [facility]        Susceptibility     Escherichia coli     OJBY     Amikacin ($) Susceptible     Ampicillin ($) Intermediate     Ampicillin/sulbactam ($) Susceptible     Cefazolin ($) Susceptible     Cefepime ($$) Susceptible     Cefoxitin Susceptible     Ceftazidime ($) Susceptible     Ceftriaxone ($) Susceptible     Ciprofloxacin ($) Susceptible     Gentamicin ($) Susceptible     Levofloxacin ($) Susceptible     Meropenem ($$) Susceptible     Nitrofurantoin Susceptible     Piperacillin/Tazobac ($) Susceptible     Tobramycin ($) Susceptible Imaging:     CT ABD PELV W CONT: Patient Communication     Add Comments   Not seen     Study Result    Narrative & Impression   EXAM: CT ABD PELV W CONT     INDICATION: Diffuse abdominal pain and diarrhea. Rectal constipation earlier  this month. Nonspecific liver disease on previous imaging. Biliary dilatation on  previous imaging. Elevated alkaline phosphatase. Normal white blood cell count,  bilirubin, lipase. COMPARISON: CT abdomen/pelvis on 9/12/2022. CONTRAST: 100 mL of Isovue-370. ORAL CONTRAST: None     TECHNIQUE:   Following the uneventful intravenous administration of contrast, thin axial  images were obtained through the abdomen and pelvis. Coronal and sagittal  reconstructions were generated. CT dose reduction was achieved through use of a  standardized protocol tailored for this examination and automatic exposure  control for dose modulation. FINDINGS:   LOWER THORAX: Small right pleural effusion is new. No basilar pneumonia. Normal  cardiac size. LIVER: Segment 7/8 subcapsular collection of heterogeneous fluid measures 9.3 x  6.9 x 3.5 cm. Heterogeneous septated fluid attenuation throughout the right  hepatic lobe measures approximately 13 cm in oblique AP diameter. Central right  hepatic lobe peripherally enhancing lesion previously measured 1.9 cm and now  measures 2.4 cm. Unchanged mild intrahepatic biliary dilatation. BILIARY TREE: Cholecystectomy. Unchanged chronic biliary dilatation. No abrupt  termination. SPLEEN: Normal size. Lobulated contour. PANCREAS: Mild diffuse atrophy. No mass or inflammation. ADRENALS: Unremarkable. KIDNEYS: No hydronephrosis. Heterogeneous small multiple cysts. STOMACH: Unremarkable. SMALL BOWEL: No dilatation or wall thickening. COLON: Incomplete distention, limited evaluation. Fluid-filled cecum is in the  pelvis. APPENDIX: Normal.  PERITONEUM: No ascites or pneumoperitoneum.   RETROPERITONEUM: Aortic atherosclerosis without aneurysm. 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 No lymphadenopathy. REPRODUCTIVE ORGANS: Hysterectomy. URINARY BLADDER: Incomplete distention, limited evaluation. BONES: Osteopenia. Femoral hardware. Spinal curvature. T11 partial compression  fracture. ABDOMINAL WALL: No mass or hernia. ADDITIONAL COMMENTS: Diffuse muscle atrophy. IMPRESSION     1. Progression of hepatic lesions and new large fluid collections in the liver. Intrahepatic abscesses from nonspecific infection are most likely. Consider  fluid sampling or drainage with CT guidance. 2. New small right pleural effusion. 3. Fluid-filled cecum may indicate infectious colitis. No bowel obstruction. Assessment / Plan:       1) Hepatic collections:     of liver biopsy on 9/15 + for malignant cells  CT repeated 10/11/22 \" The right hepatic lobe contains multiple hyperdense collections which  appear to be connected. Largest axial measurement is 10.4 x 6.7 cm, not  significantly changed. Cranially there is communication with a hyperdense 8.4 x  5.6 cm hepatic dome collection, 3-13. Caudally, there is communication with a  small subcapsular collection in the inferior right hepatic lobe, 3-29. Overall  progression of multiple hepatic hypodensities compared to 9/12/2022. For example  2.4 x 2.4 cm segment 5 hepatic lesion previously measured 1.9 x 1.6 cm. Multiple  new lesions in the left hepatic lobe, for example 9 mm segment 2 lesion, 3-16. Plan:   Given liver biopsy history , immune compromised if not at least immunomodulated state, at risk for superimposed infection in addition to concern for hematoma    CT wt new lesions lesions in the L lobe -- would expect some drop in HBG wt forming new hematomas  ? ? On Vancomycin and Meropenem, IV anidulafungin  IV empirically     Noted plans for IR attempt to aspirate /drain and discussed with primary team yesterday.      Ms Jain Saygretchen looks very frail and appreciate palliative's involvement. Per team's discussion she is awaiting to see her son to make decision for hospice           2) copd  3) metastatic adenocarcinoma, liver involvement   4) tubular adenoma colon   5) seizure hx per chart             Thank for the opportunity to participate in the care of this patient. Please contact with questions or concerns.        Alexsandra Clark,   12:20 PM

## 2022-10-13 NOTE — PROGRESS NOTES
[facility] Adult  Hospitalist Group      Brief HPI and Hospital Course:      Kane Robertson is a 77 y.o. female is brought to the ED via EMS for abdominal pain and nausea. She also reported watery diarrhea. Patient was recently hospitalized from 9/12-9/16 for abdominal pain and constipation and was found to have metastatic disease. She underwent liver biopsy which showed adenocarcinoma. Patient denied fever or chills. She denied dysuria, urgency or frequency. Work-up in the ED was significant for an abnormal abdominal pelvic CT that showed progression of hepatic lesions with a new large fluid collection in the liver with suggestion that this could be intrahepatic abscess. There are also fluid-filled cecum. Right pleural effusion. Patient was started on levofloxacin and Flagyl and was referred to the hospitalist service for admission evaluation.     Subjective:    10/12:  Patient transferred out of the ICU to [facility];     CT abd/pelviconsulted IR for CT guided drain age of liver lesions tomorrow  Appreciate input from consultants, restarted digoxin - held amiodarone due to low BP    Assessment and Plan:    Neuro- some ICU delirium- resolved; but occasional periods of confusion when she get Valium     Cardiac:  continue hemodynamic monitoring, MAP goal greater than 65, on midodrine;  A. fib RVR, heart rate goal less than 110,;  keep magnesium above 2 and potassium above 4;  cardiology adjusting cardiac meds;  EF of 25 to 30% with severe hypokinesis of the mid to distal portions of the anterior, lateral and inferior walls with akinesis at the apex with some RV dysfunction as well-CAD versus Takotsubo;  Plan to get a repeat echo in a few days;  Digoxin on hold due to elevated levels    Hypomagnesemia-resolved after IV magnesium;       Pulmonary:  supplemental oxygen as needed;  presumed COPD, standing bronchodilator/steroid therapy for now, saturation goal 88 to 94%  Stable oxygen levels on 2-3 L via nasal cannula     GI:  diet as tolerated,;  PPI therapy-Home med     Renal:  monitor urine output;  corrected electrolyte derangements;  Normal creatinine, minimally elevated BUN     Heme/Onc:  liver mass biopsy positive for adenoCA that is not [address]  Recent (Feb 2022) jennifer-colectomy for a large 6cm dysplastic polyp (zero of 29nodes);  CT abdo/pelvis on admission worrisome for a cecal malignancy. Liver mass are concerning for metastatic colon cancer. Appreciate Heme/Onc consult and recommendations; Poor candidate for any aggressive therapy; ID:  hepatic abscesses versus organized hematoma;  continue vancomycin/Flagyl/Doxy for now;  follow-up cultures;   ID consulted, follow-up recommendations; Will need a repeat CT abd/pelvis to look at evolution of liver lesions; Consulted IR for CT guided drainage of fluid collections: if bloody- will send for culture; if purulent- they will try to place drain tube    Elevated LFTs and bilirubin:  due to Shock liver: resolved  Appreciate Hepatology consult and recommendations  the rise in TBILI generally lags behind the rise and peak in liver enzymes by 3-5 days.   metastatic disease to the liver noted  Minimally elevated alk phos persists likely due to cancer/metastatic disease  Trend LFTs and Bili     Endocrinology:  keep euglycemic    Anxiety:  stable on increased valium dose     Long term prognosis remains poor;   Palliative Medicine consulted;   follow-up oncology and palliative care team [address] discussions with patient and NOK      Code status: DNR  Discharge planning: SNF in 3-4 days, sooner if patient is transitioned to Hospice;          PHYSICAL EXAMINATION:  Visit Vitals  /70 (BP Patient Position: At rest)   Pulse 93   Temp 97.8 °F (36.6 °C)   Resp 18   Ht 5' 3\" (1.6 m)   Wt 52.1 kg (114 lb 13.8 oz)   SpO2 98%   BMI 20.35 kg/m²     Patient Vitals for the past 24 hrs:   Temp Pulse Resp BP SpO2   10/13/22 1759 -- 93 -- -- --   10/13/22 1720 -- 89 18 123/70 98 %   10/13/22 1418 -- 82 -- -- --   10/13/22 1214 -- 84 -- -- --   10/13/22 1144 -- 79 16 125/61 100 %   10/13/22 1057 -- 78 -- 123/61 --   10/13/22 1022 -- 78 -- -- --   10/13/22 0815 -- 74 19 124/64 100 %   10/13/22 0730 -- 72 14 116/61 97 %   10/13/22 0600 -- 77 20 (!) 112/58 99 %   10/13/22 0430 -- 73 16 (!) 111/57 97 %   10/13/22 0400 -- 73 -- -- --   10/13/22 0200 -- 69 -- -- --   10/12/22 2330 97.8 °F (36.6 °C) 72 16 125/68 100 %   10/12/22 2206 -- -- -- -- 96 %   10/12/22 2200 -- 92 -- -- --   10/12/22 2100 98.6 °F (37 °C) 95 17 115/71 96 %   10/12/22 2000 -- 99 -- -- 96 %   10/12/22 1849 -- 97 22 102/78 97 %        General:          sleepy, cooperative, weak and chronically ill appearing  HEENT:           Atraumatic, MMM , O2 via NC           Neck:               Supple, symmetrical,  thyroid: non tender  Lungs:             decreased breath sounds bilaterally. No Wheezing or Rhonchi. No rales. Heart:              Regular  rhythm,  3/6 Systolic ejection  murmur    Abdomen:       Soft, non-tender. Not distended. Bowel sounds normal  Extremities:     No cyanosis. No clubbing,  +2 distal pulses, edema in all 4 ext sky upper ext  Skin:                Not pale.   Not Jaundiced  No rashes   Psych:             anxious  Neurologic:      Alert, severe generalized weakness,      Labs:     Recent Labs     10/13/22  1424 10/12/22  0233   WBC 15.2* 9.6   HGB 10.9* 10.2*   HCT 33.5* 31.0*    288       Recent Labs     10/13/22  1424 10/12/22  0233 10/11/22  0508   * 135* 135*   K 3.8 3.8 4.6    106 106   CO2 26 26 25   BUN 13 18 22*   CREA 0.72 0.67 0.61   GLU 82 120* 132*   CA 8.3* 8.0* 8.1*   MG  --   --  1.9       Recent Labs     10/13/22  1424 10/11/22  0508   ALT 49 50   * 162*   TBILI 0.6 0.5   TP 5.3* 4.3*   ALB 2.6* 2.3*   GLOB 2.7 2.0       Recent Labs     10/13/22  1424   INR 1.3*   PTP 13.0*        No results for input(s): FE, TIBC, PSAT, FERR in the last 72 hours. Lab Results   Component Value Date/Time    Folate 27.1 (H) 08/22/2022 03:55 AM        No results for input(s): PH, PCO2, PO2 in the last 72 hours. No results for input(s): CPK, CKNDX, TROIQ in the last 72 hours.     No lab exists for component: CPKMB  Lab Results   Component Value Date/Time    Cholesterol, total 141 08/22/2022 03:55 AM    HDL Cholesterol 57 08/22/2022 03:55 AM    LDL, calculated 71.6 08/22/2022 03:55 AM    Triglyceride 62 08/22/2022 03:55 AM    CHOL/HDL Ratio 2.5 08/22/2022 03:55 AM     Lab Results   Component Value Date/Time    Glucose (POC) 145 (H) 02/14/2022 09:01 PM     Lab Results   Component Value Date/Time    Color YELLOW/STRAW 10/03/2022 04:57 PM    Appearance CLEAR 10/03/2022 04:57 PM    Specific gravity 1.008 10/03/2022 04:57 PM    Specific gravity 1.010 09/28/2022 10:37 AM    pH (UA) 6.5 10/03/2022 04:57 PM    Protein Negative 10/03/2022 04:57 PM    Glucose Negative 10/03/2022 04:57 PM    Ketone Negative 10/03/2022 04:57 PM    Bilirubin Negative 10/03/2022 04:57 PM    Urobilinogen 0.2 10/03/2022 04:57 PM    Nitrites Negative 10/03/2022 04:57 PM    Leukocyte Esterase Negative 10/03/2022 04:57 PM    Epithelial cells FEW 10/03/2022 04:57 PM    Bacteria Negative 10/03/2022 04:57 PM    WBC 0-4 10/03/2022 04:57 PM    RBC 0-5 10/03/2022 04:57 PM       Current Facility-Administered Medications:     [START ON 10/14/2022] vancomycin random level 10/14 with AM labs , , Other, ONCE, Yeimy Mcallister MD    anidulafungin (ERAXIS) 100 mg in 0.9% sodium chloride 130 mL IVPB, 100 mg, IntraVENous, Q24H, Alexsandra Clark, DO, Last Rate: 83 mL/hr at 10/13/22 1558, 100 mg at 10/13/22 1558    albuterol-ipratropium (DUO-NEB) 2.5 MG-0.5 MG/3 ML, 3 mL, Nebulization, Q6H PRN, Pravin Tipton MD    amiodarone (CORDARONE) tablet 200 mg, 200 mg, Oral, BID, Manda Mercado NP, 200 mg at 10/13/22 1057    [START ON 10/27/2022] amiodarone (CORDARONE) tablet 200 mg, 200 mg, Oral, DAILY, Santosh CONROY NP balsam peru-castor oiL (VENELEX) ointment, , Topical, TID, Cornelia Ramírez MD, Given at 10/13/22 1056    dextrose 5 % - 0.45% NaCl infusion, 50 mL/hr, IntraVENous, CONTINUOUS, Cornelia Ramírez MD, Last Rate: 50 mL/hr at 10/12/22 2356, 50 mL/hr at 10/12/22 2356    digoxin (LANOXIN) tablet 0.0625 mg, 0.0625 mg, Oral, DAILY, Rayray Mercado, NP, 0.0625 mg at 10/13/22 1059    ondansetron (ZOFRAN ODT) tablet 4 mg, 4 mg, Oral, Q8H PRN **OR** ondansetron (ZOFRAN) injection 4 mg, 4 mg, IntraVENous, Q4H PRN, Cornelia Ramírez MD    simethicone (MYLICON) tablet 80 mg, 80 mg, Oral, QID PRN, Cornelia Ramírez MD, 80 mg at 10/08/22 0323    butalbital-acetaminophen-caffeine (FIORICET, ESGIC) -40 mg per tablet 2 Tablet, 2 Tablet, Oral, Q6H PRN, Melecio BOSCH NP, 2 Tablet at 10/12/22 1248    metoprolol tartrate (LOPRESSOR) tablet 25 mg, 25 mg, Oral, Q12H, Rayray Mercado, NP, 25 mg at 10/12/22 2106    traZODone (DESYREL) tablet 50 mg, 50 mg, Oral, QHS, Niranjan De Los Santos MD, 50 mg at 10/12/22 2106    meropenem (MERREM) 1 g in 0.9% sodium chloride (MBP/ADV) 50 mL MBP, 1 g, IntraVENous, Q8H, Cornelia Ramírez MD, Last Rate: 16.7 mL/hr at 10/13/22 1351, 1 g at 10/13/22 1351    diazePAM (VALIUM) tablet 5 mg, 5 mg, Oral, Q6H PRN, Melecio BOSCH NP, 5 mg at 10/11/22 2132    HYDROmorphone (DILAUDID) injection 0.2 mg, 0.2 mg, IntraVENous, Q3H PRN, Melecio BOSCH NP, 0.2 mg at 10/12/22 0307    lidocaine 4 % patch 1 Patch, 1 Patch, TransDERmal, Q24H, Cornelia Ramírez MD, 1 Patch at 10/12/22 1801    alteplase (CATHFLO) 1 mg in sterile water (preservative free) 1 mL injection, 1 mg, InterCATHeter, PRN, Devon BOSCH MD    therapeutic multivitamin (THERAGRAN) tablet 1 Tablet, 1 Tablet, Oral, DAILY, Niranjan De Los Santos MD, 1 Tablet at 10/13/22 1053    polyethylene glycol (MIRALAX) packet 17 g, 17 g, Oral, BID, Magaly Caicedo NP, 17 g at 10/13/22 1053    magnesium hydroxide (MILK OF MAGNESIA) 400 mg/5 mL oral suspension 30 mL, 30 mL, Oral, DAILY PRN, Gloria BOSCH NP    predniSONE (DELTASONE) tablet 40 mg, 40 mg, Oral, DAILY WITH BREAKFAST, Niranjan De Los Santos MD, 40 mg at 10/13/22 1352    phenol throat spray (CHLORASEPTIC) 1 Spray, 1 Spray, Oral, PRN, Rosana Flores, JOSEPHINE    senna-docusate (PERICOLACE) 8.6-50 mg per tablet 2 Tablet, 2 Tablet, Oral, DAILY, Gloria BOSCH NP, 2 Tablet at 10/13/22 1054    midodrine (PROAMATINE) tablet 20 mg, 20 mg, Oral, TID WITH MEALS, Niranjan De Los Santos MD, 20 mg at 10/13/22 1352    Vancomycin - Pharmacy dosing, , Other, Rx Dosing/Monitoring, Roxi Barakat MD    vancomycin (VANCOCIN) 500 mg in 0.9% sodium chloride (MBP/ADV) 100 mL MBP, 500 mg, IntraVENous, Q12H, Roxi Barakat MD, Last Rate: 100 mL/hr at 10/13/22 0728, 500 mg at 10/13/22 8581    thiamine HCL (B-1) tablet 100 mg, 100 mg, Oral, DAILY, Roxi Barakat MD, 100 mg at 10/13/22 1053    0.9% sodium chloride infusion 250 mL, 250 mL, IntraVENous, PRN, Roxi Barakat MD    sodium chloride (NS) flush 5-40 mL, 5-40 mL, IntraVENous, Q8H, CHARLES Velásquez MD, 10 mL at 10/13/22 1601    sodium chloride (NS) flush 5-40 mL, 5-40 mL, IntraVENous, PRN, JORGE LUIS Velásquez MD    acetaminophen (TYLENOL) tablet 650 mg, 650 mg, Oral, Q6H PRN, 650 mg at 10/02/22 2127 **OR** acetaminophen (TYLENOL) suppository 650 mg, 650 mg, Rectal, Q6H PRN, CRISTIAN Velásquez MD    pantoprazole (PROTONIX) 40 mg in 0.9% sodium chloride 10 mL injection, 40 mg, IntraVENous, DAILY, NOAM VelásquezZLALI ALICEA MD, 40 mg at 10/13/22 1053    prochlorperazine (COMPAZINE) injection 10 mg, 10 mg, IntraVENous, Q6H PRN, Jennifer Velásquez MD, 10 mg at 10/08/22 2119     CODE STATUS:   Full Code   X DNR    Partial    Comfort Care       Signed:   Johny Bernard MD  Date of Service:  10/13/2022

## 2022-10-13 NOTE — PROGRESS NOTES
Bedside and Verbal shift change report given to [place], RN (oncoming nurse) by Austin Alcala RN and Mei Peace RN (offgoing nurse). Report included the following information SBAR, Kardex, ED Summary, MAR, Recent Results, Med Rec Status, Cardiac Rhythm NSR, and Quality Measures.

## 2022-10-13 NOTE — PROGRESS NOTES
Problem: Self Care Deficits Care Plan (Adult)  Goal: *Acute Goals and Plan of Care (Insert Text)  Description:   FUNCTIONAL STATUS PRIOR TO ADMISSION: Patient was modified independent using a rollator for functional mobility. Usually did not use any DME at home, reports being independent in ADL/IADL, however per chart son in law assists with IADL    HOME SUPPORT: Lives with son in law who assists as needed (is not home 24/7). Son is recently incarcerated. Update 10/12/22: Patient is considering comfort care though wants to see son before she dies    Occupational Therapy Goals  Weekly re-assessment. Goals downgraded 10/12/22  1. Patient will bring cup to mouth with min assist within 7 days  2. Patient will wash face with mod assist within 7 days  3. Patient will roll R/L with mod assist for bed pan use within 7 days. 4. Patient will tolerate UE A/AROM HEP for >5 min with up to max assist within 7 days. Initiated 10/6/2022- reviewed 10/12/22- no goals met. 1.  Patient will perform grooming with minimal assistance/contact guard assist within 7 day(s). 2.  Patient will perform upper body dressing with minimal assistance/contact guard assist within 7 day(s). 3.  Patient will perform lower body dressing with moderate assistance  within 7 day(s). 4.  Patient will perform all aspects of toileting with moderate assistance  within 7 day(s). 5.  Patient will utilize energy conservation techniques during functional activities with verbal cues within 7 day(s). Outcome: Progressing Towards Goal     OCCUPATIONAL THERAPY TREATMENT  Patient: Derek Givens (53 y.o. female)  Date: 10/13/2022  Diagnosis: Liver abscess [K75.0]  Abdominal pain [R10.9]  Adenocarcinoma ([facility] [address] [C80.1] <principal problem not specified>  Procedure(s) (LRB):  INFUSION CATHETER INSERTION (N/A) 10 Days Post-Op  Precautions: Fall  Chart, occupational therapy assessment, plan of care, and goals were reviewed.     ASSESSMENT  Patient continues with skilled OT services and is progressing towards goals. Pt's performance of ADL/IADL tasks continues to be limited at this time by impaired balance (sitting), activity tolerance, pain, BUE edema (RUE weeping), and cognition (safety awareness, insight, orientation, attention to task). Pt received supine and amenable to participation in therapy session. Pt tolerated sitting EOB with BUE support and intermittent CGA-min A to correct posterior lean. She refused participation in all ADLs other than self-feeding of which she required max A (for medication administration, coordinated with nursing). Pt left with bed in modified chair position with all needs met. RN notified of session. Current Level of Function Impacting Discharge (ADLs): up to total A, max A bed mobility, max A self-feeding     Other factors to consider for discharge: PLOF, potential hospice          PLAN :  Patient continues to benefit from skilled intervention to address the above impairments. Continue treatment per established plan of care to address goals. Recommendation for discharge: (in order for the patient to meet his/her long term goals)  Therapy up to 5 days/week in SNF setting    This discharge recommendation:  Has been made in collaboration with the attending provider and/or case management    IF patient discharges home will need the following DME: TBD       SUBJECTIVE:   Patient stated If I take a nap I'll die.     OBJECTIVE DATA SUMMARY:   Cognitive/Behavioral Status:  Neurologic State: Alert  Orientation Level: Oriented X4  Cognition: Follows commands  Perception: Appears intact  Perseveration: Perseverates during conversation;Perseverates during ADLS;Perseverates during mobility  Safety/Judgement: Decreased awareness of environment;Decreased awareness of need for assistance;Decreased awareness of need for safety;Decreased insight into deficits    Functional Mobility and Transfers for ADLs:  Bed Mobility:  Supine to Sit: Maximum assistance  Sit to Supine: Maximum assistance  Scooting: Maximum assistance (to EOB)    Transfers:             Balance:  Sitting: Impaired  Sitting - Static: Fair (occasional)  Sitting - Dynamic: Fair (occasional); Poor (constant support)    ADL Intervention:  Feeding  Feeding Assistance: Maximum assistance  Container Management: Maximum assistance  Food to Mouth: Moderate assistance  Drink to Mouth: Moderate assistance ([place] distal and proximal support required)              Type of Bath: Chlorhexidine (CHG)                        Cognitive Retraining  Safety/Judgement: Decreased awareness of environment;Decreased awareness of need for assistance;Decreased awareness of need for safety;Decreased insight into deficits    Pain:  Pt intermittently reporting back pain     Activity Tolerance:   Fair    After treatment patient left in no apparent distress:   Heels elevated for pressure relief, Call bell within reach, Bed / chair alarm activated, and Side rails x 3, bed in modified chair position     COMMUNICATION/COLLABORATION:   The patients plan of care was discussed with: Registered nurse.      ZANE Kamara, OTR/L  Time Calculation: 32 mins

## 2022-10-14 ENCOUNTER — APPOINTMENT (OUTPATIENT)
Dept: CT IMAGING | Age: 77
DRG: 435 | End: 2022-10-14
Attending: INTERNAL MEDICINE
Payer: MEDICARE

## 2022-10-14 LAB
ANION GAP SERPL CALC-SCNC: 9 MMOL/L (ref 5–15)
APPEARANCE FLD: ABNORMAL
BASOPHILS # BLD: 0 K/UL (ref 0–0.1)
BASOPHILS NFR BLD: 0 % (ref 0–1)
BUN SERPL-MCNC: 13 MG/DL (ref 6–20)
BUN/CREAT SERPL: 24 (ref 12–20)
CALCIUM SERPL-MCNC: 8.3 MG/DL (ref 8.5–10.1)
CHLORIDE SERPL-SCNC: 103 MMOL/L (ref 97–108)
CO2 SERPL-SCNC: 23 MMOL/L (ref 21–32)
COLOR FLD: ABNORMAL
CREAT SERPL-MCNC: 0.55 MG/DL (ref 0.55–1.02)
DIFFERENTIAL METHOD BLD: ABNORMAL
DIGOXIN SERPL-MCNC: 1.1 NG/ML (ref 0.9–2)
EOSINOPHIL # BLD: 0 K/UL (ref 0–0.4)
EOSINOPHIL NFR BLD: 0 % (ref 0–7)
ERYTHROCYTE [DISTWIDTH] IN BLOOD BY AUTOMATED COUNT: 23.4 % (ref 11.5–14.5)
GLUCOSE SERPL-MCNC: 86 MG/DL (ref 65–100)
HCT VFR BLD AUTO: 32.4 % (ref 35–47)
HGB BLD-MCNC: 10.4 G/DL (ref 11.5–16)
IMM GRANULOCYTES # BLD AUTO: 0.1 K/UL (ref 0–0.04)
IMM GRANULOCYTES NFR BLD AUTO: 1 % (ref 0–0.5)
LYMPHOCYTES # BLD: 0.3 K/UL (ref 0.8–3.5)
LYMPHOCYTES NFR BLD: 2 % (ref 12–49)
LYMPHOCYTES NFR FLD: 1 %
MAGNESIUM SERPL-MCNC: 1.7 MG/DL (ref 1.6–2.4)
MCH RBC QN AUTO: 26.9 PG (ref 26–34)
MCHC RBC AUTO-ENTMCNC: 32.1 G/DL (ref 30–36.5)
MCV RBC AUTO: 83.9 FL (ref 80–99)
MONOCYTES # BLD: 0.8 K/UL (ref 0–1)
MONOCYTES NFR BLD: 6 % (ref 5–13)
MONOS+MACROS NFR FLD: 1 %
NEUTROPHILS NFR FLD: 98 %
NEUTS SEG # BLD: 11.7 K/UL (ref 1.8–8)
NEUTS SEG NFR BLD: 91 % (ref 32–75)
NRBC # BLD: 0.05 K/UL (ref 0–0.01)
NRBC BLD-RTO: 0.4 PER 100 WBC
NUC CELL # FLD: 4449 /CU MM
PHOSPHATE SERPL-MCNC: 2.2 MG/DL (ref 2.6–4.7)
PLATELET # BLD AUTO: 290 K/UL (ref 150–400)
PLATELET COMMENTS,PCOM: ABNORMAL
PMV BLD AUTO: 10.3 FL (ref 8.9–12.9)
POTASSIUM SERPL-SCNC: 3.2 MMOL/L (ref 3.5–5.1)
RBC # BLD AUTO: 3.86 M/UL (ref 3.8–5.2)
RBC # FLD: >100 /CU MM
RBC MORPH BLD: ABNORMAL
SODIUM SERPL-SCNC: 135 MMOL/L (ref 136–145)
SPECIMEN SOURCE FLD: ABNORMAL
VANCOMYCIN SERPL-MCNC: 19.7 UG/ML
WBC # BLD AUTO: 12.9 K/UL (ref 3.6–11)

## 2022-10-14 PROCEDURE — 74011000250 HC RX REV CODE- 250

## 2022-10-14 PROCEDURE — 65270000046 HC RM TELEMETRY

## 2022-10-14 PROCEDURE — C9113 INJ PANTOPRAZOLE SODIUM, VIA: HCPCS | Performed by: HOSPITALIST

## 2022-10-14 PROCEDURE — 74011636637 HC RX REV CODE- 636/637: Performed by: EMERGENCY MEDICINE

## 2022-10-14 PROCEDURE — 89050 BODY FLUID CELL COUNT: CPT

## 2022-10-14 PROCEDURE — 99232 SBSQ HOSP IP/OBS MODERATE 35: CPT | Performed by: INTERNAL MEDICINE

## 2022-10-14 PROCEDURE — 74011250637 HC RX REV CODE- 250/637: Performed by: NURSE PRACTITIONER

## 2022-10-14 PROCEDURE — 74011000258 HC RX REV CODE- 258: Performed by: FAMILY MEDICINE

## 2022-10-14 PROCEDURE — 83735 ASSAY OF MAGNESIUM: CPT

## 2022-10-14 PROCEDURE — 74011250636 HC RX REV CODE- 250/636: Performed by: INTERNAL MEDICINE

## 2022-10-14 PROCEDURE — 88112 CYTOPATH CELL ENHANCE TECH: CPT

## 2022-10-14 PROCEDURE — 74011250636 HC RX REV CODE- 250/636: Performed by: HOSPITALIST

## 2022-10-14 PROCEDURE — 80048 BASIC METABOLIC PNL TOTAL CA: CPT

## 2022-10-14 PROCEDURE — 87075 CULTR BACTERIA EXCEPT BLOOD: CPT

## 2022-10-14 PROCEDURE — 74011000258 HC RX REV CODE- 258: Performed by: STUDENT IN AN ORGANIZED HEALTH CARE EDUCATION/TRAINING PROGRAM

## 2022-10-14 PROCEDURE — 74011000250 HC RX REV CODE- 250: Performed by: FAMILY MEDICINE

## 2022-10-14 PROCEDURE — 87205 SMEAR GRAM STAIN: CPT

## 2022-10-14 PROCEDURE — 74011250636 HC RX REV CODE- 250/636: Performed by: FAMILY MEDICINE

## 2022-10-14 PROCEDURE — 87102 FUNGUS ISOLATION CULTURE: CPT

## 2022-10-14 PROCEDURE — 74011000258 HC RX REV CODE- 258: Performed by: INTERNAL MEDICINE

## 2022-10-14 PROCEDURE — 74011250637 HC RX REV CODE- 250/637: Performed by: STUDENT IN AN ORGANIZED HEALTH CARE EDUCATION/TRAINING PROGRAM

## 2022-10-14 PROCEDURE — 87116 MYCOBACTERIA CULTURE: CPT

## 2022-10-14 PROCEDURE — 80202 ASSAY OF VANCOMYCIN: CPT

## 2022-10-14 PROCEDURE — 36415 COLL VENOUS BLD VENIPUNCTURE: CPT

## 2022-10-14 PROCEDURE — 84100 ASSAY OF PHOSPHORUS: CPT

## 2022-10-14 PROCEDURE — 80162 ASSAY OF DIGOXIN TOTAL: CPT

## 2022-10-14 PROCEDURE — 74011250636 HC RX REV CODE- 250/636: Performed by: PHYSICIAN ASSISTANT

## 2022-10-14 PROCEDURE — 85025 COMPLETE CBC W/AUTO DIFF WBC: CPT

## 2022-10-14 PROCEDURE — 74011250636 HC RX REV CODE- 250/636: Performed by: STUDENT IN AN ORGANIZED HEALTH CARE EDUCATION/TRAINING PROGRAM

## 2022-10-14 PROCEDURE — 74011000250 HC RX REV CODE- 250: Performed by: HOSPITALIST

## 2022-10-14 PROCEDURE — 74011250637 HC RX REV CODE- 250/637: Performed by: EMERGENCY MEDICINE

## 2022-10-14 PROCEDURE — 99152 MOD SED SAME PHYS/QHP 5/>YRS: CPT

## 2022-10-14 PROCEDURE — 97530 THERAPEUTIC ACTIVITIES: CPT

## 2022-10-14 RX ORDER — FLUMAZENIL 0.1 MG/ML
0.5 INJECTION INTRAVENOUS
Status: DISCONTINUED | OUTPATIENT
Start: 2022-10-14 | End: 2022-10-14

## 2022-10-14 RX ORDER — LIDOCAINE HYDROCHLORIDE 10 MG/ML
INJECTION INFILTRATION; PERINEURAL
Status: COMPLETED
Start: 2022-10-14 | End: 2022-10-14

## 2022-10-14 RX ORDER — NALOXONE HYDROCHLORIDE 1 MG/ML
1 INJECTION INTRAMUSCULAR; INTRAVENOUS; SUBCUTANEOUS ONCE
Status: DISPENSED | OUTPATIENT
Start: 2022-10-14 | End: 2022-10-14

## 2022-10-14 RX ORDER — FENTANYL CITRATE 50 UG/ML
25-200 INJECTION, SOLUTION INTRAMUSCULAR; INTRAVENOUS
Status: DISCONTINUED | OUTPATIENT
Start: 2022-10-14 | End: 2022-10-14

## 2022-10-14 RX ORDER — SODIUM CHLORIDE 9 MG/ML
25 INJECTION, SOLUTION INTRAVENOUS
Status: DISCONTINUED | OUTPATIENT
Start: 2022-10-14 | End: 2022-10-15

## 2022-10-14 RX ORDER — MIDAZOLAM HYDROCHLORIDE 1 MG/ML
.5-5 INJECTION, SOLUTION INTRAMUSCULAR; INTRAVENOUS
Status: DISCONTINUED | OUTPATIENT
Start: 2022-10-14 | End: 2022-10-14

## 2022-10-14 RX ADMIN — SODIUM CHLORIDE 100 MG: 9 INJECTION, SOLUTION INTRAVENOUS at 17:28

## 2022-10-14 RX ADMIN — PREDNISONE 40 MG: 20 TABLET ORAL at 11:04

## 2022-10-14 RX ADMIN — METOPROLOL TARTRATE 25 MG: 25 TABLET, FILM COATED ORAL at 22:57

## 2022-10-14 RX ADMIN — LIDOCAINE HYDROCHLORIDE 10 ML: 10 INJECTION, SOLUTION INFILTRATION; PERINEURAL at 13:52

## 2022-10-14 RX ADMIN — MEROPENEM 1 G: 1 INJECTION, POWDER, FOR SOLUTION INTRAVENOUS at 08:20

## 2022-10-14 RX ADMIN — Medication: at 22:57

## 2022-10-14 RX ADMIN — SODIUM CHLORIDE, PRESERVATIVE FREE 10 ML: 5 INJECTION INTRAVENOUS at 22:59

## 2022-10-14 RX ADMIN — POLYETHYLENE GLYCOL 3350 17 G: 17 POWDER, FOR SOLUTION ORAL at 08:28

## 2022-10-14 RX ADMIN — Medication: at 17:30

## 2022-10-14 RX ADMIN — Medication 100 MG: at 08:27

## 2022-10-14 RX ADMIN — VANCOMYCIN HYDROCHLORIDE 500 MG: 500 INJECTION, POWDER, LYOPHILIZED, FOR SOLUTION INTRAVENOUS at 17:30

## 2022-10-14 RX ADMIN — SODIUM CHLORIDE 25 ML/HR: 9 INJECTION, SOLUTION INTRAVENOUS at 13:28

## 2022-10-14 RX ADMIN — SODIUM CHLORIDE, PRESERVATIVE FREE 10 ML: 5 INJECTION INTRAVENOUS at 17:29

## 2022-10-14 RX ADMIN — FENTANYL CITRATE 25 MCG: 50 INJECTION, SOLUTION INTRAMUSCULAR; INTRAVENOUS at 13:38

## 2022-10-14 RX ADMIN — FENTANYL CITRATE 50 MCG: 50 INJECTION, SOLUTION INTRAMUSCULAR; INTRAVENOUS at 13:27

## 2022-10-14 RX ADMIN — BUTALBITAL, ACETAMINOPHEN, AND CAFFEINE 2 TABLET: 50; 325; 40 TABLET ORAL at 22:57

## 2022-10-14 RX ADMIN — PANTOPRAZOLE SODIUM 40 MG: 40 INJECTION, POWDER, FOR SOLUTION INTRAVENOUS at 08:21

## 2022-10-14 RX ADMIN — DIGOXIN 0.06 MG: 125 TABLET ORAL at 08:27

## 2022-10-14 RX ADMIN — SENNOSIDES AND DOCUSATE SODIUM 2 TABLET: 50; 8.6 TABLET ORAL at 08:27

## 2022-10-14 RX ADMIN — TRAZODONE HYDROCHLORIDE 50 MG: 50 TABLET ORAL at 22:57

## 2022-10-14 RX ADMIN — Medication: at 08:29

## 2022-10-14 RX ADMIN — MIDAZOLAM 0.5 MG: 1 INJECTION INTRAMUSCULAR; INTRAVENOUS at 13:38

## 2022-10-14 RX ADMIN — POLYETHYLENE GLYCOL 3350 17 G: 17 POWDER, FOR SOLUTION ORAL at 17:45

## 2022-10-14 RX ADMIN — AMIODARONE HYDROCHLORIDE 200 MG: 200 TABLET ORAL at 08:26

## 2022-10-14 RX ADMIN — VANCOMYCIN HYDROCHLORIDE 500 MG: 500 INJECTION, POWDER, LYOPHILIZED, FOR SOLUTION INTRAVENOUS at 04:57

## 2022-10-14 RX ADMIN — MEROPENEM 1 G: 1 INJECTION, POWDER, FOR SOLUTION INTRAVENOUS at 17:28

## 2022-10-14 RX ADMIN — SODIUM CHLORIDE, PRESERVATIVE FREE 10 ML: 5 INJECTION INTRAVENOUS at 05:15

## 2022-10-14 RX ADMIN — METOPROLOL TARTRATE 25 MG: 25 TABLET, FILM COATED ORAL at 08:26

## 2022-10-14 RX ADMIN — AMIODARONE HYDROCHLORIDE 200 MG: 200 TABLET ORAL at 17:28

## 2022-10-14 RX ADMIN — THERA TABS 1 TABLET: TAB at 08:27

## 2022-10-14 RX ADMIN — MIDAZOLAM 1 MG: 1 INJECTION INTRAMUSCULAR; INTRAVENOUS at 13:27

## 2022-10-14 NOTE — PROGRESS NOTES
[facility]      Brief HPI and Hospital Course:      Sharlene Kraus is a 77 y.o. female is brought to the ED via EMS for abdominal pain and nausea. She also reported watery diarrhea. Patient was recently hospitalized from 9/12-9/16 for abdominal pain and constipation and was found to have metastatic disease. She underwent liver biopsy which showed adenocarcinoma. Patient denied fever or chills. She denied dysuria, urgency or frequency. Work-up in the ED was significant for an abnormal abdominal pelvic CT that showed progression of hepatic lesions with a new large fluid collection in the liver with suggestion that this could be intrahepatic abscess. There are also fluid-filled cecum. Right pleural effusion. Patient was started on levofloxacin and Flagyl and was referred to the hospitalist service for admission evaluation. Subjective:    10/12:  Patient transferred out of the ICU to [facility];   Still waiting for IR to biopsy/ drain the liver lesions/abscesses. Orders written for Gram stain and cultures, fungal culture, AFB stain and culture, cell count and cytology. Patient herself is weak and chronically ill, denies any new complaints.      Assessment and Plan:    Neuro:  - some ICU delirium- resolved;   - minimize/ avoid Valium     Cardiac:  continue hemodynamic monitoring, MAP goal greater than 65;  - on midodrine prn;    A. fib RVR:  - HR controlled now;  keep magnesium above 2 and potassium above 4;  cardiology adjusting cardiac meds;    Cardiomyopathy:   EF of 25 to 30% with severe hypokinesis of the mid to distal portions of the anterior, lateral and inferior walls with akinesis at the apex with some RV dysfunction as well-CAD versus Takotsubo;  Plan to get a repeat echo in a few days;  Digoxin on hold due to elevated levels    Hypomagnesemia:  Replete prn;       Pulmonary:  supplemental oxygen as needed;  presumed COPD, standing bronchodilator/steroid therapy for now, saturation goal 88 to 94%  Stable oxygen levels on 2-3 L via nasal cannula     GI:  diet as tolerated;  PPI therapy-Home med     Renal:  monitor urine output; Normal creatinine, minimally elevated BUN     Heme/Onc:  liver mass biopsy positive for adenoCA that is not [address]  Recent (Feb 2022) jennifer-colectomy for a large 6cm dysplastic polyp (zero of 29nodes);  CT abdo/pelvis on admission worrisome for a cecal malignancy. Liver mass are concerning for metastatic colon cancer. Appreciate Heme/Onc consult and recommendations; Poor candidate for any aggressive therapy; ID:  hepatic abscesses versus organized hematoma;  continue vancomycin/Flagyl/Doxy for now;  follow-up cultures;   ID consulted, follow-up recommendations; Will need a repeat CT abd/pelvis to look at evolution of liver lesions; Consulted IR for CT guided drainage of fluid collections: if bloody- will send for culture; if purulent- they will try to place drain tube  - 10/14: orders written for Gram stain, cultures, cytology, fungal culture, AFB; discussed with CT department;     Elevated LFTs and bilirubin:  due to Shock liver: resolved  Appreciate Hepatology consult and recommendations  the rise in TBILI generally lags behind the rise and peak in liver enzymes by 3-5 days.   metastatic disease to the liver noted  Minimally elevated alk phos persists likely due to cancer/metastatic disease  Trend LFTs and Bili     Endocrinology:  keep euglycemic    Anxiety:  stable on increased valium dose     Long term prognosis remains poor;   Palliative Medicine consulted;   follow-up oncology and palliative care team [address] discussions with patient and NOK      Code status: DNR  Discharge planning: SNF in 3-4 days, sooner if patient is transitioned to Hospice;   - awaiting results of CT-guided drainage of the liver collection/ abscess;    - Palliative following;            PHYSICAL EXAMINATION:  Visit Vitals  BP (!) 141/80   Pulse 96   Temp 98.6 °F (37 °C)   Resp 28   Ht 5' 3\" (1.6 m)   Wt 51.2 kg (112 lb 14 oz)   SpO2 95%   BMI 20.00 kg/m²     Patient Vitals for the past 24 hrs:   Temp Pulse Resp BP SpO2   10/14/22 0630 -- 96 28 (!) 141/80 95 %   10/14/22 0600 -- 85 19 (!) 146/78 95 %   10/14/22 0348 -- 99 -- -- --   10/14/22 0330 98.6 °F (37 °C) (!) 101 24 132/73 96 %   10/14/22 0200 -- (!) 105 -- -- --   10/14/22 0001 -- (!) 107 -- -- --   10/13/22 2309 98.5 °F (36.9 °C) (!) 101 20 (!) 153/86 94 %   10/13/22 2200 -- (!) 103 -- -- --   10/13/22 2153 -- 100 -- (!) 152/87 --   10/13/22 2000 98 °F (36.7 °C) 100 19 (!) 150/63 96 %   10/13/22 1900 97.6 °F (36.4 °C) 93 16 (!) 140/74 97 %   10/13/22 1820 -- 95 -- 139/73 --   10/13/22 1759 -- 93 -- -- --   10/13/22 1720 -- 89 18 123/70 98 %   10/13/22 1418 -- 82 -- -- --   10/13/22 1214 -- 84 -- -- --   10/13/22 1144 -- 79 16 125/61 100 %   10/13/22 1057 -- 78 -- 123/61 --   10/13/22 1022 -- 78 -- -- --        General:          sleepy, cooperative, weak and chronically ill appearing  HEENT:           Atraumatic, MMM , O2 via NC           Neck:               Supple, symmetrical,  thyroid: non tender  Lungs:             decreased breath sounds bilaterally. No Wheezing or Rhonchi. No rales. Heart:              Regular  rhythm,  3/6 Systolic ejection  murmur    Abdomen:       Soft, non-tender. Not distended. Bowel sounds normal  Extremities:     No cyanosis. No clubbing,  +2 distal pulses, edema in all 4 ext sky upper ext  Skin:                Not pale.   Not Jaundiced  No rashes   Psych:             anxious  Neurologic:      Alert, severe generalized weakness,      Labs:     Recent Labs     10/14/22  0453 10/13/22  1424   WBC 12.9* 15.2*   HGB 10.4* 10.9*   HCT 32.4* 33.5*    293       Recent Labs     10/14/22  0453 10/13/22  1424 10/12/22  0233   * 135* 135*   K 3.2* 3.8 3.8    104 106   CO2 23 26 26   BUN 13 13 18   CREA 0.55 0.72 0.67   GLU 86 82 120*   CA 8.3* 8. 3* 8.0*   MG 1.7  --   --    PHOS 2.2*  --   --        Recent Labs     10/13/22  1424   ALT 49   *   TBILI 0.6   TP 5.3*   ALB 2.6*   GLOB 2.7       Recent Labs     10/13/22  1424   INR 1.3*   PTP 13.0*        No results for input(s): FE, TIBC, PSAT, FERR in the last 72 hours. Lab Results   Component Value Date/Time    Folate 27.1 (H) 08/22/2022 03:55 AM        No results for input(s): PH, PCO2, PO2 in the last 72 hours. No results for input(s): CPK, CKNDX, TROIQ in the last 72 hours.     No lab exists for component: CPKMB  Lab Results   Component Value Date/Time    Cholesterol, total 141 08/22/2022 03:55 AM    HDL Cholesterol 57 08/22/2022 03:55 AM    LDL, calculated 71.6 08/22/2022 03:55 AM    Triglyceride 62 08/22/2022 03:55 AM    CHOL/HDL Ratio 2.5 08/22/2022 03:55 AM     Lab Results   Component Value Date/Time    Glucose (POC) 145 (H) 02/14/2022 09:01 PM     Lab Results   Component Value Date/Time    Color YELLOW/STRAW 10/03/2022 04:57 PM    Appearance CLEAR 10/03/2022 04:57 PM    Specific gravity 1.008 10/03/2022 04:57 PM    Specific gravity 1.010 09/28/2022 10:37 AM    pH (UA) 6.5 10/03/2022 04:57 PM    Protein Negative 10/03/2022 04:57 PM    Glucose Negative 10/03/2022 04:57 PM    Ketone Negative 10/03/2022 04:57 PM    Bilirubin Negative 10/03/2022 04:57 PM    Urobilinogen 0.2 10/03/2022 04:57 PM    Nitrites Negative 10/03/2022 04:57 PM    Leukocyte Esterase Negative 10/03/2022 04:57 PM    Epithelial cells FEW 10/03/2022 04:57 PM    Bacteria Negative 10/03/2022 04:57 PM    WBC 0-4 10/03/2022 04:57 PM    RBC 0-5 10/03/2022 04:57 PM       Current Facility-Administered Medications:     anidulafungin (ERAXIS) 100 mg in 0.9% sodium chloride 130 mL IVPB, 100 mg, IntraVENous, Q24H, Alexsandra Clark DO, Last Rate: 83 mL/hr at 10/13/22 1558, 100 mg at 10/13/22 1558    albuterol-ipratropium (DUO-NEB) 2.5 MG-0.5 MG/3 ML, 3 mL, Nebulization, Q6H PRN, Lita Mckeon MD    amiodarone (CORDARONE) tablet 200 mg, 200 mg, Oral, BID, Jenni Mercado NP, 200 mg at 10/14/22 0826    [START ON 10/27/2022] amiodarone (CORDARONE) tablet 200 mg, 200 mg, Oral, DAILY, Bambi Mercado, NP    balsam peru-castor oiL (VENELEX) ointment, , Topical, TID, Rashard Jade MD, Given at 10/14/22 0829    dextrose 5 % - 0.45% NaCl infusion, 50 mL/hr, IntraVENous, CONTINUOUS, Rashard Jade MD, Last Rate: 50 mL/hr at 10/12/22 2356, 50 mL/hr at 10/12/22 2356    digoxin (LANOXIN) tablet 0.0625 mg, 0.0625 mg, Oral, DAILY, Bambi Mercado, JOSEPHINE, 0.0625 mg at 10/14/22 0827    ondansetron (ZOFRAN ODT) tablet 4 mg, 4 mg, Oral, Q8H PRN **OR** ondansetron (ZOFRAN) injection 4 mg, 4 mg, IntraVENous, Q4H PRN, Rashard Jade MD    simethicone (MYLICON) tablet 80 mg, 80 mg, Oral, QID PRN, Rashard Jade MD, 80 mg at 10/08/22 0323    butalbital-acetaminophen-caffeine (FIORICET, ESGIC) -40 mg per tablet 2 Tablet, 2 Tablet, Oral, Q6H PRN, Dario BOSCH NP, 2 Tablet at 10/12/22 1248    metoprolol tartrate (LOPRESSOR) tablet 25 mg, 25 mg, Oral, Q12H, Bambi Mercado NP, 25 mg at 10/14/22 0826    traZODone (DESYREL) tablet 50 mg, 50 mg, Oral, QHS, Niranjan De Los Santos MD, 50 mg at 10/13/22 2153    meropenem (MERREM) 1 g in 0.9% sodium chloride (MBP/ADV) 50 mL MBP, 1 g, IntraVENous, Q8H, Rashard Jade MD, Last Rate: 16.7 mL/hr at 10/14/22 0820, 1 g at 10/14/22 0820    diazePAM (VALIUM) tablet 5 mg, 5 mg, Oral, Q6H PRN, Dario Grayson B, NP, 5 mg at 10/11/22 2132    HYDROmorphone (DILAUDID) injection 0.2 mg, 0.2 mg, IntraVENous, Q3H PRN, Dario BOSCH, NP, 0.2 mg at 10/12/22 0307    lidocaine 4 % patch 1 Patch, 1 Patch, TransDERmal, Q24H, Rashard Jade MD, 1 Patch at 10/13/22 1820    alteplase (CATHFLO) 1 mg in sterile water (preservative free) 1 mL injection, 1 mg, InterCATHeter, PRN, Yannick Kenny MD    therapeutic multivitamin (THERAGRAN) tablet 1 Tablet, 1 Tablet, Oral, DAILY, Yannick Kenny MD, 1 Tablet at 10/14/22 0827    polyethylene glycol (MIRALAX) packet 17 g, 17 g, Oral, BID, Magaly Caicedo NP, 17 g at 10/14/22 7570    magnesium hydroxide (MILK OF MAGNESIA) 400 mg/5 mL oral suspension 30 mL, 30 mL, Oral, DAILY PRN, Olga BOSCH NP    predniSONE (DELTASONE) tablet 40 mg, 40 mg, Oral, DAILY WITH BREAKFAST, Niranjan De Los Santos MD, 40 mg at 10/13/22 1352    phenol throat spray (CHLORASEPTIC) 1 Spray, 1 Spray, Oral, PRN, Rosana Flores NP    senna-docusate (PERICOLACE) 8.6-50 mg per tablet 2 Tablet, 2 Tablet, Oral, DAILY, Olga BOSCH NP, 2 Tablet at 10/14/22 08    midodrine (PROAMATINE) tablet 20 mg, 20 mg, Oral, TID WITH MEALS, Niranjan De Los Santos MD, 20 mg at 10/13/22 1835    Vancomycin - Pharmacy dosing, , Other, Rx Dosing/Monitoring, Roxi Gaona MD    vancomycin (VANCOCIN) 500 mg in 0.9% sodium chloride (MBP/ADV) 100 mL MBP, 500 mg, IntraVENous, Q12H, Roxi Gaona MD, Last Rate: 100 mL/hr at 10/14/22 0457, 500 mg at 10/14/22 0457    thiamine HCL (B-1) tablet 100 mg, 100 mg, Oral, DAILY, Roxi Gaona MD, 100 mg at 10/14/22 0827    0.9% sodium chloride infusion 250 mL, 250 mL, IntraVENous, PRN, Roxi Gaona MD    sodium chloride (NS) flush 5-40 mL, 5-40 mL, IntraVENous, Q8H, DK Vleásquez MD, 10 mL at 10/14/22 0515    sodium chloride (NS) flush 5-40 mL, 5-40 mL, IntraVENous, PRN, VIVI Velásquez MD    acetaminophen (TYLENOL) tablet 650 mg, 650 mg, Oral, Q6H PRN, 650 mg at 10/02/22 2127 **OR** acetaminophen (TYLENOL) suppository 650 mg, 650 mg, Rectal, Q6H PRN, LAURIE Velásquez MD    pantoprazole (PROTONIX) 40 mg in 0.9% sodium chloride 10 mL injection, 40 mg, IntraVENous, DAILY, FELIPE Velásquez MD, 40 mg at 10/14/22 5994    prochlorperazine (COMPAZINE) injection 10 mg, 10 mg, IntraVENous, Q6H PRN, Debeb, Jefm Apgar, MD, 10 mg at 10/08/22 211     CODE STATUS:   Full Code   X DNR    Partial    Comfort Care       Signed:   Alan Zhang MD  Date of Service:  10/14/2022

## 2022-10-14 NOTE — PROGRESS NOTES
Problem: Mobility Impaired (Adult and Pediatric)  Goal: *Acute Goals and Plan of Care (Insert Text)  Description: FUNCTIONAL STATUS PRIOR TO ADMISSION: Patient was modified independent using a rollator and single point cane for functional mobility PRN. Primarily ambulating throughout her home without device (anticipate furniture surfing). Denies falls. Progressive decline since last admission. Home O2. HOME SUPPORT PRIOR TO ADMISSION: The patient lived with her son in law. Son is currently incarcerated. Physical Therapy Goals  Updated 10/11/2022 - goals downgraded  1. Patient will move from supine to sit and sit to supine , scoot up and down, and roll side to side in bed with moderate assistance within 7 day(s). 2.  Patient will transfer from bed to chair and chair to bed with moderate assistance using the least restrictive device within 7 day(s). 3.  Patient will perform sit to stand with moderate assistance within 7 day(s). 4.  Patient will ambulate with moderate assistance for 20 feet with the least restrictive device within 7 day(s). Physical Therapy Goals  Initiated 10/4/2022  1. Patient will move from supine to sit and sit to supine , scoot up and down, and roll side to side in bed with modified independence within 7 day(s). 2.  Patient will transfer from bed to chair and chair to bed with supervision/set-up using the least restrictive device within 7 day(s). 3.  Patient will perform sit to stand with supervision/set-up within 7 day(s). 4.  Patient will ambulate with minimal assistance for 20 feet with the least restrictive device within 7 day(s).        Outcome: Progressing Towards Goal   PHYSICAL THERAPY TREATMENT  Patient: Abe Meckel (53 y.o. female)  Date: 10/14/2022  Diagnosis: Liver abscess [K75.0]  Abdominal pain [R10.9]  Adenocarcinoma ([facility] [address] [C80.1] <principal problem not specified>  Procedure(s) (LRB):  INFUSION CATHETER INSERTION (N/A) 11 Days Post-Op  Precautions: Fall, Skin, Bed Alarm (angela 4)  Chart, physical therapy assessment, plan of care and goals were reviewed. ASSESSMENT  Patient continues with skilled PT services and is slowly progressing towards goals. She remains limited by generalized weakness, impaired sitting balance and overall debility. She has very fragile skin with noted weeping of bilateral UEs (pillow cases wet so I changed them out). She was able to sit at the EOB for about 8 minutes and then began to fatigue and was returned to supine. Anticipate very slow gains. .     Current Level of Function Impacting Discharge (mobility/balance): max assist X 1 at bed level except for scooting to the [facility] was total assist X 2    Other factors to consider for discharge: possibly to go into hospice care. Also awaiting to learn if her incarcerated son will be allowed to visit her, per chart review, see palliative care notes. PLAN :  Patient continues to benefit from skilled intervention to address the above impairments. Continue treatment per established plan of care. to address goals.     Recommendation for discharge: (in order for the patient to meet his/her long term goals)  Therapy up to 5 days/week in SNF setting if not hospice    This discharge recommendation:  A follow-up discussion with the attending provider and/or case management is planned    IF patient discharges home will need the following DME: to be determined (TBD)       SUBJECTIVE:   Patient stated that she wanted to sit up at the EOB    OBJECTIVE DATA SUMMARY:   Chart checked, pt cleared by nursing  Critical Behavior:  Neurologic State: Alert  Orientation Level: Oriented to person, Oriented to place (partially oriented to time and situation)  Cognition: Follows commands  Safety/Judgement: Decreased awareness of environment, Decreased awareness of need for assistance, Decreased awareness of need for safety, Decreased insight into deficits  Functional Mobility Training:  Bed Mobility:  Rolling: Maximum assistance;Assist x1  Supine to Sit: Maximum assistance;Assist x1  Sit to Supine: Maximum assistance;Assist x1           Transfers:                                   Balance:  Sitting: Impaired  Sitting - Static: Fair (occasional)  Sitting - Dynamic: Poor (constant support)  Ambulation/Gait Training:                                                        Stairs: Therapeutic Exercises:     Pain Rating:  None rated    Activity Tolerance:   Good    After treatment patient left in no apparent distress:   Sitting in chair, Heels elevated for pressure relief, Patient positioned in partial right sidelying for pressure relief, Call bell within reach, Bed / chair alarm activated, and Side rails x 3    COMMUNICATION/COLLABORATION:   The patients plan of care was discussed with: Registered nurseCharbel Andrews Howell   Time Calculation: 27 mins

## 2022-10-14 NOTE — PROGRESS NOTES
Pharmacist Note - Vancomycin Dosing  Therapy day 12  Indication: intraabdominal infection   Current regimen: 500 mg IV Q12H     Recent Labs     10/14/22  0453 10/13/22  1424 10/12/22  0233   WBC 12.9* 15.2* 9.6   CREA 0.55 0.72 0.67   BUN 13 13 18       A random vancomycin level of 19.7 mcg/mL was obtained and from this level, the patient's AUC24 is calculated to be 452 with the current regimen. Goal target range AUC/JOBY 400-600      Plan: Continue current regimen. Pharmacy will continue to monitor this patient daily for changes in clinical status and renal function. *Random vancomycin levels are used to calculate AUC/JOBY, this level should not be interpreted as a trough. Vancomycin has been dosed using Bayesian kinetics software to target an AUC24:JOBY of 400-600, which provides adequate exposure for as assumed infection due to MRSA with an JOBY of 1 or less while reducing the risk of nephrotoxicity as seen with traditional trough based dosing goals.

## 2022-10-14 NOTE — ROUTINE PROCESS
1130: TRANSFER - IN REPORT:    Verbal report received from Ginette RN(name) on Magali [place]  being received from [facility]) for ordered procedure      Report consisted of patients Situation, Background, Assessment and   Recommendations(SBAR). Information from the following report(s) Procedure Summary, Intake/Output, MAR, Recent Results, Cardiac Rhythm NSR, and Alarm Parameters  was reviewed with the receiving nurse. Opportunity for questions and clarification was provided. Assessment completed upon patients arrival to unit and care assumed. 1135: Pt arrives via stretcher to angio department for CAT scan guided fluid collection drainage  procedure. All assessments completed and consent was reviewed. Education given was regarding procedure, moderate sedation, post-procedure care and  management/follow-up. Opportunity for questions was provided and all questions and concerns were addressed. Sedation medication given: 75 mcg fent and 1.5 mg versed    Sedation tolerated: well    Total sedation time: 30 minutes    Vital Signs: stable    1415: TRANSFER - OUT REPORT:    Verbal report given to Ginette RN(name) on Magali [place]  being transferred to [facility]) for routine post - op       Report consisted of patients Situation, Background, Assessment and   Recommendations(SBAR). Information from the following report(s) Procedure Summary, Intake/Output, MAR, Cardiac Rhythm NSR, and Alarm Parameters  was reviewed with the receiving nurse.     Lines:   Peripheral IV 10/05/22 Anterior;Proximal;Right Forearm (Active)   Site Assessment Clean, dry, & intact 10/13/22 1630   Phlebitis Assessment 0 10/13/22 1630   Infiltration Assessment 0 10/13/22 1630   Dressing Status Clean, dry, & intact 10/13/22 1630   Dressing Type Tape;Transparent 10/13/22 1630   Hub Color/Line Status Pink;Flushed 10/13/22 1630   Action Taken Open ports on tubing capped 10/13/22 1630   Alcohol Cap Used Yes 10/13/22 1630       Extended Dwell Peripheral IV (Active)   Criteria for Appropriate Use Long term IV/antibiotic administration 10/13/22 0815   Site Assessment Clean, dry, & intact 10/13/22 0815   Phlebitis Assessment 0 10/13/22 0815   Infiltration Assessment 0 10/13/22 0815   Arm Circumference (cm) 20 cm 10/06/22 1030   External Catheter Length (cm) 0 centimeters 10/06/22 1030   Line Status Infusing 10/13/22 0815   Line Care Connections checked and tightened 10/12/22 2200   Alcohol Cap Used Yes 10/12/22 2200   Date of Last Dressing Change 10/06/22 10/12/22 2200   Dressing Type Transparent;Stabilization/securement device 10/13/22 0815   Dressing Status Clean, dry, & intact 10/13/22 0815        Opportunity for questions and clarification was provided.       Patient transported with:   [employer]

## 2022-10-14 NOTE — PROGRESS NOTES
Infectious Disease Clinic Note        HPI:   Ms July Wetzel seen  Says she is in pain   All over and back   D/w RN   They are working on pain medications and getting Ms July Wetzel to IR for drainage           Medications:  No current facility-administered medications on file prior to encounter. Current Outpatient Medications on File Prior to Encounter   Medication Sig Dispense Refill    dicyclomine (BENTYL) 10 mg capsule Take 1 Capsule by mouth four (4) times daily. 1 Capsule 0    ondansetron (ZOFRAN ODT) 4 mg disintegrating tablet Take 1 Tablet by mouth every eight (8) hours as needed for Nausea or Vomiting. 1 Tablet 0    polyethylene glycol (MIRALAX) 17 gram packet Take 1 Packet by mouth every twelve (12) hours. 1 Each 0    simethicone (MYLICON) 80 mg chewable tablet Take 1 Tablet by mouth four (4) times daily as needed for GI Pain. 2 Tablet 0    lubiPROStone (AMITIZA) 24 mcg capsule Take 1 Capsule by mouth two (2) times daily (with meals) for 30 days. Recommended by GI service 10 Capsule 0    butalbital-acetaminophen-caffeine (FIORICET, ESGIC) -40 mg per tablet Take 2 Tablets by mouth two (2) times daily as needed for Headache or Migraine. 6 Tablet 0    digoxin (LANOXIN) 0.125 mg tablet Take 0.125 mg by mouth daily. metoprolol tartrate (LOPRESSOR) 25 mg tablet Take 12.5 mg by mouth two (2) times a day. cyclobenzaprine (FLEXERIL) 10 mg tablet Take 10 mg by mouth three (3) times daily as needed. aspirin 81 mg chewable tablet Take 81 mg by mouth daily. multivitamin (ONE A DAY) tablet Take 1 Tablet by mouth daily. acetaminophen (TYLENOL) 325 mg tablet Take 650 mg by mouth every four (4) hours as needed for Pain. albuterol (PROVENTIL HFA, VENTOLIN HFA, PROAIR HFA) 90 mcg/actuation inhaler Take 2 Puffs by inhalation every six (6) hours as needed. pantoprazole (PROTONIX) 40 mg tablet Take 40 mg by mouth daily.              Physical Exam:    Vitals: Patient Vitals for the past 24 hrs:   Temp Pulse Resp BP SpO2   10/14/22 0630 -- 96 28 (!) 141/80 95 %   10/14/22 0600 -- 85 19 (!) 146/78 95 %   10/14/22 0348 -- 99 -- -- --   10/14/22 0330 98.6 °F (37 °C) (!) 101 24 132/73 96 %   10/14/22 0200 -- (!) 105 -- -- --   10/14/22 0001 -- (!) 107 -- -- --   10/13/22 2309 98.5 °F (36.9 °C) (!) 101 20 (!) 153/86 94 %   10/13/22 2200 -- (!) 103 -- -- --   10/13/22 2153 -- 100 -- (!) 152/87 --   10/13/22 2000 98 °F (36.7 °C) 100 19 (!) 150/63 96 %   10/13/22 1900 97.6 °F (36.4 °C) 93 16 (!) 140/74 97 %   10/13/22 1820 -- 95 -- 139/73 --   10/13/22 1759 -- 93 -- -- --   10/13/22 1720 -- 89 18 123/70 98 %   10/13/22 1418 -- 82 -- -- --   10/13/22 1214 -- 84 -- -- --   10/13/22 1144 -- 79 16 125/61 100 %   10/13/22 1057 -- 78 -- 123/61 --   10/13/22 1022 -- 78 -- -- --     GEN: NAD,   CV S1 2 reg   Lungs: Nonlabored  Abdomen: soft, distended, tender   Extremities: no edema  Neuro:awake alert to self,  Skin: no rash on face, extremities,       Labs:   Recent Results (from the past 24 hour(s))   HEPATIC FUNCTION PANEL    Collection Time: 10/13/22  2:24 PM   Result Value Ref Range    Protein, total 5.3 (L) 6.4 - 8.2 g/dL    Albumin 2.6 (L) 3.5 - 5.0 g/dL    Globulin 2.7 2.0 - 4.0 g/dL    A-G Ratio 1.0 (L) 1.1 - 2.2      Bilirubin, total 0.6 0.2 - 1.0 MG/DL    Bilirubin, direct 0.3 (H) 0.0 - 0.2 MG/DL    Alk.  phosphatase 191 (H) 45 - 117 U/L    AST (SGOT) 41 (H) 15 - 37 U/L    ALT (SGPT) 49 12 - 78 U/L   DIGOXIN    Collection Time: 10/13/22  2:24 PM   Result Value Ref Range    Digoxin level 1.1 0.90 - 2.00 NG/ML   CBC WITH AUTOMATED DIFF    Collection Time: 10/13/22  2:24 PM   Result Value Ref Range    WBC 15.2 (H) 3.6 - 11.0 K/uL    RBC 3.92 3.80 - 5.20 M/uL    HGB 10.9 (L) 11.5 - 16.0 g/dL    HCT 33.5 (L) 35.0 - 47.0 %    MCV 85.5 80.0 - 99.0 FL    MCH 27.8 26.0 - 34.0 PG    MCHC 32.5 30.0 - 36.5 g/dL    RDW 23.6 (H) 11.5 - 14.5 %    PLATELET 773 788 - 443 K/uL    MPV 9.7 8.9 - 12.9 FL    NRBC 0.3 (H) 0 PER 100 WBC    ABSOLUTE NRBC 0.05 (H) 0.00 - 0.01 K/uL    NEUTROPHILS 87 (H) 32 - 75 %    LYMPHOCYTES 5 (L) 12 - 49 %    MONOCYTES 7 5 - 13 %    EOSINOPHILS 0 0 - 7 %    BASOPHILS 0 0 - 1 %    IMMATURE GRANULOCYTES 1 (H) 0.0 - 0.5 %    ABS. NEUTROPHILS 13.1 (H) 1.8 - 8.0 K/UL    ABS. LYMPHOCYTES 0.8 0.8 - 3.5 K/UL    ABS. MONOCYTES 1.1 (H) 0.0 - 1.0 K/UL    ABS. EOSINOPHILS 0.0 0.0 - 0.4 K/UL    ABS. BASOPHILS 0.0 0.0 - 0.1 K/UL    ABS. IMM.  GRANS. 0.2 (H) 0.00 - 0.04 K/UL    DF SMEAR SCANNED      RBC COMMENTS ANISOCYTOSIS  3+        RBC COMMENTS ANA CELLS  PRESENT       METABOLIC PANEL, BASIC    Collection Time: 10/13/22  2:24 PM   Result Value Ref Range    Sodium 135 (L) 136 - 145 mmol/L    Potassium 3.8 3.5 - 5.1 mmol/L    Chloride 104 97 - 108 mmol/L    CO2 26 21 - 32 mmol/L    Anion gap 5 5 - 15 mmol/L    Glucose 82 65 - 100 mg/dL    BUN 13 6 - 20 MG/DL    Creatinine 0.72 0.55 - 1.02 MG/DL    BUN/Creatinine ratio 18 12 - 20      eGFR >60 >60 ml/min/1.73m2    Calcium 8.3 (L) 8.5 - 10.1 MG/DL   PROTHROMBIN TIME + INR    Collection Time: 10/13/22  2:24 PM   Result Value Ref Range    INR 1.3 (H) 0.9 - 1.1      Prothrombin time 13.0 (H) 9.0 - 11.1 sec   TYPE & SCREEN    Collection Time: 10/13/22  2:24 PM   Result Value Ref Range    Crossmatch Expiration 10/16/2022,2359     ABO/Rh(D) A POSITIVE     Antibody screen NEG    DIGOXIN    Collection Time: 10/14/22  4:53 AM   Result Value Ref Range    Digoxin level 1.1 0.90 - 2.00 NG/ML   CBC WITH AUTOMATED DIFF    Collection Time: 10/14/22  4:53 AM   Result Value Ref Range    WBC 12.9 (H) 3.6 - 11.0 K/uL    RBC 3.86 3.80 - 5.20 M/uL    HGB 10.4 (L) 11.5 - 16.0 g/dL    HCT 32.4 (L) 35.0 - 47.0 %    MCV 83.9 80.0 - 99.0 FL    MCH 26.9 26.0 - 34.0 PG    MCHC 32.1 30.0 - 36.5 g/dL    RDW 23.4 (H) 11.5 - 14.5 %    PLATELET 055 046 - 549 K/uL    MPV 10.3 8.9 - 12.9 FL    NRBC 0.4 (H) 0  WBC    ABSOLUTE NRBC 0.05 (H) 0.00 - 0.01 K/uL    NEUTROPHILS 91 (H) 32 - 75 % LYMPHOCYTES 2 (L) 12 - 49 %    MONOCYTES 6 5 - 13 %    EOSINOPHILS 0 0 - 7 %    BASOPHILS 0 0 - 1 %    IMMATURE GRANULOCYTES 1 (H) 0.0 - 0.5 %    ABS. NEUTROPHILS 11.7 (H) 1.8 - 8.0 K/UL    ABS. LYMPHOCYTES 0.3 (L) 0.8 - 3.5 K/UL    ABS. MONOCYTES 0.8 0.0 - 1.0 K/UL    ABS. EOSINOPHILS 0.0 0.0 - 0.4 K/UL    ABS. BASOPHILS 0.0 0.0 - 0.1 K/UL    ABS. IMM.  GRANS. 0.1 (H) 0.00 - 0.04 K/UL    DF SMEAR SCANNED      PLATELET COMMENTS Large Platelets      RBC COMMENTS MACROCYTOSIS  1+        RBC COMMENTS TARGET CELLS  PRESENT        RBC COMMENTS SCHISTOCYTES  1+       METABOLIC PANEL, BASIC    Collection Time: 10/14/22  4:53 AM   Result Value Ref Range    Sodium 135 (L) 136 - 145 mmol/L    Potassium 3.2 (L) 3.5 - 5.1 mmol/L    Chloride 103 97 - 108 mmol/L    CO2 23 21 - 32 mmol/L    Anion gap 9 5 - 15 mmol/L    Glucose 86 65 - 100 mg/dL    BUN 13 6 - 20 MG/DL    Creatinine 0.55 0.55 - 1.02 MG/DL    BUN/Creatinine ratio 24 (H) 12 - 20      eGFR >60 >60 ml/min/1.73m2    Calcium 8.3 (L) 8.5 - 10.1 MG/DL   VANCOMYCIN, RANDOM    Collection Time: 10/14/22  4:53 AM   Result Value Ref Range    Vancomycin, random 19.7 UG/ML   PHOSPHORUS    Collection Time: 10/14/22  4:53 AM   Result Value Ref Range    Phosphorus 2.2 (L) 2.6 - 4.7 MG/DL   MAGNESIUM    Collection Time: 10/14/22  4:53 AM   Result Value Ref Range    Magnesium 1.7 1.6 - 2.4 mg/dL         Microbiology Data:       Blood: negative        Urine:  Contains abnormal data CULTURE, URINE  Order: [id number]  Collected 9/28/2022 10:37    Status: Final result    Specimen Information: Clean catch; Urine   0 Result Notes  Component Ref Range & Units 9/28/22 1037    Special Requests:   NO SPECIAL REQUESTS    Walker Count   >100,000   COLONIES/mL    Culture result:   ESCHERICHIA COLI Abnormal     Resulting Agency  [address]        Susceptibility     Escherichia coli     JOBY     Amikacin ($) Susceptible     Ampicillin ($) Intermediate     Ampicillin/sulbactam ($) Susceptible Cefazolin ($) Susceptible     Cefepime ($$) Susceptible     Cefoxitin Susceptible     Ceftazidime ($) Susceptible     Ceftriaxone ($) Susceptible     Ciprofloxacin ($) Susceptible     Gentamicin ($) Susceptible     Levofloxacin ($) Susceptible     Meropenem ($$) Susceptible     Nitrofurantoin Susceptible     Piperacillin/Tazobac ($) Susceptible     Tobramycin ($) Susceptible                          Imaging:     CT ABD PELV W CONT: Patient Communication     Add Comments   Not seen     Study Result    Narrative & Impression   EXAM: CT ABD PELV W CONT     INDICATION: Diffuse abdominal pain and diarrhea. Rectal constipation earlier  this month. Nonspecific liver disease on previous imaging. Biliary dilatation on  previous imaging. Elevated alkaline phosphatase. Normal white blood cell count,  bilirubin, lipase. COMPARISON: CT abdomen/pelvis on 9/12/2022. CONTRAST: 100 mL of Isovue-370. ORAL CONTRAST: None     TECHNIQUE:   Following the uneventful intravenous administration of contrast, thin axial  images were obtained through the abdomen and pelvis. Coronal and sagittal  reconstructions were generated. CT dose reduction was achieved through use of a  standardized protocol tailored for this examination and automatic exposure  control for dose modulation. FINDINGS:   LOWER THORAX: Small right pleural effusion is new. No basilar pneumonia. Normal  cardiac size. LIVER: Segment 7/8 subcapsular collection of heterogeneous fluid measures 9.3 x  6.9 x 3.5 cm. Heterogeneous septated fluid attenuation throughout the right  hepatic lobe measures approximately 13 cm in oblique AP diameter. Central right  hepatic lobe peripherally enhancing lesion previously measured 1.9 cm and now  measures 2.4 cm. Unchanged mild intrahepatic biliary dilatation. BILIARY TREE: Cholecystectomy. Unchanged chronic biliary dilatation. No abrupt  termination. SPLEEN: Normal size. Lobulated contour. PANCREAS: Mild diffuse atrophy. No mass or inflammation. ADRENALS: Unremarkable. KIDNEYS: No hydronephrosis. Heterogeneous small multiple cysts. STOMACH: Unremarkable. SMALL BOWEL: No dilatation or wall thickening. COLON: Incomplete distention, limited evaluation. Fluid-filled cecum is in the  pelvis. APPENDIX: Normal.  PERITONEUM: No ascites or pneumoperitoneum. RETROPERITONEUM: Aortic atherosclerosis without aneurysm. 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 No lymphadenopathy. REPRODUCTIVE ORGANS: Hysterectomy. URINARY BLADDER: Incomplete distention, limited evaluation. BONES: Osteopenia. Femoral hardware. Spinal curvature. T11 partial compression  fracture. ABDOMINAL WALL: No mass or hernia. ADDITIONAL COMMENTS: Diffuse muscle atrophy. IMPRESSION     1. Progression of hepatic lesions and new large fluid collections in the liver. Intrahepatic abscesses from nonspecific infection are most likely. Consider  fluid sampling or drainage with CT guidance. 2. New small right pleural effusion. 3. Fluid-filled cecum may indicate infectious colitis. No bowel obstruction. Assessment / Plan:       1) Hepatic collections:     of liver biopsy on 9/15 + for malignant cells  CT repeated 10/11/22 \" The right hepatic lobe contains multiple hyperdense collections which  appear to be connected. Largest axial measurement is 10.4 x 6.7 cm, not  significantly changed. Cranially there is communication with a hyperdense 8.4 x  5.6 cm hepatic dome collection, 3-13. Caudally, there is communication with a  small subcapsular collection in the inferior right hepatic lobe, 3-29. Overall  progression of multiple hepatic hypodensities compared to 9/12/2022. For example  2.4 x 2.4 cm segment 5 hepatic lesion previously measured 1.9 x 1.6 cm. Multiple  new lesions in the left hepatic lobe, for example 9 mm segment 2 lesion, 3-16.         Plan:   Given liver biopsy history , immune compromised if not at least immunomodulated state, at risk for superimposed infection in addition to concern for hematoma    CT wt new lesions lesions in the L lobe -- would expect some drop in HBG wt forming new hematomas  ? ? On Vancomycin and Meropenem, IV anidulafungin  IV empirically     Noted plans for IR attempt to aspirate /drain . Please send fungal, afb smear, culture, bacterial culture, cytology     Pain control per primary team       2) copd  3) metastatic adenocarcinoma, liver involvement   4) tubular adenoma colon   5) seizure hx per chart             Thank for the opportunity to participate in the care of this patient. Please contact with questions or concerns.        Gi Yeh DO  9:24 AM

## 2022-10-14 NOTE — PROGRESS NOTES
Transition of Care Plan:    RUR 24%    Disposition: Home with [address] [facility]    Discharge anticipated in 72 hours    Palliative Care is currently working with patient and her son on care goals. Son is arranging a visit for Monday. Discussed in IDRS and IR procedure is planned for today for fluid collection to test for infection. Discharge is not anticipated for 72 hours.     Jana Sharma LCSW, ACM-SW  Case Management [address] [place] [address]  C: [phone]

## 2022-10-14 NOTE — PROGRESS NOTES
Bedside shift change report given to [facility] (oncoming nurse) by Regla Cox (offgoing nurse). Report included the following information SBAR, ED Summary, Procedure Summary, Intake/Output, MAR, Recent Results, and Med Rec Status.

## 2022-10-15 PROCEDURE — 74011250637 HC RX REV CODE- 250/637: Performed by: HOSPITALIST

## 2022-10-15 PROCEDURE — 0F913ZZ DRAINAGE OF RIGHT LOBE LIVER, PERCUTANEOUS APPROACH: ICD-10-PCS | Performed by: RADIOLOGY

## 2022-10-15 PROCEDURE — 74011250637 HC RX REV CODE- 250/637: Performed by: EMERGENCY MEDICINE

## 2022-10-15 PROCEDURE — 74011250637 HC RX REV CODE- 250/637: Performed by: NURSE PRACTITIONER

## 2022-10-15 PROCEDURE — 74011000258 HC RX REV CODE- 258: Performed by: STUDENT IN AN ORGANIZED HEALTH CARE EDUCATION/TRAINING PROGRAM

## 2022-10-15 PROCEDURE — 74011250637 HC RX REV CODE- 250/637: Performed by: STUDENT IN AN ORGANIZED HEALTH CARE EDUCATION/TRAINING PROGRAM

## 2022-10-15 PROCEDURE — 74011250636 HC RX REV CODE- 250/636: Performed by: HOSPITALIST

## 2022-10-15 PROCEDURE — 74011000258 HC RX REV CODE- 258: Performed by: INTERNAL MEDICINE

## 2022-10-15 PROCEDURE — C9113 INJ PANTOPRAZOLE SODIUM, VIA: HCPCS | Performed by: HOSPITALIST

## 2022-10-15 PROCEDURE — 74011636637 HC RX REV CODE- 636/637: Performed by: EMERGENCY MEDICINE

## 2022-10-15 PROCEDURE — 74011000250 HC RX REV CODE- 250: Performed by: HOSPITALIST

## 2022-10-15 PROCEDURE — 74011000258 HC RX REV CODE- 258: Performed by: FAMILY MEDICINE

## 2022-10-15 PROCEDURE — 74011000250 HC RX REV CODE- 250: Performed by: FAMILY MEDICINE

## 2022-10-15 PROCEDURE — 74011250636 HC RX REV CODE- 250/636: Performed by: STUDENT IN AN ORGANIZED HEALTH CARE EDUCATION/TRAINING PROGRAM

## 2022-10-15 PROCEDURE — 65270000046 HC RM TELEMETRY

## 2022-10-15 PROCEDURE — 74011250636 HC RX REV CODE- 250/636: Performed by: FAMILY MEDICINE

## 2022-10-15 PROCEDURE — 74011250636 HC RX REV CODE- 250/636: Performed by: INTERNAL MEDICINE

## 2022-10-15 RX ORDER — MIDODRINE HYDROCHLORIDE 5 MG/1
10 TABLET ORAL
Status: DISCONTINUED | OUTPATIENT
Start: 2022-10-15 | End: 2022-10-20 | Stop reason: HOSPADM

## 2022-10-15 RX ADMIN — METOPROLOL TARTRATE 25 MG: 25 TABLET, FILM COATED ORAL at 09:23

## 2022-10-15 RX ADMIN — BUTALBITAL, ACETAMINOPHEN, AND CAFFEINE 2 TABLET: 50; 325; 40 TABLET ORAL at 09:23

## 2022-10-15 RX ADMIN — MIDODRINE HYDROCHLORIDE 20 MG: 5 TABLET ORAL at 09:46

## 2022-10-15 RX ADMIN — METOPROLOL TARTRATE 25 MG: 25 TABLET, FILM COATED ORAL at 22:23

## 2022-10-15 RX ADMIN — TRAZODONE HYDROCHLORIDE 50 MG: 50 TABLET ORAL at 22:23

## 2022-10-15 RX ADMIN — SODIUM CHLORIDE, PRESERVATIVE FREE 10 ML: 5 INJECTION INTRAVENOUS at 22:27

## 2022-10-15 RX ADMIN — AMIODARONE HYDROCHLORIDE 200 MG: 200 TABLET ORAL at 09:20

## 2022-10-15 RX ADMIN — Medication: at 16:22

## 2022-10-15 RX ADMIN — DIGOXIN 0.06 MG: 125 TABLET ORAL at 09:21

## 2022-10-15 RX ADMIN — Medication: at 09:27

## 2022-10-15 RX ADMIN — Medication: at 22:27

## 2022-10-15 RX ADMIN — MEROPENEM 1 G: 1 INJECTION, POWDER, FOR SOLUTION INTRAVENOUS at 09:16

## 2022-10-15 RX ADMIN — THERA TABS 1 TABLET: TAB at 09:20

## 2022-10-15 RX ADMIN — MIDODRINE HYDROCHLORIDE 20 MG: 5 TABLET ORAL at 12:42

## 2022-10-15 RX ADMIN — MEROPENEM 1 G: 1 INJECTION, POWDER, FOR SOLUTION INTRAVENOUS at 00:30

## 2022-10-15 RX ADMIN — VANCOMYCIN HYDROCHLORIDE 500 MG: 500 INJECTION, POWDER, LYOPHILIZED, FOR SOLUTION INTRAVENOUS at 06:02

## 2022-10-15 RX ADMIN — PANTOPRAZOLE SODIUM 40 MG: 40 INJECTION, POWDER, FOR SOLUTION INTRAVENOUS at 09:15

## 2022-10-15 RX ADMIN — AMIODARONE HYDROCHLORIDE 200 MG: 200 TABLET ORAL at 18:34

## 2022-10-15 RX ADMIN — PREDNISONE 40 MG: 20 TABLET ORAL at 12:42

## 2022-10-15 RX ADMIN — POLYETHYLENE GLYCOL 3350 17 G: 17 POWDER, FOR SOLUTION ORAL at 09:16

## 2022-10-15 RX ADMIN — SODIUM CHLORIDE, PRESERVATIVE FREE 10 ML: 5 INJECTION INTRAVENOUS at 16:23

## 2022-10-15 RX ADMIN — MEROPENEM 1 G: 1 INJECTION, POWDER, FOR SOLUTION INTRAVENOUS at 16:31

## 2022-10-15 RX ADMIN — Medication 100 MG: at 09:23

## 2022-10-15 RX ADMIN — SODIUM CHLORIDE, PRESERVATIVE FREE 10 ML: 5 INJECTION INTRAVENOUS at 06:18

## 2022-10-15 RX ADMIN — MIDODRINE HYDROCHLORIDE 10 MG: 5 TABLET ORAL at 16:16

## 2022-10-15 RX ADMIN — SENNOSIDES AND DOCUSATE SODIUM 2 TABLET: 50; 8.6 TABLET ORAL at 09:20

## 2022-10-15 RX ADMIN — SODIUM CHLORIDE 100 MG: 9 INJECTION, SOLUTION INTRAVENOUS at 16:17

## 2022-10-15 NOTE — PROGRESS NOTES
Bedside shift change report given to [address] (oncoming nurse) by Steffi Knapp (offgoing nurse). Report included the following information SBAR, Kardex, Procedure Summary, Intake/Output, MAR, Recent Results, Med Rec Status, and Cardiac Rhythm NS .

## 2022-10-15 NOTE — PROGRESS NOTES
Bedside shift change report given to Pierre Osborne RN (oncoming nurse) by Padmini Gusman LPN (offgoing nurse). Report included the following information SBAR, Kardex, ED Summary, Procedure Summary, Intake/Output, MAR, Recent Results, and Cardiac Rhythm NSR .

## 2022-10-15 NOTE — PROGRESS NOTES
[facility] [facility] Adult  Hospitalist Group      Brief HPI and Hospital Course:      Boris Mcgregor is a 77 y.o. female is brought to the ED via EMS for abdominal pain and nausea. She also reported watery diarrhea. Patient was recently hospitalized from 9/12-9/16 for abdominal pain and constipation and was found to have metastatic disease. She underwent liver biopsy which showed adenocarcinoma. Patient denied fever or chills. She denied dysuria, urgency or frequency. Work-up in the ED was significant for an abnormal abdominal pelvic CT that showed progression of hepatic lesions with a new large fluid collection in the liver with suggestion that this could be intrahepatic abscess. There are also fluid-filled cecum. Right pleural effusion. Patient was started on levofloxacin and Flagyl and was referred to the hospitalist service for admission evaluation. Subjective:    She is a limited historian. No acute events overnight. Assessment and Plan:    Neuro:  - some ICU delirium- resolved;   - minimize/ avoid benzos     Cardiac:  continue hemodynamic monitoring, MAP goal greater than 65;  - wean scheduled midodrine - decrease to 10 mg TID    A. fib RVR:  - HR controlled now on amiodarone, digoxine, metoprolol  keep magnesium above 2 and potassium above 4  -cardiology consulted    Cardiomyopathy:   EF of 25 to 30% with severe hypokinesis of the mid to distal portions of the anterior, lateral and inferior walls with akinesis at the apex with some RV dysfunction as well-CAD versus Takotsubo;  Plan to get a repeat echo in a few days;  Digoxin on hold due to elevated levels    Hypomagnesemia:  Replete prn;       Pulmonary:  supplemental oxygen as needed;  presumed COPD, standing bronchodilator/steroid therapy for now, saturation goal 88 to 94%  Stable oxygen levels on 2-3 L via nasal cannula     GI:  diet as tolerated;  PPI therapy-Home med     Renal:  monitor urine output;   Normal creatinine, minimally elevated BUN     Heme/Onc:  liver mass biopsy positive for adenoCA that is not [address]  Recent (Feb 2022) jennifer-colectomy for a large 6cm dysplastic polyp (zero of 29nodes);  CT abdo/pelvis on admission worrisome for a cecal malignancy. Liver mass are concerning for metastatic colon cancer. Appreciate Heme/Onc consult and recommendations; Poor candidate for any aggressive therapy; ID:  hepatic abscesses versus organized hematoma;  continue vancomycin/Flagyl/Doxy for now;  follow-up cultures;   ID consulted, follow-up recommendations  Consulted IR for CT guided drainage of fluid collections 10/14: if bloody- will send for culture; if purulent- they will try to place drain tube  - 10/14: orders written for Gram stain, cultures, cytology, fungal culture, AFB; discussed with CT department; follow-up studies    Elevated LFTs and bilirubin:  due to Shock liver: resolved  Appreciate Hepatology consult and recommendations  the rise in TBILI generally lags behind the rise and peak in liver enzymes by 3-5 days.   metastatic disease to the liver noted  Minimally elevated alk phos persists likely due to cancer/metastatic disease  Trend LFTs and Bili     Endocrinology:  keep euglycemic    Anxiety:  stable on increased valium dose     Long term prognosis remains poor;   Palliative Medicine consulted;   follow-up oncology and palliative care team [address] discussions with patient and NOK      Code status: DNR  Discharge planning: SNF  - Palliative following          ROS: unable to obtain due to patient factors    PHYSICAL EXAMINATION:  Visit Vitals  /74   Pulse 77   Temp 98 °F (36.7 °C)   Resp 18   Ht 5' 3\" (1.6 m)   Wt 51.2 kg (112 lb 14 oz)   SpO2 95%   BMI 20.00 kg/m²     Patient Vitals for the past 24 hrs:   Temp Pulse Resp BP SpO2   10/15/22 1500 -- 77 18 131/74 95 %   10/15/22 1400 -- 76 18 133/78 96 %   10/15/22 1300 -- 77 23 125/69 100 %   10/15/22 1242 -- 78 -- 129/77 --   10/15/22 1201 -- 78 20 -- 97 %   10/15/22 1200 -- 78 26 122/68 97 %   10/15/22 1100 -- 86 24 137/77 98 %   10/15/22 1000 -- 91 19 (!) 147/69 96 %   10/15/22 0946 -- -- -- 136/66 --   10/15/22 0936 98 °F (36.7 °C) -- -- -- --   10/15/22 0920 -- 87 -- 138/74 --   10/15/22 0801 -- 82 22 (!) 145/71 97 %   10/15/22 0758 -- 83 21 -- 97 %   10/15/22 0700 -- 83 18 -- 96 %   10/15/22 0557 -- 80 -- -- --   10/15/22 0400 -- 78 23 128/67 97 %   10/15/22 0358 -- 78 -- -- --   10/15/22 0159 -- 79 -- -- --   10/15/22 0000 -- 84 21 106/61 98 %   10/14/22 2300 97.5 °F (36.4 °C) 95 25 123/73 98 %   10/14/22 2230 -- 95 23 124/70 97 %   10/14/22 2200 -- 96 -- -- --   10/14/22 2038 -- 86 21 -- 97 %   10/14/22 2030 -- 85 16 (!) 110/58 95 %   10/14/22 2000 -- 88 -- -- --   10/14/22 1800 -- 85 -- -- --        General:          sleepy, cooperative, weak and chronically ill appearing  HEENT:           Atraumatic, MMM , O2 via NC           Neck:               Supple, symmetrical,  thyroid: non tender  Lungs:             decreased breath sounds bilaterally. No Wheezing or Rhonchi. No rales. Heart:              Regular  rhythm,  3/6 Systolic ejection  murmur    Abdomen:       Soft, mild diffuse tenderness. Not distended. Bowel sounds normal  Extremities:     No cyanosis. No clubbing,  +2 distal pulses, edema in all 4 ext sky upper ext  Skin:                Not pale.   Not Jaundiced  No rashes   Psych:             anxious  Neurologic:      Alert, severe generalized weakness,      Labs:     Recent Labs     10/14/22  0453 10/13/22  1424   WBC 12.9* 15.2*   HGB 10.4* 10.9*   HCT 32.4* 33.5*    293       Recent Labs     10/14/22  0453 10/13/22  1424   * 135*   K 3.2* 3.8    104   CO2 23 26   BUN 13 13   CREA 0.55 0.72   GLU 86 82   CA 8.3* 8.3*   MG 1.7  --    PHOS 2.2*  --        Recent Labs     10/13/22  1424   ALT 49   *   TBILI 0.6   TP 5.3*   ALB 2.6*   GLOB 2.7       Recent Labs     10/13/22  1424   INR 1.3*   PTP 13.0*        No results for input(s): FE, TIBC, PSAT, FERR in the last 72 hours. Lab Results   Component Value Date/Time    Folate 27.1 (H) 08/22/2022 03:55 AM        No results for input(s): PH, PCO2, PO2 in the last 72 hours. No results for input(s): CPK, CKNDX, TROIQ in the last 72 hours.     No lab exists for component: CPKMB  Lab Results   Component Value Date/Time    Cholesterol, total 141 08/22/2022 03:55 AM    HDL Cholesterol 57 08/22/2022 03:55 AM    LDL, calculated 71.6 08/22/2022 03:55 AM    Triglyceride 62 08/22/2022 03:55 AM    CHOL/HDL Ratio 2.5 08/22/2022 03:55 AM     Lab Results   Component Value Date/Time    Glucose (POC) 145 (H) 02/14/2022 09:01 PM     Lab Results   Component Value Date/Time    Color YELLOW/STRAW 10/03/2022 04:57 PM    Appearance CLEAR 10/03/2022 04:57 PM    Specific gravity 1.008 10/03/2022 04:57 PM    Specific gravity 1.010 09/28/2022 10:37 AM    pH (UA) 6.5 10/03/2022 04:57 PM    Protein Negative 10/03/2022 04:57 PM    Glucose Negative 10/03/2022 04:57 PM    Ketone Negative 10/03/2022 04:57 PM    Bilirubin Negative 10/03/2022 04:57 PM    Urobilinogen 0.2 10/03/2022 04:57 PM    Nitrites Negative 10/03/2022 04:57 PM    Leukocyte Esterase Negative 10/03/2022 04:57 PM    Epithelial cells FEW 10/03/2022 04:57 PM    Bacteria Negative 10/03/2022 04:57 PM    WBC 0-4 10/03/2022 04:57 PM    RBC 0-5 10/03/2022 04:57 PM       Current Facility-Administered Medications:     anidulafungin (ERAXIS) 100 mg in 0.9% sodium chloride 130 mL IVPB, 100 mg, IntraVENous, Q24H, Alexsandra Clark R, DO, Last Rate: 83 mL/hr at 10/14/22 1728, 100 mg at 10/14/22 1728    albuterol-ipratropium (DUO-NEB) 2.5 MG-0.5 MG/3 ML, 3 mL, Nebulization, Q6H PRN, Rashard Jade MD    amiodarone (CORDARONE) tablet 200 mg, 200 mg, Oral, BID, Jenni Mercado NP, 200 mg at 10/15/22 0920    [START ON 10/27/2022] amiodarone (CORDARONE) tablet 200 mg, 200 mg, Oral, DAILY, Bambi Mercado, NP    balsam peru-castor oiL (VENELEX) ointment, , Topical, TID, Pravin Tipton MD, Given at 10/15/22 2231    digoxin (LANOXIN) tablet 0.0625 mg, 0.0625 mg, Oral, DAILY, Manda Mercado NP, 0.0625 mg at 10/15/22 0921    ondansetron (ZOFRAN ODT) tablet 4 mg, 4 mg, Oral, Q8H PRN **OR** ondansetron (ZOFRAN) injection 4 mg, 4 mg, IntraVENous, Q4H PRN, Pravin Tipton MD    simethicone (MYLICON) tablet 80 mg, 80 mg, Oral, QID PRN, Pravin Tipton MD, 80 mg at 10/08/22 0323    butalbital-acetaminophen-caffeine (FIORICET, ESGIC) -40 mg per tablet 2 Tablet, 2 Tablet, Oral, Q6H PRN, Patricio BOSCH NP, 2 Tablet at 10/15/22 0923    metoprolol tartrate (LOPRESSOR) tablet 25 mg, 25 mg, Oral, Q12H, Manda Mercado NP, 25 mg at 10/15/22 5665    traZODone (DESYREL) tablet 50 mg, 50 mg, Oral, QHS, Niranjan De Los Santos MD, 50 mg at 10/14/22 2257    meropenem (MERREM) 1 g in 0.9% sodium chloride (MBP/ADV) 50 mL MBP, 1 g, IntraVENous, Q8H, Pravin Tipton MD, Last Rate: 16.7 mL/hr at 10/15/22 0916, 1 g at 10/15/22 0916    diazePAM (VALIUM) tablet 5 mg, 5 mg, Oral, Q6H PRN, Patrciio BOSCH NP, 5 mg at 10/11/22 2132    HYDROmorphone (DILAUDID) injection 0.2 mg, 0.2 mg, IntraVENous, Q3H PRN, Patricio BOSCH NP, 0.2 mg at 10/12/22 0307    lidocaine 4 % patch 1 Patch, 1 Patch, TransDERmal, Q24H, Pravin Tipton MD, 1 Patch at 10/14/22 1728    alteplase (CATHFLO) 1 mg in sterile water (preservative free) 1 mL injection, 1 mg, InterCATHeter, PRN, Irina BOSCH MD    therapeutic multivitamin (THERAGRAN) tablet 1 Tablet, 1 Tablet, Oral, DAILY, Niranjan De Los Santos MD, 1 Tablet at 10/15/22 0920    polyethylene glycol (MIRALAX) packet 17 g, 17 g, Oral, BID, Patricio BOSCH NP, 17 g at 10/15/22 0916    magnesium hydroxide (MILK OF MAGNESIA) 400 mg/5 mL oral suspension 30 mL, 30 mL, Oral, DAILY PRN, Patricio BOSCH NP    predniSONE (DELTASONE) tablet 40 mg, 40 mg, Oral, DAILY WITH BREAKFAST, Niranjan De Los Santos MD, 40 mg at 10/15/22 1242    phenol throat spray (CHLORASEPTIC) 1 Spray, 1 Spray, Oral, PRN, Rosana Flores NP    senna-docusate (PERICOLACE) 8.6-50 mg per tablet 2 Tablet, 2 Tablet, Oral, DAILY, Evalina Areas B, NP, 2 Tablet at 10/15/22 0920    midodrine (PROAMATINE) tablet 20 mg, 20 mg, Oral, TID WITH MEALS, Niranjan De Los Santos MD, 20 mg at 10/15/22 1242    Vancomycin - Pharmacy dosing, , Other, Rx Dosing/Monitoring, Roxi Sheikh MD    vancomycin (VANCOCIN) 500 mg in 0.9% sodium chloride (MBP/ADV) 100 mL MBP, 500 mg, IntraVENous, Q12H, Roxi Russo MD, Last Rate: 100 mL/hr at 10/15/22 0602, 500 mg at 10/15/22 0602    thiamine HCL (B-1) tablet 100 mg, 100 mg, Oral, DAILY, Roxi Sheikh MD, 100 mg at 10/15/22 0923    0.9% sodium chloride infusion 250 mL, 250 mL, IntraVENous, PRN, Roxi Sheikh MD    sodium chloride (NS) flush 5-40 mL, 5-40 mL, IntraVENous, Q8H, RAMÍREZ Velásquez MD, 10 mL at 10/15/22 0618    sodium chloride (NS) flush 5-40 mL, 5-40 mL, IntraVENous, PRN, Byron Velásquez MD    acetaminophen (TYLENOL) tablet 650 mg, 650 mg, Oral, Q6H PRN, 650 mg at 10/02/22 2127 **OR** acetaminophen (TYLENOL) suppository 650 mg, 650 mg, Rectal, Q6H PRN, YOVANI Velásquez MD    pantoprazole (PROTONIX) 40 mg in 0.9% sodium chloride 10 mL injection, 40 mg, IntraVENous, DAILY, JONNA Velásquez MD, 40 mg at 10/15/22 0915    prochlorperazine (COMPAZINE) injection 10 mg, 10 mg, IntraVENous, Q6H PRN, Byron Velásquez MD, 10 mg at 10/08/22 2119     CODE STATUS:   Full Code   X DNR    Partial    Comfort Care       Signed:   Katia Alonzo MD  Date of Service:  10/15/2022

## 2022-10-16 PROCEDURE — 74011000258 HC RX REV CODE- 258: Performed by: STUDENT IN AN ORGANIZED HEALTH CARE EDUCATION/TRAINING PROGRAM

## 2022-10-16 PROCEDURE — 74011000258 HC RX REV CODE- 258: Performed by: FAMILY MEDICINE

## 2022-10-16 PROCEDURE — 74011000250 HC RX REV CODE- 250: Performed by: HOSPITALIST

## 2022-10-16 PROCEDURE — 74011250636 HC RX REV CODE- 250/636: Performed by: FAMILY MEDICINE

## 2022-10-16 PROCEDURE — 74011250636 HC RX REV CODE- 250/636: Performed by: STUDENT IN AN ORGANIZED HEALTH CARE EDUCATION/TRAINING PROGRAM

## 2022-10-16 PROCEDURE — 74011250636 HC RX REV CODE- 250/636: Performed by: INTERNAL MEDICINE

## 2022-10-16 PROCEDURE — C9113 INJ PANTOPRAZOLE SODIUM, VIA: HCPCS | Performed by: HOSPITALIST

## 2022-10-16 PROCEDURE — 74011250636 HC RX REV CODE- 250/636: Performed by: HOSPITALIST

## 2022-10-16 PROCEDURE — 74011250637 HC RX REV CODE- 250/637: Performed by: NURSE PRACTITIONER

## 2022-10-16 PROCEDURE — 74011000258 HC RX REV CODE- 258: Performed by: INTERNAL MEDICINE

## 2022-10-16 PROCEDURE — 65270000032 HC RM SEMIPRIVATE

## 2022-10-16 PROCEDURE — 74011250637 HC RX REV CODE- 250/637: Performed by: EMERGENCY MEDICINE

## 2022-10-16 RX ADMIN — VANCOMYCIN HYDROCHLORIDE 500 MG: 500 INJECTION, POWDER, LYOPHILIZED, FOR SOLUTION INTRAVENOUS at 22:24

## 2022-10-16 RX ADMIN — TRAZODONE HYDROCHLORIDE 50 MG: 50 TABLET ORAL at 22:00

## 2022-10-16 RX ADMIN — MEROPENEM 1 G: 1 INJECTION, POWDER, FOR SOLUTION INTRAVENOUS at 18:05

## 2022-10-16 RX ADMIN — MEROPENEM 1 G: 1 INJECTION, POWDER, FOR SOLUTION INTRAVENOUS at 11:20

## 2022-10-16 RX ADMIN — BUTALBITAL, ACETAMINOPHEN, AND CAFFEINE 2 TABLET: 50; 325; 40 TABLET ORAL at 03:06

## 2022-10-16 RX ADMIN — BUTALBITAL, ACETAMINOPHEN, AND CAFFEINE 2 TABLET: 50; 325; 40 TABLET ORAL at 12:57

## 2022-10-16 RX ADMIN — PANTOPRAZOLE SODIUM 40 MG: 40 INJECTION, POWDER, FOR SOLUTION INTRAVENOUS at 10:10

## 2022-10-16 RX ADMIN — VANCOMYCIN HYDROCHLORIDE 500 MG: 500 INJECTION, POWDER, LYOPHILIZED, FOR SOLUTION INTRAVENOUS at 10:04

## 2022-10-16 RX ADMIN — DIAZEPAM 5 MG: 10 TABLET ORAL at 22:30

## 2022-10-16 RX ADMIN — ONDANSETRON 4 MG: 2 INJECTION INTRAMUSCULAR; INTRAVENOUS at 10:06

## 2022-10-16 RX ADMIN — Medication: at 16:44

## 2022-10-16 RX ADMIN — SODIUM CHLORIDE, PRESERVATIVE FREE 10 ML: 5 INJECTION INTRAVENOUS at 12:43

## 2022-10-16 RX ADMIN — PROCHLORPERAZINE EDISYLATE 10 MG: 5 INJECTION INTRAMUSCULAR; INTRAVENOUS at 12:43

## 2022-10-16 RX ADMIN — SODIUM CHLORIDE, PRESERVATIVE FREE 10 ML: 5 INJECTION INTRAVENOUS at 14:49

## 2022-10-16 RX ADMIN — MEROPENEM 1 G: 1 INJECTION, POWDER, FOR SOLUTION INTRAVENOUS at 00:46

## 2022-10-16 RX ADMIN — BUTALBITAL, ACETAMINOPHEN, AND CAFFEINE 2 TABLET: 50; 325; 40 TABLET ORAL at 18:54

## 2022-10-16 RX ADMIN — Medication: at 10:17

## 2022-10-16 RX ADMIN — SODIUM CHLORIDE, PRESERVATIVE FREE 10 ML: 5 INJECTION INTRAVENOUS at 10:17

## 2022-10-16 RX ADMIN — SODIUM CHLORIDE 100 MG: 9 INJECTION, SOLUTION INTRAVENOUS at 14:49

## 2022-10-16 NOTE — PROGRESS NOTES
[facility]  Hospitalist Group      Brief HPI and Hospital Course:      Heath Irwin is a 77 y.o. female is brought to the ED via EMS for abdominal pain and nausea. She also reported watery diarrhea. Patient was recently hospitalized from 9/12-9/16 for abdominal pain and constipation and was found to have metastatic disease. She underwent liver biopsy which showed adenocarcinoma. Patient denied fever or chills. She denied dysuria, urgency or frequency. Work-up in the ED was significant for an abnormal abdominal pelvic CT that showed progression of hepatic lesions with a new large fluid collection in the liver with suggestion that this could be intrahepatic abscess. There are also fluid-filled cecum. Right pleural effusion. Patient was started on levofloxacin and Flagyl and was referred to the hospitalist service for admission evaluation. Subjective:    Patient's son came yesterday to visit, only stayed \"5 minutes\". \"They say I have cancer and I don't have much longer to go\". She has some trouble focusing on my questions and answering them. She does not want Tramadol, it made her vomit. She does not specify what other medication for pain if any she would like. Liver was drained on Friday, it appears to be a hematoma, no growth so far on cultures.        Assessment and Plan:    Neuro:  - some ICU delirium- resolved;   - minimize/ avoid benzos     Cardiac:  continue hemodynamic monitoring, MAP goal greater than 65;  - wean scheduled midodrine - decrease to 10 mg TID    A. fib RVR:  - HR controlled now on amiodarone, digoxine, metoprolol  keep magnesium above 2 and potassium above 4  -cardiology consulted    Cardiomyopathy:   EF of 25 to 30% with severe hypokinesis of the mid to distal portions of the anterior, lateral and inferior walls with akinesis at the apex with some RV dysfunction as well-CAD versus Takotsubo;  Plan to get a repeat echo in a few days;  Digoxin on hold due to elevated levels    Hypomagnesemia:  Replete prn;       Pulmonary:  supplemental oxygen as needed;  presumed COPD, standing bronchodilator/steroid therapy for now, saturation goal 88 to 94%  Stable oxygen levels on 2-3 L via nasal cannula     GI:  diet as tolerated;  PPI therapy-Home med     Renal:  monitor urine output; Normal creatinine, minimally elevated BUN     Heme/Onc:  liver mass biopsy positive for adenoCA that is not [address]  Recent (Feb 2022) jennifer-colectomy for a large 6cm dysplastic polyp (zero of 29nodes);  CT abdo/pelvis on admission worrisome for a cecal malignancy. Liver mass are concerning for metastatic colon cancer. Appreciate Heme/Onc consult and recommendations; Poor candidate for any aggressive therapy; ID:  hepatic abscesses versus organized hematoma;  continue vancomycin/Flagyl/Doxy for now;  follow-up cultures;   ID consulted, follow-up recommendations  Consulted IR for CT guided drainage of fluid collections 10/14: if bloody- will send for culture; if purulent- they will try to place drain tube  - 10/14: orders written for Gram stain, cultures, cytology, fungal culture, AFB; discussed with CT department; follow-up studies  - 10/16: it appears that the liver collection was a hematoma, no growth so far;     Elevated LFTs and bilirubin:  due to Shock liver: resolved  Appreciate Hepatology consult and recommendations  the rise in TBILI generally lags behind the rise and peak in liver enzymes by 3-5 days.   metastatic disease to the liver noted  Minimally elevated alk phos persists likely due to cancer/metastatic disease  Trend LFTs and Bili     Endocrinology:  keep euglycemic    Anxiety:  stable on increased valium dose     Long term prognosis remains poor;   Palliative Medicine consulted;   follow-up oncology and palliative care team [address] discussions with patient and NOK      Code status: DNR  Discharge planning: SNF  - Palliative following;  - ok to transfer to Med/Surg;   - Discharge planning: awaiting placement; poor overall prognosis;         ROS: unable to obtain due to patient factors    PHYSICAL EXAMINATION:  Visit Vitals  BP (!) 172/93   Pulse 92   Temp 97.4 °F (36.3 °C)   Resp 25   Ht 5' 3\" (1.6 m)   Wt 51.2 kg (112 lb 14 oz)   SpO2 97%   BMI 20.00 kg/m²     Patient Vitals for the past 24 hrs:   Temp Pulse Resp BP SpO2   10/16/22 0800 -- 92 -- -- --   10/16/22 0600 -- 86 -- -- --   10/16/22 0400 -- 82 25 (!) 172/93 97 %   10/16/22 0200 -- 83 -- -- --   10/16/22 0000 -- 81 20 (!) 156/91 97 %   10/15/22 2205 -- 82 -- -- --   10/15/22 2200 -- 83 21 (!) 152/92 96 %   10/15/22 2000 -- 80 -- -- --   10/15/22 1956 -- 82 22 -- 95 %   10/15/22 1931 97.4 °F (36.3 °C) 82 23 (!) 147/83 95 %   10/15/22 1834 -- 80 -- (!) 144/82 --   10/15/22 1800 -- 81 26 136/75 96 %   10/15/22 1700 -- 80 22 133/72 95 %   10/15/22 1616 -- 79 -- 135/73 --   10/15/22 1600 -- 81 26 132/74 95 %   10/15/22 1500 -- 77 18 131/74 95 %   10/15/22 1400 -- 76 18 133/78 96 %   10/15/22 1300 -- 77 23 125/69 100 %   10/15/22 1242 -- 78 -- 129/77 --   10/15/22 1201 -- 78 20 -- 97 %   10/15/22 1200 -- 78 26 122/68 97 %   10/15/22 1100 -- 86 24 137/77 98 %   10/15/22 1000 -- 91 19 (!) 147/69 96 %        General:          sleepy, cooperative, weak and chronically ill appearing  HEENT:           Atraumatic, MMM , O2 via NC           Neck:               Supple, symmetrical,  thyroid: non tender  Lungs:             decreased breath sounds bilaterally. No Wheezing or Rhonchi. No rales. Heart:              Regular  rhythm,  3/6 Systolic ejection  murmur    Abdomen:       Soft, mild diffuse tenderness. Not distended. Bowel sounds normal  Extremities:     No cyanosis. No clubbing,  +2 distal pulses, edema in all 4 ext sky upper ext  Skin:                Not pale.   Not Jaundiced  No rashes   Psych:             anxious  Neurologic:      Alert, severe generalized weakness,      Labs:     Recent Labs 10/14/22  0453 10/13/22  1424   WBC 12.9* 15.2*   HGB 10.4* 10.9*   HCT 32.4* 33.5*    293       Recent Labs     10/14/22  0453 10/13/22  1424   * 135*   K 3.2* 3.8    104   CO2 23 26   BUN 13 13   CREA 0.55 0.72   GLU 86 82   CA 8.3* 8.3*   MG 1.7  --    PHOS 2.2*  --        Recent Labs     10/13/22  1424   ALT 49   *   TBILI 0.6   TP 5.3*   ALB 2.6*   GLOB 2.7       Recent Labs     10/13/22  1424   INR 1.3*   PTP 13.0*        No results for input(s): FE, TIBC, PSAT, FERR in the last 72 hours. Lab Results   Component Value Date/Time    Folate 27.1 (H) 08/22/2022 03:55 AM        No results for input(s): PH, PCO2, PO2 in the last 72 hours. No results for input(s): CPK, CKNDX, TROIQ in the last 72 hours.     No lab exists for component: CPKMB  Lab Results   Component Value Date/Time    Cholesterol, total 141 08/22/2022 03:55 AM    HDL Cholesterol 57 08/22/2022 03:55 AM    LDL, calculated 71.6 08/22/2022 03:55 AM    Triglyceride 62 08/22/2022 03:55 AM    CHOL/HDL Ratio 2.5 08/22/2022 03:55 AM     Lab Results   Component Value Date/Time    Glucose (POC) 145 (H) 02/14/2022 09:01 PM     Lab Results   Component Value Date/Time    Color YELLOW/STRAW 10/03/2022 04:57 PM    Appearance CLEAR 10/03/2022 04:57 PM    Specific gravity 1.008 10/03/2022 04:57 PM    Specific gravity 1.010 09/28/2022 10:37 AM    pH (UA) 6.5 10/03/2022 04:57 PM    Protein Negative 10/03/2022 04:57 PM    Glucose Negative 10/03/2022 04:57 PM    Ketone Negative 10/03/2022 04:57 PM    Bilirubin Negative 10/03/2022 04:57 PM    Urobilinogen 0.2 10/03/2022 04:57 PM    Nitrites Negative 10/03/2022 04:57 PM    Leukocyte Esterase Negative 10/03/2022 04:57 PM    Epithelial cells FEW 10/03/2022 04:57 PM    Bacteria Negative 10/03/2022 04:57 PM    WBC 0-4 10/03/2022 04:57 PM    RBC 0-5 10/03/2022 04:57 PM       Current Facility-Administered Medications:     midodrine (PROAMATINE) tablet 10 mg, 10 mg, Oral, TID WITH MEALS, Robina Sheets MD ELIZABETH, 10 mg at 10/15/22 1616    anidulafungin (ERAXIS) 100 mg in 0.9% sodium chloride 130 mL IVPB, 100 mg, IntraVENous, Q24H, Alexsandra Clark DO, Last Rate: 83 mL/hr at 10/15/22 1617, 100 mg at 10/15/22 1617    albuterol-ipratropium (DUO-NEB) 2.5 MG-0.5 MG/3 ML, 3 mL, Nebulization, Q6H PRN, Aurora Son MD    amiodarone (CORDARONE) tablet 200 mg, 200 mg, Oral, BID, Jenni Mercado NP, 200 mg at 10/15/22 1834    [START ON 10/27/2022] amiodarone (CORDARONE) tablet 200 mg, 200 mg, Oral, DAILY, Galilea Mercado NP    balsam peru-castor oiL (VENELEX) ointment, , Topical, TID, Aurora Son MD, Given at 10/15/22 2227    digoxin (LANOXIN) tablet 0.0625 mg, 0.0625 mg, Oral, DAILY, Galilea Mercado NP, 0.0625 mg at 10/15/22 0921    ondansetron (ZOFRAN ODT) tablet 4 mg, 4 mg, Oral, Q8H PRN **OR** ondansetron (ZOFRAN) injection 4 mg, 4 mg, IntraVENous, Q4H PRN, Aurora Son MD    simethicone (MYLICON) tablet 80 mg, 80 mg, Oral, QID PRN, Aurora Son MD, 80 mg at 10/08/22 0323    butalbital-acetaminophen-caffeine (FIORICET, ESGIC) -40 mg per tablet 2 Tablet, 2 Tablet, Oral, Q6H PRN, Tamy BOSCH NP, 2 Tablet at 10/16/22 0306    metoprolol tartrate (LOPRESSOR) tablet 25 mg, 25 mg, Oral, Q12H, Galilea Mercado NP, 25 mg at 10/15/22 2223    traZODone (DESYREL) tablet 50 mg, 50 mg, Oral, QHS, Niranjan De Los Santos MD, 50 mg at 10/15/22 2223    meropenem (MERREM) 1 g in 0.9% sodium chloride (MBP/ADV) 50 mL MBP, 1 g, IntraVENous, Q8H, Aurora Son MD, Last Rate: 16.7 mL/hr at 10/16/22 0046, 1 g at 10/16/22 0046    diazePAM (VALIUM) tablet 5 mg, 5 mg, Oral, Q6H PRN, Tamy BOSCH NP, 5 mg at 10/11/22 2132    HYDROmorphone (DILAUDID) injection 0.2 mg, 0.2 mg, IntraVENous, Q3H PRN, Tamy BOSCH NP, 0.2 mg at 10/12/22 0307    lidocaine 4 % patch 1 Patch, 1 Patch, TransDERmal, Q24H, Aurora Son MD, 1 Patch at 10/15/22 1834    alteplase (CATHFLO) 1 mg in sterile water (preservative free) 1 mL injection, 1 mg, InterCATHeter, PRN, Terrence Argueta MD    therapeutic multivitamin (THERAGRAN) tablet 1 Tablet, 1 Tablet, Oral, DAILY, Niranjan De Los Santos MD, 1 Tablet at 10/15/22 0920    polyethylene glycol (MIRALAX) packet 17 g, 17 g, Oral, BID, Winston BOSCH, NP, 17 g at 10/15/22 0916    magnesium hydroxide (MILK OF MAGNESIA) 400 mg/5 mL oral suspension 30 mL, 30 mL, Oral, DAILY PRN, Winston BOSCH NP    predniSONE (DELTASONE) tablet 40 mg, 40 mg, Oral, DAILY WITH BREAKFAST, Niranjan De Los Santos MD, 40 mg at 10/15/22 1242    phenol throat spray (CHLORASEPTIC) 1 Spray, 1 Spray, Oral, PRN, Rosana Flores NP    senna-docusate (PERICOLACE) 8.6-50 mg per tablet 2 Tablet, 2 Tablet, Oral, DAILY, Winston BOSCH NP, 2 Tablet at 10/15/22 0920    Vancomycin - Pharmacy dosing, , Other, Rx Dosing/Monitoring, Roxi Woo MD    vancomycin (VANCOCIN) 500 mg in 0.9% sodium chloride (MBP/ADV) 100 mL MBP, 500 mg, IntraVENous, Q12H, Roxi Woo MD, Last Rate: 100 mL/hr at 10/15/22 2219, Restarted at 10/15/22 2219    thiamine HCL (B-1) tablet 100 mg, 100 mg, Oral, DAILY, Roxi Woo MD, 100 mg at 10/15/22 0923    0.9% sodium chloride infusion 250 mL, 250 mL, IntraVENous, PRN, Roxi Woo MD    sodium chloride (NS) flush 5-40 mL, 5-40 mL, IntraVENous, Q8H, HARJEET Velásquez MD, 10 mL at 10/15/22 2227    sodium chloride (NS) flush 5-40 mL, 5-40 mL, IntraVENous, PRN, JUANCHO Velásquez MD    acetaminophen (TYLENOL) tablet 650 mg, 650 mg, Oral, Q6H PRN, 650 mg at 10/02/22 2127 **OR** acetaminophen (TYLENOL) suppository 650 mg, 650 mg, Rectal, Q6H PRN, Christen YJZQUJOSE ALICEA MD    pantoprazole (PROTONIX) 40 mg in 0.9% sodium chloride 10 mL injection, 40 mg, IntraVENous, DAILY, GLENNA VelásquezVKNWABEL ALICEA MD, 40 mg at 10/15/22 0915    prochlorperazine (COMPAZINE) injection 10 mg, 10 mg, IntraVENous, Q6H PRN, Nba Velásquez MD, 10 mg at 10/08/22 2119     CODE STATUS:   Full Code   X DNR    Partial    Comfort Care Signed:   Quinton Prieto MD  Date of Service:  10/16/2022

## 2022-10-16 NOTE — PROGRESS NOTES
TRANSFER - OUT REPORT:    Verbal report given to Rudy Lyon RN (name) on Chema Lamas  being transferred to [address] [place] [address] (unit) for routine progression of care       Report consisted of patients Situation, Background, Assessment and   Recommendations(SBAR). Information from the following report(s) SBAR, Kardex, ED Summary, MAR, Cardiac Rhythm NSR, and Quality Measures was reviewed with the receiving nurse. Lines:   Peripheral IV 10/05/22 Anterior;Proximal;Right Forearm (Active)   Site Assessment Clean, dry, & intact 10/13/22 1630   Phlebitis Assessment 0 10/13/22 1630   Infiltration Assessment 0 10/13/22 1630   Dressing Status Clean, dry, & intact 10/13/22 1630   Dressing Type Tape;Transparent 10/13/22 1630   Hub Color/Line Status Pink;Flushed 10/13/22 1630   Action Taken Open ports on tubing capped 10/13/22 1630   Alcohol Cap Used Yes 10/13/22 1630       Extended Dwell Peripheral IV (Active)   Criteria for Appropriate Use Long term IV/antibiotic administration 10/15/22 0931   Site Assessment Clean, dry, & intact 10/15/22 0931   Phlebitis Assessment 0 10/15/22 0931   Infiltration Assessment 0 10/15/22 0931   Arm Circumference (cm) 20 cm 10/06/22 1030   External Catheter Length (cm) 0 centimeters 10/06/22 1030   Line Status Flushed; Infusing 10/15/22 [address] checked and tightened; Chlorhexidine wipes 10/15/22 0931   Alcohol Cap Used Yes 10/15/22 0931   Date of Last Dressing Change 10/06/22 10/12/22 2200   Dressing Type Tape;Transparent 10/14/22 1630   Dressing Status Clean, dry, & intact 10/14/22 1630        Opportunity for questions and clarification was provided. Patient transported with: Transport placed pt being down graded.

## 2022-10-16 NOTE — PROGRESS NOTES
Bedside and Verbal shift change report given to [address] (oncoming nurse) by Facundo Ramirez RN and HERMAN Gorman (offgoing nurse). Report included the following information SBAR, Kardex, MAR, Recent Results, Alarm Parameters , and Quality Measures.

## 2022-10-16 NOTE — PROGRESS NOTES
Pt is laying in bed with even and unlabored respirations on room air. Pt was given pain medication prior to shift change. No distress noted. Pt had a small loose bowel movement during the shift. Kendrick cleansed with CHG. Continuing IV antibiotics and pain management. Safety precautions are in place. Bed in low position and locked. Call bell within reach. Skin assessment was performed with Jaqueline Alarcon RN. Pt has wounds to sacrum, back, heels are pink, BUE, and LLE. Multiple mepilexs are in place and were changed today. Problem: Falls - Risk of  Goal: *Absence of Falls  Description: Document Sofia Fothergill Fall Risk and appropriate interventions in the flowsheet. Outcome: Progressing Towards Goal  Note: Fall Risk Interventions:  Mobility Interventions: Communicate number of staff needed for ambulation/transfer, Patient to call before getting OOB    Mentation Interventions: Evaluate medications/consider consulting pharmacy, Door open when patient unattended, Adequate sleep, hydration, pain control    Medication Interventions: Evaluate medications/consider consulting pharmacy, Patient to call before getting OOB    Elimination Interventions: Call light in reach, Patient to call for help with toileting needs    History of Falls Interventions: Door open when patient unattended, Evaluate medications/consider consulting pharmacy         Problem: Patient Education: Go to Patient Education Activity  Goal: Patient/Family Education  Outcome: Progressing Towards Goal     Problem: Pressure Injury - Risk of  Goal: *Prevention of pressure injury  Description: Document Damian Scale and appropriate interventions in the flowsheet.   Outcome: Progressing Towards Goal  Note: Pressure Injury Interventions:  Sensory Interventions: Assess changes in LOC, Assess need for specialty bed, Avoid rigorous massage over bony prominences, Float heels    Moisture Interventions: Absorbent underpads, Apply protective barrier, creams and emollients, Assess need for specialty bed, Check for incontinence Q2 hours and as needed    Activity Interventions: Assess need for specialty bed, Increase time out of bed, Pressure redistribution bed/mattress(bed type)    Mobility Interventions: Assess need for specialty bed, Float heels, HOB 30 degrees or less, Pressure redistribution bed/mattress (bed type)    Nutrition Interventions: Document food/fluid/supplement intake, Offer support with meals,snacks and hydration    Friction and Shear Interventions: Apply protective barrier, creams and emollients, Feet elevated on foot rest, Foam dressings/transparent film/skin sealants, HOB 30 degrees or less                Problem: Patient Education: Go to Patient Education Activity  Goal: Patient/Family Education  Outcome: Progressing Towards Goal     Problem: Patient Education: Go to Patient Education Activity  Goal: Patient/Family Education  Outcome: Progressing Towards Goal     Problem: Patient Education: Go to Patient Education Activity  Goal: Patient/Family Education  Outcome: Progressing Towards Goal

## 2022-10-16 NOTE — PROGRESS NOTES
Bedside shift change report given to Huan Almanzar, [address], RN (oncoming nurse) by Johnny Silva LPN (offgoing nurse). Report included the following information SBAR, Kardex, ED Summary, Procedure Summary, Intake/Output, MAR, Recent Results, and Cardiac Rhythm NSR .

## 2022-10-17 PROBLEM — G89.29 CHRONIC LOW BACK PAIN WITHOUT SCIATICA, UNSPECIFIED BACK PAIN LATERALITY: Status: ACTIVE | Noted: 2022-10-03

## 2022-10-17 PROBLEM — M54.50 CHRONIC LOW BACK PAIN WITHOUT SCIATICA, UNSPECIFIED BACK PAIN LATERALITY: Status: ACTIVE | Noted: 2022-10-03

## 2022-10-17 PROBLEM — Z51.5 END OF LIFE CARE: Status: ACTIVE | Noted: 2022-10-03

## 2022-10-17 LAB
ANION GAP SERPL CALC-SCNC: 6 MMOL/L (ref 5–15)
BUN SERPL-MCNC: 15 MG/DL (ref 6–20)
BUN/CREAT SERPL: 22 (ref 12–20)
CALCIUM SERPL-MCNC: 7.9 MG/DL (ref 8.5–10.1)
CHLORIDE SERPL-SCNC: 107 MMOL/L (ref 97–108)
CO2 SERPL-SCNC: 26 MMOL/L (ref 21–32)
CREAT SERPL-MCNC: 0.69 MG/DL (ref 0.55–1.02)
ERYTHROCYTE [DISTWIDTH] IN BLOOD BY AUTOMATED COUNT: 22.9 % (ref 11.5–14.5)
GLUCOSE SERPL-MCNC: 97 MG/DL (ref 65–100)
HCT VFR BLD AUTO: 29.4 % (ref 35–47)
HGB BLD-MCNC: 9.4 G/DL (ref 11.5–16)
INR PPP: 1.3 (ref 0.9–1.1)
MAGNESIUM SERPL-MCNC: 1.6 MG/DL (ref 1.6–2.4)
MCH RBC QN AUTO: 27.8 PG (ref 26–34)
MCHC RBC AUTO-ENTMCNC: 32 G/DL (ref 30–36.5)
MCV RBC AUTO: 87 FL (ref 80–99)
NRBC # BLD: 0.02 K/UL (ref 0–0.01)
NRBC BLD-RTO: 0.1 PER 100 WBC
PHOSPHATE SERPL-MCNC: 1.6 MG/DL (ref 2.6–4.7)
PLATELET # BLD AUTO: 243 K/UL (ref 150–400)
PMV BLD AUTO: 10.2 FL (ref 8.9–12.9)
POTASSIUM SERPL-SCNC: 2.8 MMOL/L (ref 3.5–5.1)
PROTHROMBIN TIME: 12.9 SEC (ref 9–11.1)
RBC # BLD AUTO: 3.38 M/UL (ref 3.8–5.2)
SODIUM SERPL-SCNC: 139 MMOL/L (ref 136–145)
WBC # BLD AUTO: 14.9 K/UL (ref 3.6–11)

## 2022-10-17 PROCEDURE — 74011000250 HC RX REV CODE- 250: Performed by: INTERNAL MEDICINE

## 2022-10-17 PROCEDURE — 84100 ASSAY OF PHOSPHORUS: CPT

## 2022-10-17 PROCEDURE — 36415 COLL VENOUS BLD VENIPUNCTURE: CPT

## 2022-10-17 PROCEDURE — 74011250637 HC RX REV CODE- 250/637: Performed by: EMERGENCY MEDICINE

## 2022-10-17 PROCEDURE — 80048 BASIC METABOLIC PNL TOTAL CA: CPT

## 2022-10-17 PROCEDURE — 74011250637 HC RX REV CODE- 250/637: Performed by: NURSE PRACTITIONER

## 2022-10-17 PROCEDURE — 74011250636 HC RX REV CODE- 250/636: Performed by: INTERNAL MEDICINE

## 2022-10-17 PROCEDURE — 83735 ASSAY OF MAGNESIUM: CPT

## 2022-10-17 PROCEDURE — 74011000258 HC RX REV CODE- 258: Performed by: STUDENT IN AN ORGANIZED HEALTH CARE EDUCATION/TRAINING PROGRAM

## 2022-10-17 PROCEDURE — 74011000250 HC RX REV CODE- 250: Performed by: HOSPITALIST

## 2022-10-17 PROCEDURE — 74011250636 HC RX REV CODE- 250/636: Performed by: HOSPITALIST

## 2022-10-17 PROCEDURE — 74011250636 HC RX REV CODE- 250/636: Performed by: STUDENT IN AN ORGANIZED HEALTH CARE EDUCATION/TRAINING PROGRAM

## 2022-10-17 PROCEDURE — 74011250636 HC RX REV CODE- 250/636: Performed by: FAMILY MEDICINE

## 2022-10-17 PROCEDURE — 74011000258 HC RX REV CODE- 258: Performed by: FAMILY MEDICINE

## 2022-10-17 PROCEDURE — 85610 PROTHROMBIN TIME: CPT

## 2022-10-17 PROCEDURE — C9113 INJ PANTOPRAZOLE SODIUM, VIA: HCPCS | Performed by: HOSPITALIST

## 2022-10-17 PROCEDURE — 85027 COMPLETE CBC AUTOMATED: CPT

## 2022-10-17 PROCEDURE — 65270000032 HC RM SEMIPRIVATE

## 2022-10-17 RX ORDER — MAGNESIUM SULFATE HEPTAHYDRATE 40 MG/ML
2 INJECTION, SOLUTION INTRAVENOUS ONCE
Status: COMPLETED | OUTPATIENT
Start: 2022-10-17 | End: 2022-10-17

## 2022-10-17 RX ADMIN — Medication: at 08:38

## 2022-10-17 RX ADMIN — Medication: at 21:41

## 2022-10-17 RX ADMIN — SODIUM CHLORIDE, PRESERVATIVE FREE 10 ML: 5 INJECTION INTRAVENOUS at 21:41

## 2022-10-17 RX ADMIN — BUTALBITAL, ACETAMINOPHEN, AND CAFFEINE 2 TABLET: 50; 325; 40 TABLET ORAL at 00:53

## 2022-10-17 RX ADMIN — DIAZEPAM 5 MG: 10 TABLET ORAL at 10:44

## 2022-10-17 RX ADMIN — POTASSIUM PHOSPHATE, MONOBASIC POTASSIUM PHOSPHATE, DIBASIC: 224; 236 INJECTION, SOLUTION, CONCENTRATE INTRAVENOUS at 04:31

## 2022-10-17 RX ADMIN — POTASSIUM PHOSPHATE, MONOBASIC AND POTASSIUM PHOSPHATE, DIBASIC: 224; 236 INJECTION, SOLUTION, CONCENTRATE INTRAVENOUS at 13:02

## 2022-10-17 RX ADMIN — MEROPENEM 1 G: 1 INJECTION, POWDER, FOR SOLUTION INTRAVENOUS at 01:15

## 2022-10-17 RX ADMIN — PANTOPRAZOLE SODIUM 40 MG: 40 INJECTION, POWDER, FOR SOLUTION INTRAVENOUS at 08:28

## 2022-10-17 RX ADMIN — BUTALBITAL, ACETAMINOPHEN, AND CAFFEINE 2 TABLET: 50; 325; 40 TABLET ORAL at 21:42

## 2022-10-17 RX ADMIN — Medication: at 15:40

## 2022-10-17 RX ADMIN — MAGNESIUM SULFATE HEPTAHYDRATE 2 G: 40 INJECTION, SOLUTION INTRAVENOUS at 04:31

## 2022-10-17 RX ADMIN — BUTALBITAL, ACETAMINOPHEN, AND CAFFEINE 2 TABLET: 50; 325; 40 TABLET ORAL at 08:28

## 2022-10-17 RX ADMIN — METOPROLOL TARTRATE 25 MG: 25 TABLET, FILM COATED ORAL at 21:42

## 2022-10-17 RX ADMIN — BUTALBITAL, ACETAMINOPHEN, AND CAFFEINE 2 TABLET: 50; 325; 40 TABLET ORAL at 15:36

## 2022-10-17 RX ADMIN — TRAZODONE HYDROCHLORIDE 50 MG: 50 TABLET ORAL at 21:42

## 2022-10-17 RX ADMIN — VANCOMYCIN HYDROCHLORIDE 500 MG: 500 INJECTION, POWDER, LYOPHILIZED, FOR SOLUTION INTRAVENOUS at 10:37

## 2022-10-17 RX ADMIN — DIAZEPAM 5 MG: 10 TABLET ORAL at 21:41

## 2022-10-17 NOTE — PROGRESS NOTES
Physical Therapy    Repeat order acknowledged. Pt currently on PT caseload with d/c recommendation for SNF rehab pending [address] decisions. Attempted PT treatment, however pt sleeping deeply and unable to awaken for participation. RN informed.      Stormy Lundberg, PT, MPT

## 2022-10-17 NOTE — PROGRESS NOTES
[facility] Adult  Hospitalist Group      Brief HPI and Hospital Course:      Teddy Trotter is a 77 y.o. female is brought to the ED via EMS for abdominal pain and nausea. She also reported watery diarrhea. Patient was recently hospitalized from 9/12-9/16 for abdominal pain and constipation and was found to have metastatic disease. She underwent liver biopsy which showed adenocarcinoma. Patient denied fever or chills. She denied dysuria, urgency or frequency. Work-up in the ED was significant for an abnormal abdominal pelvic CT that showed progression of hepatic lesions with a new large fluid collection in the liver with suggestion that this could be intrahepatic abscess. There are also fluid-filled cecum. Right pleural effusion. Patient was started on levofloxacin and Flagyl and was referred to the hospitalist service for admission evaluation. Subjective:    Patient wants some more Fioricet for pain but she was given within last 1 to 2 hours discussed with RN   cannot get more patient needs palliative care consult and possibly hospice is appropriate option appreciate help from ID no more antibiotics antifungals      Assessment and Plan:  liver mass biopsy positive for adenoCA that is not [address]  Recent (Feb 2022) jennifer-colectomy for a large 6cm dysplastic polyp (zero of 29nodes);  CT abdo/pelvis on admission worrisome for a cecal malignancy. Liver mass are concerning for metastatic colon cancer. Appreciate Heme/Onc consult and recommendations; Poor candidate for any aggressive therapy; Is post drainage of liver mass blood the fluid came out hepatic abscesses versus organized hematoma; looks like hematoma only cultures no growth  ID following no more treatment    Elevated LFTs and bilirubin:  due to Shock liver: resolved  Appreciate Hepatology consult and recommendations  the rise in TBILI generally lags behind the rise and peak in liver enzymes by 3-5 days. metastatic disease to the liver noted  Minimally elevated alk phos persists likely due to cancer/metastatic disease    Neuro:  - some ICU delirium- resolved;   - minimize/ avoid benzos     Cardiac:  continue hemodynamic monitoring, MAP goal greater than 65;  - wean scheduled midodrine - decrease to 10 mg TID    A. fib RVR:  - HR controlled now on amiodarone, digoxine, metoprolol  keep magnesium above 2 and potassium above 4  -cardiology consulted    Cardiomyopathy:   EF of 25 to 30% with severe hypokinesis of the mid to distal portions of the anterior, lateral and inferior walls with akinesis at the apex with some RV dysfunction as well-CAD versus Takotsubo;  Plan to get a repeat echo in a few days;  Digoxin on hold due to elevated levels    Hypophosphatemia hypokalemia we will replete potassium phosphate  Hypomagnesemia resolved magnesium 1.6     Acute hypoxic respiratory failure resolved  supplemental oxygen as needed;  presumed COPD, standing bronchodilator/steroid therapy for now, saturation goal 88 to 94 % Stable oxygen levels on 2-3 L via nasal cannula     GERD continue PPI     stable on increased valium dose     Long term prognosis remains poor;   Palliative Medicine consulted; --possibly patient is hospice appropriate  follow-up oncology and palliative care team [address] discussions with patient and NOK      Code status: DNR  Discharge planning: SNF  Dispo in 24 hours         ROS: unable to obtain due to patient factors    PHYSICAL EXAMINATION:  Visit Vitals  /64 (BP 1 Location: Left leg, BP Patient Position: At rest)   Pulse (!) 114   Temp 98 °F (36.7 °C)   Resp 18   Ht 5' 3\" (1.6 m)   Wt 51.2 kg (112 lb 14 oz)   SpO2 91%   BMI 20.00 kg/m²     Patient Vitals for the past 24 hrs:   Temp Pulse Resp BP SpO2   10/17/22 0759 98 °F (36.7 °C) (!) 114 18 104/64 91 %   10/17/22 0322 -- -- -- 118/63 --   10/17/22 0303 97.8 °F (36.6 °C) 98 16 (!) 76/42 91 %   10/16/22 1254 97.3 °F (36.3 °C) 96 22 134/80 92 % General:          sleepy, cooperative, weak and chronically ill appearing  HEENT:           Atraumatic, MMM , O2 via NC           Neck:               Supple, symmetrical,  thyroid: non tender  Lungs:             decreased breath sounds bilaterally. No Wheezing or Rhonchi. No rales. Heart:              Regular  rhythm,  3/6 Systolic ejection  murmur    Abdomen:       Soft, mild diffuse tenderness. Not distended. Bowel sounds normal  Extremities:     No cyanosis. No clubbing,  +2 distal pulses, edema in all 4 ext sky upper ext  Skin:                Not pale. Not Jaundiced  No rashes   Psych:             anxious  Neurologic:      Alert, severe generalized weakness,      Labs:     Recent Labs     10/17/22  0114   WBC 14.9*   HGB 9.4*   HCT 29.4*          Recent Labs     10/17/22  0114      K 2.8*      CO2 26   BUN 15   CREA 0.69   GLU 97   CA 7.9*   MG 1.6   PHOS 1.6*       No results for input(s): ALT, AP, TBIL, TBILI, TP, ALB, GLOB, GGT, AML, LPSE in the last 72 hours. No lab exists for component: SGOT, GPT, AMYP, HLPSE    Recent Labs     10/17/22  0114   INR 1.3*   PTP 12.9*        No results for input(s): FE, TIBC, PSAT, FERR in the last 72 hours. Lab Results   Component Value Date/Time    Folate 27.1 (H) 08/22/2022 03:55 AM        No results for input(s): PH, PCO2, PO2 in the last 72 hours. No results for input(s): CPK, CKNDX, TROIQ in the last 72 hours.     No lab exists for component: CPKMB  Lab Results   Component Value Date/Time    Cholesterol, total 141 08/22/2022 03:55 AM    HDL Cholesterol 57 08/22/2022 03:55 AM    LDL, calculated 71.6 08/22/2022 03:55 AM    Triglyceride 62 08/22/2022 03:55 AM    CHOL/HDL Ratio 2.5 08/22/2022 03:55 AM     Lab Results   Component Value Date/Time    Glucose (POC) 145 (H) 02/14/2022 09:01 PM     Lab Results   Component Value Date/Time    Color YELLOW/STRAW 10/03/2022 04:57 PM    Appearance CLEAR 10/03/2022 04:57 PM    Specific gravity 1.008 10/03/2022 04:57 PM    Specific gravity 1.010 09/28/2022 10:37 AM    pH (UA) 6.5 10/03/2022 04:57 PM    Protein Negative 10/03/2022 04:57 PM    Glucose Negative 10/03/2022 04:57 PM    Ketone Negative 10/03/2022 04:57 PM    Bilirubin Negative 10/03/2022 04:57 PM    Urobilinogen 0.2 10/03/2022 04:57 PM    Nitrites Negative 10/03/2022 04:57 PM    Leukocyte Esterase Negative 10/03/2022 04:57 PM    Epithelial cells FEW 10/03/2022 04:57 PM    Bacteria Negative 10/03/2022 04:57 PM    WBC 0-4 10/03/2022 04:57 PM    RBC 0-5 10/03/2022 04:57 PM       Current Facility-Administered Medications:     potassium phosphate 20 mmol in 0.9% sodium chloride 500 mL infusion, , IntraVENous, ONCE, Franklin Pantoja MD    midodrine (PROAMATINE) tablet 10 mg, 10 mg, Oral, TID WITH MEALS, Debby Sheets MD, 10 mg at 10/15/22 1616    albuterol-ipratropium (DUO-NEB) 2.5 MG-0.5 MG/3 ML, 3 mL, Nebulization, Q6H PRN, Enriqueta Flores MD    amiodarone (CORDARONE) tablet 200 mg, 200 mg, Oral, BID, Jenni Mercado NP, 200 mg at 10/15/22 1834    [START ON 10/27/2022] amiodarone (CORDARONE) tablet 200 mg, 200 mg, Oral, DAILY, Peter Mercado, NP    balsam peru-castor oiL (VENELEX) ointment, , Topical, TID, Enriqueta Flores MD, Given at 10/17/22 7668    digoxin (LANOXIN) tablet 0.0625 mg, 0.0625 mg, Oral, DAILY, Peter Mercado, NP, 0.0625 mg at 10/15/22 0921    ondansetron (ZOFRAN ODT) tablet 4 mg, 4 mg, Oral, Q8H PRN **OR** ondansetron (ZOFRAN) injection 4 mg, 4 mg, IntraVENous, Q4H PRN, Enriqueta Flores MD, 4 mg at 10/16/22 1006    simethicone (MYLICON) tablet 80 mg, 80 mg, Oral, QID PRN, Enriqueta Flores MD, 80 mg at 10/08/22 0323    butalbital-acetaminophen-caffeine (FIORICET, ESGIC) -40 mg per tablet 2 Tablet, 2 Tablet, Oral, Q6H PRN, Ting BOSCH NP, 2 Tablet at 10/17/22 0828    metoprolol tartrate (LOPRESSOR) tablet 25 mg, 25 mg, Oral, Q12H, Peter Mercado NP, 25 mg at 10/15/22 2223    traZODone (DESYREL) tablet 50 mg, 50 mg, Oral, QHS, Devon BOSCH MD, 50 mg at 10/16/22 2200    diazePAM (VALIUM) tablet 5 mg, 5 mg, Oral, Q6H PRN, Melecio BOSCH NP, 5 mg at 10/17/22 1044    HYDROmorphone (DILAUDID) injection 0.2 mg, 0.2 mg, IntraVENous, Q3H PRN, Melecio BOSCH NP, 0.2 mg at 10/12/22 0307    lidocaine 4 % patch 1 Patch, 1 Patch, TransDERmal, Q24H, Cornelia Ramírez MD, 1 Patch at 10/15/22 1834    alteplase (CATHFLO) 1 mg in sterile water (preservative free) 1 mL injection, 1 mg, InterCATHeter, PRN, Devon Caballero MD    therapeutic multivitamin (THERAGRAN) tablet 1 Tablet, 1 Tablet, Oral, DAILY, Devon BOSCH MD, 1 Tablet at 10/15/22 0920    polyethylene glycol (MIRALAX) packet 17 g, 17 g, Oral, BID, Melecio BOSCH NP, 17 g at 10/15/22 0916    magnesium hydroxide (MILK OF MAGNESIA) 400 mg/5 mL oral suspension 30 mL, 30 mL, Oral, DAILY PRN, Melecio BOSCH NP    predniSONE (DELTASONE) tablet 40 mg, 40 mg, Oral, DAILY WITH BREAKFAST, Niranjan De Los Santos MD, 40 mg at 10/15/22 1242    phenol throat spray (CHLORASEPTIC) 1 Spray, 1 Spray, Oral, PRN, Rosana Flores NP    senna-docusate (PERICOLACE) 8.6-50 mg per tablet 2 Tablet, 2 Tablet, Oral, DAILY, Melecio BOSCH NP, 2 Tablet at 10/15/22 0920    thiamine HCL (B-1) tablet 100 mg, 100 mg, Oral, DAILY, Roxi Allen MD, 100 mg at 10/15/22 0923    0.9% sodium chloride infusion 250 mL, 250 mL, IntraVENous, PRN, Roxi Allen MD    sodium chloride (NS) flush 5-40 mL, 5-40 mL, IntraVENous, Q8H, KARLY Velásquez MD, 10 mL at 10/16/22 1449    sodium chloride (NS) flush 5-40 mL, 5-40 mL, IntraVENous, PRN, SUKI Velásquez MD, 10 mL at 10/16/22 1243    acetaminophen (TYLENOL) tablet 650 mg, 650 mg, Oral, Q6H PRN, 650 mg at 10/02/22 2127 **OR** acetaminophen (TYLENOL) suppository 650 mg, 650 mg, Rectal, Q6H PRN, ROE Velásquez MD    pantoprazole (PROTONIX) 40 mg in 0.9% sodium chloride 10 mL injection, 40 mg, IntraVENous, DAILY, Darlin Velásquez MD, 40 mg at 10/17/22 8362    prochlorperazine (COMPAZINE) injection 10 mg, 10 mg, IntraVENous, Q6H PRN, Cosme Velásquez MD, 10 mg at 10/16/22 1243     CODE STATUS:   Full Code   X DNR    Partial    Comfort Care       Signed:   Dutch Vargas MD  Date of Service:  10/17/2022

## 2022-10-17 NOTE — PROGRESS NOTES
Palliative Medicine Consult  Rich: [phone] ([phone])    Patient Name: Cindy Subramanian  YOB: 1945    Date of Initial Consult: 10/3/2022  Reason for Consult: care decisions  Requesting Provider: Harshad Arriaza    Primary Care Physician: Trevin Lantigua NP     SUMMARY:   Cindy Subramanian is a 77 y.o. presented to the ED on 9/28/2022 from home to the ED due to abdominal pain, nausea and diarrhea. Admitted with a diagnosis of possible liver abscess but more likely liver hematoma. Had liver biopsy on 9/15/2022. Also anemia and afib with rapid ventricular response. Hx metastatic adenocarcinoma      PMH:  metastatic adenocarcinoma, left colon resection 2/2022 (per patient this was for polyps)  traylor's esophagus, HH, GERD, [place], osteoporosis, seizures, chronic back pain, former smoker, PAF, hyperlipidemia, anemia, chronic constipation, COPD, R THR, L THR    Noted hospitalizations: September  (9/12/2022 to 9/16/2022 for abdominal pain. Liver biopsy done. August (8/21/2022 to 8/25/2022 for JOE, COPD, anemia)   June  (6/29/2022 to 6/30/2022 for COPD)    Current medical issues leading to Palliative Medicine involvement include:  care decisions. Noted on last admission, patient seen by palliative and AMD completed on 9-. Social:   Yanni Urias  (son-in-law)        Melanie Leon (son)  Patient is . 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 She has one daughter Ana Paula Vaz) who she does not have a relationship with as Ame Escalante was upset that the patient has received blood transfusions in the past.  Tamaragayatri Escalante is a [employer]. Deni Rincon lives with the patient and provides some care. He fixes her breakfast but then leaves for work. Patient then feeds herself and dresses herself. She has a shower chair. Patient's son is Melanie Leon. He is currently incarcerated due to DUI. He is expected to be in group home for 7-8 more months. PALLIATIVE DIAGNOSES:   Comfort care  Shortness of breath  Anxiety   Chronic back pain       PLAN:   Patient looks worse today. Eyes closed but does open her eyes. At first, she did not remember that her son was here on Saturday. I reminded the patient that he did visit. She says she wants to see him again before she dies  Spoke to patient about that she is ready for discharge. Roseanne Cummings can not take care of her in the home as he is working during the day. Told patient that CM is working on nursing facility placement. In further talking to the patient, she says she is tired and she wants to die. She does not want hospice, however. We talked about the services hospice provides. Patient did agree to comfort care . Update to Dr. Sara Snow, case management about comfort care but no hospice. Left message for Chelo about the patient's wishes. CM will be in touch with him about placement. Order for comfort care. 10/14/2022  Spoke to patient today. She looks a little brighter this am.    I have been following patient and in daily contact with Chelo about facilitating the visit with the patient's son. Also talked to Roseanne Cummings about the fact that the patient is suffering. The son does call the patient during the evening hours. Roseanne Cummings is usually here to help the patient with the call. I had told Roseanne Cummings that if a visit with the son isn't going to happen soon or isn't going to be possible, then I strongly recommend that the son tell his mother that it is OK for her to accept comfort care and die peacefully. Spoke to Roseanne Cummings. He said Arsenio West did not call his mother last pm.  Roseanne Cummings has relayed my message to Sandip Leo is hopeful a visit will be arranged for no later than Monday. RN reports pain is going to IR for drain placement in area of fluid collection. Addendum:   5:30 pm   at the bedside with the patient   Per RN, patient went to IR but no fluid to drain.  Patient looks much brighter than she has. Nabil Willett, her son called with myself and Chelo present. Chelo told Nabil Willett he will be visiting tomorrow around 10am to the 12 noon timeframe. He and the patient were very excited. I spoke to Nabil Willett (via speaker) and let him know his mother's wish was to hug him one last time. I told him she has been suffering and she will likely opt for comfort care after she see him. Nabil Willett was a little emotional. Support provided. Jaime Leyva will be here tomorrow at the time of the visit. Patient wants her body to be donated to science. Provided Chelo with the local agency's number (if she has registered)  patient says she has registered but unsure of paperwork. Also provided Chelo with an alternate agency in [place].    Patient says she wants her 3rd COVID booster. Relayed to Dr. Lola Riley that patient my opt for comfort care after seeing her son tomorrow    10/7/2022  Patient appears less anxious and less frail than yesterday afternoon. RN reports she did sleep last pm.  Noted that trazodone 50 mg added  Anxiety:  Patient has received valium 5 mg po x 3 doses (including dose RN is administering at this time). Patient reports she is feeling better. Continue valium 5 mg q 6 hours prn. Chronic back pain: Patient has received hydromorphone 0.2 mg IV x 3 doses (including dose RN is administering at this time). Patient does not want the hydromorphone. She wants Fioricet 2 tabs q 6 hours as needed. Spoke to pharmacy about this dosage and frequency. Ok for Fioricet 2 tabs q 6 hours prn but do not exceed 6 tabs in 24 hours. RN and patient aware. Will continue prn dilaudid  Constipation:  per RN patient has had BM in the past 24 hours. Continue pericolace 2 tabs q am, Miralax bid, and prn MOM 30 mL  5. Awaiting transfer to telemetry bed.      Addendum:  Letter written per Chelo's request to support the son being able to visit the patient    10/6/2022  RN called as patient c/o back pain. On Fioricet per her PTA meds. This is not helping. RN also reports patient is anxious. Patient does appear more frail. She does not appear more SOB. Will check in with Dr. Toni Cali regarding pain and anxiety management. Will increase the valium from 2 mg to 5 mg po q 6 hours prn. Dc Fioricet    Add hydromorphone 0.2 mg IV q 3 hours prn. (Patient did not want tramadol)  Patient continues to state she is not ready for comfort care. We did talk about the fact that she may have to be at peace with not being able to see her son. 10/5/2022  Patient transferred to a higher level of care yesterday afternoon due to low BP. BP has been stable and no additional support needed. Patient will be observed today   Talked more about patient's cancer. Patient said Dr. Suzanna Vázquez told her she will probably die before Cross. She said she never thought she would die of cancer. Megan Jackson is working to see if a visit with the son can be arranged. Patient said she will elect for comfort care once she is able to see her son. Patient has not had a relationship with her daughter, Seymour Almonte. Binu Walters was upset with the patient that she received blood as Binu Walters is a [employer]. Patient gave me her phone number and said I could call her 'if you want'. I did speak to Binu Walters and Binu Walters said she would call her mother. Patient later stated Binu Walters did call her. Constipation:  Patient is passing gas. On pericolace 2 tabs q am, Miralax daily. Will increase Miralax to bid and add MOM 30 ml daily prn. Patient reports her SOB is better. On 2 LPM.    Back pain:  has prn Fioricet. Palliative will continue to follow patient. I will be out of the office on 10/6. Call palliative office if care is needed, otherwise I will see her on 10/7     4 pm Addendum:   Reviewed a POST order with the patient. She does not want comfort care but does not want intubation/ventilator for respiratory distress.   See POST form for these orders:   Do not use intubation or mechanical ventilation  May consider less invasive airway support (e.g. CPAP or BiPAP)  Use additional medical treatment, antibiotics, and cardiac monitoring as indicated. Hospital transfer if needed. Avoid intensive care unit if possible. See other orders:  patient would want vasopressors if needed (understands this includes ICU) No feeding tube     Will update Vladymir on the POST orders. 10/4/2022  Patient reports her SOB is slightly better. Remains on 2 LPM.  HR is 116 this am.   Patient stated the doctors have said she is not doing well. Patient did not want to talk further at this time but did agree for me to come back. I will follow up this afternoon. Patient is on valium 2 mg q 6 hours prn anxiety per the hospitalist.  She has received one dose in the last 24 hours. She did say she wished the dose was 5 mg. Yesterday,  patient requested Fioricet for pain as she takes this at home. She has had one dose in the last 24 hours. (Noted that she has N&V with oxycodone and hydrocodone). Patient has not had a BM. Will increase the Pericolace to 2 tabs per day. Continue Miralax. Patient states she has had issues with having either diarrhea or constipation for many years. She was bulimic/anorexic in the past.   Talked to Anjel Powers about the fact that patient would like to be able to see and hug her son, Maria Elena Murphy. Anjel Powers is checking with Jono's  about a possible visit (whether this be in the [facility] or arrange for the patient to see him outside of the [facility]). I offered to write a letter to support her terminal condition and limited life expectancy). Plan to meet with Anjel Powers in the patient's room at 4:30  Patient is a DNR. Working to see if we can help with arrangements for her to visit her son.            10/3/2022  Met with patient and introduced palliative care services  Talked about patient's medical condition and her understanding. She had liver biopsy on 9/12 but just recently learned of the results. Se is aware of her cancer diagnosis and the liver involvement. Cardiology NP in to talk to patient about her heart condition. NP explained her heart rhythm is now normal but her liver enzymes are elevated and she will need to stop the current medication. The NP explained her EF is 20-25% which is a change from her last echo. Patient seemed to understand this meant her heart was weak. We also talked about her other medical conditions including her COPD  See social history above. Patient has been independent with her care. Her son- in-law helps with some meal prep and takes her to appointments. Patient reports she is currently SOB. RRT was recently called. She states it feels hard to get the air in and out. Patient to be diuresed. O2 @ 2 LPM.   Feels slightly anxious. Patient is on valium 2 mg q 6 hours prn anxiety. Patient does report a decreased appetite. Has occasional nausea. Has prn compazine ordered. Constipation: No BM since 9/29. Change Miralax from prn to daily. Add Amber-colace 1 tab daily. Hx chronic back pain/generalized pain: add Fioricet 2 tabs bid prn per PTA med list   Discussed the goals of care with patient. Patient has an AMD.  Jared Lopez is her primary decision maker. We also discussed her code status. Patient verbalized understanding of her medical condition. We discussed what resuscitation/CPR would include. Patient did elect for a DNR code status. We did discuss comfort care and hospice. Patient had questions about her prognosis. We talked about her multiple medical conditions. Patient then stated she wants to continue treatment at this time. She desires to hug her son one more time before she dies. He is incarcerated and isn't due to be out for 7-8 months. Will continue with current plan of care. Spoke to the patient's son-in-law.    We reviewed her current medical condition. Informed him of patient's decision for DNR status but otherwise continue care at this time   Initial consult note routed to primary continuity provider and/or primary health care team members  Communicated plan of care with: Chrissy Abbasi 192 Team     GOALS OF CARE / TREATMENT PREFERENCES:     GOALS OF CARE:  Patient/Health Care Proxy Stated Goals: Comfort    TREATMENT PREFERENCES:   Code Status: DNR  (this decision was made on 10/3/2022)    Patient and family's personal goals include:  at this time, patient would like to continue full care. Patient would like to hug her son one more time before she passes. Patient was able to hug her son on Saturday, October 15    Important upcoming milestones or family events: son will be in [facility] for another 7-8 months. Advance Care Planning:  [x] [facility] Interdisciplinary Team has updated the ACP Navigator with Health Care Decision Maker and Patient Capacity      Primary Decision Maker: Ary Rivero - [phone]    Secondary Decision Maker: Cony Hartman [facility] - [phone]  Advance Care Planning 10/7/2022   Patient's Healthcare Decision Maker is: Named in scanned ACP document   Confirm Advance Directive -   Patient Would Like to Complete Advance Directive -   Does the patient have other document types MOST/MOLST/POST/POLST       Medical Interventions: Limited additional interventions       Other:    As far as possible, the palliative care team has discussed with patient / health care proxy about goals of care / treatment preferences for patient.      HISTORY:     History obtained from: chart, team, family    CHIEF COMPLAINT: Pt admitted with aforementioned history and issues    HPI/SUBJECTIVE:    The patient is:   [x] Verbal and participatory  [] Non-participatory due to: medical condition        Clinical Pain Assessment (nonverbal scale for severity on nonverbal patients):   Clinical Pain Assessment  Severity: 0  Location: low back  Character: aching  Duration: chronic  Effect: fiorecet helps          Duration: for how long has pt been experiencing pain (e.g., 2 days, 1 month, years)  Frequency: how often pain is an issue (e.g., several times per day, once every few days, constant)     FUNCTIONAL ASSESSMENT:     Palliative Performance Scale (PPS):  PPS: 20       PSYCHOSOCIAL/SPIRITUAL SCREENING:     Palliative IDT has assessed this patient for cultural preferences / practices and a referral made as appropriate to needs (Cultural Services, Patient Advocacy, Ethics, etc.)    Any spiritual / Jainism concerns:  [] Yes /  [x] No  [] Unable to obtain this information  If \"Yes\" to discuss with pastoral care during IDT     Does caregiver feel burdened by caring for their loved one:   [] Yes /  [x] No /  [] No Caregiver Present/Available [] No Caregiver [] Pt Lives at [place] [id number]  If \"Yes\" to discuss with social work during IDT    Anticipatory grief assessment:   [x] Normal  / [] Maladaptive   [] Unable to obtain this information  [] n/a  If \"Maladaptive\" to discuss with social work during IDT    ESAS Anxiety: Anxiety: 1    ESAS Depression: Depression: 3        REVIEW OF SYSTEMS:     Positive and pertinent negative findings in ROS are noted above in HPI. The following systems were [x] reviewed / [] unable to be reviewed as noted in HPI  Other findings are noted below. Systems: constitutional, ears/nose/mouth/throat, respiratory, gastrointestinal, genitourinary, musculoskeletal, integumentary, neurologic, psychiatric, endocrine. Positive findings noted below.   Modified ESAS Completed by: provider   Fatigue: 6 Drowsiness: 5   Depression: 3 Pain: 0   Anxiety: 1 Nausea: 0   Anorexia: 8 Dyspnea: 0     Constipation: No (patient reports BM this am)     Stool Occurrence(s): 1        PHYSICAL EXAM:     From RN flowsheet:  Wt Readings from Last 3 Encounters:   10/14/22 112 lb 14 oz (51.2 kg)   09/15/22 92 lb (41.7 kg)   07/21/22 93 lb (42.2 kg)     Blood pressure 104/64, pulse (!) 114, temperature 98 °F (36.7 °C), resp. rate 18, height 5' 3\" (1.6 m), weight 112 lb 14 oz (51.2 kg), SpO2 91 %.     Pain Scale 1: Numeric (0 - 10)  Pain Intensity 1: 8  Pain Onset 1: acute  Pain Location 1: Back  Pain Orientation 1: Posterior  Pain Description 1: Aching  Pain Intervention(s) 1: Medication (see MAR)  Last bowel movement, if known:     Constitutional: resting in bed, doesn't look like she did on Friday, looks fatigued  Eyes: pupils equal, anicteric  ENMT: no nasal discharge, moist mucous membranes  Cardiovascular: afib, + edema in  hands   Respiratory: breathing not labored, symmetric, room air   Gastrointestinal: slightly firm,   :  Musculoskeletal: no deformity, no tenderness to palpation,   Skin: warm, dry  Neurologic: following commands, moving all extremities  Psychiatric: , no hallucinations, says she is ready to die  Other:       HISTORY:     Active Problems:    Liver abscess (9/28/2022)      Abdominal pain (9/28/2022)      Adenocarcinoma ([address] (9/28/2022)      Palliative care encounter (10/3/2022)      DNR (do not resuscitate) discussion (10/3/2022)      Shortness of breath (10/3/2022)      Chronic bilateral low back pain without sciatica (10/3/2022)      Chronic idiopathic constipation (10/4/2022)      Severe protein-calorie malnutrition ([address] (10/6/2022)      Anxiety (10/6/2022)    Past Medical History:   Diagnosis Date    Adenoma of colon     Anemia     Arthritis     Atrial fibrillation (HCC)     Bloating     Chronic constipation     Chronic pain     back     COPD (chronic obstructive pulmonary disease) (HCC)     GERD (gastroesophageal reflux disease)     [place] (hard of hearing)     Hyponatremia     caused seizures x 2 per pt    Indigestion     Osteoporosis     Seizures ([address] 7/2020, 9/2020    x 2       Past Surgical History:   Procedure Laterality Date    COLONOSCOPY N/A 12/16/2021    COLONOSCOPY   :- performed by Nasrin Pennington MD at [address] [place] [address] Sw ENDOSCOPY    FLEXIBLE SIGMOIDOSCOPY N/A 2/14/2022    . performed by Colletta Bride, [facility] at [facility] MAIN OR    HX [address]    HX CHOLECYSTECTOMY  1982    HX HIP REPLACEMENT Left     pt stated hip was pinned, not replaced    HX HIP REPLACEMENT Right     pt stated hip was pinned, not replaced    HX HYSTERECTOMY      HX ORTHOPAEDIC  1990, 2011    bilateral hip fractures     HX ORTHOPAEDIC Left 2020    femur fracture    HX TONSILLECTOMY        Family History   Problem Relation Age of Onset    Heart Disease Mother     Heart Disease Father     Liver Disease Father     Alcohol abuse Father     Anesth Problems Neg Hx       History reviewed, no pertinent family history. Social History     Tobacco Use    Smoking status: Former     Packs/day: 1.00     Years: 20.00     Pack years: 20.00     Types: Cigarettes     Quit date: 12/18/2011     Years since quitting: 10.8    Smokeless tobacco: Never   Substance Use Topics    Alcohol use: Never     Allergies   Allergen Reactions    Cefuroxime Hives    Hydrocodone Nausea and Vomiting    Other Medication Diarrhea and Nausea and Vomiting     PT REPORTS ALL MYCINS CAUSE SEVERE GI UPSET    Oxycodone Nausea and Vomiting    Penicillins Hives     Patient screened for any delayed non-IgE-mediated reaction to PCN.         Patient notes the following:    No delayed non-IgE-mediated reaction to PCN    Hives 1969            Current Facility-Administered Medications   Medication Dose Route Frequency    potassium phosphate 20 mmol in 0.9% sodium chloride 500 mL infusion   IntraVENous ONCE    midodrine (PROAMATINE) tablet 10 mg  10 mg Oral TID WITH MEALS    albuterol-ipratropium (DUO-NEB) 2.5 MG-0.5 MG/3 ML  3 mL Nebulization Q6H PRN    amiodarone (CORDARONE) tablet 200 mg  200 mg Oral BID    [START ON 10/27/2022] amiodarone (CORDARONE) tablet 200 mg  200 mg Oral DAILY    balsam peru-castor oiL (VENELEX) ointment   Topical TID    digoxin (LANOXIN) tablet 0.0625 mg  0.0625 mg Oral DAILY    ondansetron (ZOFRAN) injection 4 mg  4 mg IntraVENous Q4H PRN    Or    ondansetron (ZOFRAN ODT) tablet 4 mg  4 mg Oral Q8H PRN    simethicone (MYLICON) tablet 80 mg  80 mg Oral QID PRN    butalbital-acetaminophen-caffeine (FIORICET, ESGIC) -40 mg per tablet 2 Tablet  2 Tablet Oral Q6H PRN    metoprolol tartrate (LOPRESSOR) tablet 25 mg  25 mg Oral Q12H    traZODone (DESYREL) tablet 50 mg  50 mg Oral QHS    diazePAM (VALIUM) tablet 5 mg  5 mg Oral Q6H PRN    HYDROmorphone (DILAUDID) injection 0.2 mg  0.2 mg IntraVENous Q3H PRN    lidocaine 4 % patch 1 Patch  1 Patch TransDERmal Q24H    alteplase (CATHFLO) 1 mg in sterile water (preservative free) 1 mL injection  1 mg InterCATHeter PRN    therapeutic multivitamin (THERAGRAN) tablet 1 Tablet  1 Tablet Oral DAILY    polyethylene glycol (MIRALAX) packet 17 g  17 g Oral BID    magnesium hydroxide (MILK OF MAGNESIA) 400 mg/5 mL oral suspension 30 mL  30 mL Oral DAILY PRN    predniSONE (DELTASONE) tablet 40 mg  40 mg Oral DAILY WITH BREAKFAST    phenol throat spray (CHLORASEPTIC) 1 Spray  1 Spray Oral PRN    senna-docusate (PERICOLACE) 8.6-50 mg per tablet 2 Tablet  2 Tablet Oral DAILY    thiamine HCL (B-1) tablet 100 mg  100 mg Oral DAILY    0.9% sodium chloride infusion 250 mL  250 mL IntraVENous PRN    sodium chloride (NS) flush 5-40 mL  5-40 mL IntraVENous Q8H    sodium chloride (NS) flush 5-40 mL  5-40 mL IntraVENous PRN    acetaminophen (TYLENOL) tablet 650 mg  650 mg Oral Q6H PRN    Or    acetaminophen (TYLENOL) suppository 650 mg  650 mg Rectal Q6H PRN    pantoprazole (PROTONIX) 40 mg in 0.9% sodium chloride 10 mL injection  40 mg IntraVENous DAILY    prochlorperazine (COMPAZINE) injection 10 mg  10 mg IntraVENous Q6H PRN          LAB AND IMAGING FINDINGS:     Lab Results   Component Value Date/Time    WBC 14.9 (H) 10/17/2022 01:14 AM    HGB 9.4 (L) 10/17/2022 01:14 AM    PLATELET 939 92/68/2813 01:14 AM     Lab Results   Component Value Date/Time Sodium 139 10/17/2022 01:14 AM    Potassium 2.8 (L) 10/17/2022 01:14 AM    Chloride 107 10/17/2022 01:14 AM    CO2 26 10/17/2022 01:14 AM    BUN 15 10/17/2022 01:14 AM    Creatinine 0.69 10/17/2022 01:14 AM    Calcium 7.9 (L) 10/17/2022 01:14 AM    Magnesium 1.6 10/17/2022 01:14 AM    Phosphorus 1.6 (L) 10/17/2022 01:14 AM      Lab Results   Component Value Date/Time    Alk. phosphatase 191 (H) 10/13/2022 02:24 PM    Protein, total 5.3 (L) 10/13/2022 02:24 PM    Albumin 2.6 (L) 10/13/2022 02:24 PM    Globulin 2.7 10/13/2022 02:24 PM     Lab Results   Component Value Date/Time    INR 1.3 (H) 10/17/2022 01:14 AM    Prothrombin time 12.9 (H) 10/17/2022 01:14 AM    aPTT 23.9 08/22/2022 03:55 AM      Lab Results   Component Value Date/Time    Iron 39 08/24/2022 08:20 AM    TIBC 316 08/24/2022 08:20 AM    Iron % saturation 12 (L) 08/24/2022 08:20 AM    Ferritin 23 08/24/2022 08:20 AM      Lab Results   Component Value Date/Time    pH 7.37 10/03/2022 10:28 AM    PCO2 23 (L) 10/03/2022 10:28 AM    PO2 88 10/03/2022 10:28 AM     No components found for: GLPOC   No results found for: CPK, CKMB             Total time: 35 minutes  Counseling / coordination time, spent as noted above: 25 minutes  > 50% counseling / coordination?: yes    Prolonged service was provided for  []30 min   []75 min in face to face time in the presence of the patient, spent as noted above. Time Start:   Time End:   Note: this can only be billed with 39337 (initial) or 44244 (follow up). If multiple start / stop times, list each separately.

## 2022-10-17 NOTE — PROGRESS NOTES
Transitions of care plan:  Needs to be determined. Noted CM consult that pt is medically ready for discharge. Chart reviewed. Per nurse, pt's son who is incarcerated was able to visit in person over the weekend as pt had indicated that she wanted to hug/visit with son again before opting for comfort care vs hospice. Pt admitted from home but unsure if she has enough support to discharge home if hospice is pursued. CM reached out to palliative NP who has worked extensively during admission with pt and family to coordinate son visiting and symptom management. Will assist with discharge plan based on pt's decisions regarding care moving forward    Theo Harper 53   [phone]      2:30pm  Spoke with palliative NP and also left a message with palliative NP for Uche Collins, pt's MPOA, phone [phone]. CM attempted to discuss discharge planning with pt at the bedside but she reported \"being ready to die and in pain\". Offered to discuss with nurse but she declined. Per Uche Collins, pt is not able to return home as he is unable to provide care. Pt is now on comfort care but does not want to pursue hospice. CM sent referrals to local nursing facilities for review. Unsure of pt's finances if pt will need to admit as private pay. Will seek placement under medicare but due to nature of disease progression and treatment plan, this may quickly switch to private pay. Will need to discuss with pt's MPOA Chelo regarding pt's resources. Referrals sent to [facility] of [address] [place] [address] for review. Can adjust based on preference provided by MPOA as pt not willing to discuss discharge planning at this time with CM.       Theo Harper 53   [phone]

## 2022-10-17 NOTE — PROGRESS NOTES
Occupational Therapy:     New orders received and acknowledged. Noted, pt already on OT caseload. Most recent d/c recommendation for SNF if pt declining hospice services. Will follow per POC to address functional independence and performance of ADL/IADL tasks.      Thank you,   Hudson Beyer, ZANE, OTR/L

## 2022-10-17 NOTE — PROGRESS NOTES
Palliative Medicine    Full note to follow    Patient's son visited on Saturday. I spoke to [facility] today and  per [facility], she didn't realize that he had left and kept asking for him    Today she said she wanted to see her son again  I said this is highly likely not to be arranged again    We then talked about comfort care and hospice. Patient stated, \"I want to die\". Patient is open to comfort care but patient does not want hospice. Update to [facility], Dr. Tian London and case management.   Order for comfort care

## 2022-10-17 NOTE — PROGRESS NOTES
ID follow up    Dustin Tinajero to see Ms Liliana Berry  As soon as I walked in, she said \"I want my son\"   She kept saying she wants to see her son while I was in the room   Reviewed palliative notes from 10/14/22   Ms Liliana Berry is status post aspiration of \"  R hepatic lobe hematoma \" -- specimen removed was 15 cc thick bloody fluid  So far gram stain negative and cultures negative  Will stop anidulafungin   Though cultures could be suppressed in the setting of antibiotics, given hematoma findings on CT aspiration and bloody fluid will stop antibiotics   Plans for hospice per primary and palliative teams   Will sign off  Contact if questions     Alexsandra Clark,   11:15 AM

## 2022-10-18 PROBLEM — M54.50 CHRONIC BILATERAL LOW BACK PAIN WITHOUT SCIATICA: Status: ACTIVE | Noted: 2022-10-03

## 2022-10-18 PROBLEM — G89.29 CHRONIC BILATERAL LOW BACK PAIN WITHOUT SCIATICA: Status: ACTIVE | Noted: 2022-10-03

## 2022-10-18 LAB
BACTERIA SPEC CULT: NORMAL
GRAM STN SPEC: NORMAL
GRAM STN SPEC: NORMAL
SERVICE CMNT-IMP: NORMAL
SERVICE CMNT-IMP: NORMAL

## 2022-10-18 PROCEDURE — 74011250636 HC RX REV CODE- 250/636: Performed by: HOSPITALIST

## 2022-10-18 PROCEDURE — 74011000250 HC RX REV CODE- 250: Performed by: HOSPITALIST

## 2022-10-18 PROCEDURE — 74011250637 HC RX REV CODE- 250/637: Performed by: HOSPITALIST

## 2022-10-18 PROCEDURE — 74011250636 HC RX REV CODE- 250/636: Performed by: NURSE PRACTITIONER

## 2022-10-18 PROCEDURE — 77030037877 HC DRSG MEPILEX >48IN BORD MOLN -A

## 2022-10-18 PROCEDURE — 74011250637 HC RX REV CODE- 250/637: Performed by: EMERGENCY MEDICINE

## 2022-10-18 PROCEDURE — 74011250637 HC RX REV CODE- 250/637: Performed by: NURSE PRACTITIONER

## 2022-10-18 PROCEDURE — 65270000032 HC RM SEMIPRIVATE

## 2022-10-18 PROCEDURE — 77030037878 HC DRSG MEPILEX >48IN BORD MOLN -B

## 2022-10-18 RX ORDER — DIAZEPAM 10 MG/2ML
2.5 INJECTION INTRAMUSCULAR
Status: DISCONTINUED | OUTPATIENT
Start: 2022-10-18 | End: 2022-10-20 | Stop reason: HOSPADM

## 2022-10-18 RX ADMIN — TRAZODONE HYDROCHLORIDE 50 MG: 50 TABLET ORAL at 21:19

## 2022-10-18 RX ADMIN — HYDROMORPHONE HYDROCHLORIDE 0.2 MG: 1 INJECTION, SOLUTION INTRAMUSCULAR; INTRAVENOUS; SUBCUTANEOUS at 12:57

## 2022-10-18 RX ADMIN — HYDROMORPHONE HYDROCHLORIDE 0.2 MG: 1 INJECTION, SOLUTION INTRAMUSCULAR; INTRAVENOUS; SUBCUTANEOUS at 19:15

## 2022-10-18 RX ADMIN — HYDROMORPHONE HYDROCHLORIDE 0.2 MG: 1 INJECTION, SOLUTION INTRAMUSCULAR; INTRAVENOUS; SUBCUTANEOUS at 16:26

## 2022-10-18 RX ADMIN — SODIUM CHLORIDE, PRESERVATIVE FREE 10 ML: 5 INJECTION INTRAVENOUS at 06:23

## 2022-10-18 RX ADMIN — PROCHLORPERAZINE EDISYLATE 10 MG: 5 INJECTION INTRAMUSCULAR; INTRAVENOUS at 02:24

## 2022-10-18 RX ADMIN — METOPROLOL TARTRATE 25 MG: 25 TABLET, FILM COATED ORAL at 21:19

## 2022-10-18 RX ADMIN — Medication: at 16:26

## 2022-10-18 RX ADMIN — Medication: at 09:00

## 2022-10-18 RX ADMIN — DIAZEPAM 2.5 MG: 5 INJECTION, SOLUTION INTRAMUSCULAR; INTRAVENOUS at 17:53

## 2022-10-18 RX ADMIN — SODIUM CHLORIDE, PRESERVATIVE FREE 10 ML: 5 INJECTION INTRAVENOUS at 21:20

## 2022-10-18 RX ADMIN — MIDODRINE HYDROCHLORIDE 10 MG: 5 TABLET ORAL at 06:23

## 2022-10-18 RX ADMIN — HYDROMORPHONE HYDROCHLORIDE 0.2 MG: 1 INJECTION, SOLUTION INTRAMUSCULAR; INTRAVENOUS; SUBCUTANEOUS at 09:55

## 2022-10-18 RX ADMIN — Medication: at 21:19

## 2022-10-18 RX ADMIN — BUTALBITAL, ACETAMINOPHEN, AND CAFFEINE 2 TABLET: 50; 325; 40 TABLET ORAL at 21:24

## 2022-10-18 NOTE — PROGRESS NOTES
Pt is laying in bed with even and unlabored respirations on room air. Pt was given pain and anxiety medication throughout the shift. No distress noted. Pt refused all oral medications besides pain and anxiety medication. Dr. Akhil Wise was made aware via perfect serve. Palliative care saw the pt. Comfort measures were ordered. Continuing pain management at this time. Safety precautions are in place. Bed in low position and locked. Call bell within reach. Problem: Falls - Risk of  Goal: *Absence of Falls  Description: Document Karen Yousif Fall Risk and appropriate interventions in the flowsheet. Outcome: Progressing Towards Goal  Note: Fall Risk Interventions:  Mobility Interventions: Bed/chair exit alarm, Communicate number of staff needed for ambulation/transfer, Patient to call before getting OOB    Mentation Interventions: Adequate sleep, hydration, pain control, Bed/chair exit alarm, Door open when patient unattended, Evaluate medications/consider consulting pharmacy, Eyeglasses and hearing aids    Medication Interventions: Bed/chair exit alarm, Evaluate medications/consider consulting pharmacy, Patient to call before getting OOB    Elimination Interventions: Bed/chair exit alarm, Call light in reach, Patient to call for help with toileting needs, Toileting schedule/hourly rounds    History of Falls Interventions: Bed/chair exit alarm, Door open when patient unattended, Evaluate medications/consider consulting pharmacy         Problem: Patient Education: Go to Patient Education Activity  Goal: Patient/Family Education  Outcome: Progressing Towards Goal     Problem: Pressure Injury - Risk of  Goal: *Prevention of pressure injury  Description: Document Damian Scale and appropriate interventions in the flowsheet.   Outcome: Progressing Towards Goal  Note: Pressure Injury Interventions:  Sensory Interventions: Assess changes in LOC, Assess need for specialty bed, Avoid rigorous massage over bony prominences, Discuss PT/OT consult with provider, Float heels    Moisture Interventions: Absorbent underpads, Apply protective barrier, creams and emollients, Assess need for specialty bed, Check for incontinence Q2 hours and as needed, Contain wound drainage, Internal/External urinary devices    Activity Interventions: Increase time out of bed, Assess need for specialty bed, Pressure redistribution bed/mattress(bed type), PT/OT evaluation    Mobility Interventions: Assess need for specialty bed, Float heels, HOB 30 degrees or less, Pressure redistribution bed/mattress (bed type), PT/OT evaluation    Nutrition Interventions: Document food/fluid/supplement intake, Offer support with meals,snacks and hydration    Friction and Shear Interventions: Apply protective barrier, creams and emollients, Feet elevated on foot rest, Foam dressings/transparent film/skin sealants, HOB 30 degrees or less                Problem: Patient Education: Go to Patient Education Activity  Goal: Patient/Family Education  Outcome: Progressing Towards Goal     Problem: Patient Education: Go to Patient Education Activity  Goal: Patient/Family Education  Outcome: Progressing Towards Goal     Problem: Patient Education: Go to Patient Education Activity  Goal: Patient/Family Education  Outcome: Progressing Towards Goal

## 2022-10-18 NOTE — PROGRESS NOTES
Palliative Medicine Consult  Rich: [phone] ([phone])    Patient Name: Sharlene Kraus  YOB: 1945    Date of Initial Consult: 10/3/2022  Reason for Consult: care decisions  Requesting Provider: Raymundo Mclean    Primary Care Physician: Sabas Huang NP     SUMMARY:   Sharlene Kraus is a 77 y.o. presented to the ED on 9/28/2022 from home to the ED due to abdominal pain, nausea and diarrhea. Admitted with a diagnosis of possible liver abscess but more likely liver hematoma. Had liver biopsy on 9/15/2022. Also anemia and afib with rapid ventricular response. Hx metastatic adenocarcinoma      PMH:  metastatic adenocarcinoma, left colon resection 2/2022 (per patient this was for polyps)  traylor's esophagus, HH, GERD, [place], osteoporosis, seizures, chronic back pain, former smoker, PAF, hyperlipidemia, anemia, chronic constipation, COPD, R THR, L THR    Noted hospitalizations: September  (9/12/2022 to 9/16/2022 for abdominal pain. Liver biopsy done. August (8/21/2022 to 8/25/2022 for JOE, COPD, anemia)   June  (6/29/2022 to 6/30/2022 for COPD)    Current medical issues leading to Palliative Medicine involvement include:  care decisions. Noted on last admission, patient seen by palliative and AMD completed on 9-. Social:   Tess Major  (son-in-law)        Angélica Reyes (son)  Patient is . 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 She has one daughter Erin Marie) who she does not have a relationship with as Lien was upset that the patient has received blood transfusions in the past.  Lien is a Quaker. Tabby Pond lives with the patient and provides some care. He fixes her breakfast but then leaves for work. Patient then feeds herself and dresses herself. She has a shower chair. Patient's son is Angélica Reyes. He is currently incarcerated due to DUI. He is expected to be in prison for 7-8 more months. PALLIATIVE DIAGNOSES:   Comfort care  Shortness of breath  Anxiety   Chronic back pain       PLAN:   Saw patient this am. She has refused some of her meds. Will dc the medications the patient is not taking. Update to Dr. Librado Chinchilla. Patient did sign a new POST order. She has expressed she now is ready to die and wants comfort care. I wrote a note that the previous POST was voided. Original and copy of POST at the bedside for Vladymir and copy on the chart   Patient has questions about her life insurance policy. Contacted [facility]. I helped the patient with the call. The  said she would have to call the insurance agency if she wanted to make changes in the beneficiaries. RN requested IV anxiolytic as patient not taking the oral valium   Added 2.5 mg IV valium q 4 hours prn  Will update Lidiaadymitejas. Tried to call but no answer. 10/18/2022  Patient looks worse today. Eyes closed but does open her eyes. At first, she did not remember that her son was here on Saturday. I reminded the patient that he did visit. She says she wants to see him again before she dies  Spoke to patient about that she is ready for discharge. Shasta Oliver can not take care of her in the home as he is working during the day. Told patient that CM is working on nursing facility placement. In further talking to the patient, she says she is tired and she wants to die. She does not want hospice, however. We talked about the services hospice provides. Patient did agree to comfort care . Update to Dr. Librado Chinchilla, case management about comfort care but no hospice. Left message for Chelo about the patient's wishes. CM will be in touch with him about placement. Order for comfort care. 10/14/2022  Spoke to patient today. She looks a little brighter this am.    I have been following patient and in daily contact with Chelo about facilitating the visit with the patient's son.    Also talked to Shasta Oliver about the fact that the patient is suffering. The son does call the patient during the evening hours. Shasta Oliver is usually here to help the patient with the call. I had told Shasta Oliver that if a visit with the son isn't going to happen soon or isn't going to be possible, then I strongly recommend that the son tell his mother that it is OK for her to accept comfort care and die peacefully. Spoke to Shasta Oliver. He said Austen Grier did not call his mother last pm.  Shasta Oliver has relayed my message to Mata Holder is hopeful a visit will be arranged for no later than Monday. RN reports pain is going to IR for drain placement in area of fluid collection. Addendum:   5:30 pm   at the bedside with the patient   Per RN, patient went to IR but no fluid to drain. Patient looks much brighter than she has. Austen Grier, her son called with myself and Kipmir present. rhinamitejas told Austen Grier he will be visiting tomorrow around 10am to the 12 noon timeframe. He and the patient were very excited. I spoke to Austen Grier (via speaker) and let him know his mother's wish was to hug him one last time. I told him she has been suffering and she will likely opt for comfort care after she see him. Austen Grier was a little emotional. Support provided. Shasta Oliver will be here tomorrow at the time of the visit. Patient wants her body to be donated to science. Provided rhinamir with the local agency's number (if she has registered)  patient says she has registered but unsure of paperwork. Also provided [facility] with an alternate agency in [place].    Patient says she wants her 3rd COVID booster. Relayed to Dr. Booker Monterroso that patient my opt for comfort care after seeing her son tomorrow    10/7/2022  Patient appears less anxious and less frail than yesterday afternoon. RN reports she did sleep last pm.  Noted that trazodone 50 mg added  Anxiety:  Patient has received valium 5 mg po x 3 doses (including dose RN is administering at this time).   Patient reports she is feeling better. Continue valium 5 mg q 6 hours prn. Chronic back pain: Patient has received hydromorphone 0.2 mg IV x 3 doses (including dose RN is administering at this time). Patient does not want the hydromorphone. She wants Fioricet 2 tabs q 6 hours as needed. Spoke to pharmacy about this dosage and frequency. Ok for Fioricet 2 tabs q 6 hours prn but do not exceed 6 tabs in 24 hours. RN and patient aware. Will continue prn dilaudid  Constipation:  per RN patient has had BM in the past 24 hours. Continue pericolace 2 tabs q am, Miralax bid, and prn MOM 30 mL  5. Awaiting transfer to telemetry bed. Addendum:  Letter written per Kipmitejas's request to support the son being able to visit the patient    10/6/2022  RN called as patient c/o back pain. On Fioricet per her PTA meds. This is not helping. RN also reports patient is anxious. Patient does appear more frail. She does not appear more SOB. Will check in with Dr. Ana Rosa Santos regarding pain and anxiety management. Will increase the valium from 2 mg to 5 mg po q 6 hours prn. Dc Fioricet    Add hydromorphone 0.2 mg IV q 3 hours prn. (Patient did not want tramadol)  Patient continues to state she is not ready for comfort care. We did talk about the fact that she may have to be at peace with not being able to see her son. 10/5/2022  Patient transferred to a higher level of care yesterday afternoon due to low BP. BP has been stable and no additional support needed. Patient will be observed today   Talked more about patient's cancer. Patient said Dr. Connor Cabrera told her she will probably die before Az[place]. She said she never thought she would die of cancer. Jared Lopez is working to see if a visit with the son can be arranged. Patient said she will elect for comfort care once she is able to see her son. Patient has not had a relationship with her daughter, Jesse Rose.  Di Daniels was upset with the patient that she received blood as Lien is a [employer]. Patient gave me her phone number and said I could call her 'if you want'. I did speak to Lien and Lien said she would call her mother. Patient later stated Lien did call her. Constipation:  Patient is passing gas. On pericolace 2 tabs q am, Miralax daily. Will increase Miralax to bid and add MOM 30 ml daily prn. Patient reports her SOB is better. On 2 LPM.    Back pain:  has prn Fioricet. Palliative will continue to follow patient. I will be out of the office on 10/6. Call palliative office if care is needed, otherwise I will see her on 10/7     4 pm Addendum:   Reviewed a POST order with the patient. She does not want comfort care but does not want intubation/ventilator for respiratory distress. See POST form for these orders:   Do not use intubation or mechanical ventilation  May consider less invasive airway support (e.g. CPAP or BiPAP)  Use additional medical treatment, antibiotics, and cardiac monitoring as indicated. Hospital transfer if needed. Avoid intensive care unit if possible. See other orders:  patient would want vasopressors if needed (understands this includes ICU) No feeding tube     Will update Vladymir on the POST orders. 10/4/2022  Patient reports her SOB is slightly better. Remains on 2 LPM.  HR is 116 this am.   Patient stated the doctors have said she is not doing well. Patient did not want to talk further at this time but did agree for me to come back. I will follow up this afternoon. Patient is on valium 2 mg q 6 hours prn anxiety per the hospitalist.  She has received one dose in the last 24 hours. She did say she wished the dose was 5 mg. Yesterday,  patient requested Fioricet for pain as she takes this at home. She has had one dose in the last 24 hours. (Noted that she has N&V with oxycodone and hydrocodone). Patient has not had a BM. Will increase the Pericolace to 2 tabs per day. Continue Miralax. Patient states she has had issues with having either diarrhea or constipation for many years. She was bulimic/anorexic in the past.   Talked to Anjel Powers about the fact that patient would like to be able to see and hug her son, Viviane Boyd. Anjel Powers is checking with Jono's  about a possible visit (whether this be in the [facility] or arrange for the patient to see him outside of the [facility]). I offered to write a letter to support her terminal condition and limited life expectancy). Plan to meet with Anjel Powers in the patient's room at 4:30  Patient is a DNR. Working to see if we can help with arrangements for her to visit her son. 10/3/2022  Met with patient and introduced palliative care services  Talked about patient's medical condition and her understanding. She had liver biopsy on 9/12 but just recently learned of the results. Se is aware of her cancer diagnosis and the liver involvement. Cardiology NP in to talk to patient about her heart condition. NP explained her heart rhythm is now normal but her liver enzymes are elevated and she will need to stop the current medication. The NP explained her EF is 20-25% which is a change from her last echo. Patient seemed to understand this meant her heart was weak. We also talked about her other medical conditions including her COPD  See social history above. Patient has been independent with her care. Her son- in-law helps with some meal prep and takes her to appointments. Patient reports she is currently SOB. RRT was recently called. She states it feels hard to get the air in and out. Patient to be diuresed. O2 @ 2 LPM.   Feels slightly anxious. Patient is on valium 2 mg q 6 hours prn anxiety. Patient does report a decreased appetite. Has occasional nausea. Has prn compazine ordered. Constipation: No BM since 9/29. Change Miralax from prn to daily. Add Amber-colace 1 tab daily.      Hx chronic back pain/generalized pain: add Fioricet 2 tabs bid prn per PTA med list   Discussed the goals of care with patient. Patient has an AMD.  Roseanne Cummings is her primary decision maker. We also discussed her code status. Patient verbalized understanding of her medical condition. We discussed what resuscitation/CPR would include. Patient did elect for a DNR code status. We did discuss comfort care and hospice. Patient had questions about her prognosis. We talked about her multiple medical conditions. Patient then stated she wants to continue treatment at this time. She desires to hug her son one more time before she dies. He is incarcerated and isn't due to be out for 7-8 months. Will continue with current plan of care. Spoke to the patient's son-in-law. We reviewed her current medical condition. Informed him of patient's decision for DNR status but otherwise continue care at this time   Initial consult note routed to primary continuity provider and/or primary health care team members  Communicated plan of care with: Palliative Chrissy GILMORE [facility] Team     GOALS OF CARE / TREATMENT PREFERENCES:     GOALS OF CARE:  Patient/Health Care Proxy Stated Goals: Comfort    TREATMENT PREFERENCES:   Code Status: DNR  (this decision was made on 10/3/2022)    Patient and family's personal goals include:  comfort care    Important upcoming milestones or family events: son will be in half-way for another 7-8 months.       Advance Care Planning:  [x] The [facility] Inter[facility] Team has updated the ACP Navigator with Health Care Decision Maker and Patient Capacity      Primary Decision Maker: Cheryle Shore - [phone]    Secondary Decision Maker: Jono Jacobs - [facility] - [phone]  Advance Care Planning 10/7/2022   Patient's Healthcare Decision Maker is: Named in scanned ACP document   Confirm Advance Directive -   Patient Would Like to Complete Advance Directive -   Does the patient have other document types MOST/MOLST/POST/POLST       Medical Interventions: Comfort measures       Other:    As far as possible, the palliative care team has discussed with patient / health care proxy about goals of care / treatment preferences for patient. HISTORY:     History obtained from: chart, team, family    CHIEF COMPLAINT: Pt admitted with aforementioned history and issues    HPI/SUBJECTIVE:    The patient is:   [x] Verbal and participatory  [] Non-participatory due to: medical condition        Clinical Pain Assessment (nonverbal scale for severity on nonverbal patients):   Clinical Pain Assessment  Severity: 0  Location: low back  Character: aching  Duration: chronic  Effect: fiorecet helps          Duration: for how long has pt been experiencing pain (e.g., 2 days, 1 month, years)  Frequency: how often pain is an issue (e.g., several times per day, once every few days, constant)     FUNCTIONAL ASSESSMENT:     Palliative Performance Scale (PPS):  PPS: 20       PSYCHOSOCIAL/SPIRITUAL SCREENING:     Palliative IDT has assessed this patient for cultural preferences / practices and a referral made as appropriate to needs (Cultural Services, Patient Advocacy, Ethics, etc.)    Any spiritual / Shinto concerns:  [] Yes /  [x] No  [] Unable to obtain this information  If \"Yes\" to discuss with pastoral care during IDT     Does caregiver feel burdened by caring for their loved one:   [] Yes /  [x] No /  [] No Caregiver Present/Available [] No Caregiver [] Pt Lives at [place] [id number]  If \"Yes\" to discuss with social work during IDT    Anticipatory grief assessment:   [x] Normal  / [] Maladaptive   [] Unable to obtain this information  [] n/a  If \"Maladaptive\" to discuss with social work during IDT    ESAS Anxiety: Anxiety: 2    ESAS Depression: Depression: 2        REVIEW OF SYSTEMS:     Positive and pertinent negative findings in ROS are noted above in HPI.   The following systems were [x] reviewed / [] unable to be reviewed as noted in HPI  Other findings are noted below. Systems: constitutional, ears/nose/mouth/throat, respiratory, gastrointestinal, genitourinary, musculoskeletal, integumentary, neurologic, psychiatric, endocrine. Positive findings noted below. Modified ESAS Completed by: provider   Fatigue: 5 Drowsiness: 4   Depression: 2 Pain: 0   Anxiety: 2 Nausea: 0   Anorexia: 8 Dyspnea: 0     Constipation: No (patient reports BM this am)     Stool Occurrence(s): 1        PHYSICAL EXAM:     From RN flowsheet:  Wt Readings from Last 3 Encounters:   10/14/22 112 lb 14 oz (51.2 kg)   09/15/22 92 lb (41.7 kg)   07/21/22 93 lb (42.2 kg)     Blood pressure 123/76, pulse 70, temperature 99.6 °F (37.6 °C), resp. rate 16, height 5' 3\" (1.6 m), weight 112 lb 14 oz (51.2 kg), SpO2 91 %.     Pain Scale 1: Numeric (0 - 10)  Pain Intensity 1: 6  Pain Onset 1: acute  Pain Location 1: Back  Pain Orientation 1: Posterior  Pain Description 1: Aching  Pain Intervention(s) 1: Medication (see MAR)  Last bowel movement, if known:     Constitutional: resting in bed, more alert than yesterday   Eyes: pupils equal, anicteric  ENMT: no nasal discharge, moist mucous membranes  Cardiovascular: afib, + edema in  hands   Respiratory: breathing not labored, symmetric, room air   Gastrointestinal: slightly firm,   :  Musculoskeletal: no deformity, no tenderness to palpation,   Skin: warm, dry  Neurologic: following commands, moving all extremities  Psychiatric: , no hallucinations, says she is ready to die  Other:       HISTORY:     Active Problems:    Liver abscess (9/28/2022)      Abdominal pain (9/28/2022)      Adenocarcinoma ([address] (9/28/2022)      Palliative care encounter (10/3/2022)      End of life care (10/3/2022)      Shortness of breath (10/3/2022)      Chronic low back pain without sciatica, unspecified back pain laterality (10/3/2022)      Chronic idiopathic constipation (10/4/2022)      Severe protein-calorie malnutrition ([address] (10/6/2022)      Anxiety (10/6/2022)    Past Medical History:   Diagnosis Date    Adenoma of colon     Anemia     Arthritis     Atrial fibrillation (HCC)     Bloating     Chronic constipation     Chronic pain     back     COPD (chronic obstructive pulmonary disease) (HCC)     GERD (gastroesophageal reflux disease)     [place] (hard of hearing)     Hyponatremia     caused seizures x 2 per pt    Indigestion     Osteoporosis     Seizures ([address] 7/2020, 9/2020    x 2       Past Surgical History:   Procedure Laterality Date    COLONOSCOPY N/A 12/16/2021    COLONOSCOPY   :- performed by Katey Escalera MD at [address] N/A 2/14/2022    . performed by Megan Strange MD at [facility] MAIN OR    HX [address]    HX CHOLECYSTECTOMY  1982    HX HIP REPLACEMENT Left     pt stated hip was pinned, not replaced    HX HIP REPLACEMENT Right     pt stated hip was pinned, not replaced    HX HYSTERECTOMY      HX ORTHOPAEDIC  1990, 2011    bilateral hip fractures     HX ORTHOPAEDIC Left 2020    femur fracture    HX TONSILLECTOMY        Family History   Problem Relation Age of Onset    Heart Disease Mother     Heart Disease Father     Liver Disease Father     Alcohol abuse Father     Anesth Problems Neg Hx       History reviewed, no pertinent family history. Social History     Tobacco Use    Smoking status: Former     Packs/day: 1.00     Years: 20.00     Pack years: 20.00     Types: Cigarettes     Quit date: 12/18/2011     Years since quitting: 10.8    Smokeless tobacco: Never   Substance Use Topics    Alcohol use: Never     Allergies   Allergen Reactions    Cefuroxime Hives    Hydrocodone Nausea and Vomiting    Other Medication Diarrhea and Nausea and Vomiting     PT REPORTS ALL MYCINS CAUSE SEVERE GI UPSET    Oxycodone Nausea and Vomiting    Penicillins Hives     Patient screened for any delayed non-IgE-mediated reaction to PCN.         Patient notes the following:    No delayed non-IgE-mediated reaction to PCN Hives 1969            Current Facility-Administered Medications   Medication Dose Route Frequency    diazePAM (VALIUM) injection 2.5 mg  2.5 mg IntraVENous Q4H PRN    midodrine (PROAMATINE) tablet 10 mg  10 mg Oral TID WITH MEALS    albuterol-ipratropium (DUO-NEB) 2.5 MG-0.5 MG/3 ML  3 mL Nebulization Q6H PRN    [START ON 10/27/2022] amiodarone (CORDARONE) tablet 200 mg  200 mg Oral DAILY    balsam peru-castor oiL (VENELEX) ointment   Topical TID    ondansetron (ZOFRAN) injection 4 mg  4 mg IntraVENous Q4H PRN    Or    ondansetron (ZOFRAN ODT) tablet 4 mg  4 mg Oral Q8H PRN    simethicone (MYLICON) tablet 80 mg  80 mg Oral QID PRN    butalbital-acetaminophen-caffeine (FIORICET, ESGIC) -40 mg per tablet 2 Tablet  2 Tablet Oral Q6H PRN    metoprolol tartrate (LOPRESSOR) tablet 25 mg  25 mg Oral Q12H    traZODone (DESYREL) tablet 50 mg  50 mg Oral QHS    diazePAM (VALIUM) tablet 5 mg  5 mg Oral Q6H PRN    HYDROmorphone (DILAUDID) injection 0.2 mg  0.2 mg IntraVENous Q3H PRN    alteplase (CATHFLO) 1 mg in sterile water (preservative free) 1 mL injection  1 mg InterCATHeter PRN    polyethylene glycol (MIRALAX) packet 17 g  17 g Oral BID    magnesium hydroxide (MILK OF MAGNESIA) 400 mg/5 mL oral suspension 30 mL  30 mL Oral DAILY PRN    phenol throat spray (CHLORASEPTIC) 1 Spray  1 Spray Oral PRN    senna-docusate (PERICOLACE) 8.6-50 mg per tablet 2 Tablet  2 Tablet Oral DAILY    0.9% sodium chloride infusion 250 mL  250 mL IntraVENous PRN    sodium chloride (NS) flush 5-40 mL  5-40 mL IntraVENous Q8H    sodium chloride (NS) flush 5-40 mL  5-40 mL IntraVENous PRN    acetaminophen (TYLENOL) tablet 650 mg  650 mg Oral Q6H PRN    Or    acetaminophen (TYLENOL) suppository 650 mg  650 mg Rectal Q6H PRN    pantoprazole (PROTONIX) 40 mg in 0.9% sodium chloride 10 mL injection  40 mg IntraVENous DAILY    prochlorperazine (COMPAZINE) injection 10 mg  10 mg IntraVENous Q6H PRN          LAB AND IMAGING FINDINGS:     Lab Results   Component Value Date/Time    WBC 14.9 (H) 10/17/2022 01:14 AM    HGB 9.4 (L) 10/17/2022 01:14 AM    PLATELET 686 04/61/7858 01:14 AM     Lab Results   Component Value Date/Time    Sodium 139 10/17/2022 01:14 AM    Potassium 2.8 (L) 10/17/2022 01:14 AM    Chloride 107 10/17/2022 01:14 AM    CO2 26 10/17/2022 01:14 AM    BUN 15 10/17/2022 01:14 AM    Creatinine 0.69 10/17/2022 01:14 AM    Calcium 7.9 (L) 10/17/2022 01:14 AM    Magnesium 1.6 10/17/2022 01:14 AM    Phosphorus 1.6 (L) 10/17/2022 01:14 AM      Lab Results   Component Value Date/Time    Alk. phosphatase 191 (H) 10/13/2022 02:24 PM    Protein, total 5.3 (L) 10/13/2022 02:24 PM    Albumin 2.6 (L) 10/13/2022 02:24 PM    Globulin 2.7 10/13/2022 02:24 PM     Lab Results   Component Value Date/Time    INR 1.3 (H) 10/17/2022 01:14 AM    Prothrombin time 12.9 (H) 10/17/2022 01:14 AM    aPTT 23.9 08/22/2022 03:55 AM      Lab Results   Component Value Date/Time    Iron 39 08/24/2022 08:20 AM    TIBC 316 08/24/2022 08:20 AM    Iron % saturation 12 (L) 08/24/2022 08:20 AM    Ferritin 23 08/24/2022 08:20 AM      Lab Results   Component Value Date/Time    pH 7.37 10/03/2022 10:28 AM    PCO2 23 (L) 10/03/2022 10:28 AM    PO2 88 10/03/2022 10:28 AM     No components found for: GLPOC   No results found for: CPK, CKMB             Total time: 35 minutes  Counseling / coordination time, spent as noted above: 25 minutes  > 50% counseling / coordination?: yes    Prolonged service was provided for  []30 min   []75 min in face to face time in the presence of the patient, spent as noted above. Time Start:   Time End:   Note: this can only be billed with 37381 (initial) or 28782 (follow up). If multiple start / stop times, list each separately.

## 2022-10-18 NOTE — DISCHARGE SUMMARY
Discharge Summary       PATIENT ID: Chema Lamas  MRN: [id number]   YOB: 1945    DATE OF ADMISSION: 9/28/2022  5:05 AM    DATE OF DISCHARGE: 10/18/22   PRIMARY CARE PROVIDER: Peggy Melchor NP     ATTENDING PHYSICIAN: Tex Carrel  DISCHARGING PROVIDER: Shanthi Davalos MD    To contact this individual call [phone] and ask the  to page. If unavailable ask to be transferred the Adult Hospitalist Department. CONSULTATIONS: IP CONSULT TO HOSPITALIST  IP CONSULT TO ONCOLOGY  IP CONSULT TO PALLIATIVE CARE - PROVIDER  IP CONSULT TO GASTROENTEROLOGY  IP CONSULT TO INFECTIOUS DISEASES  IP CONSULT TO INTERVENTIONAL RADIOLOGY  IP CONSULT TO INTERVENTIONAL RADIOLOGY  IP CONSULT TO PALLIATIVE CARE - PROVIDER  IP CONSULT TO CARDIOLOGY  IP CONSULT TO CARDIOLOGY    PROCEDURES/SURGERIES: Procedure(s): INFUSION CATHETER INSERTION    DISCHARGE DIAGNOSES:  Liver mass adeno ca     Chema Lamas is a 77 y.o. female is brought to the ED via EMS for abdominal pain and nausea. She also reported watery diarrhea. Patient was recently hospitalized from 9/12-9/16 for abdominal pain and constipation and was found to have metastatic disease. She underwent liver biopsy which showed adenocarcinoma. Patient denied fever or chills. She denied dysuria, urgency or frequency. Work-up in the ED was significant for an abnormal abdominal pelvic CT that showed progression of hepatic lesions with a new large fluid collection in the liver with suggestion that this could be intrahepatic abscess. There are also fluid-filled cecum. Right pleural effusion. Patient was started on levofloxacin and Flagyl and was referred to the hospitalist service for admission evaluation. [address] [facility],[address] COURSE:   Liver mass biopsy positive for adenoCA   Recent (Feb 2022) jennifer-colectomy for a large 6cm dysplastic polyp (zero of 29nodes);  CT abdo/pelvis on admission worrisome for a cecal malignancy. Liver mass are concerning for metastatic colon cancer. S/P post drainage of liver mass blood the fluid came out hepatic abscesses versus organized hematoma; looks like hematoma only cultures no growth  ID following no more treatment  Patient was seen by hematology oncology patient is not a candidate for aggressive chemo      Elevated LFTs and bilirubin: due to Shock liver: resolved  Neuro:  some ICU delirium- resolved; Hypotension on scheduled midodrine 10 mg 3 times daily     A. fib RVR:  - HR controlled now on amiodarone, digoxine, metoprolol  keep magnesium above 2 and potassium above 4     Cardiomyopathy:   EF of 25 to 30% with severe hypokinesis of the mid to distal portions of the anterior, lateral and inferior walls with akinesis at the apex with some RV dysfunction as well-CAD versus Takotsubo;  Plan to get a repeat echo in a few days;  Digoxin on hold due to elevated levels     Hypophosphatemia hypokalemia we will replete potassium phosphate  Hypomagnesemia resolved magnesium 1.6     Acute hypoxic respiratory failure resolved  supplemental oxygen as needed;  presumed COPD, standing bronchodilator/steroid therapy for now, saturation goal 88 to 94 % Stable oxygen levels on 2-3 L via nasal cannula     GERD continue PPI     stable on increased valium dose     Long term prognosis remains poor;   Patient agreed for comfort measures but does not want to sign with hospice       DISCHARGE DIAGNOSES / PLAN:      Discharge to SNF    BMI: Body mass index is 20 kg/m². . This patient: Has a BMI within normal limits. PENDING TEST RESULTS:   At the time of discharge the following test results are still pending:      ADDITIONAL CARE RECOMMENDATIONS:        NOTIFY YOUR PHYSICIAN FOR ANY OF THE FOLLOWING:   Fever over 101 degrees for 24 hours. Chest pain, shortness of breath, fever, chills, nausea, vomiting, diarrhea, change in mentation, falling, weakness, bleeding.  Severe pain or pain not relieved by medications, as well as any other signs or symptoms that you may have questions about. FOLLOW UP APPOINTMENTS:    Follow-up Information       Follow up With Specialties Details Why Contact Info    Robby Cooney NP Family Medicine, General Practice Follow up in 1 week(s)  [facility]  Mail Stop [id number]  [facility] [phone]                 DIET: Regular Diet    ACTIVITY: Activity as tolerated    EQUIPMENT needed:     DISCHARGE MEDICATIONS:  Current Discharge Medication List        CONTINUE these medications which have NOT CHANGED    Details   dicyclomine (BENTYL) 10 mg capsule Take 1 Capsule by mouth four (4) times daily. Qty: 1 Capsule, Refills: 0      ondansetron (ZOFRAN ODT) 4 mg disintegrating tablet Take 1 Tablet by mouth every eight (8) hours as needed for Nausea or Vomiting. Qty: 1 Tablet, Refills: 0      polyethylene glycol (MIRALAX) 17 gram packet Take 1 Packet by mouth every twelve (12) hours. Qty: 1 Each, Refills: 0      simethicone (MYLICON) 80 mg chewable tablet Take 1 Tablet by mouth four (4) times daily as needed for GI Pain. Qty: 2 Tablet, Refills: 0      butalbital-acetaminophen-caffeine (FIORICET, ESGIC) -40 mg per tablet Take 2 Tablets by mouth two (2) times daily as needed for Headache or Migraine. Qty: 6 Tablet, Refills: 0      digoxin (LANOXIN) 0.125 mg tablet Take 0.125 mg by mouth daily. metoprolol tartrate (LOPRESSOR) 25 mg tablet Take 12.5 mg by mouth two (2) times a day. cyclobenzaprine (FLEXERIL) 10 mg tablet Take 10 mg by mouth three (3) times daily as needed. aspirin 81 mg chewable tablet Take 81 mg by mouth daily. multivitamin (ONE A DAY) tablet Take 1 Tablet by mouth daily. acetaminophen (TYLENOL) 325 mg tablet Take 650 mg by mouth every four (4) hours as needed for Pain. albuterol (PROVENTIL HFA, VENTOLIN HFA, PROAIR HFA) 90 mcg/actuation inhaler Take 2 Puffs by inhalation every six (6) hours as needed. pantoprazole (PROTONIX) 40 mg tablet Take 40 mg by mouth daily. STOP taking these medications       lubiPROStone (AMITIZA) 24 mcg capsule Comments:   Reason for Stopping:               DISPOSITION:    Home With:   OT  PT  HH  RN      X Long term SNF/Inpatient Rehab    Independent/assisted living    Hospice    Other:       PATIENT CONDITION AT DISCHARGE:     Functional status   X Poor     Deconditioned     Independent      Cognition     Lucid    X Forgetful    X Dementia      Catheters/lines (plus indication)    Kendrick     PICC     PEG    X None      Code status     Full code    X DNR      PHYSICAL EXAMINATION AT DISCHARGE:  General:          Alert, cooperative, no distress, appears stated age. HEENT:           Atraumatic, anicteric sclerae, pink conjunctivae                          No oral ulcers, mucosa moist, throat clear, dentition fair  Neck:               Supple, symmetrical  Lungs:             Clear to auscultation bilaterally. No Wheezing or Rhonchi. No rales. Chest wall:      No tenderness  No Accessory muscle use. Heart:              Regular  rhythm,  No  murmur   No edema  Abdomen:        Soft, non-tender. Not distended. Bowel sounds normal  Extremities:     No cyanosis. No clubbing,                            Skin turgor normal, Capillary refill normal  Skin:                Not pale. Not Jaundiced  No rashes   Psych:             Not anxious or agitated.   Neurologic:      Alert, moves all extremities, answers questions appropriately and responds to commands       CHRONIC MEDICAL DIAGNOSES:  Problem List as of 10/18/2022 Date Reviewed: 6/29/2022            Codes Class Noted - Resolved    Severe protein-calorie malnutrition ([facility] ICD-10-CM: E43  ICD-9-CM: 262  10/6/2022 - Present        Anxiety ICD-10-CM: F41.9  ICD-9-CM: 300.00  10/6/2022 - Present        HFrEF (heart failure with reduced ejection fraction) ([facility] ICD-10-CM: I50.20  ICD-9-CM: 428.20  10/4/2022 - Present Chronic idiopathic constipation ICD-10-CM: K59.04  ICD-9-CM: 564.00  10/4/2022 - Present        Palliative care encounter ICD-10-CM: Z51.5  ICD-9-CM: V66.7  10/3/2022 - Present        End of life care ICD-10-CM: Z51.5  ICD-9-CM: V66.7  10/3/2022 - Present        Shortness of breath ICD-10-CM: R06.02  ICD-9-CM: 786.05  10/3/2022 - Present        Chronic low back pain without sciatica, unspecified back pain laterality ICD-10-CM: M54.50, G89.29  ICD-9-CM: 724.2, 338.29  10/3/2022 - Present        Liver abscess ICD-10-CM: K75.0  ICD-9-CM: 572.0  9/28/2022 - Present        Abdominal pain ICD-10-CM: R10.9  ICD-9-CM: 789.00  9/28/2022 - Present        Adenocarcinoma ([facility] ICD-10-CM: C80.1  ICD-9-CM: 199.1  9/28/2022 - Present        Dehydration ICD-10-CM: E86.0  ICD-9-CM: 276.51  8/21/2022 - Present        Metabolic acidosis I-86-KR: E87.20  ICD-9-CM: 276.2  8/21/2022 - Present        Hypotension ICD-10-CM: I95.9  ICD-9-CM: 458.9  8/21/2022 - Present        Symptomatic anemia ICD-10-CM: D64.9  ICD-9-CM: 285.9  8/21/2022 - Present        Acute respiratory failure ([facility] ICD-10-CM: J96.00  ICD-9-CM: 518.81  6/29/2022 - Present        Respiratory distress ICD-10-CM: R06.03  ICD-9-CM: 786.09  6/29/2022 - Present        Adenomatous colon polyp ICD-10-CM: D12.6  ICD-9-CM: 211.3  2/14/2022 - Present        Adenomatous polyp of colon ICD-10-CM: D12.6  ICD-9-CM: 211.3  2/14/2022 - Present        Adenomatous polyp of descending colon ICD-10-CM: D12.4  ICD-9-CM: 211.3  1/13/2022 - Present        RESOLVED: Hyperkalemia ICD-10-CM: E87.5  ICD-9-CM: 276.7  9/12/2022 - 9/16/2022           Greater than 30  minutes were spent with the patient on counseling and coordination of care    Signed:   Vanesa Tenorio MD  10/18/2022  1:22 PM

## 2022-10-18 NOTE — PROGRESS NOTES
CM left message for pt's Nickolas Schwab [phone] to confirm choice of facilities.     Alva Gibson RN/CRM  [phone]

## 2022-10-18 NOTE — PROGRESS NOTES
1033: Perfect served NP, Sascha, \" wasn't able to give patient her AM medications, became to uncomfortable/agitated with cleaning up her BM. Able to give 0.2 diluadid prior (effective) but can I have something PRN in IV form for anxiety incase she's not cooperative taking valium tablet? \"    1100: Palliative placed order for comfort measures and requested to have all prior medications discontinued    1920: Edema noted in left hand, KVO iv fluids disconnected from patient. Unable to obtain new PIV, comfort care and plan for DC tomorrow. Extended PIV still works. Will keep in place for safety for PRN medications. Patient family is at bedside, aware and updated of hand.

## 2022-10-18 NOTE — WOUND CARE
WOCN Note:      Follow up assessment of sacrum and heels. Chart reviewed. Assessed in room  602A. Admitted DX:  Liver abscess      Assessment:   Patient is alert, confused, communicative and requires assist with repositioning. Bed: jayashree gel  Patient has a hatch  Patient reports no pain. 1. POA Sacrum, blanching pink erythema. Protected with large sacral foam dressing and applied Venelex. 2.  POA Bilateral heels, blanching red erythema. Applied Venelex. 3.  Lumbar spine, non blanching red erythema. Scattered over field 12 x 3 x 0 cm; Protect with foam dressing and applied Venelex. 4.  Left low leg, 2 skin tears each about 0.5 x 0.5 x 0.1 cm; 100% red; no exudate or odor. 5.  Right arm, skin tear:  1 x 1 x 0.1 cm; 100% pink; no exudate or odor. Wound, Pressure Prevention & Skin Care Recommendations:    Minimize layers of linen/pads under patient to optimize support surface. 2.  Turn/reposition approximately every 2 hours and offload heels. 3.  Manage moisture/ Keep skin folds clean and dry/minimize brief usage. 4.  Sacrum, heels and lumbar spine:  TID roll back foam and apply Venelex; Change foam every 3 days and as needed. 5.  Left low leg and right arm skin tears:  Every 3 days cleanse with saline; apply petrolatum gel and foam dressing.       Transition of Care:   Plan to follow as needed while admitted to hospital.     Brody Peterson, MARKYN RN [facility] Inpatient Wound Care  Available on Employee Benefit Plans  Office [phone]

## 2022-10-18 NOTE — PROGRESS NOTES
Transitions of care:  Natalia Martinez and Deb accepted in [facility]. CM needs to confirm choice of facilities with pt's [address]. Also need to explain the Medicare SNF benefit and advise that pending progress (pt on comfort care) pt may switch to private pay at facility. Need to confirm pt's ability to pay.     Theo Granados 53   [phone]

## 2022-10-18 NOTE — DISCHARGE INSTRUCTIONS
Discharge Instructions       PATIENT ID: Dio Agrawal  MRN: [id number]   YOB: 1945    DATE OF ADMISSION: [unfilled]    DATE OF DISCHARGE: 10/20/2022    PRIMARY CARE PROVIDER: @PCP@     ATTENDING PHYSICIAN: [unfilled]  DISCHARGING PROVIDER: Annemarie Sanchez MD    To contact this individual call [phone] and ask the  to page. If unavailable ask to be transferred the Adult Hospitalist Department. DISCHARGE DIAGNOSES liver mass dx adeno ca     CONSULTATIONS: [unfilled]    PROCEDURES/SURGERIES: Procedure(s): INFUSION CATHETER INSERTION    PENDING TEST RESULTS:   At the time of discharge the following test results are still pending:     FOLLOW UP APPOINTMENTS:   [unfilled]     ADDITIONAL CARE RECOMMENDATIONS:     DIET: Regular Diet    ACTIVITY: Activity as tolerated    WOUND CARE:     EQUIPMENT needed:       Radiology      DISCHARGE MEDICATIONS:   See Medication Reconciliation Form    It is important that you take the medication exactly as they are prescribed. Keep your medication in the bottles provided by the pharmacist and keep a list of the medication names, dosages, and times to be taken in your wallet. Do not take other medications without consulting your doctor. NOTIFY YOUR PHYSICIAN FOR ANY OF THE FOLLOWING:   Fever over 101 degrees for 24 hours. Chest pain, shortness of breath, fever, chills, nausea, vomiting, diarrhea, change in mentation, falling, weakness, bleeding. Severe pain or pain not relieved by medications. Or, any other signs or symptoms that you may have questions about.       DISPOSITION:    Home With:   OT  PT  HH  RN      x SNF/Inpatient Rehab/LTAC    Independent/assisted living    Hospice    Other:     CDMP Checked:   Yes x     PROBLEM LIST Updated:  Yes x       Signed:   Annemarie Sanchez MD  10/20/2022  1:19 PM

## 2022-10-18 NOTE — PROGRESS NOTES
Physical Therapy  (10/18/22)    Chart reviewed. Attempted to see pt for therapy, noted pt with plan for \"comfort care\" (not hospice) per palliative team. Pt yelling out and upset upon arrival. Pt is stating \"My sons are 36years old, they can die in peace. .. they want me to die. Khushboo Chauncey Grossman \" Pt appears confused and agitated. Ms Yosi Lincoln unable to be redirected at this time and not appropriate for skilled services. Pt planned for D/C to SNF and this remains appropriate.     Thank you,  Frieda Joyner, PT, DPT

## 2022-10-19 PROBLEM — Z02.9 DISCHARGE PLANNING ISSUES: Status: ACTIVE | Noted: 2022-10-19

## 2022-10-19 PROCEDURE — 51798 US URINE CAPACITY MEASURE: CPT

## 2022-10-19 PROCEDURE — 74011250637 HC RX REV CODE- 250/637: Performed by: NURSE PRACTITIONER

## 2022-10-19 PROCEDURE — 74011000250 HC RX REV CODE- 250: Performed by: HOSPITALIST

## 2022-10-19 PROCEDURE — 74011250637 HC RX REV CODE- 250/637: Performed by: HOSPITALIST

## 2022-10-19 PROCEDURE — 74011250637 HC RX REV CODE- 250/637: Performed by: EMERGENCY MEDICINE

## 2022-10-19 PROCEDURE — C9113 INJ PANTOPRAZOLE SODIUM, VIA: HCPCS | Performed by: HOSPITALIST

## 2022-10-19 PROCEDURE — 65270000029 HC RM PRIVATE

## 2022-10-19 PROCEDURE — 74011250637 HC RX REV CODE- 250/637: Performed by: FAMILY MEDICINE

## 2022-10-19 PROCEDURE — 74011250636 HC RX REV CODE- 250/636: Performed by: HOSPITALIST

## 2022-10-19 PROCEDURE — 74011250636 HC RX REV CODE- 250/636: Performed by: NURSE PRACTITIONER

## 2022-10-19 RX ADMIN — SODIUM CHLORIDE, PRESERVATIVE FREE 10 ML: 5 INJECTION INTRAVENOUS at 12:30

## 2022-10-19 RX ADMIN — MIDODRINE HYDROCHLORIDE 10 MG: 5 TABLET ORAL at 17:36

## 2022-10-19 RX ADMIN — SENNOSIDES AND DOCUSATE SODIUM 2 TABLET: 50; 8.6 TABLET ORAL at 12:30

## 2022-10-19 RX ADMIN — DIAZEPAM 5 MG: 10 TABLET ORAL at 23:48

## 2022-10-19 RX ADMIN — POLYETHYLENE GLYCOL 3350 17 G: 17 POWDER, FOR SOLUTION ORAL at 17:39

## 2022-10-19 RX ADMIN — POLYETHYLENE GLYCOL 3350 17 G: 17 POWDER, FOR SOLUTION ORAL at 12:30

## 2022-10-19 RX ADMIN — BUTALBITAL, ACETAMINOPHEN, AND CAFFEINE 2 TABLET: 50; 325; 40 TABLET ORAL at 14:10

## 2022-10-19 RX ADMIN — SODIUM CHLORIDE, PRESERVATIVE FREE 10 ML: 5 INJECTION INTRAVENOUS at 05:47

## 2022-10-19 RX ADMIN — DIAZEPAM 5 MG: 10 TABLET ORAL at 03:12

## 2022-10-19 RX ADMIN — MIDODRINE HYDROCHLORIDE 10 MG: 5 TABLET ORAL at 12:30

## 2022-10-19 RX ADMIN — BUTALBITAL, ACETAMINOPHEN, AND CAFFEINE 2 TABLET: 50; 325; 40 TABLET ORAL at 20:44

## 2022-10-19 RX ADMIN — Medication: at 22:00

## 2022-10-19 RX ADMIN — SODIUM CHLORIDE, PRESERVATIVE FREE 10 ML: 5 INJECTION INTRAVENOUS at 22:07

## 2022-10-19 RX ADMIN — PANTOPRAZOLE SODIUM 40 MG: 40 INJECTION, POWDER, FOR SOLUTION INTRAVENOUS at 12:30

## 2022-10-19 RX ADMIN — BUTALBITAL, ACETAMINOPHEN, AND CAFFEINE 2 TABLET: 50; 325; 40 TABLET ORAL at 03:12

## 2022-10-19 RX ADMIN — DIAZEPAM 2.5 MG: 5 INJECTION, SOLUTION INTRAMUSCULAR; INTRAVENOUS at 17:41

## 2022-10-19 RX ADMIN — ACETAMINOPHEN 650 MG: 325 TABLET, FILM COATED ORAL at 23:49

## 2022-10-19 NOTE — PROGRESS NOTES
JEREMIE: anticipate d/c to SNF under private pay Vs [address] Vs Private Pay caregivers pending MPO[employer] choice for comfort care measures as pt desires; Accepting SNFs:  [facility]    May need Rapid Covid test for SNF placement    CM noted hospice consult, CM to follow up on this once d/c plan established    [employer] [employer]    Primary family contact:Ian SAINI [facility] [phone]    RUR: 23%  -Adenocarcinoma  -PT/OT has signed off as per comfort measures plan  -Palliative following, plan for comfort measures pending progress  -0900-CM reviewed pt chart & left VM for pt's Chelo SAINI, to discuss transitional care planning. CM was informed by Palliative NP that pt desires to be on comfort measures. CM to follow. 1220-CM was spoke with pt's Maurilio Malagon and discussed d/c plan options, caregiver vs [facility] vs SNF. Lorena Wolf stated that he intends to contact [place] group for cost information and thinks he cannot afford Private Pay SNF cost; he will contact CM back with decision. 1640-CM left Private Pay Caregivers list for POA with Nurse to give him.    William Carmona RN BSN CCM

## 2022-10-19 NOTE — DISCHARGE SUMMARY
Discharge Summary       PATIENT ID: Palma Jackson  MRN: [id number]   YOB: 1945    DATE OF ADMISSION: 9/28/2022  5:05 AM    DATE OF DISCHARGE: 10/18/22   PRIMARY CARE PROVIDER: Toyin Gray NP     ATTENDING PHYSICIAN: Myles Mitchell  DISCHARGING PROVIDER: Larissa Negro MD    To contact this individual call [phone] and ask the  to page. If unavailable ask to be transferred the Adult Hospitalist Department. CONSULTATIONS: IP CONSULT TO HOSPITALIST  IP CONSULT TO ONCOLOGY  IP CONSULT TO PALLIATIVE CARE - PROVIDER  IP CONSULT TO GASTROENTEROLOGY  IP CONSULT TO INFECTIOUS DISEASES  IP CONSULT TO INTERVENTIONAL RADIOLOGY  IP CONSULT TO INTERVENTIONAL RADIOLOGY  IP CONSULT TO PALLIATIVE CARE - PROVIDER  IP CONSULT TO CARDIOLOGY  IP CONSULT TO CARDIOLOGY    PROCEDURES/SURGERIES: Procedure(s): INFUSION CATHETER INSERTION    DISCHARGE DIAGNOSES:  Liver mass adeno ca     Palma Jackson is a 77 y.o. female is brought to the ED via EMS for abdominal pain and nausea. She also reported watery diarrhea. Patient was recently hospitalized from 9/12-9/16 for abdominal pain and constipation and was found to have metastatic disease. She underwent liver biopsy which showed adenocarcinoma. Patient denied fever or chills. She denied dysuria, urgency or frequency. Work-up in the ED was significant for an abnormal abdominal pelvic CT that showed progression of hepatic lesions with a new large fluid collection in the liver with suggestion that this could be intrahepatic abscess. There are also fluid-filled cecum. Right pleural effusion. Patient was started on levofloxacin and Flagyl and was referred to the hospitalist service for admission evaluation. [address] [facility],[address] COURSE:   Liver mass biopsy positive for adenoCA   Recent (Feb 2022) jennifer-colectomy for a large 6cm dysplastic polyp (zero of 29nodes);  CT abdo/pelvis on admission worrisome for a cecal malignancy. Liver mass are concerning for metastatic colon cancer. S/P post drainage of liver mass blood the fluid came out hepatic abscesses versus organized hematoma; looks like hematoma only cultures no growth  ID following no more treatment  Patient was seen by hematology oncology patient is not a candidate for aggressive chemo      Elevated LFTs and bilirubin: due to Shock liver: resolved  Neuro:  some ICU delirium- resolved; Hypotension on scheduled midodrine 10 mg 3 times daily     A. fib RVR:  - HR controlled now on amiodarone, digoxine, metoprolol  keep magnesium above 2 and potassium above 4     Cardiomyopathy:   EF of 25 to 30% with severe hypokinesis of the mid to distal portions of the anterior, lateral and inferior walls with akinesis at the apex with some RV dysfunction as well-CAD versus Takotsubo;  Plan to get a repeat echo in a few days;  Digoxin on hold due to elevated levels     Hypophosphatemia hypokalemia we will replete potassium phosphate  Hypomagnesemia resolved magnesium 1.6     Acute hypoxic respiratory failure resolved  supplemental oxygen as needed;  presumed COPD, standing bronchodilator/steroid therapy for now, saturation goal 88 to 94 % Stable oxygen levels on 2-3 L via nasal cannula     GERD continue PPI     stable on increased valium dose     Long term prognosis remains poor;   Patient agreed for comfort measures but does not want to sign with hospice       DISCHARGE DIAGNOSES / PLAN:      Discharge to SNF    BMI: Body mass index is 20 kg/m². . This patient: Has a BMI within normal limits. PENDING TEST RESULTS:   At the time of discharge the following test results are still pending:      ADDITIONAL CARE RECOMMENDATIONS:        NOTIFY YOUR PHYSICIAN FOR ANY OF THE FOLLOWING:   Fever over 101 degrees for 24 hours. Chest pain, shortness of breath, fever, chills, nausea, vomiting, diarrhea, change in mentation, falling, weakness, bleeding.  Severe pain or pain not relieved by medications, as well as any other signs or symptoms that you may have questions about. FOLLOW UP APPOINTMENTS:    Follow-up Information       Follow up With Specialties Details Why Contact Info    Jared Stock NP Family Medicine, General Practice Follow up in 1 week(s)  [facility]  Mail Stop [phone] [facility] [phone]                 DIET: Regular Diet    ACTIVITY: Activity as tolerated    EQUIPMENT needed:     DISCHARGE MEDICATIONS:  Current Discharge Medication List        CONTINUE these medications which have NOT CHANGED    Details   dicyclomine (BENTYL) 10 mg capsule Take 1 Capsule by mouth four (4) times daily. Qty: 1 Capsule, Refills: 0      ondansetron (ZOFRAN ODT) 4 mg disintegrating tablet Take 1 Tablet by mouth every eight (8) hours as needed for Nausea or Vomiting. Qty: 1 Tablet, Refills: 0      polyethylene glycol (MIRALAX) 17 gram packet Take 1 Packet by mouth every twelve (12) hours. Qty: 1 Each, Refills: 0      simethicone (MYLICON) 80 mg chewable tablet Take 1 Tablet by mouth four (4) times daily as needed for GI Pain. Qty: 2 Tablet, Refills: 0      butalbital-acetaminophen-caffeine (FIORICET, ESGIC) -40 mg per tablet Take 2 Tablets by mouth two (2) times daily as needed for Headache or Migraine. Qty: 6 Tablet, Refills: 0      digoxin (LANOXIN) 0.125 mg tablet Take 0.125 mg by mouth daily. metoprolol tartrate (LOPRESSOR) 25 mg tablet Take 12.5 mg by mouth two (2) times a day. cyclobenzaprine (FLEXERIL) 10 mg tablet Take 10 mg by mouth three (3) times daily as needed. aspirin 81 mg chewable tablet Take 81 mg by mouth daily. multivitamin (ONE A DAY) tablet Take 1 Tablet by mouth daily. acetaminophen (TYLENOL) 325 mg tablet Take 650 mg by mouth every four (4) hours as needed for Pain. albuterol (PROVENTIL HFA, VENTOLIN HFA, PROAIR HFA) 90 mcg/actuation inhaler Take 2 Puffs by inhalation every six (6) hours as needed. pantoprazole (PROTONIX) 40 mg tablet Take 40 mg by mouth daily. STOP taking these medications       lubiPROStone (AMITIZA) 24 mcg capsule Comments:   Reason for Stopping:               DISPOSITION:    Home With:   OT  PT  HH  RN      X Long term SNF/Inpatient Rehab    Independent/assisted living    Hospice    Other:       PATIENT CONDITION AT DISCHARGE:     Functional status   X Poor     Deconditioned     Independent      Cognition     Lucid    X Forgetful    X Dementia      Catheters/lines (plus indication)    Kendrick     PICC     PEG    X None      Code status     Full code    X DNR      PHYSICAL EXAMINATION AT DISCHARGE:  General:          Alert, cooperative, no distress, appears stated age. HEENT:           Atraumatic, anicteric sclerae, pink conjunctivae                          No oral ulcers, mucosa moist, throat clear, dentition fair  Neck:               Supple, symmetrical  Lungs:             Clear to auscultation bilaterally. No Wheezing or Rhonchi. No rales. Chest wall:      No tenderness  No Accessory muscle use. Heart:              Regular  rhythm,  No  murmur   No edema  Abdomen:        Soft, non-tender. Not distended. Bowel sounds normal  Extremities:     No cyanosis. No clubbing,                            Skin turgor normal, Capillary refill normal  Skin:                Not pale. Not Jaundiced  No rashes   Psych:             Not anxious or agitated.   Neurologic:      Alert, moves all extremities, answers questions appropriately and responds to commands       CHRONIC MEDICAL DIAGNOSES:  Problem List as of 10/19/2022 Date Reviewed: 6/29/2022            Codes Class Noted - Resolved    Discharge planning issues ICD-10-CM: Z02.9  ICD-9-CM: V68.9  10/19/2022 - Present        Severe protein-calorie malnutrition ([facility] ICD-10-CM: E43  ICD-9-CM: 262  10/6/2022 - Present        Anxiety ICD-10-CM: F41.9  ICD-9-CM: 300.00  10/6/2022 - Present        HFrEF (heart failure with reduced ejection fraction) ([facility] ICD-10-CM: I50.20  ICD-9-CM: 428.20  10/4/2022 - Present        Chronic idiopathic constipation ICD-10-CM: K59.04  ICD-9-CM: 564.00  10/4/2022 - Present        Palliative care encounter ICD-10-CM: Z51.5  ICD-9-CM: V66.7  10/3/2022 - Present        End of life care ICD-10-CM: Z51.5  ICD-9-CM: V66.7  10/3/2022 - Present        Shortness of breath ICD-10-CM: R06.02  ICD-9-CM: 786.05  10/3/2022 - Present        Chronic bilateral low back pain without sciatica ICD-10-CM: M54.50, G89.29  ICD-9-CM: 724.2, 338.29  10/3/2022 - Present        Liver abscess ICD-10-CM: K75.0  ICD-9-CM: 572.0  9/28/2022 - Present        Abdominal pain ICD-10-CM: R10.9  ICD-9-CM: 789.00  9/28/2022 - Present        Adenocarcinoma ([facility] ICD-10-CM: C80.1  ICD-9-CM: 199.1  9/28/2022 - Present        Dehydration ICD-10-CM: E86.0  ICD-9-CM: 276.51  8/21/2022 - Present        Metabolic acidosis MXI-30-XX: E87.20  ICD-9-CM: 276.2  8/21/2022 - Present        Hypotension ICD-10-CM: I95.9  ICD-9-CM: 458.9  8/21/2022 - Present        Symptomatic anemia ICD-10-CM: D64.9  ICD-9-CM: 285.9  8/21/2022 - Present        Acute respiratory failure ([facility] ICD-10-CM: J96.00  ICD-9-CM: 518.81  6/29/2022 - Present        Respiratory distress ICD-10-CM: R06.03  ICD-9-CM: 786.09  6/29/2022 - Present        Adenomatous colon polyp ICD-10-CM: D12.6  ICD-9-CM: 211.3  2/14/2022 - Present        Adenomatous polyp of colon ICD-10-CM: D12.6  ICD-9-CM: 211.3  2/14/2022 - Present        Adenomatous polyp of descending colon ICD-10-CM: D12.4  ICD-9-CM: 211.3  1/13/2022 - Present        RESOLVED: Hyperkalemia ICD-10-CM: E87.5  ICD-9-CM: 276.7  9/12/2022 - 9/16/2022         Greater than 30  minutes were spent with the patient on counseling and coordination of care    Signed:   Solitario Liz MD  10/19/2022  1:22 PM

## 2022-10-19 NOTE — PROGRESS NOTES
Palliative Medicine Consult  Rich: [phone] ([phone])    Patient Name: Gilles Horn  YOB: 1945    Date of Initial Consult: 10/3/2022  Reason for Consult: care decisions  Requesting Provider: Brendan Smith    Primary Care Physician: Kamla Luque NP     SUMMARY:   Gilles Horn is a 77 y.o. presented to the ED on 9/28/2022 from home to the ED due to abdominal pain, nausea and diarrhea. Admitted with a diagnosis of possible liver abscess but more likely liver hematoma. Had liver biopsy on 9/15/2022. Also anemia and afib with rapid ventricular response. Hx metastatic adenocarcinoma      PMH:  metastatic adenocarcinoma, left colon resection 2/2022 (per patient this was for polyps)  traylor's esophagus, HH, GERD, [place], osteoporosis, seizures, chronic back pain, former smoker, PAF, hyperlipidemia, anemia, chronic constipation, COPD, R THR, L THR    Noted hospitalizations: September  (9/12/2022 to 9/16/2022 for abdominal pain. Liver biopsy done. August (8/21/2022 to 8/25/2022 for JOE, COPD, anemia)   June  (6/29/2022 to 6/30/2022 for COPD)    Current medical issues leading to Palliative Medicine involvement include:  care decisions. Noted on last admission, patient seen by palliative and AMD completed on 9-. Social:   Bal Ledesma  (son-in-law)        Matthew Ayers (son)  Patient is . 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 She has one daughter Austin Graves) who she does not have a relationship with as Angelina Mayos was upset that the patient has received blood transfusions in the past.  Alexrocael Mcguire is a Latter day. Jaime Leyva lives with the patient and provides some care. He fixes her breakfast but then leaves for work. Patient then feeds herself and dresses herself. She has a shower chair. Patient's son is Matthew Ayers. He is currently incarcerated due to DUI. He is expected to be in assisted for 7-8 more months. PALLIATIVE DIAGNOSES:   Comfort care  Shortness of breath  Anxiety   Chronic back pain       PLAN:   Patient is alert, very spry, commenting on the young man changing out the needle box and that she likes his hair. Asked patient if she wants therapy to work with her and she says she is too weak  Spoke to case management about the discharge plan. Patient will have to cover long term care   CM has call out to Anjel Powers. CM is advising that patient will need a hospice agency as the long term care facility will ask. I told Anjel Powers that I will call him at noon today. Patient was agreeable to talk to hospice      10/18/2022  Saw patient this am. She has refused some of her meds. Will dc the medications the patient is not taking. Update to Dr. Taran Austin. Patient did sign a new POST order. She has expressed she now is ready to die and wants comfort care. I wrote a note that the previous POST was voided. Original and copy of POST at the bedside for Vladymir and copy on the chart   Patient has questions about her life insurance policy. Contacted [facility]. I helped the patient with the call. The  said she would have to call the insurance agency if she wanted to make changes in the beneficiaries. RN requested IV anxiolytic as patient not taking the oral valium   Added 2.5 mg IV valium q 4 hours prn  Will update Vladymir. Tried to call but no answer. 10/18/2022  Patient looks worse today. Eyes closed but does open her eyes. At first, she did not remember that her son was here on Saturday. I reminded the patient that he did visit. She says she wants to see him again before she dies  Spoke to patient about that she is ready for discharge. Anjel Maori can not take care of her in the home as he is working during the day. Told patient that CM is working on nursing facility placement. In further talking to the patient, she says she is tired and she wants to die.    She does not want hospice, however. We talked about the services hospice provides. Patient did agree to comfort care . Update to Dr. Rangel Dickson, case management about comfort care but no hospice. Left message for Chelo about the patient's wishes. CM will be in touch with him about placement. Order for comfort care. 10/14/2022  Spoke to patient today. She looks a little brighter this am.    I have been following patient and in daily contact with Chelo about facilitating the visit with the patient's son. Also talked to Anjel Powers about the fact that the patient is suffering. The son does call the patient during the evening hours. Anjel Powers is usually here to help the patient with the call. I had told Anjel Powers that if a visit with the son isn't going to happen soon or isn't going to be possible, then I strongly recommend that the son tell his mother that it is OK for her to accept comfort care and die peacefully. Spoke to Anjel Powers. He said Don Krueger did not call his mother last pm.  Anjel Powers has relayed my message to Jose De Jesus Mayes is hopeful a visit will be arranged for no later than Monday. RN reports pain is going to IR for drain placement in area of fluid collection. Addendum:   5:30 pm   at the bedside with the patient   Per RN, patient went to IR but no fluid to drain. Patient looks much brighter than she has. Don Pati, her son called with myself and Chelo present. Chelo told Don Krueger he will be visiting tomorrow around 10am to the 12 noon timeframe. He and the patient were very excited. I spoke to Don Krueger (via speaker) and let him know his mother's wish was to hug him one last time. I told him she has been suffering and she will likely opt for comfort care after she see him. Don Krueger was a little emotional. Support provided. Anjel Powers will be here tomorrow at the time of the visit. Patient wants her body to be donated to science.   Provided Chelo with the local agency's number (if she has registered)  patient says she has registered but unsure of paperwork. Also provided Eliar with an alternate agency in [place].    Patient says she wants her 3rd COVID booster. Relayed to Dr. Ania Alicea that patient my opt for comfort care after seeing her son tomorrow    10/7/2022  Patient appears less anxious and less frail than yesterday afternoon. RN reports she did sleep last pm.  Noted that trazodone 50 mg added  Anxiety:  Patient has received valium 5 mg po x 3 doses (including dose RN is administering at this time). Patient reports she is feeling better. Continue valium 5 mg q 6 hours prn. Chronic back pain: Patient has received hydromorphone 0.2 mg IV x 3 doses (including dose RN is administering at this time). Patient does not want the hydromorphone. She wants Fioricet 2 tabs q 6 hours as needed. Spoke to pharmacy about this dosage and frequency. Ok for Fioricet 2 tabs q 6 hours prn but do not exceed 6 tabs in 24 hours. RN and patient aware. Will continue prn dilaudid  Constipation:  per RN patient has had BM in the past 24 hours. Continue pericolace 2 tabs q am, Miralax bid, and prn MOM 30 mL  5. Awaiting transfer to telemetry bed. Addendum:  Letter written per Chelo's request to support the son being able to visit the patient    10/6/2022  RN called as patient c/o back pain. On Fioricet per her PTA meds. This is not helping. RN also reports patient is anxious. Patient does appear more frail. She does not appear more SOB. Will check in with Dr. Stafford Comment regarding pain and anxiety management. Will increase the valium from 2 mg to 5 mg po q 6 hours prn. Dc Fioricet    Add hydromorphone 0.2 mg IV q 3 hours prn. (Patient did not want tramadol)  Patient continues to state she is not ready for comfort care. We did talk about the fact that she may have to be at peace with not being able to see her son.           10/5/2022  Patient transferred to a higher level of care yesterday afternoon due to low BP. BP has been stable and no additional support needed. Patient will be observed today   Talked more about patient's cancer. Patient said Dr. Lydia Campbell told her she will probably die before [place]. She said she never thought she would die of cancer. Jimmy Hyde is working to see if a visit with the son can be arranged. Patient said she will elect for comfort care once she is able to see her son. Patient has not had a relationship with her daughter, Sam Gr. Lien was upset with the patient that she received blood as Lien is a Christian. Patient gave me her phone number and said I could call her 'if you want'. I did speak to [place] and Lien said she would call her mother. Patient later stated Lien did call her. Constipation:  Patient is passing gas. On pericolace 2 tabs q am, Miralax daily. Will increase Miralax to bid and add MOM 30 ml daily prn. Patient reports her SOB is better. On 2 LPM.    Back pain:  has prn Fioricet. Palliative will continue to follow patient. I will be out of the office on 10/6. Call palliative office if care is needed, otherwise I will see her on 10/7     4 pm Addendum:   Reviewed a POST order with the patient. She does not want comfort care but does not want intubation/ventilator for respiratory distress. See POST form for these orders:   Do not use intubation or mechanical ventilation  May consider less invasive airway support (e.g. CPAP or BiPAP)  Use additional medical treatment, antibiotics, and cardiac monitoring as indicated. Hospital transfer if needed. Avoid intensive care unit if possible. See other orders:  patient would want vasopressors if needed (understands this includes ICU) No feeding tube     Will update Vladymir on the POST orders. 10/4/2022  Patient reports her SOB is slightly better. Remains on 2 LPM.  HR is 116 this am.   Patient stated the doctors have said she is not doing well.   Patient did not want to talk further at this time but did agree for me to come back. I will follow up this afternoon. Patient is on valium 2 mg q 6 hours prn anxiety per the hospitalist.  She has received one dose in the last 24 hours. She did say she wished the dose was 5 mg. Yesterday,  patient requested Fioricet for pain as she takes this at home. She has had one dose in the last 24 hours. (Noted that she has N&V with oxycodone and hydrocodone). Patient has not had a BM. Will increase the Pericolace to 2 tabs per day. Continue Miralax. Patient states she has had issues with having either diarrhea or constipation for many years. She was bulimic/anorexic in the past.   Talked to Anjel Powers about the fact that patient would like to be able to see and hug her son, Dex Carolina. Anjel Powers is checking with Jono's  about a possible visit (whether this be in the detention or arrange for the patient to see him outside of the detention). I offered to write a letter to support her terminal condition and limited life expectancy). Plan to meet with Anjel Powers in the patient's room at 4:30  Patient is a DNR. Working to see if we can help with arrangements for her to visit her son. 10/3/2022  Met with patient and introduced palliative care services  Talked about patient's medical condition and her understanding. She had liver biopsy on 9/12 but just recently learned of the results. Se is aware of her cancer diagnosis and the liver involvement. Cardiology NP in to talk to patient about her heart condition. NP explained her heart rhythm is now normal but her liver enzymes are elevated and she will need to stop the current medication. The NP explained her EF is 20-25% which is a change from her last echo. Patient seemed to understand this meant her heart was weak. We also talked about her other medical conditions including her COPD  See social history above. Patient has been independent with her care.   Her son- in-law helps with some meal prep and takes her to appointments. Patient reports she is currently SOB. RRT was recently called. She states it feels hard to get the air in and out. Patient to be diuresed. O2 @ 2 LPM.   Feels slightly anxious. Patient is on valium 2 mg q 6 hours prn anxiety. Patient does report a decreased appetite. Has occasional nausea. Has prn compazine ordered. Constipation: No BM since 9/29. Change Miralax from prn to daily. Add Amber-colace 1 tab daily. Hx chronic back pain/generalized pain: add Fioricet 2 tabs bid prn per PTA med list   Discussed the goals of care with patient. Patient has an AMD.  Elissa Fothergill is her primary decision maker. We also discussed her code status. Patient verbalized understanding of her medical condition. We discussed what resuscitation/CPR would include. Patient did elect for a DNR code status. We did discuss comfort care and hospice. Patient had questions about her prognosis. We talked about her multiple medical conditions. Patient then stated she wants to continue treatment at this time. She desires to hug her son one more time before she dies. He is incarcerated and isn't due to be out for 7-8 months. Will continue with current plan of care. Spoke to the patient's son-in-law. We reviewed her current medical condition.    Informed him of patient's decision for DNR status but otherwise continue care at this time   Initial consult note routed to primary continuity provider and/or primary health care team members  Communicated plan of care with: Chrissy Abbasi 192 Team     GOALS OF CARE / TREATMENT PREFERENCES:     GOALS OF CARE:  Patient/Health Care Proxy Stated Goals: Comfort    TREATMENT PREFERENCES:   Code Status: DNR  (this decision was made on 10/3/2022)    Patient and family's personal goals include:  comfort care    Important upcoming milestones or family events: son will be in senior living for another 7-8 months. Advance Care Planning:  [x] [facility] Inter[facility] Team has updated the ACP Navigator with Health Care Decision Maker and Patient Capacity      Primary Decision Maker: Vikki Meredith - [phone]    Secondary Decision Maker: Jeny Reynaga - Son - [phone]  Advance Care Planning 10/7/2022   Patient's Healthcare Decision Maker is: Named in scanned ACP document   Confirm Advance Directive -   Patient Would Like to Complete Advance Directive -   Does the patient have other document types MOST/MOLST/POST/POLST       Medical Interventions: Comfort measures       Other:    As far as possible, the palliative care team has discussed with patient / health care proxy about goals of care / treatment preferences for patient.      HISTORY:     History obtained from: chart, team, family    CHIEF COMPLAINT: Pt admitted with aforementioned history and issues    HPI/SUBJECTIVE:    The patient is:   [x] Verbal and participatory  [] Non-participatory due to: medical condition        Clinical Pain Assessment (nonverbal scale for severity on nonverbal patients):   Clinical Pain Assessment  Severity: 7  Location: low back  Character: aching  Duration: chronic  Effect: fiorecet helps          Duration: for how long has pt been experiencing pain (e.g., 2 days, 1 month, years)  Frequency: how often pain is an issue (e.g., several times per day, once every few days, constant)     FUNCTIONAL ASSESSMENT:     Palliative Performance Scale (PPS):  PPS: 30       PSYCHOSOCIAL/SPIRITUAL SCREENING:     Palliative IDT has assessed this patient for cultural preferences / practices and a referral made as appropriate to needs (Cultural Services, Patient Advocacy, Ethics, etc.)    Any spiritual / Yazidi concerns:  [] Yes /  [x] No  [] Unable to obtain this information  If \"Yes\" to discuss with pastoral care during IDT     Does caregiver feel burdened by caring for their loved one:   [] Yes /  [x] No /  [] No Caregiver Present/Available [] No Caregiver [] Pt Lives at Facility  If \"Yes\" to discuss with social work during IDT    Anticipatory grief assessment:   [x] Normal  / [] Maladaptive   [] Unable to obtain this information  [] n/a  If \"Maladaptive\" to discuss with social work during IDT    ESAS Anxiety: Anxiety: 0    ESAS Depression: Depression: 2        REVIEW OF SYSTEMS:     Positive and pertinent negative findings in ROS are noted above in HPI. The following systems were [x] reviewed / [] unable to be reviewed as noted in HPI  Other findings are noted below. Systems: constitutional, ears/nose/mouth/throat, respiratory, gastrointestinal, genitourinary, musculoskeletal, integumentary, neurologic, psychiatric, endocrine. Positive findings noted below. Modified ESAS Completed by: provider   Fatigue: 2 Drowsiness: 0   Depression: 2 Pain: 7   Anxiety: 0 Nausea: 0   Anorexia: 5 Dyspnea: 0     Constipation: No (patient reports BM this am)     Stool Occurrence(s): 1        PHYSICAL EXAM:     From RN flowsheet:  Wt Readings from Last 3 Encounters:   10/14/22 112 lb 14 oz (51.2 kg)   09/15/22 92 lb (41.7 kg)   07/21/22 93 lb (42.2 kg)     Blood pressure 110/70, pulse 66, temperature 97.8 °F (36.6 °C), resp. rate 16, height 5' 3\" (1.6 m), weight 112 lb 14 oz (51.2 kg), SpO2 97 %.     Pain Scale 1: Numeric (0 - 10)  Pain Intensity 1: 3  Pain Onset 1: acute  Pain Location 1: Back  Pain Orientation 1: Posterior  Pain Description 1: Aching  Pain Intervention(s) 1: Medication (see MAR)  Last bowel movement, if known:     Constitutional: resting in bed, more alert than yesterday, states \"I can't believe I am still alive\"  Eyes: pupils equal, anicteric  ENMT: no nasal discharge, moist mucous membranes  Cardiovascular:  edema in  hands (decreased)  Respiratory: breathing not labored, symmetric, room air   Gastrointestinal: semi-soft  : hatch in place  Musculoskeletal: no deformity, no tenderness to palpation,   Skin: warm, dry  Neurologic: following commands, moving all extremities  Psychiatric: , no hallucinations,   Other:       HISTORY:     Active Problems:    Liver abscess (9/28/2022)      Abdominal pain (9/28/2022)      Adenocarcinoma ([address] (9/28/2022)      Palliative care encounter (10/3/2022)      End of life care (10/3/2022)      Shortness of breath (10/3/2022)      Chronic bilateral low back pain without sciatica (10/3/2022)      Chronic idiopathic constipation (10/4/2022)      Severe protein-calorie malnutrition ([address] (10/6/2022)      Anxiety (10/6/2022)    Past Medical History:   Diagnosis Date    Adenoma of colon     Anemia     Arthritis     Atrial fibrillation (HCC)     Bloating     Chronic constipation     Chronic pain     back     COPD (chronic obstructive pulmonary disease) (HCC)     GERD (gastroesophageal reflux disease)     [place] (hard of hearing)     Hyponatremia     caused seizures x 2 per pt    Indigestion     Osteoporosis     Seizures ([address] 7/2020, 9/2020    x 2       Past Surgical History:   Procedure Laterality Date    COLONOSCOPY N/A 12/16/2021    COLONOSCOPY   :- performed by Elaine Barbour MD at [place] [address] N/A 2/14/2022    . performed by Shante Cortez MD at [facility] MAIN OR    HX [address]    HX CHOLECYSTECTOMY  1982    HX HIP REPLACEMENT Left     pt stated hip was pinned, not replaced    HX HIP REPLACEMENT Right     pt stated hip was pinned, not replaced    HX HYSTERECTOMY      HX ORTHOPAEDIC  1990, 2011    bilateral hip fractures     HX ORTHOPAEDIC Left 2020    femur fracture    HX TONSILLECTOMY        Family History   Problem Relation Age of Onset    Heart Disease Mother     Heart Disease Father     Liver Disease Father     Alcohol abuse Father     Anesth Problems Neg Hx       History reviewed, no pertinent family history.   Social History     Tobacco Use    Smoking status: Former     Packs/day: 1.00     Years: 20.00     Pack years: 20.00     Types: Cigarettes     Quit date: 12/18/2011     Years since quitting: 10.8    Smokeless tobacco: Never   Substance Use Topics    Alcohol use: Never     Allergies   Allergen Reactions    Cefuroxime Hives    Hydrocodone Nausea and Vomiting    Other Medication Diarrhea and Nausea and Vomiting     PT REPORTS ALL MYCINS CAUSE SEVERE GI UPSET    Oxycodone Nausea and Vomiting    Penicillins Hives     Patient screened for any delayed non-IgE-mediated reaction to PCN.         Patient notes the following:    No delayed non-IgE-mediated reaction to PCN    Hives 1969            Current Facility-Administered Medications   Medication Dose Route Frequency    diazePAM (VALIUM) injection 2.5 mg  2.5 mg IntraVENous Q4H PRN    midodrine (PROAMATINE) tablet 10 mg  10 mg Oral TID WITH MEALS    albuterol-ipratropium (DUO-NEB) 2.5 MG-0.5 MG/3 ML  3 mL Nebulization Q6H PRN    [START ON 10/27/2022] amiodarone (CORDARONE) tablet 200 mg  200 mg Oral DAILY    balsam peru-castor oiL (VENELEX) ointment   Topical TID    ondansetron (ZOFRAN) injection 4 mg  4 mg IntraVENous Q4H PRN    Or    ondansetron (ZOFRAN ODT) tablet 4 mg  4 mg Oral Q8H PRN    simethicone (MYLICON) tablet 80 mg  80 mg Oral QID PRN    butalbital-acetaminophen-caffeine (FIORICET, ESGIC) -40 mg per tablet 2 Tablet  2 Tablet Oral Q6H PRN    metoprolol tartrate (LOPRESSOR) tablet 25 mg  25 mg Oral Q12H    traZODone (DESYREL) tablet 50 mg  50 mg Oral QHS    diazePAM (VALIUM) tablet 5 mg  5 mg Oral Q6H PRN    HYDROmorphone (DILAUDID) injection 0.2 mg  0.2 mg IntraVENous Q3H PRN    alteplase (CATHFLO) 1 mg in sterile water (preservative free) 1 mL injection  1 mg InterCATHeter PRN    polyethylene glycol (MIRALAX) packet 17 g  17 g Oral BID    magnesium hydroxide (MILK OF MAGNESIA) 400 mg/5 mL oral suspension 30 mL  30 mL Oral DAILY PRN    phenol throat spray (CHLORASEPTIC) 1 Spray  1 Spray Oral PRN    senna-docusate (PERICOLACE) 8.6-50 mg per tablet 2 Tablet  2 Tablet Oral DAILY 0.9% sodium chloride infusion 250 mL  250 mL IntraVENous PRN    sodium chloride (NS) flush 5-40 mL  5-40 mL IntraVENous Q8H    sodium chloride (NS) flush 5-40 mL  5-40 mL IntraVENous PRN    acetaminophen (TYLENOL) tablet 650 mg  650 mg Oral Q6H PRN    Or    acetaminophen (TYLENOL) suppository 650 mg  650 mg Rectal Q6H PRN    pantoprazole (PROTONIX) 40 mg in 0.9% sodium chloride 10 mL injection  40 mg IntraVENous DAILY    prochlorperazine (COMPAZINE) injection 10 mg  10 mg IntraVENous Q6H PRN          LAB AND IMAGING FINDINGS:     Lab Results   Component Value Date/Time    WBC 14.9 (H) 10/17/2022 01:14 AM    HGB 9.4 (L) 10/17/2022 01:14 AM    PLATELET 035 74/63/9367 01:14 AM     Lab Results   Component Value Date/Time    Sodium 139 10/17/2022 01:14 AM    Potassium 2.8 (L) 10/17/2022 01:14 AM    Chloride 107 10/17/2022 01:14 AM    CO2 26 10/17/2022 01:14 AM    BUN 15 10/17/2022 01:14 AM    Creatinine 0.69 10/17/2022 01:14 AM    Calcium 7.9 (L) 10/17/2022 01:14 AM    Magnesium 1.6 10/17/2022 01:14 AM    Phosphorus 1.6 (L) 10/17/2022 01:14 AM      Lab Results   Component Value Date/Time    Alk.  phosphatase 191 (H) 10/13/2022 02:24 PM    Protein, total 5.3 (L) 10/13/2022 02:24 PM    Albumin 2.6 (L) 10/13/2022 02:24 PM    Globulin 2.7 10/13/2022 02:24 PM     Lab Results   Component Value Date/Time    INR 1.3 (H) 10/17/2022 01:14 AM    Prothrombin time 12.9 (H) 10/17/2022 01:14 AM    aPTT 23.9 08/22/2022 03:55 AM      Lab Results   Component Value Date/Time    Iron 39 08/24/2022 08:20 AM    TIBC 316 08/24/2022 08:20 AM    Iron % saturation 12 (L) 08/24/2022 08:20 AM    Ferritin 23 08/24/2022 08:20 AM      Lab Results   Component Value Date/Time    pH 7.37 10/03/2022 10:28 AM    PCO2 23 (L) 10/03/2022 10:28 AM    PO2 88 10/03/2022 10:28 AM     No components found for: GLPOC   No results found for: CPK, CKMB             Total time:15 minutes  Counseling / coordination time, spent as noted above: 10 minutes  > 50% counseling / coordination?: yes    Prolonged service was provided for  []30 min   []75 min in face to face time in the presence of the patient, spent as noted above. Time Start:   Time End:   Note: this can only be billed with 57148 (initial) or 16039 (follow up). If multiple start / stop times, list each separately.

## 2022-10-20 VITALS
SYSTOLIC BLOOD PRESSURE: 117 MMHG | HEART RATE: 67 BPM | TEMPERATURE: 97.2 F | OXYGEN SATURATION: 96 % | RESPIRATION RATE: 16 BRPM | DIASTOLIC BLOOD PRESSURE: 68 MMHG | BODY MASS INDEX: 20 KG/M2 | HEIGHT: 63 IN | WEIGHT: 112.88 LBS

## 2022-10-20 LAB
ACID FAST STN SPEC: NEGATIVE
COVID-19 RAPID TEST, COVR: NOT DETECTED
MYCOBACTERIUM SPEC QL CULT: NORMAL
SOURCE, COVRS: NORMAL
SPECIMEN PREPARATION: NORMAL
SPECIMEN SOURCE: NORMAL

## 2022-10-20 PROCEDURE — 74011250637 HC RX REV CODE- 250/637: Performed by: NURSE PRACTITIONER

## 2022-10-20 PROCEDURE — C9113 INJ PANTOPRAZOLE SODIUM, VIA: HCPCS | Performed by: HOSPITALIST

## 2022-10-20 PROCEDURE — 74011250636 HC RX REV CODE- 250/636: Performed by: NURSE PRACTITIONER

## 2022-10-20 PROCEDURE — 87635 SARS-COV-2 COVID-19 AMP PRB: CPT

## 2022-10-20 PROCEDURE — 74011250636 HC RX REV CODE- 250/636: Performed by: HOSPITALIST

## 2022-10-20 PROCEDURE — 74011000250 HC RX REV CODE- 250: Performed by: HOSPITALIST

## 2022-10-20 RX ORDER — MIDODRINE HYDROCHLORIDE 10 MG/1
10 TABLET ORAL
Qty: 90 TABLET | Refills: 0 | Status: SHIPPED | OUTPATIENT
Start: 2022-10-20 | End: 2022-11-19

## 2022-10-20 RX ORDER — AMIODARONE HYDROCHLORIDE 200 MG/1
200 TABLET ORAL DAILY
Qty: 30 TABLET | Refills: 0 | Status: SHIPPED | OUTPATIENT
Start: 2022-10-27 | End: 2022-11-26

## 2022-10-20 RX ORDER — TRAZODONE HYDROCHLORIDE 50 MG/1
50 TABLET ORAL
Qty: 30 TABLET | Refills: 0 | Status: SHIPPED | OUTPATIENT
Start: 2022-10-20

## 2022-10-20 RX ADMIN — HYDROMORPHONE HYDROCHLORIDE 0.2 MG: 1 INJECTION, SOLUTION INTRAMUSCULAR; INTRAVENOUS; SUBCUTANEOUS at 08:37

## 2022-10-20 RX ADMIN — SODIUM CHLORIDE, PRESERVATIVE FREE 10 ML: 5 INJECTION INTRAVENOUS at 06:35

## 2022-10-20 RX ADMIN — BUTALBITAL, ACETAMINOPHEN, AND CAFFEINE 2 TABLET: 50; 325; 40 TABLET ORAL at 13:37

## 2022-10-20 RX ADMIN — POLYETHYLENE GLYCOL 3350 17 G: 17 POWDER, FOR SOLUTION ORAL at 08:41

## 2022-10-20 RX ADMIN — PANTOPRAZOLE SODIUM 40 MG: 40 INJECTION, POWDER, FOR SOLUTION INTRAVENOUS at 08:42

## 2022-10-20 RX ADMIN — Medication: at 09:00

## 2022-10-20 RX ADMIN — BUTALBITAL, ACETAMINOPHEN, AND CAFFEINE 2 TABLET: 50; 325; 40 TABLET ORAL at 04:41

## 2022-10-20 NOTE — PROGRESS NOTES
Nutrition Brief    Pt is comfort care. She's currently receiving snacks and supplements per preferences and awaiting placement. Discharge summary in. Nutrition will continue to follow peripherally.      Neri Boggs RD  Available via [address] [place] [address]

## 2022-10-20 NOTE — PROGRESS NOTES
JEREMIE: anticipate d/c to [facility] today    Rapid Covid test negative 10/20    CM noted hospice consult, this will be addressed at SNF as plan for rehab initially    UAI screening requested,  to send to SNF    BLS Transport scheduled with [facility] for 2pm    Primary family contact:Ponce SAINI [phone]     RUR: 23%  -Adenocarcinoma  -PT/OT has signed off as per comfort measures plan  -Palliative following, plan for comfort measures pending progress  -Pt/Ot recs. SNF pending [address] [facility] -0920-CM reviewed pt chart & met and spoke with Jose Luis Beard of [address] [facility] who advised they may be able to accept pt under rehab benefit with possible transition to LTC pending progress. Imani to inform CM if they can accept today. CM to follow. 1220-CM spoke with Imani of [facility] that they can accept today, and requested transport for 2pm. CM confirmed [facility] tranport. CM informed nurse & Attending of d/c plan. Janette Ruff also in agreement with d/c plan as per phone call consent. Tabitha Arce RN BSN CCM Medicare pt has received, reviewed, and signed 2nd IM letter informing them of their right to appeal the discharge. Signed copy has been placed on pt bedside chart. Transition of Care Plan to SNF/Rehab    SNF/Rehab Transition:  Patient has been accepted to [facility] and meets criteria for admission. Patient will transported by [facility] and expected to leave at 2pm    Communication to Patient/Family:  Contacted Chelo LAZCANO(identified care giver) and they are agreeable to the transition plan. Communication to SNF/Rehab:  Bedside RN, Paulina Michele has been notified to update the transition plan to the facility and call report (phone number [phone]). Discharge information has been updated on the AVS.       []   Nursing Please include all hard scripts for controlled substances, med rec and dc summary, and AVS in packet.      Reviewed and confirmed with facility, Imani of [facility], can manage the patient care needs for the following:     SNF/Rehab Transition:  PCP/Specialist:as directed    Reviewed and confirmed with facility, Imani of [facility] they can manage the patient care needs for the following:     [employer] with (X) only those applicable:    Medication:  [x]  Medications will be available at the facility  []  IV Antibiotics  []  Controlled Substance - hard copy to be sent with patient   []  Weekly Labs   Documents:  [] Hard RX  [x] MAR  [x] Kardex  [x] AVS  []Transfer Summary  [x]Discharge   Equipment:  []  CPAP/BiPAP  []  Wound Vacuum  []  Kendrick or Urinary Device  []  PICC/Central Line  []  Nebulizer  []  Ventilator   Treatment:  []Isolation (for MRSA, VRE, etc.)  []Surgical Drain Management  []Tracheostomy Care  []Dressing Changes  []Dialysis with transportation and chair time  []PEG Care  []Oxygen  []Daily Weights for Heart Failure   Dietary:  []Any diet limitations  []Tube Feedings   []Total Parenteral Management (TPN)   Eligible for Medicaid Long Term Services and Supports  Yes:  [] Eligible for medical assistance or will become eligible within 180 days and UAI completed. [] Provider/Patient and/or support system has requested screening. [] UAI copy provided to patient or responsible party  [x] UAI unavailable at discharge will send once processed to SNF provider. [] UAI unavailable at discharged mailed to patient  No:   [] Private pay and is not financially eligible for Medicaid within the next 180 days. [] Reside out-of-state.   [] A residents of a state owned/operated facility that is licensed  by [facility] or [facility]  [] Enrollment in [facility] hospice services  []  [facility] [address]  [] Patient /Family declines to have screening completed or provide financial information for screening     Financial Resources:  Medicaid    [] Initiated and application pending   [] Full coverage     Advanced Care Plan:  []Surrogate Decision Maker of Care  []POA  [x]Communicated Code Status DNR   Other

## 2022-10-20 NOTE — PROGRESS NOTES
Called to give report to [place] three different times, no-one answered. Notified CM and they gave me Imani's number. Called Imani and she stated she will call the nursing staff to call nurse back to give report. PIV removed. Medicated prior to transport.      1447: Report called to [place] at [employer]

## 2022-10-20 NOTE — PROGRESS NOTES
[facility] Adult  Hospitalist Group      Brief HPI and Hospital Course:      Tahir Shook is a 77 y.o. female is brought to the ED via EMS for abdominal pain and nausea. She also reported watery diarrhea. Patient was recently hospitalized from 9/12-9/16 for abdominal pain and constipation and was found to have metastatic disease. She underwent liver biopsy which showed adenocarcinoma. Patient denied fever or chills. She denied dysuria, urgency or frequency. Work-up in the ED was significant for an abnormal abdominal pelvic CT that showed progression of hepatic lesions with a new large fluid collection in the liver with suggestion that this could be intrahepatic abscess. There are also fluid-filled cecum. Right pleural effusion. Patient was started on levofloxacin and Flagyl and was referred to the hospitalist service for admission evaluation. Subjective: Follow up adenocarcinoma   No new issues  Comfortably laying in bed      Assessment and Plan:  liver mass biopsy positive for adenoCA that is not [address]  Recent (Feb 2022) jennifer-colectomy for a large 6cm dysplastic polyp (zero of 29nodes);  CT abdo/pelvis on admission worrisome for a cecal malignancy. Liver mass are concerning for metastatic colon cancer. Appreciate Heme/Onc consult and recommendations; Poor candidate for any aggressive therapy; Is post drainage of liver mass blood the fluid came out hepatic abscesses versus organized hematoma; looks like hematoma only cultures no growth  ID following no more treatment    Elevated LFTs and bilirubin:  due to Shock liver: resolved  Appreciate Hepatology consult and recommendations  the rise in TBILI generally lags behind the rise and peak in liver enzymes by 3-5 days.   metastatic disease to the liver noted  Minimally elevated alk phos persists likely due to cancer/metastatic disease    Neuro:  - some ICU delirium- resolved;   - minimize/ avoid benzos Cardiac:  continue hemodynamic monitoring, MAP goal greater than 65;  - wean scheduled midodrine - decrease to 10 mg TID    A. fib RVR:  - HR controlled now on amiodarone, digoxine, metoprolol  keep magnesium above 2 and potassium above 4  -cardiology consulted    Cardiomyopathy:   EF of 25 to 30% with severe hypokinesis of the mid to distal portions of the anterior, lateral and inferior walls with akinesis at the apex with some RV dysfunction as well-CAD versus Takotsubo;  Plan to get a repeat echo in a few days;  Digoxin on hold due to elevated levels    Hypophosphatemia hypokalemia we will replete potassium phosphate  Hypomagnesemia resolved magnesium 1.6     Acute hypoxic respiratory failure resolved  supplemental oxygen as needed;  presumed COPD, standing bronchodilator/steroid therapy for now, saturation goal 88 to 94 % Stable oxygen levels on 2-3 L via nasal cannula     GERD continue PPI     stable on increased valium dose     Long term prognosis remains poor;   Palliative Medicine consulted; --possibly patient is hospice appropriate  follow-up oncology and palliative care team [address] discussions with patient and NOK      Code status: DNR  Discharge planning: SNF  Dispo in 24 hours         ROS: unable to obtain due to patient factors    PHYSICAL EXAMINATION:  Visit Vitals  /68 (BP 1 Location: Right arm)   Pulse 67 Comment: per Zenon Huizar RN   Temp (!) 96.7 °F (35.9 °C)   Resp 16   Ht 5' 3\" (1.6 m)   Wt 51.2 kg (112 lb 14 oz)   SpO2 96%   BMI 20.00 kg/m²     Patient Vitals for the past 24 hrs:   Temp Pulse Resp BP SpO2   10/20/22 0929 (!) 96.7 °F (35.9 °C) 67 16 117/68 96 %   10/20/22 0202 97.5 °F (36.4 °C) 66 18 118/71 96 %   10/19/22 2117 98.4 °F (36.9 °C) 69 16 105/65 97 %   10/19/22 1540 98.3 °F (36.8 °C) 70 17 97/66 97 %   10/19/22 1230 -- 72 -- 97/64 --        General:          sleepy, cooperative, weak and chronically ill appearing  HEENT:           Atraumatic, MMM , O2 via NC           Neck: Supple, symmetrical,  thyroid: non tender  Lungs:             decreased breath sounds bilaterally. No Wheezing or Rhonchi. No rales. Heart:              Regular  rhythm,  3/6 Systolic ejection  murmur    Abdomen:       Soft, mild diffuse tenderness. Not distended. Bowel sounds normal  Extremities:     No cyanosis. No clubbing,  +2 distal pulses, edema in all 4 ext sky upper ext  Skin:                Not pale. Not Jaundiced  No rashes   Psych:             anxious  Neurologic:      Alert, severe generalized weakness,      Labs:     No results for input(s): WBC, HGB, HCT, PLT, HGBEXT, HCTEXT, PLTEXT, HGBEXT, HCTEXT, PLTEXT in the last 72 hours. No results for input(s): NA, K, CL, CO2, BUN, CREA, GLU, CA, MG, PHOS, URICA in the last 72 hours. No results for input(s): ALT, AP, TBIL, TBILI, TP, ALB, GLOB, GGT, AML, LPSE in the last 72 hours. No lab exists for component: SGOT, GPT, AMYP, HLPSE    No results for input(s): INR, PTP, APTT, INREXT, INREXT in the last 72 hours. No results for input(s): FE, TIBC, PSAT, FERR in the last 72 hours. Lab Results   Component Value Date/Time    Folate 27.1 (H) 08/22/2022 03:55 AM        No results for input(s): PH, PCO2, PO2 in the last 72 hours. No results for input(s): CPK, CKNDX, TROIQ in the last 72 hours.     No lab exists for component: CPKMB  Lab Results   Component Value Date/Time    Cholesterol, total 141 08/22/2022 03:55 AM    HDL Cholesterol 57 08/22/2022 03:55 AM    LDL, calculated 71.6 08/22/2022 03:55 AM    Triglyceride 62 08/22/2022 03:55 AM    CHOL/HDL Ratio 2.5 08/22/2022 03:55 AM     Lab Results   Component Value Date/Time    Glucose (POC) 145 (H) 02/14/2022 09:01 PM     Lab Results   Component Value Date/Time    Color YELLOW/STRAW 10/03/2022 04:57 PM    Appearance CLEAR 10/03/2022 04:57 PM    Specific gravity 1.008 10/03/2022 04:57 PM    Specific gravity 1.010 09/28/2022 10:37 AM    pH (UA) 6.5 10/03/2022 04:57 PM    Protein Negative 10/03/2022 04:57 PM    Glucose Negative 10/03/2022 04:57 PM    Ketone Negative 10/03/2022 04:57 PM    Bilirubin Negative 10/03/2022 04:57 PM    Urobilinogen 0.2 10/03/2022 04:57 PM    Nitrites Negative 10/03/2022 04:57 PM    Leukocyte Esterase Negative 10/03/2022 04:57 PM    Epithelial cells FEW 10/03/2022 04:57 PM    Bacteria Negative 10/03/2022 04:57 PM    WBC 0-4 10/03/2022 04:57 PM    RBC 0-5 10/03/2022 04:57 PM       Current Facility-Administered Medications:     diazePAM (VALIUM) injection 2.5 mg, 2.5 mg, IntraVENous, Q4H PRN, Petrona BOSCH NP, 2.5 mg at 10/19/22 1741    midodrine (PROAMATINE) tablet 10 mg, 10 mg, Oral, TID WITH MEALS, Vera Sheets MD, 10 mg at 10/19/22 1736    albuterol-ipratropium (DUO-NEB) 2.5 MG-0.5 MG/3 ML, 3 mL, Nebulization, Q6H PRN, Emily Bonilla MD    [START ON 10/27/2022] amiodarone (CORDARONE) tablet 200 mg, 200 mg, Oral, DAILY, Celso Mercado, NP    balsam peru-castor oiL (VENELEX) ointment, , Topical, TID, Emily Bonilla MD, Given at 10/20/22 0900    ondansetron (ZOFRAN ODT) tablet 4 mg, 4 mg, Oral, Q8H PRN **OR** ondansetron (ZOFRAN) injection 4 mg, 4 mg, IntraVENous, Q4H PRN, Emily Bonilla MD, 4 mg at 10/16/22 1006    simethicone (MYLICON) tablet 80 mg, 80 mg, Oral, QID PRN, Emily Bonilla MD, 80 mg at 10/08/22 0323    butalbital-acetaminophen-caffeine (FIORICET, ESGIC) -40 mg per tablet 2 Tablet, 2 Tablet, Oral, Q6H PRN, Petrona BOSCH NP, 2 Tablet at 10/20/22 0441    metoprolol tartrate (LOPRESSOR) tablet 25 mg, 25 mg, Oral, Q12H, Celso Mercado NP, 25 mg at 10/18/22 2119    traZODone (DESYREL) tablet 50 mg, 50 mg, Oral, QHS, Niranjan De Los Santos MD, 50 mg at 10/18/22 2119    diazePAM (VALIUM) tablet 5 mg, 5 mg, Oral, Q6H PRN, Petrona BOSCH NP, 5 mg at 10/19/22 2348    HYDROmorphone (DILAUDID) injection 0.2 mg, 0.2 mg, IntraVENous, Q3H PRN, Petrona BOSCH NP, 0.2 mg at 10/20/22 0837    alteplase (CATHFLO) 1 mg in sterile water (preservative free) 1 mL injection, 1 mg, InterCATHeter, PRN, Niranjan Pabon MD    polyethylene glycol (MIRALAX) packet 17 g, 17 g, Oral, BID, Magaly Caicedo NP, 17 g at 10/20/22 0841    magnesium hydroxide (MILK OF MAGNESIA) 400 mg/5 mL oral suspension 30 mL, 30 mL, Oral, DAILY PRN, Mauricio Cartagenaet JOSEPHINE BOSCH    phenol throat spray (CHLORASEPTIC) 1 Spray, 1 Spray, Oral, PRN, Rosana Flores NP    senna-docusate (PERICOLACE) 8.6-50 mg per tablet 2 Tablet, 2 Tablet, Oral, DAILY, Mauricio Prophet HIRO, NP, 2 Tablet at 10/19/22 1230    0.9% sodium chloride infusion 250 mL, 250 mL, IntraVENous, PRN, Roxi Wills MD    sodium chloride (NS) flush 5-40 mL, 5-40 mL, IntraVENous, Q8H, Christen RMELIZA ALICEA MD, 10 mL at 10/20/22 0635    sodium chloride (NS) flush 5-40 mL, 5-40 mL, IntraVENous, PRN, Christen IBZCHRISTAXTZ MD NIXON, 10 mL at 10/16/22 1243    acetaminophen (TYLENOL) tablet 650 mg, 650 mg, Oral, Q6H PRN, 650 mg at 10/19/22 2349 **OR** acetaminophen (TYLENOL) suppository 650 mg, 650 mg, Rectal, Q6H PRN, Christen DNFHCALICE ALICEA MD    pantoprazole (PROTONIX) 40 mg in 0.9% sodium chloride 10 mL injection, 40 mg, IntraVENous, DAILY, Christen HPXIOMARA ALICEA MD, 40 mg at 10/20/22 0842    prochlorperazine (COMPAZINE) injection 10 mg, 10 mg, IntraVENous, Q6H PRN, Mindi Velásquez MD, 10 mg at 10/18/22 6527     CODE STATUS:   Full Code   X DNR    Partial    Comfort Care       Signed:   Abigail Menendez MD  Date of Service:  10/20/2022

## 2022-10-24 LAB
BACTERIA SPEC CULT: NORMAL
SERVICE CMNT-IMP: NORMAL

## 2023-05-16 RX ORDER — DIGOXIN 125 MCG
0.12 TABLET ORAL DAILY
COMMUNITY

## 2023-05-16 RX ORDER — TRAZODONE HYDROCHLORIDE 50 MG/1
1 TABLET ORAL NIGHTLY
COMMUNITY
Start: 2022-10-20

## 2023-05-16 RX ORDER — CYCLOBENZAPRINE HCL 10 MG
10 TABLET ORAL 3 TIMES DAILY PRN
COMMUNITY
Start: 2022-03-15

## 2023-05-16 RX ORDER — PANTOPRAZOLE SODIUM 40 MG/1
40 TABLET, DELAYED RELEASE ORAL DAILY
COMMUNITY
Start: 2020-12-15

## 2023-05-16 RX ORDER — BUTALBITAL, ACETAMINOPHEN AND CAFFEINE 50; 325; 40 MG/1; MG/1; MG/1
2 TABLET ORAL 2 TIMES DAILY PRN
COMMUNITY
Start: 2022-09-16

## 2023-05-16 RX ORDER — ALBUTEROL SULFATE 90 UG/1
2 AEROSOL, METERED RESPIRATORY (INHALATION) EVERY 6 HOURS PRN
COMMUNITY
Start: 2021-09-14

## 2023-05-16 RX ORDER — ASPIRIN 81 MG/1
81 TABLET, CHEWABLE ORAL DAILY
COMMUNITY

## 2023-05-16 RX ORDER — DICYCLOMINE HYDROCHLORIDE 10 MG/1
10 CAPSULE ORAL 4 TIMES DAILY
COMMUNITY
Start: 2022-09-16

## 2023-05-16 RX ORDER — ONDANSETRON 4 MG/1
4 TABLET, ORALLY DISINTEGRATING ORAL EVERY 8 HOURS PRN
COMMUNITY
Start: 2022-09-16

## 2023-05-16 RX ORDER — POLYETHYLENE GLYCOL 3350 17 G/17G
17 POWDER, FOR SOLUTION ORAL EVERY 12 HOURS
COMMUNITY
Start: 2022-09-16

## 2023-05-16 RX ORDER — SIMETHICONE 80 MG
80 TABLET,CHEWABLE ORAL 4 TIMES DAILY PRN
COMMUNITY
Start: 2022-09-16

## 2023-05-16 RX ORDER — ACETAMINOPHEN 325 MG/1
650 TABLET ORAL EVERY 4 HOURS PRN
COMMUNITY

## 2023-09-13 NOTE — ED TRIAGE NOTES
Sent to PCP to advise on refill ?   This nurse assisted Pt to the bedside commode at this time.     Given some wipes and the call bell for when she is finished

## (undated) DEVICE — COVER LT HNDL PLAS RIG 1 PER PK

## (undated) DEVICE — DRAPE,ROBOTICS,STERILE: Brand: MEDLINE

## (undated) DEVICE — SHEET,DRAPE,UNDERBUTTOCK,GRAD POUCH,PORT: Brand: MEDLINE

## (undated) DEVICE — TBG INSUFFLATION LUER LOCK: Brand: MEDLINE INDUSTRIES, INC.

## (undated) DEVICE — COLON CLOSING PACK: Brand: MEDLINE INDUSTRIES, INC.

## (undated) DEVICE — BASIN ST MAJOR-NO CAUTERY: Brand: MEDLINE INDUSTRIES, INC.

## (undated) DEVICE — Device

## (undated) DEVICE — Device: Brand: SINGLE USE INJECTOR NM600/610

## (undated) DEVICE — ACCESS PLATFORM FOR MINIMALLY INVASIVE SURGERY.: Brand: GELPORT® LAPAROSCOPIC  SYSTEM

## (undated) DEVICE — REM POLYHESIVE ADULT PATIENT RETURN ELECTRODE: Brand: VALLEYLAB

## (undated) DEVICE — SYR LR LCK 1ML GRAD NSAF 30ML --

## (undated) DEVICE — FORCEPS BX L240CM JAW DIA2.8MM L CAP W/ NDL MIC MESH TOOTH

## (undated) DEVICE — TRAY PREP DRY W/ PREM GLV 2 APPL 6 SPNG 2 UNDPD 1 OVERWRAP

## (undated) DEVICE — SUTURE PDS II SZ 1 L27IN ABSRB VLT CT-1 L36MM 1/2 CIR Z341H

## (undated) DEVICE — SYR 10ML LUER LOK 1/5ML GRAD --

## (undated) DEVICE — 40580 - THE PINK PAD - ADVANCED TRENDELENBURG POSITIONING KIT: Brand: 40580 - THE PINK PAD - ADVANCED TRENDELENBURG POSITIONING KIT

## (undated) DEVICE — SINGLE-USE BIOPSY FORCEPS: Brand: RADIAL JAW 4

## (undated) DEVICE — GLOVE ORANGE PI 7 1/2   MSG9075

## (undated) DEVICE — MARYLAND JAW LAPAROSCOPIC SEALER/DIVIDER COATED: Brand: LIGASURE

## (undated) DEVICE — PACK,BASIC,SIRUS,V: Brand: MEDLINE

## (undated) DEVICE — TROCAR: Brand: KII® OPTICAL ACCESS SYSTEM

## (undated) DEVICE — SUTURE MCRYL SZ 4-0 L27IN ABSRB UD L19MM PS-2 1/2 CIR PRIM Y426H

## (undated) DEVICE — LAPAROSCOPIC TROCAR SLEEVE/SINGLE USE: Brand: KII® OPTICAL ACCESS SYSTEM

## (undated) DEVICE — LUB BACTSTAT JLLY SURGLUB 3OZ --

## (undated) DEVICE — ENDO CARRY-ON PROCEDURE KIT INCLUDES ENZYMATIC SPONGE, GAUZE, BIOHAZARD LABEL, TRAY, LUBRICANT, DIRTY SCOPE LABEL, WATER LABEL, TRAY, DRAWSTRING PAD, AND DEFENDO 4-PIECE KIT.: Brand: ENDO CARRY-ON PROCEDURE KIT

## (undated) DEVICE — SUTURE VCRL SZ 3-0 L27IN ABSRB UD L26MM SH 1/2 CIR J416H

## (undated) DEVICE — ARTICULATING RELOAD WITH TRI-STAPLE TECHNOLOGY: Brand: ENDO GIA

## (undated) DEVICE — Z DISCONTINUED USE 2272114 DRAPE SURG UTIL 26X15 IN W/ TAPE N INVASIVE MULTLYR DISP

## (undated) DEVICE — PENCIL SMK EVAC 10 FT BLADE ELECTRD ROCKER FOR TELSCP

## (undated) DEVICE — FILTER SMK EVAC FLO CLR MEGADYNE

## (undated) DEVICE — GOWN,SIRUS,FABRNF,XL,20/CS: Brand: MEDLINE

## (undated) DEVICE — VISUALIZATION SYSTEM: Brand: CLEARIFY

## (undated) DEVICE — TOTAL TRAY, DB, 100% SILI FOLEY, 16FR 10: Brand: MEDLINE

## (undated) DEVICE — POWER SHELL: Brand: SIGNIA

## (undated) DEVICE — HYPODERMIC SAFETY NEEDLE: Brand: MAGELLAN

## (undated) DEVICE — PREP SKN CHLRAPRP APL 26ML STR --

## (undated) DEVICE — SUTURE SZ 0 27IN 5/8 CIR UR-6  TAPER PT VIOLET ABSRB VICRYL J603H

## (undated) DEVICE — TUBING HYDR IRR --

## (undated) DEVICE — STAIN INDIA INK IN NACL 10ML --

## (undated) DEVICE — 4-PORT MANIFOLD: Brand: NEPTUNE 2

## (undated) DEVICE — STERILE-Z MAYO STAND COVERS CLEAR POLYETHYLENE STERILE UNIVERSAL FIT 20 PER CASE: Brand: STERILE-Z

## (undated) DEVICE — INSUFFLATION NEEDLE: Brand: SURGINEEDLE

## (undated) DEVICE — LEGGINGS, PAIR, 31X48, STERILE: Brand: MEDLINE

## (undated) DEVICE — SUTURE PERMAHAND SZ 3-0 L18IN NONABSORBABLE BLK L26MM SH C013D

## (undated) DEVICE — GARMENT,MEDLINE,DVT,INT,CALF,MED, GEN2: Brand: MEDLINE

## (undated) DEVICE — GLOVE SURG SZ 8 L12IN FNGR THK79MIL GRN LTX FREE